# Patient Record
Sex: MALE | Race: WHITE | NOT HISPANIC OR LATINO | URBAN - METROPOLITAN AREA
[De-identification: names, ages, dates, MRNs, and addresses within clinical notes are randomized per-mention and may not be internally consistent; named-entity substitution may affect disease eponyms.]

---

## 2016-12-23 RX ORDER — CARVEDILOL PHOSPHATE 80 MG/1
1 CAPSULE, EXTENDED RELEASE ORAL
Qty: 0 | Refills: 0 | DISCHARGE
Start: 2016-12-23

## 2016-12-23 RX ORDER — FUROSEMIDE 40 MG
1 TABLET ORAL
Qty: 0 | Refills: 0 | COMMUNITY
Start: 2016-12-23

## 2017-01-31 PROBLEM — Z00.00 ENCOUNTER FOR PREVENTIVE HEALTH EXAMINATION: Status: ACTIVE | Noted: 2017-01-31

## 2017-02-16 ENCOUNTER — OUTPATIENT (OUTPATIENT)
Dept: OUTPATIENT SERVICES | Facility: HOSPITAL | Age: 67
LOS: 1 days | End: 2017-02-16
Payer: MEDICARE

## 2017-02-16 ENCOUNTER — APPOINTMENT (OUTPATIENT)
Dept: MRI IMAGING | Facility: HOSPITAL | Age: 67
End: 2017-02-16

## 2017-02-16 DIAGNOSIS — M46.26 OSTEOMYELITIS OF VERTEBRA, LUMBAR REGION: ICD-10-CM

## 2017-02-16 DIAGNOSIS — Z95.5 PRESENCE OF CORONARY ANGIOPLASTY IMPLANT AND GRAFT: Chronic | ICD-10-CM

## 2017-02-16 PROCEDURE — 72158 MRI LUMBAR SPINE W/O & W/DYE: CPT | Mod: 26

## 2017-02-16 PROCEDURE — A9585: CPT

## 2017-02-16 PROCEDURE — 72158 MRI LUMBAR SPINE W/O & W/DYE: CPT

## 2017-03-23 ENCOUNTER — OUTPATIENT (OUTPATIENT)
Dept: OUTPATIENT SERVICES | Facility: HOSPITAL | Age: 67
LOS: 1 days | End: 2017-03-23
Payer: MEDICARE

## 2017-03-23 ENCOUNTER — APPOINTMENT (OUTPATIENT)
Dept: MRI IMAGING | Facility: HOSPITAL | Age: 67
End: 2017-03-23

## 2017-03-23 DIAGNOSIS — M46.26 OSTEOMYELITIS OF VERTEBRA, LUMBAR REGION: ICD-10-CM

## 2017-03-23 DIAGNOSIS — Z95.5 PRESENCE OF CORONARY ANGIOPLASTY IMPLANT AND GRAFT: Chronic | ICD-10-CM

## 2017-03-23 DIAGNOSIS — M48.26 KISSING SPINE, LUMBAR REGION: ICD-10-CM

## 2017-03-23 PROCEDURE — 72158 MRI LUMBAR SPINE W/O & W/DYE: CPT | Mod: 26

## 2017-03-24 ENCOUNTER — INPATIENT (INPATIENT)
Facility: HOSPITAL | Age: 67
LOS: 3 days | Discharge: HOME | DRG: 371 | End: 2017-03-28
Attending: INTERNAL MEDICINE | Admitting: INTERNAL MEDICINE
Payer: MEDICARE

## 2017-03-24 VITALS
RESPIRATION RATE: 20 BRPM | SYSTOLIC BLOOD PRESSURE: 169 MMHG | DIASTOLIC BLOOD PRESSURE: 71 MMHG | HEART RATE: 74 BPM | TEMPERATURE: 99 F | OXYGEN SATURATION: 94 %

## 2017-03-24 DIAGNOSIS — Z95.5 PRESENCE OF CORONARY ANGIOPLASTY IMPLANT AND GRAFT: Chronic | ICD-10-CM

## 2017-03-24 DIAGNOSIS — M86.60 OTHER CHRONIC OSTEOMYELITIS, UNSPECIFIED SITE: ICD-10-CM

## 2017-03-24 DIAGNOSIS — I50.9 HEART FAILURE, UNSPECIFIED: ICD-10-CM

## 2017-03-24 DIAGNOSIS — N17.9 ACUTE KIDNEY FAILURE, UNSPECIFIED: ICD-10-CM

## 2017-03-24 DIAGNOSIS — R19.7 DIARRHEA, UNSPECIFIED: ICD-10-CM

## 2017-03-24 DIAGNOSIS — J44.1 CHRONIC OBSTRUCTIVE PULMONARY DISEASE WITH (ACUTE) EXACERBATION: ICD-10-CM

## 2017-03-24 DIAGNOSIS — K51.00 ULCERATIVE (CHRONIC) PANCOLITIS WITHOUT COMPLICATIONS: ICD-10-CM

## 2017-03-24 DIAGNOSIS — I48.91 UNSPECIFIED ATRIAL FIBRILLATION: ICD-10-CM

## 2017-03-24 DIAGNOSIS — I25.810 ATHEROSCLEROSIS OF CORONARY ARTERY BYPASS GRAFT(S) WITHOUT ANGINA PECTORIS: ICD-10-CM

## 2017-03-24 LAB
ALBUMIN SERPL ELPH-MCNC: 3.6 G/DL — SIGNIFICANT CHANGE UP (ref 3.3–5)
ALP SERPL-CCNC: 130 U/L — HIGH (ref 40–120)
ALT FLD-CCNC: 14 U/L RC — SIGNIFICANT CHANGE UP (ref 10–45)
ANION GAP SERPL CALC-SCNC: 12 MMOL/L — SIGNIFICANT CHANGE UP (ref 5–17)
APPEARANCE UR: CLEAR — SIGNIFICANT CHANGE UP
APTT BLD: 30.2 SEC — SIGNIFICANT CHANGE UP (ref 27.5–37.4)
AST SERPL-CCNC: 24 U/L — SIGNIFICANT CHANGE UP (ref 10–40)
BASOPHILS # BLD AUTO: 0 K/UL — SIGNIFICANT CHANGE UP (ref 0–0.2)
BASOPHILS NFR BLD AUTO: 2 % — SIGNIFICANT CHANGE UP (ref 0–2)
BILIRUB SERPL-MCNC: 0.5 MG/DL — SIGNIFICANT CHANGE UP (ref 0.2–1.2)
BILIRUB UR-MCNC: NEGATIVE — SIGNIFICANT CHANGE UP
BUN SERPL-MCNC: 41 MG/DL — HIGH (ref 7–23)
CALCIUM SERPL-MCNC: 8.5 MG/DL — SIGNIFICANT CHANGE UP (ref 8.4–10.5)
CHLORIDE SERPL-SCNC: 101 MMOL/L — SIGNIFICANT CHANGE UP (ref 96–108)
CK MB BLD-MCNC: 7.6 % — HIGH (ref 0–3.5)
CK MB CFR SERPL CALC: 2.6 NG/ML — SIGNIFICANT CHANGE UP (ref 0–6.7)
CK MB CFR SERPL CALC: 3.1 NG/ML — SIGNIFICANT CHANGE UP (ref 0–6.7)
CK SERPL-CCNC: 35 U/L — SIGNIFICANT CHANGE UP (ref 30–200)
CK SERPL-CCNC: 41 U/L — SIGNIFICANT CHANGE UP (ref 30–200)
CO2 SERPL-SCNC: 22 MMOL/L — SIGNIFICANT CHANGE UP (ref 22–31)
COLOR SPEC: YELLOW — SIGNIFICANT CHANGE UP
CREAT SERPL-MCNC: 1.76 MG/DL — HIGH (ref 0.5–1.3)
DIFF PNL FLD: NEGATIVE — SIGNIFICANT CHANGE UP
EOSINOPHIL # BLD AUTO: 0.1 K/UL — SIGNIFICANT CHANGE UP (ref 0–0.5)
GAS PNL BLDV: SIGNIFICANT CHANGE UP
GLUCOSE SERPL-MCNC: 155 MG/DL — HIGH (ref 70–99)
GLUCOSE UR QL: NEGATIVE — SIGNIFICANT CHANGE UP
HCT VFR BLD CALC: 32.5 % — LOW (ref 39–50)
HGB BLD-MCNC: 10.6 G/DL — LOW (ref 13–17)
INR BLD: 1.15 RATIO — SIGNIFICANT CHANGE UP (ref 0.88–1.16)
KETONES UR-MCNC: NEGATIVE — SIGNIFICANT CHANGE UP
LEUKOCYTE ESTERASE UR-ACNC: NEGATIVE — SIGNIFICANT CHANGE UP
LYMPHOCYTES # BLD AUTO: 1.2 K/UL — SIGNIFICANT CHANGE UP (ref 1–3.3)
LYMPHOCYTES # BLD AUTO: 30 % — SIGNIFICANT CHANGE UP (ref 13–44)
MCHC RBC-ENTMCNC: 31.2 PG — SIGNIFICANT CHANGE UP (ref 27–34)
MCHC RBC-ENTMCNC: 32.7 GM/DL — SIGNIFICANT CHANGE UP (ref 32–36)
MCV RBC AUTO: 95.2 FL — SIGNIFICANT CHANGE UP (ref 80–100)
MONOCYTES # BLD AUTO: 0.9 K/UL — SIGNIFICANT CHANGE UP (ref 0–0.9)
MONOCYTES NFR BLD AUTO: 23 % — HIGH (ref 2–14)
NEUTROPHILS # BLD AUTO: 1.4 K/UL — LOW (ref 1.8–7.4)
NEUTROPHILS NFR BLD AUTO: 38 % — LOW (ref 43–77)
NITRITE UR-MCNC: NEGATIVE — SIGNIFICANT CHANGE UP
NT-PROBNP SERPL-SCNC: 3244 PG/ML — HIGH (ref 0–300)
PH UR: 6 — SIGNIFICANT CHANGE UP (ref 4.8–8)
PLATELET # BLD AUTO: 179 K/UL — SIGNIFICANT CHANGE UP (ref 150–400)
POTASSIUM SERPL-MCNC: 4.5 MMOL/L — SIGNIFICANT CHANGE UP (ref 3.5–5.3)
POTASSIUM SERPL-SCNC: 4.5 MMOL/L — SIGNIFICANT CHANGE UP (ref 3.5–5.3)
PROT SERPL-MCNC: 6.2 G/DL — SIGNIFICANT CHANGE UP (ref 6–8.3)
PROT UR-MCNC: SIGNIFICANT CHANGE UP
PROTHROM AB SERPL-ACNC: 12.6 SEC — SIGNIFICANT CHANGE UP (ref 9.8–12.7)
RBC # BLD: 3.41 M/UL — LOW (ref 4.2–5.8)
RBC # FLD: 13.4 % — SIGNIFICANT CHANGE UP (ref 10.3–14.5)
SODIUM SERPL-SCNC: 135 MMOL/L — SIGNIFICANT CHANGE UP (ref 135–145)
SP GR SPEC: 1.01 — SIGNIFICANT CHANGE UP (ref 1.01–1.02)
TROPONIN T SERPL-MCNC: 0.06 NG/ML — SIGNIFICANT CHANGE UP (ref 0–0.06)
TROPONIN T SERPL-MCNC: 0.08 NG/ML — HIGH (ref 0–0.06)
UROBILINOGEN FLD QL: NEGATIVE — SIGNIFICANT CHANGE UP
WBC # BLD: 3.7 K/UL — LOW (ref 3.8–10.5)
WBC # FLD AUTO: 3.7 K/UL — LOW (ref 3.8–10.5)

## 2017-03-24 PROCEDURE — 71010: CPT | Mod: 26

## 2017-03-24 PROCEDURE — 99223 1ST HOSP IP/OBS HIGH 75: CPT

## 2017-03-24 PROCEDURE — 74176 CT ABD & PELVIS W/O CONTRAST: CPT | Mod: 26

## 2017-03-24 PROCEDURE — 99285 EMERGENCY DEPT VISIT HI MDM: CPT | Mod: GC

## 2017-03-24 RX ORDER — METRONIDAZOLE 500 MG
500 TABLET ORAL EVERY 8 HOURS
Qty: 0 | Refills: 0 | Status: DISCONTINUED | OUTPATIENT
Start: 2017-03-24 | End: 2017-03-26

## 2017-03-24 RX ORDER — HYDRALAZINE HCL 50 MG
25 TABLET ORAL THREE TIMES A DAY
Qty: 0 | Refills: 0 | Status: DISCONTINUED | OUTPATIENT
Start: 2017-03-24 | End: 2017-03-27

## 2017-03-24 RX ORDER — VANCOMYCIN HCL 1 G
125 VIAL (EA) INTRAVENOUS ONCE
Qty: 0 | Refills: 0 | Status: DISCONTINUED | OUTPATIENT
Start: 2017-03-24 | End: 2017-03-24

## 2017-03-24 RX ORDER — METRONIDAZOLE 500 MG
500 TABLET ORAL ONCE
Qty: 0 | Refills: 0 | Status: DISCONTINUED | OUTPATIENT
Start: 2017-03-24 | End: 2017-03-24

## 2017-03-24 RX ORDER — CIPROFLOXACIN LACTATE 400MG/40ML
500 VIAL (ML) INTRAVENOUS ONCE
Qty: 0 | Refills: 0 | Status: COMPLETED | OUTPATIENT
Start: 2017-03-24 | End: 2017-03-24

## 2017-03-24 RX ORDER — FUROSEMIDE 40 MG
20 TABLET ORAL ONCE
Qty: 0 | Refills: 0 | Status: COMPLETED | OUTPATIENT
Start: 2017-03-24 | End: 2017-03-24

## 2017-03-24 RX ORDER — NITROGLYCERIN 6.5 MG
0.5 CAPSULE, EXTENDED RELEASE ORAL ONCE
Qty: 0 | Refills: 0 | Status: COMPLETED | OUTPATIENT
Start: 2017-03-24 | End: 2017-03-24

## 2017-03-24 RX ORDER — FUROSEMIDE 40 MG
60 TABLET ORAL ONCE
Qty: 0 | Refills: 0 | Status: COMPLETED | OUTPATIENT
Start: 2017-03-24 | End: 2017-03-24

## 2017-03-24 RX ORDER — FUROSEMIDE 40 MG
20 TABLET ORAL ONCE
Qty: 0 | Refills: 0 | Status: DISCONTINUED | OUTPATIENT
Start: 2017-03-24 | End: 2017-03-24

## 2017-03-24 RX ORDER — CIPROFLOXACIN LACTATE 400MG/40ML
400 VIAL (ML) INTRAVENOUS EVERY 12 HOURS
Qty: 0 | Refills: 0 | Status: DISCONTINUED | OUTPATIENT
Start: 2017-03-24 | End: 2017-03-26

## 2017-03-24 RX ORDER — NITROGLYCERIN 6.5 MG
1 CAPSULE, EXTENDED RELEASE ORAL ONCE
Qty: 0 | Refills: 0 | Status: COMPLETED | OUTPATIENT
Start: 2017-03-24 | End: 2017-03-24

## 2017-03-24 RX ORDER — HEPARIN SODIUM 5000 [USP'U]/ML
5000 INJECTION INTRAVENOUS; SUBCUTANEOUS EVERY 8 HOURS
Qty: 0 | Refills: 0 | Status: DISCONTINUED | OUTPATIENT
Start: 2017-03-24 | End: 2017-03-28

## 2017-03-24 RX ORDER — NITROGLYCERIN 6.5 MG
0.5 CAPSULE, EXTENDED RELEASE ORAL ONCE
Qty: 0 | Refills: 0 | Status: DISCONTINUED | OUTPATIENT
Start: 2017-03-24 | End: 2017-03-24

## 2017-03-24 RX ORDER — CARVEDILOL PHOSPHATE 80 MG/1
25 CAPSULE, EXTENDED RELEASE ORAL EVERY 12 HOURS
Qty: 0 | Refills: 0 | Status: DISCONTINUED | OUTPATIENT
Start: 2017-03-24 | End: 2017-03-28

## 2017-03-24 RX ORDER — SODIUM CHLORIDE 9 MG/ML
1000 INJECTION INTRAMUSCULAR; INTRAVENOUS; SUBCUTANEOUS ONCE
Qty: 0 | Refills: 0 | Status: DISCONTINUED | OUTPATIENT
Start: 2017-03-24 | End: 2017-03-24

## 2017-03-24 RX ORDER — IPRATROPIUM/ALBUTEROL SULFATE 18-103MCG
3 AEROSOL WITH ADAPTER (GRAM) INHALATION EVERY 6 HOURS
Qty: 0 | Refills: 0 | Status: DISCONTINUED | OUTPATIENT
Start: 2017-03-24 | End: 2017-03-28

## 2017-03-24 RX ORDER — SODIUM CHLORIDE 9 MG/ML
500 INJECTION INTRAMUSCULAR; INTRAVENOUS; SUBCUTANEOUS ONCE
Qty: 0 | Refills: 0 | Status: COMPLETED | OUTPATIENT
Start: 2017-03-24 | End: 2017-03-24

## 2017-03-24 RX ORDER — FUROSEMIDE 40 MG
60 TABLET ORAL ONCE
Qty: 0 | Refills: 0 | Status: DISCONTINUED | OUTPATIENT
Start: 2017-03-24 | End: 2017-03-24

## 2017-03-24 RX ORDER — PANTOPRAZOLE SODIUM 20 MG/1
40 TABLET, DELAYED RELEASE ORAL
Qty: 0 | Refills: 0 | Status: DISCONTINUED | OUTPATIENT
Start: 2017-03-24 | End: 2017-03-28

## 2017-03-24 RX ADMIN — Medication 0.5 INCH(S): at 18:32

## 2017-03-24 RX ADMIN — Medication 0.5 INCH(S): at 23:58

## 2017-03-24 RX ADMIN — Medication 1 INCH(S): at 23:58

## 2017-03-24 RX ADMIN — SODIUM CHLORIDE 1000 MILLILITER(S): 9 INJECTION INTRAMUSCULAR; INTRAVENOUS; SUBCUTANEOUS at 23:55

## 2017-03-24 RX ADMIN — Medication 60 MILLIGRAM(S): at 22:20

## 2017-03-24 RX ADMIN — Medication 1 INCH(S): at 22:15

## 2017-03-24 RX ADMIN — Medication 500 MILLIGRAM(S): at 22:56

## 2017-03-24 RX ADMIN — Medication 3 MILLILITER(S): at 22:56

## 2017-03-24 RX ADMIN — Medication 20 MILLIGRAM(S): at 19:24

## 2017-03-24 RX ADMIN — Medication 500 MILLIGRAM(S): at 22:15

## 2017-03-24 NOTE — H&P ADULT. - PROBLEM SELECTOR PLAN 1
- increased productive sputum, cough, sob, and wheezing on exam  - PCO2 51, pulse ox 94%  - duonebs q6h  - prednisone 20 BID  - chest PT  - check RVP  - given SOB, hold IVF for now

## 2017-03-24 NOTE — H&P ADULT. - HISTORY OF PRESENT ILLNESS
66M c hx AFib (no longer on a/c 2/2 GIB), CAD s/p CABG/PCI, Bladder Ca s/p surg/RT, COPD, anemia, recent ICU hospitalization for lumbar osteomyelitis/GIB/GERALD (d/c'd Dec '16) s/p 12 weeks of vanc/ctx via PICC, pw diarrhea 5 days and sob 3 days.    Pt has been at rehab until 1 week ago. Since coming home       p/w generalized weakness x 2 weeks, sent in by cardiologist for GERALD and anemia on outpatient labs. Pt reports worsening chronic back pain/spasm x 1 month, s/p course of steroids and alleve daily. Endorses generalized weakness, fatigue, and poor PO intake x 2 weeks. Also reports epistaxis in the past few days. Reports colonoscopy 1 year ago that showed polyps. Also had EGD 3 years ago. Reports fall from wheelchair yesterday, but denies any head trauma.  Denies fevers, chills, CP, SOB, abdominal pain.   Also endorses daily alcohol intake - drinks 3 glasses of wine every day, last drink 2 days ago. Denies hx of alcohol withdrawal. Denies visual/auditory/tactile hallucinations, no anxiety, no diaphoresis.     ED Course:   Temp: 36.2, HR: 86, BP: 117/71, RR: 16, O2 sat: 99% on RA  Reportedly had blood clots in rectum.   Level 1 trauma called for hypotension in the setting of recent fall. Massive transfusion activated for anemia Hb 7.6, received 3U pRBCs, 2U of plasma and 1U of platelets.   Given 2L NS Bolus, Ca Gluconate, Albuterol, D5, Praxbind, Humalog 4U, Protonix 40 IV x 1 and Protonix ggt. Received IV contrast for CT. 66M c hx AFib (no longer on a/c 2/2 GIB), CAD s/p CABG/PCI, Bladder Ca s/p surg/RT, COPD, anemia, recent ICU hospitalization for lumbar osteomyelitis/GIB/GERALD (d/c'd Dec '16) s/p 12 weeks of vanc/ctx via PICC, pw diarrhea 5 days and sob 3 days.    Pt has been at rehab for 12 weeks until 1 week ago. He has also been on IV abx with picc like at rehab. Since coming home, he was well until 5 days ago when he started to have increasing frequency watery diarrhea. Yesterday, having 10 episodes of brown waterry diarrhea. Starting 3 days ago, he's been having increasing SOB and productive cough. He reports feeling dehydrated, and he self-tapered taking his lasix. His LE swelling is improving.    Denies bleeding from anywhere, melena, fevers, chills, chest pain, abd pain, N/V, headache. Last colonoscopy was 3-4 yrs ago. Last EGD was in Dec '16 which was grossly normal.    VS: Tm 98.6, P 80, /68, R 20, >94% RA  In the ED, received cipro po, flagyl po, NS 1L, lasix 20IV

## 2017-03-24 NOTE — H&P ADULT. - ASSESSMENT
66M c hx AFib (no longer on a/c 2/2 GIB), CAD s/p CABG/PCI, Bladder Ca s/p surg/RT, COPD, anemia, recent ICU hospitalization for lumbar osteomyelitis/GIB/GERALD (d/c'd Dec '16) s/p 12 weeks of vanc/ctx via PICC, pw pancolitis and COPD exacerbation.

## 2017-03-24 NOTE — H&P ADULT. - NEGATIVE GASTROINTESTINAL SYMPTOMS
no nausea/no abdominal pain/no vomiting no vomiting/no hematochezia/no abdominal pain/no melena/no nausea

## 2017-03-24 NOTE — H&P ADULT. - PMH
Atrial fibrillation    Bladder cancer    COPD (chronic obstructive pulmonary disease)    HLD (hyperlipidemia) Atrial fibrillation    Bladder cancer    Chronic osteomyelitis, osteomyelitis of unspecified site    COPD (chronic obstructive pulmonary disease)    Coronary artery disease involving coronary bypass graft of native heart without angina pectoris    HLD (hyperlipidemia)

## 2017-03-24 NOTE — H&P ADULT. - RADIOLOGY RESULTS AND INTERPRETATION
Personally reviewed imaging. Grossly clear CXR. CT a/p showing diffuse colitis of transverse and descending/sigmoid colon

## 2017-03-24 NOTE — H&P ADULT. - PROBLEM SELECTOR PLAN 5
- hx of CHF, but last TTE grossly normal (Dec '16)  - cont asa  - repeat TTE  - treat CE  - less likely CHF exacerbation despite LE swelling

## 2017-03-24 NOTE — ED PROVIDER NOTE - ATTENDING CONTRIBUTION TO CARE
67 y/o m with pmhx  Atrial fibrillation (on pradaxa), CAD s/p CABG/PCI, Bladder Ca s/p RT, COPD, GERALD presents for concern of low hgb, diarrhea, short of breath. Was seen earlier at Dr. Ledezma and was sent to ER for further treatment. Was having diarrhea for the last 10 days as treatment for osteo.   family with him at bedside. last hgb 8.8, stopped taking lasix last week.   Gen. no acute distress, Non toxic   HEENT: EOMI, mmm,   Lungs:b/l crackles and wheezing.   CVS: S1S2   Abd; Soft non tender, non distended   Ext: 3+ b/l lower ext edema   Neuro AAOx3 non focal clear speech

## 2017-03-24 NOTE — H&P ADULT. - MUSCULOSKELETAL
details… detailed exam no joint swelling/no calf tenderness ROM intact/no calf tenderness/no joint swelling

## 2017-03-24 NOTE — ED PROVIDER NOTE - PROGRESS NOTE DETAILS
ATTENDING MD Flavia: Probable C diff. Colitis on CT. Taking PO, covered with antibitoics. Breathing and symptoms imrpoved with nitro paste and lasix. pt is making good urine. Will admit to medicine.

## 2017-03-24 NOTE — ED ADULT NURSE NOTE - OBJECTIVE STATEMENT
66 year old male a/o3 ambulatory presenting to ed with worsening shortness of breath and 1 week of diarrhea. patient was recently at [facility] rehab for IV abt for tx of osteomyelitis. upon discharge at home patient began having shortness of breath. patient also with pmh of chf, was on Lasix 80 mg BID which he stopped taking about 5 days ago. 3 days ago patient began having Increased shortness of breath and increased swelling ot bilateral lower extremities with +3pitting edema respirations even unlabored no active sob/dyspnea currently no fever/chills. patient placed on contact precautions.

## 2017-03-24 NOTE — ED ADULT NURSE NOTE - PMH
Atrial fibrillation    Bladder cancer    COPD (chronic obstructive pulmonary disease)    HLD (hyperlipidemia)

## 2017-03-24 NOTE — ED PROVIDER NOTE - OBJECTIVE STATEMENT
66M w/ PMHx Afib on pradaxa, cad s/p cabg, chf, spinal osteomyelitis s/p IV abx p/w diarrhea, cough, sob for 1 week. Pt had a recent 12 week stay at [facility] rehab for osteomyelitis tx. Went home started having watery diarrhea with a cough. Felt weak, stopped taking lasix. Cough is non productive. Was discharged from rehab with hgb 8.8, went to see pmd/cards today who said to come into ED. Denies fever, chills, chest pain, palpitations, loc, dizziness.  PMD/cards: [phone] 66M w/ PMHx Afib on pradaxa, cad s/p cabg, chf, spinal osteomyelitis s/p IV abx p/w diarrhea, cough, sob for 1 week. Pt had a recent 12 week stay at [facility] rehab for osteomyelitis tx. Went home started having watery diarrhea with a cough. Felt weak, stopped taking lasix. Cough is non productive. Was discharged from rehab with hgb 8.8, went to see pmd/cards today who said to come into ED. Denies fever, chills, chest pain, palpitations, loc, dizziness.  PMD/cards: Dr. Magaña [phone]

## 2017-03-24 NOTE — ED PROVIDER NOTE - MEDICAL DECISION MAKING DETAILS
ATTD: diarrhea / shortness of breath / anemia  concern for chf / pna / c diff,- check labs, check urine, ct a/p, gentle ivf hydration, c diff. re eval for dispo

## 2017-03-24 NOTE — H&P ADULT. - PROBLEM SELECTOR PLAN 4
- urine lytes ordered  - likely prerenal  - hold lasix, but hold IVF for now until SOB improves  - hold home losartan  - will need nephro f/u as outpt

## 2017-03-24 NOTE — H&P ADULT. - PROBLEM SELECTOR PLAN 2
- unclear etiol  - informed pt to f/u with GI as outpt in 2-3 mo for cscope given hx of GIB  - cipro+flagyl for now  - cdiff pending  - hold IVF for now given SOB, but consider gentle IVF

## 2017-03-25 LAB
ALBUMIN SERPL ELPH-MCNC: 3.4 G/DL — SIGNIFICANT CHANGE UP (ref 3.3–5)
ALP SERPL-CCNC: 126 U/L — HIGH (ref 40–120)
ALT FLD-CCNC: 14 U/L RC — SIGNIFICANT CHANGE UP (ref 10–45)
ANION GAP SERPL CALC-SCNC: 14 MMOL/L — SIGNIFICANT CHANGE UP (ref 5–17)
AST SERPL-CCNC: 25 U/L — SIGNIFICANT CHANGE UP (ref 10–40)
BASOPHILS # BLD AUTO: 0 K/UL — SIGNIFICANT CHANGE UP (ref 0–0.2)
BASOPHILS NFR BLD AUTO: 1 % — SIGNIFICANT CHANGE UP (ref 0–2)
BILIRUB SERPL-MCNC: 0.4 MG/DL — SIGNIFICANT CHANGE UP (ref 0.2–1.2)
BUN SERPL-MCNC: 41 MG/DL — HIGH (ref 7–23)
C DIFF BY PCR RESULT: DETECTED
C DIFF TOX GENS STL QL NAA+PROBE: SIGNIFICANT CHANGE UP
CALCIUM SERPL-MCNC: 7.9 MG/DL — LOW (ref 8.4–10.5)
CHLORIDE SERPL-SCNC: 98 MMOL/L — SIGNIFICANT CHANGE UP (ref 96–108)
CHOLEST SERPL-MCNC: 126 MG/DL — SIGNIFICANT CHANGE UP (ref 10–199)
CO2 SERPL-SCNC: 22 MMOL/L — SIGNIFICANT CHANGE UP (ref 22–31)
CREAT ?TM UR-MCNC: 30 MG/DL — SIGNIFICANT CHANGE UP
CREAT SERPL-MCNC: 1.63 MG/DL — HIGH (ref 0.5–1.3)
CULTURE RESULTS: NO GROWTH — SIGNIFICANT CHANGE UP
EOSINOPHIL # BLD AUTO: 0.1 K/UL — SIGNIFICANT CHANGE UP (ref 0–0.5)
EOSINOPHIL NFR BLD AUTO: 2 % — SIGNIFICANT CHANGE UP (ref 0–6)
FERRITIN SERPL-MCNC: 457.7 NG/ML — HIGH (ref 30–400)
GLUCOSE SERPL-MCNC: 126 MG/DL — HIGH (ref 70–99)
HBA1C BLD-MCNC: 5.4 % — SIGNIFICANT CHANGE UP (ref 4–5.6)
HCT VFR BLD CALC: 26.8 % — LOW (ref 39–50)
HDLC SERPL-MCNC: 23 MG/DL — LOW (ref 40–125)
HGB BLD-MCNC: 9.2 G/DL — LOW (ref 13–17)
HMPV RNA SPEC QL NAA+PROBE: DETECTED
IRON SATN MFR SERPL: 12 % — LOW (ref 16–55)
IRON SATN MFR SERPL: 24 UG/DL — LOW (ref 45–165)
LIPID PNL WITH DIRECT LDL SERPL: 86 MG/DL — SIGNIFICANT CHANGE UP
LYMPHOCYTES # BLD AUTO: 1.3 K/UL — SIGNIFICANT CHANGE UP (ref 1–3.3)
LYMPHOCYTES # BLD AUTO: 41 % — SIGNIFICANT CHANGE UP (ref 13–44)
MAGNESIUM SERPL-MCNC: 1.8 MG/DL — SIGNIFICANT CHANGE UP (ref 1.6–2.6)
MCHC RBC-ENTMCNC: 32.7 PG — SIGNIFICANT CHANGE UP (ref 27–34)
MCHC RBC-ENTMCNC: 34.4 GM/DL — SIGNIFICANT CHANGE UP (ref 32–36)
MCV RBC AUTO: 94.9 FL — SIGNIFICANT CHANGE UP (ref 80–100)
MONOCYTES # BLD AUTO: 0.7 K/UL — SIGNIFICANT CHANGE UP (ref 0–0.9)
MONOCYTES NFR BLD AUTO: 24 % — HIGH (ref 2–14)
NEUTROPHILS # BLD AUTO: 1.2 K/UL — LOW (ref 1.8–7.4)
NEUTROPHILS NFR BLD AUTO: 20 % — LOW (ref 43–77)
PHOSPHATE SERPL-MCNC: 4.9 MG/DL — HIGH (ref 2.5–4.5)
PLATELET # BLD AUTO: 162 K/UL — SIGNIFICANT CHANGE UP (ref 150–400)
POTASSIUM SERPL-MCNC: 4.2 MMOL/L — SIGNIFICANT CHANGE UP (ref 3.5–5.3)
POTASSIUM SERPL-SCNC: 4.2 MMOL/L — SIGNIFICANT CHANGE UP (ref 3.5–5.3)
PROT ?TM UR-MCNC: 6 MG/DL — SIGNIFICANT CHANGE UP (ref 0–12)
PROT SERPL-MCNC: 5.6 G/DL — LOW (ref 6–8.3)
RAPID RVP RESULT: DETECTED
RBC # BLD: 2.82 M/UL — LOW (ref 4.2–5.8)
RBC # BLD: 2.82 M/UL — LOW (ref 4.2–5.8)
RBC # FLD: 12.8 % — SIGNIFICANT CHANGE UP (ref 10.3–14.5)
RETICS #: 53.2 K/UL — SIGNIFICANT CHANGE UP (ref 25–125)
RETICS/RBC NFR: 1.9 % — SIGNIFICANT CHANGE UP (ref 0.5–2.5)
SODIUM SERPL-SCNC: 134 MMOL/L — LOW (ref 135–145)
SODIUM UR-SCNC: 97 MMOL/L — SIGNIFICANT CHANGE UP
SPECIMEN SOURCE: SIGNIFICANT CHANGE UP
TIBC SERPL-MCNC: 195 UG/DL — LOW (ref 220–430)
TOTAL CHOLESTEROL/HDL RATIO MEASUREMENT: 5.5 RATIO — SIGNIFICANT CHANGE UP (ref 3.4–9.6)
TRIGL SERPL-MCNC: 86 MG/DL — SIGNIFICANT CHANGE UP (ref 10–149)
UIBC SERPL-MCNC: 171 UG/DL — SIGNIFICANT CHANGE UP (ref 110–370)
UUN UR-MCNC: 216 MG/DL — SIGNIFICANT CHANGE UP
WBC # BLD: 3.2 K/UL — LOW (ref 3.8–10.5)
WBC # FLD AUTO: 3.2 K/UL — LOW (ref 3.8–10.5)

## 2017-03-25 RX ORDER — FAMOTIDINE 10 MG/ML
2 INJECTION INTRAVENOUS
Qty: 0 | Refills: 0 | COMMUNITY

## 2017-03-25 RX ORDER — MAGNESIUM OXIDE 400 MG ORAL TABLET 241.3 MG
1 TABLET ORAL
Qty: 0 | Refills: 0 | COMMUNITY

## 2017-03-25 RX ORDER — HYDRALAZINE HCL 50 MG
1 TABLET ORAL
Qty: 0 | Refills: 0 | COMMUNITY

## 2017-03-25 RX ADMIN — Medication 25 MILLIGRAM(S): at 21:28

## 2017-03-25 RX ADMIN — PANTOPRAZOLE SODIUM 40 MILLIGRAM(S): 20 TABLET, DELAYED RELEASE ORAL at 08:25

## 2017-03-25 RX ADMIN — Medication 500 MILLIGRAM(S): at 05:01

## 2017-03-25 RX ADMIN — Medication 25 MILLIGRAM(S): at 05:01

## 2017-03-25 RX ADMIN — Medication 3 MILLILITER(S): at 10:43

## 2017-03-25 RX ADMIN — HEPARIN SODIUM 5000 UNIT(S): 5000 INJECTION INTRAVENOUS; SUBCUTANEOUS at 13:31

## 2017-03-25 RX ADMIN — Medication 500 MILLIGRAM(S): at 13:31

## 2017-03-25 RX ADMIN — Medication 200 MILLIGRAM(S): at 21:26

## 2017-03-25 RX ADMIN — Medication 20 MILLIGRAM(S): at 05:01

## 2017-03-25 RX ADMIN — Medication 3 MILLILITER(S): at 16:42

## 2017-03-25 RX ADMIN — HEPARIN SODIUM 5000 UNIT(S): 5000 INJECTION INTRAVENOUS; SUBCUTANEOUS at 05:01

## 2017-03-25 RX ADMIN — CARVEDILOL PHOSPHATE 25 MILLIGRAM(S): 80 CAPSULE, EXTENDED RELEASE ORAL at 05:01

## 2017-03-25 RX ADMIN — CARVEDILOL PHOSPHATE 25 MILLIGRAM(S): 80 CAPSULE, EXTENDED RELEASE ORAL at 18:21

## 2017-03-25 RX ADMIN — Medication 20 MILLIGRAM(S): at 18:20

## 2017-03-25 RX ADMIN — HEPARIN SODIUM 5000 UNIT(S): 5000 INJECTION INTRAVENOUS; SUBCUTANEOUS at 21:27

## 2017-03-25 RX ADMIN — Medication 200 MILLIGRAM(S): at 10:43

## 2017-03-25 RX ADMIN — Medication 3 MILLILITER(S): at 21:26

## 2017-03-25 RX ADMIN — Medication 25 MILLIGRAM(S): at 13:31

## 2017-03-25 RX ADMIN — Medication 3 MILLILITER(S): at 03:00

## 2017-03-25 RX ADMIN — Medication 500 MILLIGRAM(S): at 23:03

## 2017-03-25 NOTE — PROVIDER CONTACT NOTE (CRITICAL VALUE NOTIFICATION) - ACTION/TREATMENT ORDERED:
Will continue to monitor. ABT continues, and remain contact precaution.  Safety and comfort measure maintained.  Will continue to monitor.

## 2017-03-26 LAB
ANION GAP SERPL CALC-SCNC: 12 MMOL/L — SIGNIFICANT CHANGE UP (ref 5–17)
BUN SERPL-MCNC: 50 MG/DL — HIGH (ref 7–23)
CALCIUM SERPL-MCNC: 8.2 MG/DL — LOW (ref 8.4–10.5)
CHLORIDE SERPL-SCNC: 97 MMOL/L — SIGNIFICANT CHANGE UP (ref 96–108)
CO2 SERPL-SCNC: 20 MMOL/L — LOW (ref 22–31)
CREAT SERPL-MCNC: 1.7 MG/DL — HIGH (ref 0.5–1.3)
GLUCOSE SERPL-MCNC: 197 MG/DL — HIGH (ref 70–99)
HCT VFR BLD CALC: 26.7 % — LOW (ref 39–50)
HGB BLD-MCNC: 9 G/DL — LOW (ref 13–17)
MAGNESIUM SERPL-MCNC: 1.7 MG/DL — SIGNIFICANT CHANGE UP (ref 1.6–2.6)
MCHC RBC-ENTMCNC: 32.1 PG — SIGNIFICANT CHANGE UP (ref 27–34)
MCHC RBC-ENTMCNC: 33.7 GM/DL — SIGNIFICANT CHANGE UP (ref 32–36)
MCV RBC AUTO: 95.4 FL — SIGNIFICANT CHANGE UP (ref 80–100)
PHOSPHATE SERPL-MCNC: 4.5 MG/DL — SIGNIFICANT CHANGE UP (ref 2.5–4.5)
PLATELET # BLD AUTO: 153 K/UL — SIGNIFICANT CHANGE UP (ref 150–400)
POTASSIUM SERPL-MCNC: 4.4 MMOL/L — SIGNIFICANT CHANGE UP (ref 3.5–5.3)
POTASSIUM SERPL-SCNC: 4.4 MMOL/L — SIGNIFICANT CHANGE UP (ref 3.5–5.3)
RBC # BLD: 2.8 M/UL — LOW (ref 4.2–5.8)
RBC # FLD: 14.1 % — SIGNIFICANT CHANGE UP (ref 10.3–14.5)
SODIUM SERPL-SCNC: 129 MMOL/L — LOW (ref 135–145)
WBC # BLD: 2.76 K/UL — LOW (ref 3.8–10.5)
WBC # FLD AUTO: 2.76 K/UL — LOW (ref 3.8–10.5)

## 2017-03-26 RX ORDER — BUDESONIDE, MICRONIZED 100 %
0.5 POWDER (GRAM) MISCELLANEOUS
Qty: 0 | Refills: 0 | Status: DISCONTINUED | OUTPATIENT
Start: 2017-03-26 | End: 2017-03-28

## 2017-03-26 RX ORDER — VANCOMYCIN HCL 1 G
250 VIAL (EA) INTRAVENOUS EVERY 6 HOURS
Qty: 0 | Refills: 0 | Status: DISCONTINUED | OUTPATIENT
Start: 2017-03-26 | End: 2017-03-28

## 2017-03-26 RX ORDER — LACTOBACILLUS ACIDOPHILUS 100MM CELL
2 CAPSULE ORAL
Qty: 0 | Refills: 0 | Status: DISCONTINUED | OUTPATIENT
Start: 2017-03-26 | End: 2017-03-28

## 2017-03-26 RX ADMIN — CARVEDILOL PHOSPHATE 25 MILLIGRAM(S): 80 CAPSULE, EXTENDED RELEASE ORAL at 17:06

## 2017-03-26 RX ADMIN — CARVEDILOL PHOSPHATE 25 MILLIGRAM(S): 80 CAPSULE, EXTENDED RELEASE ORAL at 05:30

## 2017-03-26 RX ADMIN — PANTOPRAZOLE SODIUM 40 MILLIGRAM(S): 20 TABLET, DELAYED RELEASE ORAL at 05:31

## 2017-03-26 RX ADMIN — Medication 2 TABLET(S): at 17:06

## 2017-03-26 RX ADMIN — Medication 3 MILLILITER(S): at 15:32

## 2017-03-26 RX ADMIN — Medication 250 MILLIGRAM(S): at 23:47

## 2017-03-26 RX ADMIN — Medication 3 MILLILITER(S): at 21:09

## 2017-03-26 RX ADMIN — HEPARIN SODIUM 5000 UNIT(S): 5000 INJECTION INTRAVENOUS; SUBCUTANEOUS at 13:11

## 2017-03-26 RX ADMIN — Medication 25 MILLIGRAM(S): at 21:09

## 2017-03-26 RX ADMIN — Medication 0.5 MILLIGRAM(S): at 17:06

## 2017-03-26 RX ADMIN — Medication 3 MILLILITER(S): at 04:42

## 2017-03-26 RX ADMIN — Medication 500 MILLIGRAM(S): at 05:25

## 2017-03-26 RX ADMIN — Medication 25 MILLIGRAM(S): at 13:11

## 2017-03-26 RX ADMIN — Medication 20 MILLIGRAM(S): at 17:06

## 2017-03-26 RX ADMIN — Medication 250 MILLIGRAM(S): at 17:06

## 2017-03-26 RX ADMIN — Medication 20 MILLIGRAM(S): at 05:30

## 2017-03-26 RX ADMIN — Medication 25 MILLIGRAM(S): at 05:30

## 2017-03-26 RX ADMIN — Medication 200 MILLIGRAM(S): at 05:24

## 2017-03-26 RX ADMIN — HEPARIN SODIUM 5000 UNIT(S): 5000 INJECTION INTRAVENOUS; SUBCUTANEOUS at 05:29

## 2017-03-26 RX ADMIN — HEPARIN SODIUM 5000 UNIT(S): 5000 INJECTION INTRAVENOUS; SUBCUTANEOUS at 21:08

## 2017-03-26 RX ADMIN — Medication 3 MILLILITER(S): at 10:19

## 2017-03-26 NOTE — PHYSICAL THERAPY INITIAL EVALUATION ADULT - PERTINENT HX OF CURRENT PROBLEM, REHAB EVAL
66M c hx AFib (no longer on a/c 2/2 GIB), CAD s/p CABG/PCI, Bladder Ca s/p surg/RT, COPD, anemia, recent ICU hospitalization for lumbar osteomyelitis/GIB/GERALD (d/c'd Dec '16) s/p 12 weeks of vanc/ctx via PICC, p/w pancolitis and COPD exacerbation. Pt was in [facility] untill last week, Results shows decreased h/h, MRI lumbar: Discitis and osteomyelitis at the L4-5 level is again seen and unchanged. +C-diff

## 2017-03-26 NOTE — PHYSICAL THERAPY INITIAL EVALUATION ADULT - ADDITIONAL COMMENTS
Social hx; Pt lives in a pvt home with his spouse on 2nd floor, 13steps inside HR present, PTA Independent in his ADLs and IADLs, Owns cane and RW, use cane for outdoor walking,

## 2017-03-26 NOTE — PHYSICAL THERAPY INITIAL EVALUATION ADULT - DISCHARGE DISPOSITION, PT EVAL
home w/ assist/home with assist of family as needed recommended outpatient PT to improve his strength, endurance, posture and balance/outpatient PT

## 2017-03-27 LAB
ANION GAP SERPL CALC-SCNC: 13 MMOL/L — SIGNIFICANT CHANGE UP (ref 5–17)
BUN SERPL-MCNC: 57 MG/DL — HIGH (ref 7–23)
CALCIUM SERPL-MCNC: 8.4 MG/DL — SIGNIFICANT CHANGE UP (ref 8.4–10.5)
CHLORIDE SERPL-SCNC: 98 MMOL/L — SIGNIFICANT CHANGE UP (ref 96–108)
CO2 SERPL-SCNC: 17 MMOL/L — LOW (ref 22–31)
CREAT SERPL-MCNC: 1.79 MG/DL — HIGH (ref 0.5–1.3)
GLUCOSE SERPL-MCNC: 207 MG/DL — HIGH (ref 70–99)
HCT VFR BLD CALC: 26.8 % — LOW (ref 39–50)
HGB BLD-MCNC: 9.1 G/DL — LOW (ref 13–17)
MAGNESIUM SERPL-MCNC: 1.7 MG/DL — SIGNIFICANT CHANGE UP (ref 1.6–2.6)
MCHC RBC-ENTMCNC: 31.7 PG — SIGNIFICANT CHANGE UP (ref 27–34)
MCHC RBC-ENTMCNC: 34 GM/DL — SIGNIFICANT CHANGE UP (ref 32–36)
MCV RBC AUTO: 93.4 FL — SIGNIFICANT CHANGE UP (ref 80–100)
PHOSPHATE SERPL-MCNC: 3.9 MG/DL — SIGNIFICANT CHANGE UP (ref 2.5–4.5)
PLATELET # BLD AUTO: 173 K/UL — SIGNIFICANT CHANGE UP (ref 150–400)
POTASSIUM SERPL-MCNC: 4.6 MMOL/L — SIGNIFICANT CHANGE UP (ref 3.5–5.3)
POTASSIUM SERPL-SCNC: 4.6 MMOL/L — SIGNIFICANT CHANGE UP (ref 3.5–5.3)
RBC # BLD: 2.87 M/UL — LOW (ref 4.2–5.8)
RBC # FLD: 14.1 % — SIGNIFICANT CHANGE UP (ref 10.3–14.5)
SODIUM SERPL-SCNC: 128 MMOL/L — LOW (ref 135–145)
WBC # BLD: 3.93 K/UL — SIGNIFICANT CHANGE UP (ref 3.8–10.5)
WBC # FLD AUTO: 3.93 K/UL — SIGNIFICANT CHANGE UP (ref 3.8–10.5)

## 2017-03-27 PROCEDURE — 99223 1ST HOSP IP/OBS HIGH 75: CPT

## 2017-03-27 RX ORDER — HYDRALAZINE HCL 50 MG
50 TABLET ORAL THREE TIMES A DAY
Qty: 0 | Refills: 0 | Status: DISCONTINUED | OUTPATIENT
Start: 2017-03-27 | End: 2017-03-28

## 2017-03-27 RX ADMIN — Medication 50 MILLIGRAM(S): at 23:38

## 2017-03-27 RX ADMIN — Medication 2 TABLET(S): at 17:43

## 2017-03-27 RX ADMIN — Medication 250 MILLIGRAM(S): at 05:01

## 2017-03-27 RX ADMIN — Medication 0.5 MILLIGRAM(S): at 05:00

## 2017-03-27 RX ADMIN — HEPARIN SODIUM 5000 UNIT(S): 5000 INJECTION INTRAVENOUS; SUBCUTANEOUS at 05:01

## 2017-03-27 RX ADMIN — Medication 2 TABLET(S): at 09:06

## 2017-03-27 RX ADMIN — HEPARIN SODIUM 5000 UNIT(S): 5000 INJECTION INTRAVENOUS; SUBCUTANEOUS at 21:07

## 2017-03-27 RX ADMIN — Medication 0.5 MILLIGRAM(S): at 17:43

## 2017-03-27 RX ADMIN — Medication 2 TABLET(S): at 12:05

## 2017-03-27 RX ADMIN — HEPARIN SODIUM 5000 UNIT(S): 5000 INJECTION INTRAVENOUS; SUBCUTANEOUS at 15:35

## 2017-03-27 RX ADMIN — Medication 250 MILLIGRAM(S): at 12:05

## 2017-03-27 RX ADMIN — CARVEDILOL PHOSPHATE 25 MILLIGRAM(S): 80 CAPSULE, EXTENDED RELEASE ORAL at 05:00

## 2017-03-27 RX ADMIN — Medication 250 MILLIGRAM(S): at 23:30

## 2017-03-27 RX ADMIN — Medication 3 MILLILITER(S): at 21:07

## 2017-03-27 RX ADMIN — Medication 50 MILLIGRAM(S): at 17:47

## 2017-03-27 RX ADMIN — Medication 20 MILLIGRAM(S): at 05:00

## 2017-03-27 RX ADMIN — CARVEDILOL PHOSPHATE 25 MILLIGRAM(S): 80 CAPSULE, EXTENDED RELEASE ORAL at 17:44

## 2017-03-27 RX ADMIN — PANTOPRAZOLE SODIUM 40 MILLIGRAM(S): 20 TABLET, DELAYED RELEASE ORAL at 05:00

## 2017-03-27 RX ADMIN — Medication 250 MILLIGRAM(S): at 17:44

## 2017-03-27 RX ADMIN — Medication 3 MILLILITER(S): at 16:17

## 2017-03-27 RX ADMIN — Medication 3 MILLILITER(S): at 09:06

## 2017-03-27 RX ADMIN — Medication 3 MILLILITER(S): at 04:45

## 2017-03-27 RX ADMIN — Medication 25 MILLIGRAM(S): at 05:00

## 2017-03-27 RX ADMIN — Medication 20 MILLIGRAM(S): at 17:44

## 2017-03-28 ENCOUNTER — TRANSCRIPTION ENCOUNTER (OUTPATIENT)
Age: 67
End: 2017-03-28

## 2017-03-28 VITALS
RESPIRATION RATE: 18 BRPM | HEART RATE: 62 BPM | SYSTOLIC BLOOD PRESSURE: 156 MMHG | OXYGEN SATURATION: 95 % | DIASTOLIC BLOOD PRESSURE: 77 MMHG | TEMPERATURE: 97 F

## 2017-03-28 LAB
ANION GAP SERPL CALC-SCNC: 14 MMOL/L — SIGNIFICANT CHANGE UP (ref 5–17)
BUN SERPL-MCNC: 55 MG/DL — HIGH (ref 7–23)
CALCIUM SERPL-MCNC: 8.1 MG/DL — LOW (ref 8.4–10.5)
CHLORIDE SERPL-SCNC: 97 MMOL/L — SIGNIFICANT CHANGE UP (ref 96–108)
CO2 SERPL-SCNC: 19 MMOL/L — LOW (ref 22–31)
CREAT SERPL-MCNC: 1.57 MG/DL — HIGH (ref 0.5–1.3)
GLUCOSE SERPL-MCNC: 217 MG/DL — HIGH (ref 70–99)
HCT VFR BLD CALC: 28 % — LOW (ref 39–50)
HGB BLD-MCNC: 9.2 G/DL — LOW (ref 13–17)
MAGNESIUM SERPL-MCNC: 1.7 MG/DL — SIGNIFICANT CHANGE UP (ref 1.6–2.6)
MCHC RBC-ENTMCNC: 31.1 PG — SIGNIFICANT CHANGE UP (ref 27–34)
MCHC RBC-ENTMCNC: 32.9 GM/DL — SIGNIFICANT CHANGE UP (ref 32–36)
MCV RBC AUTO: 94.6 FL — SIGNIFICANT CHANGE UP (ref 80–100)
PHOSPHATE SERPL-MCNC: 3.1 MG/DL — SIGNIFICANT CHANGE UP (ref 2.5–4.5)
PLATELET # BLD AUTO: 197 K/UL — SIGNIFICANT CHANGE UP (ref 150–400)
POTASSIUM SERPL-MCNC: 4.7 MMOL/L — SIGNIFICANT CHANGE UP (ref 3.5–5.3)
POTASSIUM SERPL-SCNC: 4.7 MMOL/L — SIGNIFICANT CHANGE UP (ref 3.5–5.3)
RBC # BLD: 2.96 M/UL — LOW (ref 4.2–5.8)
RBC # FLD: 14.5 % — SIGNIFICANT CHANGE UP (ref 10.3–14.5)
SODIUM SERPL-SCNC: 130 MMOL/L — LOW (ref 135–145)
WBC # BLD: 4.08 K/UL — SIGNIFICANT CHANGE UP (ref 3.8–10.5)
WBC # FLD AUTO: 4.08 K/UL — SIGNIFICANT CHANGE UP (ref 3.8–10.5)

## 2017-03-28 PROCEDURE — 85045 AUTOMATED RETICULOCYTE COUNT: CPT

## 2017-03-28 PROCEDURE — 84156 ASSAY OF PROTEIN URINE: CPT

## 2017-03-28 PROCEDURE — 82330 ASSAY OF CALCIUM: CPT

## 2017-03-28 PROCEDURE — 85730 THROMBOPLASTIN TIME PARTIAL: CPT

## 2017-03-28 PROCEDURE — 81003 URINALYSIS AUTO W/O SCOPE: CPT

## 2017-03-28 PROCEDURE — 83036 HEMOGLOBIN GLYCOSYLATED A1C: CPT

## 2017-03-28 PROCEDURE — 84540 ASSAY OF URINE/UREA-N: CPT

## 2017-03-28 PROCEDURE — 99285 EMERGENCY DEPT VISIT HI MDM: CPT | Mod: 25

## 2017-03-28 PROCEDURE — 85610 PROTHROMBIN TIME: CPT

## 2017-03-28 PROCEDURE — 82550 ASSAY OF CK (CPK): CPT

## 2017-03-28 PROCEDURE — 84484 ASSAY OF TROPONIN QUANT: CPT

## 2017-03-28 PROCEDURE — 87086 URINE CULTURE/COLONY COUNT: CPT

## 2017-03-28 PROCEDURE — 80053 COMPREHEN METABOLIC PANEL: CPT

## 2017-03-28 PROCEDURE — 87040 BLOOD CULTURE FOR BACTERIA: CPT

## 2017-03-28 PROCEDURE — 84100 ASSAY OF PHOSPHORUS: CPT

## 2017-03-28 PROCEDURE — 84295 ASSAY OF SERUM SODIUM: CPT

## 2017-03-28 PROCEDURE — 93306 TTE W/DOPPLER COMPLETE: CPT | Mod: 26

## 2017-03-28 PROCEDURE — 80048 BASIC METABOLIC PNL TOTAL CA: CPT

## 2017-03-28 PROCEDURE — 84300 ASSAY OF URINE SODIUM: CPT

## 2017-03-28 PROCEDURE — 83605 ASSAY OF LACTIC ACID: CPT

## 2017-03-28 PROCEDURE — 87486 CHLMYD PNEUM DNA AMP PROBE: CPT

## 2017-03-28 PROCEDURE — 94640 AIRWAY INHALATION TREATMENT: CPT

## 2017-03-28 PROCEDURE — 83735 ASSAY OF MAGNESIUM: CPT

## 2017-03-28 PROCEDURE — 82728 ASSAY OF FERRITIN: CPT

## 2017-03-28 PROCEDURE — 82803 BLOOD GASES ANY COMBINATION: CPT

## 2017-03-28 PROCEDURE — 85027 COMPLETE CBC AUTOMATED: CPT

## 2017-03-28 PROCEDURE — 87798 DETECT AGENT NOS DNA AMP: CPT

## 2017-03-28 PROCEDURE — 85014 HEMATOCRIT: CPT

## 2017-03-28 PROCEDURE — 84132 ASSAY OF SERUM POTASSIUM: CPT

## 2017-03-28 PROCEDURE — 87633 RESP VIRUS 12-25 TARGETS: CPT

## 2017-03-28 PROCEDURE — 82947 ASSAY GLUCOSE BLOOD QUANT: CPT

## 2017-03-28 PROCEDURE — 83880 ASSAY OF NATRIURETIC PEPTIDE: CPT

## 2017-03-28 PROCEDURE — 87493 C DIFF AMPLIFIED PROBE: CPT

## 2017-03-28 PROCEDURE — 82435 ASSAY OF BLOOD CHLORIDE: CPT

## 2017-03-28 PROCEDURE — 82570 ASSAY OF URINE CREATININE: CPT

## 2017-03-28 PROCEDURE — 82553 CREATINE MB FRACTION: CPT

## 2017-03-28 PROCEDURE — 72158 MRI LUMBAR SPINE W/O & W/DYE: CPT

## 2017-03-28 PROCEDURE — 93306 TTE W/DOPPLER COMPLETE: CPT

## 2017-03-28 PROCEDURE — 74176 CT ABD & PELVIS W/O CONTRAST: CPT

## 2017-03-28 PROCEDURE — 83550 IRON BINDING TEST: CPT

## 2017-03-28 PROCEDURE — 87581 M.PNEUMON DNA AMP PROBE: CPT

## 2017-03-28 PROCEDURE — 96374 THER/PROPH/DIAG INJ IV PUSH: CPT

## 2017-03-28 PROCEDURE — 80061 LIPID PANEL: CPT

## 2017-03-28 PROCEDURE — 97162 PT EVAL MOD COMPLEX 30 MIN: CPT

## 2017-03-28 PROCEDURE — A9585: CPT

## 2017-03-28 PROCEDURE — 71045 X-RAY EXAM CHEST 1 VIEW: CPT

## 2017-03-28 RX ORDER — VANCOMYCIN HCL 1 G
2.5 VIAL (EA) INTRAVENOUS
Qty: 0 | Refills: 0 | COMMUNITY
Start: 2017-03-28

## 2017-03-28 RX ORDER — CALCIUM CARBONATE 500(1250)
1 TABLET ORAL
Qty: 0 | Refills: 0 | COMMUNITY

## 2017-03-28 RX ORDER — HYDRALAZINE HCL 50 MG
1 TABLET ORAL
Qty: 0 | Refills: 0 | DISCHARGE
Start: 2017-03-28

## 2017-03-28 RX ORDER — LOSARTAN POTASSIUM 100 MG/1
1 TABLET, FILM COATED ORAL
Qty: 0 | Refills: 0 | COMMUNITY

## 2017-03-28 RX ORDER — POLYETHYLENE GLYCOL 3350 17 G/17G
17 POWDER, FOR SOLUTION ORAL
Qty: 0 | Refills: 0 | COMMUNITY

## 2017-03-28 RX ORDER — LACTOBACILLUS ACIDOPHILUS 100MM CELL
0 CAPSULE ORAL
Qty: 0 | Refills: 0 | COMMUNITY
Start: 2017-03-28

## 2017-03-28 RX ORDER — VANCOMYCIN HCL 1 G
2.5 VIAL (EA) INTRAVENOUS
Qty: 100 | Refills: 0
Start: 2017-03-28 | End: 2017-04-07

## 2017-03-28 RX ORDER — HYDRALAZINE HCL 50 MG
3 TABLET ORAL
Qty: 0 | Refills: 0 | COMMUNITY

## 2017-03-28 RX ORDER — FERROUS SULFATE 325(65) MG
1 TABLET ORAL
Qty: 0 | Refills: 0 | COMMUNITY

## 2017-03-28 RX ORDER — LACTOBACILLUS ACIDOPHILUS 100MM CELL
1 CAPSULE ORAL
Qty: 0 | Refills: 0 | COMMUNITY
Start: 2017-03-28

## 2017-03-28 RX ORDER — LACTOBACILLUS ACIDOPHILUS 100MM CELL
1 CAPSULE ORAL
Qty: 60 | Refills: 0
Start: 2017-03-28 | End: 2017-04-27

## 2017-03-28 RX ADMIN — Medication 250 MILLIGRAM(S): at 11:21

## 2017-03-28 RX ADMIN — Medication 50 MILLIGRAM(S): at 13:43

## 2017-03-28 RX ADMIN — HEPARIN SODIUM 5000 UNIT(S): 5000 INJECTION INTRAVENOUS; SUBCUTANEOUS at 13:43

## 2017-03-28 RX ADMIN — PANTOPRAZOLE SODIUM 40 MILLIGRAM(S): 20 TABLET, DELAYED RELEASE ORAL at 05:46

## 2017-03-28 RX ADMIN — CARVEDILOL PHOSPHATE 25 MILLIGRAM(S): 80 CAPSULE, EXTENDED RELEASE ORAL at 05:46

## 2017-03-28 RX ADMIN — Medication 20 MILLIGRAM(S): at 05:46

## 2017-03-28 RX ADMIN — Medication 3 MILLILITER(S): at 11:21

## 2017-03-28 RX ADMIN — Medication 2 TABLET(S): at 11:22

## 2017-03-28 RX ADMIN — Medication 50 MILLIGRAM(S): at 04:55

## 2017-03-28 RX ADMIN — HEPARIN SODIUM 5000 UNIT(S): 5000 INJECTION INTRAVENOUS; SUBCUTANEOUS at 05:46

## 2017-03-28 RX ADMIN — Medication 2 TABLET(S): at 08:02

## 2017-03-28 RX ADMIN — Medication 250 MILLIGRAM(S): at 05:46

## 2017-03-28 RX ADMIN — Medication 3 MILLILITER(S): at 05:46

## 2017-03-28 RX ADMIN — Medication 0.5 MILLIGRAM(S): at 05:46

## 2017-03-28 NOTE — DISCHARGE NOTE ADULT - SECONDARY DIAGNOSIS.
COPD exacerbation Acute on chronic congestive heart failure, unspecified congestive heart failure type GERALD (acute kidney injury)

## 2017-03-28 NOTE — DISCHARGE NOTE ADULT - MEDICATION SUMMARY - MEDICATIONS TO STOP TAKING
I will STOP taking the medications listed below when I get home from the hospital:    oxyCODONE 5 mg oral tablet  -- 1 tab(s) by mouth every 4 hours, As needed, Moderate to severe Pain    albuterol-ipratropium 2.5 mg-0.5 mg/3 mL inhalation solution  -- 3 milliliter(s) inhaled every 6 hours    cefTRIAXone  -- 2 gram(s) intravenous once a day.  Last dose on 2/17/2017.    silver sulfADIAZINE 1% topical cream  -- 1 application on skin once a day    vancomycin  -- 1 gram(s) intravenous every 12 hours.  Last dose on 2/17/2017    polyethylene glycol 3350 oral powder for reconstitution  -- 17 gram(s) by mouth once a day    pantoprazole 40 mg oral delayed release tablet  -- 1 tab(s) by mouth 2 times a day (before meals)    folic acid 1 mg oral tablet  -- 1 tab(s) by mouth once a day    thiamine 100 mg oral tablet  -- 1 tab(s) by mouth once a day    Pepcid AC Maximum Strength 20 mg oral tablet  -- 2 tab(s) by mouth 2 times a day    ferrous sulfate 325 mg (65 mg elemental iron) oral delayed release tablet  -- 1 tab(s) by mouth once a day    calcium carbonate 500 mg (200 mg elemental calcium) oral tablet, chewable  -- 1 tab(s) by mouth once a day    polyethylene glycol 3350 oral powder for reconstitution  -- 17 gram(s) by mouth once a day I will STOP taking the medications listed below when I get home from the hospital:    oxyCODONE 5 mg oral tablet  -- 1 tab(s) by mouth every 4 hours, As needed, Moderate to severe Pain    albuterol-ipratropium 2.5 mg-0.5 mg/3 mL inhalation solution  -- 3 milliliter(s) inhaled every 6 hours    cefTRIAXone  -- 2 gram(s) intravenous once a day.  Last dose on 2/17/2017.    silver sulfADIAZINE 1% topical cream  -- 1 application on skin once a day    vancomycin  -- 1 gram(s) intravenous every 12 hours.  Last dose on 2/17/2017    polyethylene glycol 3350 oral powder for reconstitution  -- 17 gram(s) by mouth once a day    pantoprazole 40 mg oral delayed release tablet  -- 1 tab(s) by mouth 2 times a day (before meals)    folic acid 1 mg oral tablet  -- 1 tab(s) by mouth once a day    thiamine 100 mg oral tablet  -- 1 tab(s) by mouth once a day    Pepcid AC Maximum Strength 20 mg oral tablet  -- 2 tab(s) by mouth 2 times a day    losartan 50 mg oral tablet  -- 1 tab(s) by mouth once a day    calcium carbonate 500 mg (200 mg elemental calcium) oral tablet, chewable  -- 1 tab(s) by mouth once a day    polyethylene glycol 3350 oral powder for reconstitution  -- 17 gram(s) by mouth once a day

## 2017-03-28 NOTE — DISCHARGE NOTE ADULT - CARE PLAN
Principal Discharge DX:	Pancolitis  Goal:	improve with medication  Instructions for follow-up, activity and diet:	continue oral vancomycin as prescribed  Secondary Diagnosis:	COPD exacerbation  Instructions for follow-up, activity and diet:	continue medication as prescribed  Secondary Diagnosis:	Acute on chronic congestive heart failure, unspecified congestive heart failure type  Instructions for follow-up, activity and diet:	low salt diet  check your weight daily-monitor for weight gain  follow up with your primary doctor in 1 week  Secondary Diagnosis:	GERALD (acute kidney injury) Principal Discharge DX:	Pancolitis  Goal:	improve with medication  Instructions for follow-up, activity and diet:	C diff colitis  continue oral vancomycin as prescribed  follow up with infectious disease doctor as outpatient  Secondary Diagnosis:	COPD exacerbation  Instructions for follow-up, activity and diet:	respiratory virus- human metapneuovirus viral infection  continue medication as prescribed  Secondary Diagnosis:	Acute on chronic congestive heart failure, unspecified congestive heart failure type  Instructions for follow-up, activity and diet:	low salt diet  check your weight daily-monitor for weight gain  follow up with your primary doctor in 1 week  Secondary Diagnosis:	GERALD (acute kidney injury)  Instructions for follow-up, activity and diet:	improved  follow up with your primary doctor in 1 week  follow up blood work BMP Principal Discharge DX:	Pancolitis  Goal:	improve with medication  Instructions for follow-up, activity and diet:	C diff colitis  continue oral vancomycin as prescribed  follow up with infectious disease doctor as outpatient  Secondary Diagnosis:	COPD exacerbation  Instructions for follow-up, activity and diet:	respiratory virus- human metapneuovirus viral infection  continue medication as prescribed  Secondary Diagnosis:	Acute on chronic congestive heart failure, unspecified congestive heart failure type  Instructions for follow-up, activity and diet:	low salt diet  follow upp with your cardiologist in 1 week  losartan was on hold secondary to impaired kidney function  check your weight daily-monitor for weight gain  Secondary Diagnosis:	GERALD (acute kidney injury)  Instructions for follow-up, activity and diet:	improved  follow up with your primary doctor in 1 week  follow up blood work BMP

## 2017-03-28 NOTE — DISCHARGE NOTE ADULT - PATIENT PORTAL LINK FT
“You can access the FollowHealth Patient Portal, offered by [facility], by registering with the following website: [email or web address]”

## 2017-03-28 NOTE — PROVIDER CONTACT NOTE (OTHER) - ASSESSMENT
In ED, ED RN Gerald placed 0.5inch nitro oint @1800 and then added 1inch nitro oint totalling to 1.5inch nitro oint under same dressing. Each other has different removal time. Unable to remove oint separately. Called NP to clarify what time to take off nitro oint.
pt a& o x4, pt voids, denies chest pain/ denies pain/ discomfort

## 2017-03-28 NOTE — DISCHARGE NOTE ADULT - MEDICATION SUMMARY - MEDICATIONS TO TAKE
I will START or STAY ON the medications listed below when I get home from the hospital:    Aspirin Enteric Coated 81 mg oral delayed release tablet  -- 1 tab(s) by mouth once a day  -- Indication: For Antiplatelet    losartan 50 mg oral tablet  -- 1 tab(s) by mouth once a day  -- Indication: For Acute on chronic congestive heart failure, unspecified congestive heart failure type    carvedilol 25 mg oral tablet  -- 1 tab(s) by mouth 2 times a day  -- Indication: For Acute on chronic congestive heart failure, unspecified congestive heart failure type    Lasix 40 mg oral tablet  -- 1 tab(s) by mouth 2 times a day  -- Indication: For Diuretic    vancomycin 250 mg/5 mL oral solution  -- 2.5 milliliter(s) by mouth every 6 hours  -- 10 more days  -- Indication: For COlitis    famotidine 40 mg oral tablet  -- 1 tab(s) by mouth 2 times a day  -- Indication: For Antacid    magnesium oxide 400 mg (241.3 mg elemental magnesium) oral tablet  -- 2 tab(s) by mouth 3 times a day  -- Indication: For supplement    lactobacillus acidophilus oral capsule  -- 1 cap(s) by mouth 2 times a day  -- Indication: For Probiotic    hydrALAZINE 50 mg oral tablet  -- 1 tab(s) by mouth 3 times a day  -- Indication: For Cardiac    multivitamin with minerals  -- 1 tab(s) by mouth once a day  -- Indication: For vitamin I will START or STAY ON the medications listed below when I get home from the hospital:    Aspirin Enteric Coated 81 mg oral delayed release tablet  -- 1 tab(s) by mouth once a day  -- Indication: For Antiplatelet    carvedilol 25 mg oral tablet  -- 1 tab(s) by mouth 2 times a day  -- Indication: For Acute on chronic congestive heart failure, unspecified congestive heart failure type    Advair Diskus 250 mcg-50 mcg inhalation powder  -- 1 puff(s) inhaled 2 times a day  -- Indication: For Chronic osteomyelitis, osteomyelitis of unspecified site    Lasix 40 mg oral tablet  -- 1 tab(s) by mouth 2 times a day  -- Indication: For Diuretic    vancomycin 250 mg/5 mL oral solution  -- 2.5 milliliter(s) by mouth every 6 hours  -- 10 more days  -- Indication: For COlitis    famotidine 40 mg oral tablet  -- 1 tab(s) by mouth 2 times a day  -- Indication: For Antacid    ferrous sulfate 325 mg (65 mg elemental iron) oral tablet  -- 1 tab(s) by mouth once a day  -- Indication: For Anemia    magnesium oxide 400 mg (241.3 mg elemental magnesium) oral tablet  -- 2 tab(s) by mouth 3 times a day  -- Indication: For supplement    lactobacillus acidophilus oral capsule  -- 1 cap(s) by mouth 2 times a day  -- Indication: For Probiotic    hydrALAZINE 50 mg oral tablet  -- 1 tab(s) by mouth 3 times a day  -- Indication: For Cardiac    multivitamin with minerals  -- 1 tab(s) by mouth once a day  -- Indication: For vitamin    Vitamin D3 1000 intl units oral capsule  -- 1 cap(s) by mouth once a day  -- Indication: For vitamin

## 2017-03-28 NOTE — DISCHARGE NOTE ADULT - PROVIDER TOKENS
CULLEN:'[id number]',CULLEN:'[id number]' TOKEN:'[id number]',TOKEN:'[id number]',TOKEN:'[id number]'

## 2017-03-28 NOTE — DISCHARGE NOTE ADULT - PLAN OF CARE
improve with medication continue oral vancomycin as prescribed continue medication as prescribed low salt diet  check your weight daily-monitor for weight gain  follow up with your primary doctor in 1 week improved  follow up with your primary doctor in 1 week  follow up blood work BMP C diff colitis  continue oral vancomycin as prescribed  follow up with infectious disease doctor as outpatient respiratory virus- human metapneuovirus viral infection  continue medication as prescribed low salt diet  follow upp with your cardiologist in 1 week  losartan was on hold secondary to impaired kidney function  check your weight daily-monitor for weight gain

## 2017-03-28 NOTE — DISCHARGE NOTE ADULT - MEDICATION SUMMARY - MEDICATIONS TO CHANGE
I will SWITCH the dose or number of times a day I take the medications listed below when I get home from the hospital:    furosemide 80 mg oral tablet  -- 1 tab(s) by mouth 2 times a day    hydrALAZINE 25 mg oral tablet  -- 1 tab(s) by mouth 3 times a day    hydrALAZINE 25 mg oral tablet  -- 3 tab(s) by mouth 3 times a day

## 2017-03-28 NOTE — DISCHARGE NOTE ADULT - CARE PROVIDER_API CALL
Antionette Jerome), Infectious Disease; Internal Medicine  [address] [place] [address]  Phone: [phone]  Fax: [phone]    Aurelia Brower), [facility] Medicine  [address] [place] [address]  Phone: [phone] Antionette Jerome), Infectious Disease; Internal Medicine  [address] [place] [address]  Phone: [phone]  Fax: [phone]    Zia Storm (MD), Cardiovascular Disease  [address]  [place] [address]  Phone: [phone]  Fax: [phone]    Sal Charles), Gastroenterology; Internal Medicine  [address] [place] [address]  Phone: [phone]  Fax: [phone]

## 2017-03-28 NOTE — DISCHARGE NOTE ADULT - HOSPITAL COURSE
to be completed by attending md 66M w/ PMHx Afib on pradaxa, cad s/p cabg, chf, spinal osteomyelitis s/p IV abx p/w diarrhea, cough, sob for 1 week. Pt had a recent 12 week stay at [facility] rehab for osteomyelitis tx. Went home started having watery diarrhea with a cough. Felt weak, stopped taking lasix. Cough is non productive. Was discharged from rehab with hgb 8.8, went to see pmd/cards today who said to come into ED. Denies fever, chills, chest pain, palpitations, loc, dizziness.  Admit  Dx  Pancolitis r/o C-diff,  Copd  Exac,     3/24  Ct  Abdomen - pancolitis. Pt  was  placed and  cipro  and  flagyl.   3/25 positive for C Diff  3/27: cont po vanco for cdiff           cont w/ furosemide for acute on chronic diastolic chf           copd ex: cont w/ solumedrol  3/28	per d/w md-no steroids; no leo; pt takes Advair at home-resume  	losartan was on hold-f/u with cardiologist before resuming  	resume lasix with 40mg twice daily  	d/w md dc meds; adjusted BP meds  	cleared for dc home; d/w pt dc instruction

## 2017-03-29 ENCOUNTER — APPOINTMENT (OUTPATIENT)
Dept: INFECTIOUS DISEASE | Facility: CLINIC | Age: 67
End: 2017-03-29

## 2017-03-29 VITALS
TEMPERATURE: 96.7 F | WEIGHT: 204 LBS | HEIGHT: 66 IN | OXYGEN SATURATION: 95 % | BODY MASS INDEX: 32.78 KG/M2 | HEART RATE: 70 BPM | SYSTOLIC BLOOD PRESSURE: 161 MMHG | DIASTOLIC BLOOD PRESSURE: 68 MMHG

## 2017-03-29 DIAGNOSIS — Z95.1 PRESENCE OF AORTOCORONARY BYPASS GRAFT: ICD-10-CM

## 2017-03-29 DIAGNOSIS — Z87.891 PERSONAL HISTORY OF NICOTINE DEPENDENCE: ICD-10-CM

## 2017-03-29 DIAGNOSIS — M46.20 OSTEOMYELITIS OF VERTEBRA, SITE UNSPECIFIED: ICD-10-CM

## 2017-03-29 LAB
BASOPHILS # BLD AUTO: 0.01 K/UL
BASOPHILS NFR BLD AUTO: 0.2 %
CULTURE RESULTS: SIGNIFICANT CHANGE UP
CULTURE RESULTS: SIGNIFICANT CHANGE UP
EOSINOPHIL # BLD AUTO: 0.01 K/UL
EOSINOPHIL NFR BLD AUTO: 0.2 %
HCT VFR BLD CALC: 32.5 %
HGB BLD-MCNC: 10.5 G/DL
IMM GRANULOCYTES NFR BLD AUTO: 0.8 %
LYMPHOCYTES # BLD AUTO: 2.87 K/UL
LYMPHOCYTES NFR BLD AUTO: 44.2 %
MAN DIFF?: NORMAL
MCHC RBC-ENTMCNC: 30.8 PG
MCHC RBC-ENTMCNC: 32.3 GM/DL
MCV RBC AUTO: 95.3 FL
MONOCYTES # BLD AUTO: 0.81 K/UL
MONOCYTES NFR BLD AUTO: 12.5 %
NEUTROPHILS # BLD AUTO: 2.75 K/UL
NEUTROPHILS NFR BLD AUTO: 42.1 %
PLATELET # BLD AUTO: 268 K/UL
RBC # BLD: 3.41 M/UL
RBC # FLD: 14.5 %
SPECIMEN SOURCE: SIGNIFICANT CHANGE UP
SPECIMEN SOURCE: SIGNIFICANT CHANGE UP
WBC # FLD AUTO: 6.5 K/UL

## 2017-03-30 PROBLEM — Z87.891 FORMER SMOKER: Status: ACTIVE | Noted: 2017-03-30

## 2017-03-30 LAB
ALBUMIN SERPL ELPH-MCNC: 3.8 G/DL
ALP BLD-CCNC: 103 U/L
ALT SERPL-CCNC: 57 U/L
ANION GAP SERPL CALC-SCNC: 14 MMOL/L
AST SERPL-CCNC: 67 U/L
BILIRUB SERPL-MCNC: 0.3 MG/DL
BUN SERPL-MCNC: 52 MG/DL
CALCIUM SERPL-MCNC: 8.8 MG/DL
CHLORIDE SERPL-SCNC: 103 MMOL/L
CO2 SERPL-SCNC: 21 MMOL/L
CREAT SERPL-MCNC: 1.39 MG/DL
CRP SERPL-MCNC: 0.38 MG/DL
ERYTHROCYTE [SEDIMENTATION RATE] IN BLOOD BY WESTERGREN METHOD: 4 MM/HR
GLUCOSE SERPL-MCNC: 124 MG/DL
POTASSIUM SERPL-SCNC: 4.3 MMOL/L
PROT SERPL-MCNC: 6.3 G/DL
SODIUM SERPL-SCNC: 138 MMOL/L

## 2017-03-31 LAB
ADJUSTED MITOGEN: 6.77 IU/ML
ADJUSTED TB AG: 0 IU/ML
M TB IFN-G BLD-IMP: NEGATIVE
QUANTIFERON GOLD NIL: 0.04 IU/ML

## 2017-04-04 LAB — BACTERIA BLD CULT: NORMAL

## 2017-05-24 NOTE — DISCHARGE NOTE ADULT - REASON FOR ADMISSION
Clinic Care Coordination Contact  OUTREACH    Referral Information:  Referral Source: CTS  Reason for Contact: follow up on foot and vision loss   Care Conference: No     Universal Utilization:   ED Visits in last year: 0  Hospital visits in last year: 1  Last PCP appointment: 02/13/17  Missed Appointments: 6  Concerns: NO  Multiple Providers or Specialists:  (Yes)    Clinical Concerns:  Current Medical Concerns: CC spoke to wife and she reports the Vision loss resources is coming to the home for an evaluation 5/31/2017.  Patients vision comes and goes per wife.  Patient uses sunglasses most of the day to protect his eyes .  Patients foot is healing well and he might not need a graft.  Patient has an appointment with Dr Aragon tomorrow     Clinical Pathway Name: None  Clinical Pathway: None    Medication Management:  Not discussed today     Functional Status:  Mobility Status: Independent w/Device  Equipment Currently Used at Home: none, walker, standard         Psychosocial:  Current living arrangement:: I live in a private home with spouse     Resources and Interventions:  Current Resources:  (NA); Home Care  PAS Number:  (NA)  Senior Linkage Line Referral Placed:  (NA)  Advanced Care Plans/Directives on file:: No  Referrals Placed:  (NA)     Goals:   Goal 1 Statement: I will get vision loss resources   Goal 1 Progression Percent: 50%  Goal 1 Progression Date: 05/24/17  Goal 2 Statement:  (I will increase my strength )  Goal 2 Progression Percent: 100%  Goal 3 Statement:  (I will improve my strength)  Goal 3 Progression Percent: 100%  Goal 3 Progression Date: 05/24/17        Barriers: Vision loss   Strengths: Vision Loss resource evaluation set up 5/31/2017     Plan: CC will follow up after the Vision Loss Resource appointment     Rosalva Palafox RN / Clinical Care Coordinator     [address] [address]  [email or web address]  Office :  [phone] /  diarrhea, sob Fax :  [phone]

## 2018-06-01 NOTE — DISCHARGE NOTE ADULT - CARE PROVIDERS DIRECT ADDRESSES
No deformity or limitation of movement ,DirectAddress_Unknown,DirectAddress_Unknown,DirectAddress_Unknown ,DirectAddress_Unknown,DirectAddress_Unknown,[email or web address],DirectAddress_Unknown

## 2018-12-02 NOTE — PHYSICAL THERAPY INITIAL EVALUATION ADULT - REHAB POTENTIAL, PT EVAL
Telephone Encounter by Poppy Scruggs CMA at 10/18/18 10:47 AM     Author:  Poppy Scruggs CMA Service:  (none) Author Type:  Certified Medical Assistant     Filed:  10/18/18 10:48 AM Encounter Date:  10/18/2018 Status:  Addendum     :  Poppy Scruggs CMA (Certified Medical Assistant)            Routed to reception, please call and schedule office visit with Dr. Jain.  Next available okay.  Last seen 5-25-17 and this is a follow up for COPD & refills.[SB1.1M]        Revision History        User Key Date/Time User Provider Type Action    > [N/A] 10/18/18 10:48 AM Poppy Scruggs CMA Certified Medical Assistant Addend     SB1.1 10/18/18 10:47 AM Poppy Scruggs CMA Certified Medical Assistant Sign    M - Manual             good, to achieve stated therapy goals

## 2020-10-15 ENCOUNTER — INPATIENT (INPATIENT)
Facility: HOSPITAL | Age: 70
LOS: 28 days | Discharge: NURSING, REHAB OR LONG-TERM CARE | DRG: 673 | End: 2020-11-13
Attending: SURGERY | Admitting: STUDENT IN AN ORGANIZED HEALTH CARE EDUCATION/TRAINING PROGRAM
Payer: MEDICARE

## 2020-10-15 VITALS
TEMPERATURE: 98 F | WEIGHT: 197.09 LBS | HEART RATE: 89 BPM | RESPIRATION RATE: 20 BRPM | DIASTOLIC BLOOD PRESSURE: 74 MMHG | SYSTOLIC BLOOD PRESSURE: 142 MMHG | OXYGEN SATURATION: 100 % | HEIGHT: 66 IN

## 2020-10-15 DIAGNOSIS — Z98.890 OTHER SPECIFIED POSTPROCEDURAL STATES: Chronic | ICD-10-CM

## 2020-10-15 DIAGNOSIS — Z95.5 PRESENCE OF CORONARY ANGIOPLASTY IMPLANT AND GRAFT: Chronic | ICD-10-CM

## 2020-10-15 DIAGNOSIS — N17.9 ACUTE KIDNEY FAILURE, UNSPECIFIED: ICD-10-CM

## 2020-10-15 DIAGNOSIS — Z95.1 PRESENCE OF AORTOCORONARY BYPASS GRAFT: Chronic | ICD-10-CM

## 2020-10-15 LAB
ALBUMIN SERPL ELPH-MCNC: 3.6 G/DL — SIGNIFICANT CHANGE UP (ref 3.3–5.2)
ALP SERPL-CCNC: 194 U/L — HIGH (ref 40–120)
ALT FLD-CCNC: 16 U/L — SIGNIFICANT CHANGE UP
ANION GAP SERPL CALC-SCNC: 11 MMOL/L — SIGNIFICANT CHANGE UP (ref 5–17)
ANION GAP SERPL CALC-SCNC: 14 MMOL/L — SIGNIFICANT CHANGE UP (ref 5–17)
ANISOCYTOSIS BLD QL: SLIGHT — SIGNIFICANT CHANGE UP
APPEARANCE UR: CLEAR — SIGNIFICANT CHANGE UP
APTT BLD: 28.7 SEC — SIGNIFICANT CHANGE UP (ref 27.5–35.5)
AST SERPL-CCNC: 22 U/L — SIGNIFICANT CHANGE UP
BASOPHILS # BLD AUTO: 0 K/UL — SIGNIFICANT CHANGE UP (ref 0–0.2)
BASOPHILS NFR BLD AUTO: 0 % — SIGNIFICANT CHANGE UP (ref 0–2)
BILIRUB SERPL-MCNC: 0.9 MG/DL — SIGNIFICANT CHANGE UP (ref 0.4–2)
BILIRUB UR-MCNC: ABNORMAL
BLD GP AB SCN SERPL QL: SIGNIFICANT CHANGE UP
BUN SERPL-MCNC: 73 MG/DL — HIGH (ref 8–20)
BUN SERPL-MCNC: 86 MG/DL — HIGH (ref 8–20)
CALCIUM SERPL-MCNC: 8.2 MG/DL — LOW (ref 8.6–10.2)
CALCIUM SERPL-MCNC: 8.7 MG/DL — SIGNIFICANT CHANGE UP (ref 8.6–10.2)
CHLORIDE SERPL-SCNC: 101 MMOL/L — SIGNIFICANT CHANGE UP (ref 98–107)
CHLORIDE SERPL-SCNC: 99 MMOL/L — SIGNIFICANT CHANGE UP (ref 98–107)
CK SERPL-CCNC: 23 U/L — LOW (ref 30–200)
CO2 SERPL-SCNC: 21 MMOL/L — LOW (ref 22–29)
CO2 SERPL-SCNC: 24 MMOL/L — SIGNIFICANT CHANGE UP (ref 22–29)
COLOR SPEC: YELLOW — SIGNIFICANT CHANGE UP
CREAT SERPL-MCNC: 4.26 MG/DL — HIGH (ref 0.5–1.3)
CREAT SERPL-MCNC: 4.84 MG/DL — HIGH (ref 0.5–1.3)
DIFF PNL FLD: ABNORMAL
EOSINOPHIL # BLD AUTO: 0.13 K/UL — SIGNIFICANT CHANGE UP (ref 0–0.5)
EOSINOPHIL NFR BLD AUTO: 1.7 % — SIGNIFICANT CHANGE UP (ref 0–6)
FLU A RESULT: SIGNIFICANT CHANGE UP
FLU A RESULT: SIGNIFICANT CHANGE UP
FLUAV AG NPH QL: SIGNIFICANT CHANGE UP
FLUBV AG NPH QL: SIGNIFICANT CHANGE UP
GAS PNL BLDA: SIGNIFICANT CHANGE UP
GAS PNL BLDV: SIGNIFICANT CHANGE UP
GLUCOSE SERPL-MCNC: 102 MG/DL — HIGH (ref 70–99)
GLUCOSE SERPL-MCNC: 132 MG/DL — HIGH (ref 70–99)
GLUCOSE UR QL: NEGATIVE MG/DL — SIGNIFICANT CHANGE UP
HCO3 BLDV-SCNC: 21 MMOL/L — SIGNIFICANT CHANGE UP (ref 21–29)
HCT VFR BLD CALC: 24.2 % — LOW (ref 39–50)
HGB BLD-MCNC: 7.7 G/DL — LOW (ref 13–17)
HYPOCHROMIA BLD QL: SLIGHT — SIGNIFICANT CHANGE UP
INR BLD: 1.21 RATIO — HIGH (ref 0.88–1.16)
KETONES UR-MCNC: ABNORMAL
LEUKOCYTE ESTERASE UR-ACNC: ABNORMAL
LYMPHOCYTES # BLD AUTO: 1.2 K/UL — SIGNIFICANT CHANGE UP (ref 1–3.3)
LYMPHOCYTES # BLD AUTO: 15.6 % — SIGNIFICANT CHANGE UP (ref 13–44)
MACROCYTES BLD QL: SLIGHT — SIGNIFICANT CHANGE UP
MAGNESIUM SERPL-MCNC: 2 MG/DL — SIGNIFICANT CHANGE UP (ref 1.6–2.6)
MANUAL SMEAR VERIFICATION: SIGNIFICANT CHANGE UP
MCHC RBC-ENTMCNC: 31.8 GM/DL — LOW (ref 32–36)
MCHC RBC-ENTMCNC: 35.8 PG — HIGH (ref 27–34)
MCV RBC AUTO: 112.6 FL — HIGH (ref 80–100)
METAMYELOCYTES # FLD: 0.9 % — HIGH (ref 0–0)
MONOCYTES # BLD AUTO: 0.74 K/UL — SIGNIFICANT CHANGE UP (ref 0–0.9)
MONOCYTES NFR BLD AUTO: 9.6 % — SIGNIFICANT CHANGE UP (ref 2–14)
MYELOCYTES NFR BLD: 0.9 % — HIGH (ref 0–0)
NEUTROPHILS # BLD AUTO: 5.48 K/UL — SIGNIFICANT CHANGE UP (ref 1.8–7.4)
NEUTROPHILS NFR BLD AUTO: 69.6 % — SIGNIFICANT CHANGE UP (ref 43–77)
NEUTS BAND # BLD: 1.7 % — SIGNIFICANT CHANGE UP (ref 0–8)
NITRITE UR-MCNC: NEGATIVE — SIGNIFICANT CHANGE UP
NRBC # BLD: 1 /100 — HIGH (ref 0–0)
NT-PROBNP SERPL-SCNC: HIGH PG/ML (ref 0–300)
PCO2 BLDV: 56 MMHG — HIGH (ref 35–50)
PH BLDV: 7.24 — LOW (ref 7.32–7.43)
PH UR: 5 — SIGNIFICANT CHANGE UP (ref 5–8)
PHOSPHATE SERPL-MCNC: 6.1 MG/DL — HIGH (ref 2.4–4.7)
PLAT MORPH BLD: NORMAL — SIGNIFICANT CHANGE UP
PLATELET # BLD AUTO: 166 K/UL — SIGNIFICANT CHANGE UP (ref 150–400)
PO2 BLDV: 45 MMHG — SIGNIFICANT CHANGE UP (ref 25–45)
POLYCHROMASIA BLD QL SMEAR: SIGNIFICANT CHANGE UP
POTASSIUM SERPL-MCNC: 5 MMOL/L — SIGNIFICANT CHANGE UP (ref 3.5–5.3)
POTASSIUM SERPL-MCNC: 5.3 MMOL/L — SIGNIFICANT CHANGE UP (ref 3.5–5.3)
POTASSIUM SERPL-SCNC: 5 MMOL/L — SIGNIFICANT CHANGE UP (ref 3.5–5.3)
POTASSIUM SERPL-SCNC: 5.3 MMOL/L — SIGNIFICANT CHANGE UP (ref 3.5–5.3)
PROCALCITONIN SERPL-MCNC: 0.18 NG/ML — HIGH (ref 0.02–0.1)
PROT SERPL-MCNC: 5.5 G/DL — LOW (ref 6.6–8.7)
PROT UR-MCNC: 100 MG/DL
PROTHROM AB SERPL-ACNC: 13.9 SEC — HIGH (ref 10.6–13.6)
RBC # BLD: 2.15 M/UL — LOW (ref 4.2–5.8)
RBC # FLD: 16 % — HIGH (ref 10.3–14.5)
RBC BLD AUTO: ABNORMAL
RSV RESULT: SIGNIFICANT CHANGE UP
RSV RNA RESP QL NAA+PROBE: SIGNIFICANT CHANGE UP
SAO2 % BLDV: 81 % — SIGNIFICANT CHANGE UP
SARS-COV-2 RNA SPEC QL NAA+PROBE: SIGNIFICANT CHANGE UP
SODIUM SERPL-SCNC: 134 MMOL/L — LOW (ref 135–145)
SODIUM SERPL-SCNC: 136 MMOL/L — SIGNIFICANT CHANGE UP (ref 135–145)
SP GR SPEC: 1.01 — SIGNIFICANT CHANGE UP (ref 1.01–1.02)
TROPONIN T SERPL-MCNC: 0.05 NG/ML — SIGNIFICANT CHANGE UP (ref 0–0.06)
UROBILINOGEN FLD QL: 1 MG/DL
WBC # BLD: 7.69 K/UL — SIGNIFICANT CHANGE UP (ref 3.8–10.5)
WBC # FLD AUTO: 7.69 K/UL — SIGNIFICANT CHANGE UP (ref 3.8–10.5)

## 2020-10-15 PROCEDURE — 99283 EMERGENCY DEPT VISIT LOW MDM: CPT

## 2020-10-15 PROCEDURE — 71045 X-RAY EXAM CHEST 1 VIEW: CPT | Mod: 26

## 2020-10-15 PROCEDURE — 73060 X-RAY EXAM OF HUMERUS: CPT | Mod: 26,LT

## 2020-10-15 PROCEDURE — 93306 TTE W/DOPPLER COMPLETE: CPT | Mod: 26

## 2020-10-15 PROCEDURE — 73030 X-RAY EXAM OF SHOULDER: CPT | Mod: 26,LT

## 2020-10-15 PROCEDURE — 71045 X-RAY EXAM CHEST 1 VIEW: CPT | Mod: 26,77

## 2020-10-15 PROCEDURE — 93971 EXTREMITY STUDY: CPT | Mod: 26,LT

## 2020-10-15 PROCEDURE — 99284 EMERGENCY DEPT VISIT MOD MDM: CPT

## 2020-10-15 PROCEDURE — 99223 1ST HOSP IP/OBS HIGH 75: CPT

## 2020-10-15 PROCEDURE — 93010 ELECTROCARDIOGRAM REPORT: CPT | Mod: 76

## 2020-10-15 PROCEDURE — 73590 X-RAY EXAM OF LOWER LEG: CPT | Mod: 26,LT

## 2020-10-15 RX ORDER — INSULIN LISPRO 100/ML
VIAL (ML) SUBCUTANEOUS EVERY 6 HOURS
Refills: 0 | Status: DISCONTINUED | OUTPATIENT
Start: 2020-10-15 | End: 2020-10-16

## 2020-10-15 RX ORDER — FUROSEMIDE 40 MG
80 TABLET ORAL ONCE
Refills: 0 | Status: COMPLETED | OUTPATIENT
Start: 2020-10-15 | End: 2020-10-15

## 2020-10-15 RX ORDER — AZITHROMYCIN 500 MG/1
TABLET, FILM COATED ORAL
Refills: 0 | Status: DISCONTINUED | OUTPATIENT
Start: 2020-10-15 | End: 2020-10-16

## 2020-10-15 RX ORDER — AZITHROMYCIN 500 MG/1
500 TABLET, FILM COATED ORAL ONCE
Refills: 0 | Status: COMPLETED | OUTPATIENT
Start: 2020-10-15 | End: 2020-10-15

## 2020-10-15 RX ORDER — BUDESONIDE AND FORMOTEROL FUMARATE DIHYDRATE 160; 4.5 UG/1; UG/1
2 AEROSOL RESPIRATORY (INHALATION)
Refills: 0 | Status: DISCONTINUED | OUTPATIENT
Start: 2020-10-15 | End: 2020-10-16

## 2020-10-15 RX ORDER — SODIUM CHLORIDE 9 MG/ML
500 INJECTION, SOLUTION INTRAVENOUS
Refills: 0 | Status: DISCONTINUED | OUTPATIENT
Start: 2020-10-15 | End: 2020-10-16

## 2020-10-15 RX ORDER — HEPARIN SODIUM 5000 [USP'U]/ML
5000 INJECTION INTRAVENOUS; SUBCUTANEOUS EVERY 8 HOURS
Refills: 0 | Status: DISCONTINUED | OUTPATIENT
Start: 2020-10-15 | End: 2020-10-16

## 2020-10-15 RX ORDER — GLUCAGON INJECTION, SOLUTION 0.5 MG/.1ML
1 INJECTION, SOLUTION SUBCUTANEOUS ONCE
Refills: 0 | Status: DISCONTINUED | OUTPATIENT
Start: 2020-10-15 | End: 2020-10-16

## 2020-10-15 RX ORDER — HYDROCORTISONE 20 MG
50 TABLET ORAL EVERY 8 HOURS
Refills: 0 | Status: DISCONTINUED | OUTPATIENT
Start: 2020-10-15 | End: 2020-10-17

## 2020-10-15 RX ORDER — AZITHROMYCIN 500 MG/1
500 TABLET, FILM COATED ORAL EVERY 24 HOURS
Refills: 0 | Status: DISCONTINUED | OUTPATIENT
Start: 2020-10-16 | End: 2020-10-16

## 2020-10-15 RX ORDER — DEXTROSE 50 % IN WATER 50 %
25 SYRINGE (ML) INTRAVENOUS ONCE
Refills: 0 | Status: DISCONTINUED | OUTPATIENT
Start: 2020-10-15 | End: 2020-10-16

## 2020-10-15 RX ORDER — CEFTRIAXONE 500 MG/1
1000 INJECTION, POWDER, FOR SOLUTION INTRAMUSCULAR; INTRAVENOUS EVERY 24 HOURS
Refills: 0 | Status: DISCONTINUED | OUTPATIENT
Start: 2020-10-15 | End: 2020-10-16

## 2020-10-15 RX ORDER — IPRATROPIUM/ALBUTEROL SULFATE 18-103MCG
3 AEROSOL WITH ADAPTER (GRAM) INHALATION EVERY 6 HOURS
Refills: 0 | Status: DISCONTINUED | OUTPATIENT
Start: 2020-10-15 | End: 2020-10-16

## 2020-10-15 RX ADMIN — AZITHROMYCIN 255 MILLIGRAM(S): 500 TABLET, FILM COATED ORAL at 23:58

## 2020-10-15 RX ADMIN — Medication 80 MILLIGRAM(S): at 19:04

## 2020-10-15 RX ADMIN — BUDESONIDE AND FORMOTEROL FUMARATE DIHYDRATE 2 PUFF(S): 160; 4.5 AEROSOL RESPIRATORY (INHALATION) at 20:12

## 2020-10-15 RX ADMIN — Medication 3 MILLILITER(S): at 20:11

## 2020-10-15 RX ADMIN — HEPARIN SODIUM 5000 UNIT(S): 5000 INJECTION INTRAVENOUS; SUBCUTANEOUS at 23:58

## 2020-10-15 RX ADMIN — Medication 50 MILLIGRAM(S): at 19:04

## 2020-10-15 RX ADMIN — CEFTRIAXONE 100 MILLIGRAM(S): 500 INJECTION, POWDER, FOR SOLUTION INTRAMUSCULAR; INTRAVENOUS at 22:35

## 2020-10-15 NOTE — H&P ADULT - NSICDXPASTMEDICALHX_GEN_ALL_CORE_FT
PAST MEDICAL HISTORY:  Atrial fibrillation     CHF (congestive heart failure)     CKD (chronic kidney disease)     COPD, severity to be determined

## 2020-10-15 NOTE — PROCEDURE NOTE - ADDITIONAL PROCEDURE DETAILS
70M with acute kidney failure, COPD exacerbation and possible fluid overload, requires central venous access for medication administration, CVP monitoring    Pt consented of all risks/benefits/indications of left subclavian central venous catheter placement. Pt's left chest, neck and shoulder were prepped and draped in usual sterile fashion. Pt's left subclavian vein was accessed with 18g seeker needle via landmarks at the border of the medial/middle 1/3d of the clavicle with aspiration of dark venous blood, triple lumen catheter inserted without resistance via seldinger technique aspirating and flushing easily, catheter sutured to skin with sterile dressing. Pt tolerated procedure without complication, EBL minimal, post procedural CXR performed shows CVC tip at distal SVC without evidence of hemo/pneumothorax.

## 2020-10-15 NOTE — ED PROVIDER NOTE - OBJECTIVE STATEMENT
71 y/o M with hx of atrial fibrillation (on eliquis 2.5 BID) c/o pain and swelling in the left leg.  Patient states that he had a mechanical fall 6 days ago.  He had a negative workup at [facility] in [place].  Patient complaining of inability to walk secondary to pain in left leg.  While he was at [facility], he received a blood transfusion for Hb of 7.3.  Labs obtained from PMD from 1/2020 which show Cr of 1.7

## 2020-10-15 NOTE — CONSULT NOTE ADULT - SUBJECTIVE AND OBJECTIVE BOX
SICU Consultation Note  =====================================================  HPI: 70y male w/ PMH of COPD newly on home O2, A fib on eliquis, CHF, CKD who is s/p mechanical fall down 7 stairs on 10/9. Patient did not have LOC but did report hitting head. Patient presented to [facility] on 10/11. CT head/c-spine/CAP were negative for acute injuries. Patient was notted to have diffuse ecchymosis over L side. XR LLE were negative for acute fx. Patient was discharged home on 10/14. Patient presented to [facility] with worsening sob and lower leg pain with difficulty ambulating. Patient reports significantly lower urine output over past few days and noted that urine color was very dark. Patient denies other associated symptoms including f/c/n/v. Patient reports normal bowel movements. Patient takes eliquis for A fib. last dose prior to previous hospitalization.     SICU consulted for concerns of acute renal failure on CKD possibly d/t rhabdomyolysis. Upon evaluation in ED, patient is non-toxic appearing. As above, left arm and leg with diffuse ecchymosis. LLE includes hematoma just distal to patella, laterally. It is tender to light palpation. Compartments are soft and neurologically intact.  With SOB, also concerns for possible COPD exacerbation vs heart failure. Rhabdo unlikely as CK level is 23. To be worked up for cause of renal failure.       PAST MEDICAL & SURGICAL HISTORY:  CKD (chronic kidney disease)    CHF (congestive heart failure)    Atrial fibrillation    COPD, severity to be determined    H/O knee surgery    S/P CABG (coronary artery bypass graft)      Home Meds:   Allergies:   Soc:   Advanced Directives: Presumed Full Code     ROS:    General: Non-Contributory  Skin/Breast: Non-Contributory  Ophthalmologic: Non-Contributory  ENMT: Non-Contributory  Respiratory and Thorax: Non-Contributory  Cardiovascular: Non-Contributory  Gastrointestinal: Non-Contributory  Genitourinary: Non-Contributory  Musculoskeletal: Non-Contributory  Neurological: Non-Contributory  Psychiatric: Non-Contributory  Hematology/Lymphatics: Non-Contributory  Endocrine: Non-Contributory  Allergic/Immunologic: Non-Contributory    CURRENT MEDICATIONS:   --------------------------------------------------------------------------------------  Neurologic Medications    Respiratory Medications  albuterol/ipratropium for Nebulization 3 milliLiter(s) Nebulizer every 6 hours    Cardiovascular Medications    Gastrointestinal Medications    Genitourinary Medications    Hematologic/Oncologic Medications  heparin   Injectable 5000 Unit(s) SubCutaneous every 8 hours    Antimicrobial/Immunologic Medications    Endocrine/Metabolic Medications    Topical/Other Medications    --------------------------------------------------------------------------------------    VITAL SIGNS, INS/OUTS (last 24 hours):  --------------------------------------------------------------------------------------  ((Insert SICU Vitals / Is+Os here)) ***  --------------------------------------------------------------------------------------    EXAM:  General/Neuro  RASS:   GCS:   Exam: Normal, NAD, alert, oriented x 3, no focal deficits. PERRLA  ***    Respiratory  Exam: Lungs clear to auscultation, Normal expansion/effort.  ***  [] Tracheostomy   [] Intubated  Mechanical Ventilation:     Cardiovascular  Exam: S1, S2.  Regular rate and rhythm.  Peripheral edema  ***  Cardiac Rhythm: Normal Sinus Rhythm  ECHO:     GI  Exam: Abdomen soft, Non-tender, Non-distended.  Gastrostomy / Jejunostomy tube in place.  Nasogastric tube in place.  Colostomy / Ileostomy.  ***  Wound:   ***  Current Diet:  NPO***      Tubes/Lines/Drains  ***  [x] Peripheral IV  [] Central Venous Line     	[] R	[] L	[] IJ	[] Fem	[] SC        Type:	    Date Placed:   [] Arterial Line		[] R	[] L	[] Fem	[] Rad	[] Ax	Date Placed:   [] PICC:         	[] Midline		[] Mediport           [] Urinary Catheter		Date Placed:     Extremities  Exam: Extremities warm, pink, well-perfused.        Derm:  Exam: Good skin turgor, no skin breakdown.      :   Exam: Webb catheter in place.     LABS  --------------------------------------------------------------------------------------  ((Insert SICU Labs here))***  --------------------------------------------------------------------------------------    OTHER LABS    IMAGING RESULTS      ASSESSMENT:  70y Male ***    PLAN:   Neurologic:   Respiratory:   Cardiovascular:   Gastrointestinal/Nutrition:   Renal/Genitourinary:   Hematologic:   Infectious Disease:   Lines/Tubes:  Endocrine:   Disposition:     --------------------------------------------------------------------------------------    Critical Care Diagnoses:   SICU Consultation Note  =====================================================  HPI: 70y male w/ PMH of COPD newly on home O2, A fib on eliquis, CHF, CKD who is s/p mechanical fall down 7 stairs on 10/9. Patient did not have LOC but did report hitting head. Patient presented to [facility] on 10/11. CT head/c-spine/CAP were negative for acute injuries. Patient was notted to have diffuse ecchymosis over L side. XR LLE were negative for acute fx. Patient was discharged home on 10/14. Patient presented to [facility] with worsening sob and lower leg pain with difficulty ambulating. Patient reports significantly lower urine output over past few days and noted that urine color was very dark. Patient denies other associated symptoms including f/c/n/v. Patient reports normal bowel movements. Patient takes eliquis for A fib. last dose prior to previous hospitalization.     SICU consulted for concerns of acute renal failure on CKD possibly d/t rhabdomyolysis. Upon evaluation in ED, patient is non-toxic appearing. As above, left arm and leg with diffuse ecchymosis. LLE includes hematoma just distal to patella, laterally. It is tender to light palpation. Compartments are soft and neurologically intact.  With SOB, also concerns for possible COPD exacerbation vs heart failure. Rhabdo unlikely as CK level is 23. To be worked up for cause of renal failure and SOB.       PAST MEDICAL & SURGICAL HISTORY:  CKD (chronic kidney disease)    CHF (congestive heart failure)    Atrial fibrillation    COPD, severity to be determined    H/O knee surgery    S/P CABG (coronary artery bypass graft)      Home Meds: See Med Rec    Allergies: NKDA  Soc: Denies smoking. 2 Glasses Wine daily.   Advanced Directives: Presumed Full Code     ROS:    Negative Except as above.       CURRENT MEDICATIONS:   --------------------------------------------------------------------------------------  Neurologic Medications    Respiratory Medications  albuterol/ipratropium for Nebulization 3 milliLiter(s) Nebulizer every 6 hours    Cardiovascular Medications    Gastrointestinal Medications    Genitourinary Medications    Hematologic/Oncologic Medications  heparin   Injectable 5000 Unit(s) SubCutaneous every 8 hours    Antimicrobial/Immunologic Medications    Endocrine/Metabolic Medications    Topical/Other Medications    --------------------------------------------------------------------------------------    VITAL SIGNS, INS/OUTS (last 24 hours):  --------------------------------------------------------------------------------------    Vital Signs Last 24 Hrs  T(C): 37.1 (15 Oct 2020 14:49), Max: 37.1 (15 Oct 2020 14:49)  T(F): 98.7 (15 Oct 2020 14:49), Max: 98.7 (15 Oct 2020 14:49)  HR: 80 (15 Oct 2020 17:14) (80 - 91)  BP: 144/74 (15 Oct 2020 17:14) (142/74 - 155/74)  BP(mean): --  RR: 16 (15 Oct 2020 17:14) (16 - 20)  SpO2: 98% (15 Oct 2020 17:14) (98% - 100%)      --------------------------------------------------------------------------------------    EXAM:   General/Neuro  RASS: n/a  GCS: 15  Exam: Normal, NAD, alert, oriented x 3, no focal deficits. EOMI    Respiratory  Exam: Unlabored breathing, Normal expansion.  [] Tracheostomy   [] Intubated  Mechanical Ventilation:     Cardiovascular  Exam: S1, S2.  Regular rate and rhythm.  Peripheral edema of LLE  Cardiac Rhythm: Normal Sinus Rhythm  ECHO: Pending     GI  Exam: Abdomen soft, Non-tender, Non-distended. Colostomy / Ileostomy.   Current Diet:  NPO      Tubes/Lines/Drains   [x] Peripheral IV  [x] Central Venous Line     	[] R	[x] L	[] IJ	[] Fem	[x] SC        Type: TLC	    Date Placed: 10/15/20  [] Arterial Line		[] R	[] L	[] Fem	[] Rad	[] Ax	Date Placed:   [] PICC:         	[] Midline		[] Mediport           [x] Urinary Catheter		Date Placed: 10/15/20    Extremities  Exam:  Warm and well-perfused. LUE and LLE with diffuse ecchymoses., compartments soft. LLE tender to light palpation w/ hematoma just distal to patella.         :   Exam: Webb catheter in place.       LABS  --------------------------------------------------------------------------------------    LABS:                        7.7    7.69  )-----------( 166      ( 15 Oct 2020 12:15 )             24.2     10-15    136  |  101  |  73.0<H>  ----------------------------<  132<H>  5.0   |  24.0  |  4.26<H>    Ca    8.2<L>      15 Oct 2020 12:15    TPro  5.5<L>  /  Alb  3.6  /  TBili  0.9  /  DBili  x   /  AST  22  /  ALT  16  /  AlkPhos  194<H>  10-15    PT/INR - ( 15 Oct 2020 12:15 )   PT: 13.9 sec;   INR: 1.21 ratio         PTT - ( 15 Oct 2020 12:15 )  PTT:28.7 sec      --------------------------------------------------------------------------------------      IMAGING RESULTS  XRay Chest, L Shoulder, L Humerus without obvious injuries. Pending final read.       --------------------------------------------------------------------------------------

## 2020-10-15 NOTE — CONSULT NOTE ADULT - ASSESSMENT
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

## 2020-10-15 NOTE — CONSULT NOTE ADULT - SUBJECTIVE AND OBJECTIVE BOX
[facility] DIVISION OF KIDNEY DISEASES AND HYPERTENSION -- INITIAL CONSULT NOTE  --------------------------------------------------------------------------------  HPI:  69 y/o man with hx of atrial fibrillation (on Eliquis 2.5 BID), CKD presenting with c/o pain and swelling in the left leg.  Patient states that he had a mechanical fall 6 days ago.  He had a negative workup at [facility] in [place]. He had required 1 U PRBC for Hb 7.1 prior to discharge. He was discharged home and had worsening pain and swelling in left leg. Pt also reports decreased urination since yesterday. Reports he urinated very little this AM- urine was dark colored. He hasnt urinated since then. Nephrology consulted for Jair SCr 4.2. Labs at [facility] showed Scr 1.8. Pt reports history of CKD and was seeing nephrologist in [place]. Reports his SCr was 1.7 in January on last nephrology visit.    PAST HISTORY  --------------------------------------------------------------------------------  PAST MEDICAL & SURGICAL HISTORY: as above    FAMILY HISTORY non contributory:    PAST SOCIAL HISTORY: ; lives at home    ALLERGIES & MEDICATIONS  --------------------------------------------------------------------------------  Allergies  No Known Allergies    Standing Inpatient Medications    PRN Inpatient Medications      REVIEW OF SYSTEMS  --------------------------------------------------------------------------------  Gen: No weight changes, fatigue, fevers/chills,  Skin: No rashes  Head/Eyes/Ears/Mouth: No headache; Normal hearing; Normal vision w/o blurriness; No sinus pain/discomfort, sore throat  Respiratory: No dyspnea, cough, wheezing, hemoptysis  CV: No chest pain, PND, orthopnea  GI: No abdominal pain, diarrhea, constipation, nausea, vomiting,  : decreased urination+  MSK: leg pain+ no back pain; no edema  Neuro: No dizziness/lightheadedness, weakness, seizures,  Heme:  easy bruising +  Endo: No heat/cold intolerance  Psych: No significant nervousness, anxiety, stress, depression    All other systems were reviewed and are negative, except as noted.    VITALS/PHYSICAL EXAM  --------------------------------------------------------------------------------  T(C): 37.1 (10-15-20 @ 14:49), Max: 37.1 (10-15-20 @ 14:49)  HR: 91 (10-15-20 @ 14:49) (89 - 91)  BP: 155/74 (10-15-20 @ 14:49) (142/74 - 155/74)  RR: 16 (10-15-20 @ 14:49) (16 - 20)  SpO2: 99% (10-15-20 @ 14:49) (99% - 100%)  Wt(kg): --  Height (cm): 167.6 (10-15-20 @ 10:31)  Weight (kg): 89.4 (10-15-20 @ 10:31)  BMI (kg/m2): 31.8 (10-15-20 @ 10:31)  BSA (m2): 1.99 (10-15-20 @ 10:31)      Physical Exam:  	Gen: NAD  	HEENT: , supple neck  	Pulm: CTA B/L  	CV: RRR, S1S2; no rub  	Back: No spinal or CVA tenderness; no sacral edema  	Abd: +BS, soft, nontender/nondistended  	: No suprapubic tenderness  	UE: Warm, ; no edema  	LE: Warm, left leg extensive bruising, hematoma on knee  	Neuro: No focal deficits  	Psych: Normal affect and mood  	Skin: Warm, extensive bruising  	    LABS/STUDIES  --------------------------------------------------------------------------------              7.7    7.69  >-----------<  166      [10-15-20 @ 12:15]              24.2     136  |  101  |  73.0  ----------------------------<  132      [10-15-20 @ 12:15]  5.0   |  24.0  |  4.26        Ca     8.2     [10-15-20 @ 12:15]    TPro  5.5  /  Alb  3.6  /  TBili  0.9  /  DBili  x   /  AST  22  /  ALT  16  /  AlkPhos  194  [10-15-20 @ 12:15]    PT/INR: PT 13.9 , INR 1.21       [10-15-20 @ 12:15]  PTT: 28.7       [10-15-20 @ 12:15]      Creatinine Trend:  SCr 4.26 [10-15 @ 12:15]             [facility] DIVISION OF KIDNEY DISEASES AND HYPERTENSION -- INITIAL CONSULT NOTE  --------------------------------------------------------------------------------  HPI:  71 y/o man with hx of atrial fibrillation (on Eliquis 2.5 BID), CKD presenting with c/o pain and swelling in the left leg.  Patient states that he had a mechanical fall 6 days ago.  He had a negative workup at [facility] in [place]. He had required 1 U PRBC for Hb 7.1 prior to discharge. He was discharged home and had worsening pain and swelling in left leg. Pt also reports decreased urination since yesterday. Reports he urinated very little this AM- urine was dark colored. He hasnt urinated since then. Nephrology consulted for Jair SCr 4.2. Labs at [facility] showed Scr 1.8. Pt reports history of CKD and was seeing nephrologist in [place]. Reports his SCr was 1.7 in January on last nephrology visit. Pt also reports history of chronic anemia; had seen hematologist in the past and required PRBC transfusions twice in the past few years.     PAST HISTORY  --------------------------------------------------------------------------------  PAST MEDICAL & SURGICAL HISTORY: as above    FAMILY HISTORY non contributory:    PAST SOCIAL HISTORY: ; lives at home    ALLERGIES & MEDICATIONS  --------------------------------------------------------------------------------  Allergies  No Known Allergies    Standing Inpatient Medications    PRN Inpatient Medications      REVIEW OF SYSTEMS  --------------------------------------------------------------------------------  Gen: No weight changes, fatigue, fevers/chills,  Skin: No rashes  Head/Eyes/Ears/Mouth: No headache; Normal hearing; Normal vision w/o blurriness; No sinus pain/discomfort, sore throat  Respiratory: No dyspnea, cough, wheezing, hemoptysis  CV: No chest pain, PND, orthopnea  GI: No abdominal pain, diarrhea, constipation, nausea, vomiting,  : decreased urination+  MSK: leg pain+ no back pain; no edema  Neuro: No dizziness/lightheadedness, weakness, seizures,  Heme:  easy bruising +  Endo: No heat/cold intolerance  Psych: No significant nervousness, anxiety, stress, depression    All other systems were reviewed and are negative, except as noted.    VITALS/PHYSICAL EXAM  --------------------------------------------------------------------------------  T(C): 37.1 (10-15-20 @ 14:49), Max: 37.1 (10-15-20 @ 14:49)  HR: 91 (10-15-20 @ 14:49) (89 - 91)  BP: 155/74 (10-15-20 @ 14:49) (142/74 - 155/74)  RR: 16 (10-15-20 @ 14:49) (16 - 20)  SpO2: 99% (10-15-20 @ 14:49) (99% - 100%)  Wt(kg): --  Height (cm): 167.6 (10-15-20 @ 10:31)  Weight (kg): 89.4 (10-15-20 @ 10:31)  BMI (kg/m2): 31.8 (10-15-20 @ 10:31)  BSA (m2): 1.99 (10-15-20 @ 10:31)      Physical Exam:  	Gen: NAD  	HEENT: , supple neck  	Pulm: CTA B/L  	CV: RRR, S1S2; no rub  	Back: No spinal or CVA tenderness; no sacral edema  	Abd: +BS, soft, nontender/nondistended  	: No suprapubic tenderness  	UE: Warm, ; no edema  	LE: Warm, left leg extensive bruising, hematoma on knee  	Neuro: No focal deficits  	Psych: Normal affect and mood  	Skin: Warm, extensive bruising  	    LABS/STUDIES  --------------------------------------------------------------------------------              7.7    7.69  >-----------<  166      [10-15-20 @ 12:15]              24.2     136  |  101  |  73.0  ----------------------------<  132      [10-15-20 @ 12:15]  5.0   |  24.0  |  4.26        Ca     8.2     [10-15-20 @ 12:15]    TPro  5.5  /  Alb  3.6  /  TBili  0.9  /  DBili  x   /  AST  22  /  ALT  16  /  AlkPhos  194  [10-15-20 @ 12:15]    PT/INR: PT 13.9 , INR 1.21       [10-15-20 @ 12:15]  PTT: 28.7       [10-15-20 @ 12:15]      Creatinine Trend:  SCr 4.26 [10-15 @ 12:15]

## 2020-10-15 NOTE — H&P ADULT - NSHPPHYSICALEXAM_GEN_ALL_CORE
PHYSICAL EXAM:  GENERAL: NAD, well-developed  HEAD:  Atraumatic, Normocephalic. abrasion over superior scalp   EYES: EOMI, conjunctiva and sclera clear  NECK: Supple, No JVD  CHEST/LUNG: Clear to auscultation bilaterally; No wheeze saturating 93 on 2L NC. L chest with diffuse ecchymosis.   HEART: Regular rate and rhythm; No murmurs, rubs, or gallops  ABDOMEN: Soft, Nontender, Nondistended; Bowel sounds present  EXTREMITIES:  L shoulder tender. diffuse ecchymosis of l shoulder. LLE with hematoma over distal thigh. Hematoma and edema over lower leg from knee to ankle with associated edema. proximal lower leg with anterior hematoma 6x6cm w/ overlying skin necrosis   PSYCH: AAOx3  NEUROLOGY: non-focal  SKIN: as described above

## 2020-10-15 NOTE — ED ADULT TRIAGE NOTE - CHIEF COMPLAINT QUOTE
pt BIBA for left lower leg swelling and pain. pt states he fell on friday, had xray and full work up with no findings. + edema noted with hematoma to LLE. sensation intact, no numbness/tingling. KAY X 4

## 2020-10-15 NOTE — H&P ADULT - ASSESSMENT
71 yo M s/p fall 10/9 presents in acute renal failure with Cr 4.26 up from 1.8 on 10/12. Patient with no traumatic injuries on scans, however has extensive eccchymosis to entire L side. Patient reporting acute worsening of SOB since discharge from OSH.     - Admit to SICU  - CXR  - XR shoulder  - CK  -CKMB  -UA  -TTE  - apreciate nephrology recommendations  - fluid resuscitation  - possible HD  - Franco roper

## 2020-10-15 NOTE — H&P ADULT - HISTORY OF PRESENT ILLNESS
71 yo M w/ PMH of COPD newly on home O2, A fib on eliquis, CHF, CKD who is s/p mechanical fall down 7 stairs on 10/9. Patient did not have LOC but did report hitting head. Patient presented to [facility] on 10/11. CT head/c-spine/CAP were negative for acute injuries. Patient was notted to have diffuse ecchymosis over L side. XR LLE were negative for acute fx. Patient was discharged home on 10/14. Patient presented to [facility] with worsening sob and lower leg pain with difficulty ambulating. Patient reports significantly lower urine output over past few days and noted that urine color was very dark. Patient denies other associated symptoms including f/c/n/v. Patient reports normal bowel movements. Patient takes eliquis for A fib. last dose prior to previous hospitalization.

## 2020-10-15 NOTE — ED ADULT NURSE NOTE - NSIMPLEMENTINTERV_GEN_ALL_ED
Implemented All Fall with Harm Risk Interventions:  [place] to call system. Call bell, personal items and telephone within reach. Instruct patient to call for assistance. Room bathroom lighting operational. Non-slip footwear when patient is off stretcher. Physically safe environment: no spills, clutter or unnecessary equipment. Stretcher in lowest position, wheels locked, appropriate side rails in place. Provide visual cue, wrist band, yellow gown, etc. Monitor gait and stability. Monitor for mental status changes and reorient to person, place, and time. Review medications for side effects contributing to fall risk. Reinforce activity limits and safety measures with patient and family. Provide visual clues: red socks.

## 2020-10-15 NOTE — ED PROVIDER NOTE - ATTENDING CONTRIBUTION TO CARE
I, Sandor Swenson, performed a face to face bedside interview with this patient regarding history of present illness, review of symptoms and relevant past medical, social and family history.  I completed an independent physical examination. I have communicated the patient’s plan of care and disposition with the ACP.      71 yo male pmh afib on eliquis comes to ed s/p fall 6 days ago  with injury to left side of body; pt noted with pain to left lower extremity; pt with negative work up done at [facility] ;  while at [facility] pt needed transfusion for low hgb;  pt also with labs notable  cre 1.7 ;   pe elderly male in nad heent ncat neck supple nontender chest    irreg reg; lungs clear nontender abd soft  no guarding or rebound  ext left lower leg ecchmosis ; moderate swelling tender to palpation; skin diffuse bruising left  upper extremity, left chest , left leg;    dx leg pain ; eval compartment syndrome, dvt;  anemia ; consult trauma

## 2020-10-15 NOTE — CONSULT NOTE ADULT - ASSESSMENT
71 yo M s/p fall 10/9 presents in acute renal failure with Cr 4.26 up from 1.8 on 10/12. Patient with no traumatic injuries on scans, however has extensive ecchymosis to LUE and LLE.. Patient reporting acute worsening of SOB since discharge from OSH. Concern for renal failure and COPD exacerbation vs Fluid overload vs Heart Failure.     PLAN:   Neurologic: Pain control PRN. Delirium precautions. Sleep / Wake hygiene     Respiratory: Duonebs, hydrocortisone for possible COPD exacerbation. Home symbicort. CO2 retention with compromised metabolic response. Would benefit from HFNC for CO2 washout. Fluid overload vs heart-failure work-up as potential source of SOB.     Cardiovascular: NSR. Obtain ECHO. Troponin, BNP, and central venous gas to be obtained.     Gastrointestinal/Nutrition: NPO. Holding IVF for now as concern for fluid overload and renal failure.     Renal/Genitourinary: Webb in place. Lasix 80mg to encourage diuresis. Strict I&O. Possible Renal consult and hemodialysis pending work-up.    Hematologic: SCD, SQH. Monitor H/H    Infectious Disease: No active concerns. Monitor for leukocytosis and fevers.     Lines/Tubes: PIV, TLC, Webb    Endocrine: FS q6 and ISS in setting of steroids.     Disposition: SICU for critical care needs.

## 2020-10-15 NOTE — CHART NOTE - NSCHARTNOTEFT_GEN_A_CORE
Called by ED for admission. However, patient seen by Vascular and to be admitted to SICU. Plan per SICU. Consult Medicine if needed. Medicine called by ED for admission. However, patient seen by Vascular and to be admitted to SICU. Plan per SICU. Consult Medicine if needed.

## 2020-10-15 NOTE — H&P ADULT - NSHPLABSRESULTS_GEN_ALL_CORE
LABS:  10-15 @ 12:15 Creatine 23 U/L<L> [30 - 200]  cret                        7.7    7.69  )-----------( 166      ( 15 Oct 2020 12:15 )             24.2     10-15    136  |  101  |  73.0<H>  ----------------------------<  132<H>  5.0   |  24.0  |  4.26<H>    Ca    8.2<L>      15 Oct 2020 12:15    TPro  5.5<L>  /  Alb  3.6  /  TBili  0.9  /  DBili  x   /  AST  22  /  ALT  16  /  AlkPhos  194<H>  10-15    PT/INR - ( 15 Oct 2020 12:15 )   PT: 13.9 sec;   INR: 1.21 ratio         PTT - ( 15 Oct 2020 12:15 )  PTT:28.7 sec

## 2020-10-15 NOTE — ED ADULT NURSE NOTE - OBJECTIVE STATEMENT
69 y/o a&ox3 comes to ED c/o increased pain and difficulty ambulating. pt. states he fell a couple of days ago, went to [facility] and was discharged. multiple bruises noted to pt. left shoulder, left arm warm to touch, ace bandage wrapped to left lower leg. +4 pitting edema noted to rt. lower leg. pt. recently diagnosed with COPD and recently placed on O2 yesterday. labored breathing noted. wife at bedside.

## 2020-10-15 NOTE — ED ADULT NURSE REASSESSMENT NOTE - NS ED NURSE REASSESS COMMENT FT1
Pt received form the night shift alert and oriented , breathing easy and unlabored , no distress noted, VSS,  wife at the bedside up to date with pt care , lomeli cath in placed draining some urine,  pt awaiting transport to MICU , verbal report given to VIRI from MICU , will continue to monitor

## 2020-10-15 NOTE — H&P ADULT - ATTENDING COMMENTS
The patient was seen and examined  Details per the resident's H&P  We are asked to see this 70-year old man for a wound on his left leg  History as detailed    Exam:  Dyspneic  Left leg with full thickness necrosis of pre-tibial area, pulses present    ABG noted  Labs noted    Impression:  S/P fall  GERALD with evidence of fluid overload and hyperkalemia  Left leg wound    Plan:  Admit SICU  Re-engage nephrology - will likely require RRT  Webb catheter  Fluid hydration  Correct electrolyte abnormalities  Will require operation on leg

## 2020-10-16 PROBLEM — M86.60 OTHER CHRONIC OSTEOMYELITIS, UNSPECIFIED SITE: Chronic | Status: ACTIVE | Noted: 2017-03-24

## 2020-10-16 PROBLEM — I25.810 ATHEROSCLEROSIS OF CORONARY ARTERY BYPASS GRAFT(S) WITHOUT ANGINA PECTORIS: Chronic | Status: ACTIVE | Noted: 2017-03-24

## 2020-10-16 LAB
A1C WITH ESTIMATED AVERAGE GLUCOSE RESULT: 5 % — SIGNIFICANT CHANGE UP (ref 4–5.6)
ABO RH CONFIRMATION: SIGNIFICANT CHANGE UP
ALBUMIN SERPL ELPH-MCNC: 3.2 G/DL — LOW (ref 3.3–5.2)
ALP SERPL-CCNC: 182 U/L — HIGH (ref 40–120)
ALT FLD-CCNC: 13 U/L — SIGNIFICANT CHANGE UP
ANION GAP SERPL CALC-SCNC: 12 MMOL/L — SIGNIFICANT CHANGE UP (ref 5–17)
ANION GAP SERPL CALC-SCNC: 13 MMOL/L — SIGNIFICANT CHANGE UP (ref 5–17)
ANION GAP SERPL CALC-SCNC: 14 MMOL/L — SIGNIFICANT CHANGE UP (ref 5–17)
ANISOCYTOSIS BLD QL: SIGNIFICANT CHANGE UP
APPEARANCE UR: ABNORMAL
AST SERPL-CCNC: 17 U/L — SIGNIFICANT CHANGE UP
BACTERIA # UR AUTO: ABNORMAL
BACTERIA # UR AUTO: ABNORMAL
BASOPHILS # BLD AUTO: 0.01 K/UL — SIGNIFICANT CHANGE UP (ref 0–0.2)
BASOPHILS NFR BLD AUTO: 0.2 % — SIGNIFICANT CHANGE UP (ref 0–2)
BILIRUB SERPL-MCNC: 0.7 MG/DL — SIGNIFICANT CHANGE UP (ref 0.4–2)
BILIRUB UR-MCNC: ABNORMAL
BUN SERPL-MCNC: 86 MG/DL — HIGH (ref 8–20)
BUN SERPL-MCNC: 87 MG/DL — HIGH (ref 8–20)
BUN SERPL-MCNC: 95 MG/DL — HIGH (ref 8–20)
CALCIUM SERPL-MCNC: 8.1 MG/DL — LOW (ref 8.6–10.2)
CALCIUM SERPL-MCNC: 8.5 MG/DL — LOW (ref 8.6–10.2)
CALCIUM SERPL-MCNC: 8.7 MG/DL — SIGNIFICANT CHANGE UP (ref 8.6–10.2)
CHLORIDE SERPL-SCNC: 100 MMOL/L — SIGNIFICANT CHANGE UP (ref 98–107)
CHLORIDE SERPL-SCNC: 97 MMOL/L — LOW (ref 98–107)
CHLORIDE SERPL-SCNC: 99 MMOL/L — SIGNIFICANT CHANGE UP (ref 98–107)
CK SERPL-CCNC: 18 U/L — LOW (ref 30–200)
CO2 SERPL-SCNC: 21 MMOL/L — LOW (ref 22–29)
CO2 SERPL-SCNC: 23 MMOL/L — SIGNIFICANT CHANGE UP (ref 22–29)
CO2 SERPL-SCNC: 24 MMOL/L — SIGNIFICANT CHANGE UP (ref 22–29)
COLOR SPEC: YELLOW — SIGNIFICANT CHANGE UP
CREAT ?TM UR-MCNC: 209 MG/DL — SIGNIFICANT CHANGE UP
CREAT SERPL-MCNC: 4.29 MG/DL — HIGH (ref 0.5–1.3)
CREAT SERPL-MCNC: 4.53 MG/DL — HIGH (ref 0.5–1.3)
CREAT SERPL-MCNC: 4.79 MG/DL — HIGH (ref 0.5–1.3)
DAT IGG-SP REAG RBC-IMP: SIGNIFICANT CHANGE UP
DIFF PNL FLD: ABNORMAL
DIR ANTIGLOB POLYSPECIFIC INTERPRETATION: ABNORMAL
EOSINOPHIL # BLD AUTO: 0.11 K/UL — SIGNIFICANT CHANGE UP (ref 0–0.5)
EOSINOPHIL NFR BLD AUTO: 2.1 % — SIGNIFICANT CHANGE UP (ref 0–6)
EPI CELLS # UR: NEGATIVE — SIGNIFICANT CHANGE UP
EPI CELLS # UR: SIGNIFICANT CHANGE UP
ESTIMATED AVERAGE GLUCOSE: 97 MG/DL — SIGNIFICANT CHANGE UP (ref 68–114)
GAS PNL BLDA: SIGNIFICANT CHANGE UP
GLUCOSE BLDC GLUCOMTR-MCNC: 108 MG/DL — HIGH (ref 70–99)
GLUCOSE BLDC GLUCOMTR-MCNC: 178 MG/DL — HIGH (ref 70–99)
GLUCOSE BLDC GLUCOMTR-MCNC: 186 MG/DL — HIGH (ref 70–99)
GLUCOSE BLDC GLUCOMTR-MCNC: 204 MG/DL — HIGH (ref 70–99)
GLUCOSE SERPL-MCNC: 121 MG/DL — HIGH (ref 70–99)
GLUCOSE SERPL-MCNC: 159 MG/DL — HIGH (ref 70–99)
GLUCOSE SERPL-MCNC: 184 MG/DL — HIGH (ref 70–99)
GLUCOSE UR QL: NEGATIVE MG/DL — SIGNIFICANT CHANGE UP
HCT VFR BLD CALC: 20.1 % — CRITICAL LOW (ref 39–50)
HCT VFR BLD CALC: 22 % — LOW (ref 39–50)
HCV AB S/CO SERPL IA: 0.04 S/CO — SIGNIFICANT CHANGE UP (ref 0–0.99)
HCV AB SERPL-IMP: SIGNIFICANT CHANGE UP
HGB BLD-MCNC: 7 G/DL — CRITICAL LOW (ref 13–17)
HGB BLD-MCNC: 8.1 G/DL — LOW (ref 13–17)
HYALINE CASTS # UR AUTO: ABNORMAL /LPF
IAT COMP-SP REAG SERPL QL: ABNORMAL
IMM GRANULOCYTES NFR BLD AUTO: 0.4 % — SIGNIFICANT CHANGE UP (ref 0–1.5)
KETONES UR-MCNC: NEGATIVE — SIGNIFICANT CHANGE UP
LEUKOCYTE ESTERASE UR-ACNC: ABNORMAL
LYMPHOCYTES # BLD AUTO: 0.48 K/UL — LOW (ref 1–3.3)
LYMPHOCYTES # BLD AUTO: 9.2 % — LOW (ref 13–44)
MACROCYTES BLD QL: SIGNIFICANT CHANGE UP
MAGNESIUM SERPL-MCNC: 1.9 MG/DL — SIGNIFICANT CHANGE UP (ref 1.6–2.6)
MAGNESIUM SERPL-MCNC: 2.1 MG/DL — SIGNIFICANT CHANGE UP (ref 1.6–2.6)
MANUAL SMEAR VERIFICATION: SIGNIFICANT CHANGE UP
MCHC RBC-ENTMCNC: 34.8 GM/DL — SIGNIFICANT CHANGE UP (ref 32–36)
MCHC RBC-ENTMCNC: 36.8 GM/DL — HIGH (ref 32–36)
MCHC RBC-ENTMCNC: 40.5 PG — HIGH (ref 27–34)
MCHC RBC-ENTMCNC: 41.3 PG — HIGH (ref 27–34)
MCV RBC AUTO: 112.2 FL — HIGH (ref 80–100)
MCV RBC AUTO: 116.2 FL — HIGH (ref 80–100)
MONOCYTES # BLD AUTO: 0.5 K/UL — SIGNIFICANT CHANGE UP (ref 0–0.9)
MONOCYTES NFR BLD AUTO: 9.6 % — SIGNIFICANT CHANGE UP (ref 2–14)
NEUTROPHILS # BLD AUTO: 4.1 K/UL — SIGNIFICANT CHANGE UP (ref 1.8–7.4)
NEUTROPHILS NFR BLD AUTO: 78.5 % — HIGH (ref 43–77)
NITRITE UR-MCNC: NEGATIVE — SIGNIFICANT CHANGE UP
NT-PROBNP SERPL-SCNC: HIGH PG/ML (ref 0–300)
OSMOLALITY UR: 321 MOSM/KG — SIGNIFICANT CHANGE UP (ref 300–1000)
OVALOCYTES BLD QL SMEAR: SLIGHT — SIGNIFICANT CHANGE UP
PH UR: 5 — SIGNIFICANT CHANGE UP (ref 5–8)
PHOSPHATE SERPL-MCNC: 5.9 MG/DL — HIGH (ref 2.4–4.7)
PHOSPHATE SERPL-MCNC: 6.2 MG/DL — HIGH (ref 2.4–4.7)
PLAT MORPH BLD: NORMAL — SIGNIFICANT CHANGE UP
PLATELET # BLD AUTO: 159 K/UL — SIGNIFICANT CHANGE UP (ref 150–400)
PLATELET # BLD AUTO: 183 K/UL — SIGNIFICANT CHANGE UP (ref 150–400)
POIKILOCYTOSIS BLD QL AUTO: SLIGHT — SIGNIFICANT CHANGE UP
POLYCHROMASIA BLD QL SMEAR: SIGNIFICANT CHANGE UP
POTASSIUM SERPL-MCNC: 4.8 MMOL/L — SIGNIFICANT CHANGE UP (ref 3.5–5.3)
POTASSIUM SERPL-MCNC: 5.1 MMOL/L — SIGNIFICANT CHANGE UP (ref 3.5–5.3)
POTASSIUM SERPL-MCNC: 5.3 MMOL/L — SIGNIFICANT CHANGE UP (ref 3.5–5.3)
POTASSIUM SERPL-SCNC: 4.8 MMOL/L — SIGNIFICANT CHANGE UP (ref 3.5–5.3)
POTASSIUM SERPL-SCNC: 5.1 MMOL/L — SIGNIFICANT CHANGE UP (ref 3.5–5.3)
POTASSIUM SERPL-SCNC: 5.3 MMOL/L — SIGNIFICANT CHANGE UP (ref 3.5–5.3)
PROT SERPL-MCNC: 5.3 G/DL — LOW (ref 6.6–8.7)
PROT UR-MCNC: 30 MG/DL
RAPID RVP RESULT: SIGNIFICANT CHANGE UP
RBC # BLD: 1.73 M/UL — LOW (ref 4.2–5.8)
RBC # BLD: 1.96 M/UL — LOW (ref 4.2–5.8)
RBC # FLD: 17.2 % — HIGH (ref 10.3–14.5)
RBC # FLD: 18 % — HIGH (ref 10.3–14.5)
RBC BLD AUTO: ABNORMAL
RBC CASTS # UR COMP ASSIST: ABNORMAL /HPF (ref 0–4)
RBC CASTS # UR COMP ASSIST: ABNORMAL /HPF (ref 0–4)
SARS-COV-2 IGG SERPL QL IA: NEGATIVE — SIGNIFICANT CHANGE UP
SARS-COV-2 IGM SERPL IA-ACNC: 0.1 INDEX — SIGNIFICANT CHANGE UP
SODIUM SERPL-SCNC: 134 MMOL/L — LOW (ref 135–145)
SODIUM SERPL-SCNC: 134 MMOL/L — LOW (ref 135–145)
SODIUM SERPL-SCNC: 135 MMOL/L — SIGNIFICANT CHANGE UP (ref 135–145)
SODIUM UR-SCNC: <30 MMOL/L — SIGNIFICANT CHANGE UP
SP GR SPEC: 1.01 — SIGNIFICANT CHANGE UP (ref 1.01–1.02)
TROPONIN T SERPL-MCNC: 0.05 NG/ML — SIGNIFICANT CHANGE UP (ref 0–0.06)
UROBILINOGEN FLD QL: NEGATIVE MG/DL — SIGNIFICANT CHANGE UP
UUN UR-MCNC: 238 MG/DL — SIGNIFICANT CHANGE UP
WBC # BLD: 5.22 K/UL — SIGNIFICANT CHANGE UP (ref 3.8–10.5)
WBC # BLD: 5.78 K/UL — SIGNIFICANT CHANGE UP (ref 3.8–10.5)
WBC # FLD AUTO: 5.22 K/UL — SIGNIFICANT CHANGE UP (ref 3.8–10.5)
WBC # FLD AUTO: 5.78 K/UL — SIGNIFICANT CHANGE UP (ref 3.8–10.5)
WBC UR QL: ABNORMAL
WBC UR QL: SIGNIFICANT CHANGE UP

## 2020-10-16 PROCEDURE — 99233 SBSQ HOSP IP/OBS HIGH 50: CPT

## 2020-10-16 PROCEDURE — 71045 X-RAY EXAM CHEST 1 VIEW: CPT | Mod: 26

## 2020-10-16 RX ORDER — DEXTROSE 50 % IN WATER 50 %
50 SYRINGE (ML) INTRAVENOUS ONCE
Refills: 0 | Status: COMPLETED | OUTPATIENT
Start: 2020-10-16 | End: 2020-10-16

## 2020-10-16 RX ORDER — SODIUM POLYSTYRENE SULFONATE 4.1 MEQ/G
30 POWDER, FOR SUSPENSION ORAL ONCE
Refills: 0 | Status: COMPLETED | OUTPATIENT
Start: 2020-10-16 | End: 2020-10-16

## 2020-10-16 RX ORDER — BUDESONIDE AND FORMOTEROL FUMARATE DIHYDRATE 160; 4.5 UG/1; UG/1
2 AEROSOL RESPIRATORY (INHALATION)
Refills: 0 | Status: DISCONTINUED | OUTPATIENT
Start: 2020-10-16 | End: 2020-10-16

## 2020-10-16 RX ORDER — TIOTROPIUM BROMIDE 18 UG/1
1 CAPSULE ORAL; RESPIRATORY (INHALATION) DAILY
Refills: 0 | Status: DISCONTINUED | OUTPATIENT
Start: 2020-10-16 | End: 2020-10-21

## 2020-10-16 RX ORDER — HEPARIN SODIUM 5000 [USP'U]/ML
5000 INJECTION INTRAVENOUS; SUBCUTANEOUS EVERY 8 HOURS
Refills: 0 | Status: DISCONTINUED | OUTPATIENT
Start: 2020-10-16 | End: 2020-10-18

## 2020-10-16 RX ORDER — ALBUTEROL 90 UG/1
1 AEROSOL, METERED ORAL EVERY 4 HOURS
Refills: 0 | Status: DISCONTINUED | OUTPATIENT
Start: 2020-10-16 | End: 2020-10-21

## 2020-10-16 RX ORDER — IPRATROPIUM/ALBUTEROL SULFATE 18-103MCG
3 AEROSOL WITH ADAPTER (GRAM) INHALATION EVERY 6 HOURS
Refills: 0 | Status: DISCONTINUED | OUTPATIENT
Start: 2020-10-16 | End: 2020-10-21

## 2020-10-16 RX ORDER — FONDAPARINUX SODIUM 2.5 MG/.5ML
1 INJECTION, SOLUTION SUBCUTANEOUS
Qty: 0 | Refills: 0 | DISCHARGE

## 2020-10-16 RX ORDER — CALCIUM GLUCONATE 100 MG/ML
2 VIAL (ML) INTRAVENOUS ONCE
Refills: 0 | Status: COMPLETED | OUTPATIENT
Start: 2020-10-16 | End: 2020-10-16

## 2020-10-16 RX ORDER — IPRATROPIUM/ALBUTEROL SULFATE 18-103MCG
3 AEROSOL WITH ADAPTER (GRAM) INHALATION EVERY 4 HOURS
Refills: 0 | Status: DISCONTINUED | OUTPATIENT
Start: 2020-10-16 | End: 2020-10-16

## 2020-10-16 RX ORDER — INSULIN LISPRO 100/ML
VIAL (ML) SUBCUTANEOUS EVERY 6 HOURS
Refills: 0 | Status: DISCONTINUED | OUTPATIENT
Start: 2020-10-16 | End: 2020-10-17

## 2020-10-16 RX ORDER — LANOLIN ALCOHOL/MO/W.PET/CERES
3 CREAM (GRAM) TOPICAL AT BEDTIME
Refills: 0 | Status: DISCONTINUED | OUTPATIENT
Start: 2020-10-16 | End: 2020-10-21

## 2020-10-16 RX ORDER — BUDESONIDE AND FORMOTEROL FUMARATE DIHYDRATE 160; 4.5 UG/1; UG/1
2 AEROSOL RESPIRATORY (INHALATION)
Refills: 0 | Status: DISCONTINUED | OUTPATIENT
Start: 2020-10-16 | End: 2020-10-21

## 2020-10-16 RX ORDER — FLUTICASONE PROPIONATE AND SALMETEROL 50; 250 UG/1; UG/1
1 POWDER ORAL; RESPIRATORY (INHALATION)
Qty: 0 | Refills: 0 | DISCHARGE

## 2020-10-16 RX ORDER — SODIUM BICARBONATE 1 MEQ/ML
50 SYRINGE (ML) INTRAVENOUS ONCE
Refills: 0 | Status: COMPLETED | OUTPATIENT
Start: 2020-10-16 | End: 2020-10-16

## 2020-10-16 RX ORDER — INSULIN HUMAN 100 [IU]/ML
10 INJECTION, SOLUTION SUBCUTANEOUS ONCE
Refills: 0 | Status: COMPLETED | OUTPATIENT
Start: 2020-10-16 | End: 2020-10-16

## 2020-10-16 RX ORDER — BUMETANIDE 0.25 MG/ML
2 INJECTION INTRAMUSCULAR; INTRAVENOUS ONCE
Refills: 0 | Status: COMPLETED | OUTPATIENT
Start: 2020-10-16 | End: 2020-10-16

## 2020-10-16 RX ADMIN — Medication 50 MILLILITER(S): at 07:42

## 2020-10-16 RX ADMIN — BUDESONIDE AND FORMOTEROL FUMARATE DIHYDRATE 2 PUFF(S): 160; 4.5 AEROSOL RESPIRATORY (INHALATION) at 08:36

## 2020-10-16 RX ADMIN — Medication 50 MILLIGRAM(S): at 21:59

## 2020-10-16 RX ADMIN — Medication 3 MILLILITER(S): at 08:36

## 2020-10-16 RX ADMIN — Medication 3 MILLILITER(S): at 03:38

## 2020-10-16 RX ADMIN — Medication 2: at 22:04

## 2020-10-16 RX ADMIN — HEPARIN SODIUM 5000 UNIT(S): 5000 INJECTION INTRAVENOUS; SUBCUTANEOUS at 21:59

## 2020-10-16 RX ADMIN — Medication 3 MILLILITER(S): at 20:20

## 2020-10-16 RX ADMIN — Medication 50 MILLIEQUIVALENT(S): at 06:20

## 2020-10-16 RX ADMIN — Medication 1: at 11:52

## 2020-10-16 RX ADMIN — HEPARIN SODIUM 5000 UNIT(S): 5000 INJECTION INTRAVENOUS; SUBCUTANEOUS at 14:34

## 2020-10-16 RX ADMIN — Medication 200 GRAM(S): at 06:20

## 2020-10-16 RX ADMIN — BUMETANIDE 2 MILLIGRAM(S): 0.25 INJECTION INTRAMUSCULAR; INTRAVENOUS at 06:20

## 2020-10-16 RX ADMIN — HEPARIN SODIUM 5000 UNIT(S): 5000 INJECTION INTRAVENOUS; SUBCUTANEOUS at 05:22

## 2020-10-16 RX ADMIN — Medication 50 MILLIGRAM(S): at 14:34

## 2020-10-16 RX ADMIN — Medication 3 MILLIGRAM(S): at 21:59

## 2020-10-16 RX ADMIN — Medication 3 MILLILITER(S): at 14:54

## 2020-10-16 RX ADMIN — BUDESONIDE AND FORMOTEROL FUMARATE DIHYDRATE 2 PUFF(S): 160; 4.5 AEROSOL RESPIRATORY (INHALATION) at 20:23

## 2020-10-16 RX ADMIN — Medication 2: at 17:04

## 2020-10-16 RX ADMIN — INSULIN HUMAN 10 UNIT(S): 100 INJECTION, SOLUTION SUBCUTANEOUS at 07:42

## 2020-10-16 RX ADMIN — SODIUM POLYSTYRENE SULFONATE 30 GRAM(S): 4.1 POWDER, FOR SUSPENSION ORAL at 06:31

## 2020-10-16 RX ADMIN — Medication 50 MILLIGRAM(S): at 05:21

## 2020-10-16 NOTE — PATIENT PROFILE ADULT - NSPROPTRIGHTNOTIFY_GEN_A_NUR
EMERGENCY DEPARTMENT HISTORY AND PHYSICAL EXAM      Date: 9/11/2018  Patient Name: Jorja Crigler    History of Presenting Illness     Chief Complaint   Patient presents with    Palpitations     Ambulatory c/o palpitations x last night with SOB Hx of AFib       History Provided By: Patient    HPI: Jorja Crigler, 72 y.o. male with PMHx significant for A-fib, GERD, HTN and PUD, presents ambulatory to the ED with cc of sudden onset, intermittent palpitations since last night with associated SOB and a cough. He states he has been doing well without problems since his last ablation. However, last night he felt racing, irregular HR so he took a dose of Amiodarone and an extra diltiazem and his symptoms resolved. This AM the racing HR returned, but he denies feeling an irregular beating. He admits to cough with clear sputum over the week, but he denies fevers, chills, CP, nausea/vomiting, AP. There are no other complaints, changes, or physical findings at this time. PCP: Ros Pelaez MD    Current Facility-Administered Medications   Medication Dose Route Frequency Provider Last Rate Last Dose    dilTIAZem (CARDIZEM) IR tablet 120 mg  120 mg Oral NOW Jazlyn Tipton MD         Current Outpatient Prescriptions   Medication Sig Dispense Refill    dilTIAZem CD (CARDIZEM CD) 120 mg ER capsule Take 2 Caps by mouth daily. 60 Cap 7    valACYclovir (VALTREX) 500 mg tablet Take 500 mg by mouth as needed.  pyridoxine, vitamin B6, (VITAMIN B-6) 100 mg tablet Take 100 mg by mouth daily.  diclofenac EC (VOLTAREN) 75 mg EC tablet Take 75 mg by mouth two (2) times a day.  furosemide (LASIX) 20 mg tablet Take 1 Tab by mouth daily as needed. 90 Tab 3    cyanocobalamin (VITAMIN B-12) 1,000 mcg tablet Take 1,000 mcg by mouth daily.          Past History     Past Medical History:  Past Medical History:   Diagnosis Date    A-fib ([address]     Arthritis     back    Chronic pain     back    Encounter for cardioversion procedure 2/7/2017    GERD (gastroesophageal reflux disease)     High cholesterol     Hypertension 02/07/2017    patient denies hx of HTN    Multiple thyroid nodules     biopsied and benign    PUD (peptic ulcer disease)     \"years ago\"       Past Surgical History:  Past Surgical History:   Procedure Laterality Date    CARDIAC CATHETERIZATION      CARDIAC SURG PROCEDURE UNLIST  2010, 2011    ,ABLATION     CARDIOVERSION EXTERNAL      HX CERVICAL FUSION  2013    HX HEENT  6/6/13    THYROID BIOPSY    HX ORTHOPAEDIC      shoulder surgery on left    HX ORTHOPAEDIC      wrist surgery, LEFT    HX ORTHOPAEDIC      elbow surgery, RIGHT       Family History:  Family History   Problem Relation Age of Onset    Cancer Mother      BREAST    Heart Disease Father     Lung Disease Father        Social History:  Social History   Substance Use Topics    Smoking status: Former Smoker     Packs/day: 0.25     Years: 40.00     Quit date: 10/1/2012    Smokeless tobacco: Never Used    Alcohol use Yes      Comment: occassionally 3-4 cans of beer       Allergies: Allergies   Allergen Reactions    Percocet [Oxycodone-Acetaminophen] Anxiety     Patient takes Tylenol without problems. Review of Systems   Review of Systems   Constitutional: Negative for activity change, appetite change, chills, fever and unexpected weight change. HENT: Negative for congestion. Eyes: Negative for pain and visual disturbance. Respiratory: Positive for cough and shortness of breath. Cardiovascular: Positive for palpitations. Negative for chest pain. Gastrointestinal: Negative for abdominal pain, diarrhea, nausea and vomiting. Genitourinary: Negative for dysuria. Musculoskeletal: Negative for back pain. Skin: Negative for rash. Neurological: Negative for headaches. Physical Exam   Physical Exam   Constitutional: He is oriented to person, place, and time.  He appears well-developed and well-nourished. He appears distressed (mild). Obese, elderly male   HENT:   Head: Normocephalic and atraumatic. Mouth/Throat: Oropharynx is clear and moist.   Eyes: Conjunctivae and EOM are normal. Pupils are equal, round, and reactive to light. Right eye exhibits no discharge. Left eye exhibits no discharge. Neck: Normal range of motion. Neck supple. No JVD present. Cardiovascular: Regular rhythm, normal heart sounds and intact distal pulses. No murmur heard. HR approximately 110   Pulmonary/Chest: Effort normal and breath sounds normal. No respiratory distress. He has no wheezes. He has no rales. No Rhonchi   Abdominal: Soft. Bowel sounds are normal. He exhibits no distension. There is no tenderness. Musculoskeletal: Normal range of motion. He exhibits no edema or deformity. Neurological: He is alert and oriented to person, place, and time. No cranial nerve deficit. He exhibits normal muscle tone. Skin: Skin is warm and dry. No rash noted. He is not diaphoretic. Nursing note and vitals reviewed.       Diagnostic Study Results     Labs -     Recent Results (from the past 12 hour(s))   EKG, 12 LEAD, INITIAL    Collection Time: 09/11/18  6:54 AM   Result Value Ref Range    Ventricular Rate 111 BPM    Atrial Rate 110 BPM    QRS Duration 86 ms    Q-T Interval 376 ms    QTC Calculation (Bezet) 511 ms    Calculated R Axis 41 degrees    Calculated T Axis 49 degrees    Diagnosis       Accelerated Junctional rhythm  Nonspecific ST abnormality  When compared with ECG of 07-SEP-2017 12:49,  Junctional rhythm has replaced Sinus rhythm  QRS duration has decreased  Non-specific change in ST segment in Inferior leads     48 32 - 75 %    LYMPHOCYTES 40 12 - 49 %    MONOCYTES 8 5 - 13 %    EOSINOPHILS 3 1.1 1.1 - 2.2     MAGNESIUM    Collection Time: 09/11/18  7:57 AM   Result Value Ref Range    Magnesium 2.1 1.6 - 2.4 mg/dL   NT-PRO BNP    Collection Time: 09/11/18  7:57 AM   Result Value Ref Range    NT pro- (H) 0 - 125 PG/ML   TROPONIN I    Collection Time: 09/11/18  7:57 AM   Result Value Ref Range    Troponin-I, Qt. <0.05 <0.05 ng/mL   URINALYSIS W/ RFLX MICROSCOPIC    Collection Time: 09/11/18  9:08 AM   Result Value Ref Range    Color YELLOW/STRAW      Appearance CLEAR CLEAR      Specific gravity 1.025 1.003 - 1.030      pH (UA) 5.0 5.0 - 8.0      Protein NEGATIVE  NEG mg/dL    Glucose NEGATIVE  NEG mg/dL    Ketone NEGATIVE  NEG mg/dL Bilirubin NEGATIVE  NEG      Blood NEGATIVE  NEG      Urobilinogen 0.2 0.2 - 1.0 EU/dL    Nitrites NEGATIVE  NEG      Leukocyte Esterase NEGATIVE  NEG         Radiologic Studies -   CXR Results  (Last 48 hours)               09/11/18 0806  XR CHEST PORT Final result    Impression:  IMPRESSION: No acute cardiopulmonary disease. Narrative:  INDICATION: Chest pain, palpitations. A. fib. Short of breath. Portable AP upright view of the chest.       Direct comparison made to prior chest x-ray dated August 27, 2017. Cardiomediastinal silhouette is stable. Lungs are clear bilaterally. Pleural   spaces are normal. Osseous structures are intact. Medical Decision Making   I am the first provider for this patient. I reviewed the vital signs, available nursing notes, past medical history, past surgical history, family history and social history. Vital Signs-Reviewed the patient's vital signs. Patient Vitals for the past 12 hrs:   Temp Pulse Resp BP SpO2   09/11/18 1000 - (!) 110 13 114/85 95 %   09/11/18 0900 - (!) 111 10 (!) 140/103 95 %   09/11/18 0800 - (!) 112 13 (!) 138/107 96 %   09/11/18 0730 - (!) 112 12 (!) 127/100 94 %   09/11/18 0704 97.3 °F (36.3 °C) (!) 108 18 130/88 95 %       Pulse Oximetry Analysis - 95% on RA    Cardiac Monitor:   Rate: 108 bpm  Rhythm: Sinus Tachycardia     EKG interpretation: (Preliminary) 0654  Rhythm: Sinus tachycardia with Ps buried in the T waves and prolonged AR interval; and regular . Rate (approx.): 111; Axis: normal; QRS interval: normal ; ST/T wave: non-specific ST abnormality  Written by Rai Mckeon. Temple Brandon, LUIS Scribe, as dictated by Reji Chin MD.    Records Reviewed: Nursing Notes and Old Medical Records    Provider Notes (Medical Decision Making):   Known prior Afib presenting with a sinus tachycardia without evidence of dehydration or infection.  Possible metabolic derangement, low suspicion ACS, PE.    ED Course:   Initial assessment performed. The patients presenting problems have been discussed, and they are in agreement with the care plan formulated and outlined with them. I have encouraged them to ask questions as they arise throughout their visit. Consult Note:  9:28 AM  Bernard Teran MD consult with Dr. Maurice Plunkett,  Specialty: Cardiology  Discussed case with NP, await recommendations. Consult Note:  10:20 AM  Bernard Teran MD spoke with Suzi CANO  Discussed pt's hx, disposition, and available diagnostic and imaging results. Reviewed care plans. Consultant agrees with plans as outlined. They want to increase his Diltiazem to 240 and come to the office today for a halter monitor. 10:25 AM  Pt has been re-evaluated and is ready for discharge. Discussed medication changes and recommendations by cardiology. Pt to head straight to [facility] office for monitor. Critical Care Time:   0    Disposition:  DISCHARGE NOTE:  10:29 AM  The patient is ready for discharge. The patients signs, symptoms, diagnosis, and instructions for discharge have been discussed and the pt has conveyed their understanding. The patient is to follow up as recommended or return to the ER should their symptoms worsen. Plan has been discussed and patient has conveyed their agreement. PLAN:  1. Discharge  Current Discharge Medication List      CONTINUE these medications which have CHANGED    Details   dilTIAZem CD (CARDIZEM CD) 120 mg ER capsule Take 2 Caps by mouth daily. Qty: 60 Cap, Refills: 7           2. Follow-up Information     Follow up With Details Comments [address], [place] Go on 9/20/2018 2pm with Krista Mccall NP for palpitation follow up.   [address] [facility]  [address]       [facility] EMERGENCY DEPT  If symptoms worsen [address] [place] [address]  [phone]        Return to ED if worse     Diagnosis     Clinical Impression:   1.  Palpitations Attestations: This note is prepared by Odette Tolbert, acting as Scribe for Ashely Barton MD.    Ashely Barton MD: The scribe's documentation has been prepared under my direction and personally reviewed by me in its entirety. I confirm that the note above accurately reflects all work, treatment, procedures, and medical decision making performed by me. yes

## 2020-10-16 NOTE — CHART NOTE - NSCHARTNOTEFT_GEN_A_CORE
Patient with rising Creatinine, BUN, and potassium. Patient is fluid overloaded which is likely contributing to his respiratory difficulties. Was unresponsive to Lasix for diuresis.  Patient may benefit from urgent hemodialysis Attempts were made to reach consulting nephrologist without success.  was unable to provide name for nephrologist on call, nor is this information available through [facility]. Attempts were also made to contact ED to inquire if they knew which nephrologist is on call, to which there were also no responses. Will continue to closely monitor patient's respiratory status and cardiac function. Will attempt to reach nephrology again later in morning.

## 2020-10-16 NOTE — PROGRESS NOTE ADULT - ASSESSMENT
71 yo M s/p fall 10/9 presents in acute renal failure with Cr 4.26 up from 1.8 on 10/12. Patient with no traumatic injuries on scans, however has extensive ecchymosis to LUE and LLE.. Patient reporting acute worsening of SOB since discharge from OSH. Concern for renal failure and COPD exacerbation/Fluid Overload/PNA.      PLAN:   Neurologic: Pain control PRN. Delirium precautions. Sleep / Wake hygiene     Respiratory: Duonebs q4 ATC., Hydrocortisone 50 q8 for COPD exacerbation. Home symbicort. CO2 retention responsive to HFNC 40LPM/40%, maintian and wean as tolerated.     Cardiovascular: A.fib, Normal Rate. ECHO with EF 65-70% and severe pulm HTN. BNP Significantly elevated. Trop -    Gastrointestinal/Nutrition: NPO. Holding IVF for now as concern for fluid overload and renal failure. Possible diet if concern for intubation/NPPV low.     Renal/Genitourinary: Webb in place. Lasix 80mg without adequate response. Strict I&O. F/u Renal consult and need for hemodialysis     Hematologic: SCD, SQH. Monitor H/H    Infectious Disease: Azithromycin and Rocephin added for PNA coverage. Monitor for leukocytosis and fevers.     Lines/Tubes: PIV, TLC, Webb    Endocrine: FS q6 and ISS in setting of steroids.     Disposition: SICU for critical care needs.          30 minutes of critical care time spent providing medical care for patient's acute illness/conditions that impairs at least one vital organ system and/or poses a high risk of imminent or life threatening deterioration in the patient's condition. It includes time spent evaluating and treating the patient's acute illness as well as time spent reviewing labs, radiology, discussing goals of care with patient and/or patient's family, and discussing the case with a multidisciplinary team in an effort to prevent further life threatening deterioration or end organ damage. This time is independent of any procedures performed.

## 2020-10-16 NOTE — PATIENT PROFILE ADULT - IS THERE A SUSPICION OF ABUSE/NEGLIGENCE?
Significant other reports that the pt is illiterate, and does not read or write        Gera Babatunde, REMBERTO  10/28/19 2684 no

## 2020-10-16 NOTE — PROGRESS NOTE ADULT - SUBJECTIVE AND OBJECTIVE BOX
24 HOUR EVENTS:  Patient seen and examined at bedside. Now with some symptomatic relief, satting >96% on HFNC @40LPM/40%. Less acidotic with normal pCO2 on repeat ABG. Has some frustration about being NPO, explained importance of empty stomach in case of need for intubation. Echo performed and shows EF of 65-70% with severe pulmonary HTN. Trop Negative, CvO2 80%. Leg remains tender to palpation, though compartments are soft and neurologically intact. Flu and RVP negative. Azithromycin and Rocephin started for possible pneumonia.     SUBJECTIVE/ROS:  [x] A ten-point review of systems was otherwise negative except as noted.  [ ] Due to altered mental status/intubation, subjective information were not able to be obtained from the patient. History was obtained, to the extent possible, from review of the chart and collateral sources of information.      NEURO  RASS:  n/a   GCS: 15    CAM ICU: Negative  Exam: A&Ox3, follows commands, No focal deficits.   Meds:   [x] Adequacy of sedation and pain control has been assessed and adjusted      RESPIRATORY  RR: 18 (10-16-20 @ 02:00) (16 - 20)  SpO2: 93% (10-16-20 @ 02:00) (93% - 100%)  Wt(kg): --  Exam: unlabored, no use of accessory muscles. Some conversational dyspnea.   HFNC 40LPM, 40% FiO2  ABG - ( 16 Oct 2020 01:56 )  pH: 7.30  /  pCO2: 44    /  pO2: 79    / HCO3: 21    / Base Excess: -4.1  /  SaO2: 97      Lactate: x          [ ] Extubation Readiness Assessed  Meds: albuterol/ipratropium for Nebulization 3 milliLiter(s) Nebulizer every 6 hours  budesonide  80 MICROgram(s)/formoterol 4.5 MICROgram(s) Inhaler 2 Puff(s) Inhalation two times a day        CARDIOVASCULAR  HR: 89 (10-16-20 @ 02:00) (80 - 92)  BP: 134/63 (10-16-20 @ 02:00) (131/65 - 161/78)  BP(mean): 90 (10-16-20 @ 02:00) (90 - 111)  ABP: --  ABP(mean): --  Wt(kg): --  CVP(cm H2O): --  VBG - ( 15 Oct 2020 22:30 )  pH: 7.24  /  pCO2: 56    /  pO2: 45    / HCO3: 21    / Base Excess: x     /  SaO2: 81     Lactate: x            Exam:   Cardiac Rhythm: Irregularly Irregular. Normal Rate  Perfusion     [x]Adequate   [ ]Inadequate  Mentation   [x]Normal       [ ]Reduced  Extremities  [x]Warm         [ ]Cool  Volume Status [x]Hypervolemic [ ]Euvolemic [ ]Hypovolemic  Meds:       GI/NUTRITION  Exam: Abd soft, NT, ND  Diet: NPO  Meds:     GENITOURINARY  I&O's Detail    10-15 @ 07:01  -  10-16 @ 02:26  --------------------------------------------------------  IN:    IV PiggyBack: 350 mL    multiple electrolytes Injection Type 1.: 500 mL  Total IN: 850 mL    OUT:    Indwelling Catheter - Urethral (mL): 115 mL  Total OUT: 115 mL    Total NET: 735 mL        Weight (kg): 89.4 (10-15 @ 10:31)  10-15    134<L>  |  99  |  86.0<H>  ----------------------------<  102<H>  5.3   |  21.0<L>  |  4.84<H>    Ca    8.7      15 Oct 2020 22:28  Phos  6.1     10-15  Mg     2.0     10-15    TPro  5.5<L>  /  Alb  3.6  /  TBili  0.9  /  DBili  x   /  AST  22  /  ALT  16  /  AlkPhos  194<H>  10-15    [x] Webb catheter, indication: Strict I&O's  Meds:         HEMATOLOGIC  Meds: heparin   Injectable 5000 Unit(s) SubCutaneous every 8 hours    [x] VTE Prophylaxis                        7.7    7.69  )-----------( 166      ( 15 Oct 2020 12:15 )             24.2     PT/INR - ( 15 Oct 2020 12:15 )   PT: 13.9 sec;   INR: 1.21 ratio         PTT - ( 15 Oct 2020 12:15 )  PTT:28.7 sec  Transfusion     [ ] PRBC   [ ] Platelets   [ ] FFP   [ ] Cryoprecipitate      INFECTIOUS DISEASES  T(C): 37.1 (10-15-20 @ 23:18), Max: 37.1 (10-15-20 @ 14:49)  Wt(kg): --  WBC Count: 7.69 K/uL (10-15 @ 12:15)    Recent Cultures:    Meds: azithromycin  IVPB      azithromycin  IVPB 500 milliGRAM(s) IV Intermittent every 24 hours  cefTRIAXone   IVPB 1000 milliGRAM(s) IV Intermittent every 24 hours        ENDOCRINE  Capillary Blood Glucose    Meds: dextrose 50% Injectable 25 Gram(s) IV Push once  glucagon  Injectable 1 milliGRAM(s) IntraMuscular once PRN  hydrocortisone sodium succinate Injectable 50 milliGRAM(s) IV Push every 8 hours  insulin lispro (HumaLOG) corrective regimen sliding scale   SubCutaneous every 6 hours        ACCESS DEVICES:  [x] Peripheral IV  [x] Central Venous Line	[ ] R	[ ] L	[ ] IJ	[ ] Fem	[ ] SC	Placed:   [ ] Arterial Line		[ ] R	[ ] L	[ ] Fem	[ ] Rad	[ ] Ax	Placed:   [ ] PICC:					[ ] Mediport  [x] Urinary Catheter, Date Placed:   [ ] Necessity of urinary, arterial, and venous catheters discussed    OTHER MEDICATIONS:      CODE STATUS: Full    IMAGING:   AM CXR Pending       24 HOUR EVENTS:  Patient seen and examined at bedside. Now with some symptomatic relief, satting >96% on HFNC @40LPM/40%. Less acidotic with normal pCO2 on repeat ABG. May benefit more from CPAP, though patient does not want as he has not tolerated in past. Has some frustration about being NPO, explained importance of empty stomach in case of need for intubation. Echo performed and shows EF of 65-70% with severe pulmonary HTN. Trop Negative, CvO2 80%. Leg remains tender to palpation, though compartments are soft and neurologically intact. Flu and RVP negative. Azithromycin and Rocephin started for possible pneumonia.     SUBJECTIVE/ROS:  [x] A ten-point review of systems was otherwise negative except as noted.  [ ] Due to altered mental status/intubation, subjective information were not able to be obtained from the patient. History was obtained, to the extent possible, from review of the chart and collateral sources of information.      NEURO  RASS:  n/a   GCS: 15    CAM ICU: Negative  Exam: A&Ox3, follows commands, No focal deficits.   Meds:   [x] Adequacy of sedation and pain control has been assessed and adjusted      RESPIRATORY  RR: 18 (10-16-20 @ 02:00) (16 - 20)  SpO2: 93% (10-16-20 @ 02:00) (93% - 100%)  Wt(kg): --  Exam: unlabored, no use of accessory muscles. Some conversational dyspnea.   HFNC 40LPM, 40% FiO2  ABG - ( 16 Oct 2020 01:56 )  pH: 7.30  /  pCO2: 44    /  pO2: 79    / HCO3: 21    / Base Excess: -4.1  /  SaO2: 97      Lactate: x          [ ] Extubation Readiness Assessed  Meds: albuterol/ipratropium for Nebulization 3 milliLiter(s) Nebulizer every 6 hours  budesonide  80 MICROgram(s)/formoterol 4.5 MICROgram(s) Inhaler 2 Puff(s) Inhalation two times a day        CARDIOVASCULAR  HR: 89 (10-16-20 @ 02:00) (80 - 92)  BP: 134/63 (10-16-20 @ 02:00) (131/65 - 161/78)  BP(mean): 90 (10-16-20 @ 02:00) (90 - 111)  ABP: --  ABP(mean): --  Wt(kg): --  CVP(cm H2O): --  VBG - ( 15 Oct 2020 22:30 )  pH: 7.24  /  pCO2: 56    /  pO2: 45    / HCO3: 21    / Base Excess: x     /  SaO2: 81     Lactate: x            Exam:   Cardiac Rhythm: Irregularly Irregular. Normal Rate  Perfusion     [x]Adequate   [ ]Inadequate  Mentation   [x]Normal       [ ]Reduced  Extremities  [x]Warm         [ ]Cool  Volume Status [x]Hypervolemic [ ]Euvolemic [ ]Hypovolemic  Meds:       GI/NUTRITION  Exam: Abd soft, NT, ND  Diet: NPO  Meds:     GENITOURINARY  I&O's Detail    10-15 @ 07:01  -  10-16 @ 02:26  --------------------------------------------------------  IN:    IV PiggyBack: 350 mL    multiple electrolytes Injection Type 1.: 500 mL  Total IN: 850 mL    OUT:    Indwelling Catheter - Urethral (mL): 115 mL  Total OUT: 115 mL    Total NET: 735 mL        Weight (kg): 89.4 (10-15 @ 10:31)  10-15    134<L>  |  99  |  86.0<H>  ----------------------------<  102<H>  5.3   |  21.0<L>  |  4.84<H>    Ca    8.7      15 Oct 2020 22:28  Phos  6.1     10-15  Mg     2.0     10-15    TPro  5.5<L>  /  Alb  3.6  /  TBili  0.9  /  DBili  x   /  AST  22  /  ALT  16  /  AlkPhos  194<H>  10-15    [x] Webb catheter, indication: Strict I&O's  Meds:         HEMATOLOGIC  Meds: heparin   Injectable 5000 Unit(s) SubCutaneous every 8 hours    [x] VTE Prophylaxis                        7.7    7.69  )-----------( 166      ( 15 Oct 2020 12:15 )             24.2     PT/INR - ( 15 Oct 2020 12:15 )   PT: 13.9 sec;   INR: 1.21 ratio         PTT - ( 15 Oct 2020 12:15 )  PTT:28.7 sec  Transfusion     [ ] PRBC   [ ] Platelets   [ ] FFP   [ ] Cryoprecipitate      INFECTIOUS DISEASES  T(C): 37.1 (10-15-20 @ 23:18), Max: 37.1 (10-15-20 @ 14:49)  Wt(kg): --  WBC Count: 7.69 K/uL (10-15 @ 12:15)    Recent Cultures:    Meds: azithromycin  IVPB      azithromycin  IVPB 500 milliGRAM(s) IV Intermittent every 24 hours  cefTRIAXone   IVPB 1000 milliGRAM(s) IV Intermittent every 24 hours        ENDOCRINE  Capillary Blood Glucose    Meds: dextrose 50% Injectable 25 Gram(s) IV Push once  glucagon  Injectable 1 milliGRAM(s) IntraMuscular once PRN  hydrocortisone sodium succinate Injectable 50 milliGRAM(s) IV Push every 8 hours  insulin lispro (HumaLOG) corrective regimen sliding scale   SubCutaneous every 6 hours        ACCESS DEVICES:  [x] Peripheral IV  [x] Central Venous Line	[ ] R	[ ] L	[ ] IJ	[ ] Fem	[ ] SC	Placed:   [ ] Arterial Line		[ ] R	[ ] L	[ ] Fem	[ ] Rad	[ ] Ax	Placed:   [ ] PICC:					[ ] Mediport  [x] Urinary Catheter, Date Placed:   [ ] Necessity of urinary, arterial, and venous catheters discussed    OTHER MEDICATIONS:      CODE STATUS: Full    IMAGING:   AM CXR Pending

## 2020-10-16 NOTE — PROGRESS NOTE ADULT - SUBJECTIVE AND OBJECTIVE BOX
[facility] DIVISION OF KIDNEY DISEASES AND HYPERTENSION -- FOLLOW UP NOTE  --------------------------------------------------------------------------------  Chief Complaint:  GERALD    24 hour events/subjective:  Pt seen/examined this AM  Making urine with bumex push this AM;      PAST HISTORY  --------------------------------------------------------------------------------  No significant changes to PMH, PSH, FHx, SHx, unless otherwise noted    ALLERGIES & MEDICATIONS  --------------------------------------------------------------------------------  Allergies    No Known Allergies    Intolerances      Standing Inpatient Medications  ALBUTerol    90 MICROgram(s) HFA Inhaler 1 Puff(s) Inhalation every 4 hours  albuterol/ipratropium for Nebulization 3 milliLiter(s) Nebulizer every 6 hours  budesonide 160 MICROgram(s)/formoterol 4.5 MICROgram(s) Inhaler 2 Puff(s) Inhalation two times a day  dextrose 50% Injectable 25 Gram(s) IV Push once  heparin   Injectable 5000 Unit(s) SubCutaneous every 8 hours  hydrocortisone sodium succinate Injectable 50 milliGRAM(s) IV Push every 8 hours  insulin lispro (HumaLOG) corrective regimen sliding scale   SubCutaneous every 6 hours  tiotropium 18 MICROgram(s) Capsule 1 Capsule(s) Inhalation daily    PRN Inpatient Medications  glucagon  Injectable 1 milliGRAM(s) IntraMuscular once PRN      REVIEW OF SYSTEMS  --------------------------------------------------------------------------------  Gen: No weight changes, fatigue, fevers/chills,  Skin: No rashes  Head/Eyes/Ears/Mouth: No headache; Normal hearing; Normal vision w/o blurriness; No sinus pain/discomfort, sore throat  Respiratory: No dyspnea, cough, wheezing, hemoptysis  CV: No chest pain, PND, orthopnea  GI: No abdominal pain, diarrhea, constipation, nausea, vomiting,  : decreased urination+  MSK: leg pain+ no back pain; no edema  Neuro: No dizziness/lightheadedness, weakness, seizures,  Heme:  easy bruising +  Endo: No heat/cold intolerance  Psych: No significant nervousness, anxiety, stress, depression    All other systems were reviewed and are negative, except as noted.    VITALS/PHYSICAL EXAM  --------------------------------------------------------------------------------  T(C): 37 (10-16-20 @ 12:00), Max: 37.1 (10-15-20 @ 20:22)  HR: 86 (10-16-20 @ 14:56) (80 - 97)  BP: 145/74 (10-16-20 @ 14:00) (131/65 - 161/78)  RR: 24 (10-16-20 @ 14:00) (16 - 25)  SpO2: 96% (10-16-20 @ 14:56) (93% - 100%)  Wt(kg): --  Height (cm): 167.6 (10-15-20 @ 10:31)  Weight (kg): 89.4 (10-15-20 @ 10:31)  BMI (kg/m2): 31.8 (10-15-20 @ 10:31)  BSA (m2): 1.99 (10-15-20 @ 10:31)      10-15-20 @ 07:01  -  10-16-20 @ 07:00  --------------------------------------------------------  IN: 950 mL / OUT: 215 mL / NET: 735 mL    10-16-20 @ 07:01  -  10-16-20 @ 15:06  --------------------------------------------------------  IN: 640 mL / OUT: 520 mL / NET: 120 mL      Physical Exam:  	Gen: NAD  	HEENT: , supple neck  	Pulm: CTA B/L  	CV: RRR, S1S2; no rub  	Back: No spinal or CVA tenderness; no sacral edema  	Abd: +BS, soft, nontender/nondistended  	: No suprapubic tenderness  	UE: Warm, ; no edema  	LE: Warm, left leg extensive bruising, hematoma on knee  	Neuro: No focal deficits  	Psych: Normal affect and mood  	Skin: Warm, extensive bruising    LABS/STUDIES  --------------------------------------------------------------------------------              7.0    5.22  >-----------<  159      [10-16-20 @ 04:28]              20.1     134  |  99  |  87.0  ----------------------------<  184      [10-16-20 @ 12:06]  5.1   |  23.0  |  4.53        Ca     8.7     [10-16-20 @ 12:06]      Mg     1.9     [10-16-20 @ 04:28]      Phos  6.2     [10-16-20 @ 04:28]    TPro  5.3  /  Alb  3.2  /  TBili  0.7  /  DBili  x   /  AST  17  /  ALT  13  /  AlkPhos  182  [10-16-20 @ 04:28]    PT/INR: PT 13.9 , INR 1.21       [10-15-20 @ 12:15]  PTT: 28.7       [10-15-20 @ 12:15]    Troponin 0.05      [10-16-20 @ 04:28]  CK 18      [10-16-20 @ 04:28]    Creatinine Trend:  SCr 4.53 [10-16 @ 12:06]  SCr 4.79 [10-16 @ 04:28]  SCr 4.84 [10-15 @ 22:28]  SCr 4.26 [10-15 @ 12:15]    Urinalysis - [10-16-20 @ 10:57]      Color Yellow / Appearance Slightly Turbid / SG 1.015 / pH 5.0      Gluc Negative / Ketone Negative  / Bili Small / Urobili Negative       Blood Moderate / Protein 30 / Leuk Est Small / Nitrite Negative      RBC 11-25 / WBC 3-5 / Hyaline  / Gran  / Sq Epi  / Non Sq Epi Negative / Bacteria Occasional    Urine Creatinine 209      [10-15-20 @ 23:57]  Urine Sodium <30      [10-15-20 @ 23:57]  Urine Urea Nitrogen 238      [10-16-20 @ 07:35]  Urine Osmolality 321      [10-15-20 @ 23:57]

## 2020-10-16 NOTE — PROGRESS NOTE ADULT - ASSESSMENT
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

## 2020-10-17 LAB
ANION GAP SERPL CALC-SCNC: 13 MMOL/L — SIGNIFICANT CHANGE UP (ref 5–17)
ANISOCYTOSIS BLD QL: SIGNIFICANT CHANGE UP
BASO STIPL BLD QL SMEAR: PRESENT — SIGNIFICANT CHANGE UP
BASOPHILS # BLD AUTO: 0 K/UL — SIGNIFICANT CHANGE UP (ref 0–0.2)
BASOPHILS NFR BLD AUTO: 0 % — SIGNIFICANT CHANGE UP (ref 0–2)
BUN SERPL-MCNC: 96 MG/DL — HIGH (ref 8–20)
CALCIUM SERPL-MCNC: 8.2 MG/DL — LOW (ref 8.6–10.2)
CHLORIDE SERPL-SCNC: 99 MMOL/L — SIGNIFICANT CHANGE UP (ref 98–107)
CO2 SERPL-SCNC: 23 MMOL/L — SIGNIFICANT CHANGE UP (ref 22–29)
CREAT SERPL-MCNC: 3.93 MG/DL — HIGH (ref 0.5–1.3)
EOSINOPHIL # BLD AUTO: 0 K/UL — SIGNIFICANT CHANGE UP (ref 0–0.5)
EOSINOPHIL NFR BLD AUTO: 0 % — SIGNIFICANT CHANGE UP (ref 0–6)
GIANT PLATELETS BLD QL SMEAR: PRESENT — SIGNIFICANT CHANGE UP
GLUCOSE BLDC GLUCOMTR-MCNC: 166 MG/DL — HIGH (ref 70–99)
GLUCOSE BLDC GLUCOMTR-MCNC: 182 MG/DL — HIGH (ref 70–99)
GLUCOSE BLDC GLUCOMTR-MCNC: 233 MG/DL — HIGH (ref 70–99)
GLUCOSE BLDC GLUCOMTR-MCNC: 248 MG/DL — HIGH (ref 70–99)
GLUCOSE SERPL-MCNC: 150 MG/DL — HIGH (ref 70–99)
HCT VFR BLD CALC: 27.1 % — LOW (ref 39–50)
HGB BLD-MCNC: 9.1 G/DL — LOW (ref 13–17)
HYPOCHROMIA BLD QL: SLIGHT — SIGNIFICANT CHANGE UP
LYMPHOCYTES # BLD AUTO: 0.43 K/UL — LOW (ref 1–3.3)
LYMPHOCYTES # BLD AUTO: 7.9 % — LOW (ref 13–44)
MACROCYTES BLD QL: SLIGHT — SIGNIFICANT CHANGE UP
MAGNESIUM SERPL-MCNC: 2.1 MG/DL — SIGNIFICANT CHANGE UP (ref 1.8–2.6)
MANUAL SMEAR VERIFICATION: SIGNIFICANT CHANGE UP
MCHC RBC-ENTMCNC: 33.6 GM/DL — SIGNIFICANT CHANGE UP (ref 32–36)
MCHC RBC-ENTMCNC: 35.3 PG — HIGH (ref 27–34)
MCV RBC AUTO: 105 FL — HIGH (ref 80–100)
MICROCYTES BLD QL: SLIGHT — SIGNIFICANT CHANGE UP
MONOCYTES # BLD AUTO: 0.48 K/UL — SIGNIFICANT CHANGE UP (ref 0–0.9)
MONOCYTES NFR BLD AUTO: 8.8 % — SIGNIFICANT CHANGE UP (ref 2–14)
NEUTROPHILS # BLD AUTO: 4.55 K/UL — SIGNIFICANT CHANGE UP (ref 1.8–7.4)
NEUTROPHILS NFR BLD AUTO: 83.3 % — HIGH (ref 43–77)
NRBC # BLD: 2 /100 — HIGH (ref 0–0)
NT-PROBNP SERPL-SCNC: HIGH PG/ML (ref 0–300)
OVALOCYTES BLD QL SMEAR: SLIGHT — SIGNIFICANT CHANGE UP
PHOSPHATE SERPL-MCNC: 5.9 MG/DL — HIGH (ref 2.4–4.7)
PLAT MORPH BLD: NORMAL — SIGNIFICANT CHANGE UP
PLATELET # BLD AUTO: 167 K/UL — SIGNIFICANT CHANGE UP (ref 150–400)
PLATELET COUNT - ESTIMATE: NORMAL — SIGNIFICANT CHANGE UP
POLYCHROMASIA BLD QL SMEAR: SIGNIFICANT CHANGE UP
POTASSIUM SERPL-MCNC: 4.8 MMOL/L — SIGNIFICANT CHANGE UP (ref 3.5–5.3)
POTASSIUM SERPL-SCNC: 4.8 MMOL/L — SIGNIFICANT CHANGE UP (ref 3.5–5.3)
RBC # BLD: 2.58 M/UL — LOW (ref 4.2–5.8)
RBC # FLD: 15.4 % — HIGH (ref 10.3–14.5)
RBC BLD AUTO: ABNORMAL
SODIUM SERPL-SCNC: 135 MMOL/L — SIGNIFICANT CHANGE UP (ref 135–145)
WBC # BLD: 5.46 K/UL — SIGNIFICANT CHANGE UP (ref 3.8–10.5)
WBC # FLD AUTO: 5.46 K/UL — SIGNIFICANT CHANGE UP (ref 3.8–10.5)

## 2020-10-17 PROCEDURE — 99233 SBSQ HOSP IP/OBS HIGH 50: CPT

## 2020-10-17 RX ORDER — INSULIN LISPRO 100/ML
VIAL (ML) SUBCUTANEOUS
Refills: 0 | Status: DISCONTINUED | OUTPATIENT
Start: 2020-10-17 | End: 2020-10-19

## 2020-10-17 RX ORDER — HYDROCORTISONE 20 MG
25 TABLET ORAL EVERY 8 HOURS
Refills: 0 | Status: COMPLETED | OUTPATIENT
Start: 2020-10-18 | End: 2020-10-18

## 2020-10-17 RX ORDER — CHLORHEXIDINE GLUCONATE 213 G/1000ML
1 SOLUTION TOPICAL DAILY
Refills: 0 | Status: DISCONTINUED | OUTPATIENT
Start: 2020-10-17 | End: 2020-10-21

## 2020-10-17 RX ADMIN — Medication 3 MILLILITER(S): at 09:03

## 2020-10-17 RX ADMIN — Medication 2: at 05:27

## 2020-10-17 RX ADMIN — BUDESONIDE AND FORMOTEROL FUMARATE DIHYDRATE 2 PUFF(S): 160; 4.5 AEROSOL RESPIRATORY (INHALATION) at 09:03

## 2020-10-17 RX ADMIN — Medication 3 MILLIGRAM(S): at 21:07

## 2020-10-17 RX ADMIN — Medication 50 MILLIGRAM(S): at 13:23

## 2020-10-17 RX ADMIN — Medication 50 MILLIGRAM(S): at 05:28

## 2020-10-17 RX ADMIN — HEPARIN SODIUM 5000 UNIT(S): 5000 INJECTION INTRAVENOUS; SUBCUTANEOUS at 13:22

## 2020-10-17 RX ADMIN — HEPARIN SODIUM 5000 UNIT(S): 5000 INJECTION INTRAVENOUS; SUBCUTANEOUS at 05:28

## 2020-10-17 RX ADMIN — HEPARIN SODIUM 5000 UNIT(S): 5000 INJECTION INTRAVENOUS; SUBCUTANEOUS at 21:07

## 2020-10-17 RX ADMIN — Medication 4: at 11:13

## 2020-10-17 RX ADMIN — BUDESONIDE AND FORMOTEROL FUMARATE DIHYDRATE 2 PUFF(S): 160; 4.5 AEROSOL RESPIRATORY (INHALATION) at 21:25

## 2020-10-17 RX ADMIN — Medication 4: at 22:28

## 2020-10-17 RX ADMIN — Medication 2: at 17:07

## 2020-10-17 RX ADMIN — Medication 3 MILLILITER(S): at 14:53

## 2020-10-17 RX ADMIN — CHLORHEXIDINE GLUCONATE 1 APPLICATION(S): 213 SOLUTION TOPICAL at 11:13

## 2020-10-17 RX ADMIN — Medication 3 MILLILITER(S): at 21:25

## 2020-10-17 NOTE — PROGRESS NOTE ADULT - ASSESSMENT
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

## 2020-10-17 NOTE — DIETITIAN INITIAL EVALUATION ADULT. - OTHER INFO
Pt is a 69 yo M w/ PMH of COPD newly on home O2, A fib on eliquis, CHF, CKD who is s/p mechanical fall down 7 stairs on 10/9. Patient did not have LOC but did report hitting head. Patient presented to [facility] on 10/11. CT head/c-spine/CAP were negative for acute injuries. Patient was notted to have diffuse ecchymosis over L side. XR LLE were negative for acute fx. Patient was discharged home on 10/14. Patient presented to [facility] with worsening sob and lower leg pain with difficulty ambulating. Patient reports significantly lower urine output over past few days and noted that urine color was very dark. Patient denies other associated symptoms including f/c/n/v.   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 Pt reports eating well PTA, follows a low Na+ diet. No recent wt changes noted. New food preferences obtained. Pt declined further diet education at this time.

## 2020-10-17 NOTE — PROGRESS NOTE ADULT - SUBJECTIVE AND OBJECTIVE BOX
[facility] DIVISION OF KIDNEY DISEASES AND HYPERTENSION -- FOLLOW UP NOTE  --------------------------------------------------------------------------------  Chief Complaint: GERALD    24 hour events/subjective:  Seen/examined  SCr improving  Making urine;      PAST HISTORY  --------------------------------------------------------------------------------  No significant changes to PMH, PSH, FHx, SHx, unless otherwise noted    ALLERGIES & MEDICATIONS  --------------------------------------------------------------------------------  Allergies    No Known Allergies    Intolerances      Standing Inpatient Medications  ALBUTerol    90 MICROgram(s) HFA Inhaler 1 Puff(s) Inhalation every 4 hours  albuterol/ipratropium for Nebulization 3 milliLiter(s) Nebulizer every 6 hours  budesonide 160 MICROgram(s)/formoterol 4.5 MICROgram(s) Inhaler 2 Puff(s) Inhalation two times a day  chlorhexidine 2% Cloths 1 Application(s) Topical daily  heparin   Injectable 5000 Unit(s) SubCutaneous every 8 hours  hydrocortisone sodium succinate Injectable 50 milliGRAM(s) IV Push every 8 hours  insulin lispro (HumaLOG) corrective regimen sliding scale   SubCutaneous every 6 hours  melatonin 3 milliGRAM(s) Oral at bedtime  tiotropium 18 MICROgram(s) Capsule 1 Capsule(s) Inhalation daily    PRN Inpatient Medications      REVIEW OF SYSTEMS  --------------------------------------------------------------------------------  Gen: No weight changes, fatigue, fevers/chills, weakness  Skin: No rashes  Head/Eyes/Ears/Mouth: No headache; Normal hearing; Normal vision w/o blurriness; No sinus pain/discomfort, sore throat  Respiratory: No dyspnea, cough, wheezing, hemoptysis  CV: No chest pain, PND, orthopnea  GI: No abdominal pain, diarrhea, constipation, nausea, vomiting, melena, hematochezia  : No increased frequency, dysuria, hematuria, nocturia  MSK: No joint pain/swelling; no back pain; no edema  Neuro: No dizziness/lightheadedness, weakness, seizures, numbness, tingling  Heme: No easy bruising or bleeding  Endo: No heat/cold intolerance  Psych: No significant nervousness, anxiety, stress, depression    All other systems were reviewed and are negative, except as noted.    VITALS/PHYSICAL EXAM  --------------------------------------------------------------------------------  T(C): 36.4 (10-17-20 @ 12:01), Max: 37.4 (10-16-20 @ 23:30)  HR: 76 (10-17-20 @ 14:00) (69 - 97)  BP: 144/67 (10-17-20 @ 14:00) (120/59 - 166/77)  RR: 37 (10-17-20 @ 14:00) (18 - 37)  SpO2: 97% (10-17-20 @ 14:00) (92% - 99%)  Wt(kg): --        10-16-20 @ 07:01  -  10-17-20 @ 07:00  --------------------------------------------------------  IN: 880 mL / OUT: 1310 mL / NET: -430 mL    10-17-20 @ 07:01  -  10-17-20 @ 14:16  --------------------------------------------------------  IN: 600 mL / OUT: 395 mL / NET: 205 mL      Physical Exam:  	Gen: NAD   	HEENT: PERRL, supple neck, clear oropharynx  	Pulm: CTA B/L  	CV: RRR, S1S2; no rub  	Back: No spinal or CVA tenderness; no sacral edema  	Abd: +BS, soft, nontender/nondistended  	: No suprapubic tenderness  	UE: Warm, FROM, no clubbing, intact strength; no edema; no asterixis  	LE: Warm, FROM, no clubbing, intact strength; no edema  	Neuro: No focal deficits, intact gait  	Psych: Normal affect and mood  	Skin: Warm, without rashes  	Vascular access:    LABS/STUDIES  --------------------------------------------------------------------------------              9.1    5.46  >-----------<  167      [10-17-20 @ 06:13]              27.1     135  |  99  |  96.0  ----------------------------<  150      [10-17-20 @ 06:13]  4.8   |  23.0  |  3.93        Ca     8.2     [10-17-20 @ 06:13]      Mg     2.1     [10-17-20 @ 06:13]      Phos  5.9     [10-17-20 @ 06:13]    TPro  5.3  /  Alb  3.2  /  TBili  0.7  /  DBili  x   /  AST  17  /  ALT  13  /  AlkPhos  182  [10-16-20 @ 04:28]        Troponin 0.05      [10-16-20 @ 04:28]  CK 18      [10-16-20 @ 04:28]    Creatinine Trend:  SCr 3.93 [10-17 @ 06:13]  SCr 4.29 [10-16 @ 21:10]  SCr 4.53 [10-16 @ 12:06]  SCr 4.79 [10-16 @ 04:28]  SCr 4.84 [10-15 @ 22:28]    Urinalysis - [10-16-20 @ 10:57]      Color Yellow / Appearance Slightly Turbid / SG 1.015 / pH 5.0      Gluc Negative / Ketone Negative  / Bili Small / Urobili Negative       Blood Moderate / Protein 30 / Leuk Est Small / Nitrite Negative      RBC 11-25 / WBC 3-5 / Hyaline  / Gran  / Sq Epi  / Non Sq Epi Negative / Bacteria Occasional    Urine Creatinine 209      [10-15-20 @ 23:57]  Urine Sodium <30      [10-15-20 @ 23:57]  Urine Urea Nitrogen 238      [10-16-20 @ 07:35]  Urine Osmolality 321      [10-15-20 @ 23:57]    HbA1c 5.4      [03-25-17 @ 07:51]    HCV 0.04, Nonreact      [10-16-20 @ 09:36]

## 2020-10-17 NOTE — PROGRESS NOTE ADULT - SUBJECTIVE AND OBJECTIVE BOX
24h Events:  Given one dose of Bumex yesterday morning with good effect, UOP has been between 40-60 ml/hr. Hgb yesterday AM 7.0, recieved 1U PRBC. BMP repeated yesterday afternoon, lytes within normal limits. Maintained on HFNC, weaning support.     ICU Vital Signs Last 24 Hrs  T(C): 37.4 (16 Oct 2020 23:30), Max: 37.4 (16 Oct 2020 23:30)  T(F): 99.4 (16 Oct 2020 23:30), Max: 99.4 (16 Oct 2020 23:30)  HR: 81 (17 Oct 2020 02:00) (79 - 97)  BP: 158/75 (17 Oct 2020 02:00) (120/59 - 163/71)  BP(mean): 108 (17 Oct 2020 02:00) (85 - 120)  ABP: --  ABP(mean): --  RR: 29 (17 Oct 2020 02:00) (18 - 29)  SpO2: 98% (17 Oct 2020 02:00) (93% - 100%)    I&O's Detail    15 Oct 2020 07:01  -  16 Oct 2020 07:00  --------------------------------------------------------  IN:    IV PiggyBack: 450 mL    multiple electrolytes Injection Type 1.: 500 mL  Total IN: 950 mL    OUT:    Indwelling Catheter - Urethral (mL): 215 mL  Total OUT: 215 mL    Total NET: 735 mL    16 Oct 2020 07:01  -  17 Oct 2020 02:29  --------------------------------------------------------  IN:    Oral Fluid: 480 mL    PRBCs (Packed Red Blood Cells): 400 mL  Total IN: 880 mL    OUT:    Indwelling Catheter - Urethral (mL): 1095 mL  Total OUT: 1095 mL    Total NET: -215 mL    ABG - ( 16 Oct 2020 01:56 )  pH, Arterial: 7.30  pH, Blood: x     /  pCO2: 44    /  pO2: 79    / HCO3: 21    / Base Excess: -4.1  /  SaO2: 97        MEDICATIONS  (STANDING):  ALBUTerol    90 MICROgram(s) HFA Inhaler 1 Puff(s) Inhalation every 4 hours  albuterol/ipratropium for Nebulization 3 milliLiter(s) Nebulizer every 6 hours  budesonide 160 MICROgram(s)/formoterol 4.5 MICROgram(s) Inhaler 2 Puff(s) Inhalation two times a day  heparin   Injectable 5000 Unit(s) SubCutaneous every 8 hours  hydrocortisone sodium succinate Injectable 50 milliGRAM(s) IV Push every 8 hours  insulin lispro (HumaLOG) corrective regimen sliding scale   SubCutaneous every 6 hours  melatonin 3 milliGRAM(s) Oral at bedtime  tiotropium 18 MICROgram(s) Capsule 1 Capsule(s) Inhalation daily    MEDICATIONS  (PRN):    Physical Exam:    Gen: Resting comfortably in bed    HEENT: PERRL, EOMI    Neurological: Alert and oriented x3 without focal deficit    Neck: Trachea midline, no evidence of JVD, FROM without pain, neck symmetric    Pulmonary: CTAB with decreased breath sounds at the bases    Cardiovascular: S1S2    Gastrointestinal: Soft, non-tender, non-distended    : Lomeli in place draining yellow urine    Skin: Intact, multiple areas of ecchymosis: LUE ecchymotic with bruising extending up the L neck and down the L lateral back and flank, LLE with large hematoma    Musculoskeletal: FROM without pain, no deformity or areas of tenderness, all compartments soft    LABS:  CBC Full  -  ( 16 Oct 2020 21:10 )  WBC Count : 5.78 K/uL  RBC Count : 1.96 M/uL  Hemoglobin : 8.1 g/dL  Hematocrit : 22.0 %  Platelet Count - Automated : 183 K/uL  Mean Cell Volume : 112.2 fl  Mean Cell Hemoglobin : 41.3 pg  Mean Cell Hemoglobin Concentration : 36.8 gm/dL  Auto Neutrophil # : x  Auto Lymphocyte # : x  Auto Monocyte # : x  Auto Eosinophil # : x  Auto Basophil # : x  Auto Neutrophil % : x  Auto Lymphocyte % : x  Auto Monocyte % : x  Auto Eosinophil % : x  Auto Basophil % : x    10-16    135  |  97<L>  |  95.0<H>  ----------------------------<  159<H>  4.8   |  24.0  |  4.29<H>    Ca    8.5<L>      16 Oct 2020 21:10  Phos  5.9     10-16  Mg     2.1     10-16    TPro  5.3<L>  /  Alb  3.2<L>  /  TBili  0.7  /  DBili  x   /  AST  17  /  ALT  13  /  AlkPhos  182<H>  10-16    PT/INR - ( 15 Oct 2020 12:15 )   PT: 13.9 sec;   INR: 1.21 ratio         PTT - ( 15 Oct 2020 12:15 )  PTT:28.7 sec  Urinalysis Basic - ( 16 Oct 2020 10:57 )    Color: Yellow / Appearance: Slightly Turbid / S.015 / pH: x  Gluc: x / Ketone: Negative  / Bili: Small / Urobili: Negative mg/dL   Blood: x / Protein: 30 mg/dL / Nitrite: Negative   Leuk Esterase: Small / RBC: 11-25 /HPF / WBC 3-5   Sq Epi: x / Non Sq Epi: Negative / Bacteria: Occasional    RECENT CULTURES:    LIVER FUNCTIONS - ( 16 Oct 2020 04:28 )  Alb: 3.2 g/dL / Pro: 5.3 g/dL / ALK PHOS: 182 U/L / ALT: 13 U/L / AST: 17 U/L / GGT: x           CARDIAC MARKERS ( 16 Oct 2020 04:28 )  x     / 0.05 ng/mL / 18 U/L / x     / x      CARDIAC MARKERS ( 15 Oct 2020 20:42 )  x     / 0.05 ng/mL / x     / x     / x      CARDIAC MARKERS ( 15 Oct 2020 12:15 )  x     / x     / 23 U/L / x     / x          ASSESSMENT/PLAN:  70y Male w/ PMH of A fib, CKD, and CHF recently admitted to OSH after a mechanical fall presents w/ acute renal failure.    Neuro: delirium precautions, frequent re-orientation, avoid sedation, sleep hygiene.     CV: continue telemetry monitoring, remove TLC in AM if remains HD stable    Pulm: continue to wean HFNC support as tolerated, duonebs, hydrocort for possible COPD exacerbation, consider beginning taper in AM.     GI/Nutrition: DASH diet    /Renal: Closely monitor urine output, consider re-dosing Bumex if UOP < 40 ml/hr, trend sCr. Renal recs appreciated. strict intake and output to closely monitor fluid balance    ID: Rocephin and azithromycin discontinued yesterday, no current need for antimicrobials.     Endo: FS q 6, ISS as needed, hydrocort as above for possible COPD exacerbation    Skin: Repositioning for DTI prevention while in bed    Heme/DVT Prophylaxis: SCDs to RLE, SQH for dvt ppx, Trend H/H    Lines/Tubes: PIVs, TLC, olmeli catheter    Dispo: Continue care per SICU

## 2020-10-17 NOTE — DIETITIAN INITIAL EVALUATION ADULT. - PERTINENT LABORATORY DATA
10-17 Na135 mmol/L Glu 150 mg/dL<H> K+ 4.8 mmol/L Cr  3.93 mg/dL<H> BUN 96.0 mg/dL<H> Phos 5.9 mg/dL<H> Alb n/a   PAB n/a

## 2020-10-17 NOTE — CHART NOTE - NSCHARTNOTEFT_GEN_A_CORE
Lt SC TLC removed.  Tip visualized.  Hemostasis achieved with direct pressure.  Pt tolerated well.  No complications.

## 2020-10-18 LAB
ANION GAP SERPL CALC-SCNC: 12 MMOL/L — SIGNIFICANT CHANGE UP (ref 5–17)
APTT BLD: 125 SEC — CRITICAL HIGH (ref 27.5–35.5)
APTT BLD: 86.8 SEC — HIGH (ref 27.5–35.5)
BASOPHILS # BLD AUTO: 0.01 K/UL — SIGNIFICANT CHANGE UP (ref 0–0.2)
BASOPHILS NFR BLD AUTO: 0.1 % — SIGNIFICANT CHANGE UP (ref 0–2)
BUN SERPL-MCNC: 99 MG/DL — HIGH (ref 8–20)
CALCIUM SERPL-MCNC: 8.4 MG/DL — LOW (ref 8.6–10.2)
CHLORIDE SERPL-SCNC: 102 MMOL/L — SIGNIFICANT CHANGE UP (ref 98–107)
CK SERPL-CCNC: 15 U/L — LOW (ref 30–200)
CO2 SERPL-SCNC: 26 MMOL/L — SIGNIFICANT CHANGE UP (ref 22–29)
CREAT SERPL-MCNC: 3.15 MG/DL — HIGH (ref 0.5–1.3)
EOSINOPHIL # BLD AUTO: 0 K/UL — SIGNIFICANT CHANGE UP (ref 0–0.5)
EOSINOPHIL NFR BLD AUTO: 0 % — SIGNIFICANT CHANGE UP (ref 0–6)
GLUCOSE BLDC GLUCOMTR-MCNC: 142 MG/DL — HIGH (ref 70–99)
GLUCOSE BLDC GLUCOMTR-MCNC: 157 MG/DL — HIGH (ref 70–99)
GLUCOSE BLDC GLUCOMTR-MCNC: 164 MG/DL — HIGH (ref 70–99)
GLUCOSE BLDC GLUCOMTR-MCNC: 232 MG/DL — HIGH (ref 70–99)
GLUCOSE SERPL-MCNC: 126 MG/DL — HIGH (ref 70–99)
HCT VFR BLD CALC: 25.1 % — LOW (ref 39–50)
HCT VFR BLD CALC: 25.7 % — LOW (ref 39–50)
HGB BLD-MCNC: 8.5 G/DL — LOW (ref 13–17)
HGB BLD-MCNC: 8.7 G/DL — LOW (ref 13–17)
IMM GRANULOCYTES NFR BLD AUTO: 0.7 % — SIGNIFICANT CHANGE UP (ref 0–1.5)
LYMPHOCYTES # BLD AUTO: 0.79 K/UL — LOW (ref 1–3.3)
LYMPHOCYTES # BLD AUTO: 11.2 % — LOW (ref 13–44)
MAGNESIUM SERPL-MCNC: 2.2 MG/DL — SIGNIFICANT CHANGE UP (ref 1.6–2.6)
MCHC RBC-ENTMCNC: 33.9 GM/DL — SIGNIFICANT CHANGE UP (ref 32–36)
MCHC RBC-ENTMCNC: 33.9 GM/DL — SIGNIFICANT CHANGE UP (ref 32–36)
MCHC RBC-ENTMCNC: 36.6 PG — HIGH (ref 27–34)
MCHC RBC-ENTMCNC: 37.2 PG — HIGH (ref 27–34)
MCV RBC AUTO: 108.2 FL — HIGH (ref 80–100)
MCV RBC AUTO: 109.8 FL — HIGH (ref 80–100)
MONOCYTES # BLD AUTO: 1.06 K/UL — HIGH (ref 0–0.9)
MONOCYTES NFR BLD AUTO: 15 % — HIGH (ref 2–14)
NEUTROPHILS # BLD AUTO: 5.14 K/UL — SIGNIFICANT CHANGE UP (ref 1.8–7.4)
NEUTROPHILS NFR BLD AUTO: 73 % — SIGNIFICANT CHANGE UP (ref 43–77)
PHOSPHATE SERPL-MCNC: 4.8 MG/DL — HIGH (ref 2.4–4.7)
PLATELET # BLD AUTO: 222 K/UL — SIGNIFICANT CHANGE UP (ref 150–400)
PLATELET # BLD AUTO: 238 K/UL — SIGNIFICANT CHANGE UP (ref 150–400)
POTASSIUM SERPL-MCNC: 4.2 MMOL/L — SIGNIFICANT CHANGE UP (ref 3.5–5.3)
POTASSIUM SERPL-SCNC: 4.2 MMOL/L — SIGNIFICANT CHANGE UP (ref 3.5–5.3)
RBC # BLD: 2.32 M/UL — LOW (ref 4.2–5.8)
RBC # BLD: 2.34 M/UL — LOW (ref 4.2–5.8)
RBC # FLD: 15.9 % — HIGH (ref 10.3–14.5)
RBC # FLD: 15.9 % — HIGH (ref 10.3–14.5)
SODIUM SERPL-SCNC: 140 MMOL/L — SIGNIFICANT CHANGE UP (ref 135–145)
WBC # BLD: 6.48 K/UL — SIGNIFICANT CHANGE UP (ref 3.8–10.5)
WBC # BLD: 7.05 K/UL — SIGNIFICANT CHANGE UP (ref 3.8–10.5)
WBC # FLD AUTO: 6.48 K/UL — SIGNIFICANT CHANGE UP (ref 3.8–10.5)
WBC # FLD AUTO: 7.05 K/UL — SIGNIFICANT CHANGE UP (ref 3.8–10.5)

## 2020-10-18 PROCEDURE — 99233 SBSQ HOSP IP/OBS HIGH 50: CPT

## 2020-10-18 RX ORDER — HYDROCORTISONE 20 MG
25 TABLET ORAL EVERY 12 HOURS
Refills: 0 | Status: COMPLETED | OUTPATIENT
Start: 2020-10-19 | End: 2020-10-19

## 2020-10-18 RX ORDER — HEPARIN SODIUM 5000 [USP'U]/ML
7000 INJECTION INTRAVENOUS; SUBCUTANEOUS EVERY 6 HOURS
Refills: 0 | Status: DISCONTINUED | OUTPATIENT
Start: 2020-10-18 | End: 2020-10-21

## 2020-10-18 RX ORDER — HEPARIN SODIUM 5000 [USP'U]/ML
3500 INJECTION INTRAVENOUS; SUBCUTANEOUS EVERY 6 HOURS
Refills: 0 | Status: DISCONTINUED | OUTPATIENT
Start: 2020-10-18 | End: 2020-10-21

## 2020-10-18 RX ORDER — HEPARIN SODIUM 5000 [USP'U]/ML
INJECTION INTRAVENOUS; SUBCUTANEOUS
Qty: 25000 | Refills: 0 | Status: DISCONTINUED | OUTPATIENT
Start: 2020-10-18 | End: 2020-10-21

## 2020-10-18 RX ORDER — HYDROCORTISONE 20 MG
25 TABLET ORAL ONCE
Refills: 0 | Status: DISCONTINUED | OUTPATIENT
Start: 2020-10-20 | End: 2020-10-20

## 2020-10-18 RX ORDER — HEPARIN SODIUM 5000 [USP'U]/ML
7000 INJECTION INTRAVENOUS; SUBCUTANEOUS ONCE
Refills: 0 | Status: COMPLETED | OUTPATIENT
Start: 2020-10-18 | End: 2020-10-18

## 2020-10-18 RX ADMIN — Medication 3 MILLIGRAM(S): at 22:01

## 2020-10-18 RX ADMIN — Medication 25 MILLIGRAM(S): at 22:00

## 2020-10-18 RX ADMIN — Medication 3 MILLILITER(S): at 20:47

## 2020-10-18 RX ADMIN — HEPARIN SODIUM 1400 UNIT(S)/HR: 5000 INJECTION INTRAVENOUS; SUBCUTANEOUS at 16:47

## 2020-10-18 RX ADMIN — Medication 4: at 16:26

## 2020-10-18 RX ADMIN — HEPARIN SODIUM 5000 UNIT(S): 5000 INJECTION INTRAVENOUS; SUBCUTANEOUS at 04:27

## 2020-10-18 RX ADMIN — BUDESONIDE AND FORMOTEROL FUMARATE DIHYDRATE 2 PUFF(S): 160; 4.5 AEROSOL RESPIRATORY (INHALATION) at 09:23

## 2020-10-18 RX ADMIN — BUDESONIDE AND FORMOTEROL FUMARATE DIHYDRATE 2 PUFF(S): 160; 4.5 AEROSOL RESPIRATORY (INHALATION) at 20:47

## 2020-10-18 RX ADMIN — Medication 3 MILLILITER(S): at 04:14

## 2020-10-18 RX ADMIN — CHLORHEXIDINE GLUCONATE 1 APPLICATION(S): 213 SOLUTION TOPICAL at 11:33

## 2020-10-18 RX ADMIN — HEPARIN SODIUM 1400 UNIT(S)/HR: 5000 INJECTION INTRAVENOUS; SUBCUTANEOUS at 23:45

## 2020-10-18 RX ADMIN — HEPARIN SODIUM 1600 UNIT(S)/HR: 5000 INJECTION INTRAVENOUS; SUBCUTANEOUS at 10:11

## 2020-10-18 RX ADMIN — Medication 3 MILLILITER(S): at 14:18

## 2020-10-18 RX ADMIN — Medication 25 MILLIGRAM(S): at 15:01

## 2020-10-18 RX ADMIN — Medication 25 MILLIGRAM(S): at 04:26

## 2020-10-18 RX ADMIN — Medication 4: at 11:34

## 2020-10-18 RX ADMIN — HEPARIN SODIUM 7000 UNIT(S): 5000 INJECTION INTRAVENOUS; SUBCUTANEOUS at 10:10

## 2020-10-18 RX ADMIN — Medication 6: at 22:00

## 2020-10-18 RX ADMIN — Medication 3 MILLILITER(S): at 09:23

## 2020-10-18 NOTE — PROGRESS NOTE ADULT - SUBJECTIVE AND OBJECTIVE BOX
[facility] DIVISION OF KIDNEY DISEASES AND HYPERTENSION -- FOLLOW UP NOTE  --------------------------------------------------------------------------------  Chief Complaint:  GERALD on CKD    24 hour events/subjective:  Pt seen/examined  No complaints; sitting up in NAD;  Cr improving markedly;      PAST HISTORY  --------------------------------------------------------------------------------  No significant changes to PMH, PSH, FHx, SHx, unless otherwise noted    ALLERGIES & MEDICATIONS  --------------------------------------------------------------------------------  Allergies    No Known Allergies    Intolerances      Standing Inpatient Medications  ALBUTerol    90 MICROgram(s) HFA Inhaler 1 Puff(s) Inhalation every 4 hours  albuterol/ipratropium for Nebulization 3 milliLiter(s) Nebulizer every 6 hours  budesonide 160 MICROgram(s)/formoterol 4.5 MICROgram(s) Inhaler 2 Puff(s) Inhalation two times a day  chlorhexidine 2% Cloths 1 Application(s) Topical daily  heparin  Infusion.  Unit(s)/Hr IV Continuous <Continuous>  hydrocortisone sodium succinate Injectable 25 milliGRAM(s) IV Push every 8 hours  insulin lispro (HumaLOG) corrective regimen sliding scale   SubCutaneous Before meals and at bedtime  melatonin 3 milliGRAM(s) Oral at bedtime  tiotropium 18 MICROgram(s) Capsule 1 Capsule(s) Inhalation daily    PRN Inpatient Medications  heparin   Injectable 7000 Unit(s) IV Push every 6 hours PRN  heparin   Injectable 3500 Unit(s) IV Push every 6 hours PRN      REVIEW OF SYSTEMS  --------------------------------------------------------------------------------  Gen: No weight changes, fatigue, fevers/chills, weakness  Skin: No rashes  Head/Eyes/Ears/Mouth: No headache; Normal hearing; Normal vision w/o blurriness; No sinus pain/discomfort, sore throat  Respiratory: No dyspnea, cough, wheezing, hemoptysis  CV: No chest pain, PND, orthopnea  GI: No abdominal pain, diarrhea, constipation, nausea, vomiting, melena, hematochezia  : No increased frequency, dysuria, hematuria, nocturia  MSK: No joint pain/swelling; no back pain; no edema  Neuro: No dizziness/lightheadedness, weakness, seizures, numbness, tingling  Heme: No easy bruising or bleeding  Endo: No heat/cold intolerance  Psych: No significant nervousness, anxiety, stress, depression    All other systems were reviewed and are negative, except as noted.    VITALS/PHYSICAL EXAM  --------------------------------------------------------------------------------  T(C): 36.4 (10-18-20 @ 11:53), Max: 36.9 (10-18-20 @ 07:37)  HR: 81 (10-18-20 @ 12:00) (75 - 95)  BP: 152/68 (10-18-20 @ 12:00) (135/65 - 171/70)  RR: 18 (10-18-20 @ 12:00) (18 - 40)  SpO2: 97% (10-18-20 @ 12:00) (91% - 98%)  Wt(kg): --        10-17-20 @ 07:01  -  10-18-20 @ 07:00  --------------------------------------------------------  IN: 1055 mL / OUT: 1655 mL / NET: -600 mL    10-18-20 @ 07:01  -  10-18-20 @ 13:09  --------------------------------------------------------  IN: 0 mL / OUT: 355 mL / NET: -355 mL      Physical Exam:  	Gen: NAD, well-appearing  	HEENT: PERRL, supple neck, clear oropharynx  	Pulm: CTA B/L  	CV: RRR, S1S2; no rub  	Back: No spinal or CVA tenderness; no sacral edema  	Abd: +BS, soft, nontender/nondistended  	: No suprapubic tenderness  	UE: Warm, FROM, no clubbing, intact strength; no edema; no asterixis  	LE: Warm, FROM, no clubbing, intact strength; no edema  	Neuro: No focal deficits, intact gait  	Psych: Normal affect and mood  	Skin: Warm, without rashes  	Vascular access:    LABS/STUDIES  --------------------------------------------------------------------------------              8.7    7.05  >-----------<  222      [10-18-20 @ 04:32]              25.7     140  |  102  |  99.0  ----------------------------<  126      [10-18-20 @ 04:32]  4.2   |  26.0  |  3.15        Ca     8.4     [10-18-20 @ 04:32]      Mg     2.2     [10-18-20 @ 04:32]      Phos  4.8     [10-18-20 @ 04:32]          CK 15      [10-18-20 @ 04:32]    Creatinine Trend:  SCr 3.15 [10-18 @ 04:32]  SCr 3.93 [10-17 @ 06:13]  SCr 4.29 [10-16 @ 21:10]  SCr 4.53 [10-16 @ 12:06]  SCr 4.79 [10-16 @ 04:28]    Urinalysis - [10-16-20 @ 10:57]      Color Yellow / Appearance Slightly Turbid / SG 1.015 / pH 5.0      Gluc Negative / Ketone Negative  / Bili Small / Urobili Negative       Blood Moderate / Protein 30 / Leuk Est Small / Nitrite Negative      RBC 11-25 / WBC 3-5 / Hyaline  / Gran  / Sq Epi  / Non Sq Epi Negative / Bacteria Occasional    Urine Creatinine 209      [10-15-20 @ 23:57]  Urine Sodium <30      [10-15-20 @ 23:57]  Urine Urea Nitrogen 238      [10-16-20 @ 07:35]  Urine Osmolality 321      [10-15-20 @ 23:57]    HbA1c 5.4      [03-25-17 @ 07:51]    HCV 0.04, Nonreact      [10-16-20 @ 09:36]

## 2020-10-18 NOTE — PROGRESS NOTE ADULT - SUBJECTIVE AND OBJECTIVE BOX
24h Events:      Subjective:      ICU Vital Signs Last 24 Hrs  T(C): 36.6 (17 Oct 2020 23:34), Max: 36.9 (17 Oct 2020 08:04)  T(F): 97.9 (17 Oct 2020 23:34), Max: 98.4 (17 Oct 2020 08:04)  HR: 84 (18 Oct 2020 02:00) (69 - 95)  BP: 135/65 (18 Oct 2020 02:00) (123/60 - 170/74)  BP(mean): 93 (18 Oct 2020 02:00) (70 - 110)  ABP: --  ABP(mean): --  RR: 30 (18 Oct 2020 02:00) (19 - 40)  SpO2: 93% (18 Oct 2020 02:00) (91% - 99%)      I&O's Detail    16 Oct 2020 07:01  -  17 Oct 2020 07:00  --------------------------------------------------------  IN:    Oral Fluid: 480 mL    PRBCs (Packed Red Blood Cells): 400 mL  Total IN: 880 mL    OUT:    Indwelling Catheter - Urethral (mL): 1310 mL  Total OUT: 1310 mL    Total NET: -430 mL      17 Oct 2020 07:01  -  18 Oct 2020 02:31  --------------------------------------------------------  IN:    Oral Fluid: 1055 mL  Total IN: 1055 mL    OUT:    Indwelling Catheter - Urethral (mL): 1185 mL  Total OUT: 1185 mL    Total NET: -130 mL              MEDICATIONS  (STANDING):  ALBUTerol    90 MICROgram(s) HFA Inhaler 1 Puff(s) Inhalation every 4 hours  albuterol/ipratropium for Nebulization 3 milliLiter(s) Nebulizer every 6 hours  budesonide 160 MICROgram(s)/formoterol 4.5 MICROgram(s) Inhaler 2 Puff(s) Inhalation two times a day  chlorhexidine 2% Cloths 1 Application(s) Topical daily  heparin   Injectable 5000 Unit(s) SubCutaneous every 8 hours  hydrocortisone sodium succinate Injectable 25 milliGRAM(s) IV Push every 8 hours  insulin lispro (HumaLOG) corrective regimen sliding scale   SubCutaneous Before meals and at bedtime  melatonin 3 milliGRAM(s) Oral at bedtime  tiotropium 18 MICROgram(s) Capsule 1 Capsule(s) Inhalation daily    MEDICATIONS  (PRN):          Physical Exam:    Gen: Resting comfortably in bed    HEENT: PERRL, EOMI    Neurological: Alert and oriented x3 without focal deficit    Neck: Trachea midline, no evidence of JVD, FROM without pain, neck symmetric    Pulmonary: CTAB with decreased breath sounds at the bases    Cardiovascular: S1S2    Gastrointestinal: Soft, non-tender, non-distended    : Webb in place draining yellow urine    Extremities: Without c/c/e    Skin: Intact    Musculoskeletal: FROM without pain, no deformity or areas of tenderness    LABS:  CBC Full  -  ( 17 Oct 2020 06:13 )  WBC Count : 5.46 K/uL  RBC Count : 2.58 M/uL  Hemoglobin : 9.1 g/dL  Hematocrit : 27.1 %  Platelet Count - Automated : 167 K/uL  Mean Cell Volume : 105.0 fl  Mean Cell Hemoglobin : 35.3 pg  Mean Cell Hemoglobin Concentration : 33.6 gm/dL  Auto Neutrophil # : 4.55 K/uL  Auto Lymphocyte # : 0.43 K/uL  Auto Monocyte # : 0.48 K/uL  Auto Eosinophil # : 0.00 K/uL  Auto Basophil # : 0.00 K/uL  Auto Neutrophil % : 83.3 %  Auto Lymphocyte % : 7.9 %  Auto Monocyte % : 8.8 %  Auto Eosinophil % : 0.0 %  Auto Basophil % : 0.0 %    10-17    135  |  99  |  96.0<H>  ----------------------------<  150<H>  4.8   |  23.0  |  3.93<H>    Ca    8.2<L>      17 Oct 2020 06:13  Phos  5.9     10  Mg     2.1     10-17    TPro  5.3<L>  /  Alb  3.2<L>  /  TBili  0.7  /  DBili  x   /  AST  17  /  ALT  13  /  AlkPhos  182<H>  10-16      Urinalysis Basic - ( 16 Oct 2020 10:57 )    Color: Yellow / Appearance: Slightly Turbid / S.015 / pH: x  Gluc: x / Ketone: Negative  / Bili: Small / Urobili: Negative mg/dL   Blood: x / Protein: 30 mg/dL / Nitrite: Negative   Leuk Esterase: Small / RBC: 11-25 /HPF / WBC 3-5   Sq Epi: x / Non Sq Epi: Negative / Bacteria: Occasional          LIVER FUNCTIONS - ( 16 Oct 2020 04:28 )  Alb: 3.2 g/dL / Pro: 5.3 g/dL / ALK PHOS: 182 U/L / ALT: 13 U/L / AST: 17 U/L / GGT: x           CARDIAC MARKERS ( 16 Oct 2020 04:28 )  x     / 0.05 ng/mL / 18 U/L / x     / x          ASSESSMENT/PLAN:  70y Male    Neuro: Pain control, delirium precautions, sleep hygiene     CV:     Pulm:     GI/Nutrition:     /Renal: Webb for strict I&O    ID:     Endo:     Skin: Repositioning for DTI prevention while in bed    Heme/DVT Prophylaxis: SCDs    Lines/Tubes:     Dispo:      24h Events:  No acute events. Remained on minimal O2 support via NC, OOB to chair during the day. TLC removed. Reports his breathing feels even better that what his baseline has been recently. Urine output remains adequate, >50 ml/hr.    ICU Vital Signs Last 24 Hrs  T(C): 36.6 (17 Oct 2020 23:34), Max: 36.9 (17 Oct 2020 08:04)  T(F): 97.9 (17 Oct 2020 23:34), Max: 98.4 (17 Oct 2020 08:04)  HR: 84 (18 Oct 2020 02:00) (69 - 95)  BP: 135/65 (18 Oct 2020 02:00) (123/60 - 170/74)  BP(mean): 93 (18 Oct 2020 02:00) (70 - 110)  ABP: --  ABP(mean): --  RR: 30 (18 Oct 2020 02:00) (19 - 40)  SpO2: 93% (18 Oct 2020 02:00) (91% - 99%)    I&O's Detail    16 Oct 2020 07:01  -  17 Oct 2020 07:00  --------------------------------------------------------  IN:    Oral Fluid: 480 mL    PRBCs (Packed Red Blood Cells): 400 mL  Total IN: 880 mL    OUT:    Indwelling Catheter - Urethral (mL): 1310 mL  Total OUT: 1310 mL    Total NET: -430 mL    17 Oct 2020 07:01  -  18 Oct 2020 02:31  --------------------------------------------------------  IN:    Oral Fluid: 1055 mL  Total IN: 1055 mL    OUT:    Indwelling Catheter - Urethral (mL): 1185 mL  Total OUT: 1185 mL    Total NET: -130 mL    MEDICATIONS  (STANDING):  ALBUTerol    90 MICROgram(s) HFA Inhaler 1 Puff(s) Inhalation every 4 hours  albuterol/ipratropium for Nebulization 3 milliLiter(s) Nebulizer every 6 hours  budesonide 160 MICROgram(s)/formoterol 4.5 MICROgram(s) Inhaler 2 Puff(s) Inhalation two times a day  chlorhexidine 2% Cloths 1 Application(s) Topical daily  heparin   Injectable 5000 Unit(s) SubCutaneous every 8 hours  hydrocortisone sodium succinate Injectable 25 milliGRAM(s) IV Push every 8 hours  insulin lispro (HumaLOG) corrective regimen sliding scale   SubCutaneous Before meals and at bedtime  melatonin 3 milliGRAM(s) Oral at bedtime  tiotropium 18 MICROgram(s) Capsule 1 Capsule(s) Inhalation daily    MEDICATIONS  (PRN):    Physical Exam:    Gen: Resting comfortably in bed    HEENT: PERRL, EOMI    Neurological: Alert and oriented x3 without focal deficit    Neck: Trachea midline, no evidence of JVD, FROM without pain, neck symmetric    Pulmonary: CTAB with decreased breath sounds at the bases    Cardiovascular: S1S2    Gastrointestinal: Soft, non-tender, non-distended    : Lomeli in place draining yellow urine    Skin: Intact, multiple areas of ecchymosis: LUE ecchymotic with bruising extending up the L neck and down the L lateral back and flank, LLE with large hematoma    Musculoskeletal: FROM without pain, no deformity or areas of tenderness, all compartments soft    LABS:  CBC Full  -  ( 17 Oct 2020 06:13 )  WBC Count : 5.46 K/uL  RBC Count : 2.58 M/uL  Hemoglobin : 9.1 g/dL  Hematocrit : 27.1 %  Platelet Count - Automated : 167 K/uL  Mean Cell Volume : 105.0 fl  Mean Cell Hemoglobin : 35.3 pg  Mean Cell Hemoglobin Concentration : 33.6 gm/dL  Auto Neutrophil # : 4.55 K/uL  Auto Lymphocyte # : 0.43 K/uL  Auto Monocyte # : 0.48 K/uL  Auto Eosinophil # : 0.00 K/uL  Auto Basophil # : 0.00 K/uL  Auto Neutrophil % : 83.3 %  Auto Lymphocyte % : 7.9 %  Auto Monocyte % : 8.8 %  Auto Eosinophil % : 0.0 %  Auto Basophil % : 0.0 %    10-17    135  |  99  |  96.0<H>  ----------------------------<  150<H>  4.8   |  23.0  |  3.93<H>    Ca    8.2<L>      17 Oct 2020 06:13  Phos  5.9     10-17  Mg     2.1     10-17    TPro  5.3<L>  /  Alb  3.2<L>  /  TBili  0.7  /  DBili  x   /  AST  17  /  ALT  13  /  AlkPhos  182<H>  10-16      Urinalysis Basic - ( 16 Oct 2020 10:57 )    Color: Yellow / Appearance: Slightly Turbid / S.015 / pH: x  Gluc: x / Ketone: Negative  / Bili: Small / Urobili: Negative mg/dL   Blood: x / Protein: 30 mg/dL / Nitrite: Negative   Leuk Esterase: Small / RBC: 11-25 /HPF / WBC 3-5   Sq Epi: x / Non Sq Epi: Negative / Bacteria: Occasional    LIVER FUNCTIONS - ( 16 Oct 2020 04:28 )  Alb: 3.2 g/dL / Pro: 5.3 g/dL / ALK PHOS: 182 U/L / ALT: 13 U/L / AST: 17 U/L / GGT: x           CARDIAC MARKERS ( 16 Oct 2020 04:28 )  x     / 0.05 ng/mL / 18 U/L / x     / x        ASSESSMENT/PLAN:  70y Male w/ PMH of A fib, CKD, and CHF recently admitted to OSH after a mechanical fall presents w/ acute renal failure, now improving.    Neuro: delirium precautions, frequent re-orientation, avoid sedation, sleep hygiene.     CV: no issues, hemodynamically well    Pulm: 2L O2 (home dose), maintain O2 sat > 88%, duonebs, complete hydrocort taper for possible COPD exacerbation    GI/Nutrition: DASH diet    /Renal: maintain lomeli for strict I/O, trend sCr. Renal recs appreciated    ID: no issues, no current need for antimicrobials, continue to trend WBC    Endo: FS q 6, ISS as needed, hydrocort as above for possible COPD exacerbation    Skin: Repositioning for DTI prevention while in bed    Heme/DVT Prophylaxis: SCDs to RLE, SQH for dvt ppx, Trend H/H    Lines/Tubes: PIVs, TLC, lomeli catheter    Dispo: HD stable, now with improving renal function. Consider downgrade to floor in AM 24h Events:  No acute events. Remained on minimal O2 support via NC, OOB to chair during the day. TLC removed. Reports his breathing feels even better that what his baseline has been recently. Urine output remains adequate, >50 ml/hr.    ICU Vital Signs Last 24 Hrs  T(C): 36.6 (17 Oct 2020 23:34), Max: 36.9 (17 Oct 2020 08:04)  T(F): 97.9 (17 Oct 2020 23:34), Max: 98.4 (17 Oct 2020 08:04)  HR: 84 (18 Oct 2020 02:00) (69 - 95)  BP: 135/65 (18 Oct 2020 02:00) (123/60 - 170/74)  BP(mean): 93 (18 Oct 2020 02:00) (70 - 110)  ABP: --  ABP(mean): --  RR: 30 (18 Oct 2020 02:00) (19 - 40)  SpO2: 93% (18 Oct 2020 02:00) (91% - 99%)    I&O's Detail    16 Oct 2020 07:01  -  17 Oct 2020 07:00  --------------------------------------------------------  IN:    Oral Fluid: 480 mL    PRBCs (Packed Red Blood Cells): 400 mL  Total IN: 880 mL    OUT:    Indwelling Catheter - Urethral (mL): 1310 mL  Total OUT: 1310 mL    Total NET: -430 mL    17 Oct 2020 07:01  -  18 Oct 2020 02:31  --------------------------------------------------------  IN:    Oral Fluid: 1055 mL  Total IN: 1055 mL    OUT:    Indwelling Catheter - Urethral (mL): 1185 mL  Total OUT: 1185 mL    Total NET: -130 mL    MEDICATIONS  (STANDING):  ALBUTerol    90 MICROgram(s) HFA Inhaler 1 Puff(s) Inhalation every 4 hours  albuterol/ipratropium for Nebulization 3 milliLiter(s) Nebulizer every 6 hours  budesonide 160 MICROgram(s)/formoterol 4.5 MICROgram(s) Inhaler 2 Puff(s) Inhalation two times a day  chlorhexidine 2% Cloths 1 Application(s) Topical daily  heparin   Injectable 5000 Unit(s) SubCutaneous every 8 hours  hydrocortisone sodium succinate Injectable 25 milliGRAM(s) IV Push every 8 hours  insulin lispro (HumaLOG) corrective regimen sliding scale   SubCutaneous Before meals and at bedtime  melatonin 3 milliGRAM(s) Oral at bedtime  tiotropium 18 MICROgram(s) Capsule 1 Capsule(s) Inhalation daily    MEDICATIONS  (PRN):    Physical Exam:    Gen: Resting comfortably in bed    HEENT: PERRL, EOMI    Neurological: Alert and oriented x3 without focal deficit    Neck: Trachea midline, no evidence of JVD, FROM without pain, neck symmetric    Pulmonary: CTAB with decreased breath sounds at the bases    Cardiovascular: NSR / A fib    Gastrointestinal: Soft, non-tender, non-distended    : Lomeli in place draining yellow urine    Skin: Intact, multiple areas of ecchymosis: LUE ecchymotic with bruising extending up the L neck and down the L lateral back and flank, LLE with large hematoma    Musculoskeletal: FROM without pain, no deformity or areas of tenderness, all compartments soft    LABS:  CBC Full  -  ( 17 Oct 2020 06:13 )  WBC Count : 5.46 K/uL  RBC Count : 2.58 M/uL  Hemoglobin : 9.1 g/dL  Hematocrit : 27.1 %  Platelet Count - Automated : 167 K/uL  Mean Cell Volume : 105.0 fl  Mean Cell Hemoglobin : 35.3 pg  Mean Cell Hemoglobin Concentration : 33.6 gm/dL  Auto Neutrophil # : 4.55 K/uL  Auto Lymphocyte # : 0.43 K/uL  Auto Monocyte # : 0.48 K/uL  Auto Eosinophil # : 0.00 K/uL  Auto Basophil # : 0.00 K/uL  Auto Neutrophil % : 83.3 %  Auto Lymphocyte % : 7.9 %  Auto Monocyte % : 8.8 %  Auto Eosinophil % : 0.0 %  Auto Basophil % : 0.0 %    10-17    135  |  99  |  96.0<H>  ----------------------------<  150<H>  4.8   |  23.0  |  3.93<H>    Ca    8.2<L>      17 Oct 2020 06:13  Phos  5.9     10-17  Mg     2.1     10-17    TPro  5.3<L>  /  Alb  3.2<L>  /  TBili  0.7  /  DBili  x   /  AST  17  /  ALT  13  /  AlkPhos  182<H>  10-16      Urinalysis Basic - ( 16 Oct 2020 10:57 )    Color: Yellow / Appearance: Slightly Turbid / S.015 / pH: x  Gluc: x / Ketone: Negative  / Bili: Small / Urobili: Negative mg/dL   Blood: x / Protein: 30 mg/dL / Nitrite: Negative   Leuk Esterase: Small / RBC: 11-25 /HPF / WBC 3-5   Sq Epi: x / Non Sq Epi: Negative / Bacteria: Occasional    LIVER FUNCTIONS - ( 16 Oct 2020 04:28 )  Alb: 3.2 g/dL / Pro: 5.3 g/dL / ALK PHOS: 182 U/L / ALT: 13 U/L / AST: 17 U/L / GGT: x           CARDIAC MARKERS ( 16 Oct 2020 04:28 )  x     / 0.05 ng/mL / 18 U/L / x     / x        ASSESSMENT/PLAN:  70y Male w/ PMH of A fib, CKD, and CHF recently admitted to OSH after a mechanical fall presents w/ acute renal failure, now improving.    Neuro: delirium precautions, frequent re-orientation, avoid sedation, sleep hygiene.     CV: no issues, hemodynamically well    Pulm: 2L O2 (home dose), maintain O2 sat > 88%, duonebs, complete hydrocort taper for possible COPD exacerbation    GI/Nutrition: DASH diet    /Renal: maintain lomeli for strict I/O, trend sCr. Renal recs appreciated    ID: no issues, no current need for antimicrobials, continue to trend WBC    Endo: FS q 6, ISS as needed, hydrocort as above for possible COPD exacerbation    Skin: Repositioning for DTI prevention while in bed    Heme/DVT Prophylaxis: SCDs to RLE, [facility] for dvt ppx, Trend H/H, continue to hold home eliquis    Lines/Tubes: PIVs, TLC, lomeli catheter    Dispo: HD stable, now with improving renal function. Consider downgrade to floor in AM

## 2020-10-18 NOTE — PROGRESS NOTE ADULT - ASSESSMENT
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

## 2020-10-19 LAB
ANION GAP SERPL CALC-SCNC: 11 MMOL/L — SIGNIFICANT CHANGE UP (ref 5–17)
ANISOCYTOSIS BLD QL: SIGNIFICANT CHANGE UP
APTT BLD: 127.3 SEC — CRITICAL HIGH (ref 27.5–35.5)
APTT BLD: 70.6 SEC — HIGH (ref 27.5–35.5)
APTT BLD: 72 SEC — HIGH (ref 27.5–35.5)
BASO STIPL BLD QL SMEAR: PRESENT — SIGNIFICANT CHANGE UP
BASOPHILS # BLD AUTO: 0 K/UL — SIGNIFICANT CHANGE UP (ref 0–0.2)
BASOPHILS NFR BLD AUTO: 0 % — SIGNIFICANT CHANGE UP (ref 0–2)
BUN SERPL-MCNC: 90 MG/DL — HIGH (ref 8–20)
CALCIUM SERPL-MCNC: 8.4 MG/DL — LOW (ref 8.6–10.2)
CHLORIDE SERPL-SCNC: 103 MMOL/L — SIGNIFICANT CHANGE UP (ref 98–107)
CO2 SERPL-SCNC: 24 MMOL/L — SIGNIFICANT CHANGE UP (ref 22–29)
CREAT SERPL-MCNC: 2.18 MG/DL — HIGH (ref 0.5–1.3)
ELLIPTOCYTES BLD QL SMEAR: SLIGHT — SIGNIFICANT CHANGE UP
EOSINOPHIL # BLD AUTO: 0 K/UL — SIGNIFICANT CHANGE UP (ref 0–0.5)
EOSINOPHIL NFR BLD AUTO: 0 % — SIGNIFICANT CHANGE UP (ref 0–6)
GIANT PLATELETS BLD QL SMEAR: PRESENT — SIGNIFICANT CHANGE UP
GLUCOSE BLDC GLUCOMTR-MCNC: 158 MG/DL — HIGH (ref 70–99)
GLUCOSE BLDC GLUCOMTR-MCNC: 167 MG/DL — HIGH (ref 70–99)
GLUCOSE BLDC GLUCOMTR-MCNC: 226 MG/DL — HIGH (ref 70–99)
GLUCOSE BLDC GLUCOMTR-MCNC: 319 MG/DL — HIGH (ref 70–99)
GLUCOSE SERPL-MCNC: 206 MG/DL — HIGH (ref 70–99)
HCT VFR BLD CALC: 26.5 % — LOW (ref 39–50)
HGB BLD-MCNC: 8.8 G/DL — LOW (ref 13–17)
LYMPHOCYTES # BLD AUTO: 0.17 K/UL — LOW (ref 1–3.3)
LYMPHOCYTES # BLD AUTO: 2.6 % — LOW (ref 13–44)
MACROCYTES BLD QL: SIGNIFICANT CHANGE UP
MAGNESIUM SERPL-MCNC: 2.1 MG/DL — SIGNIFICANT CHANGE UP (ref 1.6–2.6)
MANUAL SMEAR VERIFICATION: SIGNIFICANT CHANGE UP
MCHC RBC-ENTMCNC: 33.2 GM/DL — SIGNIFICANT CHANGE UP (ref 32–36)
MCHC RBC-ENTMCNC: 36.7 PG — HIGH (ref 27–34)
MCV RBC AUTO: 110.4 FL — HIGH (ref 80–100)
MICROCYTES BLD QL: SLIGHT — SIGNIFICANT CHANGE UP
MONOCYTES # BLD AUTO: 0.51 K/UL — SIGNIFICANT CHANGE UP (ref 0–0.9)
MONOCYTES NFR BLD AUTO: 8 % — SIGNIFICANT CHANGE UP (ref 2–14)
MYELOCYTES NFR BLD: 0.9 % — HIGH (ref 0–0)
NEUTROPHILS # BLD AUTO: 5.64 K/UL — SIGNIFICANT CHANGE UP (ref 1.8–7.4)
NEUTROPHILS NFR BLD AUTO: 88.5 % — HIGH (ref 43–77)
NRBC # BLD: 2 /100 — HIGH (ref 0–0)
OVALOCYTES BLD QL SMEAR: SLIGHT — SIGNIFICANT CHANGE UP
PHOSPHATE SERPL-MCNC: 5 MG/DL — HIGH (ref 2.4–4.7)
PLAT MORPH BLD: NORMAL — SIGNIFICANT CHANGE UP
PLATELET # BLD AUTO: 239 K/UL — SIGNIFICANT CHANGE UP (ref 150–400)
PLATELET COUNT - ESTIMATE: NORMAL — SIGNIFICANT CHANGE UP
POLYCHROMASIA BLD QL SMEAR: SLIGHT — SIGNIFICANT CHANGE UP
POTASSIUM SERPL-MCNC: 4.6 MMOL/L — SIGNIFICANT CHANGE UP (ref 3.5–5.3)
POTASSIUM SERPL-SCNC: 4.6 MMOL/L — SIGNIFICANT CHANGE UP (ref 3.5–5.3)
RBC # BLD: 2.4 M/UL — LOW (ref 4.2–5.8)
RBC # FLD: 15.5 % — HIGH (ref 10.3–14.5)
RBC BLD AUTO: ABNORMAL
SODIUM SERPL-SCNC: 138 MMOL/L — SIGNIFICANT CHANGE UP (ref 135–145)
WBC # BLD: 6.37 K/UL — SIGNIFICANT CHANGE UP (ref 3.8–10.5)
WBC # FLD AUTO: 6.37 K/UL — SIGNIFICANT CHANGE UP (ref 3.8–10.5)

## 2020-10-19 PROCEDURE — 99233 SBSQ HOSP IP/OBS HIGH 50: CPT

## 2020-10-19 PROCEDURE — 99232 SBSQ HOSP IP/OBS MODERATE 35: CPT

## 2020-10-19 RX ORDER — TAMSULOSIN HYDROCHLORIDE 0.4 MG/1
0.4 CAPSULE ORAL AT BEDTIME
Refills: 0 | Status: DISCONTINUED | OUTPATIENT
Start: 2020-10-19 | End: 2020-10-21

## 2020-10-19 RX ORDER — PANTOPRAZOLE SODIUM 20 MG/1
40 TABLET, DELAYED RELEASE ORAL
Refills: 0 | Status: DISCONTINUED | OUTPATIENT
Start: 2020-10-19 | End: 2020-10-21

## 2020-10-19 RX ORDER — FERROUS SULFATE 325(65) MG
1 TABLET ORAL
Qty: 0 | Refills: 0 | DISCHARGE

## 2020-10-19 RX ORDER — ALLOPURINOL 300 MG
100 TABLET ORAL DAILY
Refills: 0 | Status: DISCONTINUED | OUTPATIENT
Start: 2020-10-19 | End: 2020-10-21

## 2020-10-19 RX ORDER — TEMAZEPAM 15 MG/1
7.5 CAPSULE ORAL AT BEDTIME
Refills: 0 | Status: DISCONTINUED | OUTPATIENT
Start: 2020-10-19 | End: 2020-10-20

## 2020-10-19 RX ORDER — FUROSEMIDE 40 MG
1 TABLET ORAL
Qty: 0 | Refills: 0 | DISCHARGE

## 2020-10-19 RX ORDER — CHOLECALCIFEROL (VITAMIN D3) 125 MCG
1 CAPSULE ORAL
Qty: 0 | Refills: 0 | DISCHARGE

## 2020-10-19 RX ORDER — FLUTICASONE PROPIONATE AND SALMETEROL 50; 250 UG/1; UG/1
1 POWDER ORAL; RESPIRATORY (INHALATION)
Qty: 0 | Refills: 0 | DISCHARGE

## 2020-10-19 RX ORDER — INSULIN LISPRO 100/ML
VIAL (ML) SUBCUTANEOUS
Refills: 0 | Status: DISCONTINUED | OUTPATIENT
Start: 2020-10-19 | End: 2020-10-21

## 2020-10-19 RX ORDER — ASPIRIN/CALCIUM CARB/MAGNESIUM 324 MG
1 TABLET ORAL
Qty: 0 | Refills: 0 | DISCHARGE

## 2020-10-19 RX ORDER — CARVEDILOL PHOSPHATE 80 MG/1
12.5 CAPSULE, EXTENDED RELEASE ORAL EVERY 12 HOURS
Refills: 0 | Status: DISCONTINUED | OUTPATIENT
Start: 2020-10-19 | End: 2020-10-21

## 2020-10-19 RX ORDER — MAGNESIUM OXIDE 400 MG ORAL TABLET 241.3 MG
2 TABLET ORAL
Qty: 0 | Refills: 0 | DISCHARGE

## 2020-10-19 RX ORDER — FAMOTIDINE 10 MG/ML
1 INJECTION INTRAVENOUS
Qty: 0 | Refills: 0 | DISCHARGE

## 2020-10-19 RX ORDER — ATORVASTATIN CALCIUM 80 MG/1
20 TABLET, FILM COATED ORAL AT BEDTIME
Refills: 0 | Status: DISCONTINUED | OUTPATIENT
Start: 2020-10-19 | End: 2020-10-21

## 2020-10-19 RX ORDER — LIDOCAINE 4 G/100G
1 CREAM TOPICAL DAILY
Refills: 0 | Status: DISCONTINUED | OUTPATIENT
Start: 2020-10-19 | End: 2020-10-21

## 2020-10-19 RX ORDER — SENNA PLUS 8.6 MG/1
2 TABLET ORAL AT BEDTIME
Refills: 0 | Status: DISCONTINUED | OUTPATIENT
Start: 2020-10-19 | End: 2020-10-21

## 2020-10-19 RX ORDER — MULTIVIT-MIN/FERROUS GLUCONATE 9 MG/15 ML
1 LIQUID (ML) ORAL
Qty: 0 | Refills: 0 | DISCHARGE

## 2020-10-19 RX ADMIN — BUDESONIDE AND FORMOTEROL FUMARATE DIHYDRATE 2 PUFF(S): 160; 4.5 AEROSOL RESPIRATORY (INHALATION) at 08:14

## 2020-10-19 RX ADMIN — LIDOCAINE 1 PATCH: 4 CREAM TOPICAL at 23:33

## 2020-10-19 RX ADMIN — LIDOCAINE 1 PATCH: 4 CREAM TOPICAL at 11:29

## 2020-10-19 RX ADMIN — Medication 3 MILLILITER(S): at 14:54

## 2020-10-19 RX ADMIN — CHLORHEXIDINE GLUCONATE 1 APPLICATION(S): 213 SOLUTION TOPICAL at 11:30

## 2020-10-19 RX ADMIN — Medication 10: at 11:37

## 2020-10-19 RX ADMIN — ATORVASTATIN CALCIUM 20 MILLIGRAM(S): 80 TABLET, FILM COATED ORAL at 22:16

## 2020-10-19 RX ADMIN — Medication 4: at 22:15

## 2020-10-19 RX ADMIN — HEPARIN SODIUM 1200 UNIT(S)/HR: 5000 INJECTION INTRAVENOUS; SUBCUTANEOUS at 14:20

## 2020-10-19 RX ADMIN — TAMSULOSIN HYDROCHLORIDE 0.4 MILLIGRAM(S): 0.4 CAPSULE ORAL at 22:15

## 2020-10-19 RX ADMIN — Medication 4: at 17:14

## 2020-10-19 RX ADMIN — HEPARIN SODIUM 1200 UNIT(S)/HR: 5000 INJECTION INTRAVENOUS; SUBCUTANEOUS at 07:34

## 2020-10-19 RX ADMIN — LIDOCAINE 1 PATCH: 4 CREAM TOPICAL at 19:00

## 2020-10-19 RX ADMIN — Medication 100 MILLIGRAM(S): at 11:29

## 2020-10-19 RX ADMIN — Medication 3 MILLILITER(S): at 08:12

## 2020-10-19 RX ADMIN — Medication 25 MILLIGRAM(S): at 17:12

## 2020-10-19 RX ADMIN — Medication 3 MILLILITER(S): at 20:42

## 2020-10-19 RX ADMIN — Medication 3 MILLILITER(S): at 03:20

## 2020-10-19 RX ADMIN — SENNA PLUS 2 TABLET(S): 8.6 TABLET ORAL at 09:53

## 2020-10-19 RX ADMIN — PANTOPRAZOLE SODIUM 40 MILLIGRAM(S): 20 TABLET, DELAYED RELEASE ORAL at 09:53

## 2020-10-19 RX ADMIN — TEMAZEPAM 7.5 MILLIGRAM(S): 15 CAPSULE ORAL at 22:15

## 2020-10-19 RX ADMIN — Medication 6: at 07:14

## 2020-10-19 RX ADMIN — Medication 25 MILLIGRAM(S): at 05:50

## 2020-10-19 RX ADMIN — CARVEDILOL PHOSPHATE 12.5 MILLIGRAM(S): 80 CAPSULE, EXTENDED RELEASE ORAL at 17:12

## 2020-10-19 RX ADMIN — HEPARIN SODIUM 1200 UNIT(S)/HR: 5000 INJECTION INTRAVENOUS; SUBCUTANEOUS at 20:34

## 2020-10-19 NOTE — OCCUPATIONAL THERAPY INITIAL EVALUATION ADULT - GENERAL OBSERVATIONS, REHAB EVAL
NAD, +multiple IV, +Tele//BP cuff, +2L O2NC, +Webb, +Left LE hematoma. Pt agreeable to OT evaluation

## 2020-10-19 NOTE — PROGRESS NOTE ADULT - SUBJECTIVE AND OBJECTIVE BOX
24h Events:  Heparin restarted for history of afib. Renal function improving.     Subjective:  Pt continues to experience tenderness of the LLE. Pt offers no new complaints.     ICU Vital Signs Last 24 Hrs  T(C): 36.8 (18 Oct 2020 23:39), Max: 36.9 (18 Oct 2020 07:37)  T(F): 98.3 (18 Oct 2020 23:39), Max: 98.5 (18 Oct 2020 07:37)  HR: 79 (19 Oct 2020 04:00) (72 - 96)  BP: 146/67 (19 Oct 2020 04:00) (125/95 - 173/73)  BP(mean): 97 (19 Oct 2020 04:00) (95 - 107)  ABP: --  ABP(mean): --  RR: 30 (19 Oct 2020 04:00) (18 - 40)  SpO2: 98% (19 Oct 2020 04:00) (81% - 99%)          MEDICATIONS  (STANDING):  ALBUTerol    90 MICROgram(s) HFA Inhaler 1 Puff(s) Inhalation every 4 hours  albuterol/ipratropium for Nebulization 3 milliLiter(s) Nebulizer every 6 hours  budesonide 160 MICROgram(s)/formoterol 4.5 MICROgram(s) Inhaler 2 Puff(s) Inhalation two times a day  chlorhexidine 2% Cloths 1 Application(s) Topical daily  heparin  Infusion.  Unit(s)/Hr (16 mL/Hr) IV Continuous <Continuous>  hydrocortisone sodium succinate Injectable 25 milliGRAM(s) IV Push every 12 hours  insulin lispro (HumaLOG) corrective regimen sliding scale   SubCutaneous Before meals and at bedtime  melatonin 3 milliGRAM(s) Oral at bedtime  tiotropium 18 MICROgram(s) Capsule 1 Capsule(s) Inhalation daily    MEDICATIONS  (PRN):  heparin   Injectable 7000 Unit(s) IV Push every 6 hours PRN For aPTT less than 40  heparin   Injectable 3500 Unit(s) IV Push every 6 hours PRN For aPTT between 40 - 57          Physical Exam:    Gen: Resting comfortably in bed    HEENT: PERRL, EOMI    Neurological: Alert and oriented x3 without focal deficit    Neck: Trachea midline, no evidence of JVD, FROM without pain, neck symmetric    Pulmonary: CTAB with decreased breath sounds at the bases    Cardiovascular: irregular    Gastrointestinal: Soft, non-tender, non-distended    : Webb in place draining yellow urine    Extremities: +edema of the b/l LE, tender to light tough over the distal LLE    Skin: Hematoma ecchymosis of the LLE, ecchymosis of the posterolateral left shoulder, venous stasis changes of the b/l LE    Musculoskeletal: FROM without pain, no deformity or areas of tenderness    LABS:  Pending      ASSESSMENT/PLAN:  70y Male recovering from acute renal failure, COPD exacerbation, LLE hematoma    Neuro: Pain control, delirium precautions, sleep hygiene     CV: Hemodynamically well, will transition to oral AC regimen after operative interventions complete    Pulm: Steroid taper. Encourage incentive spirometry, OOB as tolerated. Continue home dose of 2L supplemental oxygen via NC, goal SpO2 >88%     GI/Nutrition: Continue diet    /Renal: Possible ToV today based on am lab results, pending    ID: No active issues     Endo: Steroid taper, ISS    Skin: Repositioning for DTI prevention while in bed    Heme/DVT Prophylaxis: SCDs, heparin

## 2020-10-19 NOTE — PHYSICAL THERAPY INITIAL EVALUATION ADULT - ADDITIONAL COMMENTS
pt states he lives with his wife in a 2-story apartment with a basement. pt states there are no steps to enter and an elevator between all floors. pt states he was independent with all mobility prior to admit. no DME. wife works.

## 2020-10-19 NOTE — CHART NOTE - NSCHARTNOTEFT_GEN_A_CORE
SICU TRANSFER NOTE  -----------------------------  ICU Admission Date: 10/15/2020  Transfer Date: 10-19-20 @ 09:39    Admission Diagnosis: LLE hematoma, COPD exacerbation, Acute renal failure    Procedures: none    Consultants:  [ ] Cardiology  [ ] Endocrine  [ ] Infectious Disease  [ ] Medicine  [x] Nephrology  [ ]Neurosurgery  [ ] Ortho       [ ] Weight Bearing Status:  [ ] Palliative       [ ] Advanced Directives:    [ ] Physical Medicine and Rehab       [ ] Disposition :   [ ] Plastics  [ ] Pulmonary    Medications  ALBUTerol    90 MICROgram(s) HFA Inhaler 1 Puff(s) Inhalation every 4 hours  albuterol/ipratropium for Nebulization 3 milliLiter(s) Nebulizer every 6 hours  allopurinol 100 milliGRAM(s) Oral daily  atorvastatin 20 milliGRAM(s) Oral at bedtime  budesonide 160 MICROgram(s)/formoterol 4.5 MICROgram(s) Inhaler 2 Puff(s) Inhalation two times a day  carvedilol 12.5 milliGRAM(s) Oral every 12 hours  chlorhexidine 2% Cloths 1 Application(s) Topical daily  heparin   Injectable 7000 Unit(s) IV Push every 6 hours PRN  heparin   Injectable 3500 Unit(s) IV Push every 6 hours PRN  heparin  Infusion.  Unit(s)/Hr IV Continuous <Continuous>  hydrocortisone sodium succinate Injectable 25 milliGRAM(s) IV Push every 12 hours  insulin lispro (HumaLOG) corrective regimen sliding scale   SubCutaneous Before meals and at bedtime  lidocaine   Patch 1 Patch Transdermal daily  melatonin 3 milliGRAM(s) Oral at bedtime  pantoprazole    Tablet 40 milliGRAM(s) Oral before breakfast  senna 2 Tablet(s) Oral at bedtime  tamsulosin 0.4 milliGRAM(s) Oral at bedtime  tiotropium 18 MICROgram(s) Capsule 1 Capsule(s) Inhalation daily      [x] I attest I have reviewed and reconciled all medications prior to transfer      I have discussed this case with Dr. Shields & Dr. Gray upon transfer and all questions regarding ICU course were answered.  The following items are to be followed up:  1. Plan for operative intervention for LLE debridement - Wed 10/21  2. Heparin gtt for afib - last PTT supratherapeutic, adjusted gtt based on normogram, f/u repeat PTT  3. Monitor respiratory status - currently on 2L nasal cannula, hydrocortisone taper to end tomorrow 10/20, continue duonebs & symbicort  4. Renal indices improving, nephro input appreciated  5. D/c lomeli tomorrow (flomax started today)  6. Monitor HR / BP now that back on home dose coreg  7. Monitor blood glucose on ISS - adjust as needed  8. Be mindful that home meds hydralazine, furosemide, & aspirin have not yet been restarted - may need to be restarted based on how BP responds to coreg, daily electrolytes, respiratory status, etc.  9. Pain control PRN - lidoderm added, avoid narcotics if possible  10. Regular diet, as tolerated  11. SCD to RLE  12. Needs aggressive pulm toilette - HOB elevation, incentive spirometer, suppl O2, nebs, cough / deep breathing exercises

## 2020-10-19 NOTE — OCCUPATIONAL THERAPY INITIAL EVALUATION ADULT - DIAGNOSIS, OT EVAL
70y M recovering from acute renal failure, COPD exacerbation, Left Leg hematoma. Pt recently admitted to OSH after mechanical fall

## 2020-10-19 NOTE — PROGRESS NOTE ADULT - SUBJECTIVE AND OBJECTIVE BOX
Chief Complaint:  JAIR on CKD    24 hour events/subjective:  Pt seen/examined    SCr improving markedly;    PAST HISTORY  --------------------------------------------------------------------------------  No significant changes to PMH, PSH, FHx, SHx, unless otherwise noted    ALLERGIES & MEDICATIONS  --------------------------------------------------------------------------------  Allergies    No Known Allergies    Standing Inpatient Medications  ALBUTerol    90 MICROgram(s) HFA Inhaler 1 Puff(s) Inhalation every 4 hours  albuterol/ipratropium for Nebulization 3 milliLiter(s) Nebulizer every 6 hours  budesonide 160 MICROgram(s)/formoterol 4.5 MICROgram(s) Inhaler 2 Puff(s) Inhalation two times a day  chlorhexidine 2% Cloths 1 Application(s) Topical daily  heparin  Infusion.  Unit(s)/Hr IV Continuous <Continuous>  hydrocortisone sodium succinate Injectable 25 milliGRAM(s) IV Push every 8 hours  insulin lispro (HumaLOG) corrective regimen sliding scale   SubCutaneous Before meals and at bedtime  melatonin 3 milliGRAM(s) Oral at bedtime  tiotropium 18 MICROgram(s) Capsule 1 Capsule(s) Inhalation daily    PRN Inpatient Medications  heparin   Injectable 7000 Unit(s) IV Push every 6 hours PRN  heparin   Injectable 3500 Unit(s) IV Push every 6 hours PRN    REVIEW OF SYSTEMS  --------------------------------------------------------------------------------  Gen: No weight changes, fatigue, fevers/chills, weakness  Skin: No rashes  Head/Eyes/Ears/Mouth: No headache; Normal hearing; Normal vision w/o blurriness; No sinus pain/discomfort, sore throat  Respiratory: No dyspnea, cough, wheezing, hemoptysis  CV: No chest pain, PND, orthopnea  GI: No abdominal pain, diarrhea, constipation, nausea, vomiting, melena, hematochezia  : No increased frequency, dysuria, hematuria, nocturia  MSK: No joint pain/swelling; no back pain; no edema  Neuro: No dizziness/lightheadedness, weakness, seizures, numbness, tingling  Heme: No easy bruising or bleeding  Endo: No heat/cold intolerance  Psych: No significant nervousness, anxiety, stress, depression    All other systems were reviewed and are negative, except as noted.    VITALS/PHYSICAL EXAM:    ICU Vital Signs Last 24 Hrs,    T(C): 36.4 (19 Oct 2020 11:58), Max: 36.8 (18 Oct 2020 23:39)  T(F): 97.6 (19 Oct 2020 11:58), Max: 98.3 (18 Oct 2020 23:39)  HR: 79 (19 Oct 2020 12:00) (72 - 96)  BP: 168/74 (19 Oct 2020 12:00) (125/95 - 173/73)  BP(mean): 107 (19 Oct 2020 12:00) (95 - 129)-  RR: 23 (19 Oct 2020 12:00) (12 - 42)  SpO2: 99% (19 Oct 2020 12:00) (81% - 99%)  -------------------------------------------------------------------------------  T(C): 36.4 (10-18-20 @ 11:53), Max: 36.9 (10-18-20 @ 07:37)  HR: 81 (10-18-20 @ 12:00) (75 - 95)  BP: 152/68 (10-18-20 @ 12:00) (135/65 - 171/70)  RR: 18 (10-18-20 @ 12:00) (18 - 40)  SpO2: 97% (10-18-20 @ 12:00) (91% - 98%)    10-17-20 @ 07:01  -  10-18-20 @ 07:00  --------------------------------------------------------  IN: 1055 mL / OUT: 1655 mL / NET: -600 mL    10-18-20 @ 07:01  -  10-18-20 @ 13:09  --------------------------------------------------------  IN: 0 mL / OUT: 355 mL / NET: -355 mL    Physical Exam:  	Gen: NAD, well-appearing  	HEENT: PERRL, supple neck, clear oropharynx  	Pulm: CTA B/L  	CV: RRR, S1S2; no rub  	Back: No spinal or CVA tenderness; no sacral edema  	Abd: +BS, soft, nontender/nondistended  	: No suprapubic tenderness  	UE: Warm, FROM, no clubbing, intact strength; no edema; no asterixis  	LE: Warm, FROM, no clubbing, intact strength; no edema  	Neuro: No focal deficits, intact gait  	Psych: Normal affect and mood  	Skin: Warm, without rashes  	Vascular access:    LABS/STUDIES:    138    |  103    |  90.0<H>  ----------------------------<  206<H>  Ca:8.4<L> (19 Oct 2020 06:16)  4.6     |  24.0   |  2.18<H>      eGFR if Non : 30 <L>  eGFR if : 34 <L>                       8.8<L>  6.37  )-----------( 239      ( 19 Oct 2020 06:16 )             26.5<L>    Phos:5.0 mg/dL<H> M.1 mg/dL  (10-19 @ 06:16)    Urinalysis Basic - ( 16 Oct 2020 10:57 )  Color: Yellow / Appearance: Slightly Turbid<!> / S.015 / pH: x  Gluc: x / Ketone: Negative  / Bili: Small<!> / Urobili: Negative mg/dL   Blood: x / Protein: 30 mg/dL<!> / Nitrite: Negative   Leuk Esterase: Small<!> / RBC: 11-25 /HPF<!> / WBC 3-5   Sq Epi: x / Non Sq Epi: Negative / Bacteria: Occasional<!>    UCr:209 mg/dL   Pee:<30 mmol/L UOsm:321 mosm/kg (10-15 @ 23:57)  -------------------------------------------------------------------------------              8.7    7.05  >-----------<  222      [10-18-20 @ 04:32]              25.7     140  |  102  |  99.0  ----------------------------<  126      [10-18-20 @ 04:32]  4.2   |  26.0  |  3.15        Ca     8.4     [10-18-20 @ 04:32]      Mg     2.2     [10-18-20 @ 04:32]      Phos  4.8     [10-18-20 @ 04:32]    CK 15      [10-18-20 @ 04:32]    Creatinine Trend:  SCr 3.15 [10-18 @ 04:32]  SCr 3.93 [10-17 @ 06:13]  SCr 4.29 [10-16 @ 21:10]  SCr 4.53 [10-16 @ 12:06]  SCr 4.79 [10-16 @ 04:28]    Urinalysis - [10-16-20 @ 10:57]      Color Yellow / Appearance Slightly Turbid / SG 1.015 / pH 5.0      Gluc Negative / Ketone Negative  / Bili Small / Urobili Negative       Blood Moderate / Protein 30 / Leuk Est Small / Nitrite Negative      RBC 11-25 / WBC 3-5 / Hyaline  / Gran  / Sq Epi  / Non Sq Epi Negative / Bacteria Occasional    Urine Creatinine 209      [10-15-20 @ 23:57]  Urine Sodium <30      [10-15-20 @ 23:57]  Urine Urea Nitrogen 238      [10-16-20 @ 07:35]  Urine Osmolality 321      [10-15-20 @ 23:57]    HbA1c 5.4      [17 @ 07:51]    HCV 0.04, Nonreact      [10-16-20 @ 09:36]    Pt with known CKD_ baseline Scr 1.7; Scr was 1.8 six days ago at [facility]    Pt with Jair -  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

## 2020-10-19 NOTE — OCCUPATIONAL THERAPY INITIAL EVALUATION ADULT - SPECIAL TRAINING, OT EVAL
Patient ambulated with RW and min A x 1, +1 person for line/equipment management, +external cues short distances around bed area due to pain, decreased balance and strength. Patient limited by lines. Patient educated in energy conservation techniques including proper breathing and activity pacing.

## 2020-10-19 NOTE — PROGRESS NOTE ADULT - ASSESSMENT
69 yo male s/p fall down steps, recovering GERALD, COPD exacerbation, with LLE hematoma.   Plan for OR Wednesay 10/21 for LLE debridement  Eliquis for Afib held, on Heparin gtt  On O2 NC for COPD  DASH diet  Hydrocort Taper to end tomorrow  Webb removal tomorrow  PT/OT: home with PT vs ROCKY  On all home meds except Hydralazine, Lasix, Aspirin

## 2020-10-19 NOTE — PROGRESS NOTE ADULT - ASSESSMENT
KDIGO Care Bundle:    Avoidance of nephrotoxins/nephrotoxin medication adjustment  Medication dosage adjustment for kidney function  Continuous IVF administration (guided by CVP and testing of fluid responsiveness for 6 hours )  Discontinuation of ACE/ARBs,  Close monitoring of serum Creatinine and UO  Acid-base, electrolyte and albumin status management  Avoidance of hyperglycemia,  Consider alternatives to radiocontrast administration  Optimizing hemodynamics:

## 2020-10-19 NOTE — PROGRESS NOTE ADULT - SUBJECTIVE AND OBJECTIVE BOX
SICU Downgrade Note  INTERVAL HPI/OVERNIGHT EVENTS: Pt downgraded from SICU today. Admitted 10/15 s/p falling down steps, presented with diffuse hematoma over L side, GERALD on CKD, with CHF/COPD exacerbation.  Pt currently on Hydrocort taper to end tomorrow, Lomeli in place until tomorrow.   Pt evaluated bedside, states pain is under control. Tolerating diet, denies nausea, vomiting, chest pain, abdominal pain. States he has SOB baseline but it is improved since being in hospital. Last BM 10/17.       MEDICATIONS  (STANDING):  ALBUTerol    90 MICROgram(s) HFA Inhaler 1 Puff(s) Inhalation every 4 hours  albuterol/ipratropium for Nebulization 3 milliLiter(s) Nebulizer every 6 hours  allopurinol 100 milliGRAM(s) Oral daily  atorvastatin 20 milliGRAM(s) Oral at bedtime  budesonide 160 MICROgram(s)/formoterol 4.5 MICROgram(s) Inhaler 2 Puff(s) Inhalation two times a day  carvedilol 12.5 milliGRAM(s) Oral every 12 hours  chlorhexidine 2% Cloths 1 Application(s) Topical daily  heparin  Infusion.  Unit(s)/Hr (16 mL/Hr) IV Continuous <Continuous>  insulin lispro (HumaLOG) corrective regimen sliding scale   SubCutaneous Before meals and at bedtime  lidocaine   Patch 1 Patch Transdermal daily  melatonin 3 milliGRAM(s) Oral at bedtime  pantoprazole    Tablet 40 milliGRAM(s) Oral before breakfast  senna 2 Tablet(s) Oral at bedtime  tamsulosin 0.4 milliGRAM(s) Oral at bedtime  tiotropium 18 MICROgram(s) Capsule 1 Capsule(s) Inhalation daily    MEDICATIONS  (PRN):  heparin   Injectable 7000 Unit(s) IV Push every 6 hours PRN For aPTT less than 40  heparin   Injectable 3500 Unit(s) IV Push every 6 hours PRN For aPTT between 40 - 57      Vital Signs Last 24 Hrs  T(C): 36.4 (19 Oct 2020 18:56), Max: 36.8 (18 Oct 2020 23:39)  T(F): 97.6 (19 Oct 2020 18:56), Max: 98.3 (18 Oct 2020 23:39)  HR: 79 (19 Oct 2020 18:56) (74 - 88)  BP: 147/77 (19 Oct 2020 18:56) (141/65 - 168/77)  BP(mean): 95 (19 Oct 2020 18:00) (94 - 129)  RR: 18 (19 Oct 2020 18:56) (12 - 42)  SpO2: 96% (19 Oct 2020 18:56) (91% - 99%)    Constitutional: NAD  HEENT: PERRLA, EOMI, no drainage or redness  Respiratory: no increased WOB, Nasal Canula on 2LPM  Cardiovascular: RRR  Gastrointestinal: Soft, non-distended, non-tender  : lomeli in place draining yellow urine  Musculoskeletal: large hematoma over anterior LLE with 3x3cm eschar. Hematoma and evolving ecchymosis over left upper extremity, left flank, wrapping onto left side of back, diffusely tender to palpation    I&O's Detail    18 Oct 2020 07:01  -  19 Oct 2020 07:00  --------------------------------------------------------  IN:    Heparin Infusion: 168 mL  Total IN: 168 mL    OUT:    Indwelling Catheter - Urethral (mL): 1740 mL  Total OUT: 1740 mL    Total NET: -1572 mL    19 Oct 2020 07:01  -  19 Oct 2020 20:23  --------------------------------------------------------  IN:    Heparin Infusion: 96 mL  Total IN: 96 mL    OUT:    Indwelling Catheter - Urethral (mL): 330 mL  Total OUT: 330 mL    Total NET: -234 mL      LABS:                        8.8    6.37  )-----------( 239      ( 19 Oct 2020 06:16 )             26.5     10-19    138  |  103  |  90.0<H>  ----------------------------<  206<H>  4.6   |  24.0  |  2.18<H>    Ca    8.4<L>      19 Oct 2020 06:16  Phos  5.0     10-19  Mg     2.1     10-19      PTT - ( 19 Oct 2020 13:50 )  PTT:72.0 sec

## 2020-10-20 ENCOUNTER — TRANSCRIPTION ENCOUNTER (OUTPATIENT)
Age: 70
End: 2020-10-20

## 2020-10-20 LAB
ANION GAP SERPL CALC-SCNC: 14 MMOL/L — SIGNIFICANT CHANGE UP (ref 5–17)
ANISOCYTOSIS BLD QL: SIGNIFICANT CHANGE UP
APTT BLD: 63.1 SEC — HIGH (ref 27.5–35.5)
BASO STIPL BLD QL SMEAR: PRESENT — SIGNIFICANT CHANGE UP
BASOPHILS # BLD AUTO: 0 K/UL — SIGNIFICANT CHANGE UP (ref 0–0.2)
BASOPHILS NFR BLD AUTO: 0 % — SIGNIFICANT CHANGE UP (ref 0–2)
BUN SERPL-MCNC: 94 MG/DL — HIGH (ref 8–20)
CALCIUM SERPL-MCNC: 8.5 MG/DL — LOW (ref 8.6–10.2)
CHLORIDE SERPL-SCNC: 102 MMOL/L — SIGNIFICANT CHANGE UP (ref 98–107)
CO2 SERPL-SCNC: 23 MMOL/L — SIGNIFICANT CHANGE UP (ref 22–29)
CREAT SERPL-MCNC: 1.97 MG/DL — HIGH (ref 0.5–1.3)
EOSINOPHIL # BLD AUTO: 0.02 K/UL — SIGNIFICANT CHANGE UP (ref 0–0.5)
EOSINOPHIL NFR BLD AUTO: 0.3 % — SIGNIFICANT CHANGE UP (ref 0–6)
GLUCOSE BLDC GLUCOMTR-MCNC: 130 MG/DL — HIGH (ref 70–99)
GLUCOSE BLDC GLUCOMTR-MCNC: 139 MG/DL — HIGH (ref 70–99)
GLUCOSE BLDC GLUCOMTR-MCNC: 177 MG/DL — HIGH (ref 70–99)
GLUCOSE BLDC GLUCOMTR-MCNC: 184 MG/DL — HIGH (ref 70–99)
GLUCOSE SERPL-MCNC: 122 MG/DL — HIGH (ref 70–99)
HCT VFR BLD CALC: 24.5 % — LOW (ref 39–50)
HGB BLD-MCNC: 8.8 G/DL — LOW (ref 13–17)
IMM GRANULOCYTES NFR BLD AUTO: 1.2 % — SIGNIFICANT CHANGE UP (ref 0–1.5)
LYMPHOCYTES # BLD AUTO: 0.98 K/UL — LOW (ref 1–3.3)
LYMPHOCYTES # BLD AUTO: 13.1 % — SIGNIFICANT CHANGE UP (ref 13–44)
MACROCYTES BLD QL: SIGNIFICANT CHANGE UP
MAGNESIUM SERPL-MCNC: 2.1 MG/DL — SIGNIFICANT CHANGE UP (ref 1.6–2.6)
MANUAL SMEAR VERIFICATION: SIGNIFICANT CHANGE UP
MCHC RBC-ENTMCNC: 35.9 GM/DL — SIGNIFICANT CHANGE UP (ref 32–36)
MCHC RBC-ENTMCNC: 42.5 PG — HIGH (ref 27–34)
MCV RBC AUTO: 118.4 FL — HIGH (ref 80–100)
MICROCYTES BLD QL: SLIGHT — SIGNIFICANT CHANGE UP
MONOCYTES # BLD AUTO: 1.05 K/UL — HIGH (ref 0–0.9)
MONOCYTES NFR BLD AUTO: 14 % — SIGNIFICANT CHANGE UP (ref 2–14)
NEUTROPHILS # BLD AUTO: 5.36 K/UL — SIGNIFICANT CHANGE UP (ref 1.8–7.4)
NEUTROPHILS NFR BLD AUTO: 71.4 % — SIGNIFICANT CHANGE UP (ref 43–77)
OVALOCYTES BLD QL SMEAR: SLIGHT — SIGNIFICANT CHANGE UP
PHOSPHATE SERPL-MCNC: 3.7 MG/DL — SIGNIFICANT CHANGE UP (ref 2.4–4.7)
PLAT MORPH BLD: NORMAL — SIGNIFICANT CHANGE UP
PLATELET # BLD AUTO: 253 K/UL — SIGNIFICANT CHANGE UP (ref 150–400)
POLYCHROMASIA BLD QL SMEAR: SLIGHT — SIGNIFICANT CHANGE UP
POTASSIUM SERPL-MCNC: 4.1 MMOL/L — SIGNIFICANT CHANGE UP (ref 3.5–5.3)
POTASSIUM SERPL-SCNC: 4.1 MMOL/L — SIGNIFICANT CHANGE UP (ref 3.5–5.3)
RBC # BLD: 2.07 M/UL — LOW (ref 4.2–5.8)
RBC # FLD: 20.5 % — HIGH (ref 10.3–14.5)
RBC BLD AUTO: ABNORMAL
SARS-COV-2 RNA SPEC QL NAA+PROBE: SIGNIFICANT CHANGE UP
SODIUM SERPL-SCNC: 139 MMOL/L — SIGNIFICANT CHANGE UP (ref 135–145)
WBC # BLD: 7.5 K/UL — SIGNIFICANT CHANGE UP (ref 3.8–10.5)
WBC # FLD AUTO: 7.5 K/UL — SIGNIFICANT CHANGE UP (ref 3.8–10.5)

## 2020-10-20 PROCEDURE — 99231 SBSQ HOSP IP/OBS SF/LOW 25: CPT

## 2020-10-20 PROCEDURE — 99233 SBSQ HOSP IP/OBS HIGH 50: CPT

## 2020-10-20 RX ORDER — ACETAMINOPHEN 500 MG
1000 TABLET ORAL ONCE
Refills: 0 | Status: COMPLETED | OUTPATIENT
Start: 2020-10-20 | End: 2020-10-20

## 2020-10-20 RX ORDER — SODIUM CHLORIDE 9 MG/ML
1000 INJECTION, SOLUTION INTRAVENOUS
Refills: 0 | Status: DISCONTINUED | OUTPATIENT
Start: 2020-10-20 | End: 2020-10-21

## 2020-10-20 RX ORDER — TEMAZEPAM 15 MG/1
30 CAPSULE ORAL AT BEDTIME
Refills: 0 | Status: DISCONTINUED | OUTPATIENT
Start: 2020-10-20 | End: 2020-10-21

## 2020-10-20 RX ADMIN — Medication 4: at 18:12

## 2020-10-20 RX ADMIN — BUDESONIDE AND FORMOTEROL FUMARATE DIHYDRATE 2 PUFF(S): 160; 4.5 AEROSOL RESPIRATORY (INHALATION) at 09:28

## 2020-10-20 RX ADMIN — Medication 3 MILLILITER(S): at 15:00

## 2020-10-20 RX ADMIN — LIDOCAINE 1 PATCH: 4 CREAM TOPICAL at 13:44

## 2020-10-20 RX ADMIN — BUDESONIDE AND FORMOTEROL FUMARATE DIHYDRATE 2 PUFF(S): 160; 4.5 AEROSOL RESPIRATORY (INHALATION) at 19:51

## 2020-10-20 RX ADMIN — ATORVASTATIN CALCIUM 20 MILLIGRAM(S): 80 TABLET, FILM COATED ORAL at 21:35

## 2020-10-20 RX ADMIN — CARVEDILOL PHOSPHATE 12.5 MILLIGRAM(S): 80 CAPSULE, EXTENDED RELEASE ORAL at 05:29

## 2020-10-20 RX ADMIN — CHLORHEXIDINE GLUCONATE 1 APPLICATION(S): 213 SOLUTION TOPICAL at 13:42

## 2020-10-20 RX ADMIN — Medication 4: at 21:36

## 2020-10-20 RX ADMIN — SENNA PLUS 2 TABLET(S): 8.6 TABLET ORAL at 21:35

## 2020-10-20 RX ADMIN — Medication 400 MILLIGRAM(S): at 21:44

## 2020-10-20 RX ADMIN — TAMSULOSIN HYDROCHLORIDE 0.4 MILLIGRAM(S): 0.4 CAPSULE ORAL at 21:35

## 2020-10-20 RX ADMIN — Medication 3 MILLILITER(S): at 09:27

## 2020-10-20 RX ADMIN — CARVEDILOL PHOSPHATE 12.5 MILLIGRAM(S): 80 CAPSULE, EXTENDED RELEASE ORAL at 18:12

## 2020-10-20 RX ADMIN — PANTOPRAZOLE SODIUM 40 MILLIGRAM(S): 20 TABLET, DELAYED RELEASE ORAL at 05:29

## 2020-10-20 RX ADMIN — Medication 3 MILLILITER(S): at 19:50

## 2020-10-20 RX ADMIN — LIDOCAINE 1 PATCH: 4 CREAM TOPICAL at 19:55

## 2020-10-20 RX ADMIN — Medication 100 MILLIGRAM(S): at 13:42

## 2020-10-20 NOTE — PROGRESS NOTE ADULT - SUBJECTIVE AND OBJECTIVE BOX
[facility] DIVISION OF KIDNEY DISEASES AND HYPERTENSION -- FOLLOW UP NOTE  --------------------------------------------------------------------------------  Chief Complaint: shahriar on ckd    24 hour events/subjective:  no acute event  pt seen and examined; planned for OR today        PAST HISTORY  --------------------------------------------------------------------------------  No significant changes to PMH, PSH, FHx, SHx, unless otherwise noted    ALLERGIES & MEDICATIONS  --------------------------------------------------------------------------------  Allergies    No Known Allergies    Intolerances      Standing Inpatient Medications  ALBUTerol    90 MICROgram(s) HFA Inhaler 1 Puff(s) Inhalation every 4 hours  albuterol/ipratropium for Nebulization 3 milliLiter(s) Nebulizer every 6 hours  allopurinol 100 milliGRAM(s) Oral daily  atorvastatin 20 milliGRAM(s) Oral at bedtime  budesonide 160 MICROgram(s)/formoterol 4.5 MICROgram(s) Inhaler 2 Puff(s) Inhalation two times a day  carvedilol 12.5 milliGRAM(s) Oral every 12 hours  chlorhexidine 2% Cloths 1 Application(s) Topical daily  heparin  Infusion.  Unit(s)/Hr IV Continuous <Continuous>  hydrocortisone sodium succinate Injectable 25 milliGRAM(s) IV Push once  insulin lispro (HumaLOG) corrective regimen sliding scale   SubCutaneous Before meals and at bedtime  lidocaine   Patch 1 Patch Transdermal daily  melatonin 3 milliGRAM(s) Oral at bedtime  pantoprazole    Tablet 40 milliGRAM(s) Oral before breakfast  senna 2 Tablet(s) Oral at bedtime  tamsulosin 0.4 milliGRAM(s) Oral at bedtime  tiotropium 18 MICROgram(s) Capsule 1 Capsule(s) Inhalation daily    PRN Inpatient Medications  heparin   Injectable 7000 Unit(s) IV Push every 6 hours PRN  heparin   Injectable 3500 Unit(s) IV Push every 6 hours PRN      REVIEW OF SYSTEMS  --------------------------------------------------------------------------------  Gen: No weight changes, fatigue, fevers/chills, weakness  Skin: No rashes  Head/Eyes/Ears/Mouth: No headache; Normal hearing; Normal vision w/o blurriness; No sinus pain/discomfort, sore throat  Respiratory: No dyspnea, cough, wheezing, hemoptysis  CV: No chest pain, PND, orthopnea  GI: No abdominal pain, diarrhea, constipation, nausea, vomiting, melena, hematochezia  : No increased frequency, dysuria, hematuria, nocturia  MSK: No joint pain/swelling; no back pain; no edema  Neuro: No dizziness/lightheadedness, weakness, seizures, numbness, tingling  Heme: No easy bruising or bleeding  Endo: No heat/cold intolerance  Psych: No significant nervousness, anxiety, stress, depression    All other systems were reviewed and are negative, except as noted.    VITALS/PHYSICAL EXAM  --------------------------------------------------------------------------------  T(C): 36.4 (10-20-20 @ 05:29), Max: 36.5 (10-20-20 @ 02:17)  HR: 74 (10-20-20 @ 09:27) (74 - 89)  BP: 149/69 (10-20-20 @ 05:29) (141/65 - 168/74)  RR: 18 (10-20-20 @ 05:29) (15 - 27)  SpO2: 90% (10-20-20 @ 05:29) (90% - 99%)  Wt(kg): --        10-19-20 @ 07:01  -  10-20-20 @ 07:00  --------------------------------------------------------  IN: 216 mL / OUT: 330 mL / NET: -114 mL      Physical Exam:  	Gen: NAD  	HEENT: PERRL, supple neck  	Pulm: CTA B/L  	CV: RRR, S1S2; no rub  	Back: No spinal or CVA tenderness; no sacral edema  	Abd: +BS, soft, nontender/nondistended  	: lomeli+  	UE: Warm, no edema; no asterixis  	LE: Warm, no edema  	Neuro: No focal deficits  	Psych: Normal affect and mood  	Skin: Warm  	    LABS/STUDIES  --------------------------------------------------------------------------------              8.8    7.50  >-----------<  253      [10-20-20 @ 07:30]              24.5     139  |  102  |  94.0  ----------------------------<  122      [10-20-20 @ 07:30]  4.1   |  23.0  |  1.97        Ca     8.5     [10-20-20 @ 07:30]      Mg     2.1     [10-20-20 @ 07:30]      Phos  3.7     [10-20-20 @ 07:30]        PTT: 63.1       [10-20-20 @ 07:30]      Creatinine Trend:  SCr 1.97 [10-20 @ 07:30]  SCr 2.18 [10-19 @ 06:16]  SCr 3.15 [10-18 @ 04:32]  SCr 3.93 [10-17 @ 06:13]  SCr 4.29 [10-16 @ 21:10]    Urinalysis - [10-16-20 @ 10:57]      Color Yellow / Appearance Slightly Turbid / SG 1.015 / pH 5.0      Gluc Negative / Ketone Negative  / Bili Small / Urobili Negative       Blood Moderate / Protein 30 / Leuk Est Small / Nitrite Negative      RBC 11-25 / WBC 3-5 / Hyaline  / Gran  / Sq Epi  / Non Sq Epi Negative / Bacteria Occasional    Urine Creatinine 209      [10-15-20 @ 23:57]  Urine Sodium <30      [10-15-20 @ 23:57]  Urine Urea Nitrogen 238      [10-16-20 @ 07:35]  Urine Osmolality 321      [10-15-20 @ 23:57]    HbA1c 5.4      [03-25-17 @ 07:51]    HCV 0.04, Nonreact      [10-16-20 @ 09:36]

## 2020-10-20 NOTE — PROGRESS NOTE ADULT - ASSESSMENT
69 yo M  with PMH of COPD on home O2, afib on eliquis, CHF and CKD is s/p fall with development of acute renal failure, COPD exacerbation, and LLE hematoma    - Plan for operative intervention for LLE debridement - Wed 10/21  - Heparin gtt for afib - last PTT supratherapeutic, adjusted gtt based on normogram, f/u repeat PTT  - Monitor respiratory status - currently on 2L nasal cannula, hydrocortisone taper to end tomorrow 10/20, continue duonebs & symbicort  - Renal indices improving, nephro input appreciated  - D/c lomeli tomorrow (flomax started today)  - Monitor HR / BP now that back on home dose coreg  - Monitor blood glucose on ISS - adjust as needed  - Be mindful that home meds hydralazine, furosemide, & aspirin have not yet been restarted - may need to be restarted based on how BP   responds to coreg, daily electrolytes, respiratory status, etc.  - Pain control PRN - lidoderm added, avoid narcotics if possible  - Regular diet, as tolerated  - SCD to RLE  - Needs aggressive pulm toilette - HOB elevation, incentive spirometer, suppl O2, nebs, cough / deep breathing exercises.

## 2020-10-20 NOTE — PROGRESS NOTE ADULT - ASSESSMENT
Pt with known CKD_ baseline Scr 1.7; Scr was 1.8 during recent hospitalization  at [facility]  Pt with Jair -pre renal  improving scr with hydration  pt non oliguric  Maintain euvolemia  Monitor Scr, lytes, UOP

## 2020-10-20 NOTE — PROGRESS NOTE ADULT - SUBJECTIVE AND OBJECTIVE BOX
INTERVAL HPI/OVERNIGHT EVENTS:    GERALD continues with downtrending creatinine.  Pt continues with pain to LLE but controlled with current regimen.  Pt for OR tomorrow for debridement.  Remains on steroid taper for COPD exacerbation.  PT evaluated and recommended home with assist and home PT vs ROCKY.     MEDICATIONS  (STANDING):  ALBUTerol    90 MICROgram(s) HFA Inhaler 1 Puff(s) Inhalation every 4 hours  albuterol/ipratropium for Nebulization 3 milliLiter(s) Nebulizer every 6 hours  allopurinol 100 milliGRAM(s) Oral daily  atorvastatin 20 milliGRAM(s) Oral at bedtime  budesonide 160 MICROgram(s)/formoterol 4.5 MICROgram(s) Inhaler 2 Puff(s) Inhalation two times a day  carvedilol 12.5 milliGRAM(s) Oral every 12 hours  chlorhexidine 2% Cloths 1 Application(s) Topical daily  heparin  Infusion.  Unit(s)/Hr (16 mL/Hr) IV Continuous <Continuous>  hydrocortisone sodium succinate Injectable 25 milliGRAM(s) IV Push once  insulin lispro (HumaLOG) corrective regimen sliding scale   SubCutaneous Before meals and at bedtime  lidocaine   Patch 1 Patch Transdermal daily  melatonin 3 milliGRAM(s) Oral at bedtime  pantoprazole    Tablet 40 milliGRAM(s) Oral before breakfast  senna 2 Tablet(s) Oral at bedtime  tamsulosin 0.4 milliGRAM(s) Oral at bedtime  tiotropium 18 MICROgram(s) Capsule 1 Capsule(s) Inhalation daily    MEDICATIONS  (PRN):  heparin   Injectable 7000 Unit(s) IV Push every 6 hours PRN For aPTT less than 40  heparin   Injectable 3500 Unit(s) IV Push every 6 hours PRN For aPTT between 40 - 57      Vital Signs Last 24 Hrs  T(C): 36.4 (20 Oct 2020 05:29), Max: 36.5 (20 Oct 2020 02:17)  T(F): 97.6 (20 Oct 2020 05:29), Max: 97.7 (20 Oct 2020 02:17)  HR: 85 (20 Oct 2020 05:29) (74 - 89)  BP: 149/69 (20 Oct 2020 05:29) (141/65 - 168/74)  BP(mean): 95 (19 Oct 2020 18:00) (94 - 129)  RR: 18 (20 Oct 2020 05:29) (12 - 42)  SpO2: 90% (20 Oct 2020 05:29) (90% - 99%)      Physical Exam:    Gen: Resting comfortably in bed    HEENT: PERRL, EOMI    Neurological: Alert and oriented x3 without focal deficit    Neck: Trachea midline, no evidence of JVD, FROM without pain, neck symmetric    Pulmonary: CTAB with decreased breath sounds at the bases    Cardiovascular: irregular    Gastrointestinal: Soft, non-tender, non-distended    : Webb in place draining yellow urine    Extremities: +edema of the b/l LE, tender to light tough over the distal LLE    Skin: Hematoma ecchymosis of the LLE, ecchymosis of the posterolateral left shoulder, venous stasis changes of the b/l LE      I&O's Detail    18 Oct 2020 07:01  -  19 Oct 2020 07:00  --------------------------------------------------------  IN:    Heparin Infusion: 168 mL  Total IN: 168 mL    OUT:    Indwelling Catheter - Urethral (mL): 1740 mL  Total OUT: 1740 mL    Total NET: -1572 mL      19 Oct 2020 07:01  -  20 Oct 2020 06:46  --------------------------------------------------------  IN:    Heparin Infusion: 216 mL  Total IN: 216 mL    OUT:    Indwelling Catheter - Urethral (mL): 330 mL  Total OUT: 330 mL    Total NET: -114 mL          LABS:                        8.8    6.37  )-----------( 239      ( 19 Oct 2020 06:16 )             26.5     10-19    138  |  103  |  90.0<H>  ----------------------------<  206<H>  4.6   |  24.0  |  2.18<H>    Ca    8.4<L>      19 Oct 2020 06:16  Phos  5.0     10-19  Mg     2.1     10-19      PTT - ( 19 Oct 2020 20:08 )  PTT:70.6 sec      RADIOLOGY & ADDITIONAL STUDIES:

## 2020-10-20 NOTE — PROGRESS NOTE ADULT - SUBJECTIVE AND OBJECTIVE BOX
69 yo M w/ PMH of COPD on home O2, A fib on eliquis, CHF, CKD who is s/p mechanical fall down 7 stairs on 10/9.  Plan for debridement of LLE 10/21.

## 2020-10-20 NOTE — DIETITIAN INITIAL EVALUATION ADULT. - PATIENT MEETS CRITERIA FOR MALNUTRITION
"[facility]  Prenatal Visit    SUBJECTIVE  No contractions, no vaginal bleeding, no leakage of fluid, normal fetal movement. No headache, no vision changes, no RUQ pain.     Has had a few small nosebleeds (bloody mucus) with the change in weather; wonders if this is normal.    OBJECTIVE  /80   Pulse 91   Ht 1.6 m (5' 3\")   Wt 86.5 kg (190 lb 9.6 oz)   LMP 2020   BMI 33.76 kg/m      See OB Flowsheet    ASSESSMENT/PLAN  Gabriele Rodriguez is a 33 year old  who is 32w2d, here for HARRY.    1. Chronic hypertension:  -BP has been well-controlled without medication.  -continue daily aspirin for pre-eclampsia prevention  -baseline HELLP labs normal  -urine protein:creatinine ratio 0.21; repeat today   -was referred to nephrology for proteinuria at 9 weeks; felt to be spurious; will continue to monitor UPC monthly per their recommendations  -discussed rationale for weekly BPPs starting today until delivery; BPP today    -growth US today 42%ile; plan to repeat at 36 weeks  -reviewed signs/symptoms of pre-eclampsia and when to call  -discussed delivery planning at 38-39 weeks; sooner if poor BP control    2. Two vessel cord:   -serial growth assessments given risk of growth restriction    3. Nosebleeds:   -discussed that this is often normal in pregnancy. Offered to check platelets. Will defer for now, but Qi will bring up at any time if they worsen.    4.Routine prenatal care:  -plans nexplanon for PP contraception  - Pedro will provide her with support during labor  -plans epidural for labor pain management  -Rh POS/RI/Tdap and fluvax UTD    RTC for MD visit in 2 weeks.    Janice Hayes MD, MSCI    [facility]/OBGYN  " no

## 2020-10-20 NOTE — CHART NOTE - NSCHARTNOTEFT_GEN_A_CORE
RN called to request sleep aid per pateints home medication.  Patient normally takes 30mg qhs,  given 7.5 mg of temazepam po with good effects.

## 2020-10-21 ENCOUNTER — RESULT REVIEW (OUTPATIENT)
Age: 70
End: 2020-10-21

## 2020-10-21 LAB
ANION GAP SERPL CALC-SCNC: 7 MMOL/L — SIGNIFICANT CHANGE UP (ref 5–17)
APTT BLD: 26.1 SEC — LOW (ref 27.5–35.5)
BASOPHILS # BLD AUTO: 0.01 K/UL — SIGNIFICANT CHANGE UP (ref 0–0.2)
BASOPHILS NFR BLD AUTO: 0.1 % — SIGNIFICANT CHANGE UP (ref 0–2)
BLD GP AB SCN SERPL QL: SIGNIFICANT CHANGE UP
BUN SERPL-MCNC: 83 MG/DL — HIGH (ref 8–20)
CALCIUM SERPL-MCNC: 8.8 MG/DL — SIGNIFICANT CHANGE UP (ref 8.6–10.2)
CHLORIDE SERPL-SCNC: 103 MMOL/L — SIGNIFICANT CHANGE UP (ref 98–107)
CK SERPL-CCNC: 23 U/L — LOW (ref 30–200)
CO2 SERPL-SCNC: 28 MMOL/L — SIGNIFICANT CHANGE UP (ref 22–29)
CREAT SERPL-MCNC: 1.85 MG/DL — HIGH (ref 0.5–1.3)
EOSINOPHIL # BLD AUTO: 0.05 K/UL — SIGNIFICANT CHANGE UP (ref 0–0.5)
EOSINOPHIL NFR BLD AUTO: 0.5 % — SIGNIFICANT CHANGE UP (ref 0–6)
GAS PNL BLDA: SIGNIFICANT CHANGE UP
GLUCOSE BLDC GLUCOMTR-MCNC: 135 MG/DL — HIGH (ref 70–99)
GLUCOSE BLDC GLUCOMTR-MCNC: 176 MG/DL — HIGH (ref 70–99)
GLUCOSE SERPL-MCNC: 166 MG/DL — HIGH (ref 70–99)
HCT VFR BLD CALC: 23.9 % — LOW (ref 39–50)
HCT VFR BLD CALC: 26.9 % — LOW (ref 39–50)
HGB BLD-MCNC: 8.3 G/DL — LOW (ref 13–17)
HGB BLD-MCNC: 8.3 G/DL — LOW (ref 13–17)
IMM GRANULOCYTES NFR BLD AUTO: 1.4 % — SIGNIFICANT CHANGE UP (ref 0–1.5)
INR BLD: 1.05 RATIO — SIGNIFICANT CHANGE UP (ref 0.88–1.16)
LYMPHOCYTES # BLD AUTO: 0.84 K/UL — LOW (ref 1–3.3)
LYMPHOCYTES # BLD AUTO: 8.1 % — LOW (ref 13–44)
MAGNESIUM SERPL-MCNC: 1.9 MG/DL — SIGNIFICANT CHANGE UP (ref 1.6–2.6)
MCHC RBC-ENTMCNC: 30.9 GM/DL — LOW (ref 32–36)
MCHC RBC-ENTMCNC: 34.7 GM/DL — SIGNIFICANT CHANGE UP (ref 32–36)
MCHC RBC-ENTMCNC: 34.7 PG — HIGH (ref 27–34)
MCHC RBC-ENTMCNC: 41.1 PG — HIGH (ref 27–34)
MCV RBC AUTO: 112.6 FL — HIGH (ref 80–100)
MCV RBC AUTO: 118.3 FL — HIGH (ref 80–100)
MONOCYTES # BLD AUTO: 0.62 K/UL — SIGNIFICANT CHANGE UP (ref 0–0.9)
MONOCYTES NFR BLD AUTO: 6 % — SIGNIFICANT CHANGE UP (ref 2–14)
NEUTROPHILS # BLD AUTO: 8.67 K/UL — HIGH (ref 1.8–7.4)
NEUTROPHILS NFR BLD AUTO: 83.9 % — HIGH (ref 43–77)
PHOSPHATE SERPL-MCNC: 4 MG/DL — SIGNIFICANT CHANGE UP (ref 2.4–4.7)
PLATELET # BLD AUTO: 255 K/UL — SIGNIFICANT CHANGE UP (ref 150–400)
PLATELET # BLD AUTO: 272 K/UL — SIGNIFICANT CHANGE UP (ref 150–400)
POTASSIUM SERPL-MCNC: 4.7 MMOL/L — SIGNIFICANT CHANGE UP (ref 3.5–5.3)
POTASSIUM SERPL-SCNC: 4.7 MMOL/L — SIGNIFICANT CHANGE UP (ref 3.5–5.3)
PROTHROM AB SERPL-ACNC: 12.1 SEC — SIGNIFICANT CHANGE UP (ref 10.6–13.6)
RBC # BLD: 2.02 M/UL — LOW (ref 4.2–5.8)
RBC # BLD: 2.39 M/UL — LOW (ref 4.2–5.8)
RBC # FLD: 16.4 % — HIGH (ref 10.3–14.5)
RBC # FLD: 19.7 % — HIGH (ref 10.3–14.5)
SODIUM SERPL-SCNC: 138 MMOL/L — SIGNIFICANT CHANGE UP (ref 135–145)
TROPONIN T SERPL-MCNC: 0.02 NG/ML — SIGNIFICANT CHANGE UP (ref 0–0.06)
TROPONIN T SERPL-MCNC: <0.01 NG/ML — SIGNIFICANT CHANGE UP (ref 0–0.06)
WBC # BLD: 10.33 K/UL — SIGNIFICANT CHANGE UP (ref 3.8–10.5)
WBC # BLD: 7.48 K/UL — SIGNIFICANT CHANGE UP (ref 3.8–10.5)
WBC # FLD AUTO: 10.33 K/UL — SIGNIFICANT CHANGE UP (ref 3.8–10.5)
WBC # FLD AUTO: 7.48 K/UL — SIGNIFICANT CHANGE UP (ref 3.8–10.5)

## 2020-10-21 PROCEDURE — 99233 SBSQ HOSP IP/OBS HIGH 50: CPT

## 2020-10-21 PROCEDURE — 71045 X-RAY EXAM CHEST 1 VIEW: CPT | Mod: 26

## 2020-10-21 PROCEDURE — 32554 ASPIRATE PLEURA W/O IMAGING: CPT

## 2020-10-21 PROCEDURE — 71045 X-RAY EXAM CHEST 1 VIEW: CPT | Mod: 26,77

## 2020-10-21 PROCEDURE — 11042 DBRDMT SUBQ TIS 1ST 20SQCM/<: CPT

## 2020-10-21 PROCEDURE — 93010 ELECTROCARDIOGRAM REPORT: CPT

## 2020-10-21 PROCEDURE — 11045 DBRDMT SUBQ TISS EACH ADDL: CPT

## 2020-10-21 PROCEDURE — 99233 SBSQ HOSP IP/OBS HIGH 50: CPT | Mod: 25

## 2020-10-21 PROCEDURE — 88304 TISSUE EXAM BY PATHOLOGIST: CPT | Mod: 26

## 2020-10-21 PROCEDURE — 32556 INSERT CATH PLEURA W/O IMAGE: CPT

## 2020-10-21 PROCEDURE — 93306 TTE W/DOPPLER COMPLETE: CPT | Mod: 26

## 2020-10-21 RX ORDER — CHLORHEXIDINE GLUCONATE 213 G/1000ML
15 SOLUTION TOPICAL EVERY 12 HOURS
Refills: 0 | Status: DISCONTINUED | OUTPATIENT
Start: 2020-10-21 | End: 2020-10-22

## 2020-10-21 RX ORDER — CHLORHEXIDINE GLUCONATE 213 G/1000ML
1 SOLUTION TOPICAL DAILY
Refills: 0 | Status: DISCONTINUED | OUTPATIENT
Start: 2020-10-21 | End: 2020-10-30

## 2020-10-21 RX ORDER — HYDRALAZINE HCL 50 MG
10 TABLET ORAL ONCE
Refills: 0 | Status: COMPLETED | OUTPATIENT
Start: 2020-10-21 | End: 2020-10-21

## 2020-10-21 RX ORDER — SODIUM CHLORIDE 9 MG/ML
1000 INJECTION, SOLUTION INTRAVENOUS
Refills: 0 | Status: DISCONTINUED | OUTPATIENT
Start: 2020-10-21 | End: 2020-10-22

## 2020-10-21 RX ORDER — HYDRALAZINE HCL 50 MG
10 TABLET ORAL EVERY 4 HOURS
Refills: 0 | Status: DISCONTINUED | OUTPATIENT
Start: 2020-10-21 | End: 2020-10-22

## 2020-10-21 RX ORDER — FENTANYL CITRATE 50 UG/ML
50 INJECTION INTRAVENOUS ONCE
Refills: 0 | Status: DISCONTINUED | OUTPATIENT
Start: 2020-10-21 | End: 2020-10-21

## 2020-10-21 RX ORDER — FENTANYL CITRATE 50 UG/ML
100 INJECTION INTRAVENOUS ONCE
Refills: 0 | Status: DISCONTINUED | OUTPATIENT
Start: 2020-10-21 | End: 2020-10-21

## 2020-10-21 RX ORDER — PANTOPRAZOLE SODIUM 20 MG/1
40 TABLET, DELAYED RELEASE ORAL DAILY
Refills: 0 | Status: DISCONTINUED | OUTPATIENT
Start: 2020-10-21 | End: 2020-10-22

## 2020-10-21 RX ORDER — PROPOFOL 10 MG/ML
20 INJECTION, EMULSION INTRAVENOUS
Qty: 1000 | Refills: 0 | Status: DISCONTINUED | OUTPATIENT
Start: 2020-10-21 | End: 2020-10-22

## 2020-10-21 RX ORDER — HYDRALAZINE HCL 50 MG
10 TABLET ORAL EVERY 4 HOURS
Refills: 0 | Status: DISCONTINUED | OUTPATIENT
Start: 2020-10-21 | End: 2020-10-21

## 2020-10-21 RX ORDER — INSULIN LISPRO 100/ML
VIAL (ML) SUBCUTANEOUS EVERY 6 HOURS
Refills: 0 | Status: DISCONTINUED | OUTPATIENT
Start: 2020-10-21 | End: 2020-10-22

## 2020-10-21 RX ORDER — ERYTHROPOIETIN 10000 [IU]/ML
10000 INJECTION, SOLUTION INTRAVENOUS; SUBCUTANEOUS
Refills: 0 | Status: DISCONTINUED | OUTPATIENT
Start: 2020-10-21 | End: 2020-10-21

## 2020-10-21 RX ORDER — HEPARIN SODIUM 5000 [USP'U]/ML
5000 INJECTION INTRAVENOUS; SUBCUTANEOUS EVERY 8 HOURS
Refills: 0 | Status: DISCONTINUED | OUTPATIENT
Start: 2020-10-21 | End: 2020-10-22

## 2020-10-21 RX ADMIN — CHLORHEXIDINE GLUCONATE 15 MILLILITER(S): 213 SOLUTION TOPICAL at 17:12

## 2020-10-21 RX ADMIN — PROPOFOL 10.7 MICROGRAM(S)/KG/MIN: 10 INJECTION, EMULSION INTRAVENOUS at 23:25

## 2020-10-21 RX ADMIN — PROPOFOL 10.7 MICROGRAM(S)/KG/MIN: 10 INJECTION, EMULSION INTRAVENOUS at 18:15

## 2020-10-21 RX ADMIN — Medication 10 MILLIGRAM(S): at 17:11

## 2020-10-21 RX ADMIN — SODIUM CHLORIDE 42 MILLILITER(S): 9 INJECTION, SOLUTION INTRAVENOUS at 18:11

## 2020-10-21 RX ADMIN — CHLORHEXIDINE GLUCONATE 1 APPLICATION(S): 213 SOLUTION TOPICAL at 15:17

## 2020-10-21 RX ADMIN — LIDOCAINE 1 PATCH: 4 CREAM TOPICAL at 01:45

## 2020-10-21 RX ADMIN — Medication 10 MILLIGRAM(S): at 20:31

## 2020-10-21 RX ADMIN — FENTANYL CITRATE 50 MICROGRAM(S): 50 INJECTION INTRAVENOUS at 15:00

## 2020-10-21 RX ADMIN — Medication 3 MILLILITER(S): at 08:47

## 2020-10-21 RX ADMIN — PROPOFOL 10.7 MICROGRAM(S)/KG/MIN: 10 INJECTION, EMULSION INTRAVENOUS at 15:17

## 2020-10-21 RX ADMIN — FENTANYL CITRATE 50 MICROGRAM(S): 50 INJECTION INTRAVENOUS at 14:45

## 2020-10-21 RX ADMIN — SODIUM CHLORIDE 100 MILLILITER(S): 9 INJECTION, SOLUTION INTRAVENOUS at 00:00

## 2020-10-21 RX ADMIN — PANTOPRAZOLE SODIUM 40 MILLIGRAM(S): 20 TABLET, DELAYED RELEASE ORAL at 06:08

## 2020-10-21 RX ADMIN — CARVEDILOL PHOSPHATE 12.5 MILLIGRAM(S): 80 CAPSULE, EXTENDED RELEASE ORAL at 06:08

## 2020-10-21 RX ADMIN — Medication 2: at 18:11

## 2020-10-21 RX ADMIN — Medication 3 MILLILITER(S): at 02:58

## 2020-10-21 RX ADMIN — HEPARIN SODIUM 5000 UNIT(S): 5000 INJECTION INTRAVENOUS; SUBCUTANEOUS at 23:25

## 2020-10-21 NOTE — BRIEF OPERATIVE NOTE - OPERATION/FINDINGS
Evacuation of left lower extremity traumatic hematoma (on Eliquis), excisional of skin and subcutaneous tissue down to fat 25 x 11 cm.     Wound Class I    Following administration of anesthetic reversal agents, patient's BP and HR decreased to SBP of 50-60 and HR 40-50. He was given Atropine 1mg, epinephrine 0.1mg, and Ca+2 1g, with normalization of vital signs. Kept intubated and transferred to SICU for continuous monitoring. Evacuation of left lower extremity traumatic hematoma (on Eliquis), excisional of skin and subcutaneous tissue 25 x 11 cm.     Wound Class I    Following administration of anesthetic reversal agents, patient's BP and HR decreased to SBP of 50-60 and HR 40-50. He was given Atropine 1mg, epinephrine 0.1mg, and Ca+2 1g, with normalization of vital signs. Kept intubated and transferred to SICU for continuous monitoring. Evacuation of left lower extremity traumatic hematoma (on Eliquis), excisional debridement of skin and subcutaneous tissue down to fascia. Full thickness wound measures 25 x 11 cm.     Wound Class I    Following administration of anesthetic reversal agents, patient's BP and HR decreased to SBP of 50-60 and HR 40-50. He was given Atropine 1mg, epinephrine 0.1mg, and Ca+2 1g, with normalization of vital signs. Kept intubated and transferred to SICU for continuous monitoring.

## 2020-10-21 NOTE — PROCEDURE NOTE - NSPROCDETAILS_GEN_ALL_CORE
lumen(s) aspirated and flushed/sterile dressing applied/sterile technique, catheter placed/guidewire recovered
thoracostomy tube placed percutaneously/Seldinger technique/secured in place/sterile dressing applied

## 2020-10-21 NOTE — PROGRESS NOTE ADULT - ASSESSMENT
71 yo M  with PMH of COPD on home O2, afib on eliquis, CHF and CKD is s/p fall with development of acute renal failure, COPD exacerbation, and LLE hematoma    - Plan for operative intervention for LLE debridement - Wed 10/21  - Heparin gtt for afib - last PTT supratherapeutic, adjusted gtt based on normogram, f/u repeat PTT, plan to hold heparin 6am this am in anticipation for OR  - Monitor respiratory status   - Renal indices improving, nephro input appreciated  - Monitor HR / BP now that back on home dose coreg  - Monitor blood glucose on ISS - adjust as needed  - Be mindful that home meds hydralazine, furosemide, & aspirin have not yet been restarted - may need to be restarted based on how BP   responds to coreg, daily electrolytes, respiratory status, etc.  - Pain control PRN - lidoderm added, avoid narcotics if possible  - NPO for OR today  - SCD to RLE  - Needs aggressive pulm toilette - HOB elevation, incentive spirometer, suppl O2, nebs, cough / deep breathing exercises.

## 2020-10-21 NOTE — PROGRESS NOTE ADULT - SUBJECTIVE AND OBJECTIVE BOX
INTERVAL HPI/OVERNIGHT EVENTS:    SUBJECTIVE: Patient evaluated at bedside, found resting comfortably in bed, nad. No acute complaints or concerns. Notes sharp intermittent pains at LLE but otherwise feeling well. Plan for OR Hematoma evacuation today.       MEDICATIONS  (STANDING):  ALBUTerol    90 MICROgram(s) HFA Inhaler 1 Puff(s) Inhalation every 4 hours  albuterol/ipratropium for Nebulization 3 milliLiter(s) Nebulizer every 6 hours  allopurinol 100 milliGRAM(s) Oral daily  atorvastatin 20 milliGRAM(s) Oral at bedtime  budesonide 160 MICROgram(s)/formoterol 4.5 MICROgram(s) Inhaler 2 Puff(s) Inhalation two times a day  carvedilol 12.5 milliGRAM(s) Oral every 12 hours  chlorhexidine 2% Cloths 1 Application(s) Topical daily  heparin  Infusion.  Unit(s)/Hr (16 mL/Hr) IV Continuous <Continuous>  insulin lispro (HumaLOG) corrective regimen sliding scale   SubCutaneous Before meals and at bedtime  lactated ringers. 1000 milliLiter(s) (100 mL/Hr) IV Continuous <Continuous>  lidocaine   Patch 1 Patch Transdermal daily  melatonin 3 milliGRAM(s) Oral at bedtime  pantoprazole    Tablet 40 milliGRAM(s) Oral before breakfast  senna 2 Tablet(s) Oral at bedtime  tamsulosin 0.4 milliGRAM(s) Oral at bedtime  tiotropium 18 MICROgram(s) Capsule 1 Capsule(s) Inhalation daily    MEDICATIONS  (PRN):  heparin   Injectable 7000 Unit(s) IV Push every 6 hours PRN For aPTT less than 40  heparin   Injectable 3500 Unit(s) IV Push every 6 hours PRN For aPTT between 40 - 57  temazepam 30 milliGRAM(s) Oral at bedtime PRN Insomnia      Vital Signs Last 24 Hrs  T(C): 36.8 (20 Oct 2020 20:57), Max: 36.9 (20 Oct 2020 16:10)  T(F): 98.3 (20 Oct 2020 20:57), Max: 98.4 (20 Oct 2020 16:10)  HR: 77 (20 Oct 2020 20:57) (72 - 85)  BP: 154/76 (20 Oct 2020 20:57) (119/74 - 154/76)  BP(mean): --  RR: 18 (20 Oct 2020 20:57) (18 - 18)  SpO2: 95% (20 Oct 2020 20:57) (90% - 95%)    PE  Gen: resting comfortably in bed, nad  Pulm: no increased wob, no conversational dyspnea  Abd: non-distended, soft, non-tender  Ext: distal extremities warm and well-perfused, LLE with visible hematoma at outer anterior shin tender to touch, L calf soft and non-tender, 2+ DP pulses at b/l LE, motor and sensory intact at b/l LE        I&O's Detail    19 Oct 2020 07:01  -  20 Oct 2020 07:00  --------------------------------------------------------  IN:    Heparin Infusion: 216 mL  Total IN: 216 mL    OUT:    Indwelling Catheter - Urethral (mL): 330 mL  Total OUT: 330 mL    Total NET: -114 mL      20 Oct 2020 07:01  -  21 Oct 2020 03:08  --------------------------------------------------------  IN:    Heparin Infusion: 96 mL  Total IN: 96 mL    OUT:    Indwelling Catheter - Urethral (mL): 1250 mL    Stool (mL): 1 mL  Total OUT: 1251 mL    Total NET: -1155 mL          LABS:                        8.8    7.50  )-----------( 253      ( 20 Oct 2020 07:30 )             24.5     10-20    139  |  102  |  94.0<H>  ----------------------------<  122<H>  4.1   |  23.0  |  1.97<H>    Ca    8.5<L>      20 Oct 2020 07:30  Phos  3.7     10-20  Mg     2.1     10-20      PTT - ( 20 Oct 2020 07:30 )  PTT:63.1 sec      RADIOLOGY & ADDITIONAL STUDIES:

## 2020-10-21 NOTE — CONSULT NOTE ADULT - SUBJECTIVE AND OBJECTIVE BOX
69 y/o male with PMH COPD on home O2, afib, CHF, and CKD s/p fall prior to admission sustaining large LLE hematoma. Patient initially admitted to SICU on admission in acute renal failure and with COPD exacerbation. He completed course of steroids for COPD exacerbation and renal indices improved. Hgb remained stable, he was started on hep gtt for atrial fib, and he was downgraded to surgical floors on 10/19. Today patient was taken to the OR for debridement of LLE wound. Procedure was completed without complication however while still in OR upon reversal of anesthetics (with neostigmine and glycopyrrolate, per anesthesia records) patient became acutely bradycardic w/ HR in 40s and hypotensive w/ SBPs 50-60. Code blue was called however pt did not arrest and no CPR was performed.  Patient rcvd atropine, epinephrine, and calcium at that time w/ normalization of vital signs. He was transferred to SICU post-operatively for further management.     On examination in SICU pt hemodynamically normal. Bedside US showed large right sided pleural effusion that was then confirmed on CXR. R sided pigtail catheter was placed w/ return of approx 2L serous fluid.     PAST MEDICAL & SURGICAL HISTORY:  CKD (chronic kidney disease)  CHF (congestive heart failure)  Atrial fibrillation  COPD, severity to be determined  Coronary artery disease involving coronary bypass graft of native heart without angina pectoris  Chronic osteomyelitis, osteomyelitis of unspecified site  COPD (chronic obstructive pulmonary disease)  Atrial fibrillation  Bladder cancer  HLD (hyperlipidemia)  H/O knee surgery  S/P CABG (coronary artery bypass graft  Presence of stent of CABG    Home Medications:  Advair Diskus 250 mcg-50 mcg inhalation powder: 1 puff(s) inhaled 2 times a day (19 Oct 2020 09:15)  allopurinol 100 mg oral tablet: 1 tab(s) orally once a day (19 Oct 2020 09:15)  apixaban 2.5 mg oral tablet: 1 tab(s) orally 2 times a day (19 Oct 2020 09:15)  aspirin 81 mg oral tablet: 1 tab(s) orally once a day (19 Oct 2020 09:15)  carvedilol 12.5 mg oral tablet: 1 tab(s) orally 2 times a day (19 Oct 2020 09:15)  Flomax 0.4 mg oral capsule: 1 cap(s) orally once a day (19 Oct 2020 09:15)  furosemide 40 mg oral tablet: 1 tab(s) orally prn, As Needed (19 Oct 2020 09:15)  hydrALAZINE 25 mg oral tablet: 2 tab(s) orally 3 times a day (19 Oct 2020 09:15)  Lipitor 20 mg oral tablet: 1 tab(s) orally once a day (19 Oct 2020 09:15)  omeprazole 40 mg oral delayed release capsule: 1 cap(s) orally once a day (19 Oct 2020 09:15)  temazepam 30 mg oral capsule: 1 cap(s) orally once a day (at bedtime), As Needed (19 Oct 2020 09:15)    Review of Systems: All negative except where noted in HPI    MEDICATIONS  (STANDING):  chlorhexidine 0.12% Liquid 15 milliLiter(s) Oral Mucosa every 12 hours  chlorhexidine 2% Cloths 1 Application(s) Topical daily  propofol Infusion 20 MICROgram(s)/kG/Min (10.7 mL/Hr) IV Continuous <Continuous>      Vital Signs Last 24 Hrs  T(C): 36.7 (21 Oct 2020 10:16), Max: 36.8 (20 Oct 2020 20:57)  T(F): 98 (21 Oct 2020 10:16), Max: 98.3 (20 Oct 2020 20:57)  HR: 61 (21 Oct 2020 16:00) (61 - 87)  BP: 160/75 (21 Oct 2020 16:00) (127/62 - 169/73)  BP(mean): 108 (21 Oct 2020 16:00) (79 - 108)  RR: 12 (21 Oct 2020 16:00) (12 - 23)  SpO2: 100% (21 Oct 2020 16:00) (93% - 100%)    PHYSICAL EXAM:    General: pt intubated & sedated, arousable to voice, appears calm  Pulm: lung sounds diminished b/l, on volume AC 18/550/+8/60%, R pigtail catheter to suction, total 2250 out since insertion, serous fluid  CV: S1, S2, regular rate & rhythm,   Abdomen: softly distended, non-tender, no guarding, umbilical hernia  Neuro: RASS-1 to -2, no gross focal deficits  Skin: scattered ecchymosis, most significant on RUE  MSK: LLE w/ overlying ace bandage in place, C/D/I, LLE compartments are soft, brisk cap refill    LABS:                        8.3    10.33 )-----------( 255      ( 21 Oct 2020 14:35 )             26.9     10-21    138  |  103  |  83.0<H>  ----------------------------<  166<H>  4.7   |  28.0  |  1.85<H>    Ca    8.8      21 Oct 2020 14:35  Phos  4.0     10-21  Mg     1.9     10-21      PT/INR - ( 21 Oct 2020 14:36 )   PT: 12.1 sec;   INR: 1.05 ratio         PTT - ( 21 Oct 2020 14:36 )  PTT:26.1 sec     69 y/o male with PMH COPD on home O2, afib, CHF, and CKD s/p fall prior to admission sustaining large LLE hematoma. Patient initially admitted to SICU on admission in acute renal failure and with COPD exacerbation. He completed course of steroids for COPD exacerbation and renal indices improved. Hgb remained stable, he was started on hep gtt for atrial fib, and he was downgraded to surgical floors on 10/19. Today patient was taken to the OR for debridement of LLE wound. Procedure was completed without complication however while still in OR upon reversal of anesthetics (with neostigmine and glycopyrrolate, per anesthesia records) patient became acutely bradycardic w/ HR in 40s and hypotensive w/ SBPs 50-60. Code blue was called however pt did not arrest and no CPR was performed.  Patient rcvd atropine, epinephrine, and calcium at that time w/ normalization of vital signs. He was transferred to SICU post-operatively for further management.     On examination in SICU pt hemodynamically normal. Bedside US showed large right sided pleural effusion that was then confirmed on CXR. R sided pigtail catheter was placed w/ return of approx 2L serous fluid.     PAST MEDICAL & SURGICAL HISTORY:  CKD (chronic kidney disease)  CHF (congestive heart failure)  Atrial fibrillation  COPD, severity to be determined  Coronary artery disease involving coronary bypass graft of native heart without angina pectoris  Chronic osteomyelitis, osteomyelitis of unspecified site  COPD (chronic obstructive pulmonary disease)  Atrial fibrillation  Bladder cancer  HLD (hyperlipidemia)  H/O knee surgery  S/P CABG (coronary artery bypass graft  Presence of stent of CABG    Home Medications:  Advair Diskus 250 mcg-50 mcg inhalation powder: 1 puff(s) inhaled 2 times a day (19 Oct 2020 09:15)  allopurinol 100 mg oral tablet: 1 tab(s) orally once a day (19 Oct 2020 09:15)  apixaban 2.5 mg oral tablet: 1 tab(s) orally 2 times a day (19 Oct 2020 09:15)  aspirin 81 mg oral tablet: 1 tab(s) orally once a day (19 Oct 2020 09:15)  carvedilol 12.5 mg oral tablet: 1 tab(s) orally 2 times a day (19 Oct 2020 09:15)  Flomax 0.4 mg oral capsule: 1 cap(s) orally once a day (19 Oct 2020 09:15)  furosemide 40 mg oral tablet: 1 tab(s) orally prn, As Needed (19 Oct 2020 09:15)  hydrALAZINE 25 mg oral tablet: 2 tab(s) orally 3 times a day (19 Oct 2020 09:15)  Lipitor 20 mg oral tablet: 1 tab(s) orally once a day (19 Oct 2020 09:15)  omeprazole 40 mg oral delayed release capsule: 1 cap(s) orally once a day (19 Oct 2020 09:15)  temazepam 30 mg oral capsule: 1 cap(s) orally once a day (at bedtime), As Needed (19 Oct 2020 09:15)    Review of Systems: All negative except where noted in HPI    MEDICATIONS  (STANDING):  chlorhexidine 0.12% Liquid 15 milliLiter(s) Oral Mucosa every 12 hours  chlorhexidine 2% Cloths 1 Application(s) Topical daily  propofol Infusion 20 MICROgram(s)/kG/Min (10.7 mL/Hr) IV Continuous <Continuous>    Vital Signs Last 24 Hrs  T(C): 36.7 (21 Oct 2020 10:16), Max: 36.8 (20 Oct 2020 20:57)  T(F): 98 (21 Oct 2020 10:16), Max: 98.3 (20 Oct 2020 20:57)  HR: 61 (21 Oct 2020 16:00) (61 - 87)  BP: 160/75 (21 Oct 2020 16:00) (127/62 - 169/73)  BP(mean): 108 (21 Oct 2020 16:00) (79 - 108)  RR: 12 (21 Oct 2020 16:00) (12 - 23)  SpO2: 100% (21 Oct 2020 16:00) (93% - 100%)    PHYSICAL EXAM:  General: pt intubated & sedated, arousable to voice, appears calm  Pulm: lung sounds diminished b/l, on volume AC 18/550/+8/60%, R pigtail catheter to suction, total 2250 out since insertion, serous fluid  CV: S1, S2, regular rate & rhythm  Abdomen: distended but soft, non-tender, no guarding, umbilical hernia soft w/ no skin changes  Neuro: RASS-1 to -2, no gross focal deficits  Skin: scattered ecchymosis, most significant on RUE, no diaphoresis, pallor, cyanosis or jaundice  MSK: LLE w/ overlying ace bandage in place, C/D/I, LLE compartments are soft, brisk cap refill      LABS:                        8.3    10.33 )-----------( 255      ( 21 Oct 2020 14:35 )             26.9     10-21    138  |  103  |  83.0<H>  ----------------------------<  166<H>  4.7   |  28.0  |  1.85<H>    Ca    8.8      21 Oct 2020 14:35  Phos  4.0     10-21  Mg     1.9     10-21      PT/INR - ( 21 Oct 2020 14:36 )   PT: 12.1 sec;   INR: 1.05 ratio         PTT - ( 21 Oct 2020 14:36 )  PTT:26.1 sec

## 2020-10-21 NOTE — CONSULT NOTE ADULT - ASSESSMENT
NOTE INCOMPLETE 69 y/o male w/ hx COPD on home O2, afib, CHF, and CKD. Admitted s/p fall w/ LLE hematoma, ARF, and COPD exacerbation. Was downgraded to surgical floors on 10/19 and taken to the OR today for LLE wound debridement. At end of operative case pt was given neostigmine and glycopyrrolate for reversal of paralytic & extubation at which point he became acute bradycardic & hypotensive. Rcvd epinephrine, calcium & atropine w/ normalization of vital signs. Returned to SICU intubated and hemodynamically normal. CXR showed large R sided pleural effusion s/p drainage w/ pigtail catheter.     Neuro: continue propofol gtt for sedation, daily sedation holiday & delirium precautions, pain control w/ IV push medication if needed    CV: currently hemodynamic well, EKG showed afib, rate controlled, age indeterminate septal infarct which is unchanged compared to prior EKGs. TTE performed & results pending. First trop 0.02. Will continue to trend troponins, f/u echo read, avoid AV quique blocking agents for now and monitor hemodynamics & fluid status closely    Pulm: continue mechanical ventilation for now, can trial PSV, monitor chest tube output, keep to suction, f/u AM CXR, wean to extubation    GI/Nutrition: Keep NPO w/ NGT, gentle hydration pending echo results    /Renal: lomeli for strict I&O, monitor kidney fxn, renal indices continue to improve    ID: no afebrile w/ normal WBC, rcvd abx for SCIP, no need for additional antibiotics at this time     Endo: monitor blood glucose    Skin: repositioning for DTI prevention while in bed    Heme/DVT Prophylaxis: SQH & SCDs for DVT ppx

## 2020-10-21 NOTE — PROGRESS NOTE ADULT - SUBJECTIVE AND OBJECTIVE BOX
Chief Complaint: Baseline CK D-3, AK I ( Pre Renal Resolved )     24 hour events/subjective:  S/P OR today    PAST HISTORY  --------------------------------------------------------------------------------  No significant changes to PMH, PSH, FHx, SHx, unless otherwise noted    ALLERGIES & MEDICATIONS  --------------------------------------------------------------------------------  Allergies    No Known Allergies    Standing Inpatient Medications  ALBUTerol    90 MICROgram(s) HFA Inhaler 1 Puff(s) Inhalation every 4 hours  albuterol/ipratropium for Nebulization 3 milliLiter(s) Nebulizer every 6 hours  allopurinol 100 milliGRAM(s) Oral daily  atorvastatin 20 milliGRAM(s) Oral at bedtime  budesonide 160 MICROgram(s)/formoterol 4.5 MICROgram(s) Inhaler 2 Puff(s) Inhalation two times a day  carvedilol 12.5 milliGRAM(s) Oral every 12 hours  chlorhexidine 2% Cloths 1 Application(s) Topical daily  heparin  Infusion.  Unit(s)/Hr IV Continuous <Continuous>  hydrocortisone sodium succinate Injectable 25 milliGRAM(s) IV Push once  insulin lispro (HumaLOG) corrective regimen sliding scale   SubCutaneous Before meals and at bedtime  lidocaine   Patch 1 Patch Transdermal daily  melatonin 3 milliGRAM(s) Oral at bedtime  pantoprazole    Tablet 40 milliGRAM(s) Oral before breakfast  senna 2 Tablet(s) Oral at bedtime  tamsulosin 0.4 milliGRAM(s) Oral at bedtime  tiotropium 18 MICROgram(s) Capsule 1 Capsule(s) Inhalation daily    PRN Inpatient Medications  heparin   Injectable 7000 Unit(s) IV Push every 6 hours PRN  heparin   Injectable 3500 Unit(s) IV Push every 6 hours PRN    REVIEW OF SYSTEMS  --------------------------------------------------------------------------------  Gen: No weight changes, fatigue, fevers/chills, weakness  Skin: No rashes  Head/Eyes/Ears/Mouth: No headache; Normal hearing; Normal vision w/o blurriness; No sinus pain/discomfort, sore throat  Respiratory: No dyspnea, cough, wheezing, hemoptysis  CV: No chest pain, PND, orthopnea  GI: No abdominal pain, diarrhea, constipation, nausea, vomiting, melena, hematochezia  : No increased frequency, dysuria, hematuria, nocturia  MSK: No joint pain/swelling; no back pain; no edema  Neuro: No dizziness/lightheadedness, weakness, seizures, numbness, tingling  Heme: No easy bruising or bleeding  Endo: No heat/cold intolerance  Psych: No significant nervousness, anxiety, stress, depression    All other systems were reviewed and are negative, except as noted.    VITALS/PHYSICAL EXAM  --------------------------------------------------------------------------------  T(C): 36.4 (10-20-20 @ 05:29), Max: 36.5 (10-20-20 @ 02:17)  HR: 74 (10-20-20 @ 09:27) (74 - 89)  BP: 149/69 (10-20-20 @ 05:29) (141/65 - 168/74)  RR: 18 (10-20-20 @ 05:29) (15 - 27)  SpO2: 90% (10-20-20 @ 05:29) (90% - 99%)    10-19-20 @ 07:01  -  10-20-20 @ 07:00  --------------------------------------------------------  IN: 216 mL / OUT: 330 mL / NET: -114 mL    Physical Exam:  	Gen: NAD  	HEENT: PERRL, supple neck  	Pulm: CTA B/L  	CV: RRR, S1S2; no rub  	Back: No spinal or CVA tenderness; no sacral edema  	Abd: +BS, soft, nontender/nondistended  	: lomeli+  	UE: Warm, no edema; no asterixis  	LE: Warm, no edema  	Neuro: No focal deficits  	Psych: Normal affect and mood  	Skin: Warm, S/P Surgery,   	  LABS/STUDIES  --------------------------------------------------------------------------------              8.8    7.50  >-----------<  253      [10-20-20 @ 07:30]              24.5     139  |  102  |  94.0  ----------------------------<  122      [10-20-20 @ 07:30]  4.1   |  23.0  |  1.97        Ca     8.5     [10-20-20 @ 07:30]      Mg     2.1     [10-20-20 @ 07:30]      Phos  3.7     [10-20-20 @ 07:30]    PTT: 63.1       [10-20-20 @ 07:30]    Creatinine Trend:  SCr 1.97 [10-20 @ 07:30]  SCr 2.18 [10-19 @ 06:16]  SCr 3.15 [10-18 @ 04:32]  SCr 3.93 [10-17 @ 06:13]  SCr 4.29 [10-16 @ 21:10]    Urinalysis - [10-16-20 @ 10:57]      Color Yellow / Appearance Slightly Turbid / SG 1.015 / pH 5.0      Gluc Negative / Ketone Negative  / Bili Small / Urobili Negative       Blood Moderate / Protein 30 / Leuk Est Small / Nitrite Negative      RBC 11-25 / WBC 3-5 / Hyaline  / Gran  / Sq Epi  / Non Sq Epi Negative / Bacteria Occasional    Urine Creatinine 209      [10-15-20 @ 23:57]  Urine Sodium <30      [10-15-20 @ 23:57]  Urine Urea Nitrogen 238      [10-16-20 @ 07:35]  Urine Osmolality 321      [10-15-20 @ 23:57]    HbA1c 5.4      [03-25-17 @ 07:51]    HCV 0.04, Nonreact      [10-16-20 @ 09:36]    Pt with known CKD_ baseline Scr 1.7 mg., ; Scr was 1.8 mg., during the recent hospitalization  at [facility]    Maintain euvolemia,    Trend U.O & Scr., James

## 2020-10-21 NOTE — PRE PROCEDURE NOTE - PRE PROCEDURE EVALUATION
Preoperative Note    Preop Dx: LLE Hematoma  Surgeon: Bruno Bedoya DO  Procedure: Wound debridement, possible skin graft    Vital Signs Last 24 Hrs  T(C): 36.4 (21 Oct 2020 04:45), Max: 36.9 (20 Oct 2020 16:10)  T(F): 97.5 (21 Oct 2020 04:45), Max: 98.4 (20 Oct 2020 16:10)  HR: 82 (21 Oct 2020 04:45) (72 - 85)  BP: 127/62 (21 Oct 2020 04:45) (119/74 - 154/76)  RR: 18 (21 Oct 2020 04:45) (18 - 18)  SpO2: 95% (20 Oct 2020 20:57) (90% - 95%)                        8.8    7.50  )-----------( 253      ( 20 Oct 2020 07:30 )             24.5     10-20    139  |  102  |  94.0<H>  ----------------------------<  122<H>  4.1   |  23.0  |  1.97<H>    Ca    8.5<L>      20 Oct 2020 07:30  Phos  3.7     10-20  Mg     2.1     10-20      PTT - ( 20 Oct 2020 07:30 )  PTT:63.1 sec  Daily  (on heparin drip)    EKG: 15 Oct 2020: reviewed; notable for a-fib but otherwise unremarkable  CXR: 16 Oct 2020: reviewed  Type and Screen: 15 Oct 2020: A positive; repeat T&S pending        A/P: 70y Male w/LLE hematoma, now scheduled for OR today for wound debridement, possible skin graft    - OR 10/20/2020 w/Dr. Bedoya  - NPO past midnight, except medications  - IVF while NPO  - Morning Labs: CBC, BMP, coags, type & screen  - Heparin gtt to be stopped approximately 6 hours before surgery  - Consent signed and in chart

## 2020-10-21 NOTE — PROCEDURE NOTE - NSPOSTPRCRAD_GEN_A_CORE
awaiting XR
depth of insertion/post-procedure radiography performed/no pneumothorax/central line located in the superior vena cava

## 2020-10-21 NOTE — PROCEDURE NOTE - PROCEDURE DATE TIME, MLM
Quality 111:Pneumonia Vaccination Status For Older Adults: Pneumococcal Vaccination Previously Received
Quality 110: Preventive Care And Screening: Influenza Immunization: Influenza Immunization previously received during influenza season
15-Oct-2020 16:16
21-Oct-2020 15:21
Detail Level: Detailed

## 2020-10-21 NOTE — PROGRESS NOTE ADULT - ASSESSMENT
S/P Hematoma Evacuation,    ICU Vital Signs Last 24 Hrs,    T(C): 36.7 (21 Oct 2020 10:16), Max: 36.9 (20 Oct 2020 16:10)  T(F): 98 (21 Oct 2020 10:16), Max: 98.4 (20 Oct 2020 16:10)  HR: 80 (21 Oct 2020 14:00) (72 - 87)  BP: 130/68 (21 Oct 2020 14:00) (127/62 - 154/76)  BP(mean): 93 (21 Oct 2020 14:00) (92 - 94)  RR: 14 (21 Oct 2020 14:00) (14 - 20)  SpO2: 98% (21 Oct 2020 14:00) (92% - 100%)    139    |  102    |  94.0<H>  ----------------------------<  122<H>  Ca:8.5<L> (20 Oct 2020 07:30)  4.1     |  23.0   |  1.97<H>                        8.3<L>  7.48  )-----------( 272      ( 21 Oct 2020 06:50 )             23.9<L>    Phos:3.7 mg/dL M.1 mg/dL  (10-20 @ 07:30)    UCr:209 mg/dL   Pee:<30 mmol/L UOsm:321 mosm/kg  (10-15 @ 23:57)      Continue current Management,    Please call as Needed, TY,

## 2020-10-22 LAB
ANION GAP SERPL CALC-SCNC: 15 MMOL/L — SIGNIFICANT CHANGE UP (ref 5–17)
APTT BLD: 41.2 SEC — HIGH (ref 27.5–35.5)
BUN SERPL-MCNC: 78 MG/DL — HIGH (ref 8–20)
CALCIUM SERPL-MCNC: 8.7 MG/DL — SIGNIFICANT CHANGE UP (ref 8.6–10.2)
CHLORIDE SERPL-SCNC: 104 MMOL/L — SIGNIFICANT CHANGE UP (ref 98–107)
CO2 SERPL-SCNC: 22 MMOL/L — SIGNIFICANT CHANGE UP (ref 22–29)
CREAT SERPL-MCNC: 1.44 MG/DL — HIGH (ref 0.5–1.3)
GLUCOSE BLDC GLUCOMTR-MCNC: 166 MG/DL — HIGH (ref 70–99)
GLUCOSE BLDC GLUCOMTR-MCNC: 170 MG/DL — HIGH (ref 70–99)
GLUCOSE BLDC GLUCOMTR-MCNC: 172 MG/DL — HIGH (ref 70–99)
GLUCOSE BLDC GLUCOMTR-MCNC: 202 MG/DL — HIGH (ref 70–99)
GLUCOSE SERPL-MCNC: 153 MG/DL — HIGH (ref 70–99)
HCT VFR BLD CALC: 26 % — LOW (ref 39–50)
HCT VFR BLD CALC: 26.9 % — LOW (ref 39–50)
HGB BLD-MCNC: 8.4 G/DL — LOW (ref 13–17)
HGB BLD-MCNC: 8.7 G/DL — LOW (ref 13–17)
MAGNESIUM SERPL-MCNC: 1.8 MG/DL — SIGNIFICANT CHANGE UP (ref 1.6–2.6)
MCHC RBC-ENTMCNC: 31.2 GM/DL — LOW (ref 32–36)
MCHC RBC-ENTMCNC: 33.5 GM/DL — SIGNIFICANT CHANGE UP (ref 32–36)
MCHC RBC-ENTMCNC: 34.7 PG — HIGH (ref 27–34)
MCHC RBC-ENTMCNC: 35.8 PG — HIGH (ref 27–34)
MCV RBC AUTO: 107 FL — HIGH (ref 80–100)
MCV RBC AUTO: 111.2 FL — HIGH (ref 80–100)
PHOSPHATE SERPL-MCNC: 2.8 MG/DL — SIGNIFICANT CHANGE UP (ref 2.4–4.7)
PLATELET # BLD AUTO: 273 K/UL — SIGNIFICANT CHANGE UP (ref 150–400)
PLATELET # BLD AUTO: 292 K/UL — SIGNIFICANT CHANGE UP (ref 150–400)
POTASSIUM SERPL-MCNC: 4.6 MMOL/L — SIGNIFICANT CHANGE UP (ref 3.5–5.3)
POTASSIUM SERPL-SCNC: 4.6 MMOL/L — SIGNIFICANT CHANGE UP (ref 3.5–5.3)
RBC # BLD: 2.42 M/UL — LOW (ref 4.2–5.8)
RBC # BLD: 2.43 M/UL — LOW (ref 4.2–5.8)
RBC # FLD: 15.5 % — HIGH (ref 10.3–14.5)
RBC # FLD: 16.7 % — HIGH (ref 10.3–14.5)
SODIUM SERPL-SCNC: 141 MMOL/L — SIGNIFICANT CHANGE UP (ref 135–145)
TROPONIN T SERPL-MCNC: 0.01 NG/ML — SIGNIFICANT CHANGE UP (ref 0–0.06)
WBC # BLD: 12.53 K/UL — HIGH (ref 3.8–10.5)
WBC # BLD: 7.29 K/UL — SIGNIFICANT CHANGE UP (ref 3.8–10.5)
WBC # FLD AUTO: 12.53 K/UL — HIGH (ref 3.8–10.5)
WBC # FLD AUTO: 7.29 K/UL — SIGNIFICANT CHANGE UP (ref 3.8–10.5)

## 2020-10-22 PROCEDURE — 99231 SBSQ HOSP IP/OBS SF/LOW 25: CPT

## 2020-10-22 PROCEDURE — 99233 SBSQ HOSP IP/OBS HIGH 50: CPT

## 2020-10-22 PROCEDURE — 71045 X-RAY EXAM CHEST 1 VIEW: CPT | Mod: 26

## 2020-10-22 PROCEDURE — 99232 SBSQ HOSP IP/OBS MODERATE 35: CPT | Mod: GC

## 2020-10-22 RX ORDER — MAGNESIUM SULFATE 500 MG/ML
2 VIAL (ML) INJECTION ONCE
Refills: 0 | Status: COMPLETED | OUTPATIENT
Start: 2020-10-22 | End: 2020-10-22

## 2020-10-22 RX ORDER — ATORVASTATIN CALCIUM 80 MG/1
20 TABLET, FILM COATED ORAL AT BEDTIME
Refills: 0 | Status: DISCONTINUED | OUTPATIENT
Start: 2020-10-22 | End: 2020-10-30

## 2020-10-22 RX ORDER — ACETAMINOPHEN 500 MG
1000 TABLET ORAL ONCE
Refills: 0 | Status: COMPLETED | OUTPATIENT
Start: 2020-10-22 | End: 2020-10-22

## 2020-10-22 RX ORDER — IPRATROPIUM/ALBUTEROL SULFATE 18-103MCG
3 AEROSOL WITH ADAPTER (GRAM) INHALATION EVERY 6 HOURS
Refills: 0 | Status: DISCONTINUED | OUTPATIENT
Start: 2020-10-22 | End: 2020-10-30

## 2020-10-22 RX ORDER — LIDOCAINE 4 G/100G
1 CREAM TOPICAL DAILY
Refills: 0 | Status: DISCONTINUED | OUTPATIENT
Start: 2020-10-22 | End: 2020-10-30

## 2020-10-22 RX ORDER — ERYTHROPOIETIN 10000 [IU]/ML
10000 INJECTION, SOLUTION INTRAVENOUS; SUBCUTANEOUS
Refills: 0 | Status: DISCONTINUED | OUTPATIENT
Start: 2020-10-22 | End: 2020-10-30

## 2020-10-22 RX ORDER — PANTOPRAZOLE SODIUM 20 MG/1
40 TABLET, DELAYED RELEASE ORAL
Refills: 0 | Status: DISCONTINUED | OUTPATIENT
Start: 2020-10-22 | End: 2020-10-30

## 2020-10-22 RX ORDER — TAMSULOSIN HYDROCHLORIDE 0.4 MG/1
0.4 CAPSULE ORAL AT BEDTIME
Refills: 0 | Status: DISCONTINUED | OUTPATIENT
Start: 2020-10-22 | End: 2020-10-30

## 2020-10-22 RX ORDER — SENNA PLUS 8.6 MG/1
2 TABLET ORAL AT BEDTIME
Refills: 0 | Status: DISCONTINUED | OUTPATIENT
Start: 2020-10-22 | End: 2020-10-30

## 2020-10-22 RX ORDER — TEMAZEPAM 15 MG/1
30 CAPSULE ORAL AT BEDTIME
Refills: 0 | Status: DISCONTINUED | OUTPATIENT
Start: 2020-10-22 | End: 2020-10-29

## 2020-10-22 RX ORDER — TIOTROPIUM BROMIDE 18 UG/1
1 CAPSULE ORAL; RESPIRATORY (INHALATION) DAILY
Refills: 0 | Status: DISCONTINUED | OUTPATIENT
Start: 2020-10-22 | End: 2020-10-30

## 2020-10-22 RX ORDER — ALBUTEROL 90 UG/1
1 AEROSOL, METERED ORAL EVERY 4 HOURS
Refills: 0 | Status: DISCONTINUED | OUTPATIENT
Start: 2020-10-22 | End: 2020-10-30

## 2020-10-22 RX ORDER — HEPARIN SODIUM 5000 [USP'U]/ML
1200 INJECTION INTRAVENOUS; SUBCUTANEOUS
Qty: 25000 | Refills: 0 | Status: DISCONTINUED | OUTPATIENT
Start: 2020-10-22 | End: 2020-10-26

## 2020-10-22 RX ORDER — ALLOPURINOL 300 MG
100 TABLET ORAL DAILY
Refills: 0 | Status: DISCONTINUED | OUTPATIENT
Start: 2020-10-22 | End: 2020-10-30

## 2020-10-22 RX ORDER — INSULIN LISPRO 100/ML
VIAL (ML) SUBCUTANEOUS
Refills: 0 | Status: DISCONTINUED | OUTPATIENT
Start: 2020-10-22 | End: 2020-10-30

## 2020-10-22 RX ORDER — CARVEDILOL PHOSPHATE 80 MG/1
12.5 CAPSULE, EXTENDED RELEASE ORAL EVERY 12 HOURS
Refills: 0 | Status: DISCONTINUED | OUTPATIENT
Start: 2020-10-22 | End: 2020-10-30

## 2020-10-22 RX ORDER — BUDESONIDE AND FORMOTEROL FUMARATE DIHYDRATE 160; 4.5 UG/1; UG/1
2 AEROSOL RESPIRATORY (INHALATION)
Refills: 0 | Status: DISCONTINUED | OUTPATIENT
Start: 2020-10-22 | End: 2020-10-30

## 2020-10-22 RX ADMIN — Medication 50 GRAM(S): at 08:30

## 2020-10-22 RX ADMIN — Medication 1000 MILLIGRAM(S): at 07:49

## 2020-10-22 RX ADMIN — Medication 3 MILLILITER(S): at 14:55

## 2020-10-22 RX ADMIN — TEMAZEPAM 30 MILLIGRAM(S): 15 CAPSULE ORAL at 22:50

## 2020-10-22 RX ADMIN — Medication 100 MILLIGRAM(S): at 11:28

## 2020-10-22 RX ADMIN — Medication 4: at 11:29

## 2020-10-22 RX ADMIN — TAMSULOSIN HYDROCHLORIDE 0.4 MILLIGRAM(S): 0.4 CAPSULE ORAL at 21:15

## 2020-10-22 RX ADMIN — Medication 2: at 15:53

## 2020-10-22 RX ADMIN — Medication 2: at 06:57

## 2020-10-22 RX ADMIN — Medication 2: at 21:15

## 2020-10-22 RX ADMIN — HEPARIN SODIUM 1400 UNIT(S)/HR: 5000 INJECTION INTRAVENOUS; SUBCUTANEOUS at 19:56

## 2020-10-22 RX ADMIN — LIDOCAINE 1 PATCH: 4 CREAM TOPICAL at 22:52

## 2020-10-22 RX ADMIN — ATORVASTATIN CALCIUM 20 MILLIGRAM(S): 80 TABLET, FILM COATED ORAL at 21:15

## 2020-10-22 RX ADMIN — LIDOCAINE 1 PATCH: 4 CREAM TOPICAL at 20:14

## 2020-10-22 RX ADMIN — Medication 400 MILLIGRAM(S): at 07:32

## 2020-10-22 RX ADMIN — HEPARIN SODIUM 1200 UNIT(S)/HR: 5000 INJECTION INTRAVENOUS; SUBCUTANEOUS at 13:08

## 2020-10-22 RX ADMIN — Medication 2: at 01:09

## 2020-10-22 RX ADMIN — LIDOCAINE 1 PATCH: 4 CREAM TOPICAL at 11:28

## 2020-10-22 RX ADMIN — CHLORHEXIDINE GLUCONATE 15 MILLILITER(S): 213 SOLUTION TOPICAL at 06:23

## 2020-10-22 RX ADMIN — SENNA PLUS 2 TABLET(S): 8.6 TABLET ORAL at 21:15

## 2020-10-22 RX ADMIN — HEPARIN SODIUM 5000 UNIT(S): 5000 INJECTION INTRAVENOUS; SUBCUTANEOUS at 06:23

## 2020-10-22 RX ADMIN — CARVEDILOL PHOSPHATE 12.5 MILLIGRAM(S): 80 CAPSULE, EXTENDED RELEASE ORAL at 17:36

## 2020-10-22 RX ADMIN — Medication 3 MILLILITER(S): at 20:47

## 2020-10-22 RX ADMIN — CHLORHEXIDINE GLUCONATE 1 APPLICATION(S): 213 SOLUTION TOPICAL at 11:32

## 2020-10-22 NOTE — PROGRESS NOTE ADULT - SUBJECTIVE AND OBJECTIVE BOX
DOWNGRADE NOTE  INTERVAL HPI/OVERNIGHT EVENTS: Pt evaluated on 2 bracket after downgrade from SICU, wife bedside.  POD 1 s/ LLE hematoma evacuation and debridement, complicated post op by bradycardia and hypotension.  Pt now HDS.  Pt has no complaints, states he is feeling well, pain is well controlled, tolerating diet with no nausea, vomiting. Denies fever, chills, difficulty breathing, chest pain.        MEDICATIONS  (STANDING):  ALBUTerol    90 MICROgram(s) HFA Inhaler 1 Puff(s) Inhalation every 4 hours  albuterol/ipratropium for Nebulization 3 milliLiter(s) Nebulizer every 6 hours  allopurinol 100 milliGRAM(s) Oral daily  atorvastatin 20 milliGRAM(s) Oral at bedtime  budesonide 160 MICROgram(s)/formoterol 4.5 MICROgram(s) Inhaler 2 Puff(s) Inhalation two times a day  carvedilol 12.5 milliGRAM(s) Oral every 12 hours  chlorhexidine 2% Cloths 1 Application(s) Topical daily  epoetin felisha-epbx (RETACRIT) Injectable 97910 Unit(s) SubCutaneous <User Schedule>  heparin  Infusion. 1200 Unit(s)/Hr (12 mL/Hr) IV Continuous <Continuous>  insulin lispro (ADMELOG) corrective regimen sliding scale   SubCutaneous Before meals and at bedtime  lidocaine   Patch 1 Patch Transdermal daily  pantoprazole    Tablet 40 milliGRAM(s) Oral before breakfast  senna 2 Tablet(s) Oral at bedtime  tamsulosin 0.4 milliGRAM(s) Oral at bedtime  tiotropium 18 MICROgram(s) Capsule 1 Capsule(s) Inhalation daily    MEDICATIONS  (PRN):  temazepam 30 milliGRAM(s) Oral at bedtime PRN Insomnia      Vital Signs Last 24 Hrs  T(C): 36.5 (22 Oct 2020 16:59), Max: 36.8 (22 Oct 2020 00:19)  T(F): 97.7 (22 Oct 2020 16:59), Max: 98.2 (22 Oct 2020 00:19)  HR: 82 (22 Oct 2020 16:59) (59 - 90)  BP: 127/68 (22 Oct 2020 16:59) (117/56 - 204/87)  BP(mean): 96 (22 Oct 2020 16:00) (80 - 130)  RR: 18 (22 Oct 2020 16:59) (18 - 22)  SpO2: 97% (22 Oct 2020 16:59) (94% - 100%)    Constitutional: NAD  HEENT: NCAT  Respiratory: no increased WOB, speaking full sentences, 2L NC, pigtail drain R chest, no tenderness or skin changes around insertion site, good air seel, serosanguinous output  Cardiovascular: Afib, regular rate  Gastrointestinal: Soft, non-distended, non-tender, reducible umbilical hernia without overlaying skin changes  Musculoskeletal: LLE wrapped in ACE bandage c/d/i, LUE ROM impaired by pain, hematoma along right shoulder, flank, and back    I&O's Detail    21 Oct 2020 07:01  -  22 Oct 2020 07:00  --------------------------------------------------------  IN:    multiple electrolytes Injection Type 1.: 588 mL    Propofol: 240 mL  Total IN: 828 mL    OUT:    Chest Tube (mL): 2690 mL    Indwelling Catheter - Urethral (mL): 2155 mL  Total OUT: 4845 mL    Total NET: -4017 mL      22 Oct 2020 07:01  -  22 Oct 2020 17:55  --------------------------------------------------------  IN:    Heparin Infusion: 48 mL    IV PiggyBack: 100 mL    IV PiggyBack: 50 mL    multiple electrolytes Injection Type 1.: 126 mL  Total IN: 324 mL    OUT:    Indwelling Catheter - Urethral (mL): 260 mL    Voided (mL): 300 mL  Total OUT: 560 mL    Total NET: -236 mL      LABS:                        8.7    7.29  )-----------( 273      ( 22 Oct 2020 05:41 )             26.0     10-22    141  |  104  |  78.0<H>  ----------------------------<  153<H>  4.6   |  22.0  |  1.44<H>    Ca    8.7      22 Oct 2020 05:41  Phos  2.8     10-22  Mg     1.8     10-22      PT/INR - ( 21 Oct 2020 14:36 )   PT: 12.1 sec;   INR: 1.05 ratio         PTT - ( 21 Oct 2020 14:36 )  PTT:26.1 sec     DOWNGRADE NOTE  INTERVAL HPI/OVERNIGHT EVENTS: Pt evaluated on 2 bracket after downgrade from SICU, wife bedside.  POD 1 s/ LLE hematoma evacuation and debridement, complicated post op by bradycardia and hypotension.  Pt now HDS.  Pt has no complaints, states he is feeling well, pain is well controlled, tolerating diet with no nausea, vomiting. Denies fever, chills, difficulty breathing, chest pain.  Webb removed in SICU and voiding spontaneously.     MEDICATIONS  (STANDING):  ALBUTerol    90 MICROgram(s) HFA Inhaler 1 Puff(s) Inhalation every 4 hours  albuterol/ipratropium for Nebulization 3 milliLiter(s) Nebulizer every 6 hours  allopurinol 100 milliGRAM(s) Oral daily  atorvastatin 20 milliGRAM(s) Oral at bedtime  budesonide 160 MICROgram(s)/formoterol 4.5 MICROgram(s) Inhaler 2 Puff(s) Inhalation two times a day  carvedilol 12.5 milliGRAM(s) Oral every 12 hours  chlorhexidine 2% Cloths 1 Application(s) Topical daily  epoetin felisha-epbx (RETACRIT) Injectable 48734 Unit(s) SubCutaneous <User Schedule>  heparin  Infusion. 1200 Unit(s)/Hr (12 mL/Hr) IV Continuous <Continuous>  insulin lispro (ADMELOG) corrective regimen sliding scale   SubCutaneous Before meals and at bedtime  lidocaine   Patch 1 Patch Transdermal daily  pantoprazole    Tablet 40 milliGRAM(s) Oral before breakfast  senna 2 Tablet(s) Oral at bedtime  tamsulosin 0.4 milliGRAM(s) Oral at bedtime  tiotropium 18 MICROgram(s) Capsule 1 Capsule(s) Inhalation daily    MEDICATIONS  (PRN):  temazepam 30 milliGRAM(s) Oral at bedtime PRN Insomnia      Vital Signs Last 24 Hrs  T(C): 36.5 (22 Oct 2020 16:59), Max: 36.8 (22 Oct 2020 00:19)  T(F): 97.7 (22 Oct 2020 16:59), Max: 98.2 (22 Oct 2020 00:19)  HR: 82 (22 Oct 2020 16:59) (59 - 90)  BP: 127/68 (22 Oct 2020 16:59) (117/56 - 204/87)  BP(mean): 96 (22 Oct 2020 16:00) (80 - 130)  RR: 18 (22 Oct 2020 16:59) (18 - 22)  SpO2: 97% (22 Oct 2020 16:59) (94% - 100%)    Constitutional: NAD  HEENT: NCAT  Respiratory: no increased WOB, speaking full sentences, 2L NC, pigtail drain R chest, no tenderness or skin changes around insertion site, good air seel, serosanguinous output  Cardiovascular: Afib, regular rate  Gastrointestinal: Soft, non-distended, non-tender, reducible umbilical hernia without overlaying skin changes  Musculoskeletal: LLE wrapped in ACE bandage c/d/i, LUE ROM impaired by pain, hematoma along right shoulder, flank, and back    I&O's Detail    21 Oct 2020 07:01  -  22 Oct 2020 07:00  --------------------------------------------------------  IN:    multiple electrolytes Injection Type 1.: 588 mL    Propofol: 240 mL  Total IN: 828 mL    OUT:    Chest Tube (mL): 2690 mL    Indwelling Catheter - Urethral (mL): 2155 mL  Total OUT: 4845 mL    Total NET: -4017 mL      22 Oct 2020 07:01  -  22 Oct 2020 17:55  --------------------------------------------------------  IN:    Heparin Infusion: 48 mL    IV PiggyBack: 100 mL    IV PiggyBack: 50 mL    multiple electrolytes Injection Type 1.: 126 mL  Total IN: 324 mL    OUT:    Indwelling Catheter - Urethral (mL): 260 mL    Voided (mL): 300 mL  Total OUT: 560 mL    Total NET: -236 mL      LABS:                        8.7    7.29  )-----------( 273      ( 22 Oct 2020 05:41 )             26.0     10-22    141  |  104  |  78.0<H>  ----------------------------<  153<H>  4.6   |  22.0  |  1.44<H>    Ca    8.7      22 Oct 2020 05:41  Phos  2.8     10-22  Mg     1.8     10-22      PT/INR - ( 21 Oct 2020 14:36 )   PT: 12.1 sec;   INR: 1.05 ratio         PTT - ( 21 Oct 2020 14:36 )  PTT:26.1 sec

## 2020-10-22 NOTE — PROGRESS NOTE ADULT - SUBJECTIVE AND OBJECTIVE BOX
[facility] DIVISION OF KIDNEY DISEASES AND HYPERTENSION -- FOLLOW UP NOTE  --------------------------------------------------------------------------------  Chief Complaint: shahriar on ckd    24 hour events/subjective:    patient was taken to the OR for debridement of LLE wound. post procedure. ptt became acutely bradycardic w/ HR in 40s and hypotensive w/ SBPs 50-60 and was given atropine, epinephrine, and calcium at that time w/ normalization of vital signs. He was transferred to SICU. Bedside US showed large right sided pleural effusion that was then confirmed on CXR. R sided pigtail catheter was placed w/ return of approx 2L serous fluid.           PAST HISTORY  --------------------------------------------------------------------------------  No significant changes to PMH, PSH, FHx, SHx, unless otherwise noted    ALLERGIES & MEDICATIONS  --------------------------------------------------------------------------------  Allergies    No Known Allergies    Intolerances      Standing Inpatient Medications  ALBUTerol    90 MICROgram(s) HFA Inhaler 1 Puff(s) Inhalation every 4 hours  albuterol/ipratropium for Nebulization 3 milliLiter(s) Nebulizer every 6 hours  allopurinol 100 milliGRAM(s) Oral daily  atorvastatin 20 milliGRAM(s) Oral at bedtime  budesonide 160 MICROgram(s)/formoterol 4.5 MICROgram(s) Inhaler 2 Puff(s) Inhalation two times a day  carvedilol 12.5 milliGRAM(s) Oral every 12 hours  chlorhexidine 2% Cloths 1 Application(s) Topical daily  epoetin felisha-epbx (RETACRIT) Injectable 07634 Unit(s) SubCutaneous <User Schedule>  heparin   Injectable 5000 Unit(s) SubCutaneous every 8 hours  insulin lispro (ADMELOG) corrective regimen sliding scale   SubCutaneous every 6 hours  lidocaine   Patch 1 Patch Transdermal daily  pantoprazole    Tablet 40 milliGRAM(s) Oral before breakfast  senna 2 Tablet(s) Oral at bedtime  tamsulosin 0.4 milliGRAM(s) Oral at bedtime  tiotropium 18 MICROgram(s) Capsule 1 Capsule(s) Inhalation daily    PRN Inpatient Medications  temazepam 30 milliGRAM(s) Oral at bedtime PRN      REVIEW OF SYSTEMS  --------------------------------------------------------------------------------  Gen: No weight changes, fatigue, fevers/chills, weakness  Skin: No rashes  Head/Eyes/Ears/Mouth: No headache; Normal hearing; Normal vision w/o blurriness; No sinus pain/discomfort, sore throat  Respiratory: No dyspnea, cough, wheezing, hemoptysis  CV: No chest pain, PND, orthopnea  GI: No abdominal pain, diarrhea, constipation, nausea, vomiting, melena, hematochezia  : No increased frequency, dysuria, hematuria, nocturia  MSK: No joint pain/swelling; no back pain; no edema  Neuro: No dizziness/lightheadedness, weakness, seizures, numbness, tingling  Heme: No easy bruising or bleeding  Endo: No heat/cold intolerance  Psych: No significant nervousness, anxiety, stress, depression    All other systems were reviewed and are negative, except as noted.    VITALS/PHYSICAL EXAM  --------------------------------------------------------------------------------  T(C): 36.8 (10-22-20 @ 07:28), Max: 36.8 (10-22-20 @ 00:19)  HR: 74 (10-22-20 @ 10:00) (59 - 90)  BP: 131/60 (10-22-20 @ 10:00) (117/56 - 204/87)  RR: 19 (10-22-20 @ 10:00) (12 - 23)  SpO2: 96% (10-22-20 @ 10:00) (94% - 100%)  Wt(kg): --    Weight (kg): 89 (10-21-20 @ 10:16)      10-21-20 @ 07:01  -  10-22-20 @ 07:00  --------------------------------------------------------  IN: 828 mL / OUT: 4845 mL / NET: -4017 mL    10-22-20 @ 07:01  -  10-22-20 @ 11:33  --------------------------------------------------------  IN: 234 mL / OUT: 125 mL / NET: 109 mL      Physical Exam:  	Gen: NAD  	HEENT:  supple neck  	Pulm: rt chest tube  	CV: RRR, S1S2; no rub  	Back: No spinal or CVA tenderness; no sacral edema  	Abd: +BS, soft, nontender/nondistended  	: yani+  	UE: Warm,  no edema  	LE: Warm,  no edema  	Neuro: No focal deficits  	Skin: Warm    LABS/STUDIES  --------------------------------------------------------------------------------              8.7    7.29  >-----------<  273      [10-22-20 @ 05:41]              26.0     141  |  104  |  78.0  ----------------------------<  153      [10-22-20 @ 05:41]  4.6   |  22.0  |  1.44        Ca     8.7     [10-22-20 @ 05:41]      Mg     1.8     [10-22-20 @ 05:41]      Phos  2.8     [10-22-20 @ 05:41]      PT/INR: PT 12.1 , INR 1.05       [10-21-20 @ 14:36]  PTT: 26.1       [10-21-20 @ 14:36]    Troponin 0.01      [10-22-20 @ 05:41]  CK 23      [10-21-20 @ 14:35]    Creatinine Trend:  SCr 1.44 [10-22 @ 05:41]  SCr 1.85 [10-21 @ 14:35]  SCr 1.97 [10-20 @ 07:30]  SCr 2.18 [10-19 @ 06:16]  SCr 3.15 [10-18 @ 04:32]    Urinalysis - [10-16-20 @ 10:57]      Color Yellow / Appearance Slightly Turbid / SG 1.015 / pH 5.0      Gluc Negative / Ketone Negative  / Bili Small / Urobili Negative       Blood Moderate / Protein 30 / Leuk Est Small / Nitrite Negative      RBC 11-25 / WBC 3-5 / Hyaline  / Gran  / Sq Epi  / Non Sq Epi Negative / Bacteria Occasional    Urine Creatinine 209      [10-15-20 @ 23:57]  Urine Sodium <30      [10-15-20 @ 23:57]  Urine Urea Nitrogen 238      [10-16-20 @ 07:35]  Urine Osmolality 321      [10-15-20 @ 23:57]    HbA1c 5.4      [03-25-17 @ 07:51]    HCV 0.04, Nonreact      [10-16-20 @ 09:36]

## 2020-10-22 NOTE — PROGRESS NOTE ADULT - ASSESSMENT
Pt with known CKD_ baseline Scr 1.7; Scr was 1.8 during recent hospitalization  at [facility]  Pt with Jair -pre renal  improving scr with hydration  pt non oliguric  Maintain euvolemia  Monitor Scr, lytes, UOP

## 2020-10-22 NOTE — PROGRESS NOTE ADULT - ASSESSMENT
69 yo M PMH COPD on home O2, Afib on eliquis, CHF, and CKD s/p mechanical fall with extensive hematoma overlaying entire left side of body; POD 1 s/p LLE hematoma evacuation and debridement, downgraded from SICU today  Plan:   - continue 2L NC, titrate for SPO2 >92%, standing q6 duoneb, advair; will f/u with Pulm outpt  - daily CXR while chest tube in place, monitor chest tube output  - continue coreg and heparin drip for A fib -- follow ptt  - DASH diet  - Webb removed prior to downgrade, monitor urine output   - BID dressing changes to LLE WTD w/ NS  - all home  meds restarted except for Eliquis  DVT pps: Heparin drip  Plan for OR monday 10/26 for wound debridement and possible STSG

## 2020-10-22 NOTE — PROGRESS NOTE ADULT - ASSESSMENT
Assessment and Plan:    71 yo M w/ PMH of COPD on home O2, afib, CHF, and CKD is s/p fall w/ LLE hematoma, ARF, and COPD exacerbation. OR today for LLE wound debridement where at end of the case when given neostigmine and glycopyrrolate for reversal of paralytic & extubations had acute bradycardic & hypotensive. Rcvd epinephrine, calcium & atropine w/ normalization of vital signs. Returned to SICU intubated and CXR with large R sided pleural effusion.   Intubated, sedated and vented, Pigtail catheter placed and 2250 ml serous fluid obtained    Neuro:   - Pain control  - delirium precautions  - daily sedation holidays    CV: Symptomatic bradycardia post op, resolved, likely medication induced  - Continue hemodynamic monitoring  - Maintain MAP > 65    Pulm:   - Incentive spirometry  - Pulmonary toilet  - OOB to chair    GI/Nutrition:   - Renal diet  - continue bowel reg    /Renal:  GERALD, resolving  - Continue to trend creat an appreciate Renal recs  - Restart Lasix as per Renal  - yani to be DC'd with trial of void  - monitor kidney fxn    ID:  - Monitor fever curve  - Leukocytosis    Endo:  - monitor blood glucose  - continue ISS    Skin: Hematoma   - Wound dressing changes bid   - Plan for further debridement in OR on 10/26  - repositioning for DTI prevention while in bed    Heme/DVT Prophylaxis: Afib  - SCDs  - Will restart heparin gtt  - F/up PTT    Dispo:  - Floor transfer

## 2020-10-22 NOTE — PROGRESS NOTE ADULT - SUBJECTIVE AND OBJECTIVE BOX
71 yo male S/P Left Leg Wound Debridement under gen. anesthesia POD # 1. Patient had hypotension and bradycardia while waiting to extubate patient after surgery. Blood pressure and heart rate returned to normal after giving atropin, calcium and epinephrine. No CPR was done. Brought to ICU intubated, vss, for further management. Chest X-ray showed large pleural effusion on the right side and pigtail catheter was inserted. Troponin was negative and ECG and TTE was unchanged from previous study.   Patient was extubated today and was transferred out of ICU to surgical floor. I saw patient this afternoon, sitting on bed, awake, alert, spontaneous breathing, O2 via nasal cannula, vss, complaining of minimal pain on surgical site on leg.

## 2020-10-22 NOTE — CHART NOTE - NSCHARTNOTEFT_GEN_A_CORE
SICU TRANSFER NOTE  -----------------------------  ICU Admission Date: 10/21 (Re-admission for separate issue)  Transfer Date: 10-22-20 @ 14:02    Admission Diagnosis: Symptomatic Bradycardia    Active Problems/injuries:   LLE Hematoma - s/p Evac and Debridement  Symptomatic Bradycardia - resolved  Right hydrothorax - s/p drain placement   COPD  A.fib    Procedures:   10/21/20: Hematoma Evacuation, excisional debridement and washout of LLE  10/21/20: Bedside placement of Pigtail pleural Catheter, right     Consultants:  [ ] Cardiology  [ ] Endocrine  [ ] Infectious Disease  [ ] Medicine  [x] Nephro  [ ] Neurosurgery  [ ] Ortho       [ ] Weight Bearing Status:  [ ] Palliative       [ ] Advanced Directives:    [ ] Physical Medicine and Rehab       [ ] Disposition :   [ ] Plastics  [ ] Pulmonary  [x] PT/OT - Home w Home assist and Home PT vs [facility]    Medications  ALBUTerol    90 MICROgram(s) HFA Inhaler 1 Puff(s) Inhalation every 4 hours  albuterol/ipratropium for Nebulization 3 milliLiter(s) Nebulizer every 6 hours  allopurinol 100 milliGRAM(s) Oral daily  atorvastatin 20 milliGRAM(s) Oral at bedtime  budesonide 160 MICROgram(s)/formoterol 4.5 MICROgram(s) Inhaler 2 Puff(s) Inhalation two times a day  carvedilol 12.5 milliGRAM(s) Oral every 12 hours  chlorhexidine 2% Cloths 1 Application(s) Topical daily  epoetin felisha-epbx (RETACRIT) Injectable 57463 Unit(s) SubCutaneous <User Schedule>  heparin  Infusion. 1200 Unit(s)/Hr IV Continuous <Continuous>  insulin lispro (ADMELOG) corrective regimen sliding scale   SubCutaneous Before meals and at bedtime  lidocaine   Patch 1 Patch Transdermal daily  pantoprazole    Tablet 40 milliGRAM(s) Oral before breakfast  senna 2 Tablet(s) Oral at bedtime  tamsulosin 0.4 milliGRAM(s) Oral at bedtime  temazepam 30 milliGRAM(s) Oral at bedtime PRN  tiotropium 18 MICROgram(s) Capsule 1 Capsule(s) Inhalation daily      [ ] I attest I have reviewed and reconciled all medications prior to transfer    IV Fluids: None Active  lactated ringers.: Solution, 1000 milliLiter(s) infuse at 100 mL/Hr  Provider's Contact #: [phone]    Indication: n/a    Antibiotics: None.    Indication: n/a  End Date: -       I have discussed this case with Dr. Sonny Gray and Dr. Mj Tamayo upon transfer and all questions regarding ICU course were answered.  The following items are to be followed up:    - F/u PTT's for heparin drip which was restarted.   - BID LLE dressing changes: WTD vs Santyl  - Tentative Plan to RTOR on Monday 10/26 for skin grafting  - Monitor CT output, plans for work-up and d/c per primary.   - PT/OT

## 2020-10-22 NOTE — PROVIDER CONTACT NOTE (OTHER) - REASON
Chart Reviewed. Events noted for surgical intervention. PT requests updated MD orders for a PT Eval.

## 2020-10-22 NOTE — PROGRESS NOTE ADULT - SUBJECTIVE AND OBJECTIVE BOX
INTERVAL HPI/OVERNIGHT EVENTS:    Pt admitted to SICU s/p, per anesthesia records, patient became acutely bradycardic w/ HR in 40s and hypotensive w/ SBPs 50-60. Code blue was called however pt did not arrest and no CPR was performed.  Patient rcvd atropine, epinephrine, and calcium at that time w/ normalization of vital signs. He was transferred to SICU post-operatively for further management.   On examination in SICU pt hemodynamically normal. Bedside US showed large right sided pleural effusion that was then confirmed on CXR. R sided pigtail catheter was placed w/ return of approx 2L serous fluid.  Chest XR clear this AM with catheter in place.  Pt was successfully extubated this AM.  Weaned to nasal cannula with PMH of COPD.  Pt is currently OOB to chair with c/o of mild pain to lower extremity.  Pt remains with stable vitals.  GERALD continues to improve.        MEDICATIONS  (STANDING):  ALBUTerol    90 MICROgram(s) HFA Inhaler 1 Puff(s) Inhalation every 4 hours  albuterol/ipratropium for Nebulization 3 milliLiter(s) Nebulizer every 6 hours  allopurinol 100 milliGRAM(s) Oral daily  atorvastatin 20 milliGRAM(s) Oral at bedtime  budesonide 160 MICROgram(s)/formoterol 4.5 MICROgram(s) Inhaler 2 Puff(s) Inhalation two times a day  carvedilol 12.5 milliGRAM(s) Oral every 12 hours  chlorhexidine 2% Cloths 1 Application(s) Topical daily  epoetin felisha-epbx (RETACRIT) Injectable 24702 Unit(s) SubCutaneous <User Schedule>  heparin   Injectable 5000 Unit(s) SubCutaneous every 8 hours  insulin lispro (ADMELOG) corrective regimen sliding scale   SubCutaneous Before meals and at bedtime  lidocaine   Patch 1 Patch Transdermal daily  pantoprazole    Tablet 40 milliGRAM(s) Oral before breakfast  senna 2 Tablet(s) Oral at bedtime  tamsulosin 0.4 milliGRAM(s) Oral at bedtime  tiotropium 18 MICROgram(s) Capsule 1 Capsule(s) Inhalation daily    MEDICATIONS  (PRN):  temazepam 30 milliGRAM(s) Oral at bedtime PRN Insomnia      Drug Dosing Weight  Height (cm): 167.6 (15 Oct 2020 10:31)  Weight (kg): 89 (21 Oct 2020 10:16)  BMI (kg/m2): 31.7 (21 Oct 2020 10:16)  BSA (m2): 1.98 (21 Oct 2020 10:16)      PAST MEDICAL & SURGICAL HISTORY:  CKD (chronic kidney disease)    CHF (congestive heart failure)    Atrial fibrillation    COPD, severity to be determined    Coronary artery disease involving coronary bypass graft of native heart without angina pectoris    Chronic osteomyelitis, osteomyelitis of unspecified site    COPD (chronic obstructive pulmonary disease)    Atrial fibrillation    Bladder cancer    HLD (hyperlipidemia)    H/O knee surgery    S/P CABG (coronary artery bypass graft)    Presence of stent of CABG        ICU Vital Signs Last 24 Hrs  T(C): 36.8 (22 Oct 2020 07:28), Max: 36.8 (22 Oct 2020 00:19)  T(F): 98.2 (22 Oct 2020 07:28), Max: 98.2 (22 Oct 2020 00:19)  HR: 74 (22 Oct 2020 11:00) (59 - 90)  BP: 140/63 (22 Oct 2020 11:00) (117/56 - 204/87)  BP(mean): 90 (22 Oct 2020 11:00) (79 - 130)  ABP: --  ABP(mean): --  RR: 21 (22 Oct 2020 11:00) (12 - 23)  SpO2: 97% (22 Oct 2020 11:00) (94% - 100%)      ABG - ( 21 Oct 2020 14:08 )  pH, Arterial: 7.25  pH, Blood: x     /  pCO2: 59    /  pO2: 68    / HCO3: 23    / Base Excess: -1.4  /  SaO2: 94                  I&O's Detail    21 Oct 2020 07:01  -  22 Oct 2020 07:00  --------------------------------------------------------  IN:    multiple electrolytes Injection Type 1.: 588 mL    Propofol: 240 mL  Total IN: 828 mL    OUT:    Chest Tube (mL): 2690 mL    Indwelling Catheter - Urethral (mL): 2155 mL  Total OUT: 4845 mL    Total NET: -4017 mL      22 Oct 2020 07:01  -  22 Oct 2020 11:50  --------------------------------------------------------  IN:    IV PiggyBack: 100 mL    IV PiggyBack: 50 mL    multiple electrolytes Injection Type 1.: 84 mL  Total IN: 234 mL    OUT:    Indwelling Catheter - Urethral (mL): 125 mL  Total OUT: 125 mL    Total NET: 109 mL      Mode: CPAP with PS  FiO2: 40  PEEP: 5  PS: 10  MAP: 9  PIP: 16      Physical Exam:    Neurological:  Intubated and sedated.  Non-focal.  Moving all extremities.  No appreciable motor deficits    Pulm: lung sounds diminished b/l, on volume AC 18/550/+8/60%, R pigtail catheter to suction, total 2250 out since insertion, serous fluid    CV: S1, S2, regular rate & rhythm    Abdomen: distended but soft, non-tender, no guarding, umbilical hernia soft w/ no skin changes    MSK: LLE w/ overlying ace bandage in place, C/D/I, LLE compartments are soft, brisk cap refill    Skin: scattered ecchymosis, most significant on RUE, no diaphoresis, pallor, cyanosis or jaundice      LABS:  CBC Full  -  ( 22 Oct 2020 05:41 )  WBC Count : 7.29 K/uL  RBC Count : 2.43 M/uL  Hemoglobin : 8.7 g/dL  Hematocrit : 26.0 %  Platelet Count - Automated : 273 K/uL  Mean Cell Volume : 107.0 fl  Mean Cell Hemoglobin : 35.8 pg  Mean Cell Hemoglobin Concentration : 33.5 gm/dL  Auto Neutrophil # : x  Auto Lymphocyte # : x  Auto Monocyte # : x  Auto Eosinophil # : x  Auto Basophil # : x  Auto Neutrophil % : x  Auto Lymphocyte % : x  Auto Monocyte % : x  Auto Eosinophil % : x  Auto Basophil % : x    10-22    141  |  104  |  78.0<H>  ----------------------------<  153<H>  4.6   |  22.0  |  1.44<H>    Ca    8.7      22 Oct 2020 05:41  Phos  2.8     10-22  Mg     1.8     10-22      PT/INR - ( 21 Oct 2020 14:36 )   PT: 12.1 sec;   INR: 1.05 ratio         PTT - ( 21 Oct 2020 14:36 )  PTT:26.1 sec

## 2020-10-23 LAB
ANION GAP SERPL CALC-SCNC: 12 MMOL/L — SIGNIFICANT CHANGE UP (ref 5–17)
ANISOCYTOSIS BLD QL: SLIGHT — SIGNIFICANT CHANGE UP
APTT BLD: 74.1 SEC — HIGH (ref 27.5–35.5)
APTT BLD: 96.1 SEC — HIGH (ref 27.5–35.5)
BASO STIPL BLD QL SMEAR: PRESENT — SIGNIFICANT CHANGE UP
BASOPHILS # BLD AUTO: 0 K/UL — SIGNIFICANT CHANGE UP (ref 0–0.2)
BASOPHILS NFR BLD AUTO: 0 % — SIGNIFICANT CHANGE UP (ref 0–2)
BUN SERPL-MCNC: 63 MG/DL — HIGH (ref 8–20)
CALCIUM SERPL-MCNC: 8.2 MG/DL — LOW (ref 8.6–10.2)
CHLORIDE SERPL-SCNC: 104 MMOL/L — SIGNIFICANT CHANGE UP (ref 98–107)
CO2 SERPL-SCNC: 26 MMOL/L — SIGNIFICANT CHANGE UP (ref 22–29)
CREAT SERPL-MCNC: 1.44 MG/DL — HIGH (ref 0.5–1.3)
ELLIPTOCYTES BLD QL SMEAR: SLIGHT — SIGNIFICANT CHANGE UP
EOSINOPHIL # BLD AUTO: 0 K/UL — SIGNIFICANT CHANGE UP (ref 0–0.5)
EOSINOPHIL NFR BLD AUTO: 0 % — SIGNIFICANT CHANGE UP (ref 0–6)
GIANT PLATELETS BLD QL SMEAR: PRESENT — SIGNIFICANT CHANGE UP
GLUCOSE BLDC GLUCOMTR-MCNC: 103 MG/DL — HIGH (ref 70–99)
GLUCOSE BLDC GLUCOMTR-MCNC: 111 MG/DL — HIGH (ref 70–99)
GLUCOSE BLDC GLUCOMTR-MCNC: 179 MG/DL — HIGH (ref 70–99)
GLUCOSE BLDC GLUCOMTR-MCNC: 251 MG/DL — HIGH (ref 70–99)
GLUCOSE SERPL-MCNC: 111 MG/DL — HIGH (ref 70–99)
HCT VFR BLD CALC: 25.2 % — LOW (ref 39–50)
HGB BLD-MCNC: 7.9 G/DL — LOW (ref 13–17)
LYMPHOCYTES # BLD AUTO: 1.26 K/UL — SIGNIFICANT CHANGE UP (ref 1–3.3)
LYMPHOCYTES # BLD AUTO: 11.3 % — LOW (ref 13–44)
MACROCYTES BLD QL: SIGNIFICANT CHANGE UP
MAGNESIUM SERPL-MCNC: 2.1 MG/DL — SIGNIFICANT CHANGE UP (ref 1.6–2.6)
MANUAL SMEAR VERIFICATION: SIGNIFICANT CHANGE UP
MCHC RBC-ENTMCNC: 31.3 GM/DL — LOW (ref 32–36)
MCHC RBC-ENTMCNC: 34.6 PG — HIGH (ref 27–34)
MCV RBC AUTO: 110.5 FL — HIGH (ref 80–100)
MICROCYTES BLD QL: SLIGHT — SIGNIFICANT CHANGE UP
MONOCYTES # BLD AUTO: 0.78 K/UL — SIGNIFICANT CHANGE UP (ref 0–0.9)
MONOCYTES NFR BLD AUTO: 7 % — SIGNIFICANT CHANGE UP (ref 2–14)
NEUTROPHILS # BLD AUTO: 9.08 K/UL — HIGH (ref 1.8–7.4)
NEUTROPHILS NFR BLD AUTO: 81.7 % — HIGH (ref 43–77)
NRBC # BLD: 1 /100 — HIGH (ref 0–0)
OVALOCYTES BLD QL SMEAR: SLIGHT — SIGNIFICANT CHANGE UP
PHOSPHATE SERPL-MCNC: 3.7 MG/DL — SIGNIFICANT CHANGE UP (ref 2.4–4.7)
PLAT MORPH BLD: NORMAL — SIGNIFICANT CHANGE UP
PLATELET # BLD AUTO: 256 K/UL — SIGNIFICANT CHANGE UP (ref 150–400)
POLYCHROMASIA BLD QL SMEAR: SIGNIFICANT CHANGE UP
POTASSIUM SERPL-MCNC: 4 MMOL/L — SIGNIFICANT CHANGE UP (ref 3.5–5.3)
POTASSIUM SERPL-SCNC: 4 MMOL/L — SIGNIFICANT CHANGE UP (ref 3.5–5.3)
RBC # BLD: 2.28 M/UL — LOW (ref 4.2–5.8)
RBC # FLD: 17.2 % — HIGH (ref 10.3–14.5)
RBC BLD AUTO: ABNORMAL
SODIUM SERPL-SCNC: 141 MMOL/L — SIGNIFICANT CHANGE UP (ref 135–145)
WBC # BLD: 11.11 K/UL — HIGH (ref 3.8–10.5)
WBC # FLD AUTO: 11.11 K/UL — HIGH (ref 3.8–10.5)

## 2020-10-23 PROCEDURE — 71045 X-RAY EXAM CHEST 1 VIEW: CPT | Mod: 26

## 2020-10-23 PROCEDURE — 99233 SBSQ HOSP IP/OBS HIGH 50: CPT

## 2020-10-23 PROCEDURE — 99231 SBSQ HOSP IP/OBS SF/LOW 25: CPT

## 2020-10-23 RX ORDER — ACETAMINOPHEN 500 MG
650 TABLET ORAL EVERY 6 HOURS
Refills: 0 | Status: DISCONTINUED | OUTPATIENT
Start: 2020-10-23 | End: 2020-10-30

## 2020-10-23 RX ORDER — TRAMADOL HYDROCHLORIDE 50 MG/1
50 TABLET ORAL EVERY 4 HOURS
Refills: 0 | Status: DISCONTINUED | OUTPATIENT
Start: 2020-10-23 | End: 2020-10-30

## 2020-10-23 RX ORDER — FUROSEMIDE 40 MG
40 TABLET ORAL ONCE
Refills: 0 | Status: COMPLETED | OUTPATIENT
Start: 2020-10-23 | End: 2020-10-23

## 2020-10-23 RX ORDER — TRAMADOL HYDROCHLORIDE 50 MG/1
25 TABLET ORAL EVERY 4 HOURS
Refills: 0 | Status: DISCONTINUED | OUTPATIENT
Start: 2020-10-23 | End: 2020-10-30

## 2020-10-23 RX ADMIN — Medication 3 MILLILITER(S): at 20:55

## 2020-10-23 RX ADMIN — TRAMADOL HYDROCHLORIDE 25 MILLIGRAM(S): 50 TABLET ORAL at 17:03

## 2020-10-23 RX ADMIN — Medication 6: at 12:00

## 2020-10-23 RX ADMIN — BUDESONIDE AND FORMOTEROL FUMARATE DIHYDRATE 2 PUFF(S): 160; 4.5 AEROSOL RESPIRATORY (INHALATION) at 10:26

## 2020-10-23 RX ADMIN — ERYTHROPOIETIN 10000 UNIT(S): 10000 INJECTION, SOLUTION INTRAVENOUS; SUBCUTANEOUS at 12:00

## 2020-10-23 RX ADMIN — BUDESONIDE AND FORMOTEROL FUMARATE DIHYDRATE 2 PUFF(S): 160; 4.5 AEROSOL RESPIRATORY (INHALATION) at 20:59

## 2020-10-23 RX ADMIN — Medication 40 MILLIGRAM(S): at 17:03

## 2020-10-23 RX ADMIN — HEPARIN SODIUM 1400 UNIT(S)/HR: 5000 INJECTION INTRAVENOUS; SUBCUTANEOUS at 03:07

## 2020-10-23 RX ADMIN — LIDOCAINE 1 PATCH: 4 CREAM TOPICAL at 12:00

## 2020-10-23 RX ADMIN — TEMAZEPAM 30 MILLIGRAM(S): 15 CAPSULE ORAL at 22:54

## 2020-10-23 RX ADMIN — CHLORHEXIDINE GLUCONATE 1 APPLICATION(S): 213 SOLUTION TOPICAL at 12:01

## 2020-10-23 RX ADMIN — LIDOCAINE 1 PATCH: 4 CREAM TOPICAL at 18:42

## 2020-10-23 RX ADMIN — Medication 100 MILLIGRAM(S): at 11:59

## 2020-10-23 RX ADMIN — Medication 2: at 21:18

## 2020-10-23 RX ADMIN — Medication 3 MILLILITER(S): at 15:52

## 2020-10-23 RX ADMIN — Medication 3 MILLILITER(S): at 03:03

## 2020-10-23 RX ADMIN — Medication 3 MILLILITER(S): at 10:27

## 2020-10-23 RX ADMIN — ATORVASTATIN CALCIUM 20 MILLIGRAM(S): 80 TABLET, FILM COATED ORAL at 21:18

## 2020-10-23 RX ADMIN — TAMSULOSIN HYDROCHLORIDE 0.4 MILLIGRAM(S): 0.4 CAPSULE ORAL at 21:18

## 2020-10-23 RX ADMIN — CARVEDILOL PHOSPHATE 12.5 MILLIGRAM(S): 80 CAPSULE, EXTENDED RELEASE ORAL at 17:03

## 2020-10-23 RX ADMIN — PANTOPRAZOLE SODIUM 40 MILLIGRAM(S): 20 TABLET, DELAYED RELEASE ORAL at 05:12

## 2020-10-23 RX ADMIN — CARVEDILOL PHOSPHATE 12.5 MILLIGRAM(S): 80 CAPSULE, EXTENDED RELEASE ORAL at 05:12

## 2020-10-23 RX ADMIN — TRAMADOL HYDROCHLORIDE 25 MILLIGRAM(S): 50 TABLET ORAL at 17:30

## 2020-10-23 NOTE — PROGRESS NOTE ADULT - SUBJECTIVE AND OBJECTIVE BOX
[facility] DIVISION OF KIDNEY DISEASES AND HYPERTENSION -- FOLLOW UP NOTE  --------------------------------------------------------------------------------  Chief Complaint: shahriar on ckd    24 hour events/subjective:  downgraded from sicu  pt has no new complaint    PAST HISTORY  --------------------------------------------------------------------------------  No significant changes to PMH, PSH, FHx, SHx, unless otherwise noted    ALLERGIES & MEDICATIONS  --------------------------------------------------------------------------------  Allergies    No Known Allergies    Intolerances      Standing Inpatient Medications  ALBUTerol    90 MICROgram(s) HFA Inhaler 1 Puff(s) Inhalation every 4 hours  albuterol/ipratropium for Nebulization 3 milliLiter(s) Nebulizer every 6 hours  allopurinol 100 milliGRAM(s) Oral daily  atorvastatin 20 milliGRAM(s) Oral at bedtime  budesonide 160 MICROgram(s)/formoterol 4.5 MICROgram(s) Inhaler 2 Puff(s) Inhalation two times a day  carvedilol 12.5 milliGRAM(s) Oral every 12 hours  chlorhexidine 2% Cloths 1 Application(s) Topical daily  epoetin felisha-epbx (RETACRIT) Injectable 41763 Unit(s) SubCutaneous <User Schedule>  heparin  Infusion. 1200 Unit(s)/Hr IV Continuous <Continuous>  insulin lispro (ADMELOG) corrective regimen sliding scale   SubCutaneous Before meals and at bedtime  lidocaine   Patch 1 Patch Transdermal daily  pantoprazole    Tablet 40 milliGRAM(s) Oral before breakfast  senna 2 Tablet(s) Oral at bedtime  tamsulosin 0.4 milliGRAM(s) Oral at bedtime  tiotropium 18 MICROgram(s) Capsule 1 Capsule(s) Inhalation daily    PRN Inpatient Medications  acetaminophen   Tablet .. 650 milliGRAM(s) Oral every 6 hours PRN  temazepam 30 milliGRAM(s) Oral at bedtime PRN  traMADol 25 milliGRAM(s) Oral every 4 hours PRN  traMADol 50 milliGRAM(s) Oral every 4 hours PRN      REVIEW OF SYSTEMS  --------------------------------------------------------------------------------  Gen: No weight changes, fatigue, fevers/chills, weakness  Skin: No rashes  Head/Eyes/Ears/Mouth: No headache; Normal hearing; Normal vision w/o blurriness; No sinus pain/discomfort, sore throat  Respiratory: No dyspnea, cough, wheezing, hemoptysis  CV: No chest pain, PND, orthopnea  GI: No abdominal pain, diarrhea, constipation, nausea, vomiting, melena, hematochezia  : No increased frequency, dysuria, hematuria, nocturia  MSK: No joint pain/swelling; no back pain; no edema  Neuro: No dizziness/lightheadedness, weakness, seizures, numbness, tingling  Heme: No easy bruising or bleeding  Endo: No heat/cold intolerance  Psych: No significant nervousness, anxiety, stress, depression    All other systems were reviewed and are negative, except as noted.    VITALS/PHYSICAL EXAM  --------------------------------------------------------------------------------  T(C): 36.7 (10-23-20 @ 07:23), Max: 36.8 (10-22-20 @ 23:45)  HR: 65 (10-23-20 @ 10:30) (65 - 82)  BP: 128/67 (10-23-20 @ 07:23) (121/66 - 167/74)  RR: 18 (10-23-20 @ 07:23) (18 - 20)  SpO2: 96% (10-23-20 @ 10:30) (93% - 99%)  Wt(kg): --        10-22-20 @ 07:01  -  10-23-20 @ 07:00  --------------------------------------------------------  IN: 972 mL / OUT: 1124 mL / NET: -152 mL    10-23-20 @ 07:01  -  10-23-20 @ 14:04  --------------------------------------------------------  IN: 0 mL / OUT: 375 mL / NET: -375 mL      Physical Exam:  	Gen: NAD  	HEENT:  supple neck  	Pulm: rt chest tube  	CV: RRR, S1S2; no rub  	Back: No spinal or CVA tenderness; no sacral edema  	Abd: +BS, soft, nontender/nondistended  	: no suprapubic tenderness  	UE: Warm,  no edema  	LE: Warm,  no edema  	Neuro: No focal deficits  	Skin: Warm    LABS/STUDIES  --------------------------------------------------------------------------------              7.9    11.11 >-----------<  256      [10-23-20 @ 05:49]              25.2     141  |  104  |  63.0  ----------------------------<  111      [10-23-20 @ 05:49]  4.0   |  26.0  |  1.44        Ca     8.2     [10-23-20 @ 05:49]      Mg     2.1     [10-23-20 @ 05:49]      Phos  3.7     [10-23-20 @ 05:49]      PT/INR: PT 12.1 , INR 1.05       [10-21-20 @ 14:36]  PTT: 96.1       [10-23-20 @ 09:52]    Troponin 0.01      [10-22-20 @ 05:41]  CK 23      [10-21-20 @ 14:35]    Creatinine Trend:  SCr 1.44 [10-23 @ 05:49]  SCr 1.44 [10-22 @ 05:41]  SCr 1.85 [10-21 @ 14:35]  SCr 1.97 [10-20 @ 07:30]  SCr 2.18 [10-19 @ 06:16]    Urinalysis - [10-16-20 @ 10:57]      Color Yellow / Appearance Slightly Turbid / SG 1.015 / pH 5.0      Gluc Negative / Ketone Negative  / Bili Small / Urobili Negative       Blood Moderate / Protein 30 / Leuk Est Small / Nitrite Negative      RBC 11-25 / WBC 3-5 / Hyaline  / Gran  / Sq Epi  / Non Sq Epi Negative / Bacteria Occasional      HbA1c 5.4      [03-25-17 @ 07:51]    HCV 0.04, Nonreact      [10-16-20 @ 09:36]

## 2020-10-23 NOTE — PROGRESS NOTE ADULT - SUBJECTIVE AND OBJECTIVE BOX
INTERVAL HPI/OVERNIGHT EVENTS: Pt downgraded from SICU yesterday. POD 2 LLE hematoma evacuation with anesthesia complication of adriano and hypotension post-op. Pt now doing well, HDS, with no complaints. Some pain around chest tube site. Tolerating diet with no nausea, vomiting. Denies CP, SOB. Voiding spontaneously.     MEDICATIONS  (STANDING):  ALBUTerol    90 MICROgram(s) HFA Inhaler 1 Puff(s) Inhalation every 4 hours  albuterol/ipratropium for Nebulization 3 milliLiter(s) Nebulizer every 6 hours  allopurinol 100 milliGRAM(s) Oral daily  atorvastatin 20 milliGRAM(s) Oral at bedtime  budesonide 160 MICROgram(s)/formoterol 4.5 MICROgram(s) Inhaler 2 Puff(s) Inhalation two times a day  carvedilol 12.5 milliGRAM(s) Oral every 12 hours  chlorhexidine 2% Cloths 1 Application(s) Topical daily  epoetin felisha-epbx (RETACRIT) Injectable 38406 Unit(s) SubCutaneous <User Schedule>  heparin  Infusion. 1200 Unit(s)/Hr (12 mL/Hr) IV Continuous <Continuous>  insulin lispro (ADMELOG) corrective regimen sliding scale   SubCutaneous Before meals and at bedtime  lidocaine   Patch 1 Patch Transdermal daily  pantoprazole    Tablet 40 milliGRAM(s) Oral before breakfast  senna 2 Tablet(s) Oral at bedtime  tamsulosin 0.4 milliGRAM(s) Oral at bedtime  tiotropium 18 MICROgram(s) Capsule 1 Capsule(s) Inhalation daily    MEDICATIONS  (PRN):  temazepam 30 milliGRAM(s) Oral at bedtime PRN Insomnia      Vital Signs Last 24 Hrs  T(C): 36.8 (22 Oct 2020 23:45), Max: 36.8 (22 Oct 2020 07:28)  T(F): 98.3 (22 Oct 2020 23:45), Max: 98.3 (22 Oct 2020 23:45)  HR: 72 (22 Oct 2020 23:45) (60 - 90)  BP: 121/66 (22 Oct 2020 23:45) (117/56 - 204/87)  BP(mean): 96 (22 Oct 2020 16:00) (80 - 125)  RR: 19 (22 Oct 2020 23:45) (18 - 22)  SpO2: 96% (22 Oct 2020 23:45) (94% - 100%)    Constitutional: NAD  HEENT: PERRLA, EOMI, no drainage or redness  Respiratory: no increased WOB, speaking full sentences, on 2L NC, right sided chest tube with serosang output  Cardiovascular: RRR  Gastrointestinal: Soft, non-distended, non-tender  Musculoskeletal: LLE wrapped in kerlix and ace c/d/i; hematoma along entire left side of body; LUE ROM limited by pain    I&O's Detail    21 Oct 2020 07:01  -  22 Oct 2020 07:00  --------------------------------------------------------  IN:    multiple electrolytes Injection Type 1.: 588 mL    Propofol: 240 mL  Total IN: 828 mL    OUT:    Chest Tube (mL): 2690 mL    Indwelling Catheter - Urethral (mL): 2155 mL  Total OUT: 4845 mL    Total NET: -4017 mL      22 Oct 2020 07:01  -  23 Oct 2020 00:39  --------------------------------------------------------  IN:    Heparin Infusion: 48 mL    IV PiggyBack: 100 mL    IV PiggyBack: 50 mL    multiple electrolytes Injection Type 1.: 126 mL  Total IN: 324 mL    OUT:    Indwelling Catheter - Urethral (mL): 260 mL    Voided (mL): 850 mL  Total OUT: 1110 mL    Total NET: -786 mL          LABS:                        8.4    12.53 )-----------( 292      ( 22 Oct 2020 19:24 )             26.9     10-22    141  |  104  |  78.0<H>  ----------------------------<  153<H>  4.6   |  22.0  |  1.44<H>    Ca    8.7      22 Oct 2020 05:41  Phos  2.8     10-22  Mg     1.8     10-22      PT/INR - ( 21 Oct 2020 14:36 )   PT: 12.1 sec;   INR: 1.05 ratio         PTT - ( 22 Oct 2020 19:24 )  PTT:41.2 sec

## 2020-10-23 NOTE — OCCUPATIONAL THERAPY INITIAL EVALUATION ADULT - PERTINENT HX OF CURRENT PROBLEM, REHAB EVAL
s/p fall 10/9 presents in acute renal failure with Cr 4.26 up from 1.8 on 10/12. Patient with no traumatic injuries on scans, however has extensive eccchymosis to entire L side. Left LE hematoma now s/p evacuation of hematoma and debridment of LLE. Pt also developed right pleural effusion, now s/p chest tube placement. Now s/p LLE hematoma evacuation with anesthesia complication of adriano and hypotension post-op

## 2020-10-23 NOTE — PROGRESS NOTE ADULT - ASSESSMENT
Pt with known CKD_ baseline Scr 1.7  Pt with Jair -pre renal  improved scr with hydration;  pt non oliguric  Maintain euvolemia      Anemia: continue TIMOTHY    traumatic fall; s/p hematoma evacuation    Monitor Scr, lytes, UOP

## 2020-10-23 NOTE — OCCUPATIONAL THERAPY INITIAL EVALUATION ADULT - PLANNED THERAPY INTERVENTIONS, OT EVAL
fine motor coordination training/transfer training/ROM/strengthening/IADL retraining/balance training/cognitive, visual perceptual/neuromuscular re-education/bed mobility training/ADL retraining/motor coordination training
ADL retraining/balance training/transfer training/ROM/strengthening/functional endurance training

## 2020-10-23 NOTE — OCCUPATIONAL THERAPY INITIAL EVALUATION ADULT - RANGE OF MOTION EXAMINATION, UPPER EXTREMITY
Pain with left UE ROM since initial fall but WFL actively./bilateral UE Active ROM was WNL (within normal limits)

## 2020-10-23 NOTE — PROGRESS NOTE ADULT - ASSESSMENT
71 yo M PMH COPD on home O2, Afib on eliquis, CHF, and CKD s/p mechanical fall with extensive hematoma overlaying entire left side of body; POD 1 s/p LLE hematoma evacuation and debridement, downgraded from SICU today  Plan:   - continue 2L NC, titrate for SPO2 >92%, standing q6 duoneb, advair; will f/u with Pulm outpt  - daily CXR while chest tube in place, monitor chest tube output  - continue coreg and heparin drip for A fib -- follow ptt, adjust per Normogram  - DASH diet  - BID dressing changes to LLE WTD w/ NS  - all home  meds restarted except for Eliquis  DVT ppx: Heparin drip  Plan for OR monday 10/26 for wound debridement and possible STSG

## 2020-10-23 NOTE — OCCUPATIONAL THERAPY INITIAL EVALUATION ADULT - GENERAL OBSERVATIONS, REHAB EVAL
Pt received supine in bed, +right LE VCDs, +IV, +chest tube, +left LE drain and ace wrap, +2L O2 via nasal canula, agreeable to OT eval.

## 2020-10-24 LAB
ANION GAP SERPL CALC-SCNC: 12 MMOL/L — SIGNIFICANT CHANGE UP (ref 5–17)
APTT BLD: 79.7 SEC — HIGH (ref 27.5–35.5)
BUN SERPL-MCNC: 64 MG/DL — HIGH (ref 8–20)
CALCIUM SERPL-MCNC: 8.3 MG/DL — LOW (ref 8.6–10.2)
CHLORIDE SERPL-SCNC: 102 MMOL/L — SIGNIFICANT CHANGE UP (ref 98–107)
CO2 SERPL-SCNC: 26 MMOL/L — SIGNIFICANT CHANGE UP (ref 22–29)
CREAT SERPL-MCNC: 1.51 MG/DL — HIGH (ref 0.5–1.3)
GLUCOSE BLDC GLUCOMTR-MCNC: 120 MG/DL — HIGH (ref 70–99)
GLUCOSE BLDC GLUCOMTR-MCNC: 177 MG/DL — HIGH (ref 70–99)
GLUCOSE BLDC GLUCOMTR-MCNC: 258 MG/DL — HIGH (ref 70–99)
GLUCOSE BLDC GLUCOMTR-MCNC: 91 MG/DL — SIGNIFICANT CHANGE UP (ref 70–99)
GLUCOSE SERPL-MCNC: 187 MG/DL — HIGH (ref 70–99)
HCT VFR BLD CALC: 26.3 % — LOW (ref 39–50)
HGB BLD-MCNC: 8.7 G/DL — LOW (ref 13–17)
MAGNESIUM SERPL-MCNC: 1.8 MG/DL — SIGNIFICANT CHANGE UP (ref 1.6–2.6)
MCHC RBC-ENTMCNC: 33.1 GM/DL — SIGNIFICANT CHANGE UP (ref 32–36)
MCHC RBC-ENTMCNC: 37.8 PG — HIGH (ref 27–34)
MCV RBC AUTO: 114.3 FL — HIGH (ref 80–100)
PHOSPHATE SERPL-MCNC: 4.9 MG/DL — HIGH (ref 2.4–4.7)
PLATELET # BLD AUTO: 280 K/UL — SIGNIFICANT CHANGE UP (ref 150–400)
POTASSIUM SERPL-MCNC: 4.6 MMOL/L — SIGNIFICANT CHANGE UP (ref 3.5–5.3)
POTASSIUM SERPL-SCNC: 4.6 MMOL/L — SIGNIFICANT CHANGE UP (ref 3.5–5.3)
RBC # BLD: 2.3 M/UL — LOW (ref 4.2–5.8)
RBC # FLD: 17.1 % — HIGH (ref 10.3–14.5)
SODIUM SERPL-SCNC: 140 MMOL/L — SIGNIFICANT CHANGE UP (ref 135–145)
SURGICAL PATHOLOGY STUDY: SIGNIFICANT CHANGE UP
WBC # BLD: 9.86 K/UL — SIGNIFICANT CHANGE UP (ref 3.8–10.5)
WBC # FLD AUTO: 9.86 K/UL — SIGNIFICANT CHANGE UP (ref 3.8–10.5)

## 2020-10-24 PROCEDURE — 71045 X-RAY EXAM CHEST 1 VIEW: CPT | Mod: 26

## 2020-10-24 PROCEDURE — 99232 SBSQ HOSP IP/OBS MODERATE 35: CPT | Mod: GC

## 2020-10-24 PROCEDURE — 99233 SBSQ HOSP IP/OBS HIGH 50: CPT

## 2020-10-24 RX ORDER — FUROSEMIDE 40 MG
40 TABLET ORAL DAILY
Refills: 0 | Status: DISCONTINUED | OUTPATIENT
Start: 2020-10-24 | End: 2020-10-29

## 2020-10-24 RX ADMIN — CARVEDILOL PHOSPHATE 12.5 MILLIGRAM(S): 80 CAPSULE, EXTENDED RELEASE ORAL at 17:16

## 2020-10-24 RX ADMIN — SENNA PLUS 2 TABLET(S): 8.6 TABLET ORAL at 21:42

## 2020-10-24 RX ADMIN — Medication 3 MILLILITER(S): at 15:11

## 2020-10-24 RX ADMIN — CARVEDILOL PHOSPHATE 12.5 MILLIGRAM(S): 80 CAPSULE, EXTENDED RELEASE ORAL at 05:49

## 2020-10-24 RX ADMIN — PANTOPRAZOLE SODIUM 40 MILLIGRAM(S): 20 TABLET, DELAYED RELEASE ORAL at 05:49

## 2020-10-24 RX ADMIN — Medication 3 MILLILITER(S): at 21:13

## 2020-10-24 RX ADMIN — ATORVASTATIN CALCIUM 20 MILLIGRAM(S): 80 TABLET, FILM COATED ORAL at 21:42

## 2020-10-24 RX ADMIN — Medication 2: at 08:39

## 2020-10-24 RX ADMIN — Medication 6: at 12:32

## 2020-10-24 RX ADMIN — Medication 40 MILLIGRAM(S): at 05:49

## 2020-10-24 RX ADMIN — LIDOCAINE 1 PATCH: 4 CREAM TOPICAL at 00:24

## 2020-10-24 RX ADMIN — TEMAZEPAM 30 MILLIGRAM(S): 15 CAPSULE ORAL at 23:14

## 2020-10-24 RX ADMIN — Medication 3 MILLILITER(S): at 02:34

## 2020-10-24 RX ADMIN — TAMSULOSIN HYDROCHLORIDE 0.4 MILLIGRAM(S): 0.4 CAPSULE ORAL at 21:43

## 2020-10-24 RX ADMIN — BUDESONIDE AND FORMOTEROL FUMARATE DIHYDRATE 2 PUFF(S): 160; 4.5 AEROSOL RESPIRATORY (INHALATION) at 09:26

## 2020-10-24 RX ADMIN — CHLORHEXIDINE GLUCONATE 1 APPLICATION(S): 213 SOLUTION TOPICAL at 12:34

## 2020-10-24 RX ADMIN — Medication 100 MILLIGRAM(S): at 12:32

## 2020-10-24 RX ADMIN — BUDESONIDE AND FORMOTEROL FUMARATE DIHYDRATE 2 PUFF(S): 160; 4.5 AEROSOL RESPIRATORY (INHALATION) at 21:12

## 2020-10-24 RX ADMIN — Medication 3 MILLILITER(S): at 09:26

## 2020-10-24 NOTE — PROGRESS NOTE ADULT - ASSESSMENT
71 yo M PMH COPD on home O2, Afib on eliquis, CHF, and CKD s/p mechanical fall with extensive hematoma overlaying entire left side of body; postop s/p LLE hematoma evacuation and debridement.   Plan:   - continue 2L NC, titrate for SPO2 >92%, standing q6 duoneb, advair; will f/u with Pulm outpt  - AM CXR showing CT dislodged, will plan to remove today  - continue coreg and heparin drip for A fib -- follow ptt, adjust per Normogram  - DASH diet  - BID dressing changes to LLE WTD w/ NS  - all home  meds restarted except for Eliquis  DVT ppx: Heparin drip  Plan for OR monday 10/26 for wound debridement and possible STSG

## 2020-10-24 NOTE — PROGRESS NOTE ADULT - ASSESSMENT
Pt with known CKD_ baseline Scr 1.7  Jair -pre renal; improved scr with hydration;  pt non oliguric  Maintain euvolemia    Anemia: continue TIMOTHY  Monitor H/H    CHF  Continue lasix and Coreg  Plan to remove chest tube      traumatic fall; s/p hematoma evacuation and debridement    Monitor Scr, lytes, UOP

## 2020-10-24 NOTE — CHART NOTE - NSCHARTNOTEFT_GEN_A_CORE
Source: Patient [ x]  Family [ ]   other [x ]    Current Diet: Diet, Regular:   DASH/TLC {Sodium & Cholesterol Restricted} (DASH)  For patients receiving Renal Replacement - No Protein Restr, No Conc K, No Conc Phos, Low  Sodium (RENAL) (10-22-20 @ 07:25)      Patient reports [ ] nausea  [ ] vomiting [ ] diarrhea [ ] constipation  [ ]chewing problems [ ] swallowing issues  [ ] other:     PO intake:  < 50% [ ]   50-75%  [ ]   %  [ x]  other :    Source for PO intake [x ] Patient [ ] family [ ] chart [ ] staff [ ] other    Current Weight:   (10/21) 196.2 lbs  (10/19) 200.4 lbs  (10/18) 207.4 lbs  (10/17) 201.7 lbs  (10/16) 203.4 lbs  ? accuracy of weights, noted with 1+ left arm and left leg edema, continue to trend and maintain strict Is&Os     Pertinent Medications: MEDICATIONS  (STANDING):  ALBUTerol    90 MICROgram(s) HFA Inhaler 1 Puff(s) Inhalation every 4 hours  albuterol/ipratropium for Nebulization 3 milliLiter(s) Nebulizer every 6 hours  allopurinol 100 milliGRAM(s) Oral daily  atorvastatin 20 milliGRAM(s) Oral at bedtime  budesonide 160 MICROgram(s)/formoterol 4.5 MICROgram(s) Inhaler 2 Puff(s) Inhalation two times a day  carvedilol 12.5 milliGRAM(s) Oral every 12 hours  chlorhexidine 2% Cloths 1 Application(s) Topical daily  epoetin felisha-epbx (RETACRIT) Injectable 67665 Unit(s) SubCutaneous <User Schedule>  furosemide    Tablet 40 milliGRAM(s) Oral daily  heparin  Infusion. 1200 Unit(s)/Hr (12 mL/Hr) IV Continuous <Continuous>  insulin lispro (ADMELOG) corrective regimen sliding scale   SubCutaneous Before meals and at bedtime  lidocaine   Patch 1 Patch Transdermal daily  pantoprazole    Tablet 40 milliGRAM(s) Oral before breakfast  senna 2 Tablet(s) Oral at bedtime  tamsulosin 0.4 milliGRAM(s) Oral at bedtime  tiotropium 18 MICROgram(s) Capsule 1 Capsule(s) Inhalation daily    MEDICATIONS  (PRN):  acetaminophen   Tablet .. 650 milliGRAM(s) Oral every 6 hours PRN Mild Pain (1 - 3)  temazepam 30 milliGRAM(s) Oral at bedtime PRN Insomnia  traMADol 25 milliGRAM(s) Oral every 4 hours PRN Moderate Pain (4 - 6)  traMADol 50 milliGRAM(s) Oral every 4 hours PRN Severe Pain (7 - 10)    Pertinent Labs: CBC Full  -  ( 24 Oct 2020 06:23 )  WBC Count : 9.86 K/uL  RBC Count : 2.30 M/uL  Hemoglobin : 8.7 g/dL  Hematocrit : 26.3 %  Platelet Count - Automated : 280 K/uL  Mean Cell Volume : 114.3 fl  Mean Cell Hemoglobin : 37.8 pg  Mean Cell Hemoglobin Concentration : 33.1 gm/dL  Auto Neutrophil # : x  Auto Lymphocyte # : x  Auto Monocyte # : x  Auto Eosinophil # : x  Auto Basophil # : x  Auto Neutrophil % : x  Auto Lymphocyte % : x  Auto Monocyte % : x  Auto Eosinophil % : x  Auto Basophil % : x    10-24 Na140 mmol/L Glu 187 mg/dL<H> K+ 4.6 mmol/L Cr  1.51 mg/dL<H> BUN 64.0 mg/dL<H> Phos 4.9 mg/dL<H> Alb n/a   PAB n/a       Skin: Surgical incision to left leg per documentation    Nutrition focused physical exam conducted - found signs of malnutrition [ x]absent [ ]present    Subcutaneous fat loss: [ ] Orbital fat pads region, [ ]Buccal fat region, [ ]Triceps region,  [ ]Ribs region    Muscle wasting: [ ]Temples region, [ ]Clavicle region, [ ]Shoulder region, [ ]Scapula region, [ ]Interosseous region,  [ ]thigh region, [ ]Calf region    Estimated Needs:   [ x] no change since previous assessment  [ ] recalculated:     Current Nutrition Diagnosis: Pt remains at nutrition risk secondary to increased nutrient needs related to increased physiological demand for nutrient associated with healing as evidenced by s/p fall with extensive hematoma, s/p LLE hematoma evacuation and debridement with plan for another debridement and possible STSG on 10/26. Pt reports good appetite/po intake, consuming >75% of meals. Denies any chewing/swallowing difficulty. Pt states he prefers sugar-free items at meals. All nutrition-related questions/concerns addressed.    Recommendations:   1) Continue diet as tolerated.   2) Rx: MVI and vit C 500mg daily.   3) Encourage po intake, monitor diet tolerance, and provide assistance at meals as needed.   4) Encourage HBV protein sources.  5) Obtain daily weights to monitor trends.     Monitoring and Evaluation:   [x ] PO intake [x ] Tolerance to diet prescription [X] Weights  [X] Follow up per protocol [X] Labs

## 2020-10-24 NOTE — PROGRESS NOTE ADULT - SUBJECTIVE AND OBJECTIVE BOX
[facility] DIVISION OF KIDNEY DISEASES AND HYPERTENSION -- FOLLOW UP NOTE  --------------------------------------------------------------------------------  Chief Complaint: shahriar on ckd    24 hour events/subjective:  pt seen and examined; feels well  no complaint          PAST HISTORY  --------------------------------------------------------------------------------  No significant changes to PMH, PSH, FHx, SHx, unless otherwise noted    ALLERGIES & MEDICATIONS  --------------------------------------------------------------------------------  Allergies    No Known Allergies    Intolerances      Standing Inpatient Medications  ALBUTerol    90 MICROgram(s) HFA Inhaler 1 Puff(s) Inhalation every 4 hours  albuterol/ipratropium for Nebulization 3 milliLiter(s) Nebulizer every 6 hours  allopurinol 100 milliGRAM(s) Oral daily  atorvastatin 20 milliGRAM(s) Oral at bedtime  budesonide 160 MICROgram(s)/formoterol 4.5 MICROgram(s) Inhaler 2 Puff(s) Inhalation two times a day  carvedilol 12.5 milliGRAM(s) Oral every 12 hours  chlorhexidine 2% Cloths 1 Application(s) Topical daily  epoetin felisha-epbx (RETACRIT) Injectable 95022 Unit(s) SubCutaneous <User Schedule>  furosemide    Tablet 40 milliGRAM(s) Oral daily  heparin  Infusion. 1200 Unit(s)/Hr IV Continuous <Continuous>  insulin lispro (ADMELOG) corrective regimen sliding scale   SubCutaneous Before meals and at bedtime  lidocaine   Patch 1 Patch Transdermal daily  pantoprazole    Tablet 40 milliGRAM(s) Oral before breakfast  senna 2 Tablet(s) Oral at bedtime  tamsulosin 0.4 milliGRAM(s) Oral at bedtime  tiotropium 18 MICROgram(s) Capsule 1 Capsule(s) Inhalation daily    PRN Inpatient Medications  acetaminophen   Tablet .. 650 milliGRAM(s) Oral every 6 hours PRN  temazepam 30 milliGRAM(s) Oral at bedtime PRN  traMADol 25 milliGRAM(s) Oral every 4 hours PRN  traMADol 50 milliGRAM(s) Oral every 4 hours PRN      REVIEW OF SYSTEMS  --------------------------------------------------------------------------------  Gen: No weight changes, fatigue, fevers/chills, weakness  Skin: No rashes  Head/Eyes/Ears/Mouth: No headache; Normal hearing; Normal vision w/o blurriness; No sinus pain/discomfort, sore throat  Respiratory: No dyspnea, cough, wheezing, hemoptysis  CV: No chest pain, PND, orthopnea  GI: No abdominal pain, diarrhea, constipation, nausea, vomiting, melena, hematochezia  : No increased frequency, dysuria, hematuria, nocturia  MSK: No joint pain/swelling; no back pain; no edema  Neuro: No dizziness/lightheadedness, weakness, seizures, numbness, tingling  Heme: No easy bruising or bleeding  Endo: No heat/cold intolerance  Psych: No significant nervousness, anxiety, stress, depression    All other systems were reviewed and are negative, except as noted.    VITALS/PHYSICAL EXAM  --------------------------------------------------------------------------------  T(C): 36.6 (10-24-20 @ 04:56), Max: 37 (10-23-20 @ 19:42)  HR: 76 (10-24-20 @ 09:28) (68 - 86)  BP: 155/74 (10-24-20 @ 07:10) (134/67 - 155/74)  RR: 18 (10-24-20 @ 07:10) (17 - 20)  SpO2: 97% (10-24-20 @ 07:10) (92% - 97%)  Wt(kg): --        10-23-20 @ 07:01  -  10-24-20 @ 07:00  --------------------------------------------------------  IN: 168 mL / OUT: 2119 mL / NET: -1951 mL      Physical Exam:  	Gen: NAD,  	HEENT: , supple neck  	Pulm: CTA B/L  	CV: RRR, S1S2; no rub  	Back: No spinal or CVA tenderness; no sacral edema  	Abd: +BS, soft, nontender/nondistended  	: No suprapubic tenderness  	UE: Warm, ; no edema;  	LE: Warm, legs wrapped in ace bandages  	Neuro: No focal deficits, intact gait  	Psych: Normal affect and mood  	Skin: Warm      LABS/STUDIES  --------------------------------------------------------------------------------              8.7    9.86  >-----------<  280      [10-24-20 @ 06:23]              26.3     140  |  102  |  64.0  ----------------------------<  187      [10-24-20 @ 06:23]  4.6   |  26.0  |  1.51        Ca     8.3     [10-24-20 @ 06:23]      Mg     1.8     [10-24-20 @ 06:23]      Phos  4.9     [10-24-20 @ 06:23]        PTT: 79.7       [10-24-20 @ 06:23]      Creatinine Trend:  SCr 1.51 [10-24 @ 06:23]  SCr 1.44 [10-23 @ 05:49]  SCr 1.44 [10-22 @ 05:41]  SCr 1.85 [10-21 @ 14:35]  SCr 1.97 [10-20 @ 07:30]    Urinalysis - [10-16-20 @ 10:57]      Color Yellow / Appearance Slightly Turbid / SG 1.015 / pH 5.0      Gluc Negative / Ketone Negative  / Bili Small / Urobili Negative       Blood Moderate / Protein 30 / Leuk Est Small / Nitrite Negative      RBC 11-25 / WBC 3-5 / Hyaline  / Gran  / Sq Epi  / Non Sq Epi Negative / Bacteria Occasional      HbA1c 5.4      [03-25-17 @ 07:51]    HCV 0.04, Nonreact      [10-16-20 @ 09:36]

## 2020-10-24 NOTE — PROGRESS NOTE ADULT - SUBJECTIVE AND OBJECTIVE BOX
INTERVAL HPI/OVERNIGHT EVENTS:    SUBJECTIVE: Patient evaluated at bedside, found resting comfortably in bed, nad. No acute complaints or concerns. Pain well controlled at present. Denies sob, chest pain, n/v. Chest tube dislodged on am CXR, plan to remove today.       MEDICATIONS  (STANDING):  ALBUTerol    90 MICROgram(s) HFA Inhaler 1 Puff(s) Inhalation every 4 hours  albuterol/ipratropium for Nebulization 3 milliLiter(s) Nebulizer every 6 hours  allopurinol 100 milliGRAM(s) Oral daily  atorvastatin 20 milliGRAM(s) Oral at bedtime  budesonide 160 MICROgram(s)/formoterol 4.5 MICROgram(s) Inhaler 2 Puff(s) Inhalation two times a day  carvedilol 12.5 milliGRAM(s) Oral every 12 hours  chlorhexidine 2% Cloths 1 Application(s) Topical daily  epoetin felisha-epbx (RETACRIT) Injectable 11820 Unit(s) SubCutaneous <User Schedule>  furosemide    Tablet 40 milliGRAM(s) Oral daily  heparin  Infusion. 1200 Unit(s)/Hr (12 mL/Hr) IV Continuous <Continuous>  insulin lispro (ADMELOG) corrective regimen sliding scale   SubCutaneous Before meals and at bedtime  lidocaine   Patch 1 Patch Transdermal daily  pantoprazole    Tablet 40 milliGRAM(s) Oral before breakfast  senna 2 Tablet(s) Oral at bedtime  tamsulosin 0.4 milliGRAM(s) Oral at bedtime  tiotropium 18 MICROgram(s) Capsule 1 Capsule(s) Inhalation daily    MEDICATIONS  (PRN):  acetaminophen   Tablet .. 650 milliGRAM(s) Oral every 6 hours PRN Mild Pain (1 - 3)  temazepam 30 milliGRAM(s) Oral at bedtime PRN Insomnia  traMADol 25 milliGRAM(s) Oral every 4 hours PRN Moderate Pain (4 - 6)  traMADol 50 milliGRAM(s) Oral every 4 hours PRN Severe Pain (7 - 10)      Vital Signs Last 24 Hrs  T(C): 36.6 (24 Oct 2020 04:56), Max: 37 (23 Oct 2020 19:42)  T(F): 97.8 (24 Oct 2020 04:56), Max: 98.6 (23 Oct 2020 19:42)  HR: 70 (24 Oct 2020 04:56) (65 - 86)  BP: 149/75 (24 Oct 2020 04:56) (128/67 - 149/75)  BP(mean): --  RR: 20 (24 Oct 2020 04:56) (17 - 20)  SpO2: 95% (24 Oct 2020 04:56) (92% - 96%)    PE  Gen: resting comfortably in bed, nad  Pulm: no increased wob, no conversational dyspnea, no increased WOB, speaking full sentences, on NC, right sided chest tube with serosang output  Abd: non-distended, soft, non-tender  Ext: distal extremities warm and well-perfused, no peripheral edema  Msk: LLE wrapped in kerlix and ace c/d/i; hematoma along entire left side of body; LUE ROM limited by pain          I&O's Detail    22 Oct 2020 07:01  -  23 Oct 2020 07:00  --------------------------------------------------------  IN:    Heparin Infusion: 216 mL    IV PiggyBack: 100 mL    IV PiggyBack: 50 mL    multiple electrolytes Injection Type 1.: 126 mL    Oral Fluid: 480 mL  Total IN: 972 mL    OUT:    Chest Tube (mL): 14 mL    Indwelling Catheter - Urethral (mL): 260 mL    Voided (mL): 850 mL  Total OUT: 1124 mL    Total NET: -152 mL      23 Oct 2020 07:01  -  24 Oct 2020 06:24  --------------------------------------------------------  IN:    Heparin Infusion: 126 mL  Total IN: 126 mL    OUT:    Chest Tube (mL): 344 mL    Voided (mL): 1775 mL  Total OUT: 2119 mL    Total NET: -1993 mL          LABS:                        7.9    11.11 )-----------( 256      ( 23 Oct 2020 05:49 )             25.2     10-23    141  |  104  |  63.0<H>  ----------------------------<  111<H>  4.0   |  26.0  |  1.44<H>    Ca    8.2<L>      23 Oct 2020 05:49  Phos  3.7     10-23  Mg     2.1     10-23      PTT - ( 23 Oct 2020 09:52 )  PTT:96.1 sec      RADIOLOGY & ADDITIONAL STUDIES:

## 2020-10-25 LAB
ANION GAP SERPL CALC-SCNC: 10 MMOL/L — SIGNIFICANT CHANGE UP (ref 5–17)
APTT BLD: 104.1 SEC — HIGH (ref 27.5–35.5)
APTT BLD: 63.6 SEC — HIGH (ref 27.5–35.5)
APTT BLD: 77.8 SEC — HIGH (ref 27.5–35.5)
BLD GP AB SCN SERPL QL: SIGNIFICANT CHANGE UP
BUN SERPL-MCNC: 60 MG/DL — HIGH (ref 8–20)
CALCIUM SERPL-MCNC: 7.8 MG/DL — LOW (ref 8.6–10.2)
CHLORIDE SERPL-SCNC: 101 MMOL/L — SIGNIFICANT CHANGE UP (ref 98–107)
CO2 SERPL-SCNC: 26 MMOL/L — SIGNIFICANT CHANGE UP (ref 22–29)
CREAT SERPL-MCNC: 1.45 MG/DL — HIGH (ref 0.5–1.3)
GLUCOSE BLDC GLUCOMTR-MCNC: 134 MG/DL — HIGH (ref 70–99)
GLUCOSE BLDC GLUCOMTR-MCNC: 136 MG/DL — HIGH (ref 70–99)
GLUCOSE BLDC GLUCOMTR-MCNC: 157 MG/DL — HIGH (ref 70–99)
GLUCOSE SERPL-MCNC: 119 MG/DL — HIGH (ref 70–99)
HCT VFR BLD CALC: 22.8 % — LOW (ref 39–50)
HGB BLD-MCNC: 7.4 G/DL — LOW (ref 13–17)
MAGNESIUM SERPL-MCNC: 1.5 MG/DL — LOW (ref 1.6–2.6)
MCHC RBC-ENTMCNC: 32.5 GM/DL — SIGNIFICANT CHANGE UP (ref 32–36)
MCHC RBC-ENTMCNC: 37.2 PG — HIGH (ref 27–34)
MCV RBC AUTO: 114.6 FL — HIGH (ref 80–100)
PHOSPHATE SERPL-MCNC: 4 MG/DL — SIGNIFICANT CHANGE UP (ref 2.4–4.7)
PLATELET # BLD AUTO: 240 K/UL — SIGNIFICANT CHANGE UP (ref 150–400)
POTASSIUM SERPL-MCNC: 3.8 MMOL/L — SIGNIFICANT CHANGE UP (ref 3.5–5.3)
POTASSIUM SERPL-SCNC: 3.8 MMOL/L — SIGNIFICANT CHANGE UP (ref 3.5–5.3)
RBC # BLD: 1.99 M/UL — LOW (ref 4.2–5.8)
RBC # FLD: 16.4 % — HIGH (ref 10.3–14.5)
SODIUM SERPL-SCNC: 137 MMOL/L — SIGNIFICANT CHANGE UP (ref 135–145)
WBC # BLD: 9.96 K/UL — SIGNIFICANT CHANGE UP (ref 3.8–10.5)
WBC # FLD AUTO: 9.96 K/UL — SIGNIFICANT CHANGE UP (ref 3.8–10.5)

## 2020-10-25 PROCEDURE — 71045 X-RAY EXAM CHEST 1 VIEW: CPT | Mod: 26

## 2020-10-25 PROCEDURE — 99232 SBSQ HOSP IP/OBS MODERATE 35: CPT

## 2020-10-25 PROCEDURE — 99233 SBSQ HOSP IP/OBS HIGH 50: CPT

## 2020-10-25 RX ORDER — SODIUM CHLORIDE 9 MG/ML
1000 INJECTION, SOLUTION INTRAVENOUS
Refills: 0 | Status: DISCONTINUED | OUTPATIENT
Start: 2020-10-26 | End: 2020-10-26

## 2020-10-25 RX ORDER — POTASSIUM CHLORIDE 20 MEQ
20 PACKET (EA) ORAL ONCE
Refills: 0 | Status: COMPLETED | OUTPATIENT
Start: 2020-10-25 | End: 2020-10-25

## 2020-10-25 RX ORDER — MAGNESIUM OXIDE 400 MG ORAL TABLET 241.3 MG
400 TABLET ORAL
Refills: 0 | Status: COMPLETED | OUTPATIENT
Start: 2020-10-25 | End: 2020-10-26

## 2020-10-25 RX ADMIN — TRAMADOL HYDROCHLORIDE 25 MILLIGRAM(S): 50 TABLET ORAL at 23:00

## 2020-10-25 RX ADMIN — Medication 2: at 12:27

## 2020-10-25 RX ADMIN — Medication 20 MILLIEQUIVALENT(S): at 11:21

## 2020-10-25 RX ADMIN — CHLORHEXIDINE GLUCONATE 1 APPLICATION(S): 213 SOLUTION TOPICAL at 12:24

## 2020-10-25 RX ADMIN — HEPARIN SODIUM 1200 UNIT(S)/HR: 5000 INJECTION INTRAVENOUS; SUBCUTANEOUS at 07:12

## 2020-10-25 RX ADMIN — MAGNESIUM OXIDE 400 MG ORAL TABLET 400 MILLIGRAM(S): 241.3 TABLET ORAL at 17:12

## 2020-10-25 RX ADMIN — PANTOPRAZOLE SODIUM 40 MILLIGRAM(S): 20 TABLET, DELAYED RELEASE ORAL at 05:26

## 2020-10-25 RX ADMIN — ATORVASTATIN CALCIUM 20 MILLIGRAM(S): 80 TABLET, FILM COATED ORAL at 22:28

## 2020-10-25 RX ADMIN — TRAMADOL HYDROCHLORIDE 50 MILLIGRAM(S): 50 TABLET ORAL at 03:39

## 2020-10-25 RX ADMIN — Medication 3 MILLILITER(S): at 21:32

## 2020-10-25 RX ADMIN — Medication 100 MILLIGRAM(S): at 11:21

## 2020-10-25 RX ADMIN — TRAMADOL HYDROCHLORIDE 25 MILLIGRAM(S): 50 TABLET ORAL at 22:24

## 2020-10-25 RX ADMIN — CARVEDILOL PHOSPHATE 12.5 MILLIGRAM(S): 80 CAPSULE, EXTENDED RELEASE ORAL at 17:11

## 2020-10-25 RX ADMIN — Medication 3 MILLILITER(S): at 03:32

## 2020-10-25 RX ADMIN — BUDESONIDE AND FORMOTEROL FUMARATE DIHYDRATE 2 PUFF(S): 160; 4.5 AEROSOL RESPIRATORY (INHALATION) at 21:28

## 2020-10-25 RX ADMIN — TAMSULOSIN HYDROCHLORIDE 0.4 MILLIGRAM(S): 0.4 CAPSULE ORAL at 22:36

## 2020-10-25 RX ADMIN — Medication 40 MILLIGRAM(S): at 05:27

## 2020-10-25 RX ADMIN — TEMAZEPAM 30 MILLIGRAM(S): 15 CAPSULE ORAL at 22:23

## 2020-10-25 RX ADMIN — LIDOCAINE 1 PATCH: 4 CREAM TOPICAL at 21:48

## 2020-10-25 RX ADMIN — CARVEDILOL PHOSPHATE 12.5 MILLIGRAM(S): 80 CAPSULE, EXTENDED RELEASE ORAL at 05:26

## 2020-10-25 RX ADMIN — BUDESONIDE AND FORMOTEROL FUMARATE DIHYDRATE 2 PUFF(S): 160; 4.5 AEROSOL RESPIRATORY (INHALATION) at 08:02

## 2020-10-25 RX ADMIN — Medication 3 MILLILITER(S): at 14:54

## 2020-10-25 RX ADMIN — TRAMADOL HYDROCHLORIDE 50 MILLIGRAM(S): 50 TABLET ORAL at 02:39

## 2020-10-25 RX ADMIN — TRAMADOL HYDROCHLORIDE 25 MILLIGRAM(S): 50 TABLET ORAL at 16:55

## 2020-10-25 RX ADMIN — TRAMADOL HYDROCHLORIDE 25 MILLIGRAM(S): 50 TABLET ORAL at 14:35

## 2020-10-25 RX ADMIN — Medication 3 MILLILITER(S): at 08:02

## 2020-10-25 NOTE — CHART NOTE - NSCHARTNOTEFT_GEN_A_CORE
Wound vac applied to LLE shallow, open wound.  Wound measures 25cm long by 6cm wide proximally and 3cm wide distally. Black sponge, 125mmHg, good seal no leak. Pt tolerated procedure.

## 2020-10-25 NOTE — PROGRESS NOTE ADULT - SUBJECTIVE AND OBJECTIVE BOX
HPI/OVERNIGHT EVENTS: NAEON. Right chest tube removed 10/24 after morning CXR showed infiltration of pigtail out of pleural space and into subcutaneous space. Post-pull CXR appeared normal. Patient feels well and has no new complaints. Notes improvement in breathing after pigtail drainage. Pain adequately controlled. Tolerating PO, voiding, passing BMs w/o issue. Denies f/c/n/v/d, chest pain, SOB.    MEDICATIONS  (STANDING):  ALBUTerol    90 MICROgram(s) HFA Inhaler 1 Puff(s) Inhalation every 4 hours  albuterol/ipratropium for Nebulization 3 milliLiter(s) Nebulizer every 6 hours  allopurinol 100 milliGRAM(s) Oral daily  atorvastatin 20 milliGRAM(s) Oral at bedtime  budesonide 160 MICROgram(s)/formoterol 4.5 MICROgram(s) Inhaler 2 Puff(s) Inhalation two times a day  carvedilol 12.5 milliGRAM(s) Oral every 12 hours  chlorhexidine 2% Cloths 1 Application(s) Topical daily  epoetin felisha-epbx (RETACRIT) Injectable 16700 Unit(s) SubCutaneous <User Schedule>  furosemide    Tablet 40 milliGRAM(s) Oral daily  heparin  Infusion. 1200 Unit(s)/Hr (12 mL/Hr) IV Continuous <Continuous>  insulin lispro (ADMELOG) corrective regimen sliding scale   SubCutaneous Before meals and at bedtime  lidocaine   Patch 1 Patch Transdermal daily  pantoprazole    Tablet 40 milliGRAM(s) Oral before breakfast  senna 2 Tablet(s) Oral at bedtime  tamsulosin 0.4 milliGRAM(s) Oral at bedtime  tiotropium 18 MICROgram(s) Capsule 1 Capsule(s) Inhalation daily    MEDICATIONS  (PRN):  acetaminophen   Tablet .. 650 milliGRAM(s) Oral every 6 hours PRN Mild Pain (1 - 3)  temazepam 30 milliGRAM(s) Oral at bedtime PRN Insomnia  traMADol 25 milliGRAM(s) Oral every 4 hours PRN Moderate Pain (4 - 6)  traMADol 50 milliGRAM(s) Oral every 4 hours PRN Severe Pain (7 - 10)      Vital Signs Last 24 Hrs  T(C): 36.4 (24 Oct 2020 23:17), Max: 36.6 (24 Oct 2020 04:56)  T(F): 97.6 (24 Oct 2020 23:17), Max: 97.8 (24 Oct 2020 04:56)  HR: 70 (24 Oct 2020 23:17) (68 - 79)  BP: 133/56 (24 Oct 2020 23:17) (124/67 - 155/74)  RR: 18 (24 Oct 2020 23:17) (18 - 20)  SpO2: 94% (24 Oct 2020 23:17) (93% - 97%)    Gen: A&Ox3, NAD. Laying comfortably in bed  HEENT: NCAT, PERRLA, EOMI  Pulm: Regular respiratory rate, no distress. On NC  CV: RRR  Abd: Soft, NTND  Ext: LLE wound debrided down to fascia, with healthy, well-vascularized tissue along edges. Moderate oozing of blood  Skin: No evident rashes or lesions      I&O's Detail    23 Oct 2020 07:01  -  24 Oct 2020 07:00  --------------------------------------------------------  IN:    Heparin Infusion: 168 mL  Total IN: 168 mL    OUT:    Chest Tube (mL): 344 mL    Voided (mL): 1775 mL  Total OUT: 2119 mL    Total NET: -1951 mL      24 Oct 2020 07:01  -  25 Oct 2020 03:24  --------------------------------------------------------  IN:    Heparin Infusion: 112 mL  Total IN: 112 mL    OUT:    Voided (mL): 1000 mL  Total OUT: 1000 mL    Total NET: -888 mL          LABS:                        8.7    9.86  )-----------( 280      ( 24 Oct 2020 06:23 )             26.3     10-24    140  |  102  |  64.0<H>  ----------------------------<  187<H>  4.6   |  26.0  |  1.51<H>    Ca    8.3<L>      24 Oct 2020 06:23  Phos  4.9     10-24  Mg     1.8     10-24      PTT - ( 24 Oct 2020 06:23 )  PTT:79.7 sec

## 2020-10-25 NOTE — PROGRESS NOTE ADULT - ASSESSMENT
Pt with known CKD_ baseline Scr 1.7  Jair -pre renal; improved with hydration;  pt non oliguric  Maintain euvolemia    Anemia: continue TIMOTHY  Monitor H/H    CHF- compensated  Continue lasix and Coreg      traumatic fall; s/p hematoma evacuation and debridement    Monitor Scr, lytes, UOP

## 2020-10-25 NOTE — PROGRESS NOTE ADULT - ASSESSMENT
A/P: 69 y/o M w/PMH COPD (on home O2), Afib (on eliquis), CHF, and CKD s/p mechanical fall with extensive hematoma overlaying entire left side of body. S/p LLE hematoma evacuation and debridement, complicated by hypotension and bradycardia on anesthesia reversal, prompting brief ICU stay. Currently stable on floor    -F/U AM CXR  -BID LLE Dressing Changes: WTD w/Kerlex + ACE  -Continue Heparin gtt leading up to OR  -Scheduled for OR on 10/26 for wound debridement, possible STSG  -F/U urine nitrogen, creatinine

## 2020-10-25 NOTE — PROGRESS NOTE ADULT - SUBJECTIVE AND OBJECTIVE BOX
[facility] DIVISION OF KIDNEY DISEASES AND HYPERTENSION -- FOLLOW UP NOTE  --------------------------------------------------------------------------------  Chief Complaint:  shahriar on ckd    24 hour events/subjective:  chest tube removed        PAST HISTORY  --------------------------------------------------------------------------------  No significant changes to PMH, PSH, FHx, SHx, unless otherwise noted    ALLERGIES & MEDICATIONS  --------------------------------------------------------------------------------  Allergies    No Known Allergies    Intolerances      Standing Inpatient Medications  ALBUTerol    90 MICROgram(s) HFA Inhaler 1 Puff(s) Inhalation every 4 hours  albuterol/ipratropium for Nebulization 3 milliLiter(s) Nebulizer every 6 hours  allopurinol 100 milliGRAM(s) Oral daily  atorvastatin 20 milliGRAM(s) Oral at bedtime  budesonide 160 MICROgram(s)/formoterol 4.5 MICROgram(s) Inhaler 2 Puff(s) Inhalation two times a day  carvedilol 12.5 milliGRAM(s) Oral every 12 hours  chlorhexidine 2% Cloths 1 Application(s) Topical daily  epoetin felisha-epbx (RETACRIT) Injectable 12357 Unit(s) SubCutaneous <User Schedule>  furosemide    Tablet 40 milliGRAM(s) Oral daily  heparin  Infusion. 1200 Unit(s)/Hr IV Continuous <Continuous>  insulin lispro (ADMELOG) corrective regimen sliding scale   SubCutaneous Before meals and at bedtime  lidocaine   Patch 1 Patch Transdermal daily  magnesium oxide 400 milliGRAM(s) Oral two times a day with meals  pantoprazole    Tablet 40 milliGRAM(s) Oral before breakfast  potassium chloride   Powder 20 milliEquivalent(s) Oral once  senna 2 Tablet(s) Oral at bedtime  tamsulosin 0.4 milliGRAM(s) Oral at bedtime  tiotropium 18 MICROgram(s) Capsule 1 Capsule(s) Inhalation daily    PRN Inpatient Medications  acetaminophen   Tablet .. 650 milliGRAM(s) Oral every 6 hours PRN  temazepam 30 milliGRAM(s) Oral at bedtime PRN  traMADol 25 milliGRAM(s) Oral every 4 hours PRN  traMADol 50 milliGRAM(s) Oral every 4 hours PRN      REVIEW OF SYSTEMS  --------------------------------------------------------------------------------  Gen: No weight changes, fatigue, fevers/chills, weakness  Skin: No rashes  Head/Eyes/Ears/Mouth: No headache; Normal hearing; Normal vision w/o blurriness; No sinus pain/discomfort, sore throat  Respiratory: No dyspnea, cough, wheezing, hemoptysis  CV: No chest pain, PND, orthopnea  GI: No abdominal pain, diarrhea, constipation, nausea, vomiting, melena, hematochezia  : No increased frequency, dysuria, hematuria, nocturia  MSK: No joint pain/swelling; no back pain; no edema  Neuro: No dizziness/lightheadedness, weakness, seizures, numbness, tingling  Heme: No easy bruising or bleeding  Endo: No heat/cold intolerance  Psych: No significant nervousness, anxiety, stress, depression    All other systems were reviewed and are negative, except as noted.    VITALS/PHYSICAL EXAM  --------------------------------------------------------------------------------  T(C): 36.7 (10-25-20 @ 07:30), Max: 36.7 (10-25-20 @ 04:52)  HR: 84 (10-25-20 @ 08:03) (68 - 94)  BP: 118/64 (10-25-20 @ 07:30) (118/64 - 133/56)  RR: 18 (10-25-20 @ 07:30) (18 - 20)  SpO2: 97% (10-25-20 @ 07:30) (93% - 98%)  Wt(kg): --        10-24-20 @ 07:01  -  10-25-20 @ 07:00  --------------------------------------------------------  IN: 168 mL / OUT: 1000 mL / NET: -832 mL    10-25-20 @ 07:01  -  10-25-20 @ 10:34  --------------------------------------------------------  IN: 38 mL / OUT: 0 mL / NET: 38 mL      Physical Exam:  	Gen: NAD  	HEENT; supple neck  	Pulm: CTA B/L  	CV: RRR, S1S2; no rub  	Back: No spinal or CVA tenderness; no sacral edema  	Abd: +BS, soft, nontender/nondistended  	: No suprapubic tenderness  	UE: Warm,  no edema; no asterixis  	LE: Warm, no edema  	Neuro: No focal deficits  	Psych: Normal affect and mood  	Skin: Warm, without rashes  	    LABS/STUDIES  --------------------------------------------------------------------------------              7.4    9.96  >-----------<  240      [10-25-20 @ 06:28]              22.8     137  |  101  |  60.0  ----------------------------<  119      [10-25-20 @ 06:28]  3.8   |  26.0  |  1.45        Ca     7.8     [10-25-20 @ 06:28]      Mg     1.5     [10-25-20 @ 06:28]      Phos  4.0     [10-25-20 @ 06:28]        PTT: 104.1      [10-25-20 @ 06:27]      Creatinine Trend:  SCr 1.45 [10-25 @ 06:28]  SCr 1.51 [10-24 @ 06:23]  SCr 1.44 [10-23 @ 05:49]  SCr 1.44 [10-22 @ 05:41]  SCr 1.85 [10-21 @ 14:35]    Urinalysis - [10-16-20 @ 10:57]      Color Yellow / Appearance Slightly Turbid / SG 1.015 / pH 5.0      Gluc Negative / Ketone Negative  / Bili Small / Urobili Negative       Blood Moderate / Protein 30 / Leuk Est Small / Nitrite Negative      RBC 11-25 / WBC 3-5 / Hyaline  / Gran  / Sq Epi  / Non Sq Epi Negative / Bacteria Occasional      HbA1c 5.4      [03-25-17 @ 07:51]    HCV 0.04, Nonreact      [10-16-20 @ 09:36]

## 2020-10-26 LAB
24R-OH-CALCIDIOL SERPL-MCNC: 60.6 NG/ML — SIGNIFICANT CHANGE UP (ref 30–80)
ANION GAP SERPL CALC-SCNC: 12 MMOL/L — SIGNIFICANT CHANGE UP (ref 5–17)
APTT BLD: 64.2 SEC — HIGH (ref 27.5–35.5)
BASOPHILS # BLD AUTO: 0.01 K/UL — SIGNIFICANT CHANGE UP (ref 0–0.2)
BASOPHILS NFR BLD AUTO: 0.1 % — SIGNIFICANT CHANGE UP (ref 0–2)
BUN SERPL-MCNC: 61 MG/DL — HIGH (ref 8–20)
CALCIUM SERPL-MCNC: 7.9 MG/DL — LOW (ref 8.6–10.2)
CALCIUM SERPL-MCNC: 8.1 MG/DL — LOW (ref 8.4–10.5)
CHLORIDE SERPL-SCNC: 100 MMOL/L — SIGNIFICANT CHANGE UP (ref 98–107)
CO2 SERPL-SCNC: 25 MMOL/L — SIGNIFICANT CHANGE UP (ref 22–29)
CREAT SERPL-MCNC: 1.4 MG/DL — HIGH (ref 0.5–1.3)
EOSINOPHIL # BLD AUTO: 0.13 K/UL — SIGNIFICANT CHANGE UP (ref 0–0.5)
EOSINOPHIL NFR BLD AUTO: 1.6 % — SIGNIFICANT CHANGE UP (ref 0–6)
GLUCOSE BLDC GLUCOMTR-MCNC: 115 MG/DL — HIGH (ref 70–99)
GLUCOSE BLDC GLUCOMTR-MCNC: 119 MG/DL — HIGH (ref 70–99)
GLUCOSE BLDC GLUCOMTR-MCNC: 127 MG/DL — HIGH (ref 70–99)
GLUCOSE BLDC GLUCOMTR-MCNC: 154 MG/DL — HIGH (ref 70–99)
GLUCOSE BLDC GLUCOMTR-MCNC: 282 MG/DL — HIGH (ref 70–99)
GLUCOSE SERPL-MCNC: 114 MG/DL — HIGH (ref 70–99)
HCT VFR BLD CALC: 23.2 % — LOW (ref 39–50)
HCT VFR BLD CALC: 24.2 % — LOW (ref 39–50)
HGB BLD-MCNC: 7.3 G/DL — LOW (ref 13–17)
HGB BLD-MCNC: 7.7 G/DL — LOW (ref 13–17)
IMM GRANULOCYTES NFR BLD AUTO: 1.9 % — HIGH (ref 0–1.5)
LYMPHOCYTES # BLD AUTO: 1.1 K/UL — SIGNIFICANT CHANGE UP (ref 1–3.3)
LYMPHOCYTES # BLD AUTO: 13.3 % — SIGNIFICANT CHANGE UP (ref 13–44)
MAGNESIUM SERPL-MCNC: 1.5 MG/DL — LOW (ref 1.6–2.6)
MCHC RBC-ENTMCNC: 31.5 GM/DL — LOW (ref 32–36)
MCHC RBC-ENTMCNC: 31.8 GM/DL — LOW (ref 32–36)
MCHC RBC-ENTMCNC: 34.9 PG — HIGH (ref 27–34)
MCHC RBC-ENTMCNC: 36 PG — HIGH (ref 27–34)
MCV RBC AUTO: 111 FL — HIGH (ref 80–100)
MCV RBC AUTO: 113.1 FL — HIGH (ref 80–100)
MONOCYTES # BLD AUTO: 0.94 K/UL — HIGH (ref 0–0.9)
MONOCYTES NFR BLD AUTO: 11.3 % — SIGNIFICANT CHANGE UP (ref 2–14)
NEUTROPHILS # BLD AUTO: 5.95 K/UL — SIGNIFICANT CHANGE UP (ref 1.8–7.4)
NEUTROPHILS NFR BLD AUTO: 71.8 % — SIGNIFICANT CHANGE UP (ref 43–77)
PHOSPHATE SERPL-MCNC: 4.1 MG/DL — SIGNIFICANT CHANGE UP (ref 2.4–4.7)
PLATELET # BLD AUTO: 216 K/UL — SIGNIFICANT CHANGE UP (ref 150–400)
PLATELET # BLD AUTO: 224 K/UL — SIGNIFICANT CHANGE UP (ref 150–400)
POTASSIUM SERPL-MCNC: 4.3 MMOL/L — SIGNIFICANT CHANGE UP (ref 3.5–5.3)
POTASSIUM SERPL-SCNC: 4.3 MMOL/L — SIGNIFICANT CHANGE UP (ref 3.5–5.3)
PTH-INTACT FLD-MCNC: 56 PG/ML — SIGNIFICANT CHANGE UP (ref 15–65)
RBC # BLD: 2.09 M/UL — LOW (ref 4.2–5.8)
RBC # BLD: 2.14 M/UL — LOW (ref 4.2–5.8)
RBC # FLD: 16 % — HIGH (ref 10.3–14.5)
RBC # FLD: 16.4 % — HIGH (ref 10.3–14.5)
SODIUM SERPL-SCNC: 137 MMOL/L — SIGNIFICANT CHANGE UP (ref 135–145)
WBC # BLD: 8.29 K/UL — SIGNIFICANT CHANGE UP (ref 3.8–10.5)
WBC # BLD: 8.46 K/UL — SIGNIFICANT CHANGE UP (ref 3.8–10.5)
WBC # FLD AUTO: 8.29 K/UL — SIGNIFICANT CHANGE UP (ref 3.8–10.5)
WBC # FLD AUTO: 8.46 K/UL — SIGNIFICANT CHANGE UP (ref 3.8–10.5)

## 2020-10-26 PROCEDURE — 99233 SBSQ HOSP IP/OBS HIGH 50: CPT

## 2020-10-26 PROCEDURE — 99231 SBSQ HOSP IP/OBS SF/LOW 25: CPT

## 2020-10-26 RX ORDER — HEPARIN SODIUM 5000 [USP'U]/ML
7000 INJECTION INTRAVENOUS; SUBCUTANEOUS ONCE
Refills: 0 | Status: COMPLETED | OUTPATIENT
Start: 2020-10-26 | End: 2020-10-26

## 2020-10-26 RX ORDER — MAGNESIUM SULFATE 500 MG/ML
2 VIAL (ML) INJECTION ONCE
Refills: 0 | Status: COMPLETED | OUTPATIENT
Start: 2020-10-26 | End: 2020-10-26

## 2020-10-26 RX ORDER — ASCORBIC ACID 60 MG
500 TABLET,CHEWABLE ORAL
Refills: 0 | Status: DISCONTINUED | OUTPATIENT
Start: 2020-10-26 | End: 2020-10-30

## 2020-10-26 RX ORDER — ZINC SULFATE TAB 220 MG (50 MG ZINC EQUIVALENT) 220 (50 ZN) MG
220 TAB ORAL DAILY
Refills: 0 | Status: DISCONTINUED | OUTPATIENT
Start: 2020-10-26 | End: 2020-10-30

## 2020-10-26 RX ORDER — HEPARIN SODIUM 5000 [USP'U]/ML
1200 INJECTION INTRAVENOUS; SUBCUTANEOUS
Qty: 25000 | Refills: 0 | Status: DISCONTINUED | OUTPATIENT
Start: 2020-10-26 | End: 2020-10-30

## 2020-10-26 RX ORDER — HEPARIN SODIUM 5000 [USP'U]/ML
7000 INJECTION INTRAVENOUS; SUBCUTANEOUS EVERY 6 HOURS
Refills: 0 | Status: DISCONTINUED | OUTPATIENT
Start: 2020-10-26 | End: 2020-10-30

## 2020-10-26 RX ORDER — HEPARIN SODIUM 5000 [USP'U]/ML
3500 INJECTION INTRAVENOUS; SUBCUTANEOUS EVERY 6 HOURS
Refills: 0 | Status: DISCONTINUED | OUTPATIENT
Start: 2020-10-26 | End: 2020-10-30

## 2020-10-26 RX ORDER — HYDRALAZINE HCL 50 MG
50 TABLET ORAL THREE TIMES A DAY
Refills: 0 | Status: DISCONTINUED | OUTPATIENT
Start: 2020-10-26 | End: 2020-10-30

## 2020-10-26 RX ADMIN — SODIUM CHLORIDE 42 MILLILITER(S): 9 INJECTION, SOLUTION INTRAVENOUS at 03:14

## 2020-10-26 RX ADMIN — Medication 40 MILLIGRAM(S): at 07:11

## 2020-10-26 RX ADMIN — Medication 3 MILLILITER(S): at 08:12

## 2020-10-26 RX ADMIN — Medication 2: at 23:11

## 2020-10-26 RX ADMIN — Medication 3 MILLILITER(S): at 04:05

## 2020-10-26 RX ADMIN — PANTOPRAZOLE SODIUM 40 MILLIGRAM(S): 20 TABLET, DELAYED RELEASE ORAL at 07:11

## 2020-10-26 RX ADMIN — Medication 50 GRAM(S): at 10:05

## 2020-10-26 RX ADMIN — LIDOCAINE 1 PATCH: 4 CREAM TOPICAL at 11:40

## 2020-10-26 RX ADMIN — Medication 500 MILLIGRAM(S): at 17:58

## 2020-10-26 RX ADMIN — CARVEDILOL PHOSPHATE 12.5 MILLIGRAM(S): 80 CAPSULE, EXTENDED RELEASE ORAL at 18:55

## 2020-10-26 RX ADMIN — ERYTHROPOIETIN 10000 UNIT(S): 10000 INJECTION, SOLUTION INTRAVENOUS; SUBCUTANEOUS at 12:20

## 2020-10-26 RX ADMIN — TRAMADOL HYDROCHLORIDE 50 MILLIGRAM(S): 50 TABLET ORAL at 17:58

## 2020-10-26 RX ADMIN — TEMAZEPAM 30 MILLIGRAM(S): 15 CAPSULE ORAL at 23:01

## 2020-10-26 RX ADMIN — MAGNESIUM OXIDE 400 MG ORAL TABLET 400 MILLIGRAM(S): 241.3 TABLET ORAL at 12:20

## 2020-10-26 RX ADMIN — LIDOCAINE 1 PATCH: 4 CREAM TOPICAL at 19:00

## 2020-10-26 RX ADMIN — Medication 100 MILLIGRAM(S): at 11:40

## 2020-10-26 RX ADMIN — ZINC SULFATE TAB 220 MG (50 MG ZINC EQUIVALENT) 220 MILLIGRAM(S): 220 (50 ZN) TAB at 12:21

## 2020-10-26 RX ADMIN — Medication 3 MILLILITER(S): at 14:49

## 2020-10-26 RX ADMIN — BUDESONIDE AND FORMOTEROL FUMARATE DIHYDRATE 2 PUFF(S): 160; 4.5 AEROSOL RESPIRATORY (INHALATION) at 08:13

## 2020-10-26 RX ADMIN — HEPARIN SODIUM 1200 UNIT(S)/HR: 5000 INJECTION INTRAVENOUS; SUBCUTANEOUS at 20:25

## 2020-10-26 RX ADMIN — TAMSULOSIN HYDROCHLORIDE 0.4 MILLIGRAM(S): 0.4 CAPSULE ORAL at 23:00

## 2020-10-26 RX ADMIN — Medication 50 MILLIGRAM(S): at 13:46

## 2020-10-26 RX ADMIN — HEPARIN SODIUM 7000 UNIT(S): 5000 INJECTION INTRAVENOUS; SUBCUTANEOUS at 13:37

## 2020-10-26 RX ADMIN — LIDOCAINE 1 PATCH: 4 CREAM TOPICAL at 23:14

## 2020-10-26 RX ADMIN — CHLORHEXIDINE GLUCONATE 1 APPLICATION(S): 213 SOLUTION TOPICAL at 11:48

## 2020-10-26 RX ADMIN — BUDESONIDE AND FORMOTEROL FUMARATE DIHYDRATE 2 PUFF(S): 160; 4.5 AEROSOL RESPIRATORY (INHALATION) at 20:35

## 2020-10-26 RX ADMIN — Medication 3 MILLILITER(S): at 20:35

## 2020-10-26 RX ADMIN — HEPARIN SODIUM 1200 UNIT(S)/HR: 5000 INJECTION INTRAVENOUS; SUBCUTANEOUS at 13:34

## 2020-10-26 RX ADMIN — CARVEDILOL PHOSPHATE 12.5 MILLIGRAM(S): 80 CAPSULE, EXTENDED RELEASE ORAL at 07:10

## 2020-10-26 RX ADMIN — Medication 6: at 12:21

## 2020-10-26 RX ADMIN — Medication 1 TABLET(S): at 11:40

## 2020-10-26 RX ADMIN — ATORVASTATIN CALCIUM 20 MILLIGRAM(S): 80 TABLET, FILM COATED ORAL at 23:00

## 2020-10-26 NOTE — PROGRESS NOTE ADULT - SUBJECTIVE AND OBJECTIVE BOX
INTERVAL HPI/ OVERNIGHT EVENTS: wound vac applied to LLE yesterday, wound not ready for skin grafting,    Patient states pain is well controlled, tolerating diet, voiding spontaneously, On PT.     MEDICATIONS  (STANDING):  ALBUTerol    90 MICROgram(s) HFA Inhaler 1 Puff(s) Inhalation every 4 hours  albuterol/ipratropium for Nebulization 3 milliLiter(s) Nebulizer every 6 hours  allopurinol 100 milliGRAM(s) Oral daily  atorvastatin 20 milliGRAM(s) Oral at bedtime  budesonide 160 MICROgram(s)/formoterol 4.5 MICROgram(s) Inhaler 2 Puff(s) Inhalation two times a day  carvedilol 12.5 milliGRAM(s) Oral every 12 hours  chlorhexidine 2% Cloths 1 Application(s) Topical daily  epoetin felisha-epbx (RETACRIT) Injectable 99905 Unit(s) SubCutaneous <User Schedule>  furosemide    Tablet 40 milliGRAM(s) Oral daily  heparin  Infusion. 1200 Unit(s)/Hr (12 mL/Hr) IV Continuous <Continuous>  insulin lispro (ADMELOG) corrective regimen sliding scale   SubCutaneous Before meals and at bedtime  lidocaine   Patch 1 Patch Transdermal daily  magnesium oxide 400 milliGRAM(s) Oral two times a day with meals  multiple electrolytes Injection Type 1 1000 milliLiter(s) (42 mL/Hr) IV Continuous <Continuous>  pantoprazole    Tablet 40 milliGRAM(s) Oral before breakfast  senna 2 Tablet(s) Oral at bedtime  tamsulosin 0.4 milliGRAM(s) Oral at bedtime  tiotropium 18 MICROgram(s) Capsule 1 Capsule(s) Inhalation daily    MEDICATIONS  (PRN):  acetaminophen   Tablet .. 650 milliGRAM(s) Oral every 6 hours PRN Mild Pain (1 - 3)  temazepam 30 milliGRAM(s) Oral at bedtime PRN Insomnia  traMADol 25 milliGRAM(s) Oral every 4 hours PRN Moderate Pain (4 - 6)  traMADol 50 milliGRAM(s) Oral every 4 hours PRN Severe Pain (7 - 10)    Vital Signs Last 24 Hrs,    T(C): 36.7 (25 Oct 2020 23:28), Max: 36.7 (25 Oct 2020 04:52)  T(F): 98 (25 Oct 2020 23:28), Max: 98.1 (25 Oct 2020 04:52)  HR: 87 (26 Oct 2020 04:06) (68 - 94)  BP: 144/71 (25 Oct 2020 23:28) (118/64 - 144/71)  RR: 18 (25 Oct 2020 23:28) (18 - 18)  SpO2: 95% (25 Oct 2020 23:28) (94% - 98%)    Gen: A&Ox3, NAD. Laying comfortably in bed  HEENT: NCAT, PERRLA, EOMI  Pulm: Regular respiratory rate, no distress. On NC  CV: RRR  Abd: Soft, NTND  Ext: wound vac to LLE, good seal no leaks, 125mmHg    I&O's Detail    24 Oct 2020 07:01  -  25 Oct 2020 07:00  --------------------------------------------------------  IN:    Heparin Infusion: 168 mL  Total IN: 168 mL    OUT:    Voided (mL): 1000 mL  Total OUT: 1000 mL    Total NET: -832 mL    25 Oct 2020 07:01  -  26 Oct 2020 04:41  --------------------------------------------------------  IN:    Heparin Infusion: 134 mL  Total IN: 134 mL    OUT:  Total OUT: 0 mL    Total NET: 134 mL    LABS:                        7.4    9.96  )-----------( 240      ( 25 Oct 2020 06:28 )             22.8     10-25    137  |  101  |  60.0<H>  ----------------------------<  119<H>  3.8   |  26.0  |  1.45<H>    Ca    7.8<L>      25 Oct 2020 06:28  Phos  4.0     10-25  Mg     1.5     10-25    PTT - ( 25 Oct 2020 20:36 )  PTT:63.6 sec    KDIGO Care Bundle:    Avoidance of nephrotoxins/nephrotoxin medication adjustment  Medication dosage adjustment for kidney function  Continuous IVF administration (guided by CVP and testing of fluid responsiveness as Indicated,   Discontinuation of ACE/ARBs ,  Close monitoring of serum Creatinine and UO  Acid-base, electrolyte and albumin status management  Avoidance of hyperglycemia ,  Consider alternatives to radiocontrast administration  Optimizing hemodynamics:

## 2020-10-26 NOTE — PROGRESS NOTE ADULT - SUBJECTIVE AND OBJECTIVE BOX
INTERVAL HPI/OVERNIGHT EVENTS: wound vac applied to LLE yesterday, wound not ready for skin grafting  Patient states pain is well controlled, tolerating a diet, voiding spontaneously, working with PT.     MEDICATIONS  (STANDING):  ALBUTerol    90 MICROgram(s) HFA Inhaler 1 Puff(s) Inhalation every 4 hours  albuterol/ipratropium for Nebulization 3 milliLiter(s) Nebulizer every 6 hours  allopurinol 100 milliGRAM(s) Oral daily  atorvastatin 20 milliGRAM(s) Oral at bedtime  budesonide 160 MICROgram(s)/formoterol 4.5 MICROgram(s) Inhaler 2 Puff(s) Inhalation two times a day  carvedilol 12.5 milliGRAM(s) Oral every 12 hours  chlorhexidine 2% Cloths 1 Application(s) Topical daily  epoetin felisha-epbx (RETACRIT) Injectable 74848 Unit(s) SubCutaneous <User Schedule>  furosemide    Tablet 40 milliGRAM(s) Oral daily  heparin  Infusion. 1200 Unit(s)/Hr (12 mL/Hr) IV Continuous <Continuous>  insulin lispro (ADMELOG) corrective regimen sliding scale   SubCutaneous Before meals and at bedtime  lidocaine   Patch 1 Patch Transdermal daily  magnesium oxide 400 milliGRAM(s) Oral two times a day with meals  multiple electrolytes Injection Type 1 1000 milliLiter(s) (42 mL/Hr) IV Continuous <Continuous>  pantoprazole    Tablet 40 milliGRAM(s) Oral before breakfast  senna 2 Tablet(s) Oral at bedtime  tamsulosin 0.4 milliGRAM(s) Oral at bedtime  tiotropium 18 MICROgram(s) Capsule 1 Capsule(s) Inhalation daily    MEDICATIONS  (PRN):  acetaminophen   Tablet .. 650 milliGRAM(s) Oral every 6 hours PRN Mild Pain (1 - 3)  temazepam 30 milliGRAM(s) Oral at bedtime PRN Insomnia  traMADol 25 milliGRAM(s) Oral every 4 hours PRN Moderate Pain (4 - 6)  traMADol 50 milliGRAM(s) Oral every 4 hours PRN Severe Pain (7 - 10)      Vital Signs Last 24 Hrs  T(C): 36.7 (25 Oct 2020 23:28), Max: 36.7 (25 Oct 2020 04:52)  T(F): 98 (25 Oct 2020 23:28), Max: 98.1 (25 Oct 2020 04:52)  HR: 87 (26 Oct 2020 04:06) (68 - 94)  BP: 144/71 (25 Oct 2020 23:28) (118/64 - 144/71)  BP(mean): --  RR: 18 (25 Oct 2020 23:28) (18 - 18)  SpO2: 95% (25 Oct 2020 23:28) (94% - 98%)    Gen: A&Ox3, NAD. Laying comfortably in bed  HEENT: NCAT, PERRLA, EOMI  Pulm: Regular respiratory rate, no distress. On NC  CV: RRR  Abd: Soft, NTND  Ext: wound vac to LLE, good seal no leaks, 125mmHg    I&O's Detail    24 Oct 2020 07:01  -  25 Oct 2020 07:00  --------------------------------------------------------  IN:    Heparin Infusion: 168 mL  Total IN: 168 mL    OUT:    Voided (mL): 1000 mL  Total OUT: 1000 mL    Total NET: -832 mL      25 Oct 2020 07:01  -  26 Oct 2020 04:41  --------------------------------------------------------  IN:    Heparin Infusion: 134 mL  Total IN: 134 mL    OUT:  Total OUT: 0 mL    Total NET: 134 mL    LABS:                        7.4    9.96  )-----------( 240      ( 25 Oct 2020 06:28 )             22.8     10-25    137  |  101  |  60.0<H>  ----------------------------<  119<H>  3.8   |  26.0  |  1.45<H>    Ca    7.8<L>      25 Oct 2020 06:28  Phos  4.0     10-25  Mg     1.5     10-25      PTT - ( 25 Oct 2020 20:36 )  PTT:63.6 sec

## 2020-10-26 NOTE — PROGRESS NOTE ADULT - PROVIDER SPECIALTY LIST ADULT
Nephrology Patient called for pain medication. Attending nurse informed patient that next PRN dose cannot be administered until 0545.

## 2020-10-26 NOTE — PROGRESS NOTE ADULT - ASSESSMENT
A/P: 69 y/o M w/PMH COPD (on home O2), Afib (on eliquis), CHF, and CKD s/p mechanical fall with extensive hematoma overlaying entire left side of body. S/p LLE hematoma evacuation and debridement, complicated by hypotension and bradycardia on anesthesia reversal, prompting brief ICU stay. Currently stable on floor, s/p wound vac application to LLE wound  - next wound vac change 10/28  - continue heparin gtt, trend ptt  -F/U urine nitrogen, creatinine

## 2020-10-26 NOTE — PROGRESS NOTE ADULT - ASSESSMENT
A/P: 69 y/o M w/PMH COPD (on home O2), Afib (on eliquis), CHF, and CKD s/p mechanical fall with extensive hematoma overlaying entire left side of body.     S/p LLE hematoma evacuation and debridement, complicated by hypotension and bradycardia on anesthesia reversal, prompting brief ICU stay.     Currently stable on floor,     s/p wound vac application to LLE wound,    - next wound vac change 10/28/20,     On Heparin,     eGFR Stabilized, PRBC Transfusion PRN,     Please call as Needed, Ty.

## 2020-10-27 DIAGNOSIS — N18.9 CHRONIC KIDNEY DISEASE, UNSPECIFIED: ICD-10-CM

## 2020-10-27 DIAGNOSIS — S80.10XA CONTUSION OF UNSPECIFIED LOWER LEG, INITIAL ENCOUNTER: ICD-10-CM

## 2020-10-27 DIAGNOSIS — I48.91 UNSPECIFIED ATRIAL FIBRILLATION: ICD-10-CM

## 2020-10-27 LAB
ANION GAP SERPL CALC-SCNC: 10 MMOL/L — SIGNIFICANT CHANGE UP (ref 5–17)
ANISOCYTOSIS BLD QL: SIGNIFICANT CHANGE UP
APTT BLD: 43.3 SEC — HIGH (ref 27.5–35.5)
APTT BLD: 66.8 SEC — HIGH (ref 27.5–35.5)
APTT BLD: 92.3 SEC — HIGH (ref 27.5–35.5)
BASO STIPL BLD QL SMEAR: PRESENT — SIGNIFICANT CHANGE UP
BASOPHILS # BLD AUTO: 0 K/UL — SIGNIFICANT CHANGE UP (ref 0–0.2)
BASOPHILS NFR BLD AUTO: 0 % — SIGNIFICANT CHANGE UP (ref 0–2)
BUN SERPL-MCNC: 63 MG/DL — HIGH (ref 8–20)
CALCIUM SERPL-MCNC: 7.9 MG/DL — LOW (ref 8.6–10.2)
CHLORIDE SERPL-SCNC: 97 MMOL/L — LOW (ref 98–107)
CO2 SERPL-SCNC: 27 MMOL/L — SIGNIFICANT CHANGE UP (ref 22–29)
CREAT SERPL-MCNC: 1.56 MG/DL — HIGH (ref 0.5–1.3)
EOSINOPHIL # BLD AUTO: 0 K/UL — SIGNIFICANT CHANGE UP (ref 0–0.5)
EOSINOPHIL NFR BLD AUTO: 0 % — SIGNIFICANT CHANGE UP (ref 0–6)
GIANT PLATELETS BLD QL SMEAR: PRESENT — SIGNIFICANT CHANGE UP
GLUCOSE BLDC GLUCOMTR-MCNC: 128 MG/DL — HIGH (ref 70–99)
GLUCOSE BLDC GLUCOMTR-MCNC: 152 MG/DL — HIGH (ref 70–99)
GLUCOSE BLDC GLUCOMTR-MCNC: 170 MG/DL — HIGH (ref 70–99)
GLUCOSE BLDC GLUCOMTR-MCNC: 193 MG/DL — HIGH (ref 70–99)
GLUCOSE SERPL-MCNC: 109 MG/DL — HIGH (ref 70–99)
HCT VFR BLD CALC: 23.5 % — LOW (ref 39–50)
HGB BLD-MCNC: 7.4 G/DL — LOW (ref 13–17)
LYMPHOCYTES # BLD AUTO: 0.66 K/UL — LOW (ref 1–3.3)
LYMPHOCYTES # BLD AUTO: 7.9 % — LOW (ref 13–44)
MACROCYTES BLD QL: SIGNIFICANT CHANGE UP
MAGNESIUM SERPL-MCNC: 1.8 MG/DL — SIGNIFICANT CHANGE UP (ref 1.6–2.6)
MANUAL SMEAR VERIFICATION: SIGNIFICANT CHANGE UP
MCHC RBC-ENTMCNC: 31.5 GM/DL — LOW (ref 32–36)
MCHC RBC-ENTMCNC: 35.1 PG — HIGH (ref 27–34)
MCV RBC AUTO: 111.4 FL — HIGH (ref 80–100)
MICROCYTES BLD QL: SLIGHT — SIGNIFICANT CHANGE UP
MONOCYTES # BLD AUTO: 0.74 K/UL — SIGNIFICANT CHANGE UP (ref 0–0.9)
MONOCYTES NFR BLD AUTO: 8.8 % — SIGNIFICANT CHANGE UP (ref 2–14)
NEUTROPHILS # BLD AUTO: 6.92 K/UL — SIGNIFICANT CHANGE UP (ref 1.8–7.4)
NEUTROPHILS NFR BLD AUTO: 82.4 % — HIGH (ref 43–77)
OVALOCYTES BLD QL SMEAR: SLIGHT — SIGNIFICANT CHANGE UP
PHOSPHATE SERPL-MCNC: 3.8 MG/DL — SIGNIFICANT CHANGE UP (ref 2.4–4.7)
PLAT MORPH BLD: NORMAL — SIGNIFICANT CHANGE UP
PLATELET # BLD AUTO: 212 K/UL — SIGNIFICANT CHANGE UP (ref 150–400)
POLYCHROMASIA BLD QL SMEAR: SLIGHT — SIGNIFICANT CHANGE UP
POTASSIUM SERPL-MCNC: 4.5 MMOL/L — SIGNIFICANT CHANGE UP (ref 3.5–5.3)
POTASSIUM SERPL-SCNC: 4.5 MMOL/L — SIGNIFICANT CHANGE UP (ref 3.5–5.3)
RBC # BLD: 2.11 M/UL — LOW (ref 4.2–5.8)
RBC # FLD: 16.2 % — HIGH (ref 10.3–14.5)
RBC BLD AUTO: ABNORMAL
SODIUM SERPL-SCNC: 134 MMOL/L — LOW (ref 135–145)
VARIANT LYMPHS # BLD: 0.9 % — SIGNIFICANT CHANGE UP (ref 0–6)
WBC # BLD: 8.4 K/UL — SIGNIFICANT CHANGE UP (ref 3.8–10.5)
WBC # FLD AUTO: 8.4 K/UL — SIGNIFICANT CHANGE UP (ref 3.8–10.5)

## 2020-10-27 PROCEDURE — 99233 SBSQ HOSP IP/OBS HIGH 50: CPT

## 2020-10-27 PROCEDURE — 99231 SBSQ HOSP IP/OBS SF/LOW 25: CPT

## 2020-10-27 RX ORDER — THIAMINE MONONITRATE (VIT B1) 100 MG
100 TABLET ORAL DAILY
Refills: 0 | Status: DISCONTINUED | OUTPATIENT
Start: 2020-10-27 | End: 2020-10-30

## 2020-10-27 RX ORDER — MAGNESIUM SULFATE 500 MG/ML
1 VIAL (ML) INJECTION ONCE
Refills: 0 | Status: COMPLETED | OUTPATIENT
Start: 2020-10-27 | End: 2020-10-27

## 2020-10-27 RX ORDER — FOLIC ACID 0.8 MG
1 TABLET ORAL DAILY
Refills: 0 | Status: DISCONTINUED | OUTPATIENT
Start: 2020-10-27 | End: 2020-10-30

## 2020-10-27 RX ADMIN — Medication 100 GRAM(S): at 10:06

## 2020-10-27 RX ADMIN — CARVEDILOL PHOSPHATE 12.5 MILLIGRAM(S): 80 CAPSULE, EXTENDED RELEASE ORAL at 17:26

## 2020-10-27 RX ADMIN — TAMSULOSIN HYDROCHLORIDE 0.4 MILLIGRAM(S): 0.4 CAPSULE ORAL at 21:18

## 2020-10-27 RX ADMIN — HEPARIN SODIUM 3500 UNIT(S): 5000 INJECTION INTRAVENOUS; SUBCUTANEOUS at 04:37

## 2020-10-27 RX ADMIN — Medication 500 MILLIGRAM(S): at 05:17

## 2020-10-27 RX ADMIN — Medication 2: at 21:18

## 2020-10-27 RX ADMIN — LIDOCAINE 1 PATCH: 4 CREAM TOPICAL at 23:27

## 2020-10-27 RX ADMIN — Medication 100 MILLIGRAM(S): at 08:53

## 2020-10-27 RX ADMIN — LIDOCAINE 1 PATCH: 4 CREAM TOPICAL at 08:52

## 2020-10-27 RX ADMIN — Medication 1 TABLET(S): at 08:53

## 2020-10-27 RX ADMIN — BUDESONIDE AND FORMOTEROL FUMARATE DIHYDRATE 2 PUFF(S): 160; 4.5 AEROSOL RESPIRATORY (INHALATION) at 20:55

## 2020-10-27 RX ADMIN — HEPARIN SODIUM 1400 UNIT(S)/HR: 5000 INJECTION INTRAVENOUS; SUBCUTANEOUS at 04:37

## 2020-10-27 RX ADMIN — ATORVASTATIN CALCIUM 20 MILLIGRAM(S): 80 TABLET, FILM COATED ORAL at 21:17

## 2020-10-27 RX ADMIN — Medication 2: at 12:15

## 2020-10-27 RX ADMIN — PANTOPRAZOLE SODIUM 40 MILLIGRAM(S): 20 TABLET, DELAYED RELEASE ORAL at 05:18

## 2020-10-27 RX ADMIN — Medication 50 MILLIGRAM(S): at 05:16

## 2020-10-27 RX ADMIN — Medication 1 MILLIGRAM(S): at 08:53

## 2020-10-27 RX ADMIN — CARVEDILOL PHOSPHATE 12.5 MILLIGRAM(S): 80 CAPSULE, EXTENDED RELEASE ORAL at 05:17

## 2020-10-27 RX ADMIN — Medication 3 MILLILITER(S): at 20:54

## 2020-10-27 RX ADMIN — Medication 40 MILLIGRAM(S): at 05:17

## 2020-10-27 RX ADMIN — Medication 500 MILLIGRAM(S): at 17:26

## 2020-10-27 RX ADMIN — HEPARIN SODIUM 1400 UNIT(S)/HR: 5000 INJECTION INTRAVENOUS; SUBCUTANEOUS at 12:23

## 2020-10-27 RX ADMIN — Medication 2: at 17:26

## 2020-10-27 RX ADMIN — Medication 3 MILLILITER(S): at 09:14

## 2020-10-27 RX ADMIN — TEMAZEPAM 30 MILLIGRAM(S): 15 CAPSULE ORAL at 21:35

## 2020-10-27 RX ADMIN — CHLORHEXIDINE GLUCONATE 1 APPLICATION(S): 213 SOLUTION TOPICAL at 08:53

## 2020-10-27 RX ADMIN — Medication 3 MILLILITER(S): at 03:30

## 2020-10-27 RX ADMIN — BUDESONIDE AND FORMOTEROL FUMARATE DIHYDRATE 2 PUFF(S): 160; 4.5 AEROSOL RESPIRATORY (INHALATION) at 09:13

## 2020-10-27 RX ADMIN — Medication 3 MILLILITER(S): at 15:01

## 2020-10-27 RX ADMIN — ZINC SULFATE TAB 220 MG (50 MG ZINC EQUIVALENT) 220 MILLIGRAM(S): 220 (50 ZN) TAB at 08:53

## 2020-10-27 RX ADMIN — SENNA PLUS 2 TABLET(S): 8.6 TABLET ORAL at 21:17

## 2020-10-27 RX ADMIN — HEPARIN SODIUM 1400 UNIT(S)/HR: 5000 INJECTION INTRAVENOUS; SUBCUTANEOUS at 19:19

## 2020-10-27 NOTE — PROGRESS NOTE ADULT - SUBJECTIVE AND OBJECTIVE BOX
SUBJECTIVE/24 hour events:  Patient is a 70yMale with large left anterior calf hematoma with evacuation and debridement on 10/21, wound vac was placed on 10/25, not ready yet for skin graft, vac to be taken down today. Patient had no issues overnight, bun and creatinine back to baseline, renal signed off. Patient has no complaints, getting out of bed, tolerating a diet, no pain issues, hemoglobin stable. Evaluated by pt/ot and when medically stable can be discharged home with home pt and assistance from spouse.       Vital Signs Last 24 Hrs  T(C): 36.9 (26 Oct 2020 19:43), Max: 36.9 (26 Oct 2020 19:43)  T(F): 98.5 (26 Oct 2020 19:43), Max: 98.5 (26 Oct 2020 19:43)  HR: 75 (26 Oct 2020 19:43) (69 - 87)  BP: 128/72 (26 Oct 2020 19:43) (120/70 - 132/65)  BP(mean): --  RR: 17 (26 Oct 2020 19:43) (17 - 18)  SpO2: 94% (26 Oct 2020 19:43) (92% - 95%)  Drug Dosing Weight  Height (cm): 167.6 (15 Oct 2020 10:31)  Weight (kg): 89 (21 Oct 2020 10:16)  BMI (kg/m2): 31.7 (21 Oct 2020 10:16)  BSA (m2): 1.98 (21 Oct 2020 10:16)  I&O's Detail    25 Oct 2020 07:01  -  26 Oct 2020 07:00  --------------------------------------------------------  IN:    Heparin Infusion: 134 mL  Total IN: 134 mL    OUT:  Total OUT: 0 mL    Total NET: 134 mL      26 Oct 2020 07:01  -  27 Oct 2020 01:19  --------------------------------------------------------  IN:    Oral Fluid: 240 mL  Total IN: 240 mL    OUT:    Voided (mL): 400 mL  Total OUT: 400 mL    Total NET: -160 mL        Allergies    No Known Allergies    Intolerances                              7.7    8.46  )-----------( 216      ( 26 Oct 2020 19:45 )             24.2   10-26    137  |  100  |  61.0<H>  ----------------------------<  114<H>  4.3   |  25.0  |  1.40<H>    Ca    7.9<L>      26 Oct 2020 05:36  Phos  4.1     10-26  Mg     1.5     10-26    PTT - ( 26 Oct 2020 19:45 )  PTT:64.2 sec    ROS:    PHYSICAL EXAM:  Constitutional: in good spirits, " all things considered Im doing pretty good"     Respiratory: no respiratory distress, no conversational dyspnea, no supplemental o2 needed     Gastrointestinal: abdomen soft, non-tender, atraumatic     Genitourinary: voiding spontaneously     Extremities: lle NVI, extremity warm to touch, vac well seated, wound drainage with moderate amount of sanguineous output     Neurological: A&OX3     Skin: warm, dry and no rashes           MEDICATIONS  (STANDING):  ALBUTerol    90 MICROgram(s) HFA Inhaler 1 Puff(s) Inhalation every 4 hours  albuterol/ipratropium for Nebulization 3 milliLiter(s) Nebulizer every 6 hours  allopurinol 100 milliGRAM(s) Oral daily  ascorbic acid 500 milliGRAM(s) Oral two times a day  atorvastatin 20 milliGRAM(s) Oral at bedtime  budesonide 160 MICROgram(s)/formoterol 4.5 MICROgram(s) Inhaler 2 Puff(s) Inhalation two times a day  carvedilol 12.5 milliGRAM(s) Oral every 12 hours  chlorhexidine 2% Cloths 1 Application(s) Topical daily  epoetin felisha-epbx (RETACRIT) Injectable 53055 Unit(s) SubCutaneous <User Schedule>  furosemide    Tablet 40 milliGRAM(s) Oral daily  heparin  Infusion. 1200 Unit(s)/Hr (12 mL/Hr) IV Continuous <Continuous>  hydrALAZINE 50 milliGRAM(s) Oral three times a day  insulin lispro (ADMELOG) corrective regimen sliding scale   SubCutaneous Before meals and at bedtime  lidocaine   Patch 1 Patch Transdermal daily  multivitamin 1 Tablet(s) Oral daily  pantoprazole    Tablet 40 milliGRAM(s) Oral before breakfast  senna 2 Tablet(s) Oral at bedtime  tamsulosin 0.4 milliGRAM(s) Oral at bedtime  tiotropium 18 MICROgram(s) Capsule 1 Capsule(s) Inhalation daily  zinc sulfate 220 milliGRAM(s) Oral daily    MEDICATIONS  (PRN):  acetaminophen   Tablet .. 650 milliGRAM(s) Oral every 6 hours PRN Mild Pain (1 - 3)  heparin   Injectable 7000 Unit(s) IV Push every 6 hours PRN For aPTT less than 40  heparin   Injectable 3500 Unit(s) IV Push every 6 hours PRN For aPTT between 40 - 57  temazepam 30 milliGRAM(s) Oral at bedtime PRN Insomnia  traMADol 25 milliGRAM(s) Oral every 4 hours PRN Moderate Pain (4 - 6)  traMADol 50 milliGRAM(s) Oral every 4 hours PRN Severe Pain (7 - 10)      RADIOLOGY STUDIES:    CULTURES:

## 2020-10-27 NOTE — PROGRESS NOTE ADULT - PROBLEM SELECTOR PLAN 1
continue wound vac chagne q 3days next take down today  skin graft when wound is ready   monitor vac output  pain control prn  monitor hemoglobin   continue home medications  encourage participation with pt/ot  when medically stable can be discharged home with home pt

## 2020-10-28 LAB
ACANTHOCYTES BLD QL SMEAR: SLIGHT — SIGNIFICANT CHANGE UP
ANION GAP SERPL CALC-SCNC: 12 MMOL/L — SIGNIFICANT CHANGE UP (ref 5–17)
ANISOCYTOSIS BLD QL: SIGNIFICANT CHANGE UP
APTT BLD: 193.3 SEC — CRITICAL HIGH (ref 27.5–35.5)
APTT BLD: 41.7 SEC — HIGH (ref 27.5–35.5)
APTT BLD: 51.9 SEC — HIGH (ref 27.5–35.5)
BASOPHILS # BLD AUTO: 0.08 K/UL — SIGNIFICANT CHANGE UP (ref 0–0.2)
BASOPHILS NFR BLD AUTO: 0.9 % — SIGNIFICANT CHANGE UP (ref 0–2)
BUN SERPL-MCNC: 72 MG/DL — HIGH (ref 8–20)
CALCIUM SERPL-MCNC: 8.2 MG/DL — LOW (ref 8.6–10.2)
CHLORIDE SERPL-SCNC: 97 MMOL/L — LOW (ref 98–107)
CO2 SERPL-SCNC: 25 MMOL/L — SIGNIFICANT CHANGE UP (ref 22–29)
CREAT SERPL-MCNC: 1.58 MG/DL — HIGH (ref 0.5–1.3)
EOSINOPHIL # BLD AUTO: 0.08 K/UL — SIGNIFICANT CHANGE UP (ref 0–0.5)
EOSINOPHIL NFR BLD AUTO: 0.9 % — SIGNIFICANT CHANGE UP (ref 0–6)
GLUCOSE BLDC GLUCOMTR-MCNC: 158 MG/DL — HIGH (ref 70–99)
GLUCOSE BLDC GLUCOMTR-MCNC: 163 MG/DL — HIGH (ref 70–99)
GLUCOSE BLDC GLUCOMTR-MCNC: 164 MG/DL — HIGH (ref 70–99)
GLUCOSE BLDC GLUCOMTR-MCNC: 170 MG/DL — HIGH (ref 70–99)
GLUCOSE SERPL-MCNC: 137 MG/DL — HIGH (ref 70–99)
HCT VFR BLD CALC: 23 % — LOW (ref 39–50)
HGB BLD-MCNC: 7.9 G/DL — LOW (ref 13–17)
LYMPHOCYTES # BLD AUTO: 0.92 K/UL — LOW (ref 1–3.3)
LYMPHOCYTES # BLD AUTO: 10.5 % — LOW (ref 13–44)
MACROCYTES BLD QL: SLIGHT — SIGNIFICANT CHANGE UP
MAGNESIUM SERPL-MCNC: 1.8 MG/DL — SIGNIFICANT CHANGE UP (ref 1.6–2.6)
MANUAL SMEAR VERIFICATION: SIGNIFICANT CHANGE UP
MCHC RBC-ENTMCNC: 34.3 GM/DL — SIGNIFICANT CHANGE UP (ref 32–36)
MCHC RBC-ENTMCNC: 40.1 PG — HIGH (ref 27–34)
MCV RBC AUTO: 116.8 FL — HIGH (ref 80–100)
METAMYELOCYTES # FLD: 1.8 % — HIGH (ref 0–0)
MICROCYTES BLD QL: SLIGHT — SIGNIFICANT CHANGE UP
MONOCYTES # BLD AUTO: 0.92 K/UL — HIGH (ref 0–0.9)
MONOCYTES NFR BLD AUTO: 10.5 % — SIGNIFICANT CHANGE UP (ref 2–14)
MYELOCYTES NFR BLD: 0.9 % — HIGH (ref 0–0)
NEUTROPHILS # BLD AUTO: 6.44 K/UL — SIGNIFICANT CHANGE UP (ref 1.8–7.4)
NEUTROPHILS NFR BLD AUTO: 71.9 % — SIGNIFICANT CHANGE UP (ref 43–77)
NEUTS BAND # BLD: 1.7 % — SIGNIFICANT CHANGE UP (ref 0–8)
PHOSPHATE SERPL-MCNC: 3.7 MG/DL — SIGNIFICANT CHANGE UP (ref 2.4–4.7)
PLAT MORPH BLD: NORMAL — SIGNIFICANT CHANGE UP
PLATELET # BLD AUTO: 208 K/UL — SIGNIFICANT CHANGE UP (ref 150–400)
POIKILOCYTOSIS BLD QL AUTO: SLIGHT — SIGNIFICANT CHANGE UP
POLYCHROMASIA BLD QL SMEAR: SLIGHT — SIGNIFICANT CHANGE UP
POTASSIUM SERPL-MCNC: 4.3 MMOL/L — SIGNIFICANT CHANGE UP (ref 3.5–5.3)
POTASSIUM SERPL-SCNC: 4.3 MMOL/L — SIGNIFICANT CHANGE UP (ref 3.5–5.3)
RBC # BLD: 1.97 M/UL — LOW (ref 4.2–5.8)
RBC # FLD: 18.6 % — HIGH (ref 10.3–14.5)
RBC BLD AUTO: ABNORMAL
SCHISTOCYTES BLD QL AUTO: SLIGHT — SIGNIFICANT CHANGE UP
SODIUM SERPL-SCNC: 134 MMOL/L — LOW (ref 135–145)
STOMATOCYTES BLD QL SMEAR: SLIGHT — SIGNIFICANT CHANGE UP
VARIANT LYMPHS # BLD: 0.9 % — SIGNIFICANT CHANGE UP (ref 0–6)
WBC # BLD: 8.75 K/UL — SIGNIFICANT CHANGE UP (ref 3.8–10.5)
WBC # FLD AUTO: 8.75 K/UL — SIGNIFICANT CHANGE UP (ref 3.8–10.5)

## 2020-10-28 PROCEDURE — 99231 SBSQ HOSP IP/OBS SF/LOW 25: CPT

## 2020-10-28 RX ADMIN — ZINC SULFATE TAB 220 MG (50 MG ZINC EQUIVALENT) 220 MILLIGRAM(S): 220 (50 ZN) TAB at 11:23

## 2020-10-28 RX ADMIN — Medication 2: at 16:50

## 2020-10-28 RX ADMIN — TRAMADOL HYDROCHLORIDE 50 MILLIGRAM(S): 50 TABLET ORAL at 00:37

## 2020-10-28 RX ADMIN — PANTOPRAZOLE SODIUM 40 MILLIGRAM(S): 20 TABLET, DELAYED RELEASE ORAL at 05:29

## 2020-10-28 RX ADMIN — LIDOCAINE 1 PATCH: 4 CREAM TOPICAL at 11:23

## 2020-10-28 RX ADMIN — BUDESONIDE AND FORMOTEROL FUMARATE DIHYDRATE 2 PUFF(S): 160; 4.5 AEROSOL RESPIRATORY (INHALATION) at 09:11

## 2020-10-28 RX ADMIN — HEPARIN SODIUM 1600 UNIT(S)/HR: 5000 INJECTION INTRAVENOUS; SUBCUTANEOUS at 06:09

## 2020-10-28 RX ADMIN — Medication 100 MILLIGRAM(S): at 11:23

## 2020-10-28 RX ADMIN — CARVEDILOL PHOSPHATE 12.5 MILLIGRAM(S): 80 CAPSULE, EXTENDED RELEASE ORAL at 17:06

## 2020-10-28 RX ADMIN — ERYTHROPOIETIN 10000 UNIT(S): 10000 INJECTION, SOLUTION INTRAVENOUS; SUBCUTANEOUS at 11:24

## 2020-10-28 RX ADMIN — Medication 3 MILLILITER(S): at 09:12

## 2020-10-28 RX ADMIN — TRAMADOL HYDROCHLORIDE 50 MILLIGRAM(S): 50 TABLET ORAL at 10:18

## 2020-10-28 RX ADMIN — TAMSULOSIN HYDROCHLORIDE 0.4 MILLIGRAM(S): 0.4 CAPSULE ORAL at 22:58

## 2020-10-28 RX ADMIN — HEPARIN SODIUM 3500 UNIT(S): 5000 INJECTION INTRAVENOUS; SUBCUTANEOUS at 06:09

## 2020-10-28 RX ADMIN — HEPARIN SODIUM 1800 UNIT(S)/HR: 5000 INJECTION INTRAVENOUS; SUBCUTANEOUS at 14:28

## 2020-10-28 RX ADMIN — HEPARIN SODIUM 0 UNIT(S)/HR: 5000 INJECTION INTRAVENOUS; SUBCUTANEOUS at 23:39

## 2020-10-28 RX ADMIN — CARVEDILOL PHOSPHATE 12.5 MILLIGRAM(S): 80 CAPSULE, EXTENDED RELEASE ORAL at 05:29

## 2020-10-28 RX ADMIN — Medication 3 MILLILITER(S): at 21:16

## 2020-10-28 RX ADMIN — SENNA PLUS 2 TABLET(S): 8.6 TABLET ORAL at 22:58

## 2020-10-28 RX ADMIN — TRAMADOL HYDROCHLORIDE 50 MILLIGRAM(S): 50 TABLET ORAL at 09:19

## 2020-10-28 RX ADMIN — Medication 2: at 08:32

## 2020-10-28 RX ADMIN — Medication 2: at 12:13

## 2020-10-28 RX ADMIN — Medication 1 TABLET(S): at 11:23

## 2020-10-28 RX ADMIN — TRAMADOL HYDROCHLORIDE 50 MILLIGRAM(S): 50 TABLET ORAL at 01:30

## 2020-10-28 RX ADMIN — Medication 3 MILLILITER(S): at 14:41

## 2020-10-28 RX ADMIN — Medication 500 MILLIGRAM(S): at 17:06

## 2020-10-28 RX ADMIN — HEPARIN SODIUM 3500 UNIT(S): 5000 INJECTION INTRAVENOUS; SUBCUTANEOUS at 14:25

## 2020-10-28 RX ADMIN — Medication 2: at 22:58

## 2020-10-28 RX ADMIN — Medication 3 MILLILITER(S): at 03:46

## 2020-10-28 RX ADMIN — BUDESONIDE AND FORMOTEROL FUMARATE DIHYDRATE 2 PUFF(S): 160; 4.5 AEROSOL RESPIRATORY (INHALATION) at 21:16

## 2020-10-28 RX ADMIN — Medication 1 MILLIGRAM(S): at 11:23

## 2020-10-28 RX ADMIN — Medication 40 MILLIGRAM(S): at 05:29

## 2020-10-28 RX ADMIN — Medication 500 MILLIGRAM(S): at 05:29

## 2020-10-28 RX ADMIN — ATORVASTATIN CALCIUM 20 MILLIGRAM(S): 80 TABLET, FILM COATED ORAL at 22:58

## 2020-10-28 RX ADMIN — LIDOCAINE 1 PATCH: 4 CREAM TOPICAL at 19:40

## 2020-10-28 NOTE — PROGRESS NOTE ADULT - SUBJECTIVE AND OBJECTIVE BOX
SUBJECTIVE/24 hour events:  Patient is a 70yMale s/p incision and drainage of large left anterior calf hematoma. Wound vac to be taken down today and evaluate if the wound is ready for skin graft. No acute events overnight, no complaints, pain controlled on current regimen, getting out of bed to chair and working with PT/OT with no difficulties.       Vital Signs Last 24 Hrs  T(C): 36.6 (27 Oct 2020 23:27), Max: 36.7 (27 Oct 2020 19:26)  T(F): 97.8 (27 Oct 2020 23:27), Max: 98 (27 Oct 2020 19:26)  HR: 70 (27 Oct 2020 23:27) (65 - 78)  BP: 140/70 (27 Oct 2020 23:27) (118/63 - 144/69)  BP(mean): --  RR: 18 (27 Oct 2020 23:27) (17 - 18)  SpO2: 95% (27 Oct 2020 23:27) (92% - 97%)  Drug Dosing Weight  Height (cm): 167.6 (15 Oct 2020 10:31)  Weight (kg): 89 (21 Oct 2020 10:16)  BMI (kg/m2): 31.7 (21 Oct 2020 10:16)  BSA (m2): 1.98 (21 Oct 2020 10:16)  I&O's Detail    26 Oct 2020 07:01  -  27 Oct 2020 07:00  --------------------------------------------------------  IN:    Oral Fluid: 240 mL  Total IN: 240 mL    OUT:    Voided (mL): 800 mL  Total OUT: 800 mL    Total NET: -560 mL        Allergies    No Known Allergies    Intolerances                              7.4    8.40  )-----------( 212      ( 27 Oct 2020 02:36 )             23.5   10-27    134<L>  |  97<L>  |  63.0<H>  ----------------------------<  109<H>  4.5   |  27.0  |  1.56<H>    Ca    7.9<L>      27 Oct 2020 02:36  Phos  3.8     10-27  Mg     1.8     10-27    PTT - ( 27 Oct 2020 18:31 )  PTT:66.8 sec    ROS:    PHYSICAL EXAM:  Constitutional: in good spirits, " thanks for visiting, have a good night"     Respiratory: no respiratory distress, no conversational dyspnea, no supplemental o2 needed     Gastrointestinal: abdomen soft, non-tender, atraumatic     Genitourinary: voiding spontaneously    Extremities: lle NVI, extremity warm to touch, vac well seated, wound drainage with moderate amount of sanguineous output     Neurological: A&OX3     Skin: warm, dry and no rashes           MEDICATIONS  (STANDING):  ALBUTerol    90 MICROgram(s) HFA Inhaler 1 Puff(s) Inhalation every 4 hours  albuterol/ipratropium for Nebulization 3 milliLiter(s) Nebulizer every 6 hours  allopurinol 100 milliGRAM(s) Oral daily  ascorbic acid 500 milliGRAM(s) Oral two times a day  atorvastatin 20 milliGRAM(s) Oral at bedtime  budesonide 160 MICROgram(s)/formoterol 4.5 MICROgram(s) Inhaler 2 Puff(s) Inhalation two times a day  carvedilol 12.5 milliGRAM(s) Oral every 12 hours  chlorhexidine 2% Cloths 1 Application(s) Topical daily  epoetin felisha-epbx (RETACRIT) Injectable 93370 Unit(s) SubCutaneous <User Schedule>  folic acid 1 milliGRAM(s) Oral daily  furosemide    Tablet 40 milliGRAM(s) Oral daily  heparin  Infusion. 1200 Unit(s)/Hr (12 mL/Hr) IV Continuous <Continuous>  hydrALAZINE 50 milliGRAM(s) Oral three times a day  insulin lispro (ADMELOG) corrective regimen sliding scale   SubCutaneous Before meals and at bedtime  lidocaine   Patch 1 Patch Transdermal daily  multivitamin 1 Tablet(s) Oral daily  pantoprazole    Tablet 40 milliGRAM(s) Oral before breakfast  senna 2 Tablet(s) Oral at bedtime  tamsulosin 0.4 milliGRAM(s) Oral at bedtime  thiamine 100 milliGRAM(s) Oral daily  tiotropium 18 MICROgram(s) Capsule 1 Capsule(s) Inhalation daily  zinc sulfate 220 milliGRAM(s) Oral daily    MEDICATIONS  (PRN):  acetaminophen   Tablet .. 650 milliGRAM(s) Oral every 6 hours PRN Mild Pain (1 - 3)  heparin   Injectable 7000 Unit(s) IV Push every 6 hours PRN For aPTT less than 40  heparin   Injectable 3500 Unit(s) IV Push every 6 hours PRN For aPTT between 40 - 57  temazepam 30 milliGRAM(s) Oral at bedtime PRN Insomnia  traMADol 25 milliGRAM(s) Oral every 4 hours PRN Moderate Pain (4 - 6)  traMADol 50 milliGRAM(s) Oral every 4 hours PRN Severe Pain (7 - 10)      RADIOLOGY STUDIES:    CULTURES:

## 2020-10-28 NOTE — PROGRESS NOTE ADULT - PROBLEM SELECTOR PLAN 1
continue wound vac change q 3days next take down today  skin graft when wound is ready   monitor vac output  pain control prn  monitor hemoglobin   continue home medications  encourage participation with pt/ot  supplemental o2 needed   good pulmonary toliet  when medically stable can be discharged home with home pt

## 2020-10-29 ENCOUNTER — TRANSCRIPTION ENCOUNTER (OUTPATIENT)
Age: 70
End: 2020-10-29

## 2020-10-29 LAB
APTT BLD: 103.7 SEC — HIGH (ref 27.5–35.5)
APTT BLD: 68.7 SEC — HIGH (ref 27.5–35.5)
APTT BLD: 84.3 SEC — HIGH (ref 27.5–35.5)
GLUCOSE BLDC GLUCOMTR-MCNC: 131 MG/DL — HIGH (ref 70–99)
GLUCOSE BLDC GLUCOMTR-MCNC: 137 MG/DL — HIGH (ref 70–99)
GLUCOSE BLDC GLUCOMTR-MCNC: 210 MG/DL — HIGH (ref 70–99)
HCT VFR BLD CALC: 22 % — LOW (ref 39–50)
HGB BLD-MCNC: 7.3 G/DL — LOW (ref 13–17)
MCHC RBC-ENTMCNC: 33.2 GM/DL — SIGNIFICANT CHANGE UP (ref 32–36)
MCHC RBC-ENTMCNC: 38.8 PG — HIGH (ref 27–34)
MCV RBC AUTO: 117 FL — HIGH (ref 80–100)
PLATELET # BLD AUTO: 233 K/UL — SIGNIFICANT CHANGE UP (ref 150–400)
RBC # BLD: 1.88 M/UL — LOW (ref 4.2–5.8)
RBC # FLD: 17.2 % — HIGH (ref 10.3–14.5)
WBC # BLD: 10.9 K/UL — HIGH (ref 3.8–10.5)
WBC # FLD AUTO: 10.9 K/UL — HIGH (ref 3.8–10.5)

## 2020-10-29 PROCEDURE — 99232 SBSQ HOSP IP/OBS MODERATE 35: CPT

## 2020-10-29 PROCEDURE — 99233 SBSQ HOSP IP/OBS HIGH 50: CPT

## 2020-10-29 RX ORDER — TEMAZEPAM 15 MG/1
30 CAPSULE ORAL AT BEDTIME
Refills: 0 | Status: DISCONTINUED | OUTPATIENT
Start: 2020-10-29 | End: 2020-10-30

## 2020-10-29 RX ORDER — SODIUM CHLORIDE 9 MG/ML
1000 INJECTION, SOLUTION INTRAVENOUS
Refills: 0 | Status: DISCONTINUED | OUTPATIENT
Start: 2020-10-29 | End: 2020-10-30

## 2020-10-29 RX ADMIN — HEPARIN SODIUM 0 UNIT(S)/HR: 5000 INJECTION INTRAVENOUS; SUBCUTANEOUS at 07:19

## 2020-10-29 RX ADMIN — Medication 100 MILLIGRAM(S): at 12:41

## 2020-10-29 RX ADMIN — LIDOCAINE 1 PATCH: 4 CREAM TOPICAL at 17:43

## 2020-10-29 RX ADMIN — CARVEDILOL PHOSPHATE 12.5 MILLIGRAM(S): 80 CAPSULE, EXTENDED RELEASE ORAL at 17:40

## 2020-10-29 RX ADMIN — Medication 500 MILLIGRAM(S): at 17:37

## 2020-10-29 RX ADMIN — PANTOPRAZOLE SODIUM 40 MILLIGRAM(S): 20 TABLET, DELAYED RELEASE ORAL at 05:34

## 2020-10-29 RX ADMIN — CARVEDILOL PHOSPHATE 12.5 MILLIGRAM(S): 80 CAPSULE, EXTENDED RELEASE ORAL at 05:34

## 2020-10-29 RX ADMIN — LIDOCAINE 1 PATCH: 4 CREAM TOPICAL at 12:41

## 2020-10-29 RX ADMIN — Medication 3 MILLILITER(S): at 21:14

## 2020-10-29 RX ADMIN — BUDESONIDE AND FORMOTEROL FUMARATE DIHYDRATE 2 PUFF(S): 160; 4.5 AEROSOL RESPIRATORY (INHALATION) at 08:42

## 2020-10-29 RX ADMIN — Medication 3 MILLILITER(S): at 03:36

## 2020-10-29 RX ADMIN — Medication 1 MILLIGRAM(S): at 12:50

## 2020-10-29 RX ADMIN — BUDESONIDE AND FORMOTEROL FUMARATE DIHYDRATE 2 PUFF(S): 160; 4.5 AEROSOL RESPIRATORY (INHALATION) at 21:14

## 2020-10-29 RX ADMIN — TRAMADOL HYDROCHLORIDE 50 MILLIGRAM(S): 50 TABLET ORAL at 22:12

## 2020-10-29 RX ADMIN — ZINC SULFATE TAB 220 MG (50 MG ZINC EQUIVALENT) 220 MILLIGRAM(S): 220 (50 ZN) TAB at 12:42

## 2020-10-29 RX ADMIN — TAMSULOSIN HYDROCHLORIDE 0.4 MILLIGRAM(S): 0.4 CAPSULE ORAL at 22:12

## 2020-10-29 RX ADMIN — TEMAZEPAM 30 MILLIGRAM(S): 15 CAPSULE ORAL at 23:28

## 2020-10-29 RX ADMIN — Medication 1 TABLET(S): at 12:42

## 2020-10-29 RX ADMIN — LIDOCAINE 1 PATCH: 4 CREAM TOPICAL at 00:02

## 2020-10-29 RX ADMIN — ATORVASTATIN CALCIUM 20 MILLIGRAM(S): 80 TABLET, FILM COATED ORAL at 22:12

## 2020-10-29 RX ADMIN — Medication 3 MILLILITER(S): at 15:24

## 2020-10-29 RX ADMIN — TRAMADOL HYDROCHLORIDE 50 MILLIGRAM(S): 50 TABLET ORAL at 12:40

## 2020-10-29 RX ADMIN — Medication 4: at 12:40

## 2020-10-29 RX ADMIN — CHLORHEXIDINE GLUCONATE 1 APPLICATION(S): 213 SOLUTION TOPICAL at 12:50

## 2020-10-29 RX ADMIN — HEPARIN SODIUM 0 UNIT(S)/HR: 5000 INJECTION INTRAVENOUS; SUBCUTANEOUS at 13:45

## 2020-10-29 RX ADMIN — TEMAZEPAM 30 MILLIGRAM(S): 15 CAPSULE ORAL at 00:02

## 2020-10-29 RX ADMIN — TRAMADOL HYDROCHLORIDE 50 MILLIGRAM(S): 50 TABLET ORAL at 12:50

## 2020-10-29 RX ADMIN — Medication 3 MILLILITER(S): at 08:41

## 2020-10-29 RX ADMIN — Medication 4: at 22:21

## 2020-10-29 RX ADMIN — Medication 500 MILLIGRAM(S): at 05:34

## 2020-10-29 RX ADMIN — TRAMADOL HYDROCHLORIDE 50 MILLIGRAM(S): 50 TABLET ORAL at 22:42

## 2020-10-29 RX ADMIN — Medication 40 MILLIGRAM(S): at 05:33

## 2020-10-29 NOTE — CHART NOTE - NSCHARTNOTEFT_GEN_A_CORE
Source: Patient [ x]  Family [ ]   other [ x]    Current Diet: Diet, NPO after Midnight:      NPO Start Date: 29-Oct-2020,   NPO Start Time: 23:59  Except Medications (10-29-20 @ 09:38)  Diet, NPO after Midnight:      NPO Start Date: 29-Oct-2020,   NPO Start Time: 23:59 (10-29-20 @ 08:47)  Diet, Regular:   DASH/TLC {Sodium & Cholesterol Restricted} (DASH)  For patients receiving Renal Replacement - No Protein Restr, No Conc K, No Conc Phos, Low  Sodium (RENAL)  Supplement Feeding Modality:  Oral  Ensure Enlive Cans or Servings Per Day:  3       Frequency:  Three Times a day (10-26-20 @ 12:10)      Patient reports [ ] nausea  [ ] vomiting [ ] diarrhea [ ] constipation  [ ]chewing problems [ ] swallowing issues  [ ] other:     PO intake:  < 50% [ ]   50-75%  [ ]   %  [ x]  other :    Source for PO intake [x ] Patient [ ] family [ ] chart [ ] staff [x ] other: visual observation    Current Weight:   (10/28) 196.2 lbs  (10/19) 200.4 lbs  (10/18) 207.4 lbs  (10/17) 201.7 lbs  (10/16) 203.4 lbs  Probable 7.2 lb (3.5%) wt loss since admission; noted with 2+ left leg/foot edema and 3+ right leg/foot edema, continue to trend    Pertinent Medications: MEDICATIONS  (STANDING):  ALBUTerol    90 MICROgram(s) HFA Inhaler 1 Puff(s) Inhalation every 4 hours  albuterol/ipratropium for Nebulization 3 milliLiter(s) Nebulizer every 6 hours  allopurinol 100 milliGRAM(s) Oral daily  ascorbic acid 500 milliGRAM(s) Oral two times a day  atorvastatin 20 milliGRAM(s) Oral at bedtime  budesonide 160 MICROgram(s)/formoterol 4.5 MICROgram(s) Inhaler 2 Puff(s) Inhalation two times a day  carvedilol 12.5 milliGRAM(s) Oral every 12 hours  chlorhexidine 2% Cloths 1 Application(s) Topical daily  epoetin felisha-epbx (RETACRIT) Injectable 69742 Unit(s) SubCutaneous <User Schedule>  folic acid 1 milliGRAM(s) Oral daily  heparin  Infusion. 1200 Unit(s)/Hr (12 mL/Hr) IV Continuous <Continuous>  hydrALAZINE 50 milliGRAM(s) Oral three times a day  insulin lispro (ADMELOG) corrective regimen sliding scale   SubCutaneous Before meals and at bedtime  lidocaine   Patch 1 Patch Transdermal daily  multiple electrolytes Injection Type 1 1000 milliLiter(s) (42 mL/Hr) IV Continuous <Continuous>  multivitamin 1 Tablet(s) Oral daily  pantoprazole    Tablet 40 milliGRAM(s) Oral before breakfast  senna 2 Tablet(s) Oral at bedtime  tamsulosin 0.4 milliGRAM(s) Oral at bedtime  thiamine 100 milliGRAM(s) Oral daily  tiotropium 18 MICROgram(s) Capsule 1 Capsule(s) Inhalation daily  zinc sulfate 220 milliGRAM(s) Oral daily    MEDICATIONS  (PRN):  acetaminophen   Tablet .. 650 milliGRAM(s) Oral every 6 hours PRN Mild Pain (1 - 3)  heparin   Injectable 7000 Unit(s) IV Push every 6 hours PRN For aPTT less than 40  heparin   Injectable 3500 Unit(s) IV Push every 6 hours PRN For aPTT between 40 - 57  temazepam 30 milliGRAM(s) Oral at bedtime PRN Insomnia  traMADol 50 milliGRAM(s) Oral every 4 hours PRN Severe Pain (7 - 10)  traMADol 25 milliGRAM(s) Oral every 4 hours PRN Moderate Pain (4 - 6)    Pertinent Labs: CBC Full  -  ( 29 Oct 2020 07:00 )  WBC Count : 10.90 K/uL  RBC Count : 1.88 M/uL  Hemoglobin : 7.3 g/dL  Hematocrit : 22.0 %  Platelet Count - Automated : 233 K/uL  Mean Cell Volume : 117.0 fl  Mean Cell Hemoglobin : 38.8 pg  Mean Cell Hemoglobin Concentration : 33.2 gm/dL  Auto Neutrophil # : x  Auto Lymphocyte # : x  Auto Monocyte # : x  Auto Eosinophil # : x  Auto Basophil # : x  Auto Neutrophil % : x  Auto Lymphocyte % : x  Auto Monocyte % : x  Auto Eosinophil % : x  Auto Basophil % : x      Skin: Surgical incision to Left LE per documentation    Nutrition focused physical exam conducted - found signs of malnutrition [ ]absent [ x]present    Subcutaneous fat loss: MILD [x ] Orbital fat pads region, [ ]Buccal fat region, [ ]Triceps region,  [ ]Ribs region    Muscle wasting: MILD [x ]Temples region, [x ]Clavicle region, [ ]Shoulder region, [ ]Scapula region, [ ]Interosseous region,  [ ]thigh region, [ ]Calf region    Estimated Needs:   [x ] no change since previous assessment  [ ] recalculated:     Current Nutrition Diagnosis: Pt remains at high nutrition risk secondary to malnutrition (moderate, acute) related to inability to meet increased nutrient needs in setting of COPD, CHF, LLE hematoma evacuation and debridement, s/p wound vac application to LLE wound, and plan for OR for likely skin graft as evidenced by meeting <75% nutrient needs >7 days, mild muscle loss of temples and clavicles, mild fat loss of orbitals, 3.5% wt loss x ~2 weeks, and +edema. Pt reports overall fair appetite. States he has been eating lighter- for breakfast consumed cottage cheese and some fruit. Encouraged to consume Ensure Enlive supplements at or in between meals to optimize protein-calorie intake- pt verbalized understanding.       Recommendations:   1) Continue diet as tolerated.   2) Continue Ensure Enlive TID to optimize po intake and provide an additional 350 kcal, 20g protein per serving.   3) Continue MVI, folic acid, vitamin C, and zinc sulfate supplementation.  4 Encourage po intake, monitor diet tolerance, and provide assistance at meals as needed.   5) Encourage HBV protein sources.  6) Obtain daily weights to monitor trends.     Monitoring and Evaluation:   [x ] PO intake [x ] Tolerance to diet prescription [X] Weights  [X] Follow up per protocol [X] Labs.

## 2020-10-29 NOTE — PROGRESS NOTE ADULT - PROBLEM SELECTOR PLAN 1
continue wound vac for now, rein-enforce as needed, may have increased oozing 2/2 to anticoagulation, going to OR Friday for likely skin graft    monitor vac output  pain control prn  monitor hemoglobin   continue home medications  encourage participation with pt/ot  supplemental o2 needed   good pulmonary toliet  when medically stable can be discharged home with home pt.

## 2020-10-29 NOTE — CHART NOTE - TREATMENT: THE FOLLOWING DIET HAS BEEN RECOMMENDED
Continue diet as tolerated.   Continue Ensure Enlive TID to optimize po intake and provide an additional 350 kcal, 20g protein per serving.   Continue MVI, folic acid, vitamin C, and zinc sulfate supplementation.  Encourage po intake, monitor diet tolerance, and provide assistance at meals as needed.   Encourage HBV protein sources.  Obtain daily weights to monitor trends.

## 2020-10-29 NOTE — PROGRESS NOTE ADULT - SUBJECTIVE AND OBJECTIVE BOX
SUBJECTIVE/24 hour events:  Patient is a 70yMale s/p incision and drainage of large left anterior calf hematoma. Going to OR friday for likely skin graft. Patient is on heparin gtt and was supra- therapeutic ptt to 193  overnight and had some oozing from wound site, able to rein-enforce tegaderm and had no further issues. Patient no complaints, pain controlled on current regimen, getting out of bed to chair and working with PT/OT with no difficulties, all in all " I feel pretty good"      Vital Signs Last 24 Hrs  T(C): 36.6 (28 Oct 2020 23:00), Max: 36.6 (28 Oct 2020 04:52)  T(F): 97.9 (28 Oct 2020 23:00), Max: 97.9 (28 Oct 2020 07:00)  HR: 74 (28 Oct 2020 23:00) (61 - 78)  BP: 138/56 (28 Oct 2020 23:00) (117/61 - 138/56)  BP(mean): --  RR: 18 (28 Oct 2020 23:00) (17 - 18)  SpO2: 94% (28 Oct 2020 23:00) (92% - 97%)  Drug Dosing Weight  Height (cm): 167.6 (15 Oct 2020 10:31)  Weight (kg): 89 (21 Oct 2020 10:16)  BMI (kg/m2): 31.7 (21 Oct 2020 10:16)  BSA (m2): 1.98 (21 Oct 2020 10:16)  I&O's Detail    28 Oct 2020 07:01  -  29 Oct 2020 02:58  --------------------------------------------------------  IN:    Heparin Infusion: 170 mL    Oral Fluid: 400 mL  Total IN: 570 mL    OUT:    VAC (Vacuum Assisted Closure) System (mL): 100 mL    Voided (mL): 725 mL  Total OUT: 825 mL    Total NET: -255 mL        Allergies    No Known Allergies    Intolerances                              7.9    8.75  )-----------( 208      ( 28 Oct 2020 05:46 )             23.0   10-28    134<L>  |  97<L>  |  72.0<H>  ----------------------------<  137<H>  4.3   |  25.0  |  1.58<H>    Ca    8.2<L>      28 Oct 2020 05:46  Phos  3.7     10-28  Mg     1.8     10-28    PTT - ( 28 Oct 2020 22:02 )  PTT:193.3 sec    ROS:    PHYSICAL EXAM:  Constitutional: in good spirits     Respiratory: no conversational dyspnea, wet productive cough, no respiratory distress, supplemental o2 via nasal canula on 2 liters    Gastrointestinal: abdomen soft, non-tender, atraumatic     Genitourinary: voiding spontaneously     Extremities: lle NVI, extremity warm to touch, vac well seated, wound drainage with moderate amount of sanguineous output     Neurological: A&OX3     Skin: warm, dry and no rashes     MEDICATIONS  (STANDING):  ALBUTerol    90 MICROgram(s) HFA Inhaler 1 Puff(s) Inhalation every 4 hours  albuterol/ipratropium for Nebulization 3 milliLiter(s) Nebulizer every 6 hours  allopurinol 100 milliGRAM(s) Oral daily  ascorbic acid 500 milliGRAM(s) Oral two times a day  atorvastatin 20 milliGRAM(s) Oral at bedtime  budesonide 160 MICROgram(s)/formoterol 4.5 MICROgram(s) Inhaler 2 Puff(s) Inhalation two times a day  carvedilol 12.5 milliGRAM(s) Oral every 12 hours  chlorhexidine 2% Cloths 1 Application(s) Topical daily  epoetin felisha-epbx (RETACRIT) Injectable 54716 Unit(s) SubCutaneous <User Schedule>  folic acid 1 milliGRAM(s) Oral daily  furosemide    Tablet 40 milliGRAM(s) Oral daily  heparin  Infusion. 1200 Unit(s)/Hr (12 mL/Hr) IV Continuous <Continuous>  hydrALAZINE 50 milliGRAM(s) Oral three times a day  insulin lispro (ADMELOG) corrective regimen sliding scale   SubCutaneous Before meals and at bedtime  lidocaine   Patch 1 Patch Transdermal daily  multivitamin 1 Tablet(s) Oral daily  pantoprazole    Tablet 40 milliGRAM(s) Oral before breakfast  senna 2 Tablet(s) Oral at bedtime  tamsulosin 0.4 milliGRAM(s) Oral at bedtime  thiamine 100 milliGRAM(s) Oral daily  tiotropium 18 MICROgram(s) Capsule 1 Capsule(s) Inhalation daily  zinc sulfate 220 milliGRAM(s) Oral daily    MEDICATIONS  (PRN):  acetaminophen   Tablet .. 650 milliGRAM(s) Oral every 6 hours PRN Mild Pain (1 - 3)  heparin   Injectable 7000 Unit(s) IV Push every 6 hours PRN For aPTT less than 40  heparin   Injectable 3500 Unit(s) IV Push every 6 hours PRN For aPTT between 40 - 57  traMADol 25 milliGRAM(s) Oral every 4 hours PRN Moderate Pain (4 - 6)  traMADol 50 milliGRAM(s) Oral every 4 hours PRN Severe Pain (7 - 10)      RADIOLOGY STUDIES:    CULTURES:

## 2020-10-30 LAB
ANION GAP SERPL CALC-SCNC: 10 MMOL/L — SIGNIFICANT CHANGE UP (ref 5–17)
ANION GAP SERPL CALC-SCNC: 10 MMOL/L — SIGNIFICANT CHANGE UP (ref 5–17)
ANISOCYTOSIS BLD QL: SIGNIFICANT CHANGE UP
APTT BLD: 28 SEC — SIGNIFICANT CHANGE UP (ref 27.5–35.5)
APTT BLD: 55.1 SEC — HIGH (ref 27.5–35.5)
BASO STIPL BLD QL SMEAR: PRESENT — SIGNIFICANT CHANGE UP
BASOPHILS # BLD AUTO: 0 K/UL — SIGNIFICANT CHANGE UP (ref 0–0.2)
BASOPHILS # BLD AUTO: 0.02 K/UL — SIGNIFICANT CHANGE UP (ref 0–0.2)
BASOPHILS NFR BLD AUTO: 0 % — SIGNIFICANT CHANGE UP (ref 0–2)
BASOPHILS NFR BLD AUTO: 0.2 % — SIGNIFICANT CHANGE UP (ref 0–2)
BLD GP AB SCN SERPL QL: SIGNIFICANT CHANGE UP
BUN SERPL-MCNC: 66 MG/DL — HIGH (ref 8–20)
BUN SERPL-MCNC: 74 MG/DL — HIGH (ref 8–20)
BURR CELLS BLD QL SMEAR: PRESENT — SIGNIFICANT CHANGE UP
CALCIUM SERPL-MCNC: 8 MG/DL — LOW (ref 8.6–10.2)
CALCIUM SERPL-MCNC: 8.4 MG/DL — LOW (ref 8.6–10.2)
CHLORIDE SERPL-SCNC: 103 MMOL/L — SIGNIFICANT CHANGE UP (ref 98–107)
CHLORIDE SERPL-SCNC: 96 MMOL/L — LOW (ref 98–107)
CO2 SERPL-SCNC: 24 MMOL/L — SIGNIFICANT CHANGE UP (ref 22–29)
CO2 SERPL-SCNC: 28 MMOL/L — SIGNIFICANT CHANGE UP (ref 22–29)
CREAT SERPL-MCNC: 1.24 MG/DL — SIGNIFICANT CHANGE UP (ref 0.5–1.3)
CREAT SERPL-MCNC: 1.34 MG/DL — HIGH (ref 0.5–1.3)
EOSINOPHIL # BLD AUTO: 0.07 K/UL — SIGNIFICANT CHANGE UP (ref 0–0.5)
EOSINOPHIL # BLD AUTO: 0.09 K/UL — SIGNIFICANT CHANGE UP (ref 0–0.5)
EOSINOPHIL NFR BLD AUTO: 0.6 % — SIGNIFICANT CHANGE UP (ref 0–6)
EOSINOPHIL NFR BLD AUTO: 0.9 % — SIGNIFICANT CHANGE UP (ref 0–6)
GAS PNL BLDA: SIGNIFICANT CHANGE UP
GAS PNL BLDA: SIGNIFICANT CHANGE UP
GIANT PLATELETS BLD QL SMEAR: PRESENT — SIGNIFICANT CHANGE UP
GLUCOSE BLDC GLUCOMTR-MCNC: 135 MG/DL — HIGH (ref 70–99)
GLUCOSE BLDC GLUCOMTR-MCNC: 145 MG/DL — HIGH (ref 70–99)
GLUCOSE BLDC GLUCOMTR-MCNC: 151 MG/DL — HIGH (ref 70–99)
GLUCOSE BLDC GLUCOMTR-MCNC: 153 MG/DL — HIGH (ref 70–99)
GLUCOSE BLDC GLUCOMTR-MCNC: 205 MG/DL — HIGH (ref 70–99)
GLUCOSE SERPL-MCNC: 124 MG/DL — HIGH (ref 70–99)
GLUCOSE SERPL-MCNC: 142 MG/DL — HIGH (ref 70–99)
HCT VFR BLD CALC: 21.8 % — LOW (ref 39–50)
HCT VFR BLD CALC: 23.1 % — LOW (ref 39–50)
HGB BLD-MCNC: 7.4 G/DL — LOW (ref 13–17)
HGB BLD-MCNC: 7.5 G/DL — LOW (ref 13–17)
IMM GRANULOCYTES NFR BLD AUTO: 1.5 % — SIGNIFICANT CHANGE UP (ref 0–1.5)
INR BLD: 1.15 RATIO — SIGNIFICANT CHANGE UP (ref 0.88–1.16)
LYMPHOCYTES # BLD AUTO: 0.61 K/UL — LOW (ref 1–3.3)
LYMPHOCYTES # BLD AUTO: 1.15 K/UL — SIGNIFICANT CHANGE UP (ref 1–3.3)
LYMPHOCYTES # BLD AUTO: 11.3 % — LOW (ref 13–44)
LYMPHOCYTES # BLD AUTO: 5.6 % — LOW (ref 13–44)
MACROCYTES BLD QL: SIGNIFICANT CHANGE UP
MAGNESIUM SERPL-MCNC: 1.7 MG/DL — SIGNIFICANT CHANGE UP (ref 1.6–2.6)
MAGNESIUM SERPL-MCNC: 1.7 MG/DL — SIGNIFICANT CHANGE UP (ref 1.6–2.6)
MANUAL SMEAR VERIFICATION: SIGNIFICANT CHANGE UP
MCHC RBC-ENTMCNC: 32.5 GM/DL — SIGNIFICANT CHANGE UP (ref 32–36)
MCHC RBC-ENTMCNC: 33.9 GM/DL — SIGNIFICANT CHANGE UP (ref 32–36)
MCHC RBC-ENTMCNC: 36.8 PG — HIGH (ref 27–34)
MCHC RBC-ENTMCNC: 40.2 PG — HIGH (ref 27–34)
MCV RBC AUTO: 113.2 FL — HIGH (ref 80–100)
MCV RBC AUTO: 118.5 FL — HIGH (ref 80–100)
MICROCYTES BLD QL: SLIGHT — SIGNIFICANT CHANGE UP
MONOCYTES # BLD AUTO: 0.98 K/UL — HIGH (ref 0–0.9)
MONOCYTES # BLD AUTO: 1.07 K/UL — HIGH (ref 0–0.9)
MONOCYTES NFR BLD AUTO: 9.6 % — SIGNIFICANT CHANGE UP (ref 2–14)
MONOCYTES NFR BLD AUTO: 9.9 % — SIGNIFICANT CHANGE UP (ref 2–14)
NEUTROPHILS # BLD AUTO: 7.97 K/UL — HIGH (ref 1.8–7.4)
NEUTROPHILS # BLD AUTO: 8.92 K/UL — HIGH (ref 1.8–7.4)
NEUTROPHILS NFR BLD AUTO: 78.2 % — HIGH (ref 43–77)
NEUTROPHILS NFR BLD AUTO: 82.2 % — HIGH (ref 43–77)
NRBC # BLD: 1 /100 — HIGH (ref 0–0)
OVALOCYTES BLD QL SMEAR: SLIGHT — SIGNIFICANT CHANGE UP
PHOSPHATE SERPL-MCNC: 4 MG/DL — SIGNIFICANT CHANGE UP (ref 2.4–4.7)
PHOSPHATE SERPL-MCNC: 4 MG/DL — SIGNIFICANT CHANGE UP (ref 2.4–4.7)
PLAT MORPH BLD: NORMAL — SIGNIFICANT CHANGE UP
PLATELET # BLD AUTO: 217 K/UL — SIGNIFICANT CHANGE UP (ref 150–400)
PLATELET # BLD AUTO: 258 K/UL — SIGNIFICANT CHANGE UP (ref 150–400)
POLYCHROMASIA BLD QL SMEAR: SLIGHT — SIGNIFICANT CHANGE UP
POTASSIUM SERPL-MCNC: 4.2 MMOL/L — SIGNIFICANT CHANGE UP (ref 3.5–5.3)
POTASSIUM SERPL-MCNC: 4.5 MMOL/L — SIGNIFICANT CHANGE UP (ref 3.5–5.3)
POTASSIUM SERPL-SCNC: 4.2 MMOL/L — SIGNIFICANT CHANGE UP (ref 3.5–5.3)
POTASSIUM SERPL-SCNC: 4.5 MMOL/L — SIGNIFICANT CHANGE UP (ref 3.5–5.3)
PROTHROM AB SERPL-ACNC: 13.3 SEC — SIGNIFICANT CHANGE UP (ref 10.6–13.6)
RBC # BLD: 1.84 M/UL — LOW (ref 4.2–5.8)
RBC # BLD: 2.04 M/UL — LOW (ref 4.2–5.8)
RBC # FLD: 17.2 % — HIGH (ref 10.3–14.5)
RBC # FLD: 19 % — HIGH (ref 10.3–14.5)
RBC BLD AUTO: ABNORMAL
SODIUM SERPL-SCNC: 134 MMOL/L — LOW (ref 135–145)
SODIUM SERPL-SCNC: 137 MMOL/L — SIGNIFICANT CHANGE UP (ref 135–145)
STOMATOCYTES BLD QL SMEAR: SLIGHT — SIGNIFICANT CHANGE UP
TROPONIN T SERPL-MCNC: <0.01 NG/ML — SIGNIFICANT CHANGE UP (ref 0–0.06)
WBC # BLD: 10.19 K/UL — SIGNIFICANT CHANGE UP (ref 3.8–10.5)
WBC # BLD: 10.85 K/UL — HIGH (ref 3.8–10.5)
WBC # FLD AUTO: 10.19 K/UL — SIGNIFICANT CHANGE UP (ref 3.8–10.5)
WBC # FLD AUTO: 10.85 K/UL — HIGH (ref 3.8–10.5)

## 2020-10-30 PROCEDURE — 15002 WOUND PREP TRK/ARM/LEG: CPT

## 2020-10-30 PROCEDURE — 93010 ELECTROCARDIOGRAM REPORT: CPT

## 2020-10-30 PROCEDURE — 71045 X-RAY EXAM CHEST 1 VIEW: CPT | Mod: 26

## 2020-10-30 PROCEDURE — 15100 SPLT AGRFT T/A/L 1ST 100SQCM: CPT

## 2020-10-30 RX ORDER — TAMSULOSIN HYDROCHLORIDE 0.4 MG/1
0.4 CAPSULE ORAL AT BEDTIME
Refills: 0 | Status: DISCONTINUED | OUTPATIENT
Start: 2020-10-30 | End: 2020-11-13

## 2020-10-30 RX ORDER — FOLIC ACID 0.8 MG
1 TABLET ORAL DAILY
Refills: 0 | Status: DISCONTINUED | OUTPATIENT
Start: 2020-10-30 | End: 2020-11-13

## 2020-10-30 RX ORDER — THIAMINE MONONITRATE (VIT B1) 100 MG
100 TABLET ORAL DAILY
Refills: 0 | Status: DISCONTINUED | OUTPATIENT
Start: 2020-10-30 | End: 2020-11-13

## 2020-10-30 RX ORDER — SENNA PLUS 8.6 MG/1
2 TABLET ORAL AT BEDTIME
Refills: 0 | Status: DISCONTINUED | OUTPATIENT
Start: 2020-10-30 | End: 2020-11-13

## 2020-10-30 RX ORDER — ALLOPURINOL 300 MG
100 TABLET ORAL DAILY
Refills: 0 | Status: DISCONTINUED | OUTPATIENT
Start: 2020-10-30 | End: 2020-11-03

## 2020-10-30 RX ORDER — HEPARIN SODIUM 5000 [USP'U]/ML
8000 INJECTION INTRAVENOUS; SUBCUTANEOUS EVERY 6 HOURS
Refills: 0 | Status: DISCONTINUED | OUTPATIENT
Start: 2020-10-30 | End: 2020-10-31

## 2020-10-30 RX ORDER — INSULIN LISPRO 100/ML
VIAL (ML) SUBCUTANEOUS
Refills: 0 | Status: DISCONTINUED | OUTPATIENT
Start: 2020-10-30 | End: 2020-10-31

## 2020-10-30 RX ORDER — LANOLIN ALCOHOL/MO/W.PET/CERES
3 CREAM (GRAM) TOPICAL AT BEDTIME
Refills: 0 | Status: DISCONTINUED | OUTPATIENT
Start: 2020-10-30 | End: 2020-11-09

## 2020-10-30 RX ORDER — HYDRALAZINE HCL 50 MG
50 TABLET ORAL THREE TIMES A DAY
Refills: 0 | Status: DISCONTINUED | OUTPATIENT
Start: 2020-10-30 | End: 2020-11-02

## 2020-10-30 RX ORDER — HEPARIN SODIUM 5000 [USP'U]/ML
1200 INJECTION INTRAVENOUS; SUBCUTANEOUS
Qty: 25000 | Refills: 0 | Status: DISCONTINUED | OUTPATIENT
Start: 2020-10-30 | End: 2020-10-31

## 2020-10-30 RX ORDER — ONDANSETRON 8 MG/1
4 TABLET, FILM COATED ORAL ONCE
Refills: 0 | Status: DISCONTINUED | OUTPATIENT
Start: 2020-11-01 | End: 2020-11-13

## 2020-10-30 RX ORDER — TEMAZEPAM 15 MG/1
30 CAPSULE ORAL AT BEDTIME
Refills: 0 | Status: DISCONTINUED | OUTPATIENT
Start: 2020-10-30 | End: 2020-11-02

## 2020-10-30 RX ORDER — HEPARIN SODIUM 5000 [USP'U]/ML
4000 INJECTION INTRAVENOUS; SUBCUTANEOUS EVERY 6 HOURS
Refills: 0 | Status: DISCONTINUED | OUTPATIENT
Start: 2020-10-30 | End: 2020-10-31

## 2020-10-30 RX ORDER — MAGNESIUM SULFATE 500 MG/ML
2 VIAL (ML) INJECTION ONCE
Refills: 0 | Status: COMPLETED | OUTPATIENT
Start: 2020-10-30 | End: 2020-10-30

## 2020-10-30 RX ORDER — HYDROMORPHONE HYDROCHLORIDE 2 MG/ML
0.5 INJECTION INTRAMUSCULAR; INTRAVENOUS; SUBCUTANEOUS ONCE
Refills: 0 | Status: DISCONTINUED | OUTPATIENT
Start: 2020-10-30 | End: 2020-10-30

## 2020-10-30 RX ORDER — ATORVASTATIN CALCIUM 80 MG/1
20 TABLET, FILM COATED ORAL AT BEDTIME
Refills: 0 | Status: DISCONTINUED | OUTPATIENT
Start: 2020-10-30 | End: 2020-11-13

## 2020-10-30 RX ORDER — CHLORHEXIDINE GLUCONATE 213 G/1000ML
1 SOLUTION TOPICAL
Refills: 0 | Status: DISCONTINUED | OUTPATIENT
Start: 2020-10-30 | End: 2020-11-01

## 2020-10-30 RX ORDER — ACETAMINOPHEN 500 MG
975 TABLET ORAL EVERY 6 HOURS
Refills: 0 | Status: DISCONTINUED | OUTPATIENT
Start: 2020-10-30 | End: 2020-11-01

## 2020-10-30 RX ORDER — PANTOPRAZOLE SODIUM 20 MG/1
40 TABLET, DELAYED RELEASE ORAL
Refills: 0 | Status: DISCONTINUED | OUTPATIENT
Start: 2020-10-30 | End: 2020-11-13

## 2020-10-30 RX ORDER — FENTANYL CITRATE 50 UG/ML
25 INJECTION INTRAVENOUS
Refills: 0 | Status: DISCONTINUED | OUTPATIENT
Start: 2020-11-01 | End: 2020-11-02

## 2020-10-30 RX ORDER — CARVEDILOL PHOSPHATE 80 MG/1
12.5 CAPSULE, EXTENDED RELEASE ORAL EVERY 12 HOURS
Refills: 0 | Status: DISCONTINUED | OUTPATIENT
Start: 2020-10-30 | End: 2020-11-02

## 2020-10-30 RX ORDER — OXYCODONE HYDROCHLORIDE 5 MG/1
5 TABLET ORAL EVERY 6 HOURS
Refills: 0 | Status: DISCONTINUED | OUTPATIENT
Start: 2020-10-30 | End: 2020-11-02

## 2020-10-30 RX ORDER — ASCORBIC ACID 60 MG
500 TABLET,CHEWABLE ORAL
Refills: 0 | Status: DISCONTINUED | OUTPATIENT
Start: 2020-10-30 | End: 2020-11-13

## 2020-10-30 RX ORDER — ALBUTEROL 90 UG/1
1 AEROSOL, METERED ORAL EVERY 4 HOURS
Refills: 0 | Status: DISCONTINUED | OUTPATIENT
Start: 2020-10-30 | End: 2020-11-13

## 2020-10-30 RX ORDER — ZINC SULFATE TAB 220 MG (50 MG ZINC EQUIVALENT) 220 (50 ZN) MG
220 TAB ORAL DAILY
Refills: 0 | Status: DISCONTINUED | OUTPATIENT
Start: 2020-10-30 | End: 2020-11-13

## 2020-10-30 RX ORDER — BENZOCAINE AND MENTHOL 5; 1 G/100ML; G/100ML
1 LIQUID ORAL
Refills: 0 | Status: DISCONTINUED | OUTPATIENT
Start: 2020-10-30 | End: 2020-11-13

## 2020-10-30 RX ORDER — IPRATROPIUM/ALBUTEROL SULFATE 18-103MCG
3 AEROSOL WITH ADAPTER (GRAM) INHALATION EVERY 6 HOURS
Refills: 0 | Status: DISCONTINUED | OUTPATIENT
Start: 2020-10-30 | End: 2020-11-13

## 2020-10-30 RX ORDER — ERYTHROPOIETIN 10000 [IU]/ML
10000 INJECTION, SOLUTION INTRAVENOUS; SUBCUTANEOUS
Refills: 0 | Status: DISCONTINUED | OUTPATIENT
Start: 2020-10-30 | End: 2020-11-13

## 2020-10-30 RX ORDER — ACETAMINOPHEN 500 MG
1000 TABLET ORAL ONCE
Refills: 0 | Status: COMPLETED | OUTPATIENT
Start: 2020-10-30 | End: 2020-10-30

## 2020-10-30 RX ORDER — HYDROMORPHONE HYDROCHLORIDE 2 MG/ML
0.5 INJECTION INTRAMUSCULAR; INTRAVENOUS; SUBCUTANEOUS EVERY 4 HOURS
Refills: 0 | Status: DISCONTINUED | OUTPATIENT
Start: 2020-10-30 | End: 2020-11-01

## 2020-10-30 RX ORDER — BUDESONIDE AND FORMOTEROL FUMARATE DIHYDRATE 160; 4.5 UG/1; UG/1
2 AEROSOL RESPIRATORY (INHALATION)
Refills: 0 | Status: DISCONTINUED | OUTPATIENT
Start: 2020-10-30 | End: 2020-11-13

## 2020-10-30 RX ADMIN — HYDROMORPHONE HYDROCHLORIDE 0.5 MILLIGRAM(S): 2 INJECTION INTRAMUSCULAR; INTRAVENOUS; SUBCUTANEOUS at 01:04

## 2020-10-30 RX ADMIN — Medication 3 MILLILITER(S): at 02:33

## 2020-10-30 RX ADMIN — Medication 100 MILLIGRAM(S): at 21:45

## 2020-10-30 RX ADMIN — HYDROMORPHONE HYDROCHLORIDE 0.5 MILLIGRAM(S): 2 INJECTION INTRAMUSCULAR; INTRAVENOUS; SUBCUTANEOUS at 20:50

## 2020-10-30 RX ADMIN — CHLORHEXIDINE GLUCONATE 1 APPLICATION(S): 213 SOLUTION TOPICAL at 12:05

## 2020-10-30 RX ADMIN — LIDOCAINE 1 PATCH: 4 CREAM TOPICAL at 00:00

## 2020-10-30 RX ADMIN — TEMAZEPAM 30 MILLIGRAM(S): 15 CAPSULE ORAL at 23:21

## 2020-10-30 RX ADMIN — BENZOCAINE AND MENTHOL 1 LOZENGE: 5; 1 LIQUID ORAL at 22:09

## 2020-10-30 RX ADMIN — SODIUM CHLORIDE 42 MILLILITER(S): 9 INJECTION, SOLUTION INTRAVENOUS at 00:00

## 2020-10-30 RX ADMIN — SENNA PLUS 2 TABLET(S): 8.6 TABLET ORAL at 21:47

## 2020-10-30 RX ADMIN — HYDROMORPHONE HYDROCHLORIDE 0.5 MILLIGRAM(S): 2 INJECTION INTRAMUSCULAR; INTRAVENOUS; SUBCUTANEOUS at 20:18

## 2020-10-30 RX ADMIN — Medication 400 MILLIGRAM(S): at 21:41

## 2020-10-30 RX ADMIN — HYDROMORPHONE HYDROCHLORIDE 0.5 MILLIGRAM(S): 2 INJECTION INTRAMUSCULAR; INTRAVENOUS; SUBCUTANEOUS at 00:34

## 2020-10-30 RX ADMIN — Medication 500 MILLIGRAM(S): at 05:31

## 2020-10-30 RX ADMIN — BUDESONIDE AND FORMOTEROL FUMARATE DIHYDRATE 2 PUFF(S): 160; 4.5 AEROSOL RESPIRATORY (INHALATION) at 09:00

## 2020-10-30 RX ADMIN — Medication 50 GRAM(S): at 09:54

## 2020-10-30 RX ADMIN — Medication 3 MILLILITER(S): at 20:27

## 2020-10-30 RX ADMIN — ATORVASTATIN CALCIUM 20 MILLIGRAM(S): 80 TABLET, FILM COATED ORAL at 21:46

## 2020-10-30 RX ADMIN — Medication 50 GRAM(S): at 21:48

## 2020-10-30 RX ADMIN — HEPARIN SODIUM 8000 UNIT(S): 5000 INJECTION INTRAVENOUS; SUBCUTANEOUS at 22:07

## 2020-10-30 RX ADMIN — TAMSULOSIN HYDROCHLORIDE 0.4 MILLIGRAM(S): 0.4 CAPSULE ORAL at 21:48

## 2020-10-30 RX ADMIN — HEPARIN SODIUM 1800 UNIT(S)/HR: 5000 INJECTION INTRAVENOUS; SUBCUTANEOUS at 22:08

## 2020-10-30 RX ADMIN — Medication 1000 MILLIGRAM(S): at 22:15

## 2020-10-30 RX ADMIN — CHLORHEXIDINE GLUCONATE 1 APPLICATION(S): 213 SOLUTION TOPICAL at 19:12

## 2020-10-30 RX ADMIN — CARVEDILOL PHOSPHATE 12.5 MILLIGRAM(S): 80 CAPSULE, EXTENDED RELEASE ORAL at 05:32

## 2020-10-30 RX ADMIN — PANTOPRAZOLE SODIUM 40 MILLIGRAM(S): 20 TABLET, DELAYED RELEASE ORAL at 05:32

## 2020-10-30 RX ADMIN — Medication 3 MILLILITER(S): at 08:59

## 2020-10-30 RX ADMIN — Medication 50 MILLIGRAM(S): at 21:47

## 2020-10-30 RX ADMIN — OXYCODONE HYDROCHLORIDE 5 MILLIGRAM(S): 5 TABLET ORAL at 23:47

## 2020-10-30 RX ADMIN — SODIUM CHLORIDE 42 MILLILITER(S): 9 INJECTION, SOLUTION INTRAVENOUS at 09:54

## 2020-10-30 NOTE — BRIEF OPERATIVE NOTE - NSICDXBRIEFPROCEDURE_GEN_ALL_CORE_FT
PROCEDURES:  Graft, skin, split-thickness, extremity 30-Oct-2020 17:43:37  Rosangela Gray  Irrigation and debridement, tissue, including muscle or fascia, excisional 21-Oct-2020 13:44:45  Reyes, Jose  
PROCEDURES:  Irrigation and debridement, tissue, including muscle or fascia, excisional 21-Oct-2020 13:44:45  Reyes, Jose

## 2020-10-30 NOTE — AIRWAY REMOVAL NOTE  ADULT & PEDS - ARTIFICAL AIRWAY REMOVAL COMMENTS
Per JAYME Meza , Pt was awake and alert. Was extubate to NC-2L. Pt has no stridor or in any signs of distress. Will continue to monitor pt.

## 2020-10-30 NOTE — PRE PROCEDURE NOTE - PRE PROCEDURE EVALUATION
Preoperative Note    Preop Dx: LLE hematoma  Surgeon: Bruno Bedoya DO  Procedure: Split thickness skin graft    Vital Signs Last 24 Hrs  T(C): 36.5 (30 Oct 2020 04:52), Max: 36.8 (29 Oct 2020 07:00)  T(F): 97.7 (30 Oct 2020 04:52), Max: 98.2 (29 Oct 2020 07:00)  HR: 69 (30 Oct 2020 04:52) (69 - 82)  BP: 138/70 (30 Oct 2020 04:52) (104/50 - 144/70)  RR: 18 (30 Oct 2020 04:52) (17 - 18)  SpO2: 93% (30 Oct 2020 04:52) (90% - 97%)                        7.3    10.90 )-----------( 233      ( 29 Oct 2020 07:00 )             22.0     10-28    134<L>  |  97<L>  |  72.0<H>  ----------------------------<  137<H>  4.3   |  25.0  |  1.58<H>    Ca    8.2<L>      28 Oct 2020 05:46  Phos  3.7     10-28  Mg     1.8     10-28      PTT - ( 29 Oct 2020 20:56 )  PTT:68.7 sec  Daily     Daily     EKG: Reviewed 10/21/2020; notable for A-fib  CXR: Reviewed 10/25/2020; s/p chest tube placement and removal, notable for small residual effusion but no other abnormalities  Type and Screen: 10/25/2020, A POS        A/P: 70y Male w/LLE hematoma, s/p debridement and wound vac application, to OR today for split thickness skin graft and any additional debridement as necessary    - OR 10/30/20 for STSG with Dr. Bedoya  - NPO past midnight, except medications; IVF while NPO  - Morning Labs: CBC, BMP, type & screen  - Heparin drip to be stopped 6 hours prior to OR  - Consent to be signed and placed in chart

## 2020-10-30 NOTE — BRIEF OPERATIVE NOTE - OPERATION/FINDINGS
Webb placed intra op. 25 x 12cm wound to left lower extremity; at end of operation pt became bradycardic and hypotensive, similar to last time pt went to OR. Pt remained intubated and transferred to SICU.   LLE in splint and knee immobilizer; strict bed rest 5 days.

## 2020-10-30 NOTE — BRIEF OPERATIVE NOTE - NSICDXBRIEFPOSTOP_GEN_ALL_CORE_FT
POST-OP DIAGNOSIS:  Traumatic hematoma 21-Oct-2020 13:45:25  Reyes, Jose  
POST-OP DIAGNOSIS:  Traumatic hematoma 21-Oct-2020 13:45:25  Reyes, Jose

## 2020-10-30 NOTE — BRIEF OPERATIVE NOTE - NSICDXBRIEFPREOP_GEN_ALL_CORE_FT
PRE-OP DIAGNOSIS:  Traumatic hematoma 21-Oct-2020 13:45:04  Reyes, Jose  
PRE-OP DIAGNOSIS:  Traumatic hematoma 21-Oct-2020 13:45:04  Reyes, Jose

## 2020-10-30 NOTE — CHART NOTE - NSCHARTNOTEFT_GEN_A_CORE
Called to bedside by RN for wound vac leaking, at bedside noted blood leaking from vac with no seal, replaced vac with wet-to-dry dressing.

## 2020-10-30 NOTE — PROGRESS NOTE ADULT - SUBJECTIVE AND OBJECTIVE BOX
INTERVAL HPI/OVERNIGHT EVENTS:    SUBJECTIVE: Patient evaluated at bedside, found resting comfortably in bed, nad. No acute complaints or concerns. Vac removed overnight as not functioning, replaced with WTD dressing. Plan for OR today.      MEDICATIONS  (STANDING):  ALBUTerol    90 MICROgram(s) HFA Inhaler 1 Puff(s) Inhalation every 4 hours  albuterol/ipratropium for Nebulization 3 milliLiter(s) Nebulizer every 6 hours  allopurinol 100 milliGRAM(s) Oral daily  ascorbic acid 500 milliGRAM(s) Oral two times a day  atorvastatin 20 milliGRAM(s) Oral at bedtime  budesonide 160 MICROgram(s)/formoterol 4.5 MICROgram(s) Inhaler 2 Puff(s) Inhalation two times a day  carvedilol 12.5 milliGRAM(s) Oral every 12 hours  chlorhexidine 2% Cloths 1 Application(s) Topical daily  epoetin felisha-epbx (RETACRIT) Injectable 90954 Unit(s) SubCutaneous <User Schedule>  folic acid 1 milliGRAM(s) Oral daily  heparin  Infusion. 1200 Unit(s)/Hr (12 mL/Hr) IV Continuous <Continuous>  hydrALAZINE 50 milliGRAM(s) Oral three times a day  insulin lispro (ADMELOG) corrective regimen sliding scale   SubCutaneous Before meals and at bedtime  lidocaine   Patch 1 Patch Transdermal daily  multiple electrolytes Injection Type 1 1000 milliLiter(s) (42 mL/Hr) IV Continuous <Continuous>  multivitamin 1 Tablet(s) Oral daily  pantoprazole    Tablet 40 milliGRAM(s) Oral before breakfast  senna 2 Tablet(s) Oral at bedtime  tamsulosin 0.4 milliGRAM(s) Oral at bedtime  thiamine 100 milliGRAM(s) Oral daily  tiotropium 18 MICROgram(s) Capsule 1 Capsule(s) Inhalation daily  zinc sulfate 220 milliGRAM(s) Oral daily    MEDICATIONS  (PRN):  acetaminophen   Tablet .. 650 milliGRAM(s) Oral every 6 hours PRN Mild Pain (1 - 3)  heparin   Injectable 7000 Unit(s) IV Push every 6 hours PRN For aPTT less than 40  heparin   Injectable 3500 Unit(s) IV Push every 6 hours PRN For aPTT between 40 - 57  temazepam 30 milliGRAM(s) Oral at bedtime PRN Insomnia  traMADol 25 milliGRAM(s) Oral every 4 hours PRN Moderate Pain (4 - 6)  traMADol 50 milliGRAM(s) Oral every 4 hours PRN Severe Pain (7 - 10)      Vital Signs Last 24 Hrs  T(C): 36.5 (30 Oct 2020 04:52), Max: 36.8 (29 Oct 2020 07:00)  T(F): 97.7 (30 Oct 2020 04:52), Max: 98.2 (29 Oct 2020 07:00)  HR: 69 (30 Oct 2020 04:52) (69 - 82)  BP: 138/70 (30 Oct 2020 04:52) (104/50 - 144/70)  BP(mean): --  RR: 18 (30 Oct 2020 04:52) (17 - 18)  SpO2: 93% (30 Oct 2020 04:52) (90% - 97%)    PE  Gen: resting comfortably in bed, nad  Pulm: no increased wob, no conversational dyspnea  Abd: non-distended, soft, non-tender  Ext: distal extremities warm and well-perfused, LLE wound to lateral anterior lower extremity below knee. granulation tissue at base, no purulent exudate. L foot mildly swollen, compressing dressing applied.         I&O's Detail    28 Oct 2020 07:01  -  29 Oct 2020 07:00  --------------------------------------------------------  IN:    Heparin Infusion: 170 mL    Oral Fluid: 400 mL  Total IN: 570 mL    OUT:    VAC (Vacuum Assisted Closure) System (mL): 300 mL    Voided (mL): 725 mL  Total OUT: 1025 mL    Total NET: -455 mL      29 Oct 2020 07:01  -  30 Oct 2020 04:53  --------------------------------------------------------  IN:    Heparin Infusion: 169 mL  Total IN: 169 mL    OUT:    Stool (mL): 1 mL    Voided (mL): 200 mL  Total OUT: 201 mL    Total NET: -32 mL          LABS:                        7.3    10.90 )-----------( 233      ( 29 Oct 2020 07:00 )             22.0     10-28    134<L>  |  97<L>  |  72.0<H>  ----------------------------<  137<H>  4.3   |  25.0  |  1.58<H>    Ca    8.2<L>      28 Oct 2020 05:46  Phos  3.7     10-28  Mg     1.8     10-28      PTT - ( 29 Oct 2020 20:56 )  PTT:68.7 sec      RADIOLOGY & ADDITIONAL STUDIES:

## 2020-10-30 NOTE — PROGRESS NOTE ADULT - ASSESSMENT
70-year-old male status post fall down steps suffered a left anterior tibial hematoma which underwent surgical debridement.  Wound vac removed and replaced with WTD overnight. The patient is on a heparin drip for A. fib.   Plan  -OR today for split thickness skin graft  -Continue heparin drip for A. fib with a target PTT of 50-70, will plan for hold at 6am for planned OR  - f/u am labs  -NPO/IVF  -pain control  -OOB

## 2020-10-31 LAB
ANION GAP SERPL CALC-SCNC: 12 MMOL/L — SIGNIFICANT CHANGE UP (ref 5–17)
APTT BLD: 116.6 SEC — HIGH (ref 27.5–35.5)
APTT BLD: 28.5 SEC — SIGNIFICANT CHANGE UP (ref 27.5–35.5)
APTT BLD: 78.9 SEC — HIGH (ref 27.5–35.5)
APTT BLD: >200 SEC — CRITICAL HIGH (ref 27.5–35.5)
BASOPHILS # BLD AUTO: 0.03 K/UL — SIGNIFICANT CHANGE UP (ref 0–0.2)
BASOPHILS NFR BLD AUTO: 0.3 % — SIGNIFICANT CHANGE UP (ref 0–2)
BUN SERPL-MCNC: 68 MG/DL — HIGH (ref 8–20)
CALCIUM SERPL-MCNC: 8.7 MG/DL — SIGNIFICANT CHANGE UP (ref 8.6–10.2)
CHLORIDE SERPL-SCNC: 99 MMOL/L — SIGNIFICANT CHANGE UP (ref 98–107)
CO2 SERPL-SCNC: 26 MMOL/L — SIGNIFICANT CHANGE UP (ref 22–29)
CREAT SERPL-MCNC: 1.34 MG/DL — HIGH (ref 0.5–1.3)
EOSINOPHIL # BLD AUTO: 0.11 K/UL — SIGNIFICANT CHANGE UP (ref 0–0.5)
EOSINOPHIL NFR BLD AUTO: 1 % — SIGNIFICANT CHANGE UP (ref 0–6)
GLUCOSE SERPL-MCNC: 113 MG/DL — HIGH (ref 70–99)
HCT VFR BLD CALC: 24.4 % — LOW (ref 39–50)
HGB BLD-MCNC: 8 G/DL — LOW (ref 13–17)
IMM GRANULOCYTES NFR BLD AUTO: 1.2 % — SIGNIFICANT CHANGE UP (ref 0–1.5)
INR BLD: 1.2 RATIO — HIGH (ref 0.88–1.16)
LYMPHOCYTES # BLD AUTO: 0.92 K/UL — LOW (ref 1–3.3)
LYMPHOCYTES # BLD AUTO: 8.1 % — LOW (ref 13–44)
MAGNESIUM SERPL-MCNC: 2.3 MG/DL — SIGNIFICANT CHANGE UP (ref 1.6–2.6)
MCHC RBC-ENTMCNC: 32.8 GM/DL — SIGNIFICANT CHANGE UP (ref 32–36)
MCHC RBC-ENTMCNC: 37 PG — HIGH (ref 27–34)
MCV RBC AUTO: 113 FL — HIGH (ref 80–100)
MONOCYTES # BLD AUTO: 1.35 K/UL — HIGH (ref 0–0.9)
MONOCYTES NFR BLD AUTO: 11.9 % — SIGNIFICANT CHANGE UP (ref 2–14)
NEUTROPHILS # BLD AUTO: 8.8 K/UL — HIGH (ref 1.8–7.4)
NEUTROPHILS NFR BLD AUTO: 77.5 % — HIGH (ref 43–77)
PHOSPHATE SERPL-MCNC: 5.1 MG/DL — HIGH (ref 2.4–4.7)
PLATELET # BLD AUTO: 230 K/UL — SIGNIFICANT CHANGE UP (ref 150–400)
POTASSIUM SERPL-MCNC: 4.9 MMOL/L — SIGNIFICANT CHANGE UP (ref 3.5–5.3)
POTASSIUM SERPL-SCNC: 4.9 MMOL/L — SIGNIFICANT CHANGE UP (ref 3.5–5.3)
PROTHROM AB SERPL-ACNC: 13.8 SEC — HIGH (ref 10.6–13.6)
RBC # BLD: 2.16 M/UL — LOW (ref 4.2–5.8)
RBC # FLD: 17.3 % — HIGH (ref 10.3–14.5)
SODIUM SERPL-SCNC: 137 MMOL/L — SIGNIFICANT CHANGE UP (ref 135–145)
WBC # BLD: 11.35 K/UL — HIGH (ref 3.8–10.5)
WBC # FLD AUTO: 11.35 K/UL — HIGH (ref 3.8–10.5)

## 2020-10-31 PROCEDURE — 99024 POSTOP FOLLOW-UP VISIT: CPT

## 2020-10-31 RX ORDER — HEPARIN SODIUM 5000 [USP'U]/ML
1600 INJECTION INTRAVENOUS; SUBCUTANEOUS
Qty: 25000 | Refills: 0 | Status: DISCONTINUED | OUTPATIENT
Start: 2020-10-31 | End: 2020-11-02

## 2020-10-31 RX ORDER — FUROSEMIDE 40 MG
20 TABLET ORAL ONCE
Refills: 0 | Status: COMPLETED | OUTPATIENT
Start: 2020-10-31 | End: 2020-10-31

## 2020-10-31 RX ADMIN — HEPARIN SODIUM 16 UNIT(S)/HR: 5000 INJECTION INTRAVENOUS; SUBCUTANEOUS at 18:15

## 2020-10-31 RX ADMIN — Medication 100 MILLIGRAM(S): at 11:35

## 2020-10-31 RX ADMIN — Medication 3 MILLILITER(S): at 17:04

## 2020-10-31 RX ADMIN — Medication 1 TABLET(S): at 11:36

## 2020-10-31 RX ADMIN — Medication 3 MILLILITER(S): at 08:12

## 2020-10-31 RX ADMIN — PANTOPRAZOLE SODIUM 40 MILLIGRAM(S): 20 TABLET, DELAYED RELEASE ORAL at 05:18

## 2020-10-31 RX ADMIN — HYDROMORPHONE HYDROCHLORIDE 0.5 MILLIGRAM(S): 2 INJECTION INTRAMUSCULAR; INTRAVENOUS; SUBCUTANEOUS at 02:38

## 2020-10-31 RX ADMIN — Medication 3 MILLILITER(S): at 20:42

## 2020-10-31 RX ADMIN — Medication 50 MILLIGRAM(S): at 21:00

## 2020-10-31 RX ADMIN — HEPARIN SODIUM 16 UNIT(S)/HR: 5000 INJECTION INTRAVENOUS; SUBCUTANEOUS at 20:52

## 2020-10-31 RX ADMIN — Medication 600 MILLIGRAM(S): at 21:00

## 2020-10-31 RX ADMIN — CARVEDILOL PHOSPHATE 12.5 MILLIGRAM(S): 80 CAPSULE, EXTENDED RELEASE ORAL at 21:00

## 2020-10-31 RX ADMIN — HYDROMORPHONE HYDROCHLORIDE 0.5 MILLIGRAM(S): 2 INJECTION INTRAMUSCULAR; INTRAVENOUS; SUBCUTANEOUS at 23:17

## 2020-10-31 RX ADMIN — OXYCODONE HYDROCHLORIDE 5 MILLIGRAM(S): 5 TABLET ORAL at 16:47

## 2020-10-31 RX ADMIN — HYDROMORPHONE HYDROCHLORIDE 0.5 MILLIGRAM(S): 2 INJECTION INTRAMUSCULAR; INTRAVENOUS; SUBCUTANEOUS at 08:47

## 2020-10-31 RX ADMIN — Medication 100 MILLIGRAM(S): at 11:44

## 2020-10-31 RX ADMIN — HYDROMORPHONE HYDROCHLORIDE 0.5 MILLIGRAM(S): 2 INJECTION INTRAMUSCULAR; INTRAVENOUS; SUBCUTANEOUS at 23:42

## 2020-10-31 RX ADMIN — TAMSULOSIN HYDROCHLORIDE 0.4 MILLIGRAM(S): 0.4 CAPSULE ORAL at 21:01

## 2020-10-31 RX ADMIN — HYDROMORPHONE HYDROCHLORIDE 0.5 MILLIGRAM(S): 2 INJECTION INTRAMUSCULAR; INTRAVENOUS; SUBCUTANEOUS at 03:08

## 2020-10-31 RX ADMIN — Medication 50 MILLIGRAM(S): at 14:26

## 2020-10-31 RX ADMIN — BUDESONIDE AND FORMOTEROL FUMARATE DIHYDRATE 2 PUFF(S): 160; 4.5 AEROSOL RESPIRATORY (INHALATION) at 09:02

## 2020-10-31 RX ADMIN — CHLORHEXIDINE GLUCONATE 1 APPLICATION(S): 213 SOLUTION TOPICAL at 05:19

## 2020-10-31 RX ADMIN — Medication 1 MILLIGRAM(S): at 11:35

## 2020-10-31 RX ADMIN — Medication 500 MILLIGRAM(S): at 05:18

## 2020-10-31 RX ADMIN — HYDROMORPHONE HYDROCHLORIDE 0.5 MILLIGRAM(S): 2 INJECTION INTRAMUSCULAR; INTRAVENOUS; SUBCUTANEOUS at 09:00

## 2020-10-31 RX ADMIN — ZINC SULFATE TAB 220 MG (50 MG ZINC EQUIVALENT) 220 MILLIGRAM(S): 220 (50 ZN) TAB at 11:35

## 2020-10-31 RX ADMIN — OXYCODONE HYDROCHLORIDE 5 MILLIGRAM(S): 5 TABLET ORAL at 00:45

## 2020-10-31 RX ADMIN — ATORVASTATIN CALCIUM 20 MILLIGRAM(S): 80 TABLET, FILM COATED ORAL at 21:00

## 2020-10-31 RX ADMIN — Medication 20 MILLIGRAM(S): at 10:41

## 2020-10-31 RX ADMIN — OXYCODONE HYDROCHLORIDE 5 MILLIGRAM(S): 5 TABLET ORAL at 08:47

## 2020-10-31 RX ADMIN — Medication 50 MILLIGRAM(S): at 05:18

## 2020-10-31 RX ADMIN — Medication 500 MILLIGRAM(S): at 18:15

## 2020-10-31 RX ADMIN — CARVEDILOL PHOSPHATE 12.5 MILLIGRAM(S): 80 CAPSULE, EXTENDED RELEASE ORAL at 05:18

## 2020-10-31 RX ADMIN — Medication 3 MILLILITER(S): at 03:09

## 2020-10-31 RX ADMIN — OXYCODONE HYDROCHLORIDE 5 MILLIGRAM(S): 5 TABLET ORAL at 09:00

## 2020-10-31 RX ADMIN — SENNA PLUS 2 TABLET(S): 8.6 TABLET ORAL at 21:00

## 2020-10-31 RX ADMIN — BUDESONIDE AND FORMOTEROL FUMARATE DIHYDRATE 2 PUFF(S): 160; 4.5 AEROSOL RESPIRATORY (INHALATION) at 20:42

## 2020-10-31 NOTE — PROGRESS NOTE ADULT - SUBJECTIVE AND OBJECTIVE BOX
HPI/OVERNIGHT EVENTS: NAEON. Patient underwent STSG to LLE, but during anesthesia reversal became hypotensive and bradycardic, as with prior surgery on 10/21. On evaulation in SICU, patient continues to note significant pain in LLE, but otherwise has no complaints. Per nursing, dressing is saturated.    MEDICATIONS  (STANDING):  ALBUTerol    90 MICROgram(s) HFA Inhaler 1 Puff(s) Inhalation every 4 hours  albuterol/ipratropium for Nebulization 3 milliLiter(s) Nebulizer every 6 hours  allopurinol 100 milliGRAM(s) Oral daily  ascorbic acid 500 milliGRAM(s) Oral two times a day  atorvastatin 20 milliGRAM(s) Oral at bedtime  budesonide 160 MICROgram(s)/formoterol 4.5 MICROgram(s) Inhaler 2 Puff(s) Inhalation two times a day  carvedilol 12.5 milliGRAM(s) Oral every 12 hours  chlorhexidine 2% Cloths 1 Application(s) Topical <User Schedule>  epoetin felisha-epbx (RETACRIT) Injectable 97900 Unit(s) SubCutaneous <User Schedule>  folic acid 1 milliGRAM(s) Oral daily  heparin  Infusion. 1200 Unit(s)/Hr (18 mL/Hr) IV Continuous <Continuous>  hydrALAZINE 50 milliGRAM(s) Oral three times a day  insulin lispro (ADMELOG) corrective regimen sliding scale   SubCutaneous Before meals and at bedtime  melatonin 3 milliGRAM(s) Oral at bedtime  multivitamin 1 Tablet(s) Oral daily  pantoprazole    Tablet 40 milliGRAM(s) Oral before breakfast  senna 2 Tablet(s) Oral at bedtime  tamsulosin 0.4 milliGRAM(s) Oral at bedtime  thiamine 100 milliGRAM(s) Oral daily  zinc sulfate 220 milliGRAM(s) Oral daily    MEDICATIONS  (PRN):  acetaminophen   Tablet .. 975 milliGRAM(s) Oral every 6 hours PRN Mild Pain (1 - 3)  benzocaine 15 mG/menthol 3.6 mG (Sugar-Free) Lozenge 1 Lozenge Oral every 3 hours PRN Sore Throat  heparin   Injectable 8000 Unit(s) IV Push every 6 hours PRN For aPTT less than 40  heparin   Injectable 4000 Unit(s) IV Push every 6 hours PRN For aPTT between 40 - 57  HYDROmorphone  Injectable 0.5 milliGRAM(s) IV Push every 4 hours PRN Severe Pain (7 - 10)  oxyCODONE    IR 5 milliGRAM(s) Oral every 6 hours PRN Moderate Pain (4 - 6)  temazepam 30 milliGRAM(s) Oral at bedtime PRN Insomnia      Vital Signs Last 24 Hrs  T(C): 36.3 (31 Oct 2020 00:10), Max: 36.7 (30 Oct 2020 14:03)  T(F): 97.4 (31 Oct 2020 00:10), Max: 98.1 (30 Oct 2020 14:03)  HR: 76 (31 Oct 2020 03:10) (66 - 95)  BP: 162/57 (30 Oct 2020 19:00) (129/68 - 162/57)  BP(mean): --  RR: 24 (31 Oct 2020 02:00) (14 - 26)  SpO2: 91% (31 Oct 2020 03:10) (91% - 100%)    Gen: A&Ox3, NAD. Laying in bed  HEENT: NCAT. Nasal cannula in place  Pulm: Regular, nonlabored respirations  CV: RRR  Abd: Soft, NTND  Ext: LLE leg splint in place, with ACE wrap in place. Significant serous discharge from dressing along left thigh; minimal blood strikethrough on dressing. Noted TTP along wound. Compartments soft     I&O's Detail    29 Oct 2020 07:01  -  30 Oct 2020 07:00  --------------------------------------------------------  IN:    Heparin Infusion: 299 mL    multiple electrolytes Injection Type 1.: 336 mL  Total IN: 635 mL    OUT:    Stool (mL): 1 mL    Voided (mL): 450 mL  Total OUT: 451 mL    Total NET: 184 mL      30 Oct 2020 07:01  -  31 Oct 2020 03:18  --------------------------------------------------------  IN:    Heparin Infusion: 90 mL  Total IN: 90 mL    OUT:    Indwelling Catheter - Urethral (mL): 675 mL    Voided (mL): 275 mL  Total OUT: 950 mL    Total NET: -860 mL          LABS:                        7.5    10.85 )-----------( 217      ( 30 Oct 2020 19:29 )             23.1     10-30    137  |  103  |  66.0<H>  ----------------------------<  124<H>  4.2   |  24.0  |  1.24    Ca    8.0<L>      30 Oct 2020 19:28  Phos  4.0     10-30  Mg     1.7     10-30      PT/INR - ( 30 Oct 2020 19:28 )   PT: 13.3 sec;   INR: 1.15 ratio         PTT - ( 30 Oct 2020 19:28 )  PTT:28.0 sec

## 2020-10-31 NOTE — CHART NOTE - NSCHARTNOTEFT_GEN_A_CORE
SICU TRANSFER NOTE  -----------------------------  ICU Admission Date: XXXXX  Transfer Date: 10-31-20 @ 14:57    Admission Diagnosis: XXXXXXX    Active Problems/injuries: XXXXXX    Procedures: XXXXX INCLUDE DATES    Consultants:  [ ] Cardiology  [ ] Endocrine  [ ] Infectious Disease  [ ] Medicine  [ ]Neurosurgery  [ ] Ortho       [ ] Weight Bearing Status:  [ ] Palliative       [ ] Advanced Directives:    [ ] Physical Medicine and Rehab       [ ] Disposition :   [ ] Plastics  [ ] Pulmonary    Medications  acetaminophen   Tablet .. 975 milliGRAM(s) Oral every 6 hours PRN  ALBUTerol    90 MICROgram(s) HFA Inhaler 1 Puff(s) Inhalation every 4 hours  albuterol/ipratropium for Nebulization 3 milliLiter(s) Nebulizer every 6 hours  allopurinol 100 milliGRAM(s) Oral daily  ascorbic acid 500 milliGRAM(s) Oral two times a day  atorvastatin 20 milliGRAM(s) Oral at bedtime  benzocaine 15 mG/menthol 3.6 mG (Sugar-Free) Lozenge 1 Lozenge Oral every 3 hours PRN  budesonide 160 MICROgram(s)/formoterol 4.5 MICROgram(s) Inhaler 2 Puff(s) Inhalation two times a day  carvedilol 12.5 milliGRAM(s) Oral every 12 hours  chlorhexidine 2% Cloths 1 Application(s) Topical <User Schedule>  epoetin felisha-epbx (RETACRIT) Injectable 43356 Unit(s) SubCutaneous <User Schedule>  folic acid 1 milliGRAM(s) Oral daily  heparin   Injectable 8000 Unit(s) IV Push every 6 hours PRN  heparin   Injectable 4000 Unit(s) IV Push every 6 hours PRN  heparin  Infusion. 1200 Unit(s)/Hr IV Continuous <Continuous>  hydrALAZINE 50 milliGRAM(s) Oral three times a day  HYDROmorphone  Injectable 0.5 milliGRAM(s) IV Push every 4 hours PRN  melatonin 3 milliGRAM(s) Oral at bedtime  multivitamin 1 Tablet(s) Oral daily  oxyCODONE    IR 5 milliGRAM(s) Oral every 6 hours PRN  pantoprazole    Tablet 40 milliGRAM(s) Oral before breakfast  senna 2 Tablet(s) Oral at bedtime  tamsulosin 0.4 milliGRAM(s) Oral at bedtime  temazepam 30 milliGRAM(s) Oral at bedtime PRN  thiamine 100 milliGRAM(s) Oral daily  zinc sulfate 220 milliGRAM(s) Oral daily      [ ] I attest I have reviewed and reconciled all medications prior to transfer    IV Fluids  lactated ringers.: Solution, 1000 milliLiter(s) infuse at 75 mL/Hr  Provider's Contact #: [phone]  lactated ringers.: Solution, 1000 milliLiter(s) infuse at 100 mL/Hr  Provider's Contact #: [phone]    Indication: XXXXXX    Antibiotics:    Indication: XXXXXXX End Date:XXXXXXX      I have discussed this case with [email or web address] upon transfer and all questions regarding ICU course were answered.  The following items are to be followed up: SICU TRANSFER NOTE  -----------------------------  ICU Admission Date: 10/30/20  Transfer Date: 10-31-20 @ 14:57    Admission Diagnosis: Symptomatic Bradycardia, hypotension post-op    Active Problems/injuries:   LLE Hematoma - s/p Evac and Debridement, now s/p skin graft  Symptomatic Bradycardia - resolved  Right hydrothorax - s/p drain placement and removal  COPD  A.fib    Procedures:   10/30/20: Irrigation and debridement of LLE wound, Split-thickness Skin Graft, Left thigh donor   10/21/20: Hematoma Evacuation, excisional debridement and washout of LLE  10/21/20: Bedside placement of Pigtail pleural Catheter, right     Consultants:  [ ] Cardiology  [ ] Endocrine  [ ] Infectious Disease  [ ] Medicine  [x] Nephro  [ ] Neurosurgery  [ ] Ortho       [ ] Weight Bearing Status:  [ ] Palliative       [ ] Advanced Directives:    [ ] Physical Medicine and Rehab       [ ] Disposition :   [ ] Plastics  [ ] Pulmonary  [x] PT/OT - Home w Home assist and Home PT vs [facility]    Medications  acetaminophen   Tablet .. 975 milliGRAM(s) Oral every 6 hours PRN  ALBUTerol    90 MICROgram(s) HFA Inhaler 1 Puff(s) Inhalation every 4 hours  albuterol/ipratropium for Nebulization 3 milliLiter(s) Nebulizer every 6 hours  allopurinol 100 milliGRAM(s) Oral daily  ascorbic acid 500 milliGRAM(s) Oral two times a day  atorvastatin 20 milliGRAM(s) Oral at bedtime  benzocaine 15 mG/menthol 3.6 mG (Sugar-Free) Lozenge 1 Lozenge Oral every 3 hours PRN  budesonide 160 MICROgram(s)/formoterol 4.5 MICROgram(s) Inhaler 2 Puff(s) Inhalation two times a day  carvedilol 12.5 milliGRAM(s) Oral every 12 hours  chlorhexidine 2% Cloths 1 Application(s) Topical <User Schedule>  epoetin felisha-epbx (RETACRIT) Injectable 59480 Unit(s) SubCutaneous <User Schedule>  folic acid 1 milliGRAM(s) Oral daily  heparin   Injectable 8000 Unit(s) IV Push every 6 hours PRN  heparin   Injectable 4000 Unit(s) IV Push every 6 hours PRN  heparin  Infusion. 1200 Unit(s)/Hr IV Continuous <Continuous>  hydrALAZINE 50 milliGRAM(s) Oral three times a day  HYDROmorphone  Injectable 0.5 milliGRAM(s) IV Push every 4 hours PRN  melatonin 3 milliGRAM(s) Oral at bedtime  multivitamin 1 Tablet(s) Oral daily  oxyCODONE    IR 5 milliGRAM(s) Oral every 6 hours PRN  pantoprazole    Tablet 40 milliGRAM(s) Oral before breakfast  senna 2 Tablet(s) Oral at bedtime  tamsulosin 0.4 milliGRAM(s) Oral at bedtime  temazepam 30 milliGRAM(s) Oral at bedtime PRN  thiamine 100 milliGRAM(s) Oral daily  zinc sulfate 220 milliGRAM(s) Oral daily      [x] I attest I have reviewed and reconciled all medications prior to transfer    IV Fluids: None active.  lactated ringers.: Solution, 1000 milliLiter(s) infuse at 75 mL/Hr  Provider's Contact #: [phone]  lactated ringers.: Solution, 1000 milliLiter(s) infuse at 100 mL/Hr  Provider's Contact #: [phone]    Indication: n/a    Antibiotics: None    Indication: n/a  End Date: -       I have discussed this case with Dr. Sonny Gray and Dr. Hernán Dozier upon transfer and all questions regarding ICU course were answered.  The following items are to be followed up:    - F/u PTT @ 1900, adjust heparin infusion as indicated for therapeutic coverage. No heparin boluses.  - To have 5 days of bedrest, until 11/04  - STSG Dressing to remain for 5 days, until 11/04  - Donor site dressing per primary team  - Webb to remain while patient is strict bedrest

## 2020-10-31 NOTE — PROGRESS NOTE ADULT - SUBJECTIVE AND OBJECTIVE BOX
pt is a 70 year old male who underwent a LE skin graft procedure with General anesthesia. pt was spontaneously breathing with an endotracheal tube in place, not reversed given succinylcholine was administered, when he suddenly  became bradycardia and hypotensive. This occurred during his prior surgery on this admission. 0.4 mg of glycopyrolate given, followed by 0.5 mg of atropine and then 0.2 mg of epinephrine. pt responded appropriately. I placed an arterial line in the right upper extremity under sterile technique. An abg revealed acidosis. Pt brought to the SICU intubated, and was extubated one hour later. pt seen an examined. doing well. VSS. sleeping    Dr. Calixto Martinez

## 2020-10-31 NOTE — PROGRESS NOTE ADULT - ASSESSMENT
A/P: 70y y/o Male patient w/notable PMH of COPD, CHF, A-fib, admitted for LLE hematoma, s/p hematoma evacuation and sharp debridement to fascia of LLE on 10/21, now s/p STSG to LLE on 10/30, with post-op course complicated by bradycardia and hypotension during reversal prompting SICU transfer. Similar in nature to prior surgery. Now stable in SICU    -Strict bed rest for 5 days  -Can consider changes of outer dressing layers if wound continues to saturate  -Remaining management per SICU A/P: 70y y/o Male patient w/notable PMH of COPD, CHF, A-fib, admitted for LLE hematoma, s/p hematoma evacuation and sharp debridement to fascia of LLE on 10/21, now s/p STSG to LLE on 10/30, with post-op course complicated by bradycardia and hypotension during reversal prompting SICU transfer. Similar in nature to prior surgery. Now stable in SICU    -Strict bed rest for 5 days  -Remaining management per SICU

## 2020-10-31 NOTE — PROGRESS NOTE ADULT - ASSESSMENT
70-year-old male status post fall down steps suffered a left anterior tibial hematoma which underwent surgical debridement 10/21, and subsequent STSG on 10/30; both operations complicated by suspected vasovagal leading to bradycardia and hypotension and subsequent SICU admission. Stable now on downgrade.   Plan  - bedrest until 11/4, ace bandage to graft site until 11/4  - keep lomeli while on bedrest  - wean NC; continue q4 duoneb treatments  -Continue heparin drip for A. fib with a target PTT of 50-70, follow 6pm ptt  -DASH diet  -pain control

## 2020-10-31 NOTE — PROGRESS NOTE ADULT - SUBJECTIVE AND OBJECTIVE BOX
DOWNGRADE NOTE  POD 1 s/p STSG to LLE wound; s/p fall down steps.  Pt had episode of hypotension and bradycardia coming out of anesthesia yesterday, taken to SICU post-op as a result.  Pt extubated yesterday in SICU.   Pt endorses pain of LLE, however well controlled with pain medications.  On 5L NC and endorsing SOB, pt to receive duoneb treatment.   Tolerating diet, denies nausea, vomiting, fever, chills. Webb in place while on bedrest.     MEDICATIONS  (STANDING):  ALBUTerol    90 MICROgram(s) HFA Inhaler 1 Puff(s) Inhalation every 4 hours  albuterol/ipratropium for Nebulization 3 milliLiter(s) Nebulizer every 6 hours  allopurinol 100 milliGRAM(s) Oral daily  ascorbic acid 500 milliGRAM(s) Oral two times a day  atorvastatin 20 milliGRAM(s) Oral at bedtime  budesonide 160 MICROgram(s)/formoterol 4.5 MICROgram(s) Inhaler 2 Puff(s) Inhalation two times a day  carvedilol 12.5 milliGRAM(s) Oral every 12 hours  chlorhexidine 2% Cloths 1 Application(s) Topical <User Schedule>  epoetin felisha-epbx (RETACRIT) Injectable 69022 Unit(s) SubCutaneous <User Schedule>  folic acid 1 milliGRAM(s) Oral daily  heparin  Infusion 1600 Unit(s)/Hr (16 mL/Hr) IV Continuous <Continuous>  hydrALAZINE 50 milliGRAM(s) Oral three times a day  melatonin 3 milliGRAM(s) Oral at bedtime  multivitamin 1 Tablet(s) Oral daily  pantoprazole    Tablet 40 milliGRAM(s) Oral before breakfast  senna 2 Tablet(s) Oral at bedtime  tamsulosin 0.4 milliGRAM(s) Oral at bedtime  thiamine 100 milliGRAM(s) Oral daily  zinc sulfate 220 milliGRAM(s) Oral daily    MEDICATIONS  (PRN):  acetaminophen   Tablet .. 975 milliGRAM(s) Oral every 6 hours PRN Mild Pain (1 - 3)  benzocaine 15 mG/menthol 3.6 mG (Sugar-Free) Lozenge 1 Lozenge Oral every 3 hours PRN Sore Throat  HYDROmorphone  Injectable 0.5 milliGRAM(s) IV Push every 4 hours PRN Severe Pain (7 - 10)  oxyCODONE    IR 5 milliGRAM(s) Oral every 6 hours PRN Moderate Pain (4 - 6)  temazepam 30 milliGRAM(s) Oral at bedtime PRN Insomnia      Vital Signs Last 24 Hrs  T(C): 36.7 (31 Oct 2020 14:58), Max: 36.7 (31 Oct 2020 14:58)  T(F): 98 (31 Oct 2020 14:58), Max: 98 (31 Oct 2020 14:58)  HR: 93 (31 Oct 2020 14:58) (73 - 95)  BP: 122/70 (31 Oct 2020 14:58) (114/46 - 162/57)  BP(mean): 72 (31 Oct 2020 14:00) (67 - 79)  RR: 21 (31 Oct 2020 14:58) (14 - 33)  SpO2: 95% (31 Oct 2020 14:58) (90% - 100%)    Constitutional: NAD  Respiratory: on 5L NC,   Cardiovascular: RRR  Gastrointestinal: Soft, non-distended, non-tender  Musculoskeletal: No joint pain, swelling or deformity; no limitation of movement    I&O's Detail    30 Oct 2020 07:01  -  31 Oct 2020 07:00  --------------------------------------------------------  IN:    Heparin Infusion: 144 mL  Total IN: 144 mL    OUT:    Indwelling Catheter - Urethral (mL): 955 mL    Voided (mL): 275 mL  Total OUT: 1230 mL    Total NET: -1086 mL      31 Oct 2020 08:01  -  31 Oct 2020 17:13  --------------------------------------------------------  IN:    Heparin Infusion: 96 mL  Total IN: 96 mL    OUT:    Indwelling Catheter - Urethral (mL): 245 mL  Total OUT: 245 mL    Total NET: -149 mL          LABS:                        8.0    11.35 )-----------( 230      ( 31 Oct 2020 04:35 )             24.4     10-31    137  |  99  |  68.0<H>  ----------------------------<  113<H>  4.9   |  26.0  |  1.34<H>    Ca    8.7      31 Oct 2020 04:35  Phos  5.1     10-31  Mg     2.3     10-31      PT/INR - ( 31 Oct 2020 04:36 )   PT: 13.8 sec;   INR: 1.20 ratio         PTT - ( 31 Oct 2020 13:23 )  PTT:28.5 sec     DOWNGRADE NOTE  POD 1 s/p STSG to LLE wound; s/p fall down steps.  Pt had episode of hypotension and bradycardia coming out of anesthesia yesterday, taken to SICU post-op as a result.  Pt extubated yesterday in SICU.   Pt endorses pain of LLE, however well controlled with pain medications.  On 5L NC and endorsing SOB, pt to receive duoneb treatment.   Tolerating diet, denies nausea, vomiting, fever, chills. Webb in place while on bedrest.     MEDICATIONS  (STANDING):  ALBUTerol    90 MICROgram(s) HFA Inhaler 1 Puff(s) Inhalation every 4 hours  albuterol/ipratropium for Nebulization 3 milliLiter(s) Nebulizer every 6 hours  allopurinol 100 milliGRAM(s) Oral daily  ascorbic acid 500 milliGRAM(s) Oral two times a day  atorvastatin 20 milliGRAM(s) Oral at bedtime  budesonide 160 MICROgram(s)/formoterol 4.5 MICROgram(s) Inhaler 2 Puff(s) Inhalation two times a day  carvedilol 12.5 milliGRAM(s) Oral every 12 hours  chlorhexidine 2% Cloths 1 Application(s) Topical <User Schedule>  epoetin felisha-epbx (RETACRIT) Injectable 10907 Unit(s) SubCutaneous <User Schedule>  folic acid 1 milliGRAM(s) Oral daily  heparin  Infusion 1600 Unit(s)/Hr (16 mL/Hr) IV Continuous <Continuous>  hydrALAZINE 50 milliGRAM(s) Oral three times a day  melatonin 3 milliGRAM(s) Oral at bedtime  multivitamin 1 Tablet(s) Oral daily  pantoprazole    Tablet 40 milliGRAM(s) Oral before breakfast  senna 2 Tablet(s) Oral at bedtime  tamsulosin 0.4 milliGRAM(s) Oral at bedtime  thiamine 100 milliGRAM(s) Oral daily  zinc sulfate 220 milliGRAM(s) Oral daily    MEDICATIONS  (PRN):  acetaminophen   Tablet .. 975 milliGRAM(s) Oral every 6 hours PRN Mild Pain (1 - 3)  benzocaine 15 mG/menthol 3.6 mG (Sugar-Free) Lozenge 1 Lozenge Oral every 3 hours PRN Sore Throat  HYDROmorphone  Injectable 0.5 milliGRAM(s) IV Push every 4 hours PRN Severe Pain (7 - 10)  oxyCODONE    IR 5 milliGRAM(s) Oral every 6 hours PRN Moderate Pain (4 - 6)  temazepam 30 milliGRAM(s) Oral at bedtime PRN Insomnia      Vital Signs Last 24 Hrs  T(C): 36.7 (31 Oct 2020 14:58), Max: 36.7 (31 Oct 2020 14:58)  T(F): 98 (31 Oct 2020 14:58), Max: 98 (31 Oct 2020 14:58)  HR: 93 (31 Oct 2020 14:58) (73 - 95)  BP: 122/70 (31 Oct 2020 14:58) (114/46 - 162/57)  BP(mean): 72 (31 Oct 2020 14:00) (67 - 79)  RR: 21 (31 Oct 2020 14:58) (14 - 33)  SpO2: 95% (31 Oct 2020 14:58) (90% - 100%)    Constitutional: NAD  Respiratory: on 5L NC, dyspneic, able to speak full sentences  Cardiovascular: RRR  Gastrointestinal: Soft, non-distended, non-tender  Musculoskeletal: LLE in knee imobilizer, ACE bandage up to knee c/d/i; thigh donor sight with xeroform and telfa with dark red blood  Old ecchymosis to left side of body    I&O's Detail    30 Oct 2020 07:01  -  31 Oct 2020 07:00  --------------------------------------------------------  IN:    Heparin Infusion: 144 mL  Total IN: 144 mL    OUT:    Indwelling Catheter - Urethral (mL): 955 mL    Voided (mL): 275 mL  Total OUT: 1230 mL    Total NET: -1086 mL      31 Oct 2020 08:01  -  31 Oct 2020 17:13  --------------------------------------------------------  IN:    Heparin Infusion: 96 mL  Total IN: 96 mL    OUT:    Indwelling Catheter - Urethral (mL): 245 mL  Total OUT: 245 mL    Total NET: -149 mL          LABS:                        8.0    11.35 )-----------( 230      ( 31 Oct 2020 04:35 )             24.4     10-31    137  |  99  |  68.0<H>  ----------------------------<  113<H>  4.9   |  26.0  |  1.34<H>    Ca    8.7      31 Oct 2020 04:35  Phos  5.1     10-31  Mg     2.3     10-31      PT/INR - ( 31 Oct 2020 04:36 )   PT: 13.8 sec;   INR: 1.20 ratio         PTT - ( 31 Oct 2020 13:23 )  PTT:28.5 sec

## 2020-10-31 NOTE — PROVIDER CONTACT NOTE (CRITICAL VALUE NOTIFICATION) - ACTION/TREATMENT ORDERED:
hold heparin gtt for 1 hr and repeat ptt, wait for new ptt results to determine wether to resume heparin gtt or continue to hold.

## 2020-11-01 LAB
ANION GAP SERPL CALC-SCNC: 13 MMOL/L — SIGNIFICANT CHANGE UP (ref 5–17)
APTT BLD: 120.5 SEC — CRITICAL HIGH (ref 27.5–35.5)
APTT BLD: 85.4 SEC — HIGH (ref 27.5–35.5)
APTT BLD: 89.3 SEC — HIGH (ref 27.5–35.5)
BASOPHILS # BLD AUTO: 0.03 K/UL — SIGNIFICANT CHANGE UP (ref 0–0.2)
BASOPHILS NFR BLD AUTO: 0.3 % — SIGNIFICANT CHANGE UP (ref 0–2)
BUN SERPL-MCNC: 71 MG/DL — HIGH (ref 8–20)
CALCIUM SERPL-MCNC: 8.3 MG/DL — LOW (ref 8.6–10.2)
CHLORIDE SERPL-SCNC: 92 MMOL/L — LOW (ref 98–107)
CHLORIDE UR-SCNC: <27 MMOL/L — SIGNIFICANT CHANGE UP
CO2 SERPL-SCNC: 25 MMOL/L — SIGNIFICANT CHANGE UP (ref 22–29)
CREAT ?TM UR-MCNC: 118 MG/DL — SIGNIFICANT CHANGE UP
CREAT SERPL-MCNC: 1.77 MG/DL — HIGH (ref 0.5–1.3)
EOSINOPHIL # BLD AUTO: 0.06 K/UL — SIGNIFICANT CHANGE UP (ref 0–0.5)
EOSINOPHIL NFR BLD AUTO: 0.7 % — SIGNIFICANT CHANGE UP (ref 0–6)
GLUCOSE SERPL-MCNC: 124 MG/DL — HIGH (ref 70–99)
HCT VFR BLD CALC: 22.3 % — LOW (ref 39–50)
HGB BLD-MCNC: 7.1 G/DL — LOW (ref 13–17)
IMM GRANULOCYTES NFR BLD AUTO: 0.8 % — SIGNIFICANT CHANGE UP (ref 0–1.5)
LYMPHOCYTES # BLD AUTO: 0.72 K/UL — LOW (ref 1–3.3)
LYMPHOCYTES # BLD AUTO: 8 % — LOW (ref 13–44)
MAGNESIUM SERPL-MCNC: 2.2 MG/DL — SIGNIFICANT CHANGE UP (ref 1.8–2.6)
MCHC RBC-ENTMCNC: 31.8 GM/DL — LOW (ref 32–36)
MCHC RBC-ENTMCNC: 37.2 PG — HIGH (ref 27–34)
MCV RBC AUTO: 116.8 FL — HIGH (ref 80–100)
MONOCYTES # BLD AUTO: 0.96 K/UL — HIGH (ref 0–0.9)
MONOCYTES NFR BLD AUTO: 10.6 % — SIGNIFICANT CHANGE UP (ref 2–14)
NEUTROPHILS # BLD AUTO: 7.21 K/UL — SIGNIFICANT CHANGE UP (ref 1.8–7.4)
NEUTROPHILS NFR BLD AUTO: 79.6 % — HIGH (ref 43–77)
OSMOLALITY UR: 349 MOSM/KG — SIGNIFICANT CHANGE UP (ref 300–1000)
PHOSPHATE SERPL-MCNC: 6.6 MG/DL — HIGH (ref 2.4–4.7)
PLATELET # BLD AUTO: 183 K/UL — SIGNIFICANT CHANGE UP (ref 150–400)
POTASSIUM SERPL-MCNC: 5.3 MMOL/L — SIGNIFICANT CHANGE UP (ref 3.5–5.3)
POTASSIUM SERPL-SCNC: 5.3 MMOL/L — SIGNIFICANT CHANGE UP (ref 3.5–5.3)
POTASSIUM UR-SCNC: 30 MMOL/L — SIGNIFICANT CHANGE UP
RBC # BLD: 1.91 M/UL — LOW (ref 4.2–5.8)
RBC # FLD: 17.2 % — HIGH (ref 10.3–14.5)
SODIUM SERPL-SCNC: 130 MMOL/L — LOW (ref 135–145)
SODIUM UR-SCNC: <30 MMOL/L — SIGNIFICANT CHANGE UP
WBC # BLD: 9.05 K/UL — SIGNIFICANT CHANGE UP (ref 3.8–10.5)
WBC # FLD AUTO: 9.05 K/UL — SIGNIFICANT CHANGE UP (ref 3.8–10.5)

## 2020-11-01 PROCEDURE — 99024 POSTOP FOLLOW-UP VISIT: CPT

## 2020-11-01 PROCEDURE — 71045 X-RAY EXAM CHEST 1 VIEW: CPT | Mod: 26

## 2020-11-01 RX ORDER — SODIUM CHLORIDE 9 MG/ML
1000 INJECTION, SOLUTION INTRAVENOUS
Refills: 0 | Status: DISCONTINUED | OUTPATIENT
Start: 2020-11-01 | End: 2020-11-02

## 2020-11-01 RX ORDER — SODIUM CHLORIDE 9 MG/ML
1000 INJECTION INTRAMUSCULAR; INTRAVENOUS; SUBCUTANEOUS
Refills: 0 | Status: DISCONTINUED | OUTPATIENT
Start: 2020-11-01 | End: 2020-11-02

## 2020-11-01 RX ORDER — OXYCODONE HYDROCHLORIDE 5 MG/1
10 TABLET ORAL EVERY 6 HOURS
Refills: 0 | Status: DISCONTINUED | OUTPATIENT
Start: 2020-11-01 | End: 2020-11-02

## 2020-11-01 RX ORDER — ACETAMINOPHEN 500 MG
1000 TABLET ORAL ONCE
Refills: 0 | Status: COMPLETED | OUTPATIENT
Start: 2020-11-01 | End: 2020-11-01

## 2020-11-01 RX ADMIN — BUDESONIDE AND FORMOTEROL FUMARATE DIHYDRATE 2 PUFF(S): 160; 4.5 AEROSOL RESPIRATORY (INHALATION) at 20:58

## 2020-11-01 RX ADMIN — OXYCODONE HYDROCHLORIDE 10 MILLIGRAM(S): 5 TABLET ORAL at 09:57

## 2020-11-01 RX ADMIN — Medication 100 MILLIGRAM(S): at 11:30

## 2020-11-01 RX ADMIN — OXYCODONE HYDROCHLORIDE 10 MILLIGRAM(S): 5 TABLET ORAL at 18:31

## 2020-11-01 RX ADMIN — Medication 3 MILLIGRAM(S): at 22:11

## 2020-11-01 RX ADMIN — TAMSULOSIN HYDROCHLORIDE 0.4 MILLIGRAM(S): 0.4 CAPSULE ORAL at 22:11

## 2020-11-01 RX ADMIN — Medication 400 MILLIGRAM(S): at 09:00

## 2020-11-01 RX ADMIN — HEPARIN SODIUM 16 UNIT(S)/HR: 5000 INJECTION INTRAVENOUS; SUBCUTANEOUS at 02:52

## 2020-11-01 RX ADMIN — TEMAZEPAM 30 MILLIGRAM(S): 15 CAPSULE ORAL at 23:14

## 2020-11-01 RX ADMIN — ZINC SULFATE TAB 220 MG (50 MG ZINC EQUIVALENT) 220 MILLIGRAM(S): 220 (50 ZN) TAB at 11:30

## 2020-11-01 RX ADMIN — HYDROMORPHONE HYDROCHLORIDE 0.5 MILLIGRAM(S): 2 INJECTION INTRAMUSCULAR; INTRAVENOUS; SUBCUTANEOUS at 03:17

## 2020-11-01 RX ADMIN — Medication 3 MILLILITER(S): at 03:19

## 2020-11-01 RX ADMIN — Medication 3 MILLILITER(S): at 20:58

## 2020-11-01 RX ADMIN — Medication 3 MILLILITER(S): at 09:13

## 2020-11-01 RX ADMIN — OXYCODONE HYDROCHLORIDE 10 MILLIGRAM(S): 5 TABLET ORAL at 17:31

## 2020-11-01 RX ADMIN — OXYCODONE HYDROCHLORIDE 10 MILLIGRAM(S): 5 TABLET ORAL at 23:35

## 2020-11-01 RX ADMIN — ATORVASTATIN CALCIUM 20 MILLIGRAM(S): 80 TABLET, FILM COATED ORAL at 22:11

## 2020-11-01 RX ADMIN — Medication 1000 MILLIGRAM(S): at 09:15

## 2020-11-01 RX ADMIN — CARVEDILOL PHOSPHATE 12.5 MILLIGRAM(S): 80 CAPSULE, EXTENDED RELEASE ORAL at 17:28

## 2020-11-01 RX ADMIN — Medication 500 MILLIGRAM(S): at 17:28

## 2020-11-01 RX ADMIN — PANTOPRAZOLE SODIUM 40 MILLIGRAM(S): 20 TABLET, DELAYED RELEASE ORAL at 05:06

## 2020-11-01 RX ADMIN — CHLORHEXIDINE GLUCONATE 1 APPLICATION(S): 213 SOLUTION TOPICAL at 05:06

## 2020-11-01 RX ADMIN — Medication 3 MILLILITER(S): at 13:42

## 2020-11-01 RX ADMIN — BUDESONIDE AND FORMOTEROL FUMARATE DIHYDRATE 2 PUFF(S): 160; 4.5 AEROSOL RESPIRATORY (INHALATION) at 09:13

## 2020-11-01 RX ADMIN — Medication 1 MILLIGRAM(S): at 11:30

## 2020-11-01 RX ADMIN — Medication 600 MILLIGRAM(S): at 05:06

## 2020-11-01 RX ADMIN — OXYCODONE HYDROCHLORIDE 10 MILLIGRAM(S): 5 TABLET ORAL at 10:57

## 2020-11-01 RX ADMIN — TEMAZEPAM 30 MILLIGRAM(S): 15 CAPSULE ORAL at 00:43

## 2020-11-01 RX ADMIN — HYDROMORPHONE HYDROCHLORIDE 0.5 MILLIGRAM(S): 2 INJECTION INTRAMUSCULAR; INTRAVENOUS; SUBCUTANEOUS at 03:35

## 2020-11-01 RX ADMIN — CARVEDILOL PHOSPHATE 12.5 MILLIGRAM(S): 80 CAPSULE, EXTENDED RELEASE ORAL at 05:06

## 2020-11-01 RX ADMIN — SODIUM CHLORIDE 75 MILLILITER(S): 9 INJECTION, SOLUTION INTRAVENOUS at 22:16

## 2020-11-01 RX ADMIN — Medication 500 MILLIGRAM(S): at 05:06

## 2020-11-01 RX ADMIN — Medication 1 TABLET(S): at 11:30

## 2020-11-01 RX ADMIN — Medication 600 MILLIGRAM(S): at 17:28

## 2020-11-01 NOTE — PROGRESS NOTE ADULT - SUBJECTIVE AND OBJECTIVE BOX
INTERVAL HPI/OVERNIGHT EVENTS/SUBJECTIVE:  POD 2 s/p STSG to LLE wound; s/p fall down steps.  Pt c/o cough and unable to expectorate, without relife from nebulizer treatment,  chest PT performed and guaifenesin added to regimen.   Tolerating diet, denies nausea, vomiting, fever, chills. Lomeli in place while on bedrest.       ICU Vital Signs Last 24 Hrs  T(C): 36.5 (31 Oct 2020 23:53), Max: 36.7 (31 Oct 2020 14:58)  T(F): 97.7 (31 Oct 2020 23:53), Max: 98 (31 Oct 2020 14:58)  HR: 88 (31 Oct 2020 23:53) (75 - 102)  BP: 128/65 (31 Oct 2020 23:53) (114/46 - 146/94)  BP(mean): 72 (31 Oct 2020 14:00) (67 - 79)  ABP: 94/54 (31 Oct 2020 10:00) (87/37 - 150/53)  ABP(mean): 72 (31 Oct 2020 10:00) (55 - 81)  RR: 20 (31 Oct 2020 23:53) (17 - 33)  SpO2: 93% (31 Oct 2020 19:24) (90% - 99%)      I&O's Detail    30 Oct 2020 07:01  -  31 Oct 2020 07:00  --------------------------------------------------------  IN:    Heparin Infusion: 144 mL  Total IN: 144 mL    OUT:    Indwelling Catheter - Urethral (mL): 955 mL    Voided (mL): 275 mL  Total OUT: 1230 mL    Total NET: -1086 mL      31 Oct 2020 08:01  -  01 Nov 2020 01:23  --------------------------------------------------------  IN:    Heparin: 48 mL    Heparin Infusion: 96 mL  Total IN: 144 mL    OUT:    Indwelling Catheter - Urethral (mL): 695 mL  Total OUT: 695 mL    Total NET: -551 mL      ABG - ( 30 Oct 2020 18:23 )  pH, Arterial: 7.35  pH, Blood: x     /  pCO2: 52    /  pO2: 197   / HCO3: 27    / Base Excess: 2.4   /  SaO2: 99          MEDICATIONS  (STANDING):  ALBUTerol    90 MICROgram(s) HFA Inhaler 1 Puff(s) Inhalation every 4 hours  albuterol/ipratropium for Nebulization 3 milliLiter(s) Nebulizer every 6 hours  allopurinol 100 milliGRAM(s) Oral daily  ascorbic acid 500 milliGRAM(s) Oral two times a day  atorvastatin 20 milliGRAM(s) Oral at bedtime  budesonide 160 MICROgram(s)/formoterol 4.5 MICROgram(s) Inhaler 2 Puff(s) Inhalation two times a day  carvedilol 12.5 milliGRAM(s) Oral every 12 hours  chlorhexidine 2% Cloths 1 Application(s) Topical <User Schedule>  epoetin felisha-epbx (RETACRIT) Injectable 12151 Unit(s) SubCutaneous <User Schedule>  folic acid 1 milliGRAM(s) Oral daily  guaiFENesin  milliGRAM(s) Oral every 12 hours  heparin  Infusion 1600 Unit(s)/Hr (16 mL/Hr) IV Continuous <Continuous>  hydrALAZINE 50 milliGRAM(s) Oral three times a day  melatonin 3 milliGRAM(s) Oral at bedtime  multivitamin 1 Tablet(s) Oral daily  pantoprazole    Tablet 40 milliGRAM(s) Oral before breakfast  senna 2 Tablet(s) Oral at bedtime  tamsulosin 0.4 milliGRAM(s) Oral at bedtime  thiamine 100 milliGRAM(s) Oral daily  zinc sulfate 220 milliGRAM(s) Oral daily    MEDICATIONS  (PRN):  acetaminophen   Tablet .. 975 milliGRAM(s) Oral every 6 hours PRN Mild Pain (1 - 3)  benzocaine 15 mG/menthol 3.6 mG (Sugar-Free) Lozenge 1 Lozenge Oral every 3 hours PRN Sore Throat  HYDROmorphone  Injectable 0.5 milliGRAM(s) IV Push every 4 hours PRN Severe Pain (7 - 10)  oxyCODONE    IR 5 milliGRAM(s) Oral every 6 hours PRN Moderate Pain (4 - 6)  temazepam 30 milliGRAM(s) Oral at bedtime PRN Insomnia    MISC:     PHYSICAL EXAM:  Constitutional: NAD  Respiratory: on 5L NC, audible wheeze, unproductive cough  Cardiovascular: RRR  Gastrointestinal: Soft, non-distended, non-tender  Musculoskeletal: LLE in knee imobilizer, ACE bandage up to knee c/d/i; thigh donor sight with xeroform and telfa with dark red blood    LABS:  CBC Full  -  ( 31 Oct 2020 04:35 )  WBC Count : 11.35 K/uL  RBC Count : 2.16 M/uL  Hemoglobin : 8.0 g/dL  Hematocrit : 24.4 %  Platelet Count - Automated : 230 K/uL  Mean Cell Volume : 113.0 fl  Mean Cell Hemoglobin : 37.0 pg  Mean Cell Hemoglobin Concentration : 32.8 gm/dL  Auto Neutrophil # : 8.80 K/uL  Auto Lymphocyte # : 0.92 K/uL  Auto Monocyte # : 1.35 K/uL  Auto Eosinophil # : 0.11 K/uL  Auto Basophil # : 0.03 K/uL  Auto Neutrophil % : 77.5 %  Auto Lymphocyte % : 8.1 %  Auto Monocyte % : 11.9 %  Auto Eosinophil % : 1.0 %  Auto Basophil % : 0.3 %    10-31    137  |  99  |  68.0<H>  ----------------------------<  113<H>  4.9   |  26.0  |  1.34<H>    Ca    8.7      31 Oct 2020 04:35  Phos  5.1     10-31  Mg     2.3     10-31      PT/INR - ( 31 Oct 2020 04:36 )   PT: 13.8 sec;   INR: 1.20 ratio         PTT - ( 31 Oct 2020 19:55 )  PTT:78.9 sec    CARDIAC MARKERS ( 30 Oct 2020 19:28 )  x     / <0.01 ng/mL / x     / x     / x          ASSESSMENT/PLAN:  70yMale presenting with: Left anterior tibial hematoma which underwent surgical debridement 10/21, and subsequent STSG on 10/30; both operations complicated by suspected vasovagal leading to bradycardia and hypotension and subsequent SICU admission. Stable now on downgrade.   Plan  -needs chest PT  - bedrest until 11/4, ace bandage to graft site until 11/4  - keep lomeli while on bedrest  - wean NC; continue q4 duoneb treatments, added guaifenesin   -Continue heparin drip for A. fib with a target PTT of 50-70  -DASH diet  -pain control

## 2020-11-02 LAB
ALBUMIN SERPL ELPH-MCNC: 3.1 G/DL — LOW (ref 3.3–5.2)
ALP SERPL-CCNC: 222 U/L — HIGH (ref 40–120)
ALT FLD-CCNC: 11 U/L — SIGNIFICANT CHANGE UP
ANION GAP SERPL CALC-SCNC: 14 MMOL/L — SIGNIFICANT CHANGE UP (ref 5–17)
ANISOCYTOSIS BLD QL: SLIGHT — SIGNIFICANT CHANGE UP
APPEARANCE UR: CLEAR — SIGNIFICANT CHANGE UP
APTT BLD: 21.5 SEC — LOW (ref 27.5–35.5)
APTT BLD: 31.1 SEC — SIGNIFICANT CHANGE UP (ref 27.5–35.5)
AST SERPL-CCNC: 13 U/L — SIGNIFICANT CHANGE UP
BACTERIA # UR AUTO: ABNORMAL
BASE EXCESS BLDA CALC-SCNC: -1.4 MMOL/L — SIGNIFICANT CHANGE UP (ref -3–3)
BASO STIPL BLD QL SMEAR: PRESENT — SIGNIFICANT CHANGE UP
BASOPHILS # BLD AUTO: 0 K/UL — SIGNIFICANT CHANGE UP (ref 0–0.2)
BASOPHILS NFR BLD AUTO: 0 % — SIGNIFICANT CHANGE UP (ref 0–2)
BILIRUB SERPL-MCNC: 0.7 MG/DL — SIGNIFICANT CHANGE UP (ref 0.4–2)
BILIRUB UR-MCNC: NEGATIVE — SIGNIFICANT CHANGE UP
BLOOD GAS COMMENTS ARTERIAL: SIGNIFICANT CHANGE UP
BUN SERPL-MCNC: 75 MG/DL — HIGH (ref 8–20)
BUN SERPL-MCNC: 80 MG/DL — HIGH (ref 8–20)
BUN SERPL-MCNC: 81 MG/DL — HIGH (ref 8–20)
BURR CELLS BLD QL SMEAR: PRESENT — SIGNIFICANT CHANGE UP
CALCIUM SERPL-MCNC: 8.1 MG/DL — LOW (ref 8.6–10.2)
CALCIUM SERPL-MCNC: 8.3 MG/DL — LOW (ref 8.6–10.2)
CALCIUM SERPL-MCNC: 8.3 MG/DL — LOW (ref 8.6–10.2)
CHLORIDE SERPL-SCNC: 93 MMOL/L — LOW (ref 98–107)
CHLORIDE SERPL-SCNC: 93 MMOL/L — LOW (ref 98–107)
CHLORIDE SERPL-SCNC: 95 MMOL/L — LOW (ref 98–107)
CHLORIDE UR-SCNC: <27 MMOL/L — SIGNIFICANT CHANGE UP
CO2 SERPL-SCNC: 23 MMOL/L — SIGNIFICANT CHANGE UP (ref 22–29)
CO2 SERPL-SCNC: 24 MMOL/L — SIGNIFICANT CHANGE UP (ref 22–29)
CO2 SERPL-SCNC: 25 MMOL/L — SIGNIFICANT CHANGE UP (ref 22–29)
COLOR SPEC: YELLOW — SIGNIFICANT CHANGE UP
CREAT ?TM UR-MCNC: 123 MG/DL — SIGNIFICANT CHANGE UP
CREAT SERPL-MCNC: 2.32 MG/DL — HIGH (ref 0.5–1.3)
CREAT SERPL-MCNC: 2.76 MG/DL — HIGH (ref 0.5–1.3)
CREAT SERPL-MCNC: 2.84 MG/DL — HIGH (ref 0.5–1.3)
DIFF PNL FLD: ABNORMAL
ELLIPTOCYTES BLD QL SMEAR: SLIGHT — SIGNIFICANT CHANGE UP
EOSINOPHIL # BLD AUTO: 0 K/UL — SIGNIFICANT CHANGE UP (ref 0–0.5)
EOSINOPHIL NFR BLD AUTO: 0 % — SIGNIFICANT CHANGE UP (ref 0–6)
EPI CELLS # UR: SIGNIFICANT CHANGE UP
GAS PNL BLDA: SIGNIFICANT CHANGE UP
GLUCOSE BLDC GLUCOMTR-MCNC: 141 MG/DL — HIGH (ref 70–99)
GLUCOSE BLDC GLUCOMTR-MCNC: 223 MG/DL — HIGH (ref 70–99)
GLUCOSE SERPL-MCNC: 114 MG/DL — HIGH (ref 70–99)
GLUCOSE SERPL-MCNC: 114 MG/DL — HIGH (ref 70–99)
GLUCOSE SERPL-MCNC: 137 MG/DL — HIGH (ref 70–99)
GLUCOSE UR QL: NEGATIVE MG/DL — SIGNIFICANT CHANGE UP
HCO3 BLDA-SCNC: 23 MMOL/L — SIGNIFICANT CHANGE UP (ref 20–26)
HCT VFR BLD CALC: 25.1 % — LOW (ref 39–50)
HCT VFR BLD CALC: 26.1 % — LOW (ref 39–50)
HGB BLD-MCNC: 8.2 G/DL — LOW (ref 13–17)
HGB BLD-MCNC: 8.3 G/DL — LOW (ref 13–17)
HOROWITZ INDEX BLDA+IHG-RTO: SIGNIFICANT CHANGE UP
HYALINE CASTS # UR AUTO: ABNORMAL /LPF
INR BLD: 1.07 RATIO — SIGNIFICANT CHANGE UP (ref 0.88–1.16)
KETONES UR-MCNC: NEGATIVE — SIGNIFICANT CHANGE UP
LEUKOCYTE ESTERASE UR-ACNC: ABNORMAL
LYMPHOCYTES # BLD AUTO: 0.96 K/UL — LOW (ref 1–3.3)
LYMPHOCYTES # BLD AUTO: 9.6 % — LOW (ref 13–44)
MACROCYTES BLD QL: SLIGHT — SIGNIFICANT CHANGE UP
MAGNESIUM SERPL-MCNC: 2.1 MG/DL — SIGNIFICANT CHANGE UP (ref 1.6–2.6)
MAGNESIUM SERPL-MCNC: 2.2 MG/DL — SIGNIFICANT CHANGE UP (ref 1.6–2.6)
MANUAL SMEAR VERIFICATION: SIGNIFICANT CHANGE UP
MCHC RBC-ENTMCNC: 31.8 GM/DL — LOW (ref 32–36)
MCHC RBC-ENTMCNC: 32.7 GM/DL — SIGNIFICANT CHANGE UP (ref 32–36)
MCHC RBC-ENTMCNC: 36.2 PG — HIGH (ref 27–34)
MCHC RBC-ENTMCNC: 37.3 PG — HIGH (ref 27–34)
MCV RBC AUTO: 114 FL — HIGH (ref 80–100)
MCV RBC AUTO: 114.1 FL — HIGH (ref 80–100)
MONOCYTES # BLD AUTO: 0.79 K/UL — SIGNIFICANT CHANGE UP (ref 0–0.9)
MONOCYTES NFR BLD AUTO: 7.9 % — SIGNIFICANT CHANGE UP (ref 2–14)
NEUTROPHILS # BLD AUTO: 8.16 K/UL — HIGH (ref 1.8–7.4)
NEUTROPHILS NFR BLD AUTO: 81.6 % — HIGH (ref 43–77)
NITRITE UR-MCNC: NEGATIVE — SIGNIFICANT CHANGE UP
NT-PROBNP SERPL-SCNC: HIGH PG/ML (ref 0–300)
OSMOLALITY UR: 322 MOSM/KG — SIGNIFICANT CHANGE UP (ref 300–1000)
OVALOCYTES BLD QL SMEAR: SLIGHT — SIGNIFICANT CHANGE UP
PCO2 BLDA: 64 MMHG — HIGH (ref 35–45)
PH BLDA: 7.23 — LOW (ref 7.35–7.45)
PH UR: 5 — SIGNIFICANT CHANGE UP (ref 5–8)
PHOSPHATE SERPL-MCNC: 7.5 MG/DL — HIGH (ref 2.4–4.7)
PHOSPHATE SERPL-MCNC: 7.7 MG/DL — HIGH (ref 2.4–4.7)
PLAT MORPH BLD: NORMAL — SIGNIFICANT CHANGE UP
PLATELET # BLD AUTO: 156 K/UL — SIGNIFICANT CHANGE UP (ref 150–400)
PLATELET # BLD AUTO: 173 K/UL — SIGNIFICANT CHANGE UP (ref 150–400)
PO2 BLDA: 62 MMHG — LOW (ref 83–108)
POLYCHROMASIA BLD QL SMEAR: SLIGHT — SIGNIFICANT CHANGE UP
POTASSIUM SERPL-MCNC: 5.8 MMOL/L — HIGH (ref 3.5–5.3)
POTASSIUM SERPL-MCNC: 5.9 MMOL/L — HIGH (ref 3.5–5.3)
POTASSIUM SERPL-MCNC: 6 MMOL/L — HIGH (ref 3.5–5.3)
POTASSIUM SERPL-SCNC: 5.8 MMOL/L — HIGH (ref 3.5–5.3)
POTASSIUM SERPL-SCNC: 5.9 MMOL/L — HIGH (ref 3.5–5.3)
POTASSIUM SERPL-SCNC: 6 MMOL/L — HIGH (ref 3.5–5.3)
PROT SERPL-MCNC: 5.4 G/DL — LOW (ref 6.6–8.7)
PROT UR-MCNC: 30 MG/DL
PROTHROM AB SERPL-ACNC: 12.4 SEC — SIGNIFICANT CHANGE UP (ref 10.6–13.6)
RBC # BLD: 2.2 M/UL — LOW (ref 4.2–5.8)
RBC # BLD: 2.29 M/UL — LOW (ref 4.2–5.8)
RBC # FLD: 18.8 % — HIGH (ref 10.3–14.5)
RBC # FLD: 20.2 % — HIGH (ref 10.3–14.5)
RBC BLD AUTO: ABNORMAL
RBC CASTS # UR COMP ASSIST: ABNORMAL /HPF (ref 0–4)
SAO2 % BLDA: 91 % — LOW (ref 95–99)
SCHISTOCYTES BLD QL AUTO: SLIGHT — SIGNIFICANT CHANGE UP
SODIUM SERPL-SCNC: 131 MMOL/L — LOW (ref 135–145)
SODIUM SERPL-SCNC: 132 MMOL/L — LOW (ref 135–145)
SODIUM SERPL-SCNC: 132 MMOL/L — LOW (ref 135–145)
SODIUM UR-SCNC: <30 MMOL/L — SIGNIFICANT CHANGE UP
SP GR SPEC: 1.02 — SIGNIFICANT CHANGE UP (ref 1.01–1.02)
STOMATOCYTES BLD QL SMEAR: SLIGHT — SIGNIFICANT CHANGE UP
UROBILINOGEN FLD QL: NEGATIVE MG/DL — SIGNIFICANT CHANGE UP
VARIANT LYMPHS # BLD: 0.9 % — SIGNIFICANT CHANGE UP (ref 0–6)
WBC # BLD: 10 K/UL — SIGNIFICANT CHANGE UP (ref 3.8–10.5)
WBC # BLD: 7.6 K/UL — SIGNIFICANT CHANGE UP (ref 3.8–10.5)
WBC # FLD AUTO: 10 K/UL — SIGNIFICANT CHANGE UP (ref 3.8–10.5)
WBC # FLD AUTO: 7.6 K/UL — SIGNIFICANT CHANGE UP (ref 3.8–10.5)
WBC UR QL: ABNORMAL

## 2020-11-02 PROCEDURE — 99024 POSTOP FOLLOW-UP VISIT: CPT

## 2020-11-02 PROCEDURE — 93010 ELECTROCARDIOGRAM REPORT: CPT

## 2020-11-02 PROCEDURE — 71045 X-RAY EXAM CHEST 1 VIEW: CPT | Mod: 26

## 2020-11-02 RX ORDER — HEPARIN SODIUM 5000 [USP'U]/ML
4000 INJECTION INTRAVENOUS; SUBCUTANEOUS EVERY 6 HOURS
Refills: 0 | Status: DISCONTINUED | OUTPATIENT
Start: 2020-11-02 | End: 2020-11-02

## 2020-11-02 RX ORDER — FUROSEMIDE 40 MG
20 TABLET ORAL ONCE
Refills: 0 | Status: COMPLETED | OUTPATIENT
Start: 2020-11-02 | End: 2020-11-02

## 2020-11-02 RX ORDER — HALOPERIDOL DECANOATE 100 MG/ML
2.5 INJECTION INTRAMUSCULAR ONCE
Refills: 0 | Status: COMPLETED | OUTPATIENT
Start: 2020-11-02 | End: 2020-11-02

## 2020-11-02 RX ORDER — APIXABAN 2.5 MG/1
2.5 TABLET, FILM COATED ORAL
Refills: 0 | Status: DISCONTINUED | OUTPATIENT
Start: 2020-11-02 | End: 2020-11-04

## 2020-11-02 RX ORDER — SODIUM CHLORIDE 9 MG/ML
1000 INJECTION INTRAMUSCULAR; INTRAVENOUS; SUBCUTANEOUS
Refills: 0 | Status: DISCONTINUED | OUTPATIENT
Start: 2020-11-02 | End: 2020-11-02

## 2020-11-02 RX ORDER — INSULIN LISPRO 100/ML
10 VIAL (ML) SUBCUTANEOUS ONCE
Refills: 0 | Status: DISCONTINUED | OUTPATIENT
Start: 2020-11-02 | End: 2020-11-02

## 2020-11-02 RX ORDER — CALCIUM GLUCONATE 100 MG/ML
2 VIAL (ML) INTRAVENOUS ONCE
Refills: 0 | Status: COMPLETED | OUTPATIENT
Start: 2020-11-02 | End: 2020-11-02

## 2020-11-02 RX ORDER — INSULIN HUMAN 100 [IU]/ML
10 INJECTION, SOLUTION SUBCUTANEOUS ONCE
Refills: 0 | Status: COMPLETED | OUTPATIENT
Start: 2020-11-02 | End: 2020-11-02

## 2020-11-02 RX ORDER — HEPARIN SODIUM 5000 [USP'U]/ML
8000 INJECTION INTRAVENOUS; SUBCUTANEOUS EVERY 6 HOURS
Refills: 0 | Status: DISCONTINUED | OUTPATIENT
Start: 2020-11-02 | End: 2020-11-02

## 2020-11-02 RX ORDER — INSULIN HUMAN 100 [IU]/ML
10 INJECTION, SOLUTION SUBCUTANEOUS ONCE
Refills: 0 | Status: COMPLETED | OUTPATIENT
Start: 2020-11-02 | End: 2020-11-03

## 2020-11-02 RX ORDER — HEPARIN SODIUM 5000 [USP'U]/ML
8000 INJECTION INTRAVENOUS; SUBCUTANEOUS ONCE
Refills: 0 | Status: COMPLETED | OUTPATIENT
Start: 2020-11-02 | End: 2020-11-02

## 2020-11-02 RX ORDER — DEXTROSE 50 % IN WATER 50 %
25 SYRINGE (ML) INTRAVENOUS ONCE
Refills: 0 | Status: COMPLETED | OUTPATIENT
Start: 2020-11-02 | End: 2020-11-03

## 2020-11-02 RX ORDER — CHLORHEXIDINE GLUCONATE 213 G/1000ML
1 SOLUTION TOPICAL DAILY
Refills: 0 | Status: DISCONTINUED | OUTPATIENT
Start: 2020-11-02 | End: 2020-11-09

## 2020-11-02 RX ORDER — HEPARIN SODIUM 5000 [USP'U]/ML
1400 INJECTION INTRAVENOUS; SUBCUTANEOUS
Qty: 25000 | Refills: 0 | Status: DISCONTINUED | OUTPATIENT
Start: 2020-11-02 | End: 2020-11-02

## 2020-11-02 RX ORDER — IPRATROPIUM/ALBUTEROL SULFATE 18-103MCG
3 AEROSOL WITH ADAPTER (GRAM) INHALATION ONCE
Refills: 0 | Status: DISCONTINUED | OUTPATIENT
Start: 2020-11-02 | End: 2020-11-13

## 2020-11-02 RX ORDER — SODIUM CHLORIDE 9 MG/ML
1000 INJECTION, SOLUTION INTRAVENOUS
Refills: 0 | Status: DISCONTINUED | OUTPATIENT
Start: 2020-11-02 | End: 2020-11-03

## 2020-11-02 RX ORDER — ACETAMINOPHEN 500 MG
650 TABLET ORAL EVERY 6 HOURS
Refills: 0 | Status: DISCONTINUED | OUTPATIENT
Start: 2020-11-02 | End: 2020-11-13

## 2020-11-02 RX ORDER — DEXTROSE 50 % IN WATER 50 %
50 SYRINGE (ML) INTRAVENOUS ONCE
Refills: 0 | Status: COMPLETED | OUTPATIENT
Start: 2020-11-02 | End: 2020-11-02

## 2020-11-02 RX ORDER — ASPIRIN/CALCIUM CARB/MAGNESIUM 324 MG
81 TABLET ORAL DAILY
Refills: 0 | Status: DISCONTINUED | OUTPATIENT
Start: 2020-11-02 | End: 2020-11-13

## 2020-11-02 RX ORDER — SODIUM CHLORIDE 9 MG/ML
1000 INJECTION INTRAMUSCULAR; INTRAVENOUS; SUBCUTANEOUS ONCE
Refills: 0 | Status: COMPLETED | OUTPATIENT
Start: 2020-11-02 | End: 2020-11-02

## 2020-11-02 RX ORDER — SODIUM ZIRCONIUM CYCLOSILICATE 10 G/10G
5 POWDER, FOR SUSPENSION ORAL ONCE
Refills: 0 | Status: COMPLETED | OUTPATIENT
Start: 2020-11-02 | End: 2020-11-03

## 2020-11-02 RX ORDER — ACETYLCYSTEINE 200 MG/ML
4 VIAL (ML) MISCELLANEOUS THREE TIMES A DAY
Refills: 0 | Status: COMPLETED | OUTPATIENT
Start: 2020-11-02 | End: 2020-11-03

## 2020-11-02 RX ADMIN — Medication 100 MILLIGRAM(S): at 11:21

## 2020-11-02 RX ADMIN — Medication 3 MILLILITER(S): at 07:52

## 2020-11-02 RX ADMIN — Medication 500 MILLIGRAM(S): at 06:16

## 2020-11-02 RX ADMIN — Medication 600 MILLIGRAM(S): at 21:29

## 2020-11-02 RX ADMIN — Medication 20 MILLIGRAM(S): at 18:43

## 2020-11-02 RX ADMIN — HALOPERIDOL DECANOATE 2.5 MILLIGRAM(S): 100 INJECTION INTRAMUSCULAR at 23:34

## 2020-11-02 RX ADMIN — SENNA PLUS 2 TABLET(S): 8.6 TABLET ORAL at 21:29

## 2020-11-02 RX ADMIN — Medication 500 MILLIGRAM(S): at 17:42

## 2020-11-02 RX ADMIN — OXYCODONE HYDROCHLORIDE 10 MILLIGRAM(S): 5 TABLET ORAL at 00:35

## 2020-11-02 RX ADMIN — Medication 81 MILLIGRAM(S): at 11:21

## 2020-11-02 RX ADMIN — BUDESONIDE AND FORMOTEROL FUMARATE DIHYDRATE 2 PUFF(S): 160; 4.5 AEROSOL RESPIRATORY (INHALATION) at 07:53

## 2020-11-02 RX ADMIN — SODIUM CHLORIDE 1000 MILLILITER(S): 9 INJECTION INTRAMUSCULAR; INTRAVENOUS; SUBCUTANEOUS at 23:40

## 2020-11-02 RX ADMIN — Medication 3 MILLILITER(S): at 03:13

## 2020-11-02 RX ADMIN — BUDESONIDE AND FORMOTEROL FUMARATE DIHYDRATE 2 PUFF(S): 160; 4.5 AEROSOL RESPIRATORY (INHALATION) at 20:51

## 2020-11-02 RX ADMIN — CARVEDILOL PHOSPHATE 12.5 MILLIGRAM(S): 80 CAPSULE, EXTENDED RELEASE ORAL at 06:16

## 2020-11-02 RX ADMIN — ATORVASTATIN CALCIUM 20 MILLIGRAM(S): 80 TABLET, FILM COATED ORAL at 21:28

## 2020-11-02 RX ADMIN — Medication 50 MILLILITER(S): at 12:30

## 2020-11-02 RX ADMIN — Medication 1 MILLIGRAM(S): at 11:21

## 2020-11-02 RX ADMIN — TEMAZEPAM 30 MILLIGRAM(S): 15 CAPSULE ORAL at 22:14

## 2020-11-02 RX ADMIN — ZINC SULFATE TAB 220 MG (50 MG ZINC EQUIVALENT) 220 MILLIGRAM(S): 220 (50 ZN) TAB at 11:21

## 2020-11-02 RX ADMIN — Medication 20 MILLIGRAM(S): at 17:41

## 2020-11-02 RX ADMIN — Medication 1 TABLET(S): at 11:21

## 2020-11-02 RX ADMIN — Medication 600 MILLIGRAM(S): at 06:16

## 2020-11-02 RX ADMIN — HEPARIN SODIUM 1400 UNIT(S)/HR: 5000 INJECTION INTRAVENOUS; SUBCUTANEOUS at 06:51

## 2020-11-02 RX ADMIN — Medication 200 GRAM(S): at 23:56

## 2020-11-02 RX ADMIN — INSULIN HUMAN 10 UNIT(S): 100 INJECTION, SOLUTION SUBCUTANEOUS at 12:29

## 2020-11-02 RX ADMIN — APIXABAN 2.5 MILLIGRAM(S): 2.5 TABLET, FILM COATED ORAL at 17:42

## 2020-11-02 RX ADMIN — TAMSULOSIN HYDROCHLORIDE 0.4 MILLIGRAM(S): 0.4 CAPSULE ORAL at 21:29

## 2020-11-02 RX ADMIN — Medication 3 MILLILITER(S): at 20:51

## 2020-11-02 RX ADMIN — HEPARIN SODIUM 8000 UNIT(S): 5000 INJECTION INTRAVENOUS; SUBCUTANEOUS at 06:51

## 2020-11-02 RX ADMIN — ERYTHROPOIETIN 10000 UNIT(S): 10000 INJECTION, SOLUTION INTRAVENOUS; SUBCUTANEOUS at 11:14

## 2020-11-02 RX ADMIN — PANTOPRAZOLE SODIUM 40 MILLIGRAM(S): 20 TABLET, DELAYED RELEASE ORAL at 06:17

## 2020-11-02 NOTE — CONSULT NOTE ADULT - ATTENDING COMMENTS
Delayed entry  patient was seen and examined, events of the  day was  noted  agreed with note exam  patient with known copd recently started on home o2 with worsening respiratory failure and decrease urine output   post skin graft  on exam, alert in some respiratory distress  exam some crackles to bilateral bases  abg respiratory acidosis with baseline hypoxemia  will transfer to ICU, Lasix challenge  supplemental oxygen
69 yo M w/ PMH of COPD on home O2, afib, CHF, and CKD is s/p fall w/ LLE hematoma, ARF, and COPD exacerbation. OR today for LLE wound debridement where at end of the case when given neostigmine and glycopyrrolate for reversal of paralytic & extubations had acute bradycardic & hypotensive. Rcvd epinephrine, calcium & atropine w/ normalization of vital signs. Returned to SICU intubated and CXR with large R sided pleural effusion.   Intubated, sedated and vented, Pigtail catheter placed and 2250 ml seous fluid obtained  CV NSR  PUL vented  MS LLE dressed  Plan   1. Allow patient to awaken and wean to PSV and extubation  2. Analgesia for pain control  3. Cardiology consult and ECHO    code [id number]
I HAVE SEEN AND EXAMINED THE PATIENT WITH THE PA/NP/RESIDENT.  I AGREE WITH THE NOTE, ASSESSMENT, PLAN AND PE.  COPD EXACERBATION, DUONEBS, PULMICORT, MAG REPLACEMENT, HYDROCORTISONE  RIGHT MIDDLE AND LOWER LUNG FIELD INFILTRATE, WILL COVER FOR COMMUNITY ACQUIRED PNA.  SEVERE PULMONARY HTN ON ECHO, TROPONIN NO MAJOR ELEVATION, EKG RULES OUT STEMI,  WILL HOLD OFF SILDENAFIL, POOR CREATININE CLEARANCE  CPAP ATTEMPTED, PATIENT REFUSED, FEELS BETTER WITH VAPOTHERM,   ICU ADMIT  GERALD, NEPHRO CONSULTED  WILL TRY BUMEX, NO RESPONSE TO HIGH DOSE LASIX  CRITICAL CARE 35 MINUTES EXCLUDING BEDSIDE PROCEDURES.  CRITICAL CARE DX SEVERE PULMONARY HYPERTENSION, RESPIRATORY FAILURE ACUTE HYPOXIC\     JKOHOUT

## 2020-11-02 NOTE — CONSULT NOTE ADULT - SUBJECTIVE AND OBJECTIVE BOX
Patient is 71 y/o male w/ PMHx COPD on home O2, CHF, CKD, Afib admitted on 10/15 s/p fall with LLE hematoma. Initially admitted to SICU in acute renal failure w/ COPD exacerbation both of which had resolved. He was taken to the OR on 10/21 for debridement of LLE - he had episode of hypotension & bradycardia after receiving neostigime for reversal of anesthetic agents at conclusion of the case. He was brought to SICU post-operatively and found to have L sided hydrothorax which was drained. Work-up of cardiac event at that time was negative. He returned to the surgical floors and then was taken to the OR on 10/30 for repeat debridement and STSG - similar episode of hypotension & bradycardia happened again upon reversal of anesthetic agent. Patient had since been doing well on surgical floors. This afternoon SICU was consulted for increased work-of breathing and worsening renal function. Patient takes lasix at home - last dose of lasix was rcvd 10/31.     Patient was seen and examined at bedside with Dr. Celeste. He reports some difficulty breathing. Otherwise with no acute complaints. Denies chest pain, chest pressure, or palpitation at time of exam.    PAST MEDICAL & SURGICAL HISTORY:  CKD (chronic kidney disease)  CHF (congestive heart failure)  Atrial fibrillation  COPD  Coronary artery disease involving coronary bypass graft of native heart without angina pectoris  Chronic osteomyelitis  COPD (chronic obstructive pulmonary disease)  Atrial fibrillation  Bladder cancer  HLD (hyperlipidemia)  H/O knee surgery  S/P CABG (coronary artery bypass graft)    Home Medications:  Advair Diskus 250 mcg-50 mcg inhalation powder: 1 puff(s) inhaled 2 times a day (19 Oct 2020 09:15)  allopurinol 100 mg oral tablet: 1 tab(s) orally once a day (19 Oct 2020 09:15)  apixaban 2.5 mg oral tablet: 1 tab(s) orally 2 times a day (19 Oct 2020 09:15)  aspirin 81 mg oral tablet: 1 tab(s) orally once a day (19 Oct 2020 09:15)  carvedilol 12.5 mg oral tablet: 1 tab(s) orally 2 times a day (19 Oct 2020 09:15)  Flomax 0.4 mg oral capsule: 1 cap(s) orally once a day (19 Oct 2020 09:15)  furosemide 40 mg oral tablet: 1 tab(s) orally prn, As Needed (19 Oct 2020 09:15)  hydrALAZINE 25 mg oral tablet: 2 tab(s) orally 3 times a day (19 Oct 2020 09:15)  Lipitor 20 mg oral tablet: 1 tab(s) orally once a day (19 Oct 2020 09:15)  omeprazole 40 mg oral delayed release capsule: 1 cap(s) orally once a day (19 Oct 2020 09:15)  temazepam 30 mg oral capsule: 1 cap(s) orally once a day (at bedtime), As Needed (19 Oct 2020 09:15)    Review of Systems: All negative except where noted in HPI    MEDICATIONS  (STANDING):  ALBUTerol    90 MICROgram(s) HFA Inhaler 1 Puff(s) Inhalation every 4 hours  albuterol/ipratropium for Nebulization 3 milliLiter(s) Nebulizer every 6 hours  albuterol/ipratropium for Nebulization. 3 milliLiter(s) Nebulizer once  allopurinol 100 milliGRAM(s) Oral daily  apixaban 2.5 milliGRAM(s) Oral two times a day  ascorbic acid 500 milliGRAM(s) Oral two times a day  aspirin  chewable 81 milliGRAM(s) Oral daily  atorvastatin 20 milliGRAM(s) Oral at bedtime  budesonide 160 MICROgram(s)/formoterol 4.5 MICROgram(s) Inhaler 2 Puff(s) Inhalation two times a day  epoetin felisha-epbx (RETACRIT) Injectable 78101 Unit(s) SubCutaneous <User Schedule>  folic acid 1 milliGRAM(s) Oral daily  guaiFENesin  milliGRAM(s) Oral every 12 hours  melatonin 3 milliGRAM(s) Oral at bedtime  multivitamin 1 Tablet(s) Oral daily  pantoprazole    Tablet 40 milliGRAM(s) Oral before breakfast  senna 2 Tablet(s) Oral at bedtime  tamsulosin 0.4 milliGRAM(s) Oral at bedtime  thiamine 100 milliGRAM(s) Oral daily  zinc sulfate 220 milliGRAM(s) Oral daily    MEDICATIONS  (PRN):  benzocaine 15 mG/menthol 3.6 mG (Sugar-Free) Lozenge 1 Lozenge Oral every 3 hours PRN Sore Throat  ondansetron Injectable 4 milliGRAM(s) IV Push once PRN Nausea and/or Vomiting  temazepam 30 milliGRAM(s) Oral at bedtime PRN Insomnia      Vital Signs Last 24 Hrs  T(C): 36.5 (02 Nov 2020 09:00), Max: 36.8 (02 Nov 2020 04:43)  T(F): 97.7 (02 Nov 2020 09:00), Max: 98.3 (02 Nov 2020 04:43)  HR: 92 (02 Nov 2020 15:47) (80 - 95)  BP: 92/55 (02 Nov 2020 15:47) (86/48 - 131/64)  BP(mean): --  RR: 23 (02 Nov 2020 15:47) (18 - 23)  SpO2: 94% (02 Nov 2020 15:47) (94% - 98%)    PHYSICAL EXAM:    General: pt appears mildly uncomfortable sitting up in bed, calm & cooperative w/ exam  Pulm: lung sounds diminished b/l, inspiratory crackles & expiratory wheezing noted, mild conversational dyspnea, no accessory muscle use  CV: irregular rhythm, afib on monitor, rate controlled  Abdomen: soft, non-tender, non-distended, no rebound / guarding  Neuro: A&Ox3, no gross focal deficits  Skin: L thigh donor site covered w/ xeroform & abd pad, moderate serosanguenous oozing. Distal LLE w/ KI in place, overlying ace bandage C/D/I, able to move toes without difficulty, foot is warm w/ brisk cap refill & palpable DP pulse. Remainder of skin is warm, dry, scattered ecchymosis most notably to LUE & LLE. No diaphoresis, pallor, or cyanosis.       LABS:                        8.3    7.60  )-----------( 156      ( 02 Nov 2020 13:01 )             26.1     11-02    132<L>  |  93<L>  |  80.0<H>  ----------------------------<  114<H>  5.8<H>   |  25.0  |  2.76<H>    Ca    8.1<L>      02 Nov 2020 16:17  Phos  7.5     11-02  Mg     2.1     11-02      PT/INR - ( 02 Nov 2020 05:53 )   PT: 12.4 sec;   INR: 1.07 ratio         PTT - ( 02 Nov 2020 13:01 )  PTT:31.1 sec

## 2020-11-02 NOTE — CHART NOTE - NSCHARTNOTEFT_GEN_A_CORE
Patient seen and examined s/p 40 IV push of lasix without adequate response 20/hr UOP.  Patient denies pain at this time.  Labored breathing however, much improved after Duoneb. Patient able to bring up secretions with aggressive pulmonary toileting, sating well on 2L NC.  Unclear volume status - labs more consistent with prerenal, POC US +B lines on R lung, Hyperdynamic LV, IVC >50% variations on respiration however, patient using abdominal muscles to support breathing.  Repeat labs pending for a better understanding to support possible hypovolemia. Significant volume loss from donor site- dressing reenforced. No other issues, vitals stable.     ICU Vital Signs Last 24 Hrs  T(C): 36.7 (02 Nov 2020 19:53), Max: 36.8 (02 Nov 2020 04:43)  T(F): 98 (02 Nov 2020 19:53), Max: 98.3 (02 Nov 2020 04:43)  HR: 86 (02 Nov 2020 20:52) (79 - 95)  BP: 111/48 (02 Nov 2020 20:00) (86/48 - 131/64)  BP(mean): 67 (02 Nov 2020 20:00) (60 - 114)  ABP: --  ABP(mean): --  RR: 21 (02 Nov 2020 20:00) (18 - 23)  SpO2: 97% (02 Nov 2020 20:52) (91% - 99%)      PHYSICAL EXAM:    General: NAD, in good spirits   Pulm: unlabored,  expiratory wheezing noted, mild conversational dyspnea, no accessory muscle use  CV: afib  Abdomen: soft, non-tender, non-distended, no rebound / guarding  Neuro: A&Ox3, motor and sensation intact, no focal neuro deficits.   Skin: L thigh donor site covered w/ xeroform & abd pad, moderate serosanguenous oozing. Distal LLE w/ KI in place, overlying ace bandage C/D/I, motor intact of foot and toes.  foot is warm w/ brisk cap refill & palpable DP pulse.   Skin: warm, dry, scattered ecchymosis most notably to LUE & LLE      71 y/o male admitted on 10/15 s/p fall with LLE hematoma. Hospital course complicated by ARF, COPD exacerbation, hypotensive/bradycardic episode post-operatively after reversal of anesthetic agents. Today patient noted to be short of breath with increasing oxygen requirements. Also w/ worsening hyponatremia and renal function. Pt normally lasix dependent - has not rcvd lasix since 10/31. Deterioration of respiratory status likely due to volume overload.     Neuro: tylenol for pain control, minimize narcotics, melatonin for sleep hygiene, delirium precautions      CV: Continue hemodynamic monitoring, unclear volume status - vitals remain stable at this time.  Pending repeat labs     Pulm: COPD- (oxygen dependent at home), deconditioned from complicated hospital course and bed rest after STSG.  Continue aggressive pulm toiletting, duonebs & symbicort, HOB elevation, chair mode    GI/Nutrition: NPO, bowel regimen    /Renal: Repeat labs pending - previous urine lytes reveal pre renal ?. Monitor lytes closely     ID: afebrile. No issues     Endo: monitor blood glucose    Skin: LLE dressing to remain in place s/p STSG x 5 days - liberate from bed rest 11/4, and change dressing by surgical team.  Repositioning for DTI prevention while in bed    Heme/DVT Prophylaxis: Hgb stable, Eliquis for atrial fib, SCD to RLE     Dispo: SICU Patient seen and examined s/p 40 IV push of lasix without adequate response 20/hr UOP.  Patient denies pain at this time.  Labored breathing however, much improved after Duoneb. Patient able to bring up secretions with aggressive pulmonary toileting, sating well on 2L NC.  Unclear volume status - labs more consistent with prerenal, POC US +B lines on R lung, Hyperdynamic LV, IVC >50% variations on respiration however, patient using abdominal muscles to support breathing.  Repeat labs pending for a better understanding to support possible hypovolemia. Significant volume loss from donor site- dressing reenforced. No other issues, vitals stable.     ICU Vital Signs Last 24 Hrs  T(C): 36.7 (02 Nov 2020 19:53), Max: 36.8 (02 Nov 2020 04:43)  T(F): 98 (02 Nov 2020 19:53), Max: 98.3 (02 Nov 2020 04:43)  HR: 86 (02 Nov 2020 20:52) (79 - 95)  BP: 111/48 (02 Nov 2020 20:00) (86/48 - 131/64)  BP(mean): 67 (02 Nov 2020 20:00) (60 - 114)  ABP: --  ABP(mean): --  RR: 21 (02 Nov 2020 20:00) (18 - 23)  SpO2: 97% (02 Nov 2020 20:52) (91% - 99%)      PHYSICAL EXAM:    General: NAD, in good spirits   Pulm: unlabored,  expiratory wheezing noted, mild conversational dyspnea, no accessory muscle use  CV: afib, NO JVD  Abdomen: soft, non-tender, non-distended, no rebound / guarding  Neuro: A&Ox3, motor and sensation intact, no focal neuro deficits.   Skin: L thigh donor site covered w/ xeroform & abd pad, moderate serosanguenous oozing. Distal LLE w/ KI in place, overlying ace bandage C/D/I, motor intact of foot and toes.  foot is warm w/ brisk cap refill & palpable DP pulse.   Skin: warm, dry, scattered ecchymosis most notably to LUE & LLE      69 y/o male admitted on 10/15 s/p fall with LLE hematoma. Hospital course complicated by ARF, COPD exacerbation, hypotensive/bradycardic episode post-operatively after reversal of anesthetic agents. Today patient noted to be short of breath with increasing oxygen requirements. Also w/ worsening hyponatremia and renal function. Pt normally lasix dependent - has not rcvd lasix since 10/31. Deterioration of respiratory status likely due to volume overload.     Neuro: tylenol for pain control, minimize narcotics, melatonin for sleep hygiene, delirium precautions      CV: Continue hemodynamic monitoring, unclear volume status - vitals remain stable at this time.  Pending repeat labs     Pulm: COPD- (oxygen dependent at home), deconditioned from complicated hospital course and bed rest after STSG.  Continue aggressive pulm toiletting, duonebs & symbicort, HOB elevation, chair mode    GI/Nutrition: NPO, bowel regimen    /Renal: Repeat labs pending - previous urine lytes reveal pre renal ?. Monitor lytes closely     ID: afebrile. No issues     Endo: monitor blood glucose    Skin: LLE dressing to remain in place s/p STSG x 5 days - liberate from bed rest 11/4, and change dressing by surgical team.  Repositioning for DTI prevention while in bed    Heme/DVT Prophylaxis: Hgb stable, Eliquis for atrial fib, SCD to RLE     Dispo: SICU

## 2020-11-02 NOTE — CONSULT NOTE ADULT - ASSESSMENT
71 y/o male admitted on 10/15 s/p fall with LLE hematoma. Hospital course complicated by ARF, COPD exacerbation, hypotensive/bradycardic episode post-operatively after reversal of anesthetic agents. Today patient noted to be short of breath with increasing oxygen requirements. Also w/ worsening hyponatremia and renal function. Pt normally lasix dependent - has not rcvd lasix since 10/31. Deterioration of respiratory status likely due to volume overload.     Neuro: tylenol for pain control, minimize narcotics, melatonin for sleep hygiene, delirium precautions      CV: anti-HTN medications stopped as pt hypotensive on surgical floors, most recent /55 (MAP 70). Continue non-invasive monitoring - will closely monitor hemodynamics while diuresing    Pulm: lasix 20mg IV push now - will assess response to lasix and likely need additional dosing, needs aggressive pulm toiletting, duonebs & symbicort, HOB elevation, cannot mobilize as pt on strict bed rest until 11/4 s/p STSG    GI/Nutrition: keep NPO except meds for now, IVF d/c'ed     /Renal: hyponatremia likely 2/2 volume overload, worsening renal fxn as well. Rcvd 10 regular insulin & amp D50 for hyperkalemia, repeat K+ 6.0>5.8. Will repeat BMP after diuresis.     ID: afebrile w/ normal WBC, no indication for antibiosis at this time     Endo: monitor blood glucose    Skin: LLE dressing to remain in place s/p STSG x 5 days - will take down dressing / liberate from bed rest 11/4.  Repositioning for DTI prevention while in bed    Heme/DVT Prophylaxis: Hgb stable, Eliquis for atrial fib, SCD to RLE

## 2020-11-02 NOTE — PROGRESS NOTE ADULT - ASSESSMENT
70y Male presenting with: Left anterior tibial hematoma which underwent surgical debridement 10/21, and subsequent STSG on 10/30; both operations complicated by suspected vasovagal leading to bradycardia and hypotension and subsequent SICU admission. Pt remains stable while on the floors.  Plan  - bedrest until 11/4, ace bandage to graft site until 11/4  - keep lomeli while on bedrest  - wean NC; continue q4 duoneb treatments, added guaifenesin   -Continue heparin drip for A. fib with a target PTT of 50-70  -DASH diet  -pain control

## 2020-11-02 NOTE — CHART NOTE - NSCHARTNOTEFT_GEN_A_CORE
Patient received relistor -shortly after became acutely delirious.  Attmpeted to ramona Patient received relistor -shortly after became acutely delirious.  He responded to re-orientation and reassurance by nursing staff.  However, attempted to d/c PIV's so placed on constant observation.  Continues to be agitated , confused, attempting to get out of bed despite reorientation.  He required 2.5mg of haldol to help with agitated delirium.

## 2020-11-02 NOTE — PROGRESS NOTE ADULT - SUBJECTIVE AND OBJECTIVE BOX
STATUS POST:  STSG to LLE wound; s/p fall down steps.   POST OPERATIVE DAY #: 3    Vital Signs Last 24 Hrs  T(C): 36.8 (02 Nov 2020 04:43), Max: 36.8 (02 Nov 2020 04:43)  T(F): 98.3 (02 Nov 2020 04:43), Max: 98.3 (02 Nov 2020 04:43)  HR: 95 (02 Nov 2020 04:43) (76 - 95)  BP: 124/64 (02 Nov 2020 04:43) (123/67 - 131/64)  BP(mean): --  RR: 19 (02 Nov 2020 04:43) (18 - 28)  SpO2: 96% (02 Nov 2020 04:43) (84% - 98%)      SUBJECTIVE: Pt seen and examined at bedside. NAEO. Pt with pain well controlled. Tolerating diet, denies nausea, vomiting, fever, chills. Lomlei in place while on bedrest. Pt remains with lomeli and on bedrest throughout the day yesterday.     PHYSICAL EXAM:  Constitutional: NAD  Respiratory: on 5L NC, audible wheeze, unproductive cough  Cardiovascular: RRR  Gastrointestinal: Soft, non-distended, non-tender  Musculoskeletal: LLE in knee immobilizer, ACE bandage up to knee c/d/i; thigh donor sight with xeroform and telfa with dark red blood              I&O's Summary    01 Nov 2020 07:01  -  02 Nov 2020 07:00  --------------------------------------------------------  IN: 0 mL / OUT: 250 mL / NET: -250 mL      I&O's Detail    01 Nov 2020 07:01  -  02 Nov 2020 07:00  --------------------------------------------------------  IN:  Total IN: 0 mL    OUT:    Indwelling Catheter - Urethral (mL): 250 mL  Total OUT: 250 mL    Total NET: -250 mL          MEDICATIONS  (STANDING):  ALBUTerol    90 MICROgram(s) HFA Inhaler 1 Puff(s) Inhalation every 4 hours  albuterol/ipratropium for Nebulization 3 milliLiter(s) Nebulizer every 6 hours  allopurinol 100 milliGRAM(s) Oral daily  ascorbic acid 500 milliGRAM(s) Oral two times a day  atorvastatin 20 milliGRAM(s) Oral at bedtime  budesonide 160 MICROgram(s)/formoterol 4.5 MICROgram(s) Inhaler 2 Puff(s) Inhalation two times a day  carvedilol 12.5 milliGRAM(s) Oral every 12 hours  epoetin felisha-epbx (RETACRIT) Injectable 66140 Unit(s) SubCutaneous <User Schedule>  folic acid 1 milliGRAM(s) Oral daily  guaiFENesin  milliGRAM(s) Oral every 12 hours  heparin  Infusion. 1400 Unit(s)/Hr (14 mL/Hr) IV Continuous <Continuous>  hydrALAZINE 50 milliGRAM(s) Oral three times a day  lactated ringers. 1000 milliLiter(s) (75 mL/Hr) IV Continuous <Continuous>  melatonin 3 milliGRAM(s) Oral at bedtime  multivitamin 1 Tablet(s) Oral daily  pantoprazole    Tablet 40 milliGRAM(s) Oral before breakfast  senna 2 Tablet(s) Oral at bedtime  sodium chloride 0.9%. 1000 milliLiter(s) (42 mL/Hr) IV Continuous <Continuous>  tamsulosin 0.4 milliGRAM(s) Oral at bedtime  thiamine 100 milliGRAM(s) Oral daily  zinc sulfate 220 milliGRAM(s) Oral daily    MEDICATIONS  (PRN):  benzocaine 15 mG/menthol 3.6 mG (Sugar-Free) Lozenge 1 Lozenge Oral every 3 hours PRN Sore Throat  fentaNYL    Injectable 25 MICROGram(s) IV Push every 5 minutes PRN Moderate Pain (4 - 6)  heparin   Injectable 8000 Unit(s) IV Push every 6 hours PRN For aPTT less than 40  heparin   Injectable 4000 Unit(s) IV Push every 6 hours PRN For aPTT between 40 - 57  ondansetron Injectable 4 milliGRAM(s) IV Push once PRN Nausea and/or Vomiting  oxyCODONE    IR 10 milliGRAM(s) Oral every 6 hours PRN Severe Pain (7 - 10)  oxyCODONE    IR 5 milliGRAM(s) Oral every 6 hours PRN Moderate Pain (4 - 6)  temazepam 30 milliGRAM(s) Oral at bedtime PRN Insomnia      LABS:                        8.2    10.00 )-----------( 173      ( 02 Nov 2020 05:52 )             25.1     11-02    132<L>  |  95<L>  |  75.0<H>  ----------------------------<  114<H>  6.0<H>   |  23.0  |  2.32<H>    Ca    8.3<L>      02 Nov 2020 05:52  Phos  7.5     11-02  Mg     2.1     11-02      PT/INR - ( 02 Nov 2020 05:53 )   PT: 12.4 sec;   INR: 1.07 ratio         PTT - ( 02 Nov 2020 05:53 )  PTT:21.5 sec      RADIOLOGY & ADDITIONAL STUDIES:      Pathology:

## 2020-11-03 LAB
ANION GAP SERPL CALC-SCNC: 13 MMOL/L — SIGNIFICANT CHANGE UP (ref 5–17)
ANION GAP SERPL CALC-SCNC: 14 MMOL/L — SIGNIFICANT CHANGE UP (ref 5–17)
ANION GAP SERPL CALC-SCNC: 17 MMOL/L — SIGNIFICANT CHANGE UP (ref 5–17)
BASOPHILS # BLD AUTO: 0.02 K/UL — SIGNIFICANT CHANGE UP (ref 0–0.2)
BASOPHILS NFR BLD AUTO: 0.2 % — SIGNIFICANT CHANGE UP (ref 0–2)
BUN SERPL-MCNC: 80 MG/DL — HIGH (ref 8–20)
BUN SERPL-MCNC: 81 MG/DL — HIGH (ref 8–20)
BUN SERPL-MCNC: 82 MG/DL — HIGH (ref 8–20)
CALCIUM SERPL-MCNC: 8.1 MG/DL — LOW (ref 8.6–10.2)
CALCIUM SERPL-MCNC: 8.3 MG/DL — LOW (ref 8.6–10.2)
CALCIUM SERPL-MCNC: 8.4 MG/DL — LOW (ref 8.6–10.2)
CHLORIDE SERPL-SCNC: 95 MMOL/L — LOW (ref 98–107)
CHLORIDE SERPL-SCNC: 97 MMOL/L — LOW (ref 98–107)
CHLORIDE SERPL-SCNC: 98 MMOL/L — SIGNIFICANT CHANGE UP (ref 98–107)
CK SERPL-CCNC: 32 U/L — SIGNIFICANT CHANGE UP (ref 30–200)
CO2 SERPL-SCNC: 22 MMOL/L — SIGNIFICANT CHANGE UP (ref 22–29)
CO2 SERPL-SCNC: 23 MMOL/L — SIGNIFICANT CHANGE UP (ref 22–29)
CO2 SERPL-SCNC: 23 MMOL/L — SIGNIFICANT CHANGE UP (ref 22–29)
CREAT SERPL-MCNC: 2.4 MG/DL — HIGH (ref 0.5–1.3)
CREAT SERPL-MCNC: 2.52 MG/DL — HIGH (ref 0.5–1.3)
CREAT SERPL-MCNC: 2.64 MG/DL — HIGH (ref 0.5–1.3)
EOSINOPHIL # BLD AUTO: 0.04 K/UL — SIGNIFICANT CHANGE UP (ref 0–0.5)
EOSINOPHIL NFR BLD AUTO: 0.4 % — SIGNIFICANT CHANGE UP (ref 0–6)
GAS PNL BLDA: SIGNIFICANT CHANGE UP
GLUCOSE BLDC GLUCOMTR-MCNC: 147 MG/DL — HIGH (ref 70–99)
GLUCOSE BLDC GLUCOMTR-MCNC: 170 MG/DL — HIGH (ref 70–99)
GLUCOSE SERPL-MCNC: 78 MG/DL — SIGNIFICANT CHANGE UP (ref 70–99)
GLUCOSE SERPL-MCNC: 81 MG/DL — SIGNIFICANT CHANGE UP (ref 70–99)
GLUCOSE SERPL-MCNC: 92 MG/DL — SIGNIFICANT CHANGE UP (ref 70–99)
HCT VFR BLD CALC: 24.4 % — LOW (ref 39–50)
HGB BLD-MCNC: 8.3 G/DL — LOW (ref 13–17)
IMM GRANULOCYTES NFR BLD AUTO: 0.3 % — SIGNIFICANT CHANGE UP (ref 0–1.5)
LYMPHOCYTES # BLD AUTO: 0.68 K/UL — LOW (ref 1–3.3)
LYMPHOCYTES # BLD AUTO: 7.2 % — LOW (ref 13–44)
MAGNESIUM SERPL-MCNC: 2 MG/DL — SIGNIFICANT CHANGE UP (ref 1.6–2.6)
MAGNESIUM SERPL-MCNC: 2.1 MG/DL — SIGNIFICANT CHANGE UP (ref 1.6–2.6)
MCHC RBC-ENTMCNC: 34 GM/DL — SIGNIFICANT CHANGE UP (ref 32–36)
MCHC RBC-ENTMCNC: 39.3 PG — HIGH (ref 27–34)
MCV RBC AUTO: 115.6 FL — HIGH (ref 80–100)
MONOCYTES # BLD AUTO: 1 K/UL — HIGH (ref 0–0.9)
MONOCYTES NFR BLD AUTO: 10.6 % — SIGNIFICANT CHANGE UP (ref 2–14)
NEUTROPHILS # BLD AUTO: 7.63 K/UL — HIGH (ref 1.8–7.4)
NEUTROPHILS NFR BLD AUTO: 81.3 % — HIGH (ref 43–77)
PHOSPHATE SERPL-MCNC: 6.5 MG/DL — HIGH (ref 2.4–4.7)
PHOSPHATE SERPL-MCNC: 6.8 MG/DL — HIGH (ref 2.4–4.7)
PLATELET # BLD AUTO: 173 K/UL — SIGNIFICANT CHANGE UP (ref 150–400)
POTASSIUM SERPL-MCNC: 5.3 MMOL/L — SIGNIFICANT CHANGE UP (ref 3.5–5.3)
POTASSIUM SERPL-MCNC: 5.5 MMOL/L — HIGH (ref 3.5–5.3)
POTASSIUM SERPL-MCNC: 5.8 MMOL/L — HIGH (ref 3.5–5.3)
POTASSIUM SERPL-SCNC: 5.3 MMOL/L — SIGNIFICANT CHANGE UP (ref 3.5–5.3)
POTASSIUM SERPL-SCNC: 5.5 MMOL/L — HIGH (ref 3.5–5.3)
POTASSIUM SERPL-SCNC: 5.8 MMOL/L — HIGH (ref 3.5–5.3)
RBC # BLD: 2.11 M/UL — LOW (ref 4.2–5.8)
RBC # FLD: 19.9 % — HIGH (ref 10.3–14.5)
SODIUM SERPL-SCNC: 131 MMOL/L — LOW (ref 135–145)
SODIUM SERPL-SCNC: 135 MMOL/L — SIGNIFICANT CHANGE UP (ref 135–145)
SODIUM SERPL-SCNC: 136 MMOL/L — SIGNIFICANT CHANGE UP (ref 135–145)
TROPONIN T SERPL-MCNC: 0.09 NG/ML — HIGH (ref 0–0.06)
URATE UR-MCNC: 8.5 MG/DL — SIGNIFICANT CHANGE UP
WBC # BLD: 9.4 K/UL — SIGNIFICANT CHANGE UP (ref 3.8–10.5)
WBC # FLD AUTO: 9.4 K/UL — SIGNIFICANT CHANGE UP (ref 3.8–10.5)

## 2020-11-03 PROCEDURE — 93010 ELECTROCARDIOGRAM REPORT: CPT

## 2020-11-03 PROCEDURE — 71045 X-RAY EXAM CHEST 1 VIEW: CPT | Mod: 26

## 2020-11-03 PROCEDURE — 99291 CRITICAL CARE FIRST HOUR: CPT

## 2020-11-03 RX ORDER — FUROSEMIDE 40 MG
80 TABLET ORAL ONCE
Refills: 0 | Status: COMPLETED | OUTPATIENT
Start: 2020-11-03 | End: 2020-11-03

## 2020-11-03 RX ORDER — FUROSEMIDE 40 MG
80 TABLET ORAL DAILY
Refills: 0 | Status: DISCONTINUED | OUTPATIENT
Start: 2020-11-03 | End: 2020-11-03

## 2020-11-03 RX ORDER — ALBUMIN HUMAN 25 %
250 VIAL (ML) INTRAVENOUS ONCE
Refills: 0 | Status: COMPLETED | OUTPATIENT
Start: 2020-11-03 | End: 2020-11-03

## 2020-11-03 RX ORDER — ALBUMIN HUMAN 25 %
100 VIAL (ML) INTRAVENOUS EVERY 4 HOURS
Refills: 0 | Status: COMPLETED | OUTPATIENT
Start: 2020-11-03 | End: 2020-11-04

## 2020-11-03 RX ORDER — SODIUM CHLORIDE 9 MG/ML
1000 INJECTION INTRAMUSCULAR; INTRAVENOUS; SUBCUTANEOUS ONCE
Refills: 0 | Status: COMPLETED | OUTPATIENT
Start: 2020-11-03 | End: 2020-11-03

## 2020-11-03 RX ORDER — FUROSEMIDE 40 MG
80 TABLET ORAL ONCE
Refills: 0 | Status: DISCONTINUED | OUTPATIENT
Start: 2020-11-03 | End: 2020-11-03

## 2020-11-03 RX ADMIN — PANTOPRAZOLE SODIUM 40 MILLIGRAM(S): 20 TABLET, DELAYED RELEASE ORAL at 06:26

## 2020-11-03 RX ADMIN — Medication 600 MILLIGRAM(S): at 08:34

## 2020-11-03 RX ADMIN — TAMSULOSIN HYDROCHLORIDE 0.4 MILLIGRAM(S): 0.4 CAPSULE ORAL at 21:04

## 2020-11-03 RX ADMIN — Medication 3 MILLIGRAM(S): at 21:04

## 2020-11-03 RX ADMIN — ATORVASTATIN CALCIUM 20 MILLIGRAM(S): 80 TABLET, FILM COATED ORAL at 21:04

## 2020-11-03 RX ADMIN — SODIUM CHLORIDE 1000 MILLILITER(S): 9 INJECTION INTRAMUSCULAR; INTRAVENOUS; SUBCUTANEOUS at 02:46

## 2020-11-03 RX ADMIN — Medication 80 MILLIGRAM(S): at 10:44

## 2020-11-03 RX ADMIN — Medication 80 MILLIGRAM(S): at 23:00

## 2020-11-03 RX ADMIN — BUDESONIDE AND FORMOTEROL FUMARATE DIHYDRATE 2 PUFF(S): 160; 4.5 AEROSOL RESPIRATORY (INHALATION) at 08:20

## 2020-11-03 RX ADMIN — SENNA PLUS 2 TABLET(S): 8.6 TABLET ORAL at 21:08

## 2020-11-03 RX ADMIN — Medication 500 MILLIGRAM(S): at 06:26

## 2020-11-03 RX ADMIN — Medication 50 MILLILITER(S): at 21:05

## 2020-11-03 RX ADMIN — Medication 4 MILLILITER(S): at 14:36

## 2020-11-03 RX ADMIN — Medication 125 MILLILITER(S): at 10:48

## 2020-11-03 RX ADMIN — BUDESONIDE AND FORMOTEROL FUMARATE DIHYDRATE 2 PUFF(S): 160; 4.5 AEROSOL RESPIRATORY (INHALATION) at 20:04

## 2020-11-03 RX ADMIN — Medication 4 MILLILITER(S): at 20:04

## 2020-11-03 RX ADMIN — CHLORHEXIDINE GLUCONATE 1 APPLICATION(S): 213 SOLUTION TOPICAL at 18:25

## 2020-11-03 RX ADMIN — INSULIN HUMAN 10 UNIT(S): 100 INJECTION, SOLUTION SUBCUTANEOUS at 00:01

## 2020-11-03 RX ADMIN — Medication 3 MILLILITER(S): at 20:04

## 2020-11-03 RX ADMIN — Medication 25 MILLILITER(S): at 00:01

## 2020-11-03 RX ADMIN — SODIUM ZIRCONIUM CYCLOSILICATE 5 GRAM(S): 10 POWDER, FOR SUSPENSION ORAL at 06:25

## 2020-11-03 RX ADMIN — APIXABAN 2.5 MILLIGRAM(S): 2.5 TABLET, FILM COATED ORAL at 06:26

## 2020-11-03 RX ADMIN — Medication 3 MILLILITER(S): at 07:30

## 2020-11-03 RX ADMIN — Medication 4 MILLILITER(S): at 07:30

## 2020-11-03 RX ADMIN — Medication 3 MILLILITER(S): at 14:36

## 2020-11-03 NOTE — CONSULT NOTE ADULT - SUBJECTIVE AND OBJECTIVE BOX
PULMONARY CONSULT NOTE      DEBI BADILLO  MRN-[id number]    Patient is a 70y old  Male who presents with a chief complaint of Respiratory distress (2020 10:16)      HISTORY OF PRESENT ILLNESS:    71 yo M w/ PMH of COPD newly on home O2, A fib on eliquis, CHF, CKD who is s/p mechanical fall down 7 stairs on 10/9. Patient did not have LOC but did report hitting head. Patient presented to [facility] on 10/11. CT head/c-spine/CAP were negative for acute injuries. Patient was notted to have diffuse ecchymosis over L side. XR LLE were negative for acute fx. Patient was discharged home on 10/14. Patient presented to [facility] with worsening sob and lower leg pain with difficulty ambulating. Patient reports significantly lower urine output at that time as well with dark urine but without other associated symptoms including f/c/n/v. He is followed with outside pulm for COPD. He is now in ICU for increased SOB, hypoxia and respiratory failure as well as now hypercarbia. He is s/p LLE graft for hematoma and now with concern for pulm edema. He is being started on BiPAP support for respiratory failure with improvement in oxygenation. He was also started on albumin and lasix for volume expansion and diuresis. He was reportedly lethargic but now somewhat more awake.      MEDICATIONS  (STANDING):  acetylcysteine 10%  Inhalation 4 milliLiter(s) Inhalation three times a day  ALBUTerol    90 MICROgram(s) HFA Inhaler 1 Puff(s) Inhalation every 4 hours  albuterol/ipratropium for Nebulization 3 milliLiter(s) Nebulizer every 6 hours  albuterol/ipratropium for Nebulization. 3 milliLiter(s) Nebulizer once  apixaban 2.5 milliGRAM(s) Oral two times a day  ascorbic acid 500 milliGRAM(s) Oral two times a day  aspirin  chewable 81 milliGRAM(s) Oral daily  atorvastatin 20 milliGRAM(s) Oral at bedtime  budesonide 160 MICROgram(s)/formoterol 4.5 MICROgram(s) Inhaler 2 Puff(s) Inhalation two times a day  chlorhexidine 2% Cloths 1 Application(s) Topical daily  epoetin felisha-epbx (RETACRIT) Injectable 50784 Unit(s) SubCutaneous <User Schedule>  folic acid 1 milliGRAM(s) Oral daily  guaiFENesin  milliGRAM(s) Oral every 12 hours  melatonin 3 milliGRAM(s) Oral at bedtime  multivitamin 1 Tablet(s) Oral daily  pantoprazole    Tablet 40 milliGRAM(s) Oral before breakfast  senna 2 Tablet(s) Oral at bedtime  tamsulosin 0.4 milliGRAM(s) Oral at bedtime  thiamine 100 milliGRAM(s) Oral daily  zinc sulfate 220 milliGRAM(s) Oral daily      MEDICATIONS  (PRN):  acetaminophen   Tablet .. 650 milliGRAM(s) Oral every 6 hours PRN Mild Pain (1 - 3)  benzocaine 15 mG/menthol 3.6 mG (Sugar-Free) Lozenge 1 Lozenge Oral every 3 hours PRN Sore Throat  ondansetron Injectable 4 milliGRAM(s) IV Push once PRN Nausea and/or Vomiting      Allergies    No Known Allergies    Intolerances        PAST MEDICAL & SURGICAL HISTORY:  CKD (chronic kidney disease)    CHF (congestive heart failure)    Atrial fibrillation    COPD, severity to be determined    Coronary artery disease involving coronary bypass graft of native heart without angina pectoris    Chronic osteomyelitis, osteomyelitis of unspecified site    COPD (chronic obstructive pulmonary disease)    Atrial fibrillation    Bladder cancer    HLD (hyperlipidemia)    H/O knee surgery    S/P CABG (coronary artery bypass graft)    Presence of stent of CABG        FAMILY HISTORY:  No pertinent family history in first degree relatives        SOCIAL HISTORY  Smoking History: former smoker    REVIEW OF SYSTEMS: Limited due to BiPAP therapy    CONSTITUTIONAL:  No fevers, chills, sweats    HEENT:  Eyes:  No diplopia or blurred vision. ENT:  No earache, sore throat or runny nose.    CARDIOVASCULAR:  No pressure, squeezing, tightness, or heaviness about the chest; no palpitations.    RESPIRATORY:  Per HPI    GASTROINTESTINAL:  No abdominal pain, nausea, vomiting or diarrhea.    GENITOURINARY:  No dysuria, frequency or urgency.    NEUROLOGIC:  No paresthesias, fasciculations, seizures or weakness.    PSYCHIATRIC:  No disorder of thought or mood.    Vital Signs Last 24 Hrs  T(C): 36.5 (2020 12:15), Max: 36.7 (2020 19:53)  T(F): 97.7 (2020 12:15), Max: 98 (2020 19:53)  HR: 91 (2020 12:00) (79 - 96)  BP: 147/64 (2020 12:00) (92/55 - 147/64)  BP(mean): 83 (2020 12:00) (60 - 114)  RR: 18 (2020 12:00) (15 - 26)  SpO2: 100% (2020 12:00) (89% - 100%)    PHYSICAL EXAMINATION:    GENERAL: The patient is a well-developed, well-nourished obese male on BiPAP in no apparent distress.     HEENT: Head is normocephalic and atraumatic. Extraocular muscles are intact. Mucous membranes are moist.     NECK: Supple.     LUNGS: Clear to auscultation without wheezing, rales, or rhonchi. Respirations unlabored    HEART: Irregular rate and rhythm without murmur.    ABDOMEN: Soft, nontender, and nondistended.  No hepatosplenomegaly is noted.    EXTREMITIES: Without any cyanosis, clubbing, with LLE edema and leg is wrapped.    NEUROLOGIC: Grossly intact though somewhat confused.      LABS:                        8.3    9.40  )-----------( 173      ( 2020 04:24 )             24.4     11-03    135  |  98  |  82.0<H>  ----------------------------<  92  5.8<H>   |  23.0  |  2.52<H>    Ca    8.1<L>      2020 11:04  Phos  6.8     11-03  Mg     2.1     11-03    TPro  5.4<L>  /  Alb  3.1<L>  /  TBili  0.7  /  DBili  x   /  AST  13  /  ALT  11  /  AlkPhos  222<H>  11-02    PT/INR - ( 2020 05:53 )   PT: 12.4 sec;   INR: 1.07 ratio         PTT - ( 2020 13:01 )  PTT:31.1 sec  Urinalysis Basic - ( 2020 22:14 )    Color: Yellow / Appearance: Clear / S.020 / pH: x  Gluc: x / Ketone: Negative  / Bili: Negative / Urobili: Negative mg/dL   Blood: x / Protein: 30 mg/dL / Nitrite: Negative   Leuk Esterase: Small / RBC: 25-50 /HPF / WBC 11-25   Sq Epi: x / Non Sq Epi: Few / Bacteria: Few      ABG - ( 2020 10:22 )  pH, Arterial: 7.26  pH, Blood: x     /  pCO2: 53    /  pO2: 66    / HCO3: 22    / Base Excess: -3.4  /  SaO2: 93          Serum Pro-Brain Natriuretic Peptide: 50968 pg/mL (20 @ 22:18)    MICROBIOLOGY:    COVID-19 PCR . (10.20.20 @ 22:55)    COVID-19 PCR: NotDetec: This test has been validated by [employer] to be accurate;  though it has not been FDA cleared/approved by the usual pathway  As with all laboratory test, results should be correlated with clinical  findings.  https://www.fda.gov/media/572883/download  https://www.fda.gov/media/532382/download      RADIOLOGY & ADDITIONAL STUDIES:       EXAM:  XR CHEST PORTABLE ROUTINE 1V                          PROCEDURE DATE:  2020          INTERPRETATION:  Portable chest radiograph    CLINICAL INFORMATION:   Short of breath.    TECHNIQUE:  Portable  AP view of the chest was obtained.    COMPARISON: 2020 chest available for review.    FINDINGS: There is haziness overlying the RIGHT hemithorax indicating a large RIGHT effusion and/or basilar airspace consolidation. There is a mild LEFT pleural effusion. RIGHT diaphragmatic contour not delineated. An  LEFT upper lobe clear.  The  heart is enlarged in transverse diameter. No hilar mass.  Status post coronary artery bypass graft procedure.  /Visualized osseous structures are intact.        IMPRESSION:   Moderate-large RIGHT effusion and/or basilar RIGHT airspace consolidation. LEFT effusion.  Upright/semiupright AP and lateral radiographs recommended for confirmation.        SAYRA GIBBS MD; Attending Radiologist  This document has been electronically signed. Nov  3 2020  9:51AM      ECHO:    Summary:   1. Left ventricular ejection fraction, by visual estimation, is 55 to 60%.   2. Normal global left ventricular systolic function.   3. Limited study to evaluate LV regional wall motion. Consider repeat with Definity.   4. Abnormal septal motion consistent with post-operative status.   5. The left ventricular diastolic function could not be assessed in this study.   6. Normal left ventricular internal cavity size.   7. Mildly enlarged right ventricle. Moderately reduced RV systolic function.   8. Mildly enlargedleft atrium.   9. Mildly enlarged right atrium.  10. Mild to moderate mitral valve regurgitation.  11. Sclerotic aortic valve with normal opening.  12. Mild tricuspid regurgitation.  13. Estimated pulmonary artery systolic pressure is 46.4 mmHg assuming a right atrial pressure of 8 mmHg, which is consistent with mild pulmonary hypertension.  14. There is no evidence of pericardial effusion.    MD Mati Electronically signed on 10/21/2020 at 5:26:38 PM

## 2020-11-03 NOTE — PROGRESS NOTE ADULT - SUBJECTIVE AND OBJECTIVE BOX
24h Events:  Pt admitted to ICU yesterday evening from floor for shortness of breath and worsening renal function. Given 40 IVP Lasix without response. Overnight, pt had episode of agitation after receiving Relistor. He received 2m5 mg of haldol. Relistor discontinued and agitation and confusion is improved this morning. Additionally, pt recieved 2L fluid over the course of the night as concern that renal failure could be pre-renal in the setting of large amount of insensible losses from graft donor site. POCUS this am shows hyperdynamic LV with dilated RV, concerning for R heart failure.     ICU Vital Signs Last 24 Hrs  T(C): 36.4 (2020 07:32), Max: 36.7 (2020 19:53)  T(F): 97.5 (2020 07:32), Max: 98 (2020 19:53)  HR: 86 (2020 10:00) (79 - 96)  BP: 117/59 (2020 10:00) (92/55 - 141/59)  BP(mean): 76 (2020 10:00) (60 - 114)  ABP: --  ABP(mean): --  RR: 26 (2020 10:00) (15 - 26)  SpO2: 97% (2020 10:00) (89% - 100%)    I&O's Detail    2020 07:01  -  2020 07:00  --------------------------------------------------------  IN:    IV PiggyBack: 50 mL    multiple electrolytes Injection Type 1.: 672 mL    Sodium Chloride 0.9% Bolus: 1000 mL  Total IN: 1722 mL    OUT:    Indwelling Catheter - Urethral (mL): 340 mL  Total OUT: 340 mL    Total NET: 1382 mL    2020 07:01  -  2020 10:17  --------------------------------------------------------  IN:    multiple electrolytes Injection Type 1.: 168 mL  Total IN: 168 mL    OUT:    Indwelling Catheter - Urethral (mL): 25 mL  Total OUT: 25 mL    Total NET: 143 mL    ABG - ( 2020 15:15 )  pH, Arterial: 7.23  pH, Blood: x     /  pCO2: 64    /  pO2: 62    / HCO3: 23    / Base Excess: -1.4  /  SaO2: 91        MEDICATIONS  (STANDING):  acetylcysteine 10%  Inhalation 4 milliLiter(s) Inhalation three times a day  albumin human  5% IVPB 250 milliLiter(s) IV Intermittent once  ALBUTerol    90 MICROgram(s) HFA Inhaler 1 Puff(s) Inhalation every 4 hours  albuterol/ipratropium for Nebulization 3 milliLiter(s) Nebulizer every 6 hours  albuterol/ipratropium for Nebulization. 3 milliLiter(s) Nebulizer once  apixaban 2.5 milliGRAM(s) Oral two times a day  ascorbic acid 500 milliGRAM(s) Oral two times a day  aspirin  chewable 81 milliGRAM(s) Oral daily  atorvastatin 20 milliGRAM(s) Oral at bedtime  budesonide 160 MICROgram(s)/formoterol 4.5 MICROgram(s) Inhaler 2 Puff(s) Inhalation two times a day  chlorhexidine 2% Cloths 1 Application(s) Topical daily  epoetin felisha-epbx (RETACRIT) Injectable 62054 Unit(s) SubCutaneous <User Schedule>  folic acid 1 milliGRAM(s) Oral daily  furosemide   Injectable 80 milliGRAM(s) IV Push once  guaiFENesin  milliGRAM(s) Oral every 12 hours  melatonin 3 milliGRAM(s) Oral at bedtime  multivitamin 1 Tablet(s) Oral daily  pantoprazole    Tablet 40 milliGRAM(s) Oral before breakfast  senna 2 Tablet(s) Oral at bedtime  tamsulosin 0.4 milliGRAM(s) Oral at bedtime  thiamine 100 milliGRAM(s) Oral daily  zinc sulfate 220 milliGRAM(s) Oral daily    MEDICATIONS  (PRN):  acetaminophen   Tablet .. 650 milliGRAM(s) Oral every 6 hours PRN Mild Pain (1 - 3)  benzocaine 15 mG/menthol 3.6 mG (Sugar-Free) Lozenge 1 Lozenge Oral every 3 hours PRN Sore Throat  ondansetron Injectable 4 milliGRAM(s) IV Push once PRN Nausea and/or Vomiting    Physical Exam:    Gen: Resting comfortably in bed, sleeping, easily arousable    HEENT: PERRL, EOMI    Neurological: Alert and oriented x3 without focal deficit    Neck: Trachea midline, no evidence of JVD, FROM without pain, neck symmetric    Pulmonary: Course crackles R > L, shallow respirations    Cardiovascular: S1S2    Gastrointestinal: Soft, non-tender, non-distended    : Webb in place draining yellow urine    Extremities: Without c/c/e    Skin: LLE with donor site to upper thigh, LLE wrapped in ace and brace    Musculoskeletal: FROM without pain, no deformity or areas of tenderness    LABS:  CBC Full  -  ( 2020 04:24 )  WBC Count : 9.40 K/uL  RBC Count : 2.11 M/uL  Hemoglobin : 8.3 g/dL  Hematocrit : 24.4 %  Platelet Count - Automated : 173 K/uL  Mean Cell Volume : 115.6 fl  Mean Cell Hemoglobin : 39.3 pg  Mean Cell Hemoglobin Concentration : 34.0 gm/dL  Auto Neutrophil # : 7.63 K/uL  Auto Lymphocyte # : 0.68 K/uL  Auto Monocyte # : 1.00 K/uL  Auto Eosinophil # : 0.04 K/uL  Auto Basophil # : 0.02 K/uL  Auto Neutrophil % : 81.3 %  Auto Lymphocyte % : 7.2 %  Auto Monocyte % : 10.6 %  Auto Eosinophil % : 0.4 %  Auto Basophil % : 0.2 %        131<L>  |  95<L>  |  80.0<H>  ----------------------------<  78  5.5<H>   |  23.0  |  2.64<H>    Ca    8.3<L>      2020 04:24  Phos  6.8     11-  Mg     2.1     -    TPro  5.4<L>  /  Alb  3.1<L>  /  TBili  0.7  /  DBili  x   /  AST  13  /  ALT  11  /  AlkPhos  222<H>  11-    PT/INR - ( 2020 05:53 )   PT: 12.4 sec;   INR: 1.07 ratio      PTT - ( 2020 13:01 )  PTT:31.1 sec  Urinalysis Basic - ( 2020 22:14 )    Color: Yellow / Appearance: Clear / S.020 / pH: x  Gluc: x / Ketone: Negative  / Bili: Negative / Urobili: Negative mg/dL   Blood: x / Protein: 30 mg/dL / Nitrite: Negative   Leuk Esterase: Small / RBC: 25-50 /HPF / WBC 11-25   Sq Epi: x / Non Sq Epi: Few / Bacteria: Few    RECENT CULTURES:    LIVER FUNCTIONS - ( 2020 22:18 )  Alb: 3.1 g/dL / Pro: 5.4 g/dL / ALK PHOS: 222 U/L / ALT: 11 U/L / AST: 13 U/L / GGT: x           ASSESSMENT/PLAN:  71 y/o male w/ PMHx COPD on home O2, CHF, CKD, Afib admitted on 10/15 s/p fall with LLE hematoma. Admitted to SICU in acute renal failure w/ COPD exacerbation (resolved). S/P OR on 10/21 for debridement of LLE, admitted to SICU post-operatively for hypotension and bradycardia during extubation. Tx to surgical floors following day, back to OR on 10/30 for repeat debridement and STSG, again briefly admitted to ICU for hypotension and bradycardia during attempted extubation. Trasnfered back to surgical floors after safely extubated later that evening in ICU. Admitted again to ICU  for respiratory distress and acute renal failure.     Neuro: Pain control, delirium precautions, sleep hygiene, delirium precautions. No longer need for 1:1.     CV: concerned for R heart failure, formal echo ordered    Pulm: f/u ABG, pt more lethargic this AM, concerned for CO2 retention, consult pulm    GI/Nutrition: NPO for mental status    /Renal: Webb for strict I&O, Lasix 80 mg, albumin 250 5%    ID: none    Endo: FS q6, ISS    Skin: Repositioning for DTI prevention while in bed    Heme/DVT Prophylaxis: SCDs, Eliquis    Lines/Tubes: PIV, Rad A line    Dispo: ICU

## 2020-11-03 NOTE — CONSULT NOTE ADULT - ASSESSMENT
Prior echo with normal EF and only mild pulm HTN  VHD with mild to mod MR  Hypoxic respiratory failure requiring BiPAP  Renal insufficiency  Hyperkalemia  Anemia  Improved though persistent respiratory acidosis  R>L effusion with significantly elevated BNP though somewhat limited due to renal insufficiency  Doubt infection given pt is afeb without leukocytosis  Reported underlying COPD  Former smoker  AF    Rec:    Drug nebs  BiPAP support  Supplemental O2 - decrease as tolerates  Follow lytes and renal function; correct as needed; should improve with diuresis  Await f/u echo  Optimize cardiac function  Diurese as tolerates  Complete albumin  Consider IV steroids if no improvement with diuresis  Rate control of AF  A/c for AF  May need renal evaluation for HD if K does not correct or if renal function worsens  Continue supportive care    D/w SICU   Prior echo with normal EF and only mild pulm HTN  VHD with mild to mod MR  Hypoxic respiratory failure requiring BiPAP  Renal insufficiency  Hyperkalemia  Anemia  Improved though persistent respiratory acidosis  R>L effusion with significantly elevated BNP though somewhat limited due to renal insufficiency  Doubt infection given pt is afeb without leukocytosis  Reported underlying COPD  Former smoker  AF    Rec:    Drug nebs  BiPAP support  Follow ABG for improvement  Supplemental O2 - decrease as tolerates  Follow lytes and renal function; correct as needed; should improve with diuresis  Await f/u echo  Optimize cardiac function  Diurese as tolerates  Complete albumin  Consider IV steroids if no improvement with diuresis  Rate control of AF  A/c for AF  May need renal evaluation for HD if K does not correct or if renal function worsens  Continue supportive care    D/w SICU

## 2020-11-03 NOTE — CHART NOTE - NSCHARTNOTEFT_GEN_A_CORE
Patient status is unchanged, minimal response to lasix 100/hr x2.  Now with worsening pulmonary hypertension and RV failure.  Patient denies CP, SOB, Dyspnea, palpitations, jaw or arm pain.  Repeat ECG and troponins pending.  Consult cards to discuss possible need for R heart catheterization.  Called pulmonology for thought of use of sildenafil - does NOT feel patient would benefit in acute setting.  Dr. Celeste on board with plan.      ICU Vital Signs Last 24 Hrs  T(C): 36.3 (03 Nov 2020 19:10), Max: 36.8 (03 Nov 2020 15:43)  T(F): 97.4 (03 Nov 2020 19:10), Max: 98.3 (03 Nov 2020 15:43)  HR: 90 (03 Nov 2020 21:00) (80 - 96)  BP: 116/54 (03 Nov 2020 21:00) (101/83 - 147/64)  BP(mean): 71 (03 Nov 2020 21:00) (71 - 99)  ABP: --  ABP(mean): --  RR: 26 (03 Nov 2020 21:00) (16 - 30)  SpO2: 92% (03 Nov 2020 21:00) (89% - 100%)    PHYSICAL EXAM:    General: NAD, in good spirits   Pulm: unlabored,  expiratory wheezing noted, mild conversational dyspnea, no accessory muscle use  CV: afib, + JVD  Abdomen: soft, non-tender, non-distended, no rebound / guarding  Neuro: A&Ox3, motor and sensation intact, no focal neuro deficits.   Skin: L thigh donor site covered w/ xeroform & abd pad, moderate serosanguenous oozing. Distal LLE w/ KI in place, overlying ace bandage C/D/I, motor intact of foot and toes.  foot is warm w/ brisk cap refill & palpable DP pulse.   Skin: warm, dry, scattered ecchymosis most notably to LUE & LLE    Echo: results as follows:   1. Left ventricular ejection fraction, by visual estimation, is 60 to 65%.   2. Normal global left ventricular systolic function.   3. Severely enlarged right atrium.   4. Severely enlarged right ventricle.   5. Severely reduced RV systolic function.   6. Positive Baca Sign.   7. Normal left atrial size.   8. There is no evidence of pericardial effusion.   9. Mild-moderate tricuspid regurgitation.  10. Estimated pulmonary artery systolic pressure is 63.4 mmHg assuming a right atrial pressure of 15 mmHg, which is consistent with severe pulmonary hypertension.    71 y/o male admitted on 10/15 s/p fall with LLE hematoma. Hospital course complicated by ARF, COPD exacerbation, hypotensive/bradycardic episode post-operatively after reversal of anesthetic agents. Today patient noted to be short of breath with increasing oxygen requirements. Also w/ worsening hyponatremia and renal function. Pt normally lasix dependent - has not rcvd lasix since 10/31. Deterioration of respiratory status likely due to volume overload.     -Continue delirium precautions  -Pending ecg, troponins, attempt to diuresis futher  -F/u cards recs   -COPD-bipap as needed  -NPO Patient status is unchanged, minimal response to lasix 100/hr x2.  Now with worsening pulmonary hypertension and RV failure.  Patient denies CP, SOB, Dyspnea, palpitations, jaw or arm pain.  Repeat ECG and troponins pending.  Consult cards to discuss possible need for R heart catheterization.  Called pulmonology for thought of use of sildenafil - does NOT feel patient would benefit in acute setting.      ICU Vital Signs Last 24 Hrs  T(C): 36.3 (03 Nov 2020 19:10), Max: 36.8 (03 Nov 2020 15:43)  T(F): 97.4 (03 Nov 2020 19:10), Max: 98.3 (03 Nov 2020 15:43)  HR: 90 (03 Nov 2020 21:00) (80 - 96)  BP: 116/54 (03 Nov 2020 21:00) (101/83 - 147/64)  BP(mean): 71 (03 Nov 2020 21:00) (71 - 99)  ABP: --  ABP(mean): --  RR: 26 (03 Nov 2020 21:00) (16 - 30)  SpO2: 92% (03 Nov 2020 21:00) (89% - 100%)    PHYSICAL EXAM:    General: NAD, in good spirits   Pulm: unlabored,  expiratory wheezing noted, mild conversational dyspnea, no accessory muscle use  CV: afib, + JVD  Abdomen: soft, non-tender, non-distended, no rebound / guarding  Neuro: A&Ox3, motor and sensation intact, no focal neuro deficits.   Skin: L thigh donor site covered w/ xeroform & abd pad, moderate serosanguenous oozing. Distal LLE w/ KI in place, overlying ace bandage C/D/I, motor intact of foot and toes.  foot is warm w/ brisk cap refill & palpable DP pulse.   Skin: warm, dry, scattered ecchymosis most notably to LUE & LLE    Echo: results as follows:   1. Left ventricular ejection fraction, by visual estimation, is 60 to 65%.   2. Normal global left ventricular systolic function.   3. Severely enlarged right atrium.   4. Severely enlarged right ventricle.   5. Severely reduced RV systolic function.   6. Positive Baca Sign.   7. Normal left atrial size.   8. There is no evidence of pericardial effusion.   9. Mild-moderate tricuspid regurgitation.  10. Estimated pulmonary artery systolic pressure is 63.4 mmHg assuming a right atrial pressure of 15 mmHg, which is consistent with severe pulmonary hypertension.    69 y/o male admitted on 10/15 s/p fall with LLE hematoma. Hospital course complicated by ARF, COPD exacerbation, hypotensive/bradycardic episode post-operatively after reversal of anesthetic agents. Today patient noted to be short of breath with increasing oxygen requirements. Also w/ worsening hyponatremia and renal function. Pt normally lasix dependent - has not rcvd lasix since 10/31. Deterioration of respiratory status likely due to volume overload.     -Continue delirium precautions  -Pending ecg, troponins, attempt to diuresis futher  -F/u cards recs   -COPD-bipap as needed  -NPO  -Echo also noted to have +McConnells sign- likely consistent with severe pulmonary hypertension, however, can be seen with PE as well.  Low suspicion for PE at this time.  Patient is fully anticoagulated with Eliquis.  Vitals remain stable.  The risk of renal failure does not outweigh the benefit of getting a CT Angio Chest to r/o PE at this time.  It does not  of patient- which at this time is to continue anticoagulation.     Dr. Celeste on board with plan

## 2020-11-04 LAB
ANION GAP SERPL CALC-SCNC: 13 MMOL/L — SIGNIFICANT CHANGE UP (ref 5–17)
ANION GAP SERPL CALC-SCNC: 17 MMOL/L — SIGNIFICANT CHANGE UP (ref 5–17)
BASOPHILS # BLD AUTO: 0.02 K/UL — SIGNIFICANT CHANGE UP (ref 0–0.2)
BASOPHILS NFR BLD AUTO: 0.3 % — SIGNIFICANT CHANGE UP (ref 0–2)
BUN SERPL-MCNC: 78 MG/DL — HIGH (ref 8–20)
BUN SERPL-MCNC: 82 MG/DL — HIGH (ref 8–20)
CALCIUM SERPL-MCNC: 8.1 MG/DL — LOW (ref 8.6–10.2)
CALCIUM SERPL-MCNC: 8.5 MG/DL — LOW (ref 8.6–10.2)
CHLORIDE SERPL-SCNC: 94 MMOL/L — LOW (ref 98–107)
CHLORIDE SERPL-SCNC: 96 MMOL/L — LOW (ref 98–107)
CO2 SERPL-SCNC: 21 MMOL/L — LOW (ref 22–29)
CO2 SERPL-SCNC: 26 MMOL/L — SIGNIFICANT CHANGE UP (ref 22–29)
CREAT SERPL-MCNC: 2.11 MG/DL — HIGH (ref 0.5–1.3)
CREAT SERPL-MCNC: 2.26 MG/DL — HIGH (ref 0.5–1.3)
EOSINOPHIL # BLD AUTO: 0.04 K/UL — SIGNIFICANT CHANGE UP (ref 0–0.5)
EOSINOPHIL NFR BLD AUTO: 0.6 % — SIGNIFICANT CHANGE UP (ref 0–6)
GAS PNL BLDA: SIGNIFICANT CHANGE UP
GLUCOSE SERPL-MCNC: 123 MG/DL — HIGH (ref 70–99)
GLUCOSE SERPL-MCNC: 78 MG/DL — SIGNIFICANT CHANGE UP (ref 70–99)
HCT VFR BLD CALC: 24.8 % — LOW (ref 39–50)
HGB BLD-MCNC: 7.9 G/DL — LOW (ref 13–17)
IMM GRANULOCYTES NFR BLD AUTO: 0.4 % — SIGNIFICANT CHANGE UP (ref 0–1.5)
LYMPHOCYTES # BLD AUTO: 0.66 K/UL — LOW (ref 1–3.3)
LYMPHOCYTES # BLD AUTO: 9.4 % — LOW (ref 13–44)
MAGNESIUM SERPL-MCNC: 1.9 MG/DL — SIGNIFICANT CHANGE UP (ref 1.6–2.6)
MAGNESIUM SERPL-MCNC: 2.1 MG/DL — SIGNIFICANT CHANGE UP (ref 1.6–2.6)
MCHC RBC-ENTMCNC: 31.9 GM/DL — LOW (ref 32–36)
MCHC RBC-ENTMCNC: 35.9 PG — HIGH (ref 27–34)
MCV RBC AUTO: 112.7 FL — HIGH (ref 80–100)
MONOCYTES # BLD AUTO: 0.67 K/UL — SIGNIFICANT CHANGE UP (ref 0–0.9)
MONOCYTES NFR BLD AUTO: 9.5 % — SIGNIFICANT CHANGE UP (ref 2–14)
NEUTROPHILS # BLD AUTO: 5.61 K/UL — SIGNIFICANT CHANGE UP (ref 1.8–7.4)
NEUTROPHILS NFR BLD AUTO: 79.8 % — HIGH (ref 43–77)
PHOSPHATE SERPL-MCNC: 5.6 MG/DL — HIGH (ref 2.4–4.7)
PHOSPHATE SERPL-MCNC: 6.1 MG/DL — HIGH (ref 2.4–4.7)
PLATELET # BLD AUTO: 170 K/UL — SIGNIFICANT CHANGE UP (ref 150–400)
POTASSIUM SERPL-MCNC: 4.6 MMOL/L — SIGNIFICANT CHANGE UP (ref 3.5–5.3)
POTASSIUM SERPL-MCNC: 5 MMOL/L — SIGNIFICANT CHANGE UP (ref 3.5–5.3)
POTASSIUM SERPL-SCNC: 4.6 MMOL/L — SIGNIFICANT CHANGE UP (ref 3.5–5.3)
POTASSIUM SERPL-SCNC: 5 MMOL/L — SIGNIFICANT CHANGE UP (ref 3.5–5.3)
RBC # BLD: 2.2 M/UL — LOW (ref 4.2–5.8)
RBC # FLD: 18.6 % — HIGH (ref 10.3–14.5)
SODIUM SERPL-SCNC: 133 MMOL/L — LOW (ref 135–145)
SODIUM SERPL-SCNC: 134 MMOL/L — LOW (ref 135–145)
TROPONIN T SERPL-MCNC: 0.09 NG/ML — HIGH (ref 0–0.06)
TROPONIN T SERPL-MCNC: 0.1 NG/ML — HIGH (ref 0–0.06)
WBC # BLD: 7.03 K/UL — SIGNIFICANT CHANGE UP (ref 3.8–10.5)
WBC # FLD AUTO: 7.03 K/UL — SIGNIFICANT CHANGE UP (ref 3.8–10.5)

## 2020-11-04 PROCEDURE — 99232 SBSQ HOSP IP/OBS MODERATE 35: CPT

## 2020-11-04 PROCEDURE — 99291 CRITICAL CARE FIRST HOUR: CPT

## 2020-11-04 RX ORDER — APIXABAN 2.5 MG/1
5 TABLET, FILM COATED ORAL EVERY 12 HOURS
Refills: 0 | Status: DISCONTINUED | OUTPATIENT
Start: 2020-11-04 | End: 2020-11-04

## 2020-11-04 RX ORDER — FUROSEMIDE 40 MG
80 TABLET ORAL ONCE
Refills: 0 | Status: COMPLETED | OUTPATIENT
Start: 2020-11-04 | End: 2020-11-04

## 2020-11-04 RX ORDER — APIXABAN 2.5 MG/1
2.5 TABLET, FILM COATED ORAL
Refills: 0 | Status: DISCONTINUED | OUTPATIENT
Start: 2020-11-04 | End: 2020-11-05

## 2020-11-04 RX ORDER — CARVEDILOL PHOSPHATE 80 MG/1
12.5 CAPSULE, EXTENDED RELEASE ORAL EVERY 12 HOURS
Refills: 0 | Status: DISCONTINUED | OUTPATIENT
Start: 2020-11-04 | End: 2020-11-13

## 2020-11-04 RX ADMIN — ZINC SULFATE TAB 220 MG (50 MG ZINC EQUIVALENT) 220 MILLIGRAM(S): 220 (50 ZN) TAB at 12:39

## 2020-11-04 RX ADMIN — Medication 50 MILLILITER(S): at 18:24

## 2020-11-04 RX ADMIN — APIXABAN 2.5 MILLIGRAM(S): 2.5 TABLET, FILM COATED ORAL at 05:48

## 2020-11-04 RX ADMIN — APIXABAN 5 MILLIGRAM(S): 2.5 TABLET, FILM COATED ORAL at 18:24

## 2020-11-04 RX ADMIN — Medication 50 MILLILITER(S): at 14:14

## 2020-11-04 RX ADMIN — Medication 50 MILLILITER(S): at 05:46

## 2020-11-04 RX ADMIN — CHLORHEXIDINE GLUCONATE 1 APPLICATION(S): 213 SOLUTION TOPICAL at 12:40

## 2020-11-04 RX ADMIN — Medication 500 MILLIGRAM(S): at 18:23

## 2020-11-04 RX ADMIN — Medication 3 MILLILITER(S): at 15:23

## 2020-11-04 RX ADMIN — BUDESONIDE AND FORMOTEROL FUMARATE DIHYDRATE 2 PUFF(S): 160; 4.5 AEROSOL RESPIRATORY (INHALATION) at 08:32

## 2020-11-04 RX ADMIN — CARVEDILOL PHOSPHATE 12.5 MILLIGRAM(S): 80 CAPSULE, EXTENDED RELEASE ORAL at 18:23

## 2020-11-04 RX ADMIN — Medication 80 MILLIGRAM(S): at 09:56

## 2020-11-04 RX ADMIN — Medication 3 MILLILITER(S): at 20:37

## 2020-11-04 RX ADMIN — Medication 500 MILLIGRAM(S): at 05:48

## 2020-11-04 RX ADMIN — Medication 50 MILLILITER(S): at 09:56

## 2020-11-04 RX ADMIN — Medication 81 MILLIGRAM(S): at 12:40

## 2020-11-04 RX ADMIN — Medication 1 MILLIGRAM(S): at 12:39

## 2020-11-04 RX ADMIN — Medication 3 MILLILITER(S): at 08:34

## 2020-11-04 RX ADMIN — Medication 50 MILLILITER(S): at 02:43

## 2020-11-04 RX ADMIN — PANTOPRAZOLE SODIUM 40 MILLIGRAM(S): 20 TABLET, DELAYED RELEASE ORAL at 06:13

## 2020-11-04 RX ADMIN — Medication 100 MILLIGRAM(S): at 12:40

## 2020-11-04 RX ADMIN — Medication 1 TABLET(S): at 12:39

## 2020-11-04 RX ADMIN — Medication 3 MILLIGRAM(S): at 21:21

## 2020-11-04 RX ADMIN — TAMSULOSIN HYDROCHLORIDE 0.4 MILLIGRAM(S): 0.4 CAPSULE ORAL at 21:21

## 2020-11-04 RX ADMIN — ATORVASTATIN CALCIUM 20 MILLIGRAM(S): 80 TABLET, FILM COATED ORAL at 21:21

## 2020-11-04 RX ADMIN — ERYTHROPOIETIN 10000 UNIT(S): 10000 INJECTION, SOLUTION INTRAVENOUS; SUBCUTANEOUS at 10:02

## 2020-11-04 RX ADMIN — BUDESONIDE AND FORMOTEROL FUMARATE DIHYDRATE 2 PUFF(S): 160; 4.5 AEROSOL RESPIRATORY (INHALATION) at 20:37

## 2020-11-04 RX ADMIN — Medication 3 MILLILITER(S): at 02:32

## 2020-11-04 NOTE — PROGRESS NOTE ADULT - SUBJECTIVE AND OBJECTIVE BOX
24 HOUR EVENTS: Patient responded to lasix, 100-150/hr.  Vitals remain stable. Awaiting cards to see patient.  ECG unchanged.  Troponin negative, will trend.  Denies pain.  Pending labs to evaluate electrolytes this AM.     SUBJECTIVE/ROS:  [x ] A ten-point review of systems was otherwise negative except as noted.  [ ] Due to altered mental status/intubation, subjective information were not able to be obtained from the patient. History was obtained, to the extent possible, from review of the chart and collateral sources of information.      NEURO  RASS:     GCS:   15  CAM ICU:  Exam: No focal neuro deficits, KAY, 5/5 strength   Meds: acetaminophen   Tablet .. 650 milliGRAM(s) Oral every 6 hours PRN Mild Pain (1 - 3)  melatonin 3 milliGRAM(s) Oral at bedtime  ondansetron Injectable 4 milliGRAM(s) IV Push once PRN Nausea and/or Vomiting    [x] Adequacy of sedation and pain control has been assessed and adjusted      RESPIRATORY  RR: 23 (11-04-20 @ 02:00) (16 - 30)  SpO2: 99% (11-04-20 @ 02:32) (89% - 100%)  Wt(kg): --  Exam: unlabored, diminished b/l bs, wheezing slightly improved R>L  Mechanical Ventilation:   ABG - ( 03 Nov 2020 10:22 )  pH: 7.26  /  pCO2: 53    /  pO2: 66    / HCO3: 22    / Base Excess: -3.4  /  SaO2: 93      Lactate: x                [ ] Extubation Readiness Assessed  Meds: ALBUTerol    90 MICROgram(s) HFA Inhaler 1 Puff(s) Inhalation every 4 hours  albuterol/ipratropium for Nebulization 3 milliLiter(s) Nebulizer every 6 hours  albuterol/ipratropium for Nebulization. 3 milliLiter(s) Nebulizer once  budesonide 160 MICROgram(s)/formoterol 4.5 MICROgram(s) Inhaler 2 Puff(s) Inhalation two times a day        CARDIOVASCULAR  HR: 72 (11-04-20 @ 02:32) (72 - 96)  BP: 128/53 (11-04-20 @ 02:00) (101/83 - 147/64)  BP(mean): 74 (11-04-20 @ 02:00) (71 - 105)  ABP: --  ABP(mean): --  Wt(kg): --  CVP(cm H2O): --      Exam: nsr   Cardiac Rhythm: rrr  Perfusion     [x ]Adequate   [ ]Inadequate  Mentation   [x ]Normal       [ ]Reduced  Extremities  [x ]Warm         [ ]Cool  Volume Status [ ]Hypervolemic [x ]Euvolemic [ ]Hypovolemic  Meds: tamsulosin 0.4 milliGRAM(s) Oral at bedtime        GI/NUTRITION  Exam:   Diet:  Meds: pantoprazole    Tablet 40 milliGRAM(s) Oral before breakfast  senna 2 Tablet(s) Oral at bedtime      GENITOURINARY  I&O's Detail    11-02 @ 07:01  -  11-03 @ 07:00  --------------------------------------------------------  IN:    IV PiggyBack: 50 mL    multiple electrolytes Injection Type 1.: 672 mL    Sodium Chloride 0.9% Bolus: 1000 mL  Total IN: 1722 mL    OUT:    Indwelling Catheter - Urethral (mL): 340 mL  Total OUT: 340 mL    Total NET: 1382 mL      11-03 @ 07:01 - 11-04 @ 02:57  --------------------------------------------------------  IN:    IV PiggyBack: 450 mL    multiple electrolytes Injection Type 1.: 252 mL  Total IN: 702 mL    OUT:    Indwelling Catheter - Urethral (mL): 1545 mL  Total OUT: 1545 mL    Total NET: -843 mL          11-03    136  |  97<L>  |  81.0<H>  ----------------------------<  81  5.3   |  22.0  |  2.40<H>    Ca    8.4<L>      03 Nov 2020 18:09  Phos  6.5     11-03  Mg     2.0     11-03    TPro  5.4<L>  /  Alb  3.1<L>  /  TBili  0.7  /  DBili  x   /  AST  13  /  ALT  11  /  AlkPhos  222<H>  11-02    [ ] Webb catheter, indication: N/A  Meds: albumin human 25% IVPB 100 milliLiter(s) IV Intermittent every 4 hours  ascorbic acid 500 milliGRAM(s) Oral two times a day  folic acid 1 milliGRAM(s) Oral daily  multivitamin 1 Tablet(s) Oral daily  thiamine 100 milliGRAM(s) Oral daily  zinc sulfate 220 milliGRAM(s) Oral daily        HEMATOLOGIC  Meds: apixaban 2.5 milliGRAM(s) Oral two times a day  aspirin  chewable 81 milliGRAM(s) Oral daily    [x] VTE Prophylaxis                        8.3    9.40  )-----------( 173      ( 03 Nov 2020 04:24 )             24.4     PT/INR - ( 02 Nov 2020 05:53 )   PT: 12.4 sec;   INR: 1.07 ratio         PTT - ( 02 Nov 2020 13:01 )  PTT:31.1 sec  Transfusion     [ ] PRBC   [ ] Platelets   [ ] FFP   [ ] Cryoprecipitate      INFECTIOUS DISEASES  T(C): 37 (11-04-20 @ 00:07), Max: 37 (11-04-20 @ 00:07)  Wt(kg): --  WBC Count: 9.40 K/uL (11-03 @ 04:24)    Recent Cultures:    Meds: epoetin felisha-epbx (RETACRIT) Injectable 69676 Unit(s) SubCutaneous <User Schedule>        ENDOCRINE  Capillary Blood Glucose    Meds: atorvastatin 20 milliGRAM(s) Oral at bedtime        ACCESS DEVICES:  [ ] Peripheral IV  [ ] Central Venous Line	[ ] R	[ ] L	[ ] IJ	[ ] Fem	[ ] SC	Placed:   [ ] Arterial Line		[ ] R	[ ] L	[ ] Fem	[ ] Rad	[ ] Ax	Placed:   [ ] PICC:					[ ] Mediport  [ ] Urinary Catheter, Date Placed:   [ ] Necessity of urinary, arterial, and venous catheters discussed    OTHER MEDICATIONS:  benzocaine 15 mG/menthol 3.6 mG (Sugar-Free) Lozenge 1 Lozenge Oral every 3 hours PRN  chlorhexidine 2% Cloths 1 Application(s) Topical daily      CODE STATUS:     IMAGING:   24 HOUR EVENTS: Patient responded to lasix, 100-150/hr.  Vitals remain stable. Awaiting cards to see patient.  ECG unchanged.  Troponin negative, will trend.  Denies pain.  Pending labs to evaluate electrolytes this AM.     SUBJECTIVE/ROS:  [x ] A ten-point review of systems was otherwise negative except as noted.  [ ] Due to altered mental status/intubation, subjective information were not able to be obtained from the patient. History was obtained, to the extent possible, from review of the chart and collateral sources of information.      NEURO  RASS:     GCS:   15  CAM ICU:  Exam: No focal neuro deficits, KAY, 5/5 strength   Meds: acetaminophen   Tablet .. 650 milliGRAM(s) Oral every 6 hours PRN Mild Pain (1 - 3)  melatonin 3 milliGRAM(s) Oral at bedtime  ondansetron Injectable 4 milliGRAM(s) IV Push once PRN Nausea and/or Vomiting    [x] Adequacy of sedation and pain control has been assessed and adjusted      RESPIRATORY  RR: 23 (11-04-20 @ 02:00) (16 - 30)  SpO2: 99% (11-04-20 @ 02:32) (89% - 100%)  Wt(kg): --  Exam: unlabored, diminished b/l bs, wheezing slightly improved R>L  Mechanical Ventilation:   ABG - ( 03 Nov 2020 10:22 )  pH: 7.26  /  pCO2: 53    /  pO2: 66    / HCO3: 22    / Base Excess: -3.4  /  SaO2: 93      Lactate: x                [ ] Extubation Readiness Assessed  Meds: ALBUTerol    90 MICROgram(s) HFA Inhaler 1 Puff(s) Inhalation every 4 hours  albuterol/ipratropium for Nebulization 3 milliLiter(s) Nebulizer every 6 hours  albuterol/ipratropium for Nebulization. 3 milliLiter(s) Nebulizer once  budesonide 160 MICROgram(s)/formoterol 4.5 MICROgram(s) Inhaler 2 Puff(s) Inhalation two times a day        CARDIOVASCULAR  HR: 72 (11-04-20 @ 02:32) (72 - 96)  BP: 128/53 (11-04-20 @ 02:00) (101/83 - 147/64)  BP(mean): 74 (11-04-20 @ 02:00) (71 - 105)  ABP: --  ABP(mean): --  Wt(kg): --  CVP(cm H2O): --      Exam: nsr   Cardiac Rhythm: rrr  Perfusion     [x ]Adequate   [ ]Inadequate  Mentation   [x ]Normal       [ ]Reduced  Extremities  [x ]Warm         [ ]Cool  Volume Status [ ]Hypervolemic [x ]Euvolemic [ ]Hypovolemic  Meds: tamsulosin 0.4 milliGRAM(s) Oral at bedtime        GI/NUTRITION  Exam: obese, soft, nttp, nd  Diet: NPO   Meds: pantoprazole    Tablet 40 milliGRAM(s) Oral before breakfast  senna 2 Tablet(s) Oral at bedtime      GENITOURINARY  I&O's Detail    11-02 @ 07:01  -  11-03 @ 07:00  --------------------------------------------------------  IN:    IV PiggyBack: 50 mL    multiple electrolytes Injection Type 1.: 672 mL    Sodium Chloride 0.9% Bolus: 1000 mL  Total IN: 1722 mL    OUT:    Indwelling Catheter - Urethral (mL): 340 mL  Total OUT: 340 mL    Total NET: 1382 mL      11-03 @ 07:01  -  11-04 @ 02:57  --------------------------------------------------------  IN:    IV PiggyBack: 450 mL    multiple electrolytes Injection Type 1.: 252 mL  Total IN: 702 mL    OUT:    Indwelling Catheter - Urethral (mL): 1545 mL  Total OUT: 1545 mL    Total NET: -843 mL          11-03    136  |  97<L>  |  81.0<H>  ----------------------------<  81  5.3   |  22.0  |  2.40<H>    Ca    8.4<L>      03 Nov 2020 18:09  Phos  6.5     11-03  Mg     2.0     11-03    TPro  5.4<L>  /  Alb  3.1<L>  /  TBili  0.7  /  DBili  x   /  AST  13  /  ALT  11  /  AlkPhos  222<H>  11-02    [x ] Webb catheter, indication: strict i/o's   Meds: albumin human 25% IVPB 100 milliLiter(s) IV Intermittent every 4 hours  ascorbic acid 500 milliGRAM(s) Oral two times a day  folic acid 1 milliGRAM(s) Oral daily  multivitamin 1 Tablet(s) Oral daily  thiamine 100 milliGRAM(s) Oral daily  zinc sulfate 220 milliGRAM(s) Oral daily    Skin: L thigh donor site covered w/ xeroform & abd pad, moderate serosanguenous oozing. Distal LLE w/ KI in place, overlying knee immoblizer, C/D/I, motor intact of foot and toes.  foot is warm w/ brisk cap refill & palpable DP pulse.   Skin: warm, dry, scattered ecchymosis most notably to LUE & LLE      HEMATOLOGIC  Meds: apixaban 2.5 milliGRAM(s) Oral two times a day  aspirin  chewable 81 milliGRAM(s) Oral daily    [x] VTE Prophylaxis                        8.3    9.40  )-----------( 173      ( 03 Nov 2020 04:24 )             24.4     PT/INR - ( 02 Nov 2020 05:53 )   PT: 12.4 sec;   INR: 1.07 ratio         PTT - ( 02 Nov 2020 13:01 )  PTT:31.1 sec  Transfusion     [ ] PRBC   [ ] Platelets   [ ] FFP   [ ] Cryoprecipitate      INFECTIOUS DISEASES  T(C): 37 (11-04-20 @ 00:07), Max: 37 (11-04-20 @ 00:07)  Wt(kg): --  WBC Count: 9.40 K/uL (11-03 @ 04:24)    Recent Cultures:    Meds: epoetin felisha-epbx (RETACRIT) Injectable 75953 Unit(s) SubCutaneous <User Schedule>        ENDOCRINE  Capillary Blood Glucose    Meds: atorvastatin 20 milliGRAM(s) Oral at bedtime        ACCESS DEVICES:  [ ] Peripheral IV  [ ] Central Venous Line	[ ] R	[ ] L	[ ] IJ	[ ] Fem	[ ] SC	Placed:   [ ] Arterial Line		[ ] R	[ ] L	[ ] Fem	[ ] Rad	[ ] Ax	Placed:   [ ] PICC:					[ ] Mediport  [ ] Urinary Catheter, Date Placed:   [ ] Necessity of urinary, arterial, and venous catheters discussed    OTHER MEDICATIONS:  benzocaine 15 mG/menthol 3.6 mG (Sugar-Free) Lozenge 1 Lozenge Oral every 3 hours PRN  chlorhexidine 2% Cloths 1 Application(s) Topical daily      CODE STATUS:     IMAGING:

## 2020-11-04 NOTE — PROGRESS NOTE ADULT - ASSESSMENT
Assess    Acute worsening of right heart failure despite anticoagulation with asa and apixiban  COPD  CAD  LLE hematoma  CKD    Rec    Perfusion lung scan  Leg duplex  AC  Neb Rx  02  Bipap as needed       Assess    Acute worsening of right heart failure despite anticoagulation with asa and apixiban  COPD  CAD  LLE hematoma  CKD    Rec    Perfusion lung scan  Leg duplex  AC  Neb Rx  02  Bipap as needed  EKG, Right sided V3-6  Cardiac enzymes

## 2020-11-04 NOTE — PROVIDER CONTACT NOTE (OTHER) - ACTION/TREATMENT ORDERED:
resident rodrigez ordered b/l wrist which were placed on patient   resident rodrigez replaced left leg graft site drsg with abd padsx2 wrapped in kerlix  contiue to monitor patient
as per resident roxane rodrigez, reinforce left thigh jillg

## 2020-11-04 NOTE — PROVIDER CONTACT NOTE (OTHER) - SITUATION
Please enter new orders for OT evaluate and treat following OR on 10/21/20. Thank you.
Please enter new orders for OT evaluate and treat when pt appropriate to participate following OR. Thank you.
left thigh drsg saturated with sanguinous drainage
patient removed his left leg graft site drsg and has large amount of sanguinous blood loss in bed  vss, bp wdl  o2 sat 95% on 3l nc  patient alert and oriented with periods of confusion

## 2020-11-04 NOTE — PROGRESS NOTE ADULT - ASSESSMENT
71 y/o male admitted on 10/15 s/p fall with LLE hematoma. Hospital course complicated by ARF, COPD exacerbation, hypotensive/bradycardic episode post-operatively after reversal of anesthetic agents. Today patient noted to be short of breath with increasing oxygen requirements. Also w/ worsening hyponatremia and renal function. Pt normally lasix dependent - has not rcvd lasix since 10/31. Deterioration of respiratory status likely due to worsening R heart failure and severe pulmonary hypertension.     Neuro: Tylenol for pain control, minimize narcotics, melatonin for sleep hygiene, delirium precautions      CV: Continue hemodynamic monitoring. Vitals stable.  Responded to lasix in order to off load R heart. Cards consulted for possible R heart catheretization.  Unclear etiology of worsening pulmonary hypertension on echo, trend troponins. McConnells sign likely due to RV failure.     Pulm: COPD-bipap prn.  Pulm recs appreciated. McConnells sign- possible PE however, risk outweighs benefit of CT angio of chest.  Continue full anticoagulation.   Continue pulmonary toileting     GI/Nutrition: NPO, bowel regimen    /Renal: Webb- strict i/o's, f/u electrolytes     ID: afebrile. No issues     Endo: monitor blood glucose    Skin: LLE dressing change to performed by surgical team today.      Heme/DVT Prophylaxis: Hgb stable, Eliquis for atrial fib, SCD to RLE     Dispo: SICU.

## 2020-11-04 NOTE — PROVIDER CONTACT NOTE (OTHER) - RECOMMENDATIONS
1:1 constant observation was reccommended by myself to andrzej rosales and resident elia as well as to repeat cbc
change drsg repeat cbc

## 2020-11-04 NOTE — CONSULT NOTE ADULT - SUBJECTIVE AND OBJECTIVE BOX
[facility]                                                               Office:  [address] [address]                                                              Telephone: [phone]. Fax:[phone]                                                                        CARDIOLOGY CONSULTATION NOTE                                                                                             Consult requested by:  Dr. Bedoya  Reason for Consultation: CHF  History obtained by: Patient and medical record   obtained: No    Chief complaint:    Patient is a 70y old  Male who presents with a chief complaint of Leg pain (2020 12:32)      HPI: 71 y/o M with a PMHx of COPD (newly placed on home O2), Afib (on Eliquis), CHF (unknown baseline EF), and CKD who presented to the ED after a mechanical fall.   71 yo M w/ PMH of COPD newly on home O2, A fib on eliquis, CHF, CKD who is s/p mechanical fall down 7 stairs on 10/9. Patient did not have LOC but did report hitting head. Patient presented to [facility] on 10/11. CT head/c-spine/CAP were negative for acute injuries. Patient was notted to have diffuse ecchymosis over L side. XR LLE were negative for acute fx. Patient was discharged home on 10/14. Patient presented to [facility] with worsening sob and lower leg pain with difficulty ambulating. Patient reports significantly lower urine output over past few days and noted that urine color was very dark. Patient denies other associated symptoms including f/c/n/v. Patient reports normal bowel movements. Patient takes eliquis for A fib. last dose prior to previous hospitalization.  (15 Oct 2020 16:07)        REVIEW OF SYMPTOMS:     CONSTITUTIONAL: No fever, weight loss, or fatigue  ENMT:  No difficulty hearing, tinnitus, vertigo; No sinus or throat pain  NECK: No pain or stiffness  CARDIOVASCULAR: chest pain, dyspnea, syncope, palpitations, dizziness, Orthopnea, Paroxsymal nocturnal dyspnea  RESPIRATORY: Dyspnea on exertion, Shortness of breath, cough, wheezing  : No dysuria, no hematuria   GI: No dark color stool, no melena, no diarrhea, no constipation, no abdominal pain   NEURO: No headache, no dizziness, no slurred speech   MUSCULOSKELETAL: No joint pain or swelling; No muscle, back, or extremity pain  PSYCH: No agitation, no anxiety.    ALL OTHER REVIEW OF SYSTEMS ARE NEGATIVE.      PREVIOUS DIAGNOSTIC TESTING  ECHO FINDINGS:      STRESS FINDINGS:      CATHETERIZATION FINDINGS:         ALLERGIES: Allergies    No Known Allergies    Intolerances          PAST MEDICAL HISTORY  CKD (chronic kidney disease)    CHF (congestive heart failure)    Atrial fibrillation    COPD, severity to be determined    Coronary artery disease involving coronary bypass graft of native heart without angina pectoris    Chronic osteomyelitis, osteomyelitis of unspecified site    COPD (chronic obstructive pulmonary disease)    Atrial fibrillation    Bladder cancer    HLD (hyperlipidemia)        PAST SURGICAL HISTORY  H/O knee surgery    S/P CABG (coronary artery bypass graft)    Presence of stent of CABG        FAMILY HISTORY:  No pertinent family history in first degree relatives        SOCIAL HISTORY:  Denies smoking/alcohol/drugs  CIGARETTES:     ALCOHOL:  DRUGS:      CURRENT MEDICATIONS:  furosemide   Injectable 80 milliGRAM(s) IV Push once  tamsulosin 0.4 milliGRAM(s) Oral at bedtime    ALBUTerol    90 MICROgram(s) HFA Inhaler  albuterol/ipratropium for Nebulization  albuterol/ipratropium for Nebulization.  budesonide 160 MICROgram(s)/formoterol 4.5 MICROgram(s) Inhaler   melatonin  pantoprazole    Tablet  senna  albumin human 25% IVPB  apixaban  ascorbic acid  aspirin  chewable  atorvastatin  chlorhexidine 2% Cloths  epoetin felisha-epbx (RETACRIT) Injectable  folic acid  multivitamin  thiamine  zinc sulfate        HOME MEDICATIONS:      Vital Signs Last 24 Hrs  T(C): 36.4 (2020 07:59), Max: 37 (2020 00:07)  T(F): 97.6 (2020 07:59), Max: 98.6 (2020 00:07)  HR: 85 (2020 09:00) (72 - 94)  BP: 131/54 (2020 09:00) (103/86 - 147/64)  BP(mean): 76 (2020 09:00) (64 - 105)  RR: 22 (2020 09:00) (18 - 31)  SpO2: 96% (2020 09:00) (92% - 100%)      PHYSICAL EXAM:  Constitutional: Comfortable . No acute distress.   HEENT: Atraumatic and normocephalic , neck is supple . no JVD. No carotid bruit. PEERL   CNS: A&Ox3. No focal deficits. EOMI. Cranial nerves II-IX are intact.   Lymph Nodes: Cervical : Not palpable.  Respiratory: CTAB  Cardiovascular: S1S2 RRR. No murmur/rubs or gallop.  Gastrointestinal: Soft non-tender and non distended . +Bowel sounds. negative Woodson's sign.  Extremities: No edema.   Psychiatric: Calm . no agitation.  Skin: No skin rash/ulcers visualized to face, hands or feet.    Intake and output:    @ 07:  -   @ 07:00  --------------------------------------------------------  IN: 802 mL / OUT: 2055 mL / NET: -1253 mL     @ 07:  -   @ 09:42  --------------------------------------------------------  IN: 250 mL / OUT: 125 mL / NET: 125 mL        LABS:                        7.9    7.03  )-----------( 170      ( 2020 04:30 )             24.8     04    134<L>  |  96<L>  |  78.0<H>  ----------------------------<  78  5.0   |  21.0<L>  |  2.26<H>    Ca    8.1<L>      2020 04:30  Phos  6.1       Mg     1.9         TPro  5.4<L>  /  Alb  3.1<L>  /  TBili  0.7  /  DBili  x   /  AST  13  /  ALT  11  /  AlkPhos  222<H>  11    CARDIAC MARKERS ( 2020 04:30 )  x     / 0.09 ng/mL / x     / x     / x      CARDIAC MARKERS ( 2020 21:52 )  x     / 0.09 ng/mL / 32 U/L / x     / x        ;p-BNP=Serum Pro-Brain Natriuretic Peptide: 89555 pg/mL ( @ 22:18)    PTT - ( 2020 13:01 )  PTT:31.1 sec  Urinalysis Basic - ( 2020 22:14 )    Color: Yellow / Appearance: Clear / S.020 / pH: x  Gluc: x / Ketone: Negative  / Bili: Negative / Urobili: Negative mg/dL   Blood: x / Protein: 30 mg/dL / Nitrite: Negative   Leuk Esterase: Small / RBC: 25-50 /HPF / WBC 11-25   Sq Epi: x / Non Sq Epi: Few / Bacteria: Few        INTERPRETATION OF TELEMETRY: Reviewed by me.   ECG: Reviewed by me.     RADIOLOGY & ADDITIONAL STUDIES:    X-ray:  reviewed by me.   CT scan:   MRI:      [facility] [facility]                                                               Office:  [address] [address]                                                              Telephone: [phone]. Fax:[phone]                                                                        CARDIOLOGY CONSULTATION NOTE                                                                                             Consult requested by:  Dr. Bedoya  Reason for Consultation: CHF  History obtained by: Patient and medical record   obtained: No    Chief complaint:    Patient is a 70y old  Male who presents with a chief complaint of Leg pain (2020 12:32)      HPI: 69 y/o M with a PMHx of COPD (newly placed on home O2), CAD s/p CABG x 3 (10/2013 at [place]), pulmonary HTN (severity not noted on Echo 2020), Afib (on Eliquis), CHF (unknown baseline EF), PVD, HTN, HLD, and CKD who presented to the ED after a mechanical fall. Patient initially fell on 10/9 down 7 stairs with a mechanical fall with hitting his head, but no LOC. Patient was in [facility] on 10/11 with negative CT and LLE Xrays, but noted to have diffuse ecchymosis on his left side. Patient was D/C'd 10/14, and began having worsening leg swelling, pain, decreased urine output, and dyspnea. Patient has had a complicated hospital course with ARF, COPD exacerbation, CHF exacerbation. Patient underwent a LLE debridement on 10/21/20, and post-operatively with hypotension and bradycardia, requiring reversal of anesthetic agents. Patient now receiving IV doses of lasix, but repeat Echo showed severe pulmonary HTN and severely reduced RV function. Patient still with some dyspnea, orthopnea, and LE swelling, but denies any chest pain or chest pressure. Patient denies any fevers, chills, CP, syncope, near syncope, headache, dizziness, abdominal pain, N/V/D.     REVIEW OF SYMPTOMS:     CONSTITUTIONAL: No fever, weight loss, or fatigue  ENMT:  No difficulty hearing, tinnitus, vertigo; No sinus or throat pain  NECK: No pain or stiffness  CARDIOVASCULAR: AS PER HPI  RESPIRATORY: Dyspnea on exertion, Shortness of breath, cough, wheezing  : No dysuria, no hematuria   GI: No dark color stool, no melena, no diarrhea, no constipation, no abdominal pain   NEURO: No headache, no dizziness, no slurred speech   MUSCULOSKELETAL: AS PER HPI  PSYCH: No agitation, no anxiety.    ALL OTHER REVIEW OF SYSTEMS ARE NEGATIVE.      PREVIOUS DIAGNOSTIC TESTING  ECHO FINDINGS:  < from: TTE Echo Limited or F/U (20 @ 15:22) >  PHYSICIAN INTERPRETATION:  Left Ventricle: Endocardial visualization was enhanced with intravenous echo contrast. The left ventricular internal cavity size is normal.  Global LV systolic function was normal. Left ventricular ejection fraction, by visual estimation, is 60 to 65%. Abnormal (paradoxical) septal motion, consistent with left bundle branch block.  Right Ventricle: The right ventricular size is severely enlarged. RV systolicfunction is severely reduced. Positive Baca Sign. TV S' 0.1 m/s.  Left Atrium: Normal left atrial size.  Right Atrium: Severely enlarged right atrium.  Pericardium: There is no evidence of pericardial effusion.  Tricuspid Valve: The tricuspid valve is not well seen. Mild-moderate tricuspid regurgitation is visualized. Estimated pulmonary artery systolic pressure is 63.4 mmHg assuming a right atrial pressure of 15 mmHg, which is consistent with severe pulmonary hypertension.  Venous: The inferior vena cava was dilated, with respiratory size variation less than 50%.      Summary:   1. Left ventricular ejection fraction, by visual estimation, is 60 to 65%.   2. Normal global left ventricular systolic function.   3. Severely enlarged right atrium.   4. Severely enlarged right ventricle.   5. Severely reduced RV systolic function.   6. Positive Baca Sign.   7. Normal left atrial size.   8. There is no evidence of pericardial effusion.   9. Mild-moderate tricuspid regurgitation.  10. Estimated pulmonary artery systolic pressure is 63.4 mmHg assuming a right atrial pressure of 15 mmHg, which is consistent with severe pulmonary hypertension.  11. Endocardial visualization was enhanced with intravenous echo contrast.    MD Niharika Electronically signed on 11/3/2020 at 6:34:55 PM    < end of copied text >      Echo 2020 Outpatient Dr. Storm  EF 55-60%, mild asymmetric LVH, normal regional wall motion, moderate to severe diastolic dysfunction, enlarged LA, RV normal size and function, aortic root is dilated, mild MR, mild TR, pulmonary HTN is present, peak PA pressure 47 (mild to mod)      ALLERGIES: Allergies    No Known Allergies    Intolerances      PAST MEDICAL HISTORY  CKD (chronic kidney disease)    CHF (congestive heart failure)    Atrial fibrillation    COPD, severity to be determined    Coronary artery disease involving coronary bypass graft of native heart without angina pectoris    Chronic osteomyelitis, osteomyelitis of unspecified site    COPD (chronic obstructive pulmonary disease)    Atrial fibrillation    Bladder cancer    HLD (hyperlipidemia)        PAST SURGICAL HISTORY  H/O knee surgery    S/P CABG (coronary artery bypass graft)    Presence of stent of CABG        FAMILY HISTORY:  No pertinent family history in first degree relatives        SOCIAL HISTORY:    CIGARETTES:   Former smoker, quit . Smoked 1- 1.5 PPD x 20+ years  ALCOHOL: On weekends   DRUGS: Denies      CURRENT MEDICATIONS:  furosemide   Injectable 80 milliGRAM(s) IV Push once  tamsulosin 0.4 milliGRAM(s) Oral at bedtime    ALBUTerol    90 MICROgram(s) HFA Inhaler  albuterol/ipratropium for Nebulization  albuterol/ipratropium for Nebulization.  budesonide 160 MICROgram(s)/formoterol 4.5 MICROgram(s) Inhaler   melatonin  pantoprazole    Tablet  senna  albumin human 25% IVPB  apixaban  ascorbic acid  aspirin  chewable  atorvastatin  chlorhexidine 2% Cloths  epoetin felisha-epbx (RETACRIT) Injectable  folic acid  multivitamin  thiamine  zinc sulfate      HOME MEDICATIONS:  Home Medications:  Advair Diskus 250 mcg-50 mcg inhalation powder: 1 puff(s) inhaled 2 times a day (19 Oct 2020 09:15)  allopurinol 100 mg oral tablet: 1 tab(s) orally once a day (19 Oct 2020 09:15)  apixaban 2.5 mg oral tablet: 1 tab(s) orally 2 times a day (19 Oct 2020 09:15)  aspirin 81 mg oral tablet: 1 tab(s) orally once a day (19 Oct 2020 09:15)  carvedilol 12.5 mg oral tablet: 1 tab(s) orally 2 times a day (19 Oct 2020 09:15)  Flomax 0.4 mg oral capsule: 1 cap(s) orally once a day (19 Oct 2020 09:15)  furosemide 40 mg oral tablet: 1 tab(s) orally prn, As Needed (19 Oct 2020 09:15)  hydrALAZINE 25 mg oral tablet: 2 tab(s) orally 3 times a day (19 Oct 2020 09:15)  Lipitor 20 mg oral tablet: 1 tab(s) orally once a day (19 Oct 2020 09:15)  omeprazole 40 mg oral delayed release capsule: 1 cap(s) orally once a day (19 Oct 2020 09:15)  temazepam 30 mg oral capsule: 1 cap(s) orally once a day (at bedtime), As Needed (19 Oct 2020 09:15)      Vital Signs Last 24 Hrs  T(C): 36.4 (2020 07:59), Max: 37 (2020 00:07)  T(F): 97.6 (2020 07:59), Max: 98.6 (2020 00:07)  HR: 85 (2020 09:00) (72 - 94)  BP: 131/54 (2020 09:00) (103/86 - 147/64)  BP(mean): 76 (2020 09:00) (64 - 105)  RR: 22 (2020 09:00) (18 - 31)  SpO2: 96% (2020 09:00) (92% - 100%)      PHYSICAL EXAM:  Constitutional: Comfortable . No acute distress.   HEENT: Atraumatic and normocephalic , neck is supple . + JVD. No carotid bruit. PEERL   CNS: A&Ox3. No focal deficits. EOMI. Cranial nerves II-IX are intact.   Lymph Nodes: Cervical : Not palpable.  Respiratory: Bibasilar rales  Cardiovascular: S1S2 Irregularly irregular. No rubs or gallop. III/VI systolic murmur lsb  Gastrointestinal: Soft non-tender and non distended . +Bowel sounds. negative Woodson's sign.  Extremities: LLE with dressing in place, b/l LE 2+ LE edema, scattered ecchymosis LUE/LLE  Psychiatric: Calm . no agitation.  Skin: No skin rash/ulcers visualized to face, hands or feet.      Intake and output:    @ : @ 07:00  --------------------------------------------------------  IN: 802 mL / OUT: 2055 mL / NET: -1253 mL     @ 07: @ 09:42  --------------------------------------------------------  IN: 250 mL / OUT: 125 mL / NET: 125 mL        LABS:                        7.9    7.03  )-----------( 170      ( 2020 04:30 )             24.8     11-04    134<L>  |  96<L>  |  78.0<H>  ----------------------------<  78  5.0   |  21.0<L>  |  2.26<H>    Ca    8.1<L>      2020 04:30  Phos  6.1     11-  Mg     1.9     -    TPro  5.4<L>  /  Alb  3.1<L>  /  TBili  0.7  /  DBili  x   /  AST  13  /  ALT  11  /  AlkPhos  222<H>  1102    CARDIAC MARKERS ( 2020 04:30 )  x     / 0.09 ng/mL / x     / x     / x      CARDIAC MARKERS ( 2020 21:52 )  x     / 0.09 ng/mL / 32 U/L / x     / x        ;p-BNP=Serum Pro-Brain Natriuretic Peptide: 28246 pg/mL ( @ 22:18)    PTT - ( 2020 13:01 )  PTT:31.1 sec  Urinalysis Basic - ( 2020 22:14 )    Color: Yellow / Appearance: Clear / S.020 / pH: x  Gluc: x / Ketone: Negative  / Bili: Negative / Urobili: Negative mg/dL   Blood: x / Protein: 30 mg/dL / Nitrite: Negative   Leuk Esterase: Small / RBC: 25-50 /HPF / WBC 11-25   Sq Epi: x / Non Sq Epi: Few / Bacteria: Few        INTERPRETATION OF TELEMETRY: Reviewed by me. Afib, PVCs  ECG: Reviewed by me. Atrial fibrillation, RAD, IRBBB, NSST/T wave abnormalities    RADIOLOGY & ADDITIONAL STUDIES:    X-ray:  reviewed by me.   < from: Xray Chest 1 View- PORTABLE-Routine (Xray Chest 1 View- PORTABLE-Routine in AM.) (20 @ 06:04) >  EXAM:  XR CHEST PORTABLE ROUTINE 1V                          PROCEDURE DATE:  2020          INTERPRETATION:  Portable chest radiograph    CLINICAL INFORMATION:   Short of breath.    TECHNIQUE:  Portable  AP view of the chest was obtained.    COMPARISON: 2020 chest available for review.    FINDINGS: There is haziness overlying the RIGHT hemithorax indicating a large RIGHT effusion and/or basilar airspace consolidation. There is a mild LEFT pleural effusion. RIGHT diaphragmatic contour not delineated. An  LEFT upper lobe clear.  The  heart is enlarged in transverse diameter. No hilar mass.  Status post coronary artery bypass graft procedure.  /Visualized osseous structures are intact.        IMPRESSION:   Moderate-large RIGHT effusion and/or basilar RIGHT airspace consolidation. LEFT effusion.  Upright/semiupright AP and lateral radiographs recommended for confirmation.    < end of copied text >         [facility]                                                               Office:  [address] [address]                                                              Telephone: [phone]. Fax:[phone]                                                                        CARDIOLOGY CONSULTATION NOTE                                                                                             Consult requested by:  Dr. Bedoya  Reason for Consultation: CHF  History obtained by: Patient and medical record   obtained: No    Chief complaint:    Patient is a 70y old  Male who presents with a chief complaint of Leg pain (2020 12:32)      HPI: 69 y/o M with a PMHx of COPD (newly placed on home O2), CAD s/p CABG x 3 (10/2013 at [place]), pulmonary HTN (severity not noted on Echo 2020), Afib (on Eliquis), CHF (unknown baseline EF), PVD, HTN, HLD, and CKD who presented to the ED after a mechanical fall. Patient initially fell on 10/9 down 7 stairs with a mechanical fall with hitting his head, but no LOC. Patient was in [facility] on 10/[facility] with negative CT and LLE Xrays, but noted to have diffuse ecchymosis on his left side. Patient was D/C'd 10/14, and began having worsening leg swelling, pain, decreased urine output, and dyspnea. Patient has had a complicated hospital course with ARF, COPD exacerbation, CHF exacerbation. Patient underwent a LLE debridement on 10/21/20, and post-operatively with hypotension and bradycardia, requiring reversal of anesthetic agents. Patient now receiving IV doses of lasix, but repeat Echo showed severe pulmonary HTN and severely reduced RV function. Patient still with some dyspnea, orthopnea, and LE swelling, but denies any chest pain or chest pressure. Patient denies any fevers, chills, CP, syncope, near syncope, headache, dizziness, abdominal pain, N/V/D.     REVIEW OF SYMPTOMS:     CONSTITUTIONAL: No fever, weight loss, or fatigue  ENMT:  No difficulty hearing, tinnitus, vertigo; No sinus or throat pain  NECK: No pain or stiffness  CARDIOVASCULAR: AS PER HPI  RESPIRATORY: Dyspnea on exertion, Shortness of breath, cough, wheezing  : No dysuria, no hematuria   GI: No dark color stool, no melena, no diarrhea, no constipation, no abdominal pain   NEURO: No headache, no dizziness, no slurred speech   MUSCULOSKELETAL: AS PER HPI  PSYCH: No agitation, no anxiety.    ALL OTHER REVIEW OF SYSTEMS ARE NEGATIVE.      PREVIOUS DIAGNOSTIC TESTING  ECHO FINDINGS:  < from: TTE Echo Limited or F/U (20 @ 15:22) >  PHYSICIAN INTERPRETATION:  Left Ventricle: Endocardial visualization was enhanced with intravenous echo contrast. The left ventricular internal cavity size is normal.  Global LV systolic function was normal. Left ventricular ejection fraction, by visual estimation, is 60 to 65%. Abnormal (paradoxical) septal motion, consistent with left bundle branch block.  Right Ventricle: The right ventricular size is severely enlarged. RV systolicfunction is severely reduced. Positive Baca Sign. TV S' 0.1 m/s.  Left Atrium: Normal left atrial size.  Right Atrium: Severely enlarged right atrium.  Pericardium: There is no evidence of pericardial effusion.  Tricuspid Valve: The tricuspid valve is not well seen. Mild-moderate tricuspid regurgitation is visualized. Estimated pulmonary artery systolic pressure is 63.4 mmHg assuming a right atrial pressure of 15 mmHg, which is consistent with severe pulmonary hypertension.  Venous: The inferior vena cava was dilated, with respiratory size variation less than 50%.      Summary:   1. Left ventricular ejection fraction, by visual estimation, is 60 to 65%.   2. Normal global left ventricular systolic function.   3. Severely enlarged right atrium.   4. Severely enlarged right ventricle.   5. Severely reduced RV systolic function.   6. Positive Baca Sign.   7. Normal left atrial size.   8. There is no evidence of pericardial effusion.   9. Mild-moderate tricuspid regurgitation.  10. Estimated pulmonary artery systolic pressure is 63.4 mmHg assuming a right atrial pressure of 15 mmHg, which is consistent with severe pulmonary hypertension.  11. Endocardial visualization was enhanced with intravenous echo contrast.    MD Niharika Electronically signed on 11/3/2020 at 6:34:55 PM    < end of copied text >      Echo 2020 Outpatient Dr. Storm  EF 55-60%, mild asymmetric LVH, normal regional wall motion, moderate to severe diastolic dysfunction, enlarged LA, RV normal size and function, aortic root is dilated, mild MR, mild TR, pulmonary HTN is present, peak PA pressure 47 (mild to mod)      ALLERGIES: Allergies    No Known Allergies    Intolerances      PAST MEDICAL HISTORY  CKD (chronic kidney disease)    CHF (congestive heart failure)    Atrial fibrillation    COPD, severity to be determined    Coronary artery disease involving coronary bypass graft of native heart without angina pectoris    Chronic osteomyelitis, osteomyelitis of unspecified site    COPD (chronic obstructive pulmonary disease)    Atrial fibrillation    Bladder cancer    HLD (hyperlipidemia)        PAST SURGICAL HISTORY  H/O knee surgery    S/P CABG (coronary artery bypass graft)    Presence of stent of CABG        FAMILY HISTORY:  No pertinent family history in first degree relatives        SOCIAL HISTORY:    CIGARETTES:   Former smoker, quit . Smoked 1- 1.5 PPD x 20+ years  ALCOHOL: On weekends   DRUGS: Denies      CURRENT MEDICATIONS:  furosemide   Injectable 80 milliGRAM(s) IV Push once  tamsulosin 0.4 milliGRAM(s) Oral at bedtime    ALBUTerol    90 MICROgram(s) HFA Inhaler  albuterol/ipratropium for Nebulization  albuterol/ipratropium for Nebulization.  budesonide 160 MICROgram(s)/formoterol 4.5 MICROgram(s) Inhaler   melatonin  pantoprazole    Tablet  senna  albumin human 25% IVPB  apixaban  ascorbic acid  aspirin  chewable  atorvastatin  chlorhexidine 2% Cloths  epoetin felisha-epbx (RETACRIT) Injectable  folic acid  multivitamin  thiamine  zinc sulfate      HOME MEDICATIONS:  Home Medications:  Advair Diskus 250 mcg-50 mcg inhalation powder: 1 puff(s) inhaled 2 times a day (19 Oct 2020 09:15)  allopurinol 100 mg oral tablet: 1 tab(s) orally once a day (19 Oct 2020 09:15)  apixaban 2.5 mg oral tablet: 1 tab(s) orally 2 times a day (19 Oct 2020 09:15)  aspirin 81 mg oral tablet: 1 tab(s) orally once a day (19 Oct 2020 09:15)  carvedilol 12.5 mg oral tablet: 1 tab(s) orally 2 times a day (19 Oct 2020 09:15)  Flomax 0.4 mg oral capsule: 1 cap(s) orally once a day (19 Oct 2020 09:15)  furosemide 40 mg oral tablet: 1 tab(s) orally prn, As Needed (19 Oct 2020 09:15)  hydrALAZINE 25 mg oral tablet: 2 tab(s) orally 3 times a day (19 Oct 2020 09:15)  Lipitor 20 mg oral tablet: 1 tab(s) orally once a day (19 Oct 2020 09:15)  omeprazole 40 mg oral delayed release capsule: 1 cap(s) orally once a day (19 Oct 2020 09:15)  temazepam 30 mg oral capsule: 1 cap(s) orally once a day (at bedtime), As Needed (19 Oct 2020 09:15)      Vital Signs Last 24 Hrs  T(C): 36.4 (2020 07:59), Max: 37 (2020 00:07)  T(F): 97.6 (2020 07:59), Max: 98.6 (2020 00:07)  HR: 85 (2020 09:00) (72 - 94)  BP: 131/54 (2020 09:00) (103/86 - 147/64)  BP(mean): 76 (2020 09:00) (64 - 105)  RR: 22 (2020 09:00) (18 - 31)  SpO2: 96% (2020 09:00) (92% - 100%)      PHYSICAL EXAM:  Constitutional: Comfortable . No acute distress.   HEENT: Atraumatic and normocephalic , neck is supple . + JVD. No carotid bruit. PEERL   CNS: A&Ox3. No focal deficits. EOMI. Cranial nerves II-IX are intact.   Lymph Nodes: Cervical : Not palpable.  Respiratory: Bibasilar rales  Cardiovascular: S1S2 Irregularly irregular. No rubs or gallop. III/VI systolic murmur lsb  Gastrointestinal: Soft non-tender and non distended . +Bowel sounds. negative Woodson's sign.  Extremities: LLE with dressing in place, b/l LE 2+ LE edema, scattered ecchymosis LUE/LLE  Psychiatric: Calm . no agitation.  Skin: No skin rash/ulcers visualized to face, hands or feet.      Intake and output:    @ : @ 07:00  --------------------------------------------------------  IN: 802 mL / OUT: 2055 mL / NET: -1253 mL     @ 07: @ 09:42  --------------------------------------------------------  IN: 250 mL / OUT: 125 mL / NET: 125 mL        LABS:                        7.9    7.03  )-----------( 170      ( 2020 04:30 )             24.8     11-04    134<L>  |  96<L>  |  78.0<H>  ----------------------------<  78  5.0   |  21.0<L>  |  2.26<H>    Ca    8.1<L>      2020 04:30  Phos  6.1     11-  Mg     1.9     -    TPro  5.4<L>  /  Alb  3.1<L>  /  TBili  0.7  /  DBili  x   /  AST  13  /  ALT  11  /  AlkPhos  222<H>  1102    CARDIAC MARKERS ( 2020 04:30 )  x     / 0.09 ng/mL / x     / x     / x      CARDIAC MARKERS ( 2020 21:52 )  x     / 0.09 ng/mL / 32 U/L / x     / x        ;p-BNP=Serum Pro-Brain Natriuretic Peptide: 84138 pg/mL ( @ 22:18)    PTT - ( 2020 13:01 )  PTT:31.1 sec  Urinalysis Basic - ( 2020 22:14 )    Color: Yellow / Appearance: Clear / S.020 / pH: x  Gluc: x / Ketone: Negative  / Bili: Negative / Urobili: Negative mg/dL   Blood: x / Protein: 30 mg/dL / Nitrite: Negative   Leuk Esterase: Small / RBC: 25-50 /HPF / WBC 11-25   Sq Epi: x / Non Sq Epi: Few / Bacteria: Few        INTERPRETATION OF TELEMETRY: Reviewed by me. Afib, PVCs  ECG: Reviewed by me. Atrial fibrillation, RAD, IRBBB, NSST/T wave abnormalities    RADIOLOGY & ADDITIONAL STUDIES:    X-ray:  reviewed by me.   < from: Xray Chest 1 View- PORTABLE-Routine (Xray Chest 1 View- PORTABLE-Routine in AM.) (20 @ 06:04) >  EXAM:  XR CHEST PORTABLE ROUTINE 1V                          PROCEDURE DATE:  2020          INTERPRETATION:  Portable chest radiograph    CLINICAL INFORMATION:   Short of breath.    TECHNIQUE:  Portable  AP view of the chest was obtained.    COMPARISON: 2020 chest available for review.    FINDINGS: There is haziness overlying the RIGHT hemithorax indicating a large RIGHT effusion and/or basilar airspace consolidation. There is a mild LEFT pleural effusion. RIGHT diaphragmatic contour not delineated. An  LEFT upper lobe clear.  The  heart is enlarged in transverse diameter. No hilar mass.  Status post coronary artery bypass graft procedure.  /Visualized osseous structures are intact.        IMPRESSION:   Moderate-large RIGHT effusion and/or basilar RIGHT airspace consolidation. LEFT effusion.  Upright/semiupright AP and lateral radiographs recommended for confirmation.    < end of copied text >

## 2020-11-04 NOTE — CONSULT NOTE ADULT - ASSESSMENT
A/P: 71 y/o M with a PMHx of COPD (newly placed on home O2), CAD s/p CABG x 3 (10/2013 at [place]), pulmonary HTN (severity not noted on Echo 06/2020), Afib (on Eliquis), CHF (unknown baseline EF), PVD, HTN, HLD, and CKD who presented to the ED after a mechanical fall. Patient initially fell on 10/9 down 7 stairs with a mechanical fall with hitting his head, but no LOC. Patient was in [facility] on 10/11 with negative CT and LLE Xrays, but noted to have diffuse ecchymosis on his left side. Patient was D/C'd 10/14, and began having worsening leg swelling, pain, decreased urine output, and dyspnea. Patient has had a complicated hospital course with ARF, COPD exacerbation, CHF exacerbation. Patient underwent a LLE debridement on 10/21/20, and post-operatively with hypotension and bradycardia, requiring reversal of anesthetic agents. Patient now receiving IV doses of lasix, but repeat Echo showed severe pulmonary HTN and severely reduced RV function. Patient still with some dyspnea, orthopnea, and LE swelling, but denies any chest pain or chest pressure. Patient denies any fevers, chills, CP, syncope, near syncope, headache, dizziness, abdominal pain, N/V/D.   Troponin <0.01 -> 0.09  x 2 (normal CK)    Severe Pulmonary HTN with RV Failure  - Echo performed on 06/2020 showed mild to moderate pulmonary HTN and normal RV function.   - Patient was off of AC from 10/11 until at least 10/15.   - Patient's initial echo 10/15/20 showed now severe pulmonary HTN.   - Patient was maintained on heparin gtt until 11/02, when he was started on Eliquis 2.5mg PO BID  - Patient only meets 1 of the criteria for lowering the dose (81 y/o or older; wt < 60kg; Cr > 1.5), would increase dose to 5mg PO BID.   - Concern at this time for pulmonary embolism as cause of worsening pulmonary HTN and severe RV failure, would recommend either CTA chest or V/Q scan to further evaluate.   - Continue IV diuresis.   - Defer LHC/RHC at this time.     CAD s/p CABG x 3  - Patient with elevated troponin 0.09 x 2 in setting of GERALD with normal CK.   - No new RWMA on echo.   - No need for ischemic evaluation at this time, likely demand ischemia.   - Continue aspirin and statin.   - Restart home coreg 12.5mg PO BID when able.     Atrial Fibrillation   - Rate controlled at this time.   - Restart home Coreg 12.5mg PO BID.   - Increase Eliquis to 5mg PO BID for Ac.   - Continue telemetry monitoring.     Preliminary evaluation, please await official recommendations by Dr. Geller A/P: 69 y/o M with a PMHx of COPD (newly placed on home O2), CAD s/p CABG x 3 (10/2013 at [place]), pulmonary HTN (severity not noted on Echo 06/2020), Afib (on Eliquis), CHF (unknown baseline EF), PVD, HTN, HLD, and CKD who presented to the ED after a mechanical fall. Patient initially fell on 10/9 down 7 stairs with a mechanical fall with hitting his head, but no LOC. Patient was in [facility] on 10/11 with negative CT and LLE Xrays, but noted to have diffuse ecchymosis on his left side. Patient was D/C'd 10/14, and began having worsening leg swelling, pain, decreased urine output, and dyspnea. Patient has had a complicated hospital course with ARF, COPD exacerbation, CHF exacerbation. Patient underwent a LLE debridement on 10/21/20, and post-operatively with hypotension and bradycardia, requiring reversal of anesthetic agents. Patient now receiving IV doses of lasix, but repeat Echo showed severe pulmonary HTN and severely reduced RV function. Patient still with some dyspnea, orthopnea, and LE swelling, but denies any chest pain or chest pressure. Patient denies any fevers, chills, CP, syncope, near syncope, headache, dizziness, abdominal pain, N/V/D.   Troponin <0.01 -> 0.09  x 2 (normal CK)    Severe Pulmonary HTN with RV Failure  - Echo performed on 06/2020 showed mild to moderate pulmonary HTN and normal RV function.   - Patient was off of AC from 10/11 until at least 10/15.   - Patient's initial echo 10/15/20 showed now severe pulmonary HTN.   - Patient was maintained on heparin gtt until 11/02, when he was started on Eliquis 2.5mg PO BID  - Patient only meets 1 of the criteria for lowering the dose (79 y/o or older; wt < 60kg; Cr > 1.5), would increase dose to 5mg PO BID.   - Concern at this time for pulmonary embolism as cause of acute worsening pulmonary HTN and severe RV failure, less likely cardiac ischemia.  Would recommend either CTA chest or V/Q scan to further evaluate.   - Continue IV diuresis; lasix 80 mg IVP x1 today for goal day balance -1 L, if not meeting target may require bid dosing  - No indication for urgent LHC at this time, particularly in setting of GERALD     CAD s/p CABG x 3  - Patient with elevated troponin 0.09 x 2 in setting of GERALD with normal CK.   - No new RWMA on echo.   - If no recent ischemia evaluations, LHC prior to discharge when acute hospital issues resolved, including GERALD  - Continue aspirin and statin.   - Restart home coreg 12.5mg PO BID when able.   - maintain Hb >8    Atrial Fibrillation   - Rate controlled at this time.   - Restart home Coreg 12.5mg PO BID.   - Increase Eliquis to 5mg PO BID for AC (does not meet criteria for reduced dosing)  - Continue telemetry monitoring.

## 2020-11-04 NOTE — CHART NOTE - NSCHARTNOTEFT_GEN_A_CORE
Lasix re-dosed this morning, given 80 mg IVP. Urine output 75 - 150 ml/hr, afternoon BMP with improvement in K, phos, and downtrending cr. Seen by cardiology, discussed benefit of obtaining CTA to r/o PE. Pt's respiratory status has been improving with diuresis, he is not tachypneic and has not been tachycardic. Came to conclusion that it would not  as patient is already receiving therapeutic anticoagulation and would not be a candidate for TPA even if a PE was discovered on imaging. ACS team took down LLE brace and dressing. Graft site covered in xeroform and wrapped with kerlex and ace. Graft donor site covered with xeroform and is to be left open to air.

## 2020-11-04 NOTE — PROGRESS NOTE ADULT - SUBJECTIVE AND OBJECTIVE BOX
PULMONARY PROGRESS NOTE      DEBI BADILLOAPARNA-[id number]    Patient is a 70y old  Male who presents with a chief complaint of Leg pain (2020 12:32)  COPD on 02  CAD post CABGX3  pHTN  Afib on Eliquis  PVD, HTN, CKD      INTERVAL HPI/OVERNIGHT EVENTS:    Comfortable when seen     MEDICATIONS  (STANDING):  albumin human 25% IVPB 100 milliLiter(s) IV Intermittent every 4 hours  ALBUTerol    90 MICROgram(s) HFA Inhaler 1 Puff(s) Inhalation every 4 hours  albuterol/ipratropium for Nebulization 3 milliLiter(s) Nebulizer every 6 hours  albuterol/ipratropium for Nebulization. 3 milliLiter(s) Nebulizer once  apixaban 2.5 milliGRAM(s) Oral two times a day  ascorbic acid 500 milliGRAM(s) Oral two times a day  aspirin  chewable 81 milliGRAM(s) Oral daily  atorvastatin 20 milliGRAM(s) Oral at bedtime  budesonide 160 MICROgram(s)/formoterol 4.5 MICROgram(s) Inhaler 2 Puff(s) Inhalation two times a day  chlorhexidine 2% Cloths 1 Application(s) Topical daily  epoetin felisha-epbx (RETACRIT) Injectable 87677 Unit(s) SubCutaneous <User Schedule>  folic acid 1 milliGRAM(s) Oral daily  melatonin 3 milliGRAM(s) Oral at bedtime  multivitamin 1 Tablet(s) Oral daily  pantoprazole    Tablet 40 milliGRAM(s) Oral before breakfast  senna 2 Tablet(s) Oral at bedtime  tamsulosin 0.4 milliGRAM(s) Oral at bedtime  thiamine 100 milliGRAM(s) Oral daily  zinc sulfate 220 milliGRAM(s) Oral daily      MEDICATIONS  (PRN):  acetaminophen   Tablet .. 650 milliGRAM(s) Oral every 6 hours PRN Mild Pain (1 - 3)  benzocaine 15 mG/menthol 3.6 mG (Sugar-Free) Lozenge 1 Lozenge Oral every 3 hours PRN Sore Throat  ondansetron Injectable 4 milliGRAM(s) IV Push once PRN Nausea and/or Vomiting      Allergies    No Known Allergies    Intolerances        PAST MEDICAL & SURGICAL HISTORY:  CKD (chronic kidney disease)    CHF (congestive heart failure)    Atrial fibrillation    COPD, severity to be determined    Coronary artery disease involving coronary bypass graft of native heart without angina pectoris    Chronic osteomyelitis, osteomyelitis of unspecified site    COPD (chronic obstructive pulmonary disease)    Atrial fibrillation    Bladder cancer    HLD (hyperlipidemia)    H/O knee surgery    S/P CABG (coronary artery bypass graft)    Presence of stent of CABG        SOCIAL HISTORY  Smoking History:       REVIEW OF SYSTEMS:    CONSTITUTIONAL:  No distress    HEENT:  Eyes:  No diplopia or blurred vision. ENT:  No earache, sore throat or runny nose.    CARDIOVASCULAR:  No pressure, squeezing, tightness, heaviness or aching about the chest; no palpitations.    RESPIRATORY:  No cough, shortness of breath, PND or orthopnea. Mild SOBOE    GASTROINTESTINAL:  No nausea, vomiting or diarrhea.    GENITOURINARY:  No dysuria, frequency or urgency.    NEUROLOGIC:  No paresthesias, fasciculations, seizures or weakness.    PSYCHIATRIC:  No disorder of thought or mood.    Vital Signs Last 24 Hrs  T(C): 36.7 (2020 11:51), Max: 37 (2020 00:07)  T(F): 98.1 (2020 11:51), Max: 98.6 (2020 00:07)  HR: 84 (2020 12:00) (72 - 94)  BP: 136/64 (2020 12:00) (103/86 - 138/63)  BP(mean): 85 (2020 12:00) (64 - 105)  RR: 24 (2020 12:00) (18 - 31)  SpO2: 95% (2020 12:00) (92% - 100%)    PHYSICAL EXAMINATION:    GENERAL: The patient is awake and alert in no apparent distress.     HEENT: Head is normocephalic and atraumatic. Extraocular muscles are intact. Mucous membranes are moist.    NECK: Supple.    LUNGS: Clear to auscultation without wheezing, rales or rhonchi; respirations unlabored    HEART: Regular rate and rhythm without murmur.    ABDOMEN: Soft, nontender, and nondistended.      EXTREMITIES: Without any cyanosis, clubbing, rash, lesions or edema.    NEUROLOGIC: Grossly intact.    LABS:                        7.9    7.03  )-----------( 170      ( 2020 04:30 )             24.8     11-04    134<L>  |  96<L>  |  78.0<H>  ----------------------------<  78  5.0   |  21.0<L>  |  2.26<H>    Ca    8.1<L>      2020 04:30  Phos  6.1       Mg     1.9         TPro  5.4<L>  /  Alb  3.1<L>  /  TBili  0.7  /  DBili  x   /  AST  13  /  ALT  11  /  AlkPhos  222<H>      PTT - ( 2020 13:01 )  PTT:31.1 sec  Urinalysis Basic - ( 2020 22:14 )    Color: Yellow / Appearance: Clear / S.020 / pH: x  Gluc: x / Ketone: Negative  / Bili: Negative / Urobili: Negative mg/dL   Blood: x / Protein: 30 mg/dL / Nitrite: Negative   Leuk Esterase: Small / RBC: 25-50 /HPF / WBC 11-25   Sq Epi: x / Non Sq Epi: Few / Bacteria: Few      ABG - ( 2020 08:59 )  pH, Arterial: 7.31  pH, Blood: x     /  pCO2: 49    /  pO2: 68    / HCO3: 23    / Base Excess: -1.7  /  SaO2: 95                CARDIAC MARKERS ( 2020 10:48 )  x     / 0.10 ng/mL / x     / x     / x      CARDIAC MARKERS ( 2020 04:30 )  x     / 0.09 ng/mL / x     / x     / x      CARDIAC MARKERS ( 2020 21:52 )  x     / 0.09 ng/mL / 32 U/L / x     / x            Serum Pro-Brain Natriuretic Peptide: 25790 pg/mL (20 @ 22:18)          MICROBIOLOGY: Unrevealing    RADIOLOGY & ADDITIONAL STUDIES:     EXAM:  XR CHEST PORTABLE ROUTINE 1V                          PROCEDURE DATE:  2020          INTERPRETATION:  Portable chest radiograph    CLINICAL INFORMATION:   Short of breath.    TECHNIQUE:  Portable  AP view of the chest was obtained.    COMPARISON: 2020 chest available for review.    FINDINGS: There is haziness overlying the RIGHT hemithorax indicating a large RIGHT effusion and/or basilar airspace consolidation. There is a mild LEFT pleural effusion. RIGHT diaphragmatic contour not delineated. An  LEFT upper lobe clear.  The  heart is enlarged in transverse diameter. No hilar mass.  Status post coronary artery bypass graft procedure.  /Visualized osseous structures are intact.        IMPRESSION:   Moderate-large RIGHT effusion and/or basilar RIGHT airspace consolidation. LEFT effusion.  Upright/semiupright AP and lateral radiographs recommended for confirmation.    SAYRA GIBBS MD; Attending Radiologist  This document has been electronically signed. Nov  3 2020  9:51AM       1. Left ventricular ejection fraction, by visual estimation, is 60 to 65%.   2. Normal global left ventricular systolic function.   3. Severely enlarged right atrium.   4. Severely enlarged right ventricle.   5. Severely reduced RV systolic function.   6. Positive Baca Sign.   7. Normal left atrial size.   8. There is no evidence of pericardial effusion.   9. Mild-moderate tricuspid regurgitation.  10. Estimated pulmonary artery systolic pressure is 63.4 mmHg assuming a right atrial pressure of 15 mmHg, which is consistent with severe pulmonary hypertension.  11. Endocardial visualization was enhanced with intravenous echo contrast.    MD Niharika Electronically signed on 11/3/2020 at 6:34:55 PM

## 2020-11-05 LAB
ANION GAP SERPL CALC-SCNC: 12 MMOL/L — SIGNIFICANT CHANGE UP (ref 5–17)
BASOPHILS # BLD AUTO: 0.02 K/UL — SIGNIFICANT CHANGE UP (ref 0–0.2)
BASOPHILS NFR BLD AUTO: 0.3 % — SIGNIFICANT CHANGE UP (ref 0–2)
BUN SERPL-MCNC: 80 MG/DL — HIGH (ref 8–20)
CALCIUM SERPL-MCNC: 8.1 MG/DL — LOW (ref 8.6–10.2)
CHLORIDE SERPL-SCNC: 98 MMOL/L — SIGNIFICANT CHANGE UP (ref 98–107)
CO2 SERPL-SCNC: 26 MMOL/L — SIGNIFICANT CHANGE UP (ref 22–29)
CREAT SERPL-MCNC: 2.01 MG/DL — HIGH (ref 0.5–1.3)
EOSINOPHIL # BLD AUTO: 0.11 K/UL — SIGNIFICANT CHANGE UP (ref 0–0.5)
EOSINOPHIL NFR BLD AUTO: 1.6 % — SIGNIFICANT CHANGE UP (ref 0–6)
GAS PNL BLDA: SIGNIFICANT CHANGE UP
GLUCOSE SERPL-MCNC: 111 MG/DL — HIGH (ref 70–99)
HCT VFR BLD CALC: 23.3 % — LOW (ref 39–50)
HGB BLD-MCNC: 7.3 G/DL — LOW (ref 13–17)
IMM GRANULOCYTES NFR BLD AUTO: 0.6 % — SIGNIFICANT CHANGE UP (ref 0–1.5)
LYMPHOCYTES # BLD AUTO: 0.69 K/UL — LOW (ref 1–3.3)
LYMPHOCYTES # BLD AUTO: 10.1 % — LOW (ref 13–44)
MAGNESIUM SERPL-MCNC: 1.8 MG/DL — SIGNIFICANT CHANGE UP (ref 1.6–2.6)
MCHC RBC-ENTMCNC: 31.3 GM/DL — LOW (ref 32–36)
MCHC RBC-ENTMCNC: 34.8 PG — HIGH (ref 27–34)
MCV RBC AUTO: 111 FL — HIGH (ref 80–100)
MONOCYTES # BLD AUTO: 0.62 K/UL — SIGNIFICANT CHANGE UP (ref 0–0.9)
MONOCYTES NFR BLD AUTO: 9 % — SIGNIFICANT CHANGE UP (ref 2–14)
NEUTROPHILS # BLD AUTO: 5.38 K/UL — SIGNIFICANT CHANGE UP (ref 1.8–7.4)
NEUTROPHILS NFR BLD AUTO: 78.4 % — HIGH (ref 43–77)
NT-PROBNP SERPL-SCNC: HIGH PG/ML (ref 0–300)
PHOSPHATE SERPL-MCNC: 5 MG/DL — HIGH (ref 2.4–4.7)
PLATELET # BLD AUTO: 178 K/UL — SIGNIFICANT CHANGE UP (ref 150–400)
POTASSIUM SERPL-MCNC: 4.4 MMOL/L — SIGNIFICANT CHANGE UP (ref 3.5–5.3)
POTASSIUM SERPL-SCNC: 4.4 MMOL/L — SIGNIFICANT CHANGE UP (ref 3.5–5.3)
RBC # BLD: 2.1 M/UL — LOW (ref 4.2–5.8)
RBC # FLD: 17.1 % — HIGH (ref 10.3–14.5)
SODIUM SERPL-SCNC: 136 MMOL/L — SIGNIFICANT CHANGE UP (ref 135–145)
WBC # BLD: 6.86 K/UL — SIGNIFICANT CHANGE UP (ref 3.8–10.5)
WBC # FLD AUTO: 6.86 K/UL — SIGNIFICANT CHANGE UP (ref 3.8–10.5)

## 2020-11-05 PROCEDURE — 78580 LUNG PERFUSION IMAGING: CPT | Mod: 26

## 2020-11-05 PROCEDURE — 71045 X-RAY EXAM CHEST 1 VIEW: CPT | Mod: 26

## 2020-11-05 PROCEDURE — 93970 EXTREMITY STUDY: CPT | Mod: 26

## 2020-11-05 PROCEDURE — 99232 SBSQ HOSP IP/OBS MODERATE 35: CPT

## 2020-11-05 PROCEDURE — 99291 CRITICAL CARE FIRST HOUR: CPT

## 2020-11-05 PROCEDURE — 99233 SBSQ HOSP IP/OBS HIGH 50: CPT

## 2020-11-05 RX ORDER — FUROSEMIDE 40 MG
40 TABLET ORAL EVERY 12 HOURS
Refills: 0 | Status: DISCONTINUED | OUTPATIENT
Start: 2020-11-05 | End: 2020-11-07

## 2020-11-05 RX ORDER — ALLOPURINOL 300 MG
100 TABLET ORAL DAILY
Refills: 0 | Status: DISCONTINUED | OUTPATIENT
Start: 2020-11-05 | End: 2020-11-13

## 2020-11-05 RX ORDER — APIXABAN 2.5 MG/1
5 TABLET, FILM COATED ORAL EVERY 12 HOURS
Refills: 0 | Status: DISCONTINUED | OUTPATIENT
Start: 2020-11-05 | End: 2020-11-10

## 2020-11-05 RX ORDER — FUROSEMIDE 40 MG
40 TABLET ORAL DAILY
Refills: 0 | Status: DISCONTINUED | OUTPATIENT
Start: 2020-11-05 | End: 2020-11-05

## 2020-11-05 RX ADMIN — Medication 100 MILLIGRAM(S): at 16:23

## 2020-11-05 RX ADMIN — Medication 81 MILLIGRAM(S): at 12:41

## 2020-11-05 RX ADMIN — APIXABAN 5 MILLIGRAM(S): 2.5 TABLET, FILM COATED ORAL at 17:30

## 2020-11-05 RX ADMIN — BUDESONIDE AND FORMOTEROL FUMARATE DIHYDRATE 2 PUFF(S): 160; 4.5 AEROSOL RESPIRATORY (INHALATION) at 09:09

## 2020-11-05 RX ADMIN — Medication 3 MILLIGRAM(S): at 20:45

## 2020-11-05 RX ADMIN — SENNA PLUS 2 TABLET(S): 8.6 TABLET ORAL at 20:45

## 2020-11-05 RX ADMIN — Medication 500 MILLIGRAM(S): at 05:25

## 2020-11-05 RX ADMIN — CARVEDILOL PHOSPHATE 12.5 MILLIGRAM(S): 80 CAPSULE, EXTENDED RELEASE ORAL at 17:48

## 2020-11-05 RX ADMIN — BUDESONIDE AND FORMOTEROL FUMARATE DIHYDRATE 2 PUFF(S): 160; 4.5 AEROSOL RESPIRATORY (INHALATION) at 20:10

## 2020-11-05 RX ADMIN — ATORVASTATIN CALCIUM 20 MILLIGRAM(S): 80 TABLET, FILM COATED ORAL at 20:45

## 2020-11-05 RX ADMIN — Medication 3 MILLILITER(S): at 02:38

## 2020-11-05 RX ADMIN — Medication 3 MILLILITER(S): at 14:06

## 2020-11-05 RX ADMIN — Medication 3 MILLILITER(S): at 09:09

## 2020-11-05 RX ADMIN — CARVEDILOL PHOSPHATE 12.5 MILLIGRAM(S): 80 CAPSULE, EXTENDED RELEASE ORAL at 05:25

## 2020-11-05 RX ADMIN — ZINC SULFATE TAB 220 MG (50 MG ZINC EQUIVALENT) 220 MILLIGRAM(S): 220 (50 ZN) TAB at 12:41

## 2020-11-05 RX ADMIN — Medication 40 MILLIGRAM(S): at 17:48

## 2020-11-05 RX ADMIN — CHLORHEXIDINE GLUCONATE 1 APPLICATION(S): 213 SOLUTION TOPICAL at 12:42

## 2020-11-05 RX ADMIN — Medication 40 MILLIGRAM(S): at 09:22

## 2020-11-05 RX ADMIN — Medication 1 TABLET(S): at 12:41

## 2020-11-05 RX ADMIN — Medication 100 MILLIGRAM(S): at 12:41

## 2020-11-05 RX ADMIN — PANTOPRAZOLE SODIUM 40 MILLIGRAM(S): 20 TABLET, DELAYED RELEASE ORAL at 05:25

## 2020-11-05 RX ADMIN — TAMSULOSIN HYDROCHLORIDE 0.4 MILLIGRAM(S): 0.4 CAPSULE ORAL at 20:45

## 2020-11-05 RX ADMIN — Medication 3 MILLILITER(S): at 20:09

## 2020-11-05 RX ADMIN — Medication 1 MILLIGRAM(S): at 12:41

## 2020-11-05 RX ADMIN — APIXABAN 2.5 MILLIGRAM(S): 2.5 TABLET, FILM COATED ORAL at 05:25

## 2020-11-05 RX ADMIN — Medication 500 MILLIGRAM(S): at 17:30

## 2020-11-05 NOTE — PROGRESS NOTE ADULT - ASSESSMENT
Assess    Severe unexplained RV dysfunction  CXR and BNP suggest CHF - echo does not -  Echo suggests PE in the face of AC wiht apixaban (which has only been intermittently held for short periods)  Riociguat (Adempas) would be superior to Sildenafil for CTEPH (chronic thromboembolic pulmonary hypertension)  COPD  CAD  LLE hematoma  CKD    Rec    Perfusion lung scan  Leg duplex if possible  Non-contrast CT chest  AC  Neb Rx  02  Bipap as needed  EKG, Right sided V3-6  Right and Left heart cath a consideration     Discussed with SICU PA

## 2020-11-05 NOTE — PROGRESS NOTE ADULT - ASSESSMENT
71 y/o male admitted on 10/15 s/p fall with LLE hematoma. Hospital course complicated by ARF, COPD exacerbation, hypotensive/bradycardic episode post-operatively after reversal of anesthetic agents. Today patient noted to be short of breath with increasing oxygen requirements. Also w/ worsening hyponatremia and renal function. Pt normally lasix dependent - has not rcvd lasix since 10/31. Deterioration of respiratory status likely due to worsening R heart failure and severe pulmonary hypertension.     Neuro: Tylenol for pain control, minimize narcotics, melatonin for sleep hygiene, delirium precautions      CV: Continue hemodynamic monitoring. Vitals stable.  Responded to lasix in order to off lo  Unclear etiology of worsening pulmonary hypertension on echo, trend troponins. McConnells sign likely due to RV failure.     Pulm: COPD-bipap prn.  Pulm recs appreciated. McConnells sign- possible PE however, risk outweighs benefit of CT angio of chest.  Continue full anticoagulation.   Continue pulmonary toileting     GI/Nutrition: NPO, bowel regimen    /Renal: Webb- strict i/o's, f/u electrolytes     ID: afebrile. No issues     Endo: monitor blood glucose    Skin: LLE dressing change to performed by surgical team     Heme/DVT Prophylaxis: Hgb stable, Eliquis for atrial fib, SCD to RLE     Dispo: SICU.   69 y/o male admitted on 10/15 s/p fall with LLE hematoma. Hospital course complicated by ARF, COPD exacerbation, hypotensive/bradycardic episode post-operatively after reversal of anesthetic agents. Today patient noted to be short of breath with increasing oxygen requirements. Also w/ worsening hyponatremia and renal function. Pt normally lasix dependent - has not rcvd lasix since 10/31. Deterioration of respiratory status likely due to worsening R heart failure and severe pulmonary hypertension.     Neuro: Tylenol for pain control, minimize narcotics, melatonin for sleep hygiene, delirium precautions      CV: Continue hemodynamic monitoring. Vitals stable.  Responded to lasix in order to off lo  Unclear etiology of worsening pulmonary hypertension on echo, trend troponins. McConnells sign likely due to RV failure.     Pulm: COPD-bipap prn.  Pulm recs appreciated. McConnells sign- possible PE however, risk outweighs benefit of CT angio of chest.  Continue full anticoagulation.   Continue pulmonary toileting     GI/Nutrition: Regular, bowel regimen    /Renal: Webb- strict i/o's, f/u electrolytes     ID: afebrile. No issues     Endo: monitor blood glucose    Skin: LLE dressing change to performed by surgical team     Heme/DVT Prophylaxis: Hgb stable, Eliquis for atrial fib, SCD to RLE     Dispo: SICU.

## 2020-11-05 NOTE — PHYSICAL THERAPY INITIAL EVALUATION ADULT - GENERAL OBSERVATIONS, REHAB EVAL
awake in recliner, +telemonitor with , +2L/min o2 via nc, +heparin drip
Pt received supine in bed + O2 via nc + IV, monitor, agreeable to PT
Pt received supine in bed + chest tube + O2nc + IV, c/o 0/10 pain, pt agreeable to PT

## 2020-11-05 NOTE — PHYSICAL THERAPY INITIAL EVALUATION ADULT - PRECAUTIONS/LIMITATIONS, REHAB EVAL
oxygen therapy device and L/min/fall precautions
fall precautions/oxygen therapy device and L/min
no known precautions/limitations/fall precautions/oxygen therapy device and L/min

## 2020-11-05 NOTE — PHYSICAL THERAPY INITIAL EVALUATION ADULT - PERTINENT HX OF CURRENT PROBLEM, REHAB EVAL
69 yo M s/p fall 10/9 presents in acute renal failure with Cr 4.26 up from 1.8 on 10/12. Patient with no traumatic injuries on scans, however has extensive eccchymosis to entire L side. left LE hematoma.
69 yo M s/p fall 10/9 presents in acute renal failure with Cr 4.26 up from 1.8 on 10/12. Patient with no traumatic injuries on scans, however has extensive eccchymosis to entire L side. Left LE hematoma now s/p evacuation of hematoma and debridment of LLE. Pt also developed right pleural effusion, now s/p chest tube placement and removbal. Pt. transferred to MICU 11/2/20 due to respiratory distress
71 yo M s/p fall 10/9 presents in acute renal failure with Cr 4.26 up from 1.8 on 10/12. Patient with no traumatic injuries on scans, however has extensive eccchymosis to entire L side. Left LE hematoma now s/p evacuation of hematoma and debridment of LLE. Pt also developed right pleural effusion, now s/p chest tube placement.

## 2020-11-05 NOTE — PHYSICAL THERAPY INITIAL EVALUATION ADULT - MUSCLE TONE ASSESSMENT, REHAB EVAL
bilateral upper extremities/normal/bilateral lower extremities
bilateral upper extremities/bilateral lower extremities/normal
normal

## 2020-11-05 NOTE — PHYSICAL THERAPY INITIAL EVALUATION ADULT - ASR EQUIP NEEDS DISCH PT EVAL
rolling walker (5 inch wheels)
shower chair/rolling walker (5 inch wheels)/raised toilet seat
see OT eval for further recommendations/rolling walker (5 inch wheels)

## 2020-11-05 NOTE — PHARMACOTHERAPY INTERVENTION NOTE - COMMENTS
Called patient's pharmacy for medication list.
Recommended to restart home allopurinol
s/w resident to renew

## 2020-11-05 NOTE — PHYSICAL THERAPY INITIAL EVALUATION ADULT - GAIT DEVIATIONS NOTED, PT EVAL
decreased stride length/decreased step length
decreased weight-shifting ability/decreased norah
decreased step length/decreased stride length/decreased norah

## 2020-11-05 NOTE — PHYSICAL THERAPY INITIAL EVALUATION ADULT - NEUROVASCULAR ASSESSMENT LLE
no tingling/no numbness
no numbness
denies/left lower leg ecchymotic with anterior proximal hematoma

## 2020-11-05 NOTE — PHYSICAL THERAPY INITIAL EVALUATION ADULT - IMPAIRMENTS FOUND, PT EVAL
gait, locomotion, and balance/gross motor
gait, locomotion, and balance/aerobic capacity/endurance/muscle strength

## 2020-11-05 NOTE — PHYSICAL THERAPY INITIAL EVALUATION ADULT - LEVEL OF INDEPENDENCE: SIT/STAND, REHAB EVAL
minimum assist (75% patients effort)
moderate assist (50% patients effort)/minimum assist (75% patients effort)
supervision

## 2020-11-05 NOTE — PROGRESS NOTE ADULT - SUBJECTIVE AND OBJECTIVE BOX
HISTORY    24 HOUR EVENTS: Restarted Coreg, Per Cardiology, No cath for now. UOP 50-60 hourly. LLE dressing taken down by ACS. Graft site w/ xeroform, kerlex and ace wrap, donor site with xeroform open to air. During evening, patient removed the xerofrom from graft site. Pressure dressing applied and changed once throughout the night. He was advanced to a regular diet and tolerated without nausea or vomiting.     SUBJECTIVE/ROS:  [x] A ten-point review of systems was otherwise negative except as noted.  [ ] Due to altered mental status/intubation, subjective information were not able to be obtained from the patient. History was obtained, to the extent possible, from review of the chart and collateral sources of information.      NEURO  NEURO  RASS:     GCS:   15  CAM ICU:  Exam: No focal neuro deficits, KAY, 5/5 strength   Meds: acetaminophen   Tablet .. 650 milliGRAM(s) Oral every 6 hours PRN Mild Pain (1 - 3)  melatonin 3 milliGRAM(s) Oral at bedtime  ondansetron Injectable 4 milliGRAM(s) IV Push once PRN Nausea and/or Vomiting    [x] Adequacy of sedation and pain control has been assessed and adjusted      RESPIRATORY  RR: 20 (11-05-20 @ 04:00) (17 - 32)  SpO2: 96% (11-05-20 @ 04:00) (92% - 100%)  Wt(kg): --  Exam: unlabored, clear to auscultation bilaterally  Mechanical Ventilation:   ABG - ( 05 Nov 2020 04:57 )  pH: 7.30  /  pCO2: 55    /  pO2: 69    / HCO3: 25    / Base Excess: 0.6   /  SaO2: 95      Lactate: x            Meds: ALBUTerol    90 MICROgram(s) HFA Inhaler 1 Puff(s) Inhalation every 4 hours  albuterol/ipratropium for Nebulization 3 milliLiter(s) Nebulizer every 6 hours  albuterol/ipratropium for Nebulization. 3 milliLiter(s) Nebulizer once  budesonide 160 MICROgram(s)/formoterol 4.5 MICROgram(s) Inhaler 2 Puff(s) Inhalation two times a day        CARDIOVASCULAR  HR: 86 (11-05-20 @ 04:00) (78 - 91)  BP: 116/52 (11-05-20 @ 04:00) (109/55 - 138/63)  BP(mean): 73 (11-05-20 @ 04:00) (64 - 102)  ABP: --  ABP(mean): --  Wt(kg): --  CVP(cm H2O): --      Exam:  Cardiac Rhythm: rrr  Perfusion     [x ]Adequate   [ ]Inadequate  Mentation   [x ]Normal       [ ]Reduced  Extremities  [x ]Warm         [ ]Cool  Volume Status [ ]Hypervolemic [x ]Euvolemic [ ]Hypovolemic  Meds: tamsulosin 0.4 milliGRAM(s) Oral at bedtime      GI/NUTRITION  Exam: obese, soft, nttp, nd  Diet: NPO Exam:  Meds: pantoprazole    Tablet 40 milliGRAM(s) Oral before breakfast  senna 2 Tablet(s) Oral at bedtime      GENITOURINARY  I&O's Detail    11-03 @ 07:01  -  11-04 @ 07:00  --------------------------------------------------------  IN:    IV PiggyBack: 550 mL    multiple electrolytes Injection Type 1.: 252 mL  Total IN: 802 mL    OUT:    Indwelling Catheter - Urethral (mL): 2055 mL  Total OUT: 2055 mL    Total NET: -1253 mL      11-04 @ 07:01  -  11-05 @ 04:59  --------------------------------------------------------  IN:    IV PiggyBack: 300 mL    Oral Fluid: 750 mL  Total IN: 1050 mL    OUT:    Indwelling Catheter - Urethral (mL): 1740 mL  Total OUT: 1740 mL    Total NET: -690 mL          11-04    133<L>  |  94<L>  |  82.0<H>  ----------------------------<  123<H>  4.6   |  26.0  |  2.11<H>    Ca    8.5<L>      04 Nov 2020 14:53  Phos  5.6     11-04  Mg     2.1     11-04      [x ] Webb catheter, indication: strict i/o's   Meds: albumin human 25% IVPB 100 milliLiter(s) IV Intermittent every 4 hours  ascorbic acid 500 milliGRAM(s) Oral two times a day  folic acid 1 milliGRAM(s) Oral daily  multivitamin 1 Tablet(s) Oral daily  thiamine 100 milliGRAM(s) Oral daily  zinc sulfate 220 milliGRAM(s) Oral daily        HEMATOLOGIC  Meds: apixaban 2.5 milliGRAM(s) Oral two times a day  aspirin  chewable 81 milliGRAM(s) Oral daily    [x] VTE Prophylaxis                        7.9    7.03  )-----------( 170      ( 04 Nov 2020 04:30 )             24.8       Transfusion     [ ] PRBC   [ ] Platelets   [ ] FFP   [ ] Cryoprecipitate      INFECTIOUS DISEASES  T(C): 36.6 (11-05-20 @ 03:25), Max: 36.9 (11-04-20 @ 18:43)  Wt(kg): --    Recent Cultures:    Meds: epoetin felisha-epbx (RETACRIT) Injectable 26836 Unit(s) SubCutaneous <User Schedule>        ENDOCRINE  Capillary Blood Glucose    Meds: atorvastatin 20 milliGRAM(s) Oral at bedtime        ACCESS DEVICES:  [ ] Peripheral IV  [ ] Central Venous Line	[ ] R	[ ] L	[ ] IJ	[ ] Fem	[ ] SC	Placed:   [ ] Arterial Line		[ ] R	[ ] L	[ ] Fem	[ ] Rad	[ ] Ax	Placed:   [ ] PICC:					[ ] Mediport  [ ] Urinary Catheter, Date Placed:   [ ] Necessity of urinary, arterial, and venous catheters discussed[ ] Peripheral IV      OTHER MEDICATIONS:  benzocaine 15 mG/menthol 3.6 mG (Sugar-Free) Lozenge 1 Lozenge Oral every 3 hours PRN  chlorhexidine 2% Cloths 1 Application(s) Topical daily      CODE STATUS: FULL        30 minutes of critical care time spent providing medical care for patient's acute illness/conditions that impairs at least one vital organ system and/or poses a high risk of imminent or life threatening deterioration in the patient's condition. It includes time spent evaluating and treating the patient's acute illness as well as time spent reviewing labs, radiology, discussing goals of care with patient and/or patient's family, and discussing the case with a multidisciplinary team in an effort to prevent further life threatening deterioration or end organ damage. This time is independent of any procedures performed. HISTORY    24 HOUR EVENTS: Restarted Coreg, Per Cardiology, No cath for now. UOP 50-60 hourly. LLE dressing taken down by ACS. Graft site w/ xeroform, kerlex and ace wrap, donor site with xeroform open to air. During evening, patient removed the xerofrom from graft site. Pressure dressing applied and changed once throughout the night. He was advanced to a regular diet and tolerated without nausea or vomiting. Eliquis increased then subsequently decreased due to left extremity oozing.    SUBJECTIVE/ROS:  [x] A ten-point review of systems was otherwise negative except as noted.  [ ] Due to altered mental status/intubation, subjective information were not able to be obtained from the patient. History was obtained, to the extent possible, from review of the chart and collateral sources of information.      NEURO  NEURO  RASS:     GCS:   15  CAM ICU:  Exam: No focal neuro deficits, KAY, 5/5 strength   Meds: acetaminophen   Tablet .. 650 milliGRAM(s) Oral every 6 hours PRN Mild Pain (1 - 3)  melatonin 3 milliGRAM(s) Oral at bedtime  ondansetron Injectable 4 milliGRAM(s) IV Push once PRN Nausea and/or Vomiting    [x] Adequacy of sedation and pain control has been assessed and adjusted      RESPIRATORY  RR: 20 (11-05-20 @ 04:00) (17 - 32)  SpO2: 96% (11-05-20 @ 04:00) (92% - 100%)  Wt(kg): --  Exam: unlabored, clear to auscultation bilaterally  Mechanical Ventilation:   ABG - ( 05 Nov 2020 04:57 )  pH: 7.30  /  pCO2: 55    /  pO2: 69    / HCO3: 25    / Base Excess: 0.6   /  SaO2: 95      Lactate: x            Meds: ALBUTerol    90 MICROgram(s) HFA Inhaler 1 Puff(s) Inhalation every 4 hours  albuterol/ipratropium for Nebulization 3 milliLiter(s) Nebulizer every 6 hours  albuterol/ipratropium for Nebulization. 3 milliLiter(s) Nebulizer once  budesonide 160 MICROgram(s)/formoterol 4.5 MICROgram(s) Inhaler 2 Puff(s) Inhalation two times a day        CARDIOVASCULAR  HR: 86 (11-05-20 @ 04:00) (78 - 91)  BP: 116/52 (11-05-20 @ 04:00) (109/55 - 138/63)  BP(mean): 73 (11-05-20 @ 04:00) (64 - 102)  ABP: --  ABP(mean): --  Wt(kg): --  CVP(cm H2O): --      Exam:  Cardiac Rhythm: rrr  Perfusion     [x ]Adequate   [ ]Inadequate  Mentation   [x ]Normal       [ ]Reduced  Extremities  [x ]Warm         [ ]Cool  Volume Status [ ]Hypervolemic [x ]Euvolemic [ ]Hypovolemic  Meds: tamsulosin 0.4 milliGRAM(s) Oral at bedtime      GI/NUTRITION  Exam: obese, soft, nttp, nd  Diet: Reg  Meds: pantoprazole    Tablet 40 milliGRAM(s) Oral before breakfast  senna 2 Tablet(s) Oral at bedtime      GENITOURINARY  I&O's Detail    11-03 @ 07:01  -  11-04 @ 07:00  --------------------------------------------------------  IN:    IV PiggyBack: 550 mL    multiple electrolytes Injection Type 1.: 252 mL  Total IN: 802 mL    OUT:    Indwelling Catheter - Urethral (mL): 2055 mL  Total OUT: 2055 mL    Total NET: -1253 mL      11-04 @ 07:01  -  11-05 @ 04:59  --------------------------------------------------------  IN:    IV PiggyBack: 300 mL    Oral Fluid: 750 mL  Total IN: 1050 mL    OUT:    Indwelling Catheter - Urethral (mL): 1740 mL  Total OUT: 1740 mL    Total NET: -690 mL          11-04    133<L>  |  94<L>  |  82.0<H>  ----------------------------<  123<H>  4.6   |  26.0  |  2.11<H>    Ca    8.5<L>      04 Nov 2020 14:53  Phos  5.6     11-04  Mg     2.1     11-04      [x ] Webb catheter, indication: strict i/o's   Meds: albumin human 25% IVPB 100 milliLiter(s) IV Intermittent every 4 hours  ascorbic acid 500 milliGRAM(s) Oral two times a day  folic acid 1 milliGRAM(s) Oral daily  multivitamin 1 Tablet(s) Oral daily  thiamine 100 milliGRAM(s) Oral daily  zinc sulfate 220 milliGRAM(s) Oral daily        HEMATOLOGIC  Meds: apixaban 2.5 milliGRAM(s) Oral two times a day  aspirin  chewable 81 milliGRAM(s) Oral daily    [x] VTE Prophylaxis                        7.9    7.03  )-----------( 170      ( 04 Nov 2020 04:30 )             24.8       Transfusion     [ ] PRBC   [ ] Platelets   [ ] FFP   [ ] Cryoprecipitate      INFECTIOUS DISEASES  T(C): 36.6 (11-05-20 @ 03:25), Max: 36.9 (11-04-20 @ 18:43)  Wt(kg): --    Recent Cultures:    Meds: epoetin felisha-epbx (RETACRIT) Injectable 66321 Unit(s) SubCutaneous <User Schedule>        ENDOCRINE  Capillary Blood Glucose    Meds: atorvastatin 20 milliGRAM(s) Oral at bedtime        ACCESS DEVICES:  [ ] Peripheral IV  [ ] Central Venous Line	[ ] R	[ ] L	[ ] IJ	[ ] Fem	[ ] SC	Placed:   [ ] Arterial Line		[ ] R	[ ] L	[ ] Fem	[ ] Rad	[ ] Ax	Placed:   [ ] PICC:					[ ] Mediport  [ ] Urinary Catheter, Date Placed:   [ ] Necessity of urinary, arterial, and venous catheters discussed[ ] Peripheral IV      OTHER MEDICATIONS:  benzocaine 15 mG/menthol 3.6 mG (Sugar-Free) Lozenge 1 Lozenge Oral every 3 hours PRN  chlorhexidine 2% Cloths 1 Application(s) Topical daily      CODE STATUS: FULL        30 minutes of critical care time spent providing medical care for patient's acute illness/conditions that impairs at least one vital organ system and/or poses a high risk of imminent or life threatening deterioration in the patient's condition. It includes time spent evaluating and treating the patient's acute illness as well as time spent reviewing labs, radiology, discussing goals of care with patient and/or patient's family, and discussing the case with a multidisciplinary team in an effort to prevent further life threatening deterioration or end organ damage. This time is independent of any procedures performed.

## 2020-11-05 NOTE — PROGRESS NOTE ADULT - ASSESSMENT
A/P: 71 y/o M with a PMHx of COPD (newly placed on home O2), CAD s/p CABG x 3 (10/2013 at [place]), pulmonary HTN (severity not noted on Echo 06/2020), Afib (on Eliquis), CHF (unknown baseline EF), PVD, HTN, HLD, and CKD who presented to the ED after a mechanical fall. Patient initially fell on 10/9 down 7 stairs with a mechanical fall with hitting his head, but no LOC. Patient was in [facility] on 10/11 with negative CT and LLE Xrays, but noted to have diffuse ecchymosis on his left side. Patient was D/C'd 10/14, and began having worsening leg swelling, pain, decreased urine output, and dyspnea. Patient has had a complicated hospital course with ARF, COPD exacerbation, CHF exacerbation. Patient underwent a LLE debridement on 10/21/20, and post-operatively with hypotension and bradycardia, requiring reversal of anesthetic agents. Patient now receiving IV doses of lasix, but repeat Echo showed severe pulmonary HTN and severely reduced RV function. Patient still with some dyspnea, orthopnea, and LE swelling, but denies any chest pain or chest pressure. Patient denies any fevers, chills, CP, syncope, near syncope, headache, dizziness, abdominal pain, N/V/D.   Troponin <0.01 -> 0.09  x 2 (normal CK)    Severe Pulmonary HTN with RV Failure  - Echo performed on 06/2020 showed mild to moderate pulmonary HTN and normal RV function.   - Patient was off of AC from 10/11 until at least 10/15.   - Patient's initial echo 10/15/20 showed now severe pulmonary HTN.   - Patient was maintained on heparin gtt until 11/02, when he was started on Eliquis 2.5mg PO BID  - Patient only meets 1 of the criteria for lowering the dose (81 y/o or older; wt < 60kg; Cr > 1.5), would increase dose to 5mg PO BID.   - Eliquis 2.5mg PO BID is not an effective dose of AC for this patient to either treat possible PE or AC for Afib. If unable to give Eliquis 5mg PO BID, would recommend going back to a heparin gtt at this time.  - Concern at this time for pulmonary embolism as cause of acute worsening pulmonary HTN and severe RV failure, less likely cardiac ischemia.  Would recommend either CTA chest or V/Q scan to further evaluate.   - Continue IV lasix 80mg IV daily to meet goal negative -1L, can increase to BID dosing if needed.   - No indication for urgent LHC at this time, particularly in setting of GERALD     CAD s/p CABG x 3  - Patient with elevated troponin 0.09 x 2 in setting of GERALD with normal CK.   - No new RWMA on echo.   - If no recent ischemia evaluations, LHC prior to discharge when acute hospital issues resolved, including GERALD  - Continue aspirin and statin.   - Continue home coreg 12.5mg PO BID when able.   - maintain Hb >8    Atrial Fibrillation   - Rate controlled at this time.   - Restart home Coreg 12.5mg PO BID.   - Increase Eliquis to 5mg PO BID for AC (does not meet criteria for reduced dosing) or restart heparin gtt.  - Continue telemetry monitoring.     Preliminary evaluation, please await official recommendations by Dr. Geller   A/P: 71 y/o M with a PMHx of COPD (newly placed on home O2), CAD s/p CABG x 3 (10/2013 at [place]), pulmonary HTN (severity not noted on Echo 06/2020), Afib (on Eliquis), CHF (unknown baseline EF), PVD, HTN, HLD, and CKD who presented to the ED after a mechanical fall. Patient initially fell on 10/9 down 7 stairs with a mechanical fall with hitting his head, but no LOC. Patient was in [facility] on [facility] with negative CT and LLE Xrays, but noted to have diffuse ecchymosis on his left side. Patient was D/C'd 10/14, and began having worsening leg swelling, pain, decreased urine output, and dyspnea. Patient has had a complicated hospital course with ARF, COPD exacerbation, CHF exacerbation. Patient underwent a LLE debridement on 10/21/20, and post-operatively with hypotension and bradycardia, requiring reversal of anesthetic agents. Patient now receiving IV doses of lasix, but repeat Echo showed severe pulmonary HTN and severely reduced RV function. Patient still with some dyspnea, orthopnea, and LE swelling, but denies any chest pain or chest pressure. Patient denies any fevers, chills, CP, syncope, near syncope, headache, dizziness, abdominal pain, N/V/D.   Troponin <0.01 -> 0.09  x 2 (normal CK)    Severe Pulmonary HTN with RV Failure  - Echo performed on 06/2020 showed mild to moderate pulmonary HTN and normal RV function.   - Patient was off of AC from 10/11 until at least 10/15.   - Patient's initial echo 10/15/20 showed now severe pulmonary HTN.   - Patient was maintained on heparin gtt until 11/02, when he was started on Eliquis 2.5mg PO BID  - Patient only meets 1 of the criteria for lowering the dose (81 y/o or older; wt < 60kg; Cr > 1.5), would increase dose to 5mg PO BID.   - Eliquis 2.5mg PO BID is not an effective dose of AC for this patient to either treat possible PE or AC for Afib. If unable to give Eliquis 5mg PO BID, would recommend going back to a heparin gtt at this time.  - Concern at this time for pulmonary embolism as cause of acute worsening pulmonary HTN and severe RV failure, less likely cardiac ischemia.  Consider V/Q scan to further evaluate.   - Continue IV lasix 80mg IV daily to meet goal negative -1L, can increase to BID dosing if needed.  Would not transition to PO lasix at this time  - Follow up Pulmonary recommendations   - No indication for urgent LHC at this time, particularly in setting of GERALD     CAD s/p CABG x 3  - Patient with elevated troponin 0.09 x 2 in setting of GERALD with normal CK.   - No new RWMA on echo.   - If no recent ischemia evaluations, LHC prior to discharge when acute hospital issues resolved, including GERALD  - Continue aspirin and statin.   - Continue home coreg 12.5mg PO BID when able.   - maintain Hb >8    Atrial Fibrillation   - Rate controlled at this time.   - Restart home Coreg 12.5mg PO BID.   - Increase Eliquis to 5mg PO BID for AC (does not meet criteria for reduced dosing) or restart heparin gtt.  - Continue telemetry monitoring.

## 2020-11-05 NOTE — PHYSICAL THERAPY INITIAL EVALUATION ADULT - CRITERIA FOR SKILLED THERAPEUTIC INTERVENTIONS
impairments found
anticipated discharge recommendation/impairments found/Home with RW, Home PT
anticipated discharge recommendation/impairments found/functional limitations in following categories/anticipated equipment needs at discharge/therapy frequency/risk reduction/prevention/rehab potential

## 2020-11-05 NOTE — PHYSICAL THERAPY INITIAL EVALUATION ADULT - PLANNED THERAPY INTERVENTIONS, PT EVAL
gait training/transfer training/bed mobility training
gait training/strengthening/transfer training/balance training/bed mobility training
bed mobility training/gait training/balance training/transfer training/strengthening

## 2020-11-05 NOTE — PHYSICAL THERAPY INITIAL EVALUATION ADULT - REFERRAL TO ANOTHER SERVICE NEEDED, PT EVAL
consult in chart/occupational therapy
occupational therapy/PM&R
occupational therapy/consult in chart

## 2020-11-05 NOTE — PHYSICAL THERAPY INITIAL EVALUATION ADULT - MANUAL MUSCLE TESTING RESULTS, REHAB EVAL
UEs-see OT eval; bilateral hip flex 3+/5, knee ext 3+/5, ankle df 4/5
no strength deficits were identified/with exception to left hip flexion 3+/5. Otherwise 4/5 throughout
BLE WFL

## 2020-11-05 NOTE — PHYSICAL THERAPY INITIAL EVALUATION ADULT - DISCHARGE DISPOSITION, PT EVAL
Acute Rehabilitation vs [facility]
home w/ home PT
home with assist and home PT vs [facility], pending progress

## 2020-11-05 NOTE — PROGRESS NOTE ADULT - SUBJECTIVE AND OBJECTIVE BOX
PULMONARY PROGRESS NOTE      DEBI BADILLOAPARNA-[id number]    Patient is a 70y old  Male who presents with a chief complaint of CHF (04 Nov 2020 12:52)  COPD on 02  CAD post CABGX3  Afib on Eliquis  PVD, HTN, CKD  Severe pHTN with severe RV dysfunction       INTERVAL HPI/OVERNIGHT EVENTS:  Comfortable     MEDICATIONS  (STANDING):  ALBUTerol    90 MICROgram(s) HFA Inhaler 1 Puff(s) Inhalation every 4 hours  albuterol/ipratropium for Nebulization 3 milliLiter(s) Nebulizer every 6 hours  albuterol/ipratropium for Nebulization. 3 milliLiter(s) Nebulizer once  apixaban 2.5 milliGRAM(s) Oral two times a day  ascorbic acid 500 milliGRAM(s) Oral two times a day  aspirin  chewable 81 milliGRAM(s) Oral daily  atorvastatin 20 milliGRAM(s) Oral at bedtime  budesonide 160 MICROgram(s)/formoterol 4.5 MICROgram(s) Inhaler 2 Puff(s) Inhalation two times a day  carvedilol 12.5 milliGRAM(s) Oral every 12 hours  chlorhexidine 2% Cloths 1 Application(s) Topical daily  epoetin felisha-epbx (RETACRIT) Injectable 89414 Unit(s) SubCutaneous <User Schedule>  folic acid 1 milliGRAM(s) Oral daily  furosemide    Tablet 40 milliGRAM(s) Oral daily  melatonin 3 milliGRAM(s) Oral at bedtime  multivitamin 1 Tablet(s) Oral daily  pantoprazole    Tablet 40 milliGRAM(s) Oral before breakfast  senna 2 Tablet(s) Oral at bedtime  tamsulosin 0.4 milliGRAM(s) Oral at bedtime  thiamine 100 milliGRAM(s) Oral daily  zinc sulfate 220 milliGRAM(s) Oral daily      MEDICATIONS  (PRN):  acetaminophen   Tablet .. 650 milliGRAM(s) Oral every 6 hours PRN Mild Pain (1 - 3)  benzocaine 15 mG/menthol 3.6 mG (Sugar-Free) Lozenge 1 Lozenge Oral every 3 hours PRN Sore Throat  ondansetron Injectable 4 milliGRAM(s) IV Push once PRN Nausea and/or Vomiting      Allergies    No Known Allergies    Intolerances        PAST MEDICAL & SURGICAL HISTORY:  CKD (chronic kidney disease)    CHF (congestive heart failure)    Atrial fibrillation    COPD, severity to be determined    Coronary artery disease involving coronary bypass graft of native heart without angina pectoris    Chronic osteomyelitis, osteomyelitis of unspecified site    COPD (chronic obstructive pulmonary disease)    Atrial fibrillation    Bladder cancer    HLD (hyperlipidemia)    H/O knee surgery    S/P CABG (coronary artery bypass graft)    Presence of stent of CABG        SOCIAL HISTORY  Smoking History:       REVIEW OF SYSTEMS:    CONSTITUTIONAL:  No distress    HEENT:  Eyes:  No diplopia or blurred vision. ENT:  No earache, sore throat or runny nose.    CARDIOVASCULAR:  No pressure, squeezing, tightness, heaviness or aching about the chest; no palpitations.    RESPIRATORY:  No cough, shortness of breath, PND or orthopnea. Mild SOBOE    GASTROINTESTINAL:  No nausea, vomiting or diarrhea.    GENITOURINARY:  No dysuria, frequency or urgency.    NEUROLOGIC:  No paresthesias, fasciculations, seizures or weakness.    PSYCHIATRIC:  No disorder of thought or mood.    Vital Signs Last 24 Hrs  T(C): 36.7 (05 Nov 2020 08:01), Max: 36.9 (04 Nov 2020 18:43)  T(F): 98.1 (05 Nov 2020 08:01), Max: 98.4 (04 Nov 2020 18:43)  HR: 86 (05 Nov 2020 09:20) (71 - 91)  BP: 99/52 (05 Nov 2020 09:00) (99/52 - 136/64)  BP(mean): 68 (05 Nov 2020 09:00) (67 - 117)  RR: 30 (05 Nov 2020 09:00) (17 - 32)  SpO2: 98% (05 Nov 2020 09:00) (92% - 100%)    PHYSICAL EXAMINATION:    GENERAL: The patient is awake and alert in no apparent distress.     HEENT: Head is normocephalic and atraumatic. Extraocular muscles are intact. Mucous membranes are moist.    NECK: Supple.    LUNGS: Clear to auscultation without wheezing, rales or rhonchi; respirations unlabored    HEART: Regular rate and rhythm without murmur.    ABDOMEN: Soft, nontender, and nondistended.      EXTREMITIES: Without any cyanosis, clubbing, rash, lesions or edema.    NEUROLOGIC: Grossly intact.    LABS:                        7.3    6.86  )-----------( 178      ( 05 Nov 2020 05:56 )             23.3     11-05    136  |  98  |  80.0<H>  ----------------------------<  111<H>  4.4   |  26.0  |  2.01<H>    Ca    8.1<L>      05 Nov 2020 05:56  Phos  5.0     11-05  Mg     1.8     11-05          ABG - ( 05 Nov 2020 04:57 )  pH, Arterial: 7.30  pH, Blood: x     /  pCO2: 55    /  pO2: 69    / HCO3: 25    / Base Excess: 0.6   /  SaO2: 95                CARDIAC MARKERS ( 04 Nov 2020 10:48 )  x     / 0.10 ng/mL / x     / x     / x      CARDIAC MARKERS ( 04 Nov 2020 04:30 )  x     / 0.09 ng/mL / x     / x     / x      CARDIAC MARKERS ( 03 Nov 2020 21:52 )  x     / 0.09 ng/mL / 32 U/L / x     / x            Serum Pro-Brain Natriuretic Peptide: 29546 pg/mL (11-05-20 @ 05:56)  Serum Pro-Brain Natriuretic Peptide: 07795 pg/mL (11-02-20 @ 22:18)        RADIOLOGY & ADDITIONAL STUDIES:   EXAM:  XR CHEST PORTABLE ROUTINE 1V                          PROCEDURE DATE:  11/03/2020          INTERPRETATION:  Portable chest radiograph    CLINICAL INFORMATION:   Short of breath.    TECHNIQUE:  Portable  AP view of the chest was obtained.    COMPARISON: 11/2/2020 chest available for review.    FINDINGS: There is haziness overlying the RIGHT hemithorax indicating a large RIGHT effusion and/or basilar airspace consolidation. There is a mild LEFT pleural effusion. RIGHT diaphragmatic contour not delineated. An  LEFT upper lobe clear.  The  heart is enlarged in transverse diameter. No hilar mass.  Status post coronary artery bypass graft procedure.  /Visualized osseous structures are intact.        IMPRESSION:   Moderate-large RIGHT effusion and/or basilar RIGHT airspace consolidation. LEFT effusion.  Upright/semiupright AP and lateral radiographs recommended for confirmation.      SAYRA GIBBS MD; Attending Radiologist  This document has been electronically signed. Nov  3 2020  9:51AM      Echo on 11/3/20     1. Left ventricular ejection fraction, by visual estimation, is 60 to 65%.   2. Normal global left ventricular systolic function.   3. Severely enlarged right atrium.   4. Severely enlarged right ventricle.   5. Severely reduced RV systolic function.   6. Positive Baca Sign.   7. Normal left atrial size.   8. There is no evidence of pericardial effusion.   9. Mild-moderate tricuspid regurgitation.  10. Estimated pulmonary artery systolic pressure is 63.4 mmHg assuming a right atrial pressure of 15 mmHg, which is consistent with severe pulmonary hypertension.  11. Endocardial visualization was enhanced with intravenous echo contrast.    MD Niharika Electronically signed on 11/3/2020 at 6:34:55 PM

## 2020-11-05 NOTE — PHYSICAL THERAPY INITIAL EVALUATION ADULT - IMPAIRMENTS CONTRIBUTING TO GAIT DEVIATIONS, PT EVAL
pain/impaired balance
impaired balance/assist needed to manage lines
impaired balance/decreased strength/pain

## 2020-11-05 NOTE — PHYSICAL THERAPY INITIAL EVALUATION ADULT - DIAGNOSIS, PT EVAL
gait disturbance due to pain, decreased strength,decreased balance.
deconditioning
decreased functional mobility

## 2020-11-06 LAB
ANION GAP SERPL CALC-SCNC: 12 MMOL/L — SIGNIFICANT CHANGE UP (ref 5–17)
ANISOCYTOSIS BLD QL: SIGNIFICANT CHANGE UP
BASO STIPL BLD QL SMEAR: PRESENT — SIGNIFICANT CHANGE UP
BASOPHILS # BLD AUTO: 0.07 K/UL — SIGNIFICANT CHANGE UP (ref 0–0.2)
BASOPHILS NFR BLD AUTO: 0.9 % — SIGNIFICANT CHANGE UP (ref 0–2)
BLD GP AB SCN SERPL QL: SIGNIFICANT CHANGE UP
BUN SERPL-MCNC: 81 MG/DL — HIGH (ref 8–20)
CALCIUM SERPL-MCNC: 8 MG/DL — LOW (ref 8.6–10.2)
CHLORIDE SERPL-SCNC: 99 MMOL/L — SIGNIFICANT CHANGE UP (ref 98–107)
CO2 SERPL-SCNC: 27 MMOL/L — SIGNIFICANT CHANGE UP (ref 22–29)
CREAT SERPL-MCNC: 1.82 MG/DL — HIGH (ref 0.5–1.3)
EOSINOPHIL # BLD AUTO: 0.13 K/UL — SIGNIFICANT CHANGE UP (ref 0–0.5)
EOSINOPHIL NFR BLD AUTO: 1.7 % — SIGNIFICANT CHANGE UP (ref 0–6)
GLUCOSE SERPL-MCNC: 133 MG/DL — HIGH (ref 70–99)
HCT VFR BLD CALC: 23.1 % — LOW (ref 39–50)
HGB BLD-MCNC: 7.4 G/DL — LOW (ref 13–17)
LYMPHOCYTES # BLD AUTO: 0.73 K/UL — LOW (ref 1–3.3)
LYMPHOCYTES # BLD AUTO: 9.6 % — LOW (ref 13–44)
MACROCYTES BLD QL: SLIGHT — SIGNIFICANT CHANGE UP
MAGNESIUM SERPL-MCNC: 1.8 MG/DL — SIGNIFICANT CHANGE UP (ref 1.6–2.6)
MANUAL SMEAR VERIFICATION: SIGNIFICANT CHANGE UP
MCHC RBC-ENTMCNC: 32 GM/DL — SIGNIFICANT CHANGE UP (ref 32–36)
MCHC RBC-ENTMCNC: 36.5 PG — HIGH (ref 27–34)
MCV RBC AUTO: 113.8 FL — HIGH (ref 80–100)
METAMYELOCYTES # FLD: 0.9 % — HIGH (ref 0–0)
MONOCYTES # BLD AUTO: 0.59 K/UL — SIGNIFICANT CHANGE UP (ref 0–0.9)
MONOCYTES NFR BLD AUTO: 7.8 % — SIGNIFICANT CHANGE UP (ref 2–14)
NEUTROPHILS # BLD AUTO: 6.03 K/UL — SIGNIFICANT CHANGE UP (ref 1.8–7.4)
NEUTROPHILS NFR BLD AUTO: 79.1 % — HIGH (ref 43–77)
OVALOCYTES BLD QL SMEAR: SLIGHT — SIGNIFICANT CHANGE UP
PHOSPHATE SERPL-MCNC: 4 MG/DL — SIGNIFICANT CHANGE UP (ref 2.4–4.7)
PLAT MORPH BLD: NORMAL — SIGNIFICANT CHANGE UP
PLATELET # BLD AUTO: 180 K/UL — SIGNIFICANT CHANGE UP (ref 150–400)
POLYCHROMASIA BLD QL SMEAR: SLIGHT — SIGNIFICANT CHANGE UP
POTASSIUM SERPL-MCNC: 4.2 MMOL/L — SIGNIFICANT CHANGE UP (ref 3.5–5.3)
POTASSIUM SERPL-SCNC: 4.2 MMOL/L — SIGNIFICANT CHANGE UP (ref 3.5–5.3)
RBC # BLD: 2.03 M/UL — LOW (ref 4.2–5.8)
RBC # FLD: 18.8 % — HIGH (ref 10.3–14.5)
RBC BLD AUTO: ABNORMAL
SODIUM SERPL-SCNC: 138 MMOL/L — SIGNIFICANT CHANGE UP (ref 135–145)
STOMATOCYTES BLD QL SMEAR: SLIGHT — SIGNIFICANT CHANGE UP
WBC # BLD: 7.62 K/UL — SIGNIFICANT CHANGE UP (ref 3.8–10.5)
WBC # FLD AUTO: 7.62 K/UL — SIGNIFICANT CHANGE UP (ref 3.8–10.5)

## 2020-11-06 PROCEDURE — 99291 CRITICAL CARE FIRST HOUR: CPT

## 2020-11-06 RX ORDER — OXYCODONE HYDROCHLORIDE 5 MG/1
5 TABLET ORAL ONCE
Refills: 0 | Status: DISCONTINUED | OUTPATIENT
Start: 2020-11-06 | End: 2020-11-06

## 2020-11-06 RX ORDER — MAGNESIUM SULFATE 500 MG/ML
1 VIAL (ML) INJECTION ONCE
Refills: 0 | Status: COMPLETED | OUTPATIENT
Start: 2020-11-06 | End: 2020-11-06

## 2020-11-06 RX ADMIN — TAMSULOSIN HYDROCHLORIDE 0.4 MILLIGRAM(S): 0.4 CAPSULE ORAL at 22:13

## 2020-11-06 RX ADMIN — Medication 3 MILLILITER(S): at 20:54

## 2020-11-06 RX ADMIN — ZINC SULFATE TAB 220 MG (50 MG ZINC EQUIVALENT) 220 MILLIGRAM(S): 220 (50 ZN) TAB at 13:09

## 2020-11-06 RX ADMIN — Medication 3 MILLIGRAM(S): at 22:13

## 2020-11-06 RX ADMIN — OXYCODONE HYDROCHLORIDE 5 MILLIGRAM(S): 5 TABLET ORAL at 08:12

## 2020-11-06 RX ADMIN — Medication 100 MILLIGRAM(S): at 13:09

## 2020-11-06 RX ADMIN — CARVEDILOL PHOSPHATE 12.5 MILLIGRAM(S): 80 CAPSULE, EXTENDED RELEASE ORAL at 17:41

## 2020-11-06 RX ADMIN — Medication 1 MILLIGRAM(S): at 13:09

## 2020-11-06 RX ADMIN — Medication 3 MILLILITER(S): at 08:16

## 2020-11-06 RX ADMIN — CARVEDILOL PHOSPHATE 12.5 MILLIGRAM(S): 80 CAPSULE, EXTENDED RELEASE ORAL at 05:17

## 2020-11-06 RX ADMIN — Medication 81 MILLIGRAM(S): at 13:09

## 2020-11-06 RX ADMIN — Medication 40 MILLIGRAM(S): at 05:16

## 2020-11-06 RX ADMIN — Medication 3 MILLILITER(S): at 03:19

## 2020-11-06 RX ADMIN — Medication 1 TABLET(S): at 13:08

## 2020-11-06 RX ADMIN — Medication 500 MILLIGRAM(S): at 17:41

## 2020-11-06 RX ADMIN — Medication 3 MILLILITER(S): at 15:13

## 2020-11-06 RX ADMIN — Medication 500 MILLIGRAM(S): at 05:16

## 2020-11-06 RX ADMIN — Medication 40 MILLIGRAM(S): at 17:41

## 2020-11-06 RX ADMIN — SENNA PLUS 2 TABLET(S): 8.6 TABLET ORAL at 22:13

## 2020-11-06 RX ADMIN — ATORVASTATIN CALCIUM 20 MILLIGRAM(S): 80 TABLET, FILM COATED ORAL at 22:13

## 2020-11-06 RX ADMIN — OXYCODONE HYDROCHLORIDE 5 MILLIGRAM(S): 5 TABLET ORAL at 07:56

## 2020-11-06 RX ADMIN — ERYTHROPOIETIN 10000 UNIT(S): 10000 INJECTION, SOLUTION INTRAVENOUS; SUBCUTANEOUS at 13:46

## 2020-11-06 RX ADMIN — PANTOPRAZOLE SODIUM 40 MILLIGRAM(S): 20 TABLET, DELAYED RELEASE ORAL at 05:21

## 2020-11-06 RX ADMIN — CHLORHEXIDINE GLUCONATE 1 APPLICATION(S): 213 SOLUTION TOPICAL at 13:15

## 2020-11-06 RX ADMIN — APIXABAN 5 MILLIGRAM(S): 2.5 TABLET, FILM COATED ORAL at 05:16

## 2020-11-06 RX ADMIN — BUDESONIDE AND FORMOTEROL FUMARATE DIHYDRATE 2 PUFF(S): 160; 4.5 AEROSOL RESPIRATORY (INHALATION) at 20:55

## 2020-11-06 RX ADMIN — Medication 100 GRAM(S): at 06:17

## 2020-11-06 RX ADMIN — BUDESONIDE AND FORMOTEROL FUMARATE DIHYDRATE 2 PUFF(S): 160; 4.5 AEROSOL RESPIRATORY (INHALATION) at 08:18

## 2020-11-06 RX ADMIN — BENZOCAINE AND MENTHOL 1 LOZENGE: 5; 1 LIQUID ORAL at 22:14

## 2020-11-06 RX ADMIN — APIXABAN 5 MILLIGRAM(S): 2.5 TABLET, FILM COATED ORAL at 17:41

## 2020-11-06 NOTE — PROGRESS NOTE ADULT - SUBJECTIVE AND OBJECTIVE BOX
INTERVAL HPI/OVERNIGHT EVENTS:    Chest X-ray on 11/5 with improvement of L sided effusion.  Pt started on 40mg IV Lasix BID with good response. LE duplex with no DVT.  Pt received a VQ scan which revealed a low probability of PE.  Bilateral lower extremity duplex without DVT.  Pt tolerated nasal cannula throughout overnight without resp distress.  Creatinine downtrending.      MEDICATIONS  (STANDING):  ALBUTerol    90 MICROgram(s) HFA Inhaler 1 Puff(s) Inhalation every 4 hours  albuterol/ipratropium for Nebulization 3 milliLiter(s) Nebulizer every 6 hours  albuterol/ipratropium for Nebulization. 3 milliLiter(s) Nebulizer once  allopurinol 100 milliGRAM(s) Oral daily  apixaban 5 milliGRAM(s) Oral every 12 hours  ascorbic acid 500 milliGRAM(s) Oral two times a day  aspirin  chewable 81 milliGRAM(s) Oral daily  atorvastatin 20 milliGRAM(s) Oral at bedtime  budesonide 160 MICROgram(s)/formoterol 4.5 MICROgram(s) Inhaler 2 Puff(s) Inhalation two times a day  carvedilol 12.5 milliGRAM(s) Oral every 12 hours  chlorhexidine 2% Cloths 1 Application(s) Topical daily  epoetin felisha-epbx (RETACRIT) Injectable 06313 Unit(s) SubCutaneous <User Schedule>  folic acid 1 milliGRAM(s) Oral daily  furosemide   Injectable 40 milliGRAM(s) IV Push every 12 hours  melatonin 3 milliGRAM(s) Oral at bedtime  multivitamin 1 Tablet(s) Oral daily  pantoprazole    Tablet 40 milliGRAM(s) Oral before breakfast  senna 2 Tablet(s) Oral at bedtime  tamsulosin 0.4 milliGRAM(s) Oral at bedtime  thiamine 100 milliGRAM(s) Oral daily  zinc sulfate 220 milliGRAM(s) Oral daily    MEDICATIONS  (PRN):  acetaminophen   Tablet .. 650 milliGRAM(s) Oral every 6 hours PRN Mild Pain (1 - 3)  benzocaine 15 mG/menthol 3.6 mG (Sugar-Free) Lozenge 1 Lozenge Oral every 3 hours PRN Sore Throat  ondansetron Injectable 4 milliGRAM(s) IV Push once PRN Nausea and/or Vomiting      Drug Dosing Weight  Height (cm): 167.6 (15 Oct 2020 10:31)  Weight (kg): 96.5 (30 Oct 2020 18:20)  BMI (kg/m2): 34.4 (30 Oct 2020 18:20)  BSA (m2): 2.05 (30 Oct 2020 18:20)      PAST MEDICAL & SURGICAL HISTORY:  CKD (chronic kidney disease)    CHF (congestive heart failure)    Atrial fibrillation    COPD, severity to be determined    Coronary artery disease involving coronary bypass graft of native heart without angina pectoris    Chronic osteomyelitis, osteomyelitis of unspecified site    COPD (chronic obstructive pulmonary disease)    Atrial fibrillation    Bladder cancer    HLD (hyperlipidemia)    H/O knee surgery    S/P CABG (coronary artery bypass graft)    Presence of stent of CABG        ICU Vital Signs Last 24 Hrs  T(C): 36.8 (06 Nov 2020 04:07), Max: 37 (05 Nov 2020 12:05)  T(F): 98.3 (06 Nov 2020 04:07), Max: 98.6 (05 Nov 2020 12:05)  HR: 78 (06 Nov 2020 05:00) (70 - 95)  BP: 125/51 (06 Nov 2020 05:00) (93/75 - 156/134)  BP(mean): 72 (06 Nov 2020 05:00) (68 - 143)  ABP: --  ABP(mean): --  RR: 18 (06 Nov 2020 05:00) (15 - 30)  SpO2: 100% (06 Nov 2020 05:00) (90% - 100%)      ABG - ( 05 Nov 2020 04:57 )  pH, Arterial: 7.30  pH, Blood: x     /  pCO2: 55    /  pO2: 69    / HCO3: 25    / Base Excess: 0.6   /  SaO2: 95                  I&O's Detail    04 Nov 2020 07:01  -  05 Nov 2020 07:00  --------------------------------------------------------  IN:    IV PiggyBack: 300 mL    Oral Fluid: 850 mL  Total IN: 1150 mL    OUT:    Indwelling Catheter - Urethral (mL): 1915 mL  Total OUT: 1915 mL    Total NET: -765 mL      05 Nov 2020 07:01  -  06 Nov 2020 05:54  --------------------------------------------------------  IN:    Oral Fluid: 500 mL  Total IN: 500 mL    OUT:    Indwelling Catheter - Urethral (mL): 1430 mL  Total OUT: 1430 mL    Total NET: -930 mL        Physical Exam:    Neurological:  Alert and oriented x 3.  Non-focal.  Moving all extremities.  No appreciable motor deficits    HEENT: PERRLA, no drainage or redness.     Neck: Neck supple, No JVD    Respiratory:  Trachea midline, equal chest rise.  Diminished Breath Sounds to Right lower lung field.  Crackles to bilateral lower lung base.      Cardiovascular: Regular rate & rhythm, normal S1, S2    Gastrointestinal: Soft, non-tender, normal bowel sounds    Extremities: No peripheral edema, No cyanosis, clubbing     Vascular: Equal and normal pulses: 2+ peripheral pulses throughout    Skin: L thigh donor site covered w/ xeroform & abd pad, moderate serosanguenous oozing. Distal LLE w/ KI in place, overlying knee immoblizer, C/D/I, motor intact of foot and toes.  Foot is warm w/ brisk cap refill & palpable DP pulse.   scattered ecchymosis most notably to LUE & LLE      LABS:  CBC Full  -  ( 06 Nov 2020 05:08 )  WBC Count : 7.62 K/uL  RBC Count : 2.03 M/uL  Hemoglobin : 7.4 g/dL  Hematocrit : 23.1 %  Platelet Count - Automated : 180 K/uL  Mean Cell Volume : 113.8 fl  Mean Cell Hemoglobin : 36.5 pg  Mean Cell Hemoglobin Concentration : 32.0 gm/dL  Auto Neutrophil # : 6.03 K/uL  Auto Lymphocyte # : 0.73 K/uL  Auto Monocyte # : 0.59 K/uL  Auto Eosinophil # : 0.13 K/uL  Auto Basophil # : 0.07 K/uL  Auto Neutrophil % : 79.1 %  Auto Lymphocyte % : 9.6 %  Auto Monocyte % : 7.8 %  Auto Eosinophil % : 1.7 %  Auto Basophil % : 0.9 %    11-06    138  |  99  |  81.0<H>  ----------------------------<  133<H>  4.2   |  27.0  |  1.82<H>    Ca    8.0<L>      06 Nov 2020 05:08  Phos  4.0     11-06  Mg     1.8     11-06

## 2020-11-06 NOTE — PROGRESS NOTE ADULT - SUBJECTIVE AND OBJECTIVE BOX
PULMONARY PROGRESS NOTE      [facility][id number]    Patient is a 70y old  Male who presents with a chief complaint of CHF (05 Nov 2020 09:23)  71 yo M w/ PMH of COPD newly on home O2, A fib on eliquis, CHF, CKD who is s/p mechanical fall down 7 stairs on 10/9. Patient did not have LOC but did report hitting head. Patient presented to [facility] on 10/11. CT head/c-spine/CAP were negative for acute injuries. Patient was notted to have diffuse ecchymosis over L side. XR LLE were negative for acute fx. Patient was discharged home on 10/14. Patient presented to [facility] with worsening sob and lower leg pain with difficulty ambulating. Patient reports significantly lower urine output over past few days and noted that urine color was very dark. Patient denies other associated symptoms including f/c/n/v. Patient reports normal bowel movements. Patient takes eliquis for A fib.    INTERVAL HPI/OVERNIGHT EVENTS:  alert sitting up in chair on cannula  NAD  denies any CP or SOB  tolerating nocturnal NIV  MEDICATIONS  (STANDING):  ALBUTerol    90 MICROgram(s) HFA Inhaler 1 Puff(s) Inhalation every 4 hours  albuterol/ipratropium for Nebulization 3 milliLiter(s) Nebulizer every 6 hours  albuterol/ipratropium for Nebulization. 3 milliLiter(s) Nebulizer once  allopurinol 100 milliGRAM(s) Oral daily  apixaban 5 milliGRAM(s) Oral every 12 hours  ascorbic acid 500 milliGRAM(s) Oral two times a day  aspirin  chewable 81 milliGRAM(s) Oral daily  atorvastatin 20 milliGRAM(s) Oral at bedtime  budesonide 160 MICROgram(s)/formoterol 4.5 MICROgram(s) Inhaler 2 Puff(s) Inhalation two times a day  carvedilol 12.5 milliGRAM(s) Oral every 12 hours  chlorhexidine 2% Cloths 1 Application(s) Topical daily  epoetin felisha-epbx (RETACRIT) Injectable 45009 Unit(s) SubCutaneous <User Schedule>  folic acid 1 milliGRAM(s) Oral daily  furosemide   Injectable 40 milliGRAM(s) IV Push every 12 hours  melatonin 3 milliGRAM(s) Oral at bedtime  multivitamin 1 Tablet(s) Oral daily  pantoprazole    Tablet 40 milliGRAM(s) Oral before breakfast  senna 2 Tablet(s) Oral at bedtime  tamsulosin 0.4 milliGRAM(s) Oral at bedtime  thiamine 100 milliGRAM(s) Oral daily  zinc sulfate 220 milliGRAM(s) Oral daily      MEDICATIONS  (PRN):  acetaminophen   Tablet .. 650 milliGRAM(s) Oral every 6 hours PRN Mild Pain (1 - 3)  benzocaine 15 mG/menthol 3.6 mG (Sugar-Free) Lozenge 1 Lozenge Oral every 3 hours PRN Sore Throat  ondansetron Injectable 4 milliGRAM(s) IV Push once PRN Nausea and/or Vomiting      Allergies    No Known Allergies    Intolerances        PAST MEDICAL & SURGICAL HISTORY:  CKD (chronic kidney disease)    CHF (congestive heart failure)    Atrial fibrillation    COPD, severity to be determined    Coronary artery disease involving coronary bypass graft of native heart without angina pectoris    Chronic osteomyelitis, osteomyelitis of unspecified site    COPD (chronic obstructive pulmonary disease)    Atrial fibrillation    Bladder cancer    HLD (hyperlipidemia)    H/O knee surgery    S/P CABG (coronary artery bypass graft)    Presence of stent of CABG        SOCIAL HISTORY  Smoking History:       REVIEW OF SYSTEMS:    CONSTITUTIONAL:  No distress    HEENT:  Eyes:  No diplopia or blurred vision. ENT:  No earache, sore throat or runny nose.    CARDIOVASCULAR:  No pressure, squeezing, tightness, heaviness or aching about the chest; no palpitations.    RESPIRATORY:  see HPI  GASTROINTESTINAL:  No nausea, vomiting or diarrhea.    GENITOURINARY:  No dysuria, frequency or urgency.    NEUROLOGIC:  No paresthesias, fasciculations, seizures or weakness.    PSYCHIATRIC:  No disorder of thought or mood.    Vital Signs Last 24 Hrs  T(C): 37.3 (06 Nov 2020 11:40), Max: 37.3 (06 Nov 2020 11:40)  T(F): 99.1 (06 Nov 2020 11:40), Max: 99.1 (06 Nov 2020 11:40)  HR: 75 (06 Nov 2020 12:00) (70 - 95)  BP: 108/58 (06 Nov 2020 12:00) (93/75 - 156/134)  BP(mean): 69 (06 Nov 2020 12:00) (63 - 143)  RR: 21 (06 Nov 2020 12:00) (18 - 26)  SpO2: 90% (06 Nov 2020 12:00) (90% - 100%)    PHYSICAL EXAMINATION:    GENERAL: The patient is awake and alert in no apparent distress.     HEENT: Head is normocephalic and atraumatic. Extraocular muscles are intact. Mucous membranes are moist.    NECK: Supple.    LUNGS: moderate air entry bilat with decreased Bs R; respirations unlabored    HEART: Regular rate and rhythm without murmur.    ABDOMEN: Soft, nontender, and nondistended.      EXTREMITIES: Without any cyanosis, clubbing, rash, lesions or edema.    NEUROLOGIC: Grossly intact.    LABS:                        7.4    7.62  )-----------( 180      ( 06 Nov 2020 05:08 )             23.1     11-06    138  |  99  |  81.0<H>  ----------------------------<  133<H>  4.2   |  27.0  |  1.82<H>    Ca    8.0<L>      06 Nov 2020 05:08  Phos  4.0     11-06  Mg     1.8     11-06          ABG - ( 05 Nov 2020 04:57 )  pH, Arterial: 7.30  pH, Blood: x     /  pCO2: 55    /  pO2: 69    / HCO3: 25    / Base Excess: 0.6   /  SaO2: 95                      Serum Pro-Brain Natriuretic Peptide: 41133 pg/mL (11-05-20 @ 05:56)          MICROBIOLOGY:    RADIOLOGY & ADDITIONAL STUDIES:  CXrs, office notes reviewed

## 2020-11-06 NOTE — PROGRESS NOTE ADULT - ASSESSMENT
69 y/o male admitted on 10/15 s/p fall with LLE hematoma. Hospital course complicated by ARF, COPD exacerbation, hypotensive/bradycardic episode post-operatively after reversal of anesthetic agents. Today patient noted to be short of breath with increasing oxygen requirements. Also w/ worsening hyponatremia and renal function. Pt normally lasix dependent - has not rcvd lasix since 10/31. Deterioration of respiratory status likely due to worsening R heart failure and severe pulmonary hypertension.     Neuro: Tylenol for pain control, minimize narcotics, melatonin for sleep hygiene, delirium precautions      CV: Continue hemodynamic monitoring. Vitals stable.  Continue Lasix 40 mg bid and coreg.  Appreciate cardiology recs     Pulm: Currently tolerating Nasal Cannula without resp distress.  Continue Bipap prn for COPD/overload.  Lasix 40 mg bid.  Pulm recs appreciated. VQ scan performed with low probability of PE.  Continue full anticoagulation with eliquis.   Continue pulmonary toileting     GI/Nutrition: Regular, bowel regimen    /Renal: Lasix 40 mg bid.  Creatinine downtrending.  Webb- strict i/o's, f/u electrolytes     ID: afebrile. No issues     Endo: monitor blood glucose    Skin: LLE dressing change to performed by surgical team     Heme/DVT Prophylaxis: Hgb stable, Eliquis for atrial fib, SCD to RLE     Dispo: SICU.

## 2020-11-06 NOTE — PROGRESS NOTE ADULT - ASSESSMENT
acute on chronic hypercapnic, hypoxemic resp failure  worsening pulmonary Htn, Baca's sign per report  Perfusion scan limited, minimal perfusion R lung secondary to effusion, no segmental defects seen L   Duplex pending, COPD, home 02 by hx  Cardiology input noted  on AC corrected for renal function  large R effusion, had pigtail removed, sig increased BNP  on diuretics, good urine out put, follow CXR, CT non contrast if no improvement and eval need for repeat drainage  would benefit form RHC ? Mems if pulmonary venous with elevated wedge  continue nocturnal NIV, will need Trilogy at home to improve mortality and decrease re admission rate  remains critical, but pH and serum c02 slowly increasing  prognosis guarded

## 2020-11-06 NOTE — PROGRESS NOTE ADULT - ASSESSMENT
70M with pulmonary hypertension and predominantly right-sided heart failure    Severe Pulmonary HTN with RV Failure  - Echo performed on 06/2020 showed mild to moderate pulmonary HTN and normal RV function.   - Patient's initial echo 10/15/20 showed now severe pulmonary HTN.   - Patient only meets 1 of the criteria for lowering the dose (81 y/o or older; wt < 60kg; Cr > 1.5)of eliquis, continue 5mg PO BID.   - PE has been excluded via V/Q scan  - Continue IV diuretics, now on 40mg IV bid.  Approaching euvolemia, would transition to home PO dose of diuretics tomorrow  - Follow up Pulmonary recommendations   - No indication for urgent LHC at this time, particularly in setting of GERALD     CAD s/p CABG x 3  - Patient with elevated troponin 0.09 x 2 in setting of GERALD with normal CK.   - No new RWMA on echo.   - no indication for urgent LHC/RHC.  If patient agreeable, can perform prior to discharge  - Patient at this time not sure if he would want LHC/RHC at [facility] or as an outpatient with his Cardiologist at [facility]  - Continue aspirin and statin.   - Continue home coreg 12.5mg PO BID  - maintain Hb >8    Atrial Fibrillation   - Rate controlled at this time.   - continue Coreg 12.5mg PO BID.   - CHADSVasc is 4, continue eliquis at current dose; monitor Hb closely  - Continue telemetry monitoring.

## 2020-11-06 NOTE — PROGRESS NOTE ADULT - SUBJECTIVE AND OBJECTIVE BOX
[facility]                                                               Office: [address] [address]                                                              Telephone: [phone]. Fax:[phone]                                                                             PROGRESS NOTE    Reason for follow up: RV failure with severe Pulmonary HTN  Overnight: No new issues overnight.  V/Q scan with low probability of PE  Update: Feeling well today, no cardiac complaints     Review of Systems:  a 12 point ROS was negative except as per the HPI above.     	  Vitals:  T(C): 36.6 (11-06-20 @ 07:23), Max: 37 (11-05-20 @ 12:05)  HR: 86 (11-06-20 @ 10:00) (70 - 95)  BP: 114/55 (11-06-20 @ 10:00) (93/75 - 156/134)  RR: 22 (11-06-20 @ 10:00) (15 - 27)  SpO2: 97% (11-06-20 @ 10:00) (90% - 100%)  Wt(kg): --  I&O's Summary    05 Nov 2020 07:01  -  06 Nov 2020 07:00  --------------------------------------------------------  IN: 650 mL / OUT: 1565 mL / NET: -915 mL    06 Nov 2020 07:01  -  06 Nov 2020 10:58  --------------------------------------------------------  IN: 120 mL / OUT: 165 mL / NET: -45 mL            PHYSICAL EXAM:  Constitutional: Comfortable . No acute distress.   HEENT: Atraumatic and normocephalic , neck is supple . + JVD. No carotid bruit. PEERL   CNS: A&Ox3. No focal deficits. EOMI. Cranial nerves II-IX are intact.   Lymph Nodes: Cervical : Not palpable.  Respiratory: Bibasilar rales  Cardiovascular: S1S2 Irregularly irregular. No rubs or gallop. III/VI systolic murmur lsb  Gastrointestinal: Soft non-tender and non distended . +Bowel sounds. negative Woodson's sign.  Extremities: LLE with dressing in place, b/l LE 2+ LE edema, scattered ecchymosis LUE/LLE  Psychiatric: Calm . no agitation.  Skin: No skin rash/ulcers visualized to face, hands or feet.      CURRENT MEDICATIONS:  carvedilol 12.5 milliGRAM(s) Oral every 12 hours  furosemide   Injectable 40 milliGRAM(s) IV Push every 12 hours  tamsulosin 0.4 milliGRAM(s) Oral at bedtime    ALBUTerol    90 MICROgram(s) HFA Inhaler  albuterol/ipratropium for Nebulization  albuterol/ipratropium for Nebulization.  budesonide 160 MICROgram(s)/formoterol 4.5 MICROgram(s) Inhaler  melatonin  pantoprazole    Tablet  senna  allopurinol  atorvastatin  apixaban  ascorbic acid  aspirin  chewable  chlorhexidine 2% Cloths  epoetin felisha-epbx (RETACRIT) Injectable  folic acid  multivitamin  thiamine  zinc sulfate      DIAGNOSTIC TESTING:  [ ] Echocardiogram:     < from: TTE Echo Limited or F/U (11.03.20 @ 15:22) >  PHYSICIAN INTERPRETATION:  Left Ventricle: Endocardial visualization was enhanced with intravenous echo contrast. The left ventricular internal cavity size is normal.  Global LV systolic function was normal. Left ventricular ejection fraction, by visual estimation, is 60 to 65%. Abnormal (paradoxical) septal motion, consistent with left bundle branch block.  Right Ventricle: The right ventricular size is severely enlarged. RV systolicfunction is severely reduced. Positive Baca Sign. TV S' 0.1 m/s.  Left Atrium: Normal left atrial size.  Right Atrium: Severely enlarged right atrium.  Pericardium: There is no evidence of pericardial effusion.  Tricuspid Valve: The tricuspid valve is not well seen. Mild-moderate tricuspid regurgitation is visualized. Estimated pulmonary artery systolic pressure is 63.4 mmHg assuming a right atrial pressure of 15 mmHg, which is consistent with severe pulmonary hypertension.  Venous: The inferior vena cava was dilated, with respiratory size variation less than 50%.      Summary:   1. Left ventricular ejection fraction, by visual estimation, is 60 to 65%.   2. Normal global left ventricular systolic function.   3. Severely enlarged right atrium.   4. Severely enlarged right ventricle.   5. Severely reduced RV systolic function.   6. Positive Baca Sign.   7. Normal left atrial size.   8. There is no evidence of pericardial effusion.   9. Mild-moderate tricuspid regurgitation.  10. Estimated pulmonary artery systolic pressure is 63.4 mmHg assuming a right atrial pressure of 15 mmHg, which is consistent with severe pulmonary hypertension.  11. Endocardial visualization was enhanced with intravenous echo contrast.    MD Niharika Electronically signed on 11/3/2020 at 6:34:55 PM    < end of copied text >	      LABS:	 	  CARDIAC MARKERS ( 05 Nov 2020 05:56 )  x     / x     / x     / x     / x      p-BNP 05 Nov 2020 05:56: 75079 pg/mL, CARDIAC MARKERS ( 04 Nov 2020 10:48 )  x     / 0.10 ng/mL / x     / x     / x      p-BNP 04 Nov 2020 10:48: x    , CARDIAC MARKERS ( 04 Nov 2020 04:30 )  x     / 0.09 ng/mL / x     / x     / x      p-BNP 04 Nov 2020 04:30: x    , CARDIAC MARKERS ( 03 Nov 2020 21:52 )  x     / 0.09 ng/mL / 32 U/L / x     / x      p-BNP 03 Nov 2020 21:52: x                              7.4    7.62  )-----------( 180      ( 06 Nov 2020 05:08 )             23.1     11-06    138  |  99  |  81.0<H>  ----------------------------<  133<H>  4.2   |  27.0  |  1.82<H>    Ca    8.0<L>      06 Nov 2020 05:08  Phos  4.0     11-06  Mg     1.8     11-06      proBNP: Serum Pro-Brain Natriuretic Peptide: 46840 pg/mL (11-05 @ 05:56)  Serum Pro-Brain Natriuretic Peptide: 08211 pg/mL (11-02 @ 22:18)    Lipid Profile:   HgA1c:   TSH:       TELEMETRY: Reviewed    ECG:  Reviewed by me.

## 2020-11-06 NOTE — CHART NOTE - NSCHARTNOTEFT_GEN_A_CORE
Source: Patient [x ]  Family [ ]   other [ ]    Current Diet: Diet, DASH/TLC:   Sodium & Cholesterol Restricted  Supplement Feeding Modality:  Oral  Ensure Enlive Cans or Servings Per Day:  1       Frequency:  Three Times a day (11-04-20 @ 15:29)    PO intake:  < 50% [ ]   50-75%  [x ]   %  [x ]  other :    Source for PO intake [x ] Patient [ ] family [ ] chart [ ] staff [ ] other    Current Weight:   (11/6) 195.3 lbs   (10/28) 196.2 lbs  (10/19) 200.4 lbs  (10/18) 207.4 lbs  (10/17) 201.7 lbs  (10/16) 203.4 lbs    % Weight Change: 8# (3.94%) wt loss since admission (Generalized edema, L hip 2+ edema noted may be masking further wt loss)     Pertinent Medications: MEDICATIONS  (STANDING):  ALBUTerol    90 MICROgram(s) HFA Inhaler 1 Puff(s) Inhalation every 4 hours  albuterol/ipratropium for Nebulization 3 milliLiter(s) Nebulizer every 6 hours  albuterol/ipratropium for Nebulization. 3 milliLiter(s) Nebulizer once  allopurinol 100 milliGRAM(s) Oral daily  apixaban 5 milliGRAM(s) Oral every 12 hours  ascorbic acid 500 milliGRAM(s) Oral two times a day  aspirin  chewable 81 milliGRAM(s) Oral daily  atorvastatin 20 milliGRAM(s) Oral at bedtime  budesonide 160 MICROgram(s)/formoterol 4.5 MICROgram(s) Inhaler 2 Puff(s) Inhalation two times a day  carvedilol 12.5 milliGRAM(s) Oral every 12 hours  chlorhexidine 2% Cloths 1 Application(s) Topical daily  epoetin felisha-epbx (RETACRIT) Injectable 11033 Unit(s) SubCutaneous <User Schedule>  folic acid 1 milliGRAM(s) Oral daily  furosemide   Injectable 40 milliGRAM(s) IV Push every 12 hours  melatonin 3 milliGRAM(s) Oral at bedtime  multivitamin 1 Tablet(s) Oral daily  pantoprazole    Tablet 40 milliGRAM(s) Oral before breakfast  senna 2 Tablet(s) Oral at bedtime  tamsulosin 0.4 milliGRAM(s) Oral at bedtime  thiamine 100 milliGRAM(s) Oral daily  zinc sulfate 220 milliGRAM(s) Oral daily    MEDICATIONS  (PRN):  acetaminophen   Tablet .. 650 milliGRAM(s) Oral every 6 hours PRN Mild Pain (1 - 3)  benzocaine 15 mG/menthol 3.6 mG (Sugar-Free) Lozenge 1 Lozenge Oral every 3 hours PRN Sore Throat  ondansetron Injectable 4 milliGRAM(s) IV Push once PRN Nausea and/or Vomiting    Pertinent Labs: CBC Full  -  ( 06 Nov 2020 05:08 )  WBC Count : 7.62 K/uL  RBC Count : 2.03 M/uL  Hemoglobin : 7.4 g/dL  Hematocrit : 23.1 %  Platelet Count - Automated : 180 K/uL  Mean Cell Volume : 113.8 fl  Mean Cell Hemoglobin : 36.5 pg  Mean Cell Hemoglobin Concentration : 32.0 gm/dL  Auto Neutrophil # : 6.03 K/uL  Auto Lymphocyte # : 0.73 K/uL  Auto Monocyte # : 0.59 K/uL  Auto Eosinophil # : 0.13 K/uL  Auto Basophil # : 0.07 K/uL  Auto Neutrophil % : 79.1 %  Auto Lymphocyte % : 9.6 %  Auto Monocyte % : 7.8 %  Auto Eosinophil % : 1.7 %  Auto Basophil % : 0.9 %        Skin: Surgical site to L leg     Nutrition focused physical exam conducted - found signs of malnutrition [ ]absent [ x]present    Subcutaneous fat loss: MILD [x ] Orbital fat pads region, [ ]Buccal fat region, [ ]Triceps region,  [ ]Ribs region    Muscle wasting: MILD [x ]Temples region, [x ]Clavicle region, [ ]Shoulder region, [ ]Scapula region, [ ]Interosseous region,  [ ]thigh region, [ ]Calf region      Estimated Needs:   [ x] no change since previous assessment  [ ] recalculated:     Current Nutrition Diagnosis:  Pt remains at high nutrition risk secondary to malnutrition (moderate, acute) related to inability to meet increased nutrient needs in setting of COPD, CHF, LLE hematoma evacuation and debridement, s/p wound vac application to LLE wound, renal insufficiency and Deterioration of respiratory status likely due to worsening R heart failure and severe pulmonary hypertension as evidenced by meeting <75% nutrient needs >7 days, mild muscle loss of temples and clavicles, mild fat loss of orbitals, 3.5% wt loss x ~2 weeks, and +edema. Pt reports appetite improving, however does not "love" Ensure supplements, encouraged pt to eat as much of the meal as possible and sip ensure between meals. Pt agreeable. Pt with concerns of worsening renal function; and elevated K+ level. Reviewed high K+ foods with pt, pt with good understanding and reports staying away from high K+ foods. New food preferences obtained.     Recommendations:   1. RX: Nephro-markell and folic acid daily   2. Continue with Vit. C daily   3. Check wt daily to monitor trends  4. Consider changing Ensure supplement to Nepro shakes   5. Monitor Renal labs      Monitoring and Evaluation:   [x ] PO intake [x ] Tolerance to diet prescription [X] Weights  [X] Follow up per protocol [X] Labs:

## 2020-11-07 LAB
ANION GAP SERPL CALC-SCNC: 12 MMOL/L — SIGNIFICANT CHANGE UP (ref 5–17)
BASOPHILS # BLD AUTO: 0.03 K/UL — SIGNIFICANT CHANGE UP (ref 0–0.2)
BASOPHILS NFR BLD AUTO: 0.4 % — SIGNIFICANT CHANGE UP (ref 0–2)
BUN SERPL-MCNC: 80 MG/DL — HIGH (ref 8–20)
CALCIUM SERPL-MCNC: 8 MG/DL — LOW (ref 8.6–10.2)
CHLORIDE SERPL-SCNC: 98 MMOL/L — SIGNIFICANT CHANGE UP (ref 98–107)
CO2 SERPL-SCNC: 27 MMOL/L — SIGNIFICANT CHANGE UP (ref 22–29)
CREAT SERPL-MCNC: 1.93 MG/DL — HIGH (ref 0.5–1.3)
EOSINOPHIL # BLD AUTO: 0.23 K/UL — SIGNIFICANT CHANGE UP (ref 0–0.5)
EOSINOPHIL NFR BLD AUTO: 2.9 % — SIGNIFICANT CHANGE UP (ref 0–6)
GLUCOSE SERPL-MCNC: 126 MG/DL — HIGH (ref 70–99)
HCT VFR BLD CALC: 24.6 % — LOW (ref 39–50)
HGB BLD-MCNC: 7.6 G/DL — LOW (ref 13–17)
IMM GRANULOCYTES NFR BLD AUTO: 0.4 % — SIGNIFICANT CHANGE UP (ref 0–1.5)
LYMPHOCYTES # BLD AUTO: 0.86 K/UL — LOW (ref 1–3.3)
LYMPHOCYTES # BLD AUTO: 10.9 % — LOW (ref 13–44)
MAGNESIUM SERPL-MCNC: 2 MG/DL — SIGNIFICANT CHANGE UP (ref 1.6–2.6)
MCHC RBC-ENTMCNC: 30.9 GM/DL — LOW (ref 32–36)
MCHC RBC-ENTMCNC: 34.4 PG — HIGH (ref 27–34)
MCV RBC AUTO: 111.3 FL — HIGH (ref 80–100)
MONOCYTES # BLD AUTO: 0.75 K/UL — SIGNIFICANT CHANGE UP (ref 0–0.9)
MONOCYTES NFR BLD AUTO: 9.5 % — SIGNIFICANT CHANGE UP (ref 2–14)
NEUTROPHILS # BLD AUTO: 5.97 K/UL — SIGNIFICANT CHANGE UP (ref 1.8–7.4)
NEUTROPHILS NFR BLD AUTO: 75.9 % — SIGNIFICANT CHANGE UP (ref 43–77)
PHOSPHATE SERPL-MCNC: 5.2 MG/DL — HIGH (ref 2.4–4.7)
PLATELET # BLD AUTO: 207 K/UL — SIGNIFICANT CHANGE UP (ref 150–400)
POTASSIUM SERPL-MCNC: 5.1 MMOL/L — SIGNIFICANT CHANGE UP (ref 3.5–5.3)
POTASSIUM SERPL-SCNC: 5.1 MMOL/L — SIGNIFICANT CHANGE UP (ref 3.5–5.3)
RBC # BLD: 2.21 M/UL — LOW (ref 4.2–5.8)
RBC # FLD: 17.1 % — HIGH (ref 10.3–14.5)
SODIUM SERPL-SCNC: 137 MMOL/L — SIGNIFICANT CHANGE UP (ref 135–145)
WBC # BLD: 7.87 K/UL — SIGNIFICANT CHANGE UP (ref 3.8–10.5)
WBC # FLD AUTO: 7.87 K/UL — SIGNIFICANT CHANGE UP (ref 3.8–10.5)

## 2020-11-07 PROCEDURE — 99024 POSTOP FOLLOW-UP VISIT: CPT

## 2020-11-07 PROCEDURE — 99233 SBSQ HOSP IP/OBS HIGH 50: CPT

## 2020-11-07 RX ORDER — FUROSEMIDE 40 MG
40 TABLET ORAL EVERY 12 HOURS
Refills: 0 | Status: DISCONTINUED | OUTPATIENT
Start: 2020-11-07 | End: 2020-11-13

## 2020-11-07 RX ORDER — SODIUM CHLORIDE 9 MG/ML
500 INJECTION, SOLUTION INTRAVENOUS ONCE
Refills: 0 | Status: DISCONTINUED | OUTPATIENT
Start: 2020-11-07 | End: 2020-11-07

## 2020-11-07 RX ADMIN — Medication 3 MILLILITER(S): at 15:32

## 2020-11-07 RX ADMIN — Medication 3 MILLILITER(S): at 04:02

## 2020-11-07 RX ADMIN — Medication 3 MILLILITER(S): at 08:09

## 2020-11-07 RX ADMIN — Medication 81 MILLIGRAM(S): at 11:25

## 2020-11-07 RX ADMIN — Medication 3 MILLIGRAM(S): at 21:58

## 2020-11-07 RX ADMIN — Medication 100 MILLIGRAM(S): at 11:25

## 2020-11-07 RX ADMIN — Medication 1 TABLET(S): at 11:26

## 2020-11-07 RX ADMIN — TAMSULOSIN HYDROCHLORIDE 0.4 MILLIGRAM(S): 0.4 CAPSULE ORAL at 21:58

## 2020-11-07 RX ADMIN — BUDESONIDE AND FORMOTEROL FUMARATE DIHYDRATE 2 PUFF(S): 160; 4.5 AEROSOL RESPIRATORY (INHALATION) at 08:10

## 2020-11-07 RX ADMIN — Medication 40 MILLIGRAM(S): at 17:04

## 2020-11-07 RX ADMIN — Medication 500 MILLIGRAM(S): at 17:04

## 2020-11-07 RX ADMIN — Medication 100 MILLIGRAM(S): at 11:26

## 2020-11-07 RX ADMIN — APIXABAN 5 MILLIGRAM(S): 2.5 TABLET, FILM COATED ORAL at 05:40

## 2020-11-07 RX ADMIN — SENNA PLUS 2 TABLET(S): 8.6 TABLET ORAL at 21:58

## 2020-11-07 RX ADMIN — Medication 3 MILLILITER(S): at 20:20

## 2020-11-07 RX ADMIN — APIXABAN 5 MILLIGRAM(S): 2.5 TABLET, FILM COATED ORAL at 17:04

## 2020-11-07 RX ADMIN — ATORVASTATIN CALCIUM 20 MILLIGRAM(S): 80 TABLET, FILM COATED ORAL at 21:58

## 2020-11-07 RX ADMIN — Medication 40 MILLIGRAM(S): at 05:40

## 2020-11-07 RX ADMIN — CARVEDILOL PHOSPHATE 12.5 MILLIGRAM(S): 80 CAPSULE, EXTENDED RELEASE ORAL at 17:04

## 2020-11-07 RX ADMIN — CARVEDILOL PHOSPHATE 12.5 MILLIGRAM(S): 80 CAPSULE, EXTENDED RELEASE ORAL at 05:40

## 2020-11-07 RX ADMIN — BENZOCAINE AND MENTHOL 1 LOZENGE: 5; 1 LIQUID ORAL at 05:40

## 2020-11-07 RX ADMIN — Medication 500 MILLIGRAM(S): at 05:40

## 2020-11-07 RX ADMIN — ZINC SULFATE TAB 220 MG (50 MG ZINC EQUIVALENT) 220 MILLIGRAM(S): 220 (50 ZN) TAB at 11:26

## 2020-11-07 RX ADMIN — Medication 1 MILLIGRAM(S): at 11:26

## 2020-11-07 NOTE — PROGRESS NOTE ADULT - ASSESSMENT
acute on chronic hypercapnic, hypoxemic resp failure  worsening pulmonary Htn, Baca's sign per report  Perfusion scan limited, minimal perfusion R lung secondary to effusion, no segmental defects seen L   Duplex pending, COPD, home 02 by hx  Cardiology input noted  on AC corrected for renal function  large R effusion, had pigtail removed, sig increased BNP  on diuretics, good urine out put, follow CXR, CT non contrast if no improvement and eval need for repeat drainage  would benefit form RHC ? Mems if pulmonary venous with elevated wedge  continue nocturnal NIV, will need Trilogy at home to improve mortality and decrease re admission rate  remains critical, but pH and serum c02 slowly increasing, needs repeat ABG prior to transfer  prognosis guarded acute on chronic hypercapnic, hypoxemic resp failure  worsening pulmonary Htn, Baca's sign per report  Perfusion scan limited, minimal perfusion R lung secondary to effusion, no segmental defects seen L   Duplex pending, COPD, home 02 by hx  Cardiology input noted  on AC corrected for renal function  large R effusion, had pigtail removed, sig increased BNP  on diuretics, good urine out put, follow CXR, CT non contrast if no improvement and eval need for repeat drainage  would benefit form RHC ? Mems if pulmonary venous with elevated wedge  continue nocturnal NIV, will need Trilogy at home to improve mortality and decrease re admission rate  may benefit from short course of medrol  remains critical, but pH and serum c02 slowly increasing, needs repeat ABG prior to transfer  prognosis guarded

## 2020-11-07 NOTE — PROGRESS NOTE ADULT - ASSESSMENT
71 y/o male admitted on 10/15 s/p fall with LLE hematoma. Hospital course complicated by ARF, COPD exacerbation, hypotensive/bradycardic episode post-operatively after reversal of anesthetic agents. Pt was downgraded from SICU to floors today on O2 2L NC, lasix restarted, otherwise recovering well after surgery, doing well on the floors.    - continue home dose lasix (40 mg BID)  - cardiology following, plan for R heart cath before D/C - HGB > 8 for cath  - 2L home O2 (new prescription, patient has not used at home yet, but should have set up for discharge)  - PT/OT  - TOV passed

## 2020-11-07 NOTE — PROGRESS NOTE ADULT - SUBJECTIVE AND OBJECTIVE BOX
INTERVAL HPI/OVERNIGHT EVENTS:    Pt continues to do well clinically.  Pt continues without respiratory distress.  Cardiology states they will perform cardiac cath prior to DC.  Donor and graft site C/D/I.  Vitals stable.      MEDICATIONS  (STANDING):  ALBUTerol    90 MICROgram(s) HFA Inhaler 1 Puff(s) Inhalation every 4 hours  albuterol/ipratropium for Nebulization 3 milliLiter(s) Nebulizer every 6 hours  albuterol/ipratropium for Nebulization. 3 milliLiter(s) Nebulizer once  allopurinol 100 milliGRAM(s) Oral daily  apixaban 5 milliGRAM(s) Oral every 12 hours  ascorbic acid 500 milliGRAM(s) Oral two times a day  aspirin  chewable 81 milliGRAM(s) Oral daily  atorvastatin 20 milliGRAM(s) Oral at bedtime  budesonide 160 MICROgram(s)/formoterol 4.5 MICROgram(s) Inhaler 2 Puff(s) Inhalation two times a day  carvedilol 12.5 milliGRAM(s) Oral every 12 hours  chlorhexidine 2% Cloths 1 Application(s) Topical daily  epoetin felisha-epbx (RETACRIT) Injectable 96291 Unit(s) SubCutaneous <User Schedule>  folic acid 1 milliGRAM(s) Oral daily  furosemide   Injectable 40 milliGRAM(s) IV Push every 12 hours  melatonin 3 milliGRAM(s) Oral at bedtime  multivitamin 1 Tablet(s) Oral daily  pantoprazole    Tablet 40 milliGRAM(s) Oral before breakfast  senna 2 Tablet(s) Oral at bedtime  tamsulosin 0.4 milliGRAM(s) Oral at bedtime  thiamine 100 milliGRAM(s) Oral daily  zinc sulfate 220 milliGRAM(s) Oral daily    MEDICATIONS  (PRN):  acetaminophen   Tablet .. 650 milliGRAM(s) Oral every 6 hours PRN Mild Pain (1 - 3)  benzocaine 15 mG/menthol 3.6 mG (Sugar-Free) Lozenge 1 Lozenge Oral every 3 hours PRN Sore Throat  ondansetron Injectable 4 milliGRAM(s) IV Push once PRN Nausea and/or Vomiting      Drug Dosing Weight  Height (cm): 167.6 (15 Oct 2020 10:31)  Weight (kg): 96.5 (30 Oct 2020 18:20)  BMI (kg/m2): 34.4 (30 Oct 2020 18:20)  BSA (m2): 2.05 (30 Oct 2020 18:20)      PAST MEDICAL & SURGICAL HISTORY:  CKD (chronic kidney disease)    CHF (congestive heart failure)    Atrial fibrillation    COPD, severity to be determined    Coronary artery disease involving coronary bypass graft of native heart without angina pectoris    Chronic osteomyelitis, osteomyelitis of unspecified site    COPD (chronic obstructive pulmonary disease)    Atrial fibrillation    Bladder cancer    HLD (hyperlipidemia)    H/O knee surgery    S/P CABG (coronary artery bypass graft)    Presence of stent of CABG        ICU Vital Signs Last 24 Hrs  T(C): 36.5 (07 Nov 2020 03:39), Max: 37.4 (06 Nov 2020 15:24)  T(F): 97.7 (07 Nov 2020 03:39), Max: 99.3 (06 Nov 2020 15:24)  HR: 67 (07 Nov 2020 04:00) (67 - 86)  BP: 109/87 (07 Nov 2020 04:00) (104/48 - 132/66)  BP(mean): 92 (07 Nov 2020 04:00) (63 - 99)  ABP: --  ABP(mean): --  RR: 19 (07 Nov 2020 04:00) (13 - 26)  SpO2: 100% (07 Nov 2020 04:00) (90% - 100%)          I&O's Detail    05 Nov 2020 07:01  -  06 Nov 2020 07:00  --------------------------------------------------------  IN:    IV PiggyBack: 100 mL    Oral Fluid: 550 mL  Total IN: 650 mL    OUT:    Indwelling Catheter - Urethral (mL): 1565 mL  Total OUT: 1565 mL    Total NET: -915 mL      06 Nov 2020 07:01  -  07 Nov 2020 05:29  --------------------------------------------------------  IN:    Oral Fluid: 700 mL  Total IN: 700 mL    OUT:    Indwelling Catheter - Urethral (mL): 1155 mL  Total OUT: 1155 mL    Total NET: -455 mL      Physical Exam:    Neurological:  Alert and oriented x 3.  Non-focal.  Moving all extremities.  No appreciable motor deficits    HEENT: PERRLA, no drainage or redness.     Neck: Neck supple, No JVD    Respiratory:  Trachea midline, equal chest rise.  Diminished Breath Sounds to Right lower lung field.  Crackles to bilateral lower lung base.      Cardiovascular: Regular rate & rhythm, normal S1, S2    Gastrointestinal: Soft, non-tender, normal bowel sounds    Extremities: No peripheral edema, No cyanosis, clubbing     Vascular: Equal and normal pulses: 2+ peripheral pulses throughout    Skin: L thigh donor site covered w/ xeroform & abd pad, moderate serosanguenous oozing. Distal LLE w/ KI in place, overlying knee immoblizer, C/D/I, motor intact of foot and toes.  Foot is warm w/ brisk cap refill & palpable DP pulse.   scattered ecchymosis most notably to LUE & LLE      LABS:  CBC Full  -  ( 06 Nov 2020 05:08 )  WBC Count : 7.62 K/uL  RBC Count : 2.03 M/uL  Hemoglobin : 7.4 g/dL  Hematocrit : 23.1 %  Platelet Count - Automated : 180 K/uL  Mean Cell Volume : 113.8 fl  Mean Cell Hemoglobin : 36.5 pg  Mean Cell Hemoglobin Concentration : 32.0 gm/dL  Auto Neutrophil # : 6.03 K/uL  Auto Lymphocyte # : 0.73 K/uL  Auto Monocyte # : 0.59 K/uL  Auto Eosinophil # : 0.13 K/uL  Auto Basophil # : 0.07 K/uL  Auto Neutrophil % : 79.1 %  Auto Lymphocyte % : 9.6 %  Auto Monocyte % : 7.8 %  Auto Eosinophil % : 1.7 %  Auto Basophil % : 0.9 %    11-06    138  |  99  |  81.0<H>  ----------------------------<  133<H>  4.2   |  27.0  |  1.82<H>    Ca    8.0<L>      06 Nov 2020 05:08  Phos  4.0     11-06  Mg     1.8     11-06

## 2020-11-07 NOTE — PROGRESS NOTE ADULT - ASSESSMENT
69 y/o male admitted on 10/15 s/p fall with LLE hematoma. Hospital course complicated by ARF, COPD exacerbation, hypotensive/bradycardic episode post-operatively after reversal of anesthetic agents. Today patient noted to be short of breath with increasing oxygen requirements. Also w/ worsening hyponatremia and renal function. Pt normally lasix dependent - has not rcvd lasix since 10/31. Deterioration of respiratory status likely due to worsening R heart failure and severe pulmonary hypertension.     Neuro: Tylenol for pain control, minimize narcotics, melatonin for sleep hygiene, delirium precautions      CV: Cardiologist will perform cardiac cath prior to DC.  Continue hemodynamic monitoring. Vitals stable.  Continue Lasix 40 mg bid and coreg.  Appreciate cardiology recs     Pulm: Currently tolerating Nasal Cannula without resp distress.  Continue Bipap prn for COPD/overload.  Lasix 40 mg bid.  Pulm recs appreciated. VQ scan performed with low probability of PE.  Continue full anticoagulation with eliquis.   Continue pulmonary toileting     GI/Nutrition: Regular, bowel regimen    /Renal: Lasix 40 mg bid.  Creatinine downtrending.  Webb- strict i/o's, f/u electrolytes     ID: afebrile. No issues     Endo: monitor blood glucose    Skin: LLE dressing change to performed by surgical team     Heme/DVT Prophylaxis: Hgb stable, Eliquis for atrial fib, SCD to RLE     Dispo: Consider transfer to stepdown

## 2020-11-07 NOTE — CHART NOTE - NSCHARTNOTESELECT_GEN_ALL_CORE
Malnutrition Notification
Downgrade/Transfer Note
Event Note
Nephro c/s/Event Note
Nutrition Services
Transfer Note
Transfer Note
Transfer Note/Downgrade

## 2020-11-07 NOTE — PROGRESS NOTE ADULT - SUBJECTIVE AND OBJECTIVE BOX
Progress Note/SICU Downgrade note      INTERVAL HPI/OVERNIGHT EVENTS: Pt seen and examined at bedside. Downgrade from SICU this afternoon. Pt reports no complaints. Pain is well controlled. Passing flatus, no bm today. Denies n/v/f/SOB/CP.     STATUS POST:  evacuation of hematoma, sharp debridement to fascia, LLE, STSG, LLE    POST OPERATIVE DAY #: 17, 8      MEDICATIONS  (STANDING):  ALBUTerol    90 MICROgram(s) HFA Inhaler 1 Puff(s) Inhalation every 4 hours  albuterol/ipratropium for Nebulization 3 milliLiter(s) Nebulizer every 6 hours  albuterol/ipratropium for Nebulization. 3 milliLiter(s) Nebulizer once  allopurinol 100 milliGRAM(s) Oral daily  apixaban 5 milliGRAM(s) Oral every 12 hours  ascorbic acid 500 milliGRAM(s) Oral two times a day  aspirin  chewable 81 milliGRAM(s) Oral daily  atorvastatin 20 milliGRAM(s) Oral at bedtime  budesonide 160 MICROgram(s)/formoterol 4.5 MICROgram(s) Inhaler 2 Puff(s) Inhalation two times a day  carvedilol 12.5 milliGRAM(s) Oral every 12 hours  chlorhexidine 2% Cloths 1 Application(s) Topical daily  epoetin felisha-epbx (RETACRIT) Injectable 31718 Unit(s) SubCutaneous <User Schedule>  folic acid 1 milliGRAM(s) Oral daily  furosemide    Tablet 40 milliGRAM(s) Oral every 12 hours  melatonin 3 milliGRAM(s) Oral at bedtime  multivitamin 1 Tablet(s) Oral daily  pantoprazole    Tablet 40 milliGRAM(s) Oral before breakfast  senna 2 Tablet(s) Oral at bedtime  tamsulosin 0.4 milliGRAM(s) Oral at bedtime  thiamine 100 milliGRAM(s) Oral daily  zinc sulfate 220 milliGRAM(s) Oral daily    MEDICATIONS  (PRN):  acetaminophen   Tablet .. 650 milliGRAM(s) Oral every 6 hours PRN Mild Pain (1 - 3)  benzocaine 15 mG/menthol 3.6 mG (Sugar-Free) Lozenge 1 Lozenge Oral every 3 hours PRN Sore Throat  ondansetron Injectable 4 milliGRAM(s) IV Push once PRN Nausea and/or Vomiting      Vital Signs Last 24 Hrs  T(C): 36.7 (07 Nov 2020 13:27), Max: 36.7 (07 Nov 2020 08:00)  T(F): 98.1 (07 Nov 2020 13:27), Max: 98.1 (07 Nov 2020 08:00)  HR: 80 (07 Nov 2020 17:03) (67 - 84)  BP: 122/62 (07 Nov 2020 17:03) (103/61 - 140/94)  BP(mean): 70 (07 Nov 2020 13:00) (66 - 110)  RR: 20 (07 Nov 2020 13:27) (13 - 24)  SpO2: 97% (07 Nov 2020 15:33) (90% - 100%)    Constitutional: NAD, well-groomed, well-developed  HEENT: PERRLA, EOMI, no drainage or redness  Neck: No bruits; no thyromegaly or nodules,  No JVD  Respiratory: Breath Sounds equal & clear to percussion & auscultation, no accessory muscle use  Cardiovascular: Regular rate & rhythm, normal S1, S2; no murmurs, gallops or rubs; no S3, S4  Gastrointestinal: Soft, non-tender, no hepatosplenomegaly, normal bowel sounds  Extremities: No peripheral edema, No cyanosis, clubbing   Vascular: Equal and normal pulses: 2+ peripheral pulses throughout  Neurological: GCS: 15. A&O x 3; no sensory, motor or coordination deficits, normal reflexes  Psychiatric: Normal mood, normal affect  Musculoskeletal: No joint pain, swelling or deformity; no limitation of movement, L leg with ace wrap, kerlex, donor site with xeroform  Skin: No rashes      I&O's Detail    06 Nov 2020 07:01  -  07 Nov 2020 07:00  --------------------------------------------------------  IN:    Oral Fluid: 920 mL  Total IN: 920 mL    OUT:    Indwelling Catheter - Urethral (mL): 1275 mL  Total OUT: 1275 mL    Total NET: -355 mL      07 Nov 2020 07:01  -  07 Nov 2020 18:58  --------------------------------------------------------  IN:    Oral Fluid: 480 mL  Total IN: 480 mL    OUT:    Indwelling Catheter - Urethral (mL): 205 mL  Total OUT: 205 mL    Total NET: 275 mL          LABS:                        7.6    7.87  )-----------( 207      ( 07 Nov 2020 05:41 )             24.6     11-07    137  |  98  |  80.0<H>  ----------------------------<  126<H>  5.1   |  27.0  |  1.93<H>    Ca    8.0<L>      07 Nov 2020 05:41  Phos  5.2     11-07  Mg     2.0     11-07

## 2020-11-07 NOTE — PROGRESS NOTE ADULT - SUBJECTIVE AND OBJECTIVE BOX
PULMONARY PROGRESS NOTE      DEBI BADILLOAPARNA-[id number]    Patient is a 70y old  Male who presents with a chief complaint of pul htn (06 Nov 2020 13:25)      INTERVAL HPI/OVERNIGHT EVENTS:  sitting up in chair  NAD  denies any Cp or SOB  pt tolerating bipap  MEDICATIONS  (STANDING):  ALBUTerol    90 MICROgram(s) HFA Inhaler 1 Puff(s) Inhalation every 4 hours  albuterol/ipratropium for Nebulization 3 milliLiter(s) Nebulizer every 6 hours  albuterol/ipratropium for Nebulization. 3 milliLiter(s) Nebulizer once  allopurinol 100 milliGRAM(s) Oral daily  apixaban 5 milliGRAM(s) Oral every 12 hours  ascorbic acid 500 milliGRAM(s) Oral two times a day  aspirin  chewable 81 milliGRAM(s) Oral daily  atorvastatin 20 milliGRAM(s) Oral at bedtime  budesonide 160 MICROgram(s)/formoterol 4.5 MICROgram(s) Inhaler 2 Puff(s) Inhalation two times a day  carvedilol 12.5 milliGRAM(s) Oral every 12 hours  chlorhexidine 2% Cloths 1 Application(s) Topical daily  epoetin felisha-epbx (RETACRIT) Injectable 97460 Unit(s) SubCutaneous <User Schedule>  folic acid 1 milliGRAM(s) Oral daily  furosemide    Tablet 40 milliGRAM(s) Oral every 12 hours  melatonin 3 milliGRAM(s) Oral at bedtime  multivitamin 1 Tablet(s) Oral daily  pantoprazole    Tablet 40 milliGRAM(s) Oral before breakfast  senna 2 Tablet(s) Oral at bedtime  tamsulosin 0.4 milliGRAM(s) Oral at bedtime  thiamine 100 milliGRAM(s) Oral daily  zinc sulfate 220 milliGRAM(s) Oral daily      MEDICATIONS  (PRN):  acetaminophen   Tablet .. 650 milliGRAM(s) Oral every 6 hours PRN Mild Pain (1 - 3)  benzocaine 15 mG/menthol 3.6 mG (Sugar-Free) Lozenge 1 Lozenge Oral every 3 hours PRN Sore Throat  ondansetron Injectable 4 milliGRAM(s) IV Push once PRN Nausea and/or Vomiting      Allergies    No Known Allergies    Intolerances        PAST MEDICAL & SURGICAL HISTORY:  CKD (chronic kidney disease)    CHF (congestive heart failure)    Atrial fibrillation    COPD, severity to be determined    Coronary artery disease involving coronary bypass graft of native heart without angina pectoris    Chronic osteomyelitis, osteomyelitis of unspecified site    COPD (chronic obstructive pulmonary disease)    Atrial fibrillation    Bladder cancer    HLD (hyperlipidemia)    H/O knee surgery    S/P CABG (coronary artery bypass graft)    Presence of stent of CABG        SOCIAL HISTORY  Smoking History:       REVIEW OF SYSTEMS:    CONSTITUTIONAL:  No distress    HEENT:  Eyes:  No diplopia or blurred vision. ENT:  No earache, sore throat or runny nose.    CARDIOVASCULAR:  No pressure, squeezing, tightness, heaviness or aching about the chest; no palpitations.    RESPIRATORY:  see HPI    GASTROINTESTINAL:  No nausea, vomiting or diarrhea.    GENITOURINARY:  No dysuria, frequency or urgency.    NEUROLOGIC:  No paresthesias, fasciculations, seizures or weakness.    PSYCHIATRIC:  No disorder of thought or mood.    Vital Signs Last 24 Hrs  T(C): 36.7 (07 Nov 2020 08:00), Max: 37.4 (06 Nov 2020 15:24)  T(F): 98.1 (07 Nov 2020 08:00), Max: 99.3 (06 Nov 2020 15:24)  HR: 73 (07 Nov 2020 09:00) (67 - 86)  BP: 113/48 (07 Nov 2020 09:00) (104/63 - 140/94)  BP(mean): 66 (07 Nov 2020 09:00) (66 - 110)  RR: 16 (07 Nov 2020 09:00) (13 - 24)  SpO2: 92% (07 Nov 2020 09:00) (90% - 100%)    PHYSICAL EXAMINATION:    GENERAL: The patient is awake and alert in no apparent distress.     HEENT: Head is normocephalic and atraumatic. Extraocular muscles are intact. Mucous membranes are moist.    NECK: Supple.    LUNGS: moderate air entry  without wheezing, rales or rhonchi; respirations unlabored    HEART: Regular rate and rhythm without murmur.    ABDOMEN: Soft, nontender, and nondistended.      EXTREMITIES: Without any cyanosis, clubbing, rash, lesions or edema.    NEUROLOGIC: Grossly intact.    LABS:                        7.6    7.87  )-----------( 207      ( 07 Nov 2020 05:41 )             24.6     11-07    137  |  98  |  80.0<H>  ----------------------------<  126<H>  5.1   |  27.0  |  1.93<H>    Ca    8.0<L>      07 Nov 2020 05:41  Phos  5.2     11-07  Mg     2.0     11-07                  Serum Pro-Brain Natriuretic Peptide: 65326 pg/mL (11-05-20 @ 05:56)          MICROBIOLOGY:    RADIOLOGY & ADDITIONAL STUDIES:

## 2020-11-07 NOTE — CHART NOTE - NSCHARTNOTEFT_GEN_A_CORE
SICU TRANSFER NOTE  -----------------------------  ICU Admission Date: 11/2/2020  Transfer Date: 11-07-20 @ 12:56    Admission Diagnosis: respiratory failure    Active Problems/injuries: respiratory insufficiency 2/2 COPD, R heart failure, LLE skin graft, LL thigh skin graft donor site, resolving ARF    Procedures:   10/21: LLE wound debridement  10/30: LLE skin graft    Consultants:  cardiology  renal    Medications  acetaminophen   Tablet .. 650 milliGRAM(s) Oral every 6 hours PRN  ALBUTerol    90 MICROgram(s) HFA Inhaler 1 Puff(s) Inhalation every 4 hours  albuterol/ipratropium for Nebulization 3 milliLiter(s) Nebulizer every 6 hours  albuterol/ipratropium for Nebulization. 3 milliLiter(s) Nebulizer once  allopurinol 100 milliGRAM(s) Oral daily  apixaban 5 milliGRAM(s) Oral every 12 hours  ascorbic acid 500 milliGRAM(s) Oral two times a day  aspirin  chewable 81 milliGRAM(s) Oral daily  atorvastatin 20 milliGRAM(s) Oral at bedtime  benzocaine 15 mG/menthol 3.6 mG (Sugar-Free) Lozenge 1 Lozenge Oral every 3 hours PRN  budesonide 160 MICROgram(s)/formoterol 4.5 MICROgram(s) Inhaler 2 Puff(s) Inhalation two times a day  carvedilol 12.5 milliGRAM(s) Oral every 12 hours  chlorhexidine 2% Cloths 1 Application(s) Topical daily  epoetin felisha-epbx (RETACRIT) Injectable 15634 Unit(s) SubCutaneous <User Schedule>  folic acid 1 milliGRAM(s) Oral daily  furosemide    Tablet 40 milliGRAM(s) Oral every 12 hours  melatonin 3 milliGRAM(s) Oral at bedtime  multivitamin 1 Tablet(s) Oral daily  ondansetron Injectable 4 milliGRAM(s) IV Push once PRN  pantoprazole    Tablet 40 milliGRAM(s) Oral before breakfast  senna 2 Tablet(s) Oral at bedtime  tamsulosin 0.4 milliGRAM(s) Oral at bedtime  thiamine 100 milliGRAM(s) Oral daily  zinc sulfate 220 milliGRAM(s) Oral daily    [x] I attest I have reviewed and reconciled all medications prior to transfer    IV Fluids  lactated ringers Bolus:   500 milliLiter(s), IV Bolus, once, infuse over 2 Hour(s), Stop After 1 Doses  Provider's Contact #: [phone]  sodium chloride 0.9% Bolus:   1000 milliLiter(s), IV Bolus, once, infuse over 60 Minute(s), Stop After 1 Doses  Provider's Contact #: [phone]  sodium chloride 0.9% Bolus:   1000 milliLiter(s), IV Bolus, once, infuse over 60 Minute(s), Stop After 1 Doses  Provider's Contact #: [phone]  sodium chloride 0.9%.: Solution, 1000 milliLiter(s) infuse at 42 mL/Hr  Provider's Contact #: [phone]  sodium chloride 0.9%.: Solution, 1000 milliLiter(s) infuse at 42 mL/Hr  lactated ringers.: Solution, 1000 milliLiter(s) infuse at 75 mL/Hr  Provider's Contact #: [phone]  lactated ringers.: Solution, 1000 milliLiter(s) infuse at 100 mL/Hr  Provider's Contact #: [phone]    Indication: fluid balance    Antibiotics: none    I have discussed this case with [email or web address] [email or web address] upon transfer and all questions regarding ICU course were answered.    The following items are to be followed up:  - continue home dose lasix (40 mg BID)  - cardiology following, plan for R heart cath before D/C - HGB > 8 for cath  - 2L home O2 (new prescription, patient has not used at home yet, but should have set up for discharge)  - PT/OT  - Webb d/c this morning, follow up TOV by 5 PM SICU TRANSFER NOTE  -----------------------------  ICU Admission Date: 11/2/2020  Transfer Date: 11-07-20 @ 12:56    Admission Diagnosis: respiratory failure    Active Problems/injuries: respiratory insufficiency 2/2 COPD, R heart failure, LLE skin graft, LL thigh skin graft donor site, resolving ARF    Procedures:   10/21: LLE wound debridement  10/30: LLE skin graft    Consultants:  cardiology  renal    Medications  acetaminophen   Tablet .. 650 milliGRAM(s) Oral every 6 hours PRN  ALBUTerol    90 MICROgram(s) HFA Inhaler 1 Puff(s) Inhalation every 4 hours  albuterol/ipratropium for Nebulization 3 milliLiter(s) Nebulizer every 6 hours  albuterol/ipratropium for Nebulization. 3 milliLiter(s) Nebulizer once  allopurinol 100 milliGRAM(s) Oral daily  apixaban 5 milliGRAM(s) Oral every 12 hours  ascorbic acid 500 milliGRAM(s) Oral two times a day  aspirin  chewable 81 milliGRAM(s) Oral daily  atorvastatin 20 milliGRAM(s) Oral at bedtime  benzocaine 15 mG/menthol 3.6 mG (Sugar-Free) Lozenge 1 Lozenge Oral every 3 hours PRN  budesonide 160 MICROgram(s)/formoterol 4.5 MICROgram(s) Inhaler 2 Puff(s) Inhalation two times a day  carvedilol 12.5 milliGRAM(s) Oral every 12 hours  chlorhexidine 2% Cloths 1 Application(s) Topical daily  epoetin felisha-epbx (RETACRIT) Injectable 66076 Unit(s) SubCutaneous <User Schedule>  folic acid 1 milliGRAM(s) Oral daily  furosemide    Tablet 40 milliGRAM(s) Oral every 12 hours  melatonin 3 milliGRAM(s) Oral at bedtime  multivitamin 1 Tablet(s) Oral daily  ondansetron Injectable 4 milliGRAM(s) IV Push once PRN  pantoprazole    Tablet 40 milliGRAM(s) Oral before breakfast  senna 2 Tablet(s) Oral at bedtime  tamsulosin 0.4 milliGRAM(s) Oral at bedtime  thiamine 100 milliGRAM(s) Oral daily  zinc sulfate 220 milliGRAM(s) Oral daily    [x] I attest I have reviewed and reconciled all medications prior to transfer    IV Fluids  lactated ringers Bolus:   500 milliLiter(s), IV Bolus, once, infuse over 2 Hour(s), Stop After 1 Doses  Provider's Contact #: [phone]  sodium chloride 0.9% Bolus:   1000 milliLiter(s), IV Bolus, once, infuse over 60 Minute(s), Stop After 1 Doses  Provider's Contact #: [phone]  sodium chloride 0.9% Bolus:   1000 milliLiter(s), IV Bolus, once, infuse over 60 Minute(s), Stop After 1 Doses  Provider's Contact #: [phone]  sodium chloride 0.9%.: Solution, 1000 milliLiter(s) infuse at 42 mL/Hr  Provider's Contact #: [phone]  sodium chloride 0.9%.: Solution, 1000 milliLiter(s) infuse at 42 mL/Hr  lactated ringers.: Solution, 1000 milliLiter(s) infuse at 75 mL/Hr  Provider's Contact #: [phone]  lactated ringers.: Solution, 1000 milliLiter(s) infuse at 100 mL/Hr  Provider's Contact #: [phone]    Indication: fluid balance    Antibiotics: none    I have discussed this case with Dr. Espinosa upon transfer and all questions regarding ICU course were answered.    The following items are to be followed up:  - continue home dose lasix (40 mg BID)  - cardiology following, plan for R heart cath before D/C - HGB > 8 for cath  - 2L home O2 (new prescription, patient has not used at home yet, but should have set up for discharge)  - PT/OT  - Jon d/c this morning, follow up TOV by 5 PM SICU TRANSFER NOTE  -----------------------------  ICU Admission Date: 11/2/2020  Transfer Date: 11-07-20 @ 12:56    Admission Diagnosis: respiratory failure    Active Problems/injuries: respiratory insufficiency 2/2 COPD, R heart failure, LLE skin graft, LL thigh skin graft donor site, resolving ARF    Procedures:   10/21: LLE wound debridement  10/30: LLE skin graft    Consultants:  cardiology  renal  pulmonology     Medications  acetaminophen   Tablet .. 650 milliGRAM(s) Oral every 6 hours PRN  ALBUTerol    90 MICROgram(s) HFA Inhaler 1 Puff(s) Inhalation every 4 hours  albuterol/ipratropium for Nebulization 3 milliLiter(s) Nebulizer every 6 hours  albuterol/ipratropium for Nebulization. 3 milliLiter(s) Nebulizer once  allopurinol 100 milliGRAM(s) Oral daily  apixaban 5 milliGRAM(s) Oral every 12 hours  ascorbic acid 500 milliGRAM(s) Oral two times a day  aspirin  chewable 81 milliGRAM(s) Oral daily  atorvastatin 20 milliGRAM(s) Oral at bedtime  benzocaine 15 mG/menthol 3.6 mG (Sugar-Free) Lozenge 1 Lozenge Oral every 3 hours PRN  budesonide 160 MICROgram(s)/formoterol 4.5 MICROgram(s) Inhaler 2 Puff(s) Inhalation two times a day  carvedilol 12.5 milliGRAM(s) Oral every 12 hours  chlorhexidine 2% Cloths 1 Application(s) Topical daily  epoetin felisha-epbx (RETACRIT) Injectable 04242 Unit(s) SubCutaneous <User Schedule>  folic acid 1 milliGRAM(s) Oral daily  furosemide    Tablet 40 milliGRAM(s) Oral every 12 hours  melatonin 3 milliGRAM(s) Oral at bedtime  multivitamin 1 Tablet(s) Oral daily  ondansetron Injectable 4 milliGRAM(s) IV Push once PRN  pantoprazole    Tablet 40 milliGRAM(s) Oral before breakfast  senna 2 Tablet(s) Oral at bedtime  tamsulosin 0.4 milliGRAM(s) Oral at bedtime  thiamine 100 milliGRAM(s) Oral daily  zinc sulfate 220 milliGRAM(s) Oral daily    [x] I attest I have reviewed and reconciled all medications prior to transfer    IV Fluids  lactated ringers Bolus:   500 milliLiter(s), IV Bolus, once, infuse over 2 Hour(s), Stop After 1 Doses  Provider's Contact #: [phone]  sodium chloride 0.9% Bolus:   1000 milliLiter(s), IV Bolus, once, infuse over 60 Minute(s), Stop After 1 Doses  Provider's Contact #: [phone]  sodium chloride 0.9% Bolus:   1000 milliLiter(s), IV Bolus, once, infuse over 60 Minute(s), Stop After 1 Doses  Provider's Contact #: [phone]  sodium chloride 0.9%.: Solution, 1000 milliLiter(s) infuse at 42 mL/Hr  Provider's Contact #: [phone]  sodium chloride 0.9%.: Solution, 1000 milliLiter(s) infuse at 42 mL/Hr  lactated ringers.: Solution, 1000 milliLiter(s) infuse at 75 mL/Hr  Provider's Contact #: [phone]  lactated ringers.: Solution, 1000 milliLiter(s) infuse at 100 mL/Hr  Provider's Contact #: [phone]    Indication: fluid balance    Antibiotics: none    I have discussed this case with Dr. Espinosa upon transfer and all questions regarding ICU course were answered.    The following items are to be followed up:  - continue home dose lasix (40 mg BID)  - cardiology following, plan for R heart cath before D/C - HGB > 8 for cath  - 2L home O2 (new prescription, patient has not used at home yet, but should have set up for discharge)  - PT/OT  - Webb d/c this morning, follow up TOV by 5 PM  - PT/OT

## 2020-11-08 LAB
ALBUMIN SERPL ELPH-MCNC: 3.2 G/DL — LOW (ref 3.3–5.2)
ALP SERPL-CCNC: 184 U/L — HIGH (ref 40–120)
ALT FLD-CCNC: 13 U/L — SIGNIFICANT CHANGE UP
ANION GAP SERPL CALC-SCNC: 9 MMOL/L — SIGNIFICANT CHANGE UP (ref 5–17)
AST SERPL-CCNC: 14 U/L — SIGNIFICANT CHANGE UP
BASOPHILS # BLD AUTO: 0.03 K/UL — SIGNIFICANT CHANGE UP (ref 0–0.2)
BASOPHILS NFR BLD AUTO: 0.5 % — SIGNIFICANT CHANGE UP (ref 0–2)
BILIRUB SERPL-MCNC: 0.6 MG/DL — SIGNIFICANT CHANGE UP (ref 0.4–2)
BUN SERPL-MCNC: 85 MG/DL — HIGH (ref 8–20)
CALCIUM SERPL-MCNC: 8.2 MG/DL — LOW (ref 8.6–10.2)
CHLORIDE SERPL-SCNC: 99 MMOL/L — SIGNIFICANT CHANGE UP (ref 98–107)
CO2 SERPL-SCNC: 28 MMOL/L — SIGNIFICANT CHANGE UP (ref 22–29)
CREAT SERPL-MCNC: 2.05 MG/DL — HIGH (ref 0.5–1.3)
EOSINOPHIL # BLD AUTO: 0.26 K/UL — SIGNIFICANT CHANGE UP (ref 0–0.5)
EOSINOPHIL NFR BLD AUTO: 4 % — SIGNIFICANT CHANGE UP (ref 0–6)
GLUCOSE SERPL-MCNC: 101 MG/DL — HIGH (ref 70–99)
HCT VFR BLD CALC: 23.5 % — LOW (ref 39–50)
HGB BLD-MCNC: 7.2 G/DL — LOW (ref 13–17)
IMM GRANULOCYTES NFR BLD AUTO: 0.8 % — SIGNIFICANT CHANGE UP (ref 0–1.5)
LYMPHOCYTES # BLD AUTO: 0.86 K/UL — LOW (ref 1–3.3)
LYMPHOCYTES # BLD AUTO: 13.3 % — SIGNIFICANT CHANGE UP (ref 13–44)
MAGNESIUM SERPL-MCNC: 1.9 MG/DL — SIGNIFICANT CHANGE UP (ref 1.6–2.6)
MCHC RBC-ENTMCNC: 30.6 GM/DL — LOW (ref 32–36)
MCHC RBC-ENTMCNC: 33.5 PG — SIGNIFICANT CHANGE UP (ref 27–34)
MCV RBC AUTO: 109.3 FL — HIGH (ref 80–100)
MONOCYTES # BLD AUTO: 0.61 K/UL — SIGNIFICANT CHANGE UP (ref 0–0.9)
MONOCYTES NFR BLD AUTO: 9.5 % — SIGNIFICANT CHANGE UP (ref 2–14)
NEUTROPHILS # BLD AUTO: 4.64 K/UL — SIGNIFICANT CHANGE UP (ref 1.8–7.4)
NEUTROPHILS NFR BLD AUTO: 71.9 % — SIGNIFICANT CHANGE UP (ref 43–77)
PHOSPHATE SERPL-MCNC: 4.7 MG/DL — SIGNIFICANT CHANGE UP (ref 2.4–4.7)
PLATELET # BLD AUTO: 223 K/UL — SIGNIFICANT CHANGE UP (ref 150–400)
POTASSIUM SERPL-MCNC: 4.3 MMOL/L — SIGNIFICANT CHANGE UP (ref 3.5–5.3)
POTASSIUM SERPL-SCNC: 4.3 MMOL/L — SIGNIFICANT CHANGE UP (ref 3.5–5.3)
PROT SERPL-MCNC: 4.9 G/DL — LOW (ref 6.6–8.7)
RBC # BLD: 2.15 M/UL — LOW (ref 4.2–5.8)
RBC # FLD: 17.1 % — HIGH (ref 10.3–14.5)
SODIUM SERPL-SCNC: 136 MMOL/L — SIGNIFICANT CHANGE UP (ref 135–145)
WBC # BLD: 6.45 K/UL — SIGNIFICANT CHANGE UP (ref 3.8–10.5)
WBC # FLD AUTO: 6.45 K/UL — SIGNIFICANT CHANGE UP (ref 3.8–10.5)

## 2020-11-08 RX ADMIN — Medication 1 MILLIGRAM(S): at 11:17

## 2020-11-08 RX ADMIN — ATORVASTATIN CALCIUM 20 MILLIGRAM(S): 80 TABLET, FILM COATED ORAL at 21:21

## 2020-11-08 RX ADMIN — Medication 3 MILLIGRAM(S): at 21:21

## 2020-11-08 RX ADMIN — APIXABAN 5 MILLIGRAM(S): 2.5 TABLET, FILM COATED ORAL at 05:39

## 2020-11-08 RX ADMIN — BUDESONIDE AND FORMOTEROL FUMARATE DIHYDRATE 2 PUFF(S): 160; 4.5 AEROSOL RESPIRATORY (INHALATION) at 19:38

## 2020-11-08 RX ADMIN — Medication 3 MILLILITER(S): at 19:42

## 2020-11-08 RX ADMIN — Medication 500 MILLIGRAM(S): at 16:15

## 2020-11-08 RX ADMIN — Medication 3 MILLILITER(S): at 03:30

## 2020-11-08 RX ADMIN — Medication 100 MILLIGRAM(S): at 11:17

## 2020-11-08 RX ADMIN — Medication 40 MILLIGRAM(S): at 16:15

## 2020-11-08 RX ADMIN — Medication 3 MILLILITER(S): at 15:01

## 2020-11-08 RX ADMIN — Medication 1 TABLET(S): at 11:17

## 2020-11-08 RX ADMIN — Medication 500 MILLIGRAM(S): at 05:39

## 2020-11-08 RX ADMIN — Medication 3 MILLILITER(S): at 08:51

## 2020-11-08 RX ADMIN — BUDESONIDE AND FORMOTEROL FUMARATE DIHYDRATE 2 PUFF(S): 160; 4.5 AEROSOL RESPIRATORY (INHALATION) at 08:52

## 2020-11-08 RX ADMIN — Medication 81 MILLIGRAM(S): at 11:17

## 2020-11-08 RX ADMIN — APIXABAN 5 MILLIGRAM(S): 2.5 TABLET, FILM COATED ORAL at 16:15

## 2020-11-08 RX ADMIN — CARVEDILOL PHOSPHATE 12.5 MILLIGRAM(S): 80 CAPSULE, EXTENDED RELEASE ORAL at 16:16

## 2020-11-08 RX ADMIN — TAMSULOSIN HYDROCHLORIDE 0.4 MILLIGRAM(S): 0.4 CAPSULE ORAL at 21:21

## 2020-11-08 RX ADMIN — PANTOPRAZOLE SODIUM 40 MILLIGRAM(S): 20 TABLET, DELAYED RELEASE ORAL at 05:39

## 2020-11-08 RX ADMIN — ZINC SULFATE TAB 220 MG (50 MG ZINC EQUIVALENT) 220 MILLIGRAM(S): 220 (50 ZN) TAB at 16:15

## 2020-11-08 RX ADMIN — SENNA PLUS 2 TABLET(S): 8.6 TABLET ORAL at 21:21

## 2020-11-08 NOTE — PROGRESS NOTE ADULT - SUBJECTIVE AND OBJECTIVE BOX
Progress Note      INTERVAL HPI/OVERNIGHT EVENTS: Pt seen and examined at bedside. Downgrade from SICU this afternoon. Pt reports no complaints. Pain is well controlled. Passing flatus, no bm today. Denies n/v/f/SOB/CP.     STATUS POST:  evacuation of hematoma, sharp debridement to fascia, LLE, STSG, LLE    POST OPERATIVE DAY #: 17, 8      MEDICATIONS  (STANDING):  ALBUTerol    90 MICROgram(s) HFA Inhaler 1 Puff(s) Inhalation every 4 hours  albuterol/ipratropium for Nebulization 3 milliLiter(s) Nebulizer every 6 hours  albuterol/ipratropium for Nebulization. 3 milliLiter(s) Nebulizer once  allopurinol 100 milliGRAM(s) Oral daily  apixaban 5 milliGRAM(s) Oral every 12 hours  ascorbic acid 500 milliGRAM(s) Oral two times a day  aspirin  chewable 81 milliGRAM(s) Oral daily  atorvastatin 20 milliGRAM(s) Oral at bedtime  budesonide 160 MICROgram(s)/formoterol 4.5 MICROgram(s) Inhaler 2 Puff(s) Inhalation two times a day  carvedilol 12.5 milliGRAM(s) Oral every 12 hours  chlorhexidine 2% Cloths 1 Application(s) Topical daily  epoetin felisha-epbx (RETACRIT) Injectable 03113 Unit(s) SubCutaneous <User Schedule>  folic acid 1 milliGRAM(s) Oral daily  furosemide    Tablet 40 milliGRAM(s) Oral every 12 hours  melatonin 3 milliGRAM(s) Oral at bedtime  multivitamin 1 Tablet(s) Oral daily  pantoprazole    Tablet 40 milliGRAM(s) Oral before breakfast  senna 2 Tablet(s) Oral at bedtime  tamsulosin 0.4 milliGRAM(s) Oral at bedtime  thiamine 100 milliGRAM(s) Oral daily  zinc sulfate 220 milliGRAM(s) Oral daily    MEDICATIONS  (PRN):  acetaminophen   Tablet .. 650 milliGRAM(s) Oral every 6 hours PRN Mild Pain (1 - 3)  benzocaine 15 mG/menthol 3.6 mG (Sugar-Free) Lozenge 1 Lozenge Oral every 3 hours PRN Sore Throat  ondansetron Injectable 4 milliGRAM(s) IV Push once PRN Nausea and/or Vomiting      Vital Signs Last 24 Hrs  T(C): 36.7 (07 Nov 2020 13:27), Max: 36.7 (07 Nov 2020 08:00)  T(F): 98.1 (07 Nov 2020 13:27), Max: 98.1 (07 Nov 2020 08:00)  HR: 80 (07 Nov 2020 17:03) (67 - 84)  BP: 122/62 (07 Nov 2020 17:03) (103/61 - 140/94)  BP(mean): 70 (07 Nov 2020 13:00) (66 - 110)  RR: 20 (07 Nov 2020 13:27) (13 - 24)  SpO2: 97% (07 Nov 2020 15:33) (90% - 100%)    Constitutional: NAD, well-groomed, well-developed  HEENT: PERRLA, EOMI, no drainage or redness  Neck: No bruits; no thyromegaly or nodules,  No JVD  Respiratory: Breath Sounds equal & clear to percussion & auscultation, no accessory muscle use  Cardiovascular: Regular rate & rhythm, normal S1, S2; no murmurs, gallops or rubs; no S3, S4  Gastrointestinal: Soft, non-tender, no hepatosplenomegaly, normal bowel sounds  Extremities: No peripheral edema, No cyanosis, clubbing   Vascular: Equal and normal pulses: 2+ peripheral pulses throughout  Neurological: GCS: 15. A&O x 3; no sensory, motor or coordination deficits, normal reflexes  Psychiatric: Normal mood, normal affect  Musculoskeletal: No joint pain, swelling or deformity; no limitation of movement, L leg with ace wrap, kerlex, donor site with xeroform  Skin: No rashes      I&O's Detail    06 Nov 2020 07:01  -  07 Nov 2020 07:00  --------------------------------------------------------  IN:    Oral Fluid: 920 mL  Total IN: 920 mL    OUT:    Indwelling Catheter - Urethral (mL): 1275 mL  Total OUT: 1275 mL    Total NET: -355 mL      07 Nov 2020 07:01  -  07 Nov 2020 18:58  --------------------------------------------------------  IN:    Oral Fluid: 480 mL  Total IN: 480 mL    OUT:    Indwelling Catheter - Urethral (mL): 205 mL  Total OUT: 205 mL    Total NET: 275 mL          LABS:                        7.6    7.87  )-----------( 207      ( 07 Nov 2020 05:41 )             24.6     11-07    137  |  98  |  80.0<H>  ----------------------------<  126<H>  5.1   |  27.0  |  1.93<H>    Ca    8.0<L>      07 Nov 2020 05:41  Phos  5.2     11-07  Mg     2.0     11-07                Assessment and Plan:   · Assessment	  69 y/o male admitted on 10/15 s/p fall with LLE hematoma. Hospital course complicated by ARF, COPD exacerbation, hypotensive/bradycardic episode post-operatively after reversal of anesthetic agents. Pt was downgraded from SICU to floors today on O2 2L NC, lasix restarted, otherwise recovering well after surgery, doing well on the floors.    - continue home dose lasix (40 mg BID)  - cardiology following, plan for R heart cath before D/C - HGB > 8 for cath  - 2L home O2 (new prescription, patient has not used at home yet, but should have set up for discharge)  - PT/OT  - TOV passed

## 2020-11-09 LAB
ANION GAP SERPL CALC-SCNC: 9 MMOL/L — SIGNIFICANT CHANGE UP (ref 5–17)
ANISOCYTOSIS BLD QL: SLIGHT — SIGNIFICANT CHANGE UP
BASE EXCESS BLDA CALC-SCNC: 3.5 MMOL/L — HIGH (ref -3–3)
BASOPHILS # BLD AUTO: 0 K/UL — SIGNIFICANT CHANGE UP (ref 0–0.2)
BASOPHILS NFR BLD AUTO: 0 % — SIGNIFICANT CHANGE UP (ref 0–2)
BLOOD GAS COMMENTS ARTERIAL: SIGNIFICANT CHANGE UP
BUN SERPL-MCNC: 92 MG/DL — HIGH (ref 8–20)
CALCIUM SERPL-MCNC: 8 MG/DL — LOW (ref 8.6–10.2)
CHLORIDE SERPL-SCNC: 100 MMOL/L — SIGNIFICANT CHANGE UP (ref 98–107)
CO2 SERPL-SCNC: 29 MMOL/L — SIGNIFICANT CHANGE UP (ref 22–29)
CREAT SERPL-MCNC: 2.06 MG/DL — HIGH (ref 0.5–1.3)
DACRYOCYTES BLD QL SMEAR: SLIGHT — SIGNIFICANT CHANGE UP
EOSINOPHIL # BLD AUTO: 0.19 K/UL — SIGNIFICANT CHANGE UP (ref 0–0.5)
EOSINOPHIL NFR BLD AUTO: 2.8 % — SIGNIFICANT CHANGE UP (ref 0–6)
GAS PNL BLDA: SIGNIFICANT CHANGE UP
GLUCOSE SERPL-MCNC: 155 MG/DL — HIGH (ref 70–99)
HCO3 BLDA-SCNC: 28 MMOL/L — HIGH (ref 20–26)
HCT VFR BLD CALC: 22.7 % — LOW (ref 39–50)
HGB BLD-MCNC: 7.1 G/DL — LOW (ref 13–17)
HOROWITZ INDEX BLDA+IHG-RTO: SIGNIFICANT CHANGE UP
HYPOCHROMIA BLD QL: SLIGHT — SIGNIFICANT CHANGE UP
LYMPHOCYTES # BLD AUTO: 0.56 K/UL — LOW (ref 1–3.3)
LYMPHOCYTES # BLD AUTO: 8.3 % — LOW (ref 13–44)
MACROCYTES BLD QL: SLIGHT — SIGNIFICANT CHANGE UP
MAGNESIUM SERPL-MCNC: 1.8 MG/DL — SIGNIFICANT CHANGE UP (ref 1.6–2.6)
MANUAL SMEAR VERIFICATION: SIGNIFICANT CHANGE UP
MCHC RBC-ENTMCNC: 31.3 GM/DL — LOW (ref 32–36)
MCHC RBC-ENTMCNC: 36 PG — HIGH (ref 27–34)
MCV RBC AUTO: 115.2 FL — HIGH (ref 80–100)
MONOCYTES # BLD AUTO: 0.31 K/UL — SIGNIFICANT CHANGE UP (ref 0–0.9)
MONOCYTES NFR BLD AUTO: 4.6 % — SIGNIFICANT CHANGE UP (ref 2–14)
NEUTROPHILS # BLD AUTO: 5.67 K/UL — SIGNIFICANT CHANGE UP (ref 1.8–7.4)
NEUTROPHILS NFR BLD AUTO: 84.3 % — HIGH (ref 43–77)
NRBC # BLD: 1 /100 — HIGH (ref 0–0)
PCO2 BLDA: 58 MMHG — HIGH (ref 35–45)
PH BLDA: 7.32 — LOW (ref 7.35–7.45)
PHOSPHATE SERPL-MCNC: 4.5 MG/DL — SIGNIFICANT CHANGE UP (ref 2.4–4.7)
PLAT MORPH BLD: NORMAL — SIGNIFICANT CHANGE UP
PLATELET # BLD AUTO: 243 K/UL — SIGNIFICANT CHANGE UP (ref 150–400)
PO2 BLDA: 74 MMHG — LOW (ref 83–108)
POLYCHROMASIA BLD QL SMEAR: SLIGHT — SIGNIFICANT CHANGE UP
POTASSIUM SERPL-MCNC: 4.5 MMOL/L — SIGNIFICANT CHANGE UP (ref 3.5–5.3)
POTASSIUM SERPL-SCNC: 4.5 MMOL/L — SIGNIFICANT CHANGE UP (ref 3.5–5.3)
RBC # BLD: 1.97 M/UL — LOW (ref 4.2–5.8)
RBC # FLD: 19.8 % — HIGH (ref 10.3–14.5)
RBC BLD AUTO: ABNORMAL
SAO2 % BLDA: 96 % — SIGNIFICANT CHANGE UP (ref 95–99)
SODIUM SERPL-SCNC: 138 MMOL/L — SIGNIFICANT CHANGE UP (ref 135–145)
WBC # BLD: 6.73 K/UL — SIGNIFICANT CHANGE UP (ref 3.8–10.5)
WBC # FLD AUTO: 6.73 K/UL — SIGNIFICANT CHANGE UP (ref 3.8–10.5)

## 2020-11-09 PROCEDURE — 99232 SBSQ HOSP IP/OBS MODERATE 35: CPT | Mod: GC

## 2020-11-09 PROCEDURE — 99232 SBSQ HOSP IP/OBS MODERATE 35: CPT

## 2020-11-09 RX ADMIN — Medication 3 MILLILITER(S): at 08:18

## 2020-11-09 RX ADMIN — Medication 100 MILLIGRAM(S): at 22:17

## 2020-11-09 RX ADMIN — PANTOPRAZOLE SODIUM 40 MILLIGRAM(S): 20 TABLET, DELAYED RELEASE ORAL at 05:00

## 2020-11-09 RX ADMIN — Medication 100 MILLIGRAM(S): at 11:20

## 2020-11-09 RX ADMIN — SENNA PLUS 2 TABLET(S): 8.6 TABLET ORAL at 22:17

## 2020-11-09 RX ADMIN — Medication 3 MILLILITER(S): at 20:35

## 2020-11-09 RX ADMIN — ATORVASTATIN CALCIUM 20 MILLIGRAM(S): 80 TABLET, FILM COATED ORAL at 22:17

## 2020-11-09 RX ADMIN — Medication 3 MILLILITER(S): at 03:19

## 2020-11-09 RX ADMIN — Medication 81 MILLIGRAM(S): at 11:20

## 2020-11-09 RX ADMIN — ERYTHROPOIETIN 10000 UNIT(S): 10000 INJECTION, SOLUTION INTRAVENOUS; SUBCUTANEOUS at 14:19

## 2020-11-09 RX ADMIN — CARVEDILOL PHOSPHATE 12.5 MILLIGRAM(S): 80 CAPSULE, EXTENDED RELEASE ORAL at 05:00

## 2020-11-09 RX ADMIN — BUDESONIDE AND FORMOTEROL FUMARATE DIHYDRATE 2 PUFF(S): 160; 4.5 AEROSOL RESPIRATORY (INHALATION) at 08:20

## 2020-11-09 RX ADMIN — CARVEDILOL PHOSPHATE 12.5 MILLIGRAM(S): 80 CAPSULE, EXTENDED RELEASE ORAL at 17:40

## 2020-11-09 RX ADMIN — Medication 1 MILLIGRAM(S): at 11:20

## 2020-11-09 RX ADMIN — TAMSULOSIN HYDROCHLORIDE 0.4 MILLIGRAM(S): 0.4 CAPSULE ORAL at 22:17

## 2020-11-09 RX ADMIN — ZINC SULFATE TAB 220 MG (50 MG ZINC EQUIVALENT) 220 MILLIGRAM(S): 220 (50 ZN) TAB at 11:20

## 2020-11-09 RX ADMIN — Medication 500 MILLIGRAM(S): at 05:00

## 2020-11-09 RX ADMIN — Medication 40 MILLIGRAM(S): at 17:40

## 2020-11-09 RX ADMIN — Medication 500 MILLIGRAM(S): at 17:40

## 2020-11-09 RX ADMIN — Medication 1 TABLET(S): at 11:20

## 2020-11-09 RX ADMIN — BUDESONIDE AND FORMOTEROL FUMARATE DIHYDRATE 2 PUFF(S): 160; 4.5 AEROSOL RESPIRATORY (INHALATION) at 20:35

## 2020-11-09 RX ADMIN — Medication 3 MILLILITER(S): at 14:13

## 2020-11-09 RX ADMIN — Medication 40 MILLIGRAM(S): at 05:00

## 2020-11-09 RX ADMIN — APIXABAN 5 MILLIGRAM(S): 2.5 TABLET, FILM COATED ORAL at 05:00

## 2020-11-09 RX ADMIN — APIXABAN 5 MILLIGRAM(S): 2.5 TABLET, FILM COATED ORAL at 17:40

## 2020-11-09 NOTE — PROGRESS NOTE ADULT - ASSESSMENT
Acute on chronic hypercapnic, hypoxemic resp failure secondary to COPD and worsening pulmonary Htn, Baca's sign per report  Atrial Fib  Effusion may need to be addressed with pleurx cath and diuresis  prognosis guarded    Plan:  repeat chest xray  encourage use of BIPAP ST  ABG in AM

## 2020-11-09 NOTE — PROGRESS NOTE ADULT - SUBJECTIVE AND OBJECTIVE BOX
INTERVAL HPI/OVERNIGHT EVENTS:  Patient was seen and examined at bedside and was found to be in no acute distress. Patient's staples were removed yesterday and dressing was changed. Patient is voiding adequately and tolerating a DASH diet, and denying any n/v/f/c, cp/sob. Patient reports that he's ready to go home.    MEDICATIONS  (STANDING):  ALBUTerol    90 MICROgram(s) HFA Inhaler 1 Puff(s) Inhalation every 4 hours  albuterol/ipratropium for Nebulization 3 milliLiter(s) Nebulizer every 6 hours  albuterol/ipratropium for Nebulization. 3 milliLiter(s) Nebulizer once  allopurinol 100 milliGRAM(s) Oral daily  apixaban 5 milliGRAM(s) Oral every 12 hours  ascorbic acid 500 milliGRAM(s) Oral two times a day  aspirin  chewable 81 milliGRAM(s) Oral daily  atorvastatin 20 milliGRAM(s) Oral at bedtime  budesonide 160 MICROgram(s)/formoterol 4.5 MICROgram(s) Inhaler 2 Puff(s) Inhalation two times a day  carvedilol 12.5 milliGRAM(s) Oral every 12 hours  chlorhexidine 2% Cloths 1 Application(s) Topical daily  epoetin felisha-epbx (RETACRIT) Injectable 36249 Unit(s) SubCutaneous <User Schedule>  folic acid 1 milliGRAM(s) Oral daily  furosemide    Tablet 40 milliGRAM(s) Oral every 12 hours  melatonin 3 milliGRAM(s) Oral at bedtime  multivitamin 1 Tablet(s) Oral daily  pantoprazole    Tablet 40 milliGRAM(s) Oral before breakfast  senna 2 Tablet(s) Oral at bedtime  tamsulosin 0.4 milliGRAM(s) Oral at bedtime  thiamine 100 milliGRAM(s) Oral daily  zinc sulfate 220 milliGRAM(s) Oral daily    MEDICATIONS  (PRN):  acetaminophen   Tablet .. 650 milliGRAM(s) Oral every 6 hours PRN Mild Pain (1 - 3)  benzocaine 15 mG/menthol 3.6 mG (Sugar-Free) Lozenge 1 Lozenge Oral every 3 hours PRN Sore Throat  ondansetron Injectable 4 milliGRAM(s) IV Push once PRN Nausea and/or Vomiting      Vital Signs Last 24 Hrs  T(C): 36.8 (08 Nov 2020 23:33), Max: 36.8 (08 Nov 2020 09:06)  T(F): 98.2 (08 Nov 2020 23:33), Max: 98.3 (08 Nov 2020 09:06)  HR: 71 (09 Nov 2020 03:20) (71 - 90)  BP: 124/68 (08 Nov 2020 23:33) (106/55 - 124/68)  BP(mean): --  RR: 18 (08 Nov 2020 23:33) (18 - 20)  SpO2: 98% (09 Nov 2020 03:20) (92% - 100%)    Physical Exam:  Gen: NAD  Neurological:  No sensory/motor deficits  HEENT: PERRLA, EOMI  Respiratory: Breath Sounds equal & CTA bilaterally, no accessory muscle use  Cardiovascular: Regular rate & rhythm, normal S1, S2; no murmurs, gallops or rubs  Gastrointestinal: Soft, non-tender, nondistended  Vascular: Equal and normal pulses: 2+ peripheral pulses throughout  Musculoskeletal: No joint pain, swelling or deformity; no limitation of movement  Extremities: LLE shin dressing c/d/i with no strikethrough      I&O's Detail    07 Nov 2020 07:01  -  08 Nov 2020 07:00  --------------------------------------------------------  IN:    Oral Fluid: 480 mL  Total IN: 480 mL    OUT:    Indwelling Catheter - Urethral (mL): 205 mL    Voided (mL): 325 mL  Total OUT: 530 mL    Total NET: -50 mL      08 Nov 2020 07:01  -  09 Nov 2020 03:34  --------------------------------------------------------  IN:  Total IN: 0 mL    OUT:    Stool (mL): 1 mL    Voided (mL): 400 mL  Total OUT: 401 mL    Total NET: -401 mL          LABS:                        7.2    6.45  )-----------( 223      ( 08 Nov 2020 07:55 )             23.5     11-08    136  |  99  |  85.0<H>  ----------------------------<  101<H>  4.3   |  28.0  |  2.05<H>    Ca    8.2<L>      08 Nov 2020 07:55  Phos  4.7     11-08  Mg     1.9     11-08    TPro  4.9<L>  /  Alb  3.2<L>  /  TBili  0.6  /  DBili  x   /  AST  14  /  ALT  13  /  AlkPhos  184<H>  11-08      Assessment:  69 y/o male admitted on 10/15 s/p fall with LLE hematoma. Hospital course complicated by ARF, COPD exacerbation, hypotensive/bradycardic episode post-operatively after reversal of anesthetic agents. Pt was downgraded from SICU to floors on 10/7 on O2 2L NC, lasix restarted, otherwise recovering well after surgery, doing well on the floors.    - continue home dose lasix (40 mg BID)  - cardiology following, plan for R heart cath before D/C - HGB > 8 for cath  - 2L home O2 (new prescription, patient has not used at home yet, but should have set up for discharge)  - f/u PT/OT

## 2020-11-09 NOTE — PROGRESS NOTE ADULT - SUBJECTIVE AND OBJECTIVE BOX
PULMONARY PROGRESS NOTE      [id number]    Patient is a 70y old  Male who presents with a chief complaint of LLE anterior shin hematoma w/overlying skin necrosis s/p evacuation of hematoma (09 Nov 2020 03:33)      INTERVAL HPI/OVERNIGHT EVENTS:  Awake alert in NAD  Pt refusing BIPAP 10/5  No cough or sputum      MEDICATIONS  (STANDING):  ALBUTerol    90 MICROgram(s) HFA Inhaler 1 Puff(s) Inhalation every 4 hours  albuterol/ipratropium for Nebulization 3 milliLiter(s) Nebulizer every 6 hours  albuterol/ipratropium for Nebulization. 3 milliLiter(s) Nebulizer once  allopurinol 100 milliGRAM(s) Oral daily  apixaban 5 milliGRAM(s) Oral every 12 hours  ascorbic acid 500 milliGRAM(s) Oral two times a day  aspirin  chewable 81 milliGRAM(s) Oral daily  atorvastatin 20 milliGRAM(s) Oral at bedtime  budesonide 160 MICROgram(s)/formoterol 4.5 MICROgram(s) Inhaler 2 Puff(s) Inhalation two times a day  carvedilol 12.5 milliGRAM(s) Oral every 12 hours  epoetin felisha-epbx (RETACRIT) Injectable 85943 Unit(s) SubCutaneous <User Schedule>  folic acid 1 milliGRAM(s) Oral daily  furosemide    Tablet 40 milliGRAM(s) Oral every 12 hours  multivitamin 1 Tablet(s) Oral daily  pantoprazole    Tablet 40 milliGRAM(s) Oral before breakfast  senna 2 Tablet(s) Oral at bedtime  tamsulosin 0.4 milliGRAM(s) Oral at bedtime  thiamine 100 milliGRAM(s) Oral daily  zinc sulfate 220 milliGRAM(s) Oral daily      MEDICATIONS  (PRN):  acetaminophen   Tablet .. 650 milliGRAM(s) Oral every 6 hours PRN Mild Pain (1 - 3)  benzocaine 15 mG/menthol 3.6 mG (Sugar-Free) Lozenge 1 Lozenge Oral every 3 hours PRN Sore Throat  ondansetron Injectable 4 milliGRAM(s) IV Push once PRN Nausea and/or Vomiting      Allergies    No Known Allergies    Intolerances        PAST MEDICAL & SURGICAL HISTORY:  CKD (chronic kidney disease)    CHF (congestive heart failure)    Atrial fibrillation    COPD, severity to be determined    Coronary artery disease involving coronary bypass graft of native heart without angina pectoris    Chronic osteomyelitis, osteomyelitis of unspecified site    COPD (chronic obstructive pulmonary disease)    Atrial fibrillation    Bladder cancer    HLD (hyperlipidemia)    H/O knee surgery    S/P CABG (coronary artery bypass graft)    Presence of stent of CABG        SOCIAL HISTORY  Smoking History:       REVIEW OF SYSTEMS:    CONSTITUTIONAL:  No distress    HEENT:  Eyes:  No diplopia or blurred vision. ENT:  No earache, sore throat or runny nose.    CARDIOVASCULAR:  No pressure, squeezing, tightness, heaviness or aching about the chest; no palpitations.    RESPIRATORY:  No cough, shortness of breath, PND or orthopnea. Mild SOBOE    GASTROINTESTINAL:  No nausea, vomiting or diarrhea.    GENITOURINARY:  No dysuria, frequency or urgency.    NEUROLOGIC:  No paresthesias, fasciculations, seizures or weakness.    Extremities: No cyanosis, clubbing or edema    PSYCHIATRIC:  No disorder of thought or mood.    Vital Signs Last 24 Hrs  T(C): 36.8 (08 Nov 2020 23:33), Max: 36.8 (08 Nov 2020 23:33)  T(F): 98.2 (08 Nov 2020 23:33), Max: 98.2 (08 Nov 2020 23:33)  HR: 71 (09 Nov 2020 14:31) (65 - 90)  BP: 119/65 (09 Nov 2020 14:31) (112/59 - 132/72)  BP(mean): --  RR: 24 (09 Nov 2020 14:31) (16 - 24)  SpO2: 96% (09 Nov 2020 14:44) (94% - 100%)    PHYSICAL EXAMINATION:    GENERAL: The patient is awake and alert in no apparent distress.     HEENT: Head is normocephalic and atraumatic. Extraocular muscles are intact. Mucous membranes are moist.    NECK: Supple.    LUNGS: Clear to auscultation without wheezing, rales or rhonchi; respirations unlabored    HEART: Regular rate and rhythm without murmur.    ABDOMEN: Soft, nontender, and nondistended.      EXTREMITIES: Without any cyanosis, clubbing, rash, lesions or edema.    NEUROLOGIC: Grossly intact.    LABS:                        7.1    6.73  )-----------( 243      ( 09 Nov 2020 11:37 )             22.7     11-09    138  |  100  |  92.0<H>  ----------------------------<  155<H>  4.5   |  29.0  |  2.06<H>    Ca    8.0<L>      09 Nov 2020 11:37  Phos  4.5     11-09  Mg     1.8     11-09    TPro  4.9<L>  /  Alb  3.2<L>  /  TBili  0.6  /  DBili  x   /  AST  14  /  ALT  13  /  AlkPhos  184<H>  11-08        ABG - ( 09 Nov 2020 06:46 )  pH, Arterial: 7.32  pH, Blood: x     /  pCO2: 58    /  pO2: 74    / HCO3: 28    / Base Excess: 3.5   /  SaO2: 96        ECHO:  11/3/20  < from: TTE Echo Limited or F/U (11.03.20 @ 15:22) >  Summary:   1. Left ventricular ejection fraction, by visual estimation, is 60 to 65%.   2. Normal global left ventricular systolic function.   3. Severely enlarged right atrium.   4. Severely enlarged right ventricle.   5. Severely reduced RV systolic function.   6. Positive aBca Sign.   7. Normal left atrial size.   8. There is no evidence of pericardial effusion.   9. Mild-moderate tricuspid regurgitation.  10. Estimated pulmonary artery systolic pressure is 63.4 mmHg assuming a right atrial pressure of 15 mmHg, which is consistent with severe pulmonary hypertension.  11. Endocardial visualization was enhanced with intravenous echo contrast.    MD Niharika Electronically signed on 11/3/2020 at 6:34:55 PM                          MICROBIOLOGY:    RADIOLOGY & ADDITIONAL STUDIES:  < from: NM Pulmonary Perfusion Scan (11.05.20 @ 15:17) >   EXAM:  NM PULM PERFUSION IMG                          PROCEDURE DATE:  11/05/2020          INTERPRETATION:  CLINICAL INFORMATION: 70 year old male with new severe RV dysfunction and pulmonary hypertension; referred to evaluate for pulmonary embolism.    RADIOPHARMACEUTICAL:  3.1 mCi Tc-99m-MAA, I.V.    TECHNIQUE:  Perfusion images of the lungs were obtained following administration of Tc-99m-MAA. Images were obtained in the anterior, posterior, both lateral, and all 4 oblique projections. The study was interpreted in conjunction with a chest radiograph of 11/5/2020.    COMPARISON: No prior lung perfusion scan.    FINDINGS: There is diffusely decreased radionuclide activity in the right lung and heterogeneous distribution of radiopharmaceutical in the remainder of the lungs. There are no segmental perfusion defects in either lung.    IMPRESSION: Low probability of pulmonary embolus.              JONATHAN GLEZ MD; Attending Nuclear Medicine  This document has been electronically signed. Nov 5 2020  3:52PM    < end of copied text >  < from: US Duplex Venous Lower Ext Complete, Bilateral (11.05.20 @ 13:41) >     EXAM:  US DPLX LWR EXT VEINS COMPL BI                          PROCEDURE DATE:  11/05/2020          INTERPRETATION:  CLINICAL INFORMATION: Shortness of breath    COMPARISON: Left lower extremity ultrasound October 15, 2020.    TECHNIQUE: Duplex sonography of the BILATERAL LOWER extremity veins with color and spectral Doppler, with and without compression.    FINDINGS:    There is normal compressibility of the bilateral common femoral, femoral and popliteal veins.  Doppler examination shows normal spontaneous and phasic flow.    No right calf vein thrombosis is detected. The calf veins in the left leg could not be visualized due to the presence of bandages.    Note is made of a Baker's cyst measuring 2.8 0.7 x 1.4 cm in the right popliteal fossa.    IMPRESSION:  No evidence of deep venous thrombosis in the visualized veins of both lower extremities.  Unable to evaluate the calf veins in the left leg.                BRENDAN SMITH MD; Attending Radiologist  This document has been electronically signed. Nov 5 2020  1:50PM    < end of copied text >

## 2020-11-10 DIAGNOSIS — J44.9 CHRONIC OBSTRUCTIVE PULMONARY DISEASE, UNSPECIFIED: ICD-10-CM

## 2020-11-10 LAB
ANION GAP SERPL CALC-SCNC: 8 MMOL/L — SIGNIFICANT CHANGE UP (ref 5–17)
BASE EXCESS BLDA CALC-SCNC: 2.9 MMOL/L — SIGNIFICANT CHANGE UP (ref -3–3)
BASOPHILS # BLD AUTO: 0.02 K/UL — SIGNIFICANT CHANGE UP (ref 0–0.2)
BASOPHILS NFR BLD AUTO: 0.3 % — SIGNIFICANT CHANGE UP (ref 0–2)
BLOOD GAS COMMENTS ARTERIAL: SIGNIFICANT CHANGE UP
BUN SERPL-MCNC: 98 MG/DL — HIGH (ref 8–20)
CALCIUM SERPL-MCNC: 8 MG/DL — LOW (ref 8.6–10.2)
CHLORIDE SERPL-SCNC: 98 MMOL/L — SIGNIFICANT CHANGE UP (ref 98–107)
CO2 SERPL-SCNC: 31 MMOL/L — HIGH (ref 22–29)
CREAT SERPL-MCNC: 2.1 MG/DL — HIGH (ref 0.5–1.3)
EOSINOPHIL # BLD AUTO: 0.22 K/UL — SIGNIFICANT CHANGE UP (ref 0–0.5)
EOSINOPHIL NFR BLD AUTO: 3.1 % — SIGNIFICANT CHANGE UP (ref 0–6)
GAS PNL BLDA: SIGNIFICANT CHANGE UP
GAS PNL BLDA: SIGNIFICANT CHANGE UP
GLUCOSE SERPL-MCNC: 119 MG/DL — HIGH (ref 70–99)
HCO3 BLDA-SCNC: 27 MMOL/L — HIGH (ref 20–26)
HCT VFR BLD CALC: 23.8 % — LOW (ref 39–50)
HGB BLD-MCNC: 7.3 G/DL — LOW (ref 13–17)
HOROWITZ INDEX BLDA+IHG-RTO: SIGNIFICANT CHANGE UP
IMM GRANULOCYTES NFR BLD AUTO: 0.9 % — SIGNIFICANT CHANGE UP (ref 0–1.5)
LYMPHOCYTES # BLD AUTO: 1.12 K/UL — SIGNIFICANT CHANGE UP (ref 1–3.3)
LYMPHOCYTES # BLD AUTO: 15.9 % — SIGNIFICANT CHANGE UP (ref 13–44)
MAGNESIUM SERPL-MCNC: 1.9 MG/DL — SIGNIFICANT CHANGE UP (ref 1.8–2.6)
MCHC RBC-ENTMCNC: 30.7 GM/DL — LOW (ref 32–36)
MCHC RBC-ENTMCNC: 35.4 PG — HIGH (ref 27–34)
MCV RBC AUTO: 115.5 FL — HIGH (ref 80–100)
MONOCYTES # BLD AUTO: 0.72 K/UL — SIGNIFICANT CHANGE UP (ref 0–0.9)
MONOCYTES NFR BLD AUTO: 10.2 % — SIGNIFICANT CHANGE UP (ref 2–14)
NEUTROPHILS # BLD AUTO: 4.91 K/UL — SIGNIFICANT CHANGE UP (ref 1.8–7.4)
NEUTROPHILS NFR BLD AUTO: 69.6 % — SIGNIFICANT CHANGE UP (ref 43–77)
PCO2 BLDA: 61 MMHG — HIGH (ref 35–45)
PH BLDA: 7.3 — LOW (ref 7.35–7.45)
PHOSPHATE SERPL-MCNC: 4.9 MG/DL — HIGH (ref 2.4–4.7)
PLATELET # BLD AUTO: 248 K/UL — SIGNIFICANT CHANGE UP (ref 150–400)
PO2 BLDA: 115 MMHG — HIGH (ref 83–108)
POTASSIUM SERPL-MCNC: 4.4 MMOL/L — SIGNIFICANT CHANGE UP (ref 3.5–5.3)
POTASSIUM SERPL-SCNC: 4.4 MMOL/L — SIGNIFICANT CHANGE UP (ref 3.5–5.3)
RBC # BLD: 2.06 M/UL — LOW (ref 4.2–5.8)
RBC # FLD: 18.2 % — HIGH (ref 10.3–14.5)
SAO2 % BLDA: 100 % — HIGH (ref 95–99)
SODIUM SERPL-SCNC: 137 MMOL/L — SIGNIFICANT CHANGE UP (ref 135–145)
WBC # BLD: 7.05 K/UL — SIGNIFICANT CHANGE UP (ref 3.8–10.5)
WBC # FLD AUTO: 7.05 K/UL — SIGNIFICANT CHANGE UP (ref 3.8–10.5)

## 2020-11-10 PROCEDURE — 99232 SBSQ HOSP IP/OBS MODERATE 35: CPT

## 2020-11-10 PROCEDURE — 71045 X-RAY EXAM CHEST 1 VIEW: CPT | Mod: 26

## 2020-11-10 RX ORDER — ALPRAZOLAM 0.25 MG
0.25 TABLET ORAL ONCE
Refills: 0 | Status: DISCONTINUED | OUTPATIENT
Start: 2020-11-10 | End: 2020-11-10

## 2020-11-10 RX ADMIN — Medication 1 TABLET(S): at 08:51

## 2020-11-10 RX ADMIN — CARVEDILOL PHOSPHATE 12.5 MILLIGRAM(S): 80 CAPSULE, EXTENDED RELEASE ORAL at 05:44

## 2020-11-10 RX ADMIN — BUDESONIDE AND FORMOTEROL FUMARATE DIHYDRATE 2 PUFF(S): 160; 4.5 AEROSOL RESPIRATORY (INHALATION) at 08:42

## 2020-11-10 RX ADMIN — Medication 40 MILLIGRAM(S): at 17:05

## 2020-11-10 RX ADMIN — Medication 3 MILLILITER(S): at 08:43

## 2020-11-10 RX ADMIN — Medication 500 MILLIGRAM(S): at 17:05

## 2020-11-10 RX ADMIN — Medication 1 MILLIGRAM(S): at 08:51

## 2020-11-10 RX ADMIN — Medication 0.25 MILLIGRAM(S): at 01:54

## 2020-11-10 RX ADMIN — ATORVASTATIN CALCIUM 20 MILLIGRAM(S): 80 TABLET, FILM COATED ORAL at 21:25

## 2020-11-10 RX ADMIN — CARVEDILOL PHOSPHATE 12.5 MILLIGRAM(S): 80 CAPSULE, EXTENDED RELEASE ORAL at 17:05

## 2020-11-10 RX ADMIN — Medication 100 MILLIGRAM(S): at 08:51

## 2020-11-10 RX ADMIN — Medication 500 MILLIGRAM(S): at 05:44

## 2020-11-10 RX ADMIN — Medication 3 MILLILITER(S): at 15:24

## 2020-11-10 RX ADMIN — Medication 3 MILLILITER(S): at 20:40

## 2020-11-10 RX ADMIN — Medication 3 MILLILITER(S): at 03:28

## 2020-11-10 RX ADMIN — BUDESONIDE AND FORMOTEROL FUMARATE DIHYDRATE 2 PUFF(S): 160; 4.5 AEROSOL RESPIRATORY (INHALATION) at 20:40

## 2020-11-10 RX ADMIN — Medication 40 MILLIGRAM(S): at 05:44

## 2020-11-10 RX ADMIN — PANTOPRAZOLE SODIUM 40 MILLIGRAM(S): 20 TABLET, DELAYED RELEASE ORAL at 05:44

## 2020-11-10 RX ADMIN — Medication 0.25 MILLIGRAM(S): at 23:17

## 2020-11-10 RX ADMIN — TAMSULOSIN HYDROCHLORIDE 0.4 MILLIGRAM(S): 0.4 CAPSULE ORAL at 21:25

## 2020-11-10 RX ADMIN — ZINC SULFATE TAB 220 MG (50 MG ZINC EQUIVALENT) 220 MILLIGRAM(S): 220 (50 ZN) TAB at 08:51

## 2020-11-10 RX ADMIN — Medication 81 MILLIGRAM(S): at 08:51

## 2020-11-10 RX ADMIN — APIXABAN 5 MILLIGRAM(S): 2.5 TABLET, FILM COATED ORAL at 05:45

## 2020-11-10 NOTE — PROGRESS NOTE ADULT - SUBJECTIVE AND OBJECTIVE BOX
PULMONARY PROGRESS NOTE      DEBI BADILLOAPARNA-[id number]    Patient is a 70y old  Male who presents with a chief complaint of leg graft (09 Nov 2020 15:25)      INTERVAL HPI/OVERNIGHT EVENTS:  Episode of somnolence and hypoxia which has resolved  Pt stated he expectorated and felt better  No respiratory distress  Refused BIPAP ST    MEDICATIONS  (STANDING):  ALBUTerol    90 MICROgram(s) HFA Inhaler 1 Puff(s) Inhalation every 4 hours  albuterol/ipratropium for Nebulization 3 milliLiter(s) Nebulizer every 6 hours  albuterol/ipratropium for Nebulization. 3 milliLiter(s) Nebulizer once  allopurinol 100 milliGRAM(s) Oral daily  apixaban 5 milliGRAM(s) Oral every 12 hours  ascorbic acid 500 milliGRAM(s) Oral two times a day  aspirin  chewable 81 milliGRAM(s) Oral daily  atorvastatin 20 milliGRAM(s) Oral at bedtime  budesonide 160 MICROgram(s)/formoterol 4.5 MICROgram(s) Inhaler 2 Puff(s) Inhalation two times a day  carvedilol 12.5 milliGRAM(s) Oral every 12 hours  epoetin felisha-epbx (RETACRIT) Injectable 93301 Unit(s) SubCutaneous <User Schedule>  folic acid 1 milliGRAM(s) Oral daily  furosemide    Tablet 40 milliGRAM(s) Oral every 12 hours  multivitamin 1 Tablet(s) Oral daily  pantoprazole    Tablet 40 milliGRAM(s) Oral before breakfast  senna 2 Tablet(s) Oral at bedtime  tamsulosin 0.4 milliGRAM(s) Oral at bedtime  thiamine 100 milliGRAM(s) Oral daily  zinc sulfate 220 milliGRAM(s) Oral daily      MEDICATIONS  (PRN):  acetaminophen   Tablet .. 650 milliGRAM(s) Oral every 6 hours PRN Mild Pain (1 - 3)  benzocaine 15 mG/menthol 3.6 mG (Sugar-Free) Lozenge 1 Lozenge Oral every 3 hours PRN Sore Throat  ondansetron Injectable 4 milliGRAM(s) IV Push once PRN Nausea and/or Vomiting      Allergies    No Known Allergies    Intolerances        PAST MEDICAL & SURGICAL HISTORY:  CKD (chronic kidney disease)    CHF (congestive heart failure)    Atrial fibrillation    COPD, severity to be determined    Coronary artery disease involving coronary bypass graft of native heart without angina pectoris    Chronic osteomyelitis, osteomyelitis of unspecified site    COPD (chronic obstructive pulmonary disease)    Atrial fibrillation    Bladder cancer    HLD (hyperlipidemia)    H/O knee surgery    S/P CABG (coronary artery bypass graft)    Presence of stent of CABG        SOCIAL HISTORY  Smoking History:       REVIEW OF SYSTEMS:    CONSTITUTIONAL:  No distress    HEENT:  Eyes:  No diplopia or blurred vision. ENT:  No earache, sore throat or runny nose.    CARDIOVASCULAR:  No pressure, squeezing, tightness, heaviness or aching about the chest; no palpitations.    RESPIRATORY:  No cough, shortness of breath, PND or orthopnea. Mild SOBOE    GASTROINTESTINAL:  No nausea, vomiting or diarrhea.    GENITOURINARY:  No dysuria, frequency or urgency.    NEUROLOGIC:  No paresthesias, fasciculations, seizures or weakness.    Extremities: No cyanosis, clubbing or edema    PSYCHIATRIC:  No disorder of thought or mood.    Vital Signs Last 24 Hrs  T(C): 37.1 (10 Nov 2020 07:53), Max: 37.1 (10 Nov 2020 07:53)  T(F): 98.8 (10 Nov 2020 07:53), Max: 98.8 (10 Nov 2020 07:53)  HR: 68 (10 Nov 2020 10:00) (66 - 87)  BP: 131/48 (10 Nov 2020 10:00) (104/59 - 131/56)  BP(mean): --  RR: 17 (10 Nov 2020 10:00) (17 - 20)  SpO2: 100% (10 Nov 2020 10:00) (95% - 100%)    PHYSICAL EXAMINATION:    GENERAL: The patient is awake and alert in no apparent distress.     HEENT: Head is normocephalic and atraumatic. Extraocular muscles are intact. Mucous membranes are moist.    NECK: Supple.    LUNGS: bibasilar dullness rt > L, neg wheeze    HEART: Regular rate and rhythm without murmur.    ABDOMEN: Soft, nontender, and nondistended.      EXTREMITIES: Without any cyanosis, clubbing, rash, lesions or edema.    NEUROLOGIC: Grossly intact.    LABS:                        7.3    7.05  )-----------( 248      ( 10 Nov 2020 06:08 )             23.8     11-10    137  |  98  |  98.0<H>  ----------------------------<  119<H>  4.4   |  31.0<H>  |  2.10<H>    Ca    8.0<L>      10 Nov 2020 06:08  Phos  4.9     11-10  Mg     1.9     11-10          ABG - ( 10 Nov 2020 07:52 )  pH, Arterial: 7.30  pH, Blood: x     /  pCO2: 61    /  pO2: 115   / HCO3: 27    / Base Excess: 2.9   /  SaO2: 100             ECHO:  < from: TTE Echo Limited or F/U (11.03.20 @ 15:22) >  Summary:   1. Left ventricular ejection fraction, by visual estimation, is 60 to 65%.   2. Normal global left ventricular systolic function.   3. Severely enlarged right atrium.   4. Severely enlarged right ventricle.   5. Severely reduced RV systolic function.   6. Positive Baca Sign.   7. Normal left atrial size.   8. There is no evidence of pericardial effusion.   9. Mild-moderate tricuspid regurgitation.  10. Estimated pulmonary artery systolic pressure is 63.4 mmHg assuming a right atrial pressure of 15 mmHg, which is consistent with severe pulmonary hypertension.  11. Endocardial visualization was enhanced with intravenous echo contrast.    MD Niharika Electronically signed on 11/3/2020 at 6:34:55 PM              MICROBIOLOGY:    RADIOLOGY & ADDITIONAL STUDIES:  < from: Xray Chest 1 View- PORTABLE-Urgent (Xray Chest 1 View- PORTABLE-Urgent .) (11.10.20 @ 10:33) >     EXAM:  XR CHEST PORTABLE URGENT 1V                          PROCEDURE DATE:  11/10/2020          INTERPRETATION:  CHEST AP PORTABLE:    History: pleural effusion.    Date and time of exam: 11/10/2020 9:57 AM.    Technique: A single AP view of the chest was obtained.    Comparison exam: 11/5/2020.    Findings:  Cardio megaly. Post cardiac surgery changes. No hilar or mediastinal abnormality. There are bilateral pleural effusions, right larger than left, unchanged. Persistent pulmonary vascular congestion, unchanged..    Impression:  Evidence of congestive heart failure. No significant change since 11/5/2020..          EDITA ARREGUIN MD; Attending Radiologist  This document has been electronically signed. Nov 10 2020  1:31PM    < end of copied text >

## 2020-11-10 NOTE — PROGRESS NOTE ADULT - SUBJECTIVE AND OBJECTIVE BOX
[facility] Faculty Practice                                                               Office: [address] [address]                                                              Telephone: [phone]. Fax:[phone]                                                                             PROGRESS NOTE    Reason for follow up: RV failure with severe Pulmonary HTN  Overnight: No new issues overnight.  Agreeable to cath at [facility]  Update: Feeling well today, no cardiac complaints     Review of Systems:  a 12 point ROS was negative except as per the HPI above.      	  Vitals:  T(C): 37.1 (11-10-20 @ 07:53), Max: 37.1 (11-10-20 @ 07:53)  HR: 68 (11-10-20 @ 10:00) (66 - 87)  BP: 131/48 (11-10-20 @ 10:00) (104/59 - 131/56)  RR: 17 (11-10-20 @ 10:00) (17 - 19)  SpO2: 100% (11-10-20 @ 10:00) (95% - 100%)  Wt(kg): --  I&O's Summary    09 Nov 2020 07:01  -  10 Nov 2020 07:00  --------------------------------------------------------  IN: 120 mL / OUT: 951 mL / NET: -831 mL      Weight (kg): 98.5 (11-07 @ 13:18)      PHYSICAL EXAM:  Constitutional: Comfortable . No acute distress.   HEENT: Atraumatic and normocephalic , neck is supple . + JVD. No carotid bruit. PEERL   CNS: A&Ox3. No focal deficits. EOMI. Cranial nerves II-IX are intact.   Lymph Nodes: Cervical : Not palpable.  Respiratory: trace Bibasilar rales  Cardiovascular: S1S2 Irregularly irregular. No rubs or gallop. III/VI systolic murmur lsb  Gastrointestinal: Soft non-tender and non distended . +Bowel sounds. negative Woodson's sign.  Extremities: LLE with dressing in place, b/l LE 2+ LE edema, scattered ecchymosis LUE/LLE  Psychiatric: Calm . no agitation.  Skin: No skin rash/ulcers visualized to face, hands or feet.      CURRENT MEDICATIONS:  carvedilol 12.5 milliGRAM(s) Oral every 12 hours  furosemide    Tablet 40 milliGRAM(s) Oral every 12 hours  tamsulosin 0.4 milliGRAM(s) Oral at bedtime    ALBUTerol    90 MICROgram(s) HFA Inhaler  albuterol/ipratropium for Nebulization  albuterol/ipratropium for Nebulization.  budesonide 160 MICROgram(s)/formoterol 4.5 MICROgram(s) Inhaler  pantoprazole    Tablet  senna  allopurinol  atorvastatin  ascorbic acid  aspirin  chewable  epoetin felisha-epbx (RETACRIT) Injectable  folic acid  multivitamin  thiamine  zinc sulfate      DIAGNOSTIC TESTING:  [ ] Echocardiogram:     < from: TTE Echo Limited or F/U (11.03.20 @ 15:22) >  PHYSICIAN INTERPRETATION:  Left Ventricle: Endocardial visualization was enhanced with intravenous echo contrast. The left ventricular internal cavity size is normal.  Global LV systolic function was normal. Left ventricular ejection fraction, by visual estimation, is 60 to 65%. Abnormal (paradoxical) septal motion, consistent with left bundle branch block.  Right Ventricle: The right ventricular size is severely enlarged. RV systolicfunction is severely reduced. Positive Baca Sign. TV S' 0.1 m/s.  Left Atrium: Normal left atrial size.  Right Atrium: Severely enlarged right atrium.  Pericardium: There is no evidence of pericardial effusion.  Tricuspid Valve: The tricuspid valve is not well seen. Mild-moderate tricuspid regurgitation is visualized. Estimated pulmonary artery systolic pressure is 63.4 mmHg assuming a right atrial pressure of 15 mmHg, which is consistent with severe pulmonary hypertension.  Venous: The inferior vena cava was dilated, with respiratory size variation less than 50%.      Summary:   1. Left ventricular ejection fraction, by visual estimation, is 60 to 65%.   2. Normal global left ventricular systolic function.   3. Severely enlarged right atrium.   4. Severely enlarged right ventricle.   5. Severely reduced RV systolic function.   6. Positive Baca Sign.   7. Normal left atrial size.   8. There is no evidence of pericardial effusion.   9. Mild-moderate tricuspid regurgitation.  10. Estimated pulmonary artery systolic pressure is 63.4 mmHg assuming a right atrial pressure of 15 mmHg, which is consistent with severe pulmonary hypertension.  11. Endocardial visualization was enhanced with intravenous echo contrast.    MD Niharika Electronically signed on 11/3/2020 at 6:34:55 PM    < end of copied text >		      LABS:	 	                            7.3    7.05  )-----------( 248      ( 10 Nov 2020 06:08 )             23.8     11-10    137  |  98  |  98.0<H>  ----------------------------<  119<H>  4.4   |  31.0<H>  |  2.10<H>    Ca    8.0<L>      10 Nov 2020 06:08  Phos  4.9     11-10  Mg     1.9     11-10      TELEMETRY: Reviewed    ECG:  Reviewed by me.

## 2020-11-10 NOTE — PROGRESS NOTE ADULT - ASSESSMENT
Acute on chronic resp failure.  Change of MS this am may have been affect of sedation and worsening hypercapnea  Patient requires nocturnal non-invasive ventilation to prolong life, improve quality of life and prevent future or decrease future hospitalizations  Pleural effusions without change    Plan:  BIPAP  ABG in AM  If no change in effusions with diuresis will need pleurx cath

## 2020-11-10 NOTE — PROGRESS NOTE ADULT - ASSESSMENT
70M with pulmonary hypertension and predominantly right-sided heart failure    Severe Pulmonary HTN with RV Failure  - Echo performed on 06/2020 showed mild to moderate pulmonary HTN and normal RV function.   - Patient's initial echo 10/15/20 showed now severe pulmonary HTN.   - Patient only meets 1 of the criteria for lowering the dose (79 y/o or older; wt < 60kg; Cr > 1.5)of eliquis, continue 5mg PO BID.   - PE has been excluded via V/Q scan  - Euvolemic on exam, although continues to have pleural effusion.  Continue PO diuretics for now; consider thoracentesis if having difficulty weaning from NC O2  - Follow up Pulmonary recommendations     CAD s/p CABG x 3  - Patient with elevated troponin 0.09 x 2 in setting of GERALD with normal CK.   - No new RWMA on echo.   - patient agreeable to cath at [facility]; plan for LHC/RHC on 11/11/20  - hold tonight and tomorrow AM dose of apixaban  - NPO after midnight  - Continue aspirin and statin.   - Continue home coreg 12.5mg PO BID  - maintain Hb >8    Atrial Fibrillation   - Rate controlled at this time.   - continue Coreg 12.5mg PO BID.   - CHADSVasc is 4, continue eliquis at current dose; hold as above and resume after cath  - Continue telemetry monitoring.

## 2020-11-10 NOTE — PROGRESS NOTE ADULT - PROBLEM SELECTOR PLAN 3
pulmonary following, recommendations   Effusion may need to be addressed with pleurx cath and diuresis  prognosis guarded  Plan:  repeat chest xray  encourage use of BIPAP ST  ABG in AM

## 2020-11-10 NOTE — PROGRESS NOTE ADULT - SUBJECTIVE AND OBJECTIVE BOX
SUBJECTIVE/24 hour events:  Patient is a 70yMale with evacuation of hematoma to e now s/p STSG. Patient was somnolent in the am and upgraded to the stepdown unit. Patients sensorium cleared, no acute events overnight, asking for his home dose xanax so that he " can rest my mind and be able to sleep", dose halved and administered with no issues. Lomeli remains for retention.       Vital Signs Last 24 Hrs  T(C): 36.3 (09 Nov 2020 22:10), Max: 36.4 (09 Nov 2020 15:48)  T(F): 97.4 (09 Nov 2020 22:10), Max: 97.6 (09 Nov 2020 15:48)  HR: 72 (09 Nov 2020 22:10) (65 - 89)  BP: 131/56 (09 Nov 2020 22:10) (111/82 - 132/72)  BP(mean): --  RR: 18 (09 Nov 2020 22:10) (16 - 24)  SpO2: 95% (09 Nov 2020 22:10) (95% - 100%)  Drug Dosing Weight  Height (cm): 167.6 (15 Oct 2020 10:31)  Weight (kg): 98.5 (07 Nov 2020 13:18)  BMI (kg/m2): 35.1 (07 Nov 2020 13:18)  BSA (m2): 2.07 (07 Nov 2020 13:18)  I&O's Detail    08 Nov 2020 07:01  -  09 Nov 2020 07:00  --------------------------------------------------------  IN:  Total IN: 0 mL    OUT:    Stool (mL): 1 mL    Voided (mL): 400 mL  Total OUT: 401 mL    Total NET: -401 mL      09 Nov 2020 07:01  -  10 Nov 2020 01:59  --------------------------------------------------------  IN:    Oral Fluid: 60 mL  Total IN: 60 mL    OUT:    Indwelling Catheter - Urethral (mL): 450 mL    Stool (mL): 1 mL  Total OUT: 451 mL    Total NET: -391 mL        Allergies    No Known Allergies    Intolerances                              7.1    6.73  )-----------( 243      ( 09 Nov 2020 11:37 )             22.7   11-09    138  |  100  |  92.0<H>  ----------------------------<  155<H>  4.5   |  29.0  |  2.06<H>    Ca    8.0<L>      09 Nov 2020 11:37  Phos  4.5     11-09  Mg     1.8     11-09    TPro  4.9<L>  /  Alb  3.2<L>  /  TBili  0.6  /  DBili  x   /  AST  14  /  ALT  13  /  AlkPhos  184<H>  11-08      ROS:    PHYSICAL EXAM:  Constitutional: in no distress, " i just want to sleep"    Respiratory: no respiratory distress, no conversational dyspnea, on supplemental o2 via nasal canula 2 liters    Cardiovascular: on telemetery NSR    Gastrointestinal: abdomen soft, non-tender, atraumatic     Genitourinary: lomeli in place     Extremities: LLE nvi, compartments soft, warm to touch, ace bandage in place c/d/i, left lateral thigh donor site with xeroform well seated    Neurological: A&OX3, GCS 15     Skin: warm, dry and no rashes           MEDICATIONS  (STANDING):  ALBUTerol    90 MICROgram(s) HFA Inhaler 1 Puff(s) Inhalation every 4 hours  albuterol/ipratropium for Nebulization 3 milliLiter(s) Nebulizer every 6 hours  albuterol/ipratropium for Nebulization. 3 milliLiter(s) Nebulizer once  allopurinol 100 milliGRAM(s) Oral daily  apixaban 5 milliGRAM(s) Oral every 12 hours  ascorbic acid 500 milliGRAM(s) Oral two times a day  aspirin  chewable 81 milliGRAM(s) Oral daily  atorvastatin 20 milliGRAM(s) Oral at bedtime  budesonide 160 MICROgram(s)/formoterol 4.5 MICROgram(s) Inhaler 2 Puff(s) Inhalation two times a day  carvedilol 12.5 milliGRAM(s) Oral every 12 hours  epoetin felisha-epbx (RETACRIT) Injectable 52078 Unit(s) SubCutaneous <User Schedule>  folic acid 1 milliGRAM(s) Oral daily  furosemide    Tablet 40 milliGRAM(s) Oral every 12 hours  multivitamin 1 Tablet(s) Oral daily  pantoprazole    Tablet 40 milliGRAM(s) Oral before breakfast  senna 2 Tablet(s) Oral at bedtime  tamsulosin 0.4 milliGRAM(s) Oral at bedtime  thiamine 100 milliGRAM(s) Oral daily  zinc sulfate 220 milliGRAM(s) Oral daily    MEDICATIONS  (PRN):  acetaminophen   Tablet .. 650 milliGRAM(s) Oral every 6 hours PRN Mild Pain (1 - 3)  benzocaine 15 mG/menthol 3.6 mG (Sugar-Free) Lozenge 1 Lozenge Oral every 3 hours PRN Sore Throat  ondansetron Injectable 4 milliGRAM(s) IV Push once PRN Nausea and/or Vomiting      RADIOLOGY STUDIES:    CULTURES:

## 2020-11-10 NOTE — PROGRESS NOTE ADULT - PROBLEM SELECTOR PLAN 1
continue daily dressing changes  wbat  continue home meds  continue dash diet   pt/ot  encourage oob  incentive spirometer

## 2020-11-11 DIAGNOSIS — I25.810 ATHEROSCLEROSIS OF CORONARY ARTERY BYPASS GRAFT(S) WITHOUT ANGINA PECTORIS: ICD-10-CM

## 2020-11-11 DIAGNOSIS — J44.9 CHRONIC OBSTRUCTIVE PULMONARY DISEASE, UNSPECIFIED: ICD-10-CM

## 2020-11-11 LAB
ANION GAP SERPL CALC-SCNC: 8 MMOL/L — SIGNIFICANT CHANGE UP (ref 5–17)
BASOPHILS # BLD AUTO: 0.03 K/UL — SIGNIFICANT CHANGE UP (ref 0–0.2)
BASOPHILS NFR BLD AUTO: 0.4 % — SIGNIFICANT CHANGE UP (ref 0–2)
BUN SERPL-MCNC: 97 MG/DL — HIGH (ref 8–20)
CALCIUM SERPL-MCNC: 8.2 MG/DL — LOW (ref 8.6–10.2)
CHLORIDE SERPL-SCNC: 98 MMOL/L — SIGNIFICANT CHANGE UP (ref 98–107)
CO2 SERPL-SCNC: 29 MMOL/L — SIGNIFICANT CHANGE UP (ref 22–29)
CREAT SERPL-MCNC: 2.08 MG/DL — HIGH (ref 0.5–1.3)
EOSINOPHIL # BLD AUTO: 0.24 K/UL — SIGNIFICANT CHANGE UP (ref 0–0.5)
EOSINOPHIL NFR BLD AUTO: 3.2 % — SIGNIFICANT CHANGE UP (ref 0–6)
GLUCOSE SERPL-MCNC: 128 MG/DL — HIGH (ref 70–99)
HCT VFR BLD CALC: 24.7 % — LOW (ref 39–50)
HGB BLD-MCNC: 7.5 G/DL — LOW (ref 13–17)
IMM GRANULOCYTES NFR BLD AUTO: 0.7 % — SIGNIFICANT CHANGE UP (ref 0–1.5)
LYMPHOCYTES # BLD AUTO: 0.85 K/UL — LOW (ref 1–3.3)
LYMPHOCYTES # BLD AUTO: 11.5 % — LOW (ref 13–44)
MAGNESIUM SERPL-MCNC: 1.8 MG/DL — SIGNIFICANT CHANGE UP (ref 1.6–2.6)
MCHC RBC-ENTMCNC: 30.4 GM/DL — LOW (ref 32–36)
MCHC RBC-ENTMCNC: 34.2 PG — HIGH (ref 27–34)
MCV RBC AUTO: 112.8 FL — HIGH (ref 80–100)
MONOCYTES # BLD AUTO: 0.75 K/UL — SIGNIFICANT CHANGE UP (ref 0–0.9)
MONOCYTES NFR BLD AUTO: 10.1 % — SIGNIFICANT CHANGE UP (ref 2–14)
NEUTROPHILS # BLD AUTO: 5.48 K/UL — SIGNIFICANT CHANGE UP (ref 1.8–7.4)
NEUTROPHILS NFR BLD AUTO: 74.1 % — SIGNIFICANT CHANGE UP (ref 43–77)
PHOSPHATE SERPL-MCNC: 4.2 MG/DL — SIGNIFICANT CHANGE UP (ref 2.4–4.7)
PLATELET # BLD AUTO: 266 K/UL — SIGNIFICANT CHANGE UP (ref 150–400)
POTASSIUM SERPL-MCNC: 4.8 MMOL/L — SIGNIFICANT CHANGE UP (ref 3.5–5.3)
POTASSIUM SERPL-SCNC: 4.8 MMOL/L — SIGNIFICANT CHANGE UP (ref 3.5–5.3)
RBC # BLD: 2.19 M/UL — LOW (ref 4.2–5.8)
RBC # FLD: 16.7 % — HIGH (ref 10.3–14.5)
SODIUM SERPL-SCNC: 135 MMOL/L — SIGNIFICANT CHANGE UP (ref 135–145)
WBC # BLD: 7.4 K/UL — SIGNIFICANT CHANGE UP (ref 3.8–10.5)
WBC # FLD AUTO: 7.4 K/UL — SIGNIFICANT CHANGE UP (ref 3.8–10.5)

## 2020-11-11 PROCEDURE — 99232 SBSQ HOSP IP/OBS MODERATE 35: CPT

## 2020-11-11 PROCEDURE — 99223 1ST HOSP IP/OBS HIGH 75: CPT

## 2020-11-11 PROCEDURE — 99024 POSTOP FOLLOW-UP VISIT: CPT

## 2020-11-11 PROCEDURE — 99233 SBSQ HOSP IP/OBS HIGH 50: CPT

## 2020-11-11 RX ORDER — APIXABAN 2.5 MG/1
5 TABLET, FILM COATED ORAL EVERY 12 HOURS
Refills: 0 | Status: DISCONTINUED | OUTPATIENT
Start: 2020-11-11 | End: 2020-11-13

## 2020-11-11 RX ORDER — MAGNESIUM SULFATE 500 MG/ML
2 VIAL (ML) INJECTION ONCE
Refills: 0 | Status: COMPLETED | OUTPATIENT
Start: 2020-11-11 | End: 2020-11-11

## 2020-11-11 RX ORDER — TRAMADOL HYDROCHLORIDE 50 MG/1
25 TABLET ORAL ONCE
Refills: 0 | Status: DISCONTINUED | OUTPATIENT
Start: 2020-11-11 | End: 2020-11-11

## 2020-11-11 RX ORDER — APIXABAN 2.5 MG/1
5 TABLET, FILM COATED ORAL EVERY 12 HOURS
Refills: 0 | Status: DISCONTINUED | OUTPATIENT
Start: 2020-11-11 | End: 2020-11-11

## 2020-11-11 RX ADMIN — CARVEDILOL PHOSPHATE 12.5 MILLIGRAM(S): 80 CAPSULE, EXTENDED RELEASE ORAL at 17:11

## 2020-11-11 RX ADMIN — TRAMADOL HYDROCHLORIDE 25 MILLIGRAM(S): 50 TABLET ORAL at 03:04

## 2020-11-11 RX ADMIN — BUDESONIDE AND FORMOTEROL FUMARATE DIHYDRATE 2 PUFF(S): 160; 4.5 AEROSOL RESPIRATORY (INHALATION) at 08:57

## 2020-11-11 RX ADMIN — Medication 3 MILLILITER(S): at 08:57

## 2020-11-11 RX ADMIN — Medication 50 GRAM(S): at 09:56

## 2020-11-11 RX ADMIN — Medication 40 MILLIGRAM(S): at 17:06

## 2020-11-11 RX ADMIN — Medication 100 MILLIGRAM(S): at 17:06

## 2020-11-11 RX ADMIN — Medication 3 MILLILITER(S): at 03:51

## 2020-11-11 RX ADMIN — Medication 3 MILLILITER(S): at 20:48

## 2020-11-11 RX ADMIN — Medication 650 MILLIGRAM(S): at 02:15

## 2020-11-11 RX ADMIN — PANTOPRAZOLE SODIUM 40 MILLIGRAM(S): 20 TABLET, DELAYED RELEASE ORAL at 05:13

## 2020-11-11 RX ADMIN — ERYTHROPOIETIN 10000 UNIT(S): 10000 INJECTION, SOLUTION INTRAVENOUS; SUBCUTANEOUS at 10:44

## 2020-11-11 RX ADMIN — Medication 3 MILLILITER(S): at 15:29

## 2020-11-11 RX ADMIN — Medication 40 MILLIGRAM(S): at 05:13

## 2020-11-11 RX ADMIN — APIXABAN 5 MILLIGRAM(S): 2.5 TABLET, FILM COATED ORAL at 17:17

## 2020-11-11 RX ADMIN — Medication 1 TABLET(S): at 17:06

## 2020-11-11 RX ADMIN — TRAMADOL HYDROCHLORIDE 25 MILLIGRAM(S): 50 TABLET ORAL at 03:45

## 2020-11-11 RX ADMIN — CARVEDILOL PHOSPHATE 12.5 MILLIGRAM(S): 80 CAPSULE, EXTENDED RELEASE ORAL at 05:14

## 2020-11-11 RX ADMIN — Medication 650 MILLIGRAM(S): at 01:24

## 2020-11-11 RX ADMIN — Medication 500 MILLIGRAM(S): at 17:07

## 2020-11-11 RX ADMIN — TAMSULOSIN HYDROCHLORIDE 0.4 MILLIGRAM(S): 0.4 CAPSULE ORAL at 21:27

## 2020-11-11 RX ADMIN — Medication 81 MILLIGRAM(S): at 09:56

## 2020-11-11 RX ADMIN — ATORVASTATIN CALCIUM 20 MILLIGRAM(S): 80 TABLET, FILM COATED ORAL at 21:27

## 2020-11-11 RX ADMIN — BUDESONIDE AND FORMOTEROL FUMARATE DIHYDRATE 2 PUFF(S): 160; 4.5 AEROSOL RESPIRATORY (INHALATION) at 20:49

## 2020-11-11 RX ADMIN — Medication 500 MILLIGRAM(S): at 05:14

## 2020-11-11 RX ADMIN — Medication 1 MILLIGRAM(S): at 17:06

## 2020-11-11 RX ADMIN — ZINC SULFATE TAB 220 MG (50 MG ZINC EQUIVALENT) 220 MILLIGRAM(S): 220 (50 ZN) TAB at 17:06

## 2020-11-11 NOTE — PROGRESS NOTE ADULT - SUBJECTIVE AND OBJECTIVE BOX
[facility]  [address], [place] [address]    REASON FOR VISIT:  follow up on chf  UPDATE:  Scheduled for a cath today but has decided he does not want it He is worried about his kidney function worsening    11-11    135  |  98  |  97.0<H>  ----------------------------<  128<H>  4.8   |  29.0  |  2.08<H>    Ca    8.2<L>      11 Nov 2020 06:53  Phos  4.2     11-11  Mg     1.8     11-11    MEDICATIONS  (STANDING):  ALBUTerol    90 MICROgram(s) HFA Inhaler 1 Puff(s) Inhalation every 4 hours  albuterol/ipratropium for Nebulization 3 milliLiter(s) Nebulizer every 6 hours  albuterol/ipratropium for Nebulization. 3 milliLiter(s) Nebulizer once  allopurinol 100 milliGRAM(s) Oral daily  ascorbic acid 500 milliGRAM(s) Oral two times a day  aspirin  chewable 81 milliGRAM(s) Oral daily  atorvastatin 20 milliGRAM(s) Oral at bedtime  budesonide 160 MICROgram(s)/formoterol 4.5 MICROgram(s) Inhaler 2 Puff(s) Inhalation two times a day  carvedilol 12.5 milliGRAM(s) Oral every 12 hours  epoetin felisha-epbx (RETACRIT) Injectable 76497 Unit(s) SubCutaneous <User Schedule>  folic acid 1 milliGRAM(s) Oral daily  furosemide    Tablet 40 milliGRAM(s) Oral every 12 hours  multivitamin 1 Tablet(s) Oral daily  pantoprazole    Tablet 40 milliGRAM(s) Oral before breakfast  senna 2 Tablet(s) Oral at bedtime  tamsulosin 0.4 milliGRAM(s) Oral at bedtime  thiamine 100 milliGRAM(s) Oral daily  zinc sulfate 220 milliGRAM(s) Oral daily    ROS:    No fever chills  Cardiac  No cp+ sob or palp  Resp  no cough no mucus production  Gi  no abd pain no melana  Ext No calf tenderness, no edema    Vital Signs Last 24 Hrs  T(C): 36.3 (11 Nov 2020 08:25), Max: 36.8 (10 Nov 2020 21:17)  T(F): 97.3 (11 Nov 2020 08:25), Max: 98.3 (10 Nov 2020 21:17)  HR: 69 (11 Nov 2020 09:00) (67 - 83)  BP: 103/51 (11 Nov 2020 08:25) (103/51 - 126/68)  BP(mean): --  RR: 19 (11 Nov 2020 08:25) (18 - 22)  SpO2: 97% (11 Nov 2020 09:00) (92% - 100%)  T(C): 36.3 (11-11-20 @ 08:25), Max: 36.8 (11-10-20 @ 21:17)  HR: 69 (11-11-20 @ 09:00) (67 - 83)  BP: 103/51 (11-11-20 @ 08:25) (103/51 - 126/68)  RR: 19 (11-11-20 @ 08:25) (18 - 22)  SpO2: 97% (11-11-20 @ 09:00) (92% - 100%)    HEENT Head atraumatic eomi, oral mucosa moist  CV S1&S2      No r/g/ m   RESP  decreasd lung sounds on left  GI  Soft active bowel sounds non tender  EXT  No clubbing/Cyanosis /Edema  NEURO  Alert oriented  No gross motor or sensory deficits    < from: TTE Echo Limited or F/U (11.03.20 @ 15:22) >  Summary:   1. Left ventricular ejection fraction, by visual estimation, is 60 to 65%.   2. Normal global left ventricular systolic function.   3. Severely enlarged right atrium.   4. Severely enlarged right ventricle.   5. Severely reduced RV systolic function.   6. Positive Baca Sign.   7. Normal left atrial size.   8. There is no evidence of pericardial effusion.   9. Mild-moderate tricuspid regurgitation.  10. Estimated pulmonary artery systolic pressure is 63.4 mmHg assuming a right atrial pressure of 15 mmHg, which is consistent with severe pulmonary hypertension.  11. Endocardial visualization was enhanced with intravenous echo contrast.

## 2020-11-11 NOTE — PROGRESS NOTE ADULT - SUBJECTIVE AND OBJECTIVE BOX
SUBJECTIVE/24 hour events:  Patient is a 70yMale with large left lower extremity hematoma now s/p split thickness skin graft, on stepdown unit going for cardiac catheterization today. Patient had no acute events overnight, A&OX3, GCS 15, having minor issues of anxiety which he was given half his home dose of xanax 0.25 and complaints of pain to left thigh donor site and given 25mg tramadol with resolution of both pain and anxiety. Patient once medically stable will be able to be discharged home with assist.       Vital Signs Last 24 Hrs  T(C): 36.8 (10 Nov 2020 21:17), Max: 37.1 (10 Nov 2020 07:53)  T(F): 98.3 (10 Nov 2020 21:17), Max: 98.8 (10 Nov 2020 07:53)  HR: 72 (11 Nov 2020 03:51) (66 - 79)  BP: 117/57 (11 Nov 2020 02:52) (104/59 - 131/48)  BP(mean): --  RR: 18 (11 Nov 2020 01:17) (17 - 22)  SpO2: 96% (11 Nov 2020 03:51) (92% - 100%)  Drug Dosing Weight  Height (cm): 167.6 (15 Oct 2020 10:31)  Weight (kg): 98.5 (07 Nov 2020 13:18)  BMI (kg/m2): 35.1 (07 Nov 2020 13:18)  BSA (m2): 2.07 (07 Nov 2020 13:18)  I&O's Detail    09 Nov 2020 07:01  -  10 Nov 2020 07:00  --------------------------------------------------------  IN:    Oral Fluid: 120 mL  Total IN: 120 mL    OUT:    Indwelling Catheter - Urethral (mL): 950 mL    Stool (mL): 1 mL  Total OUT: 951 mL    Total NET: -831 mL      10 Nov 2020 07:01  -  11 Nov 2020 04:51  --------------------------------------------------------  IN:    Oral Fluid: 540 mL  Total IN: 540 mL    OUT:    Indwelling Catheter - Urethral (mL): 650 mL  Total OUT: 650 mL    Total NET: -110 mL        Allergies    No Known Allergies    Intolerances                              7.3    7.05  )-----------( 248      ( 10 Nov 2020 06:08 )             23.8   11-10    137  |  98  |  98.0<H>  ----------------------------<  119<H>  4.4   |  31.0<H>  |  2.10<H>    Ca    8.0<L>      10 Nov 2020 06:08  Phos  4.9     11-10  Mg     1.9     11-10        ROS:    PHYSICAL EXAM:  Constitutional: in good spirits " I had a good day"   Respiratory: in no respiratory distress, no dyspnea, on supplemental o2 via nasal canula 2 liters   Cardiovascular: afib on telemetry   Gastrointestinal: abdomen soft, non-tender, obese, atraumatic   Genitourinary: lomeli catheter in place draining clear yellow urine   Extremities: left lower extremity NVI, compartments soft, ace bandage dressing c/d/i, left thigh donor site xeroform c/d/i  Neurological: A&Ox3  Skin: warm, dry and no rashes           MEDICATIONS  (STANDING):  ALBUTerol    90 MICROgram(s) HFA Inhaler 1 Puff(s) Inhalation every 4 hours  albuterol/ipratropium for Nebulization 3 milliLiter(s) Nebulizer every 6 hours  albuterol/ipratropium for Nebulization. 3 milliLiter(s) Nebulizer once  allopurinol 100 milliGRAM(s) Oral daily  ascorbic acid 500 milliGRAM(s) Oral two times a day  aspirin  chewable 81 milliGRAM(s) Oral daily  atorvastatin 20 milliGRAM(s) Oral at bedtime  budesonide 160 MICROgram(s)/formoterol 4.5 MICROgram(s) Inhaler 2 Puff(s) Inhalation two times a day  carvedilol 12.5 milliGRAM(s) Oral every 12 hours  epoetin felisha-epbx (RETACRIT) Injectable 44068 Unit(s) SubCutaneous <User Schedule>  folic acid 1 milliGRAM(s) Oral daily  furosemide    Tablet 40 milliGRAM(s) Oral every 12 hours  multivitamin 1 Tablet(s) Oral daily  pantoprazole    Tablet 40 milliGRAM(s) Oral before breakfast  senna 2 Tablet(s) Oral at bedtime  tamsulosin 0.4 milliGRAM(s) Oral at bedtime  thiamine 100 milliGRAM(s) Oral daily  zinc sulfate 220 milliGRAM(s) Oral daily    MEDICATIONS  (PRN):  acetaminophen   Tablet .. 650 milliGRAM(s) Oral every 6 hours PRN Mild Pain (1 - 3)  benzocaine 15 mG/menthol 3.6 mG (Sugar-Free) Lozenge 1 Lozenge Oral every 3 hours PRN Sore Throat  ondansetron Injectable 4 milliGRAM(s) IV Push once PRN Nausea and/or Vomiting      RADIOLOGY STUDIES:    CULTURES:

## 2020-11-11 NOTE — PROGRESS NOTE ADULT - SUBJECTIVE AND OBJECTIVE BOX
PULMONARY PROGRESS NOTE      DEBI BADILLOAPARNA-[id number]    Patient is a 70y old  Male who presents with a chief complaint of LLE hematoma (10 Nov 2020 15:04)      INTERVAL HPI/OVERNIGHT EVENTS:  sitting up NAD  no cp or SOB  MEDICATIONS  (STANDING):  ALBUTerol    90 MICROgram(s) HFA Inhaler 1 Puff(s) Inhalation every 4 hours  albuterol/ipratropium for Nebulization 3 milliLiter(s) Nebulizer every 6 hours  albuterol/ipratropium for Nebulization. 3 milliLiter(s) Nebulizer once  allopurinol 100 milliGRAM(s) Oral daily  apixaban 5 milliGRAM(s) Oral every 12 hours  ascorbic acid 500 milliGRAM(s) Oral two times a day  aspirin  chewable 81 milliGRAM(s) Oral daily  atorvastatin 20 milliGRAM(s) Oral at bedtime  budesonide 160 MICROgram(s)/formoterol 4.5 MICROgram(s) Inhaler 2 Puff(s) Inhalation two times a day  carvedilol 12.5 milliGRAM(s) Oral every 12 hours  epoetin felisha-epbx (RETACRIT) Injectable 80934 Unit(s) SubCutaneous <User Schedule>  folic acid 1 milliGRAM(s) Oral daily  furosemide    Tablet 40 milliGRAM(s) Oral every 12 hours  multivitamin 1 Tablet(s) Oral daily  pantoprazole    Tablet 40 milliGRAM(s) Oral before breakfast  senna 2 Tablet(s) Oral at bedtime  tamsulosin 0.4 milliGRAM(s) Oral at bedtime  thiamine 100 milliGRAM(s) Oral daily  zinc sulfate 220 milliGRAM(s) Oral daily      MEDICATIONS  (PRN):  acetaminophen   Tablet .. 650 milliGRAM(s) Oral every 6 hours PRN Mild Pain (1 - 3)  benzocaine 15 mG/menthol 3.6 mG (Sugar-Free) Lozenge 1 Lozenge Oral every 3 hours PRN Sore Throat  ondansetron Injectable 4 milliGRAM(s) IV Push once PRN Nausea and/or Vomiting      Allergies    No Known Allergies    Intolerances        PAST MEDICAL & SURGICAL HISTORY:  CKD (chronic kidney disease)    CHF (congestive heart failure)    Atrial fibrillation    COPD, severity to be determined    Coronary artery disease involving coronary bypass graft of native heart without angina pectoris    Chronic osteomyelitis, osteomyelitis of unspecified site    COPD (chronic obstructive pulmonary disease)    Atrial fibrillation    Bladder cancer    HLD (hyperlipidemia)    H/O knee surgery    S/P CABG (coronary artery bypass graft)    Presence of stent of CABG        SOCIAL HISTORY  Smoking History:       REVIEW OF SYSTEMS:    CONSTITUTIONAL:  No distress    HEENT:  Eyes:  No diplopia or blurred vision. ENT:  No earache, sore throat or runny nose.    CARDIOVASCULAR:  No pressure, squeezing, tightness, heaviness or aching about the chest; no palpitations.    RESPIRATORY:  see HPI    GASTROINTESTINAL:  No nausea, vomiting or diarrhea.    GENITOURINARY:  No dysuria, frequency or urgency.    NEUROLOGIC:  No paresthesias, fasciculations, seizures or weakness.    PSYCHIATRIC:  No disorder of thought or mood.    Vital Signs Last 24 Hrs  T(C): 36.3 (11 Nov 2020 15:00), Max: 36.8 (10 Nov 2020 21:17)  T(F): 97.3 (11 Nov 2020 15:00), Max: 98.3 (10 Nov 2020 21:17)  HR: 74 (11 Nov 2020 15:31) (65 - 83)  BP: 103/53 (11 Nov 2020 15:00) (103/51 - 126/68)  BP(mean): --  RR: 19 (11 Nov 2020 15:00) (18 - 22)  SpO2: 97% (11 Nov 2020 15:31) (92% - 100%)    PHYSICAL EXAMINATION:    GENERAL: The patient is awake and alert in no apparent distress.     HEENT: Head is normocephalic and atraumatic. Extraocular muscles are intact. Mucous membranes are moist.    NECK: Supple.    LUNGS: moderate air entry bilat, decreased R base respirations unlabored    HEART: Regular rate and rhythm without murmur.    ABDOMEN: Soft, nontender, and nondistended.      EXTREMITIES: Without any cyanosis, clubbing, rash, lesions or edema.    NEUROLOGIC: Grossly intact.    LABS:                        7.5    7.40  )-----------( 266      ( 11 Nov 2020 06:53 )             24.7     11-11    135  |  98  |  97.0<H>  ----------------------------<  128<H>  4.8   |  29.0  |  2.08<H>    Ca    8.2<L>      11 Nov 2020 06:53  Phos  4.2     11-11  Mg     1.8     11-11          ABG - ( 10 Nov 2020 07:52 )  pH, Arterial: 7.30  pH, Blood: x     /  pCO2: 61    /  pO2: 115   / HCO3: 27    / Base Excess: 2.9   /  SaO2: 100                             MICROBIOLOGY:    RADIOLOGY & ADDITIONAL STUDIES:  CXrs reviewed

## 2020-11-11 NOTE — PROGRESS NOTE ADULT - ASSESSMENT
A70 y/o M with a PMHx of COPD (newly placed on home O2), CAD s/p CABG x 3 (10/2013 at [facility]), pulmonary HTN (severity not noted on Echo 06/2020), Afib (on Eliquis), CHF (unknown baseline EF), PVD, HTN, HLD, and CKD who presented to the ED after a mechanical fall. Patient initially fell on 10/9 down 7 stairs with a mechanical fall with hitting his head, but no LOC. Patient was in [facility] on 10/11 with negative CT and LLE Xrays, but noted to have diffuse ecchymosis on his left side. Patient was D/C'd 10/14, and began having worsening leg swelling, pain, decreased urine output, and dyspnea. Patient has had a complicated hospital course with ARF, COPD exacerbation, CHF exacerbation. Patient underwent a LLE debridement on 10/21/20, and post-operatively with hypotension and bradycardia, requiring reversal of anesthetic agents. Patient now receiving IV doses of lasix, but repeat Echo showed severe pulmonary HTN and severely reduced RV function. Patient still with some dyspnea, orthopnea, and LE swelling, but denies any chest pain or chest pressure. Patient denies any fevers, chills, CP, syncope, near syncope, headache, dizziness, abdominal pain, N/V/D. Echo    HFpef/Severe Pulm htn  Educated re low na diet  Leg elevation and compression to keep edema donw  Loop diuretic bid  (lasix 40 bid)  supplemental oxygen    COPD  supplemental oxygen   duoneb  Breo inhaler    HTN   Low nad diet  c/w coreg    CAD s/p cabg 2013  Cath advised but he is refusing  c/w asa, atorvastatin, coreg  Further work up out patient      MEDICATIONS  (STANDING):  aspirin  chewable 81 milliGRAM(s) Oral daily  atorvastatin 20 milliGRAM(s) Oral at bedtime  carvedilol 12.5 milliGRAM(s) Oral every 12 hours  furosemide    Tablet 40 milliGRAM(s) Oral every 12 hours   A70 y/o M with a PMHx of COPD (newly placed on home O2), CAD s/p CABG x 3 (10/2013 at [place]), pulmonary HTN (severity not noted on Echo 06/2020), Afib (on Eliquis), CHF (unknown baseline EF), PVD, HTN, HLD, and CKD who presented to the ED after a mechanical fall. Patient initially fell on 10/9 down 7 stairs with a mechanical fall with hitting his head, but no LOC. Patient was in [facility] on 10/11 with negative CT and LLE Xrays, but noted to have diffuse ecchymosis on his left side. Patient was D/C'd 10/14, and began having worsening leg swelling, pain, decreased urine output, and dyspnea. Patient has had a complicated hospital course with ARF, COPD exacerbation, CHF exacerbation. Patient underwent a LLE debridement on 10/21/20, and post-operatively with hypotension and bradycardia, requiring reversal of anesthetic agents. Patient now receiving IV doses of lasix, but repeat Echo showed severe pulmonary HTN and severely reduced RV function. Patient still with some dyspnea, orthopnea, and LE swelling, but denies any chest pain or chest pressure. Patient denies any fevers, chills, CP, syncope, near syncope, headache, dizziness, abdominal pain, N/V/D. Echo    HFpef/Severe Pulm htn  Educated re low na diet  Leg elevation and compression to keep edema donw  Loop diuretic bid  (lasix 40 bid)  supplemental oxygen  would consider thoracic consult for us of left lung  consider thoracentesis right lung    COPD  supplemental oxygen   duoneb  Breo inhaler    HTN   Low nad diet  c/w coreg    CAD s/p cabg 2013  Cath advised but he is refusing  c/w asa, atorvastatin, coreg  Further work up out patient    MACROCYTIC ANEMIA  Check b12 folate iron stores  consider procrit    MEDICATIONS  (STANDING):  aspirin  chewable 81 milliGRAM(s) Oral daily  atorvastatin 20 milliGRAM(s) Oral at bedtime  carvedilol 12.5 milliGRAM(s) Oral every 12 hours  furosemide    Tablet 40 milliGRAM(s) Oral every 12 hours

## 2020-11-11 NOTE — CONSULT NOTE ADULT - SUBJECTIVE AND OBJECTIVE BOX
70yM was admitted on 10-15 with worsened SOB and left leg pain. As per medical documentation, patient had a fall down the stairs on 10/9 and went to [facility] where workup was negative and was DC HOME 10/14. He was found to have a traumatic hematoma s/p irrigation and debridement and split thickness graft. Patient course complicated by right pleural effusion s/p drain, COPD exacerbation and pulmonary HTN.     Patient notes that fatigue has been his biggest functional limitation.   He reports no pain.   Has SOB but not an impediment to moving.     REVIEW OF SYSTEMS  Constitutional - No fever, No weight loss, +fatigue  HEENT - No eye pain, No visual disturbances, No difficulty hearing, No tinnitus, No vertigo, No neck pain  Respiratory - No cough, No wheezing, +shortness of breath  Cardiovascular - No chest pain, No palpitations  Gastrointestinal - No abdominal pain, No nausea, No vomiting, No diarrhea, No constipation  Genitourinary - No dysuria, No frequency, No hematuria, No incontinence  Neurological - No headaches, No memory loss, +loss of strength, No numbness, No tremors  Skin - No itching, No rashes, +lesions   Endocrine - No temperature intolerance  Musculoskeletal - No joint pain, +joint swelling, No muscle pain  Psychiatric - No depression, No anxiety    VITALS  T(C): 36.3 (11-11-20 @ 08:25), Max: 36.8 (11-10-20 @ 21:17)  HR: 69 (11-11-20 @ 09:00) (67 - 83)  BP: 103/51 (11-11-20 @ 08:25) (103/51 - 126/68)  RR: 19 (11-11-20 @ 08:25) (18 - 22)  SpO2: 97% (11-11-20 @ 09:00) (92% - 100%)  Wt(kg): --    PAST MEDICAL & SURGICAL HISTORY  CKD (chronic kidney disease)    CHF (congestive heart failure)    Atrial fibrillation    COPD, severity to be determined    Coronary artery disease involving coronary bypass graft of native heart without angina pectoris    Chronic osteomyelitis, osteomyelitis of unspecified site    COPD (chronic obstructive pulmonary disease)    Atrial fibrillation    Bladder cancer    HLD (hyperlipidemia)    H/O knee surgery    S/P CABG (coronary artery bypass graft)    Presence of stent of CABG        SOCIAL HISTORY - as per documentation/history  Smoking - None  EtOH - +  Drugs - None    FUNCTIONAL HISTORY  Lives with spouse, 0 FRANCISCO  Independent    CURRENT FUNCTIONAL STATUS  11/10  Sit-Stand Transfer Training  Transfer Training Sit-to-Stand Transfer: minimum assist (75% patient effort);  1 person assist;  verbal cues;  weight-bearing as tolerated   rolling walker  Transfer Training Stand-to-Sit Transfer: minimum assist (75% patient effort);  1 person assist;  verbal cues;  weight-bearing as tolerated   rolling walker  Sit-to-Stand Transfer Training Transfer Safety Analysis: decreased sequencing ability;  decreased weight-shifting ability;  decreased strength;  pain;  rolling walker    Gait Training  Gait Training: minimum assist (75% patient effort);  1 person assist;  verbal cues;  weight-bearing as tolerated   rolling walker;  40 ft  Gait Analysis: swing-through gait   crouch;  decreased hip/knee flexion;  shuffling;  decreased step length;  decreased strength;  40 ft;  rolling walker    FAMILY HISTORY   No pertinent family history in first degree relatives        RECENT LABS - Reviewed  CBC Full  -  ( 11 Nov 2020 06:53 )  WBC Count : 7.40 K/uL  RBC Count : 2.19 M/uL  Hemoglobin : 7.5 g/dL  Hematocrit : 24.7 %  Platelet Count - Automated : 266 K/uL  Mean Cell Volume : 112.8 fl  Mean Cell Hemoglobin : 34.2 pg  Mean Cell Hemoglobin Concentration : 30.4 gm/dL  Auto Neutrophil # : 5.48 K/uL  Auto Lymphocyte # : 0.85 K/uL  Auto Monocyte # : 0.75 K/uL  Auto Eosinophil # : 0.24 K/uL  Auto Basophil # : 0.03 K/uL  Auto Neutrophil % : 74.1 %  Auto Lymphocyte % : 11.5 %  Auto Monocyte % : 10.1 %  Auto Eosinophil % : 3.2 %  Auto Basophil % : 0.4 %    11-11    135  |  98  |  97.0<H>  ----------------------------<  128<H>  4.8   |  29.0  |  2.08<H>    Ca    8.2<L>      11 Nov 2020 06:53  Phos  4.2     11-11  Mg     1.8     11-11          ALLERGIES  No Known Allergies      MEDICATIONS   acetaminophen   Tablet .. 650 milliGRAM(s) Oral every 6 hours PRN  ALBUTerol    90 MICROgram(s) HFA Inhaler 1 Puff(s) Inhalation every 4 hours  albuterol/ipratropium for Nebulization 3 milliLiter(s) Nebulizer every 6 hours  albuterol/ipratropium for Nebulization. 3 milliLiter(s) Nebulizer once  allopurinol 100 milliGRAM(s) Oral daily  ascorbic acid 500 milliGRAM(s) Oral two times a day  aspirin  chewable 81 milliGRAM(s) Oral daily  atorvastatin 20 milliGRAM(s) Oral at bedtime  benzocaine 15 mG/menthol 3.6 mG (Sugar-Free) Lozenge 1 Lozenge Oral every 3 hours PRN  budesonide 160 MICROgram(s)/formoterol 4.5 MICROgram(s) Inhaler 2 Puff(s) Inhalation two times a day  carvedilol 12.5 milliGRAM(s) Oral every 12 hours  epoetin felisha-epbx (RETACRIT) Injectable 17451 Unit(s) SubCutaneous <User Schedule>  folic acid 1 milliGRAM(s) Oral daily  furosemide    Tablet 40 milliGRAM(s) Oral every 12 hours  multivitamin 1 Tablet(s) Oral daily  ondansetron Injectable 4 milliGRAM(s) IV Push once PRN  pantoprazole    Tablet 40 milliGRAM(s) Oral before breakfast  senna 2 Tablet(s) Oral at bedtime  tamsulosin 0.4 milliGRAM(s) Oral at bedtime  thiamine 100 milliGRAM(s) Oral daily  zinc sulfate 220 milliGRAM(s) Oral daily      ----------------------------------------------------------------------------------------  PHYSICAL EXAM  Constitutional - NAD, Comfortable  HEENT - NCAT, EOMI  Neck - Supple, No limited ROM  Chest - Breathing comfortably, No wheezing, +O2 via NC  Cardiovascular - S1S2   Abdomen - Distended, Obese  Extremities - BLE edema/ACE wrap   Neurologic Exam -                    Cognitive - Awake, Alert, AAO to self, place, date, year, situation     Motor - No focal deficits                    LEFT    UE - ShAB 5/5, EF 5/5, EE 5/5, WE 5/5,  5/5                    RIGHT UE - ShAB 5/5, EF 5/5, EE 5/5, WE 5/5,  5/5                    LEFT    LE - HF 5/5, KE 5/5, DF 5/5, PF 5/5                    RIGHT LE - HF 5/5, KE 5/5, DF 5/5, PF 5/5        Sensory - Intact to LT  Psychiatric - Mood stable, Affect WNL  ----------------------------------------------------------------------------------------  ASSESSMENT/PLAN  70yMale with functional deficits after debility related to a trauma  Left LE hematoma s/p evacuation - WBAT  AFIB - Eliquis  CAD - ASA  HTN - COreg  Pulm - Lasix, Duoneb, Proventil, Symbicort  Pain - Tylenol  DVT PPX - SCDs  Rehab - Expect patient to achieve functional goals for DC HOME with HOME CARE. Recommend daily PT. Will continue to follow. Functional progress will determine ongoing rehab dispo recommendations, which may change.    Continue bedside therapy as well as OOB throughout the day with mobilization by staff to maintain cardiopulmonary function and prevention of secondary complications related to debility.     Discussed with rehab team.

## 2020-11-11 NOTE — PROGRESS NOTE ADULT - ASSESSMENT
Acute on chronic resp failure.  severe Pulmonary Htn, moderate R eff, CKD  Patient requires nocturnal non-invasive ventilation to prolong life, improve quality of life and prevent future or decrease future hospitalizations  Needs nocturnal NIV, CT surgery re Pleural drainage, favor RHC with ? Mems  renal function may also impairing hco3 compensation, may benefit from po bicarbonate  prognosis guarded Acute on chronic resp failure.  severe Pulmonary Htn, moderate R eff, CKD  Patient requires nocturnal non-invasive ventilation/Trilogy to prolong life, improve quality of life and prevent future or decrease future hospitalizations  Needs nocturnal NIV, CT surgery re Pleural drainage, favor RHC with ? Mems  renal function may also impairing hco3 compensation, may benefit from po bicarbonate  continue nebs, has home 02  prognosis guarded

## 2020-11-12 ENCOUNTER — TRANSCRIPTION ENCOUNTER (OUTPATIENT)
Age: 70
End: 2020-11-12

## 2020-11-12 PROBLEM — J44.9 CHRONIC OBSTRUCTIVE PULMONARY DISEASE, UNSPECIFIED: Chronic | Status: ACTIVE | Noted: 2020-10-15

## 2020-11-12 PROBLEM — I50.9 HEART FAILURE, UNSPECIFIED: Chronic | Status: ACTIVE | Noted: 2020-10-15

## 2020-11-12 PROBLEM — I48.91 UNSPECIFIED ATRIAL FIBRILLATION: Chronic | Status: ACTIVE | Noted: 2020-10-15

## 2020-11-12 PROBLEM — N18.9 CHRONIC KIDNEY DISEASE, UNSPECIFIED: Chronic | Status: ACTIVE | Noted: 2020-10-15

## 2020-11-12 LAB
ANION GAP SERPL CALC-SCNC: 12 MMOL/L — SIGNIFICANT CHANGE UP (ref 5–17)
BUN SERPL-MCNC: 99 MG/DL — HIGH (ref 8–20)
CALCIUM SERPL-MCNC: 8.4 MG/DL — LOW (ref 8.6–10.2)
CHLORIDE SERPL-SCNC: 97 MMOL/L — LOW (ref 98–107)
CO2 SERPL-SCNC: 26 MMOL/L — SIGNIFICANT CHANGE UP (ref 22–29)
CREAT SERPL-MCNC: 2.1 MG/DL — HIGH (ref 0.5–1.3)
GLUCOSE SERPL-MCNC: 126 MG/DL — HIGH (ref 70–99)
HCT VFR BLD CALC: 25.5 % — LOW (ref 39–50)
HGB BLD-MCNC: 7.7 G/DL — LOW (ref 13–17)
MAGNESIUM SERPL-MCNC: 2.1 MG/DL — SIGNIFICANT CHANGE UP (ref 1.6–2.6)
MCHC RBC-ENTMCNC: 30.2 GM/DL — LOW (ref 32–36)
MCHC RBC-ENTMCNC: 33.6 PG — SIGNIFICANT CHANGE UP (ref 27–34)
MCV RBC AUTO: 111.4 FL — HIGH (ref 80–100)
PLATELET # BLD AUTO: 302 K/UL — SIGNIFICANT CHANGE UP (ref 150–400)
POTASSIUM SERPL-MCNC: 5 MMOL/L — SIGNIFICANT CHANGE UP (ref 3.5–5.3)
POTASSIUM SERPL-SCNC: 5 MMOL/L — SIGNIFICANT CHANGE UP (ref 3.5–5.3)
RBC # BLD: 2.29 M/UL — LOW (ref 4.2–5.8)
RBC # FLD: 16.6 % — HIGH (ref 10.3–14.5)
SARS-COV-2 RNA SPEC QL NAA+PROBE: SIGNIFICANT CHANGE UP
SODIUM SERPL-SCNC: 135 MMOL/L — SIGNIFICANT CHANGE UP (ref 135–145)
WBC # BLD: 8.25 K/UL — SIGNIFICANT CHANGE UP (ref 3.8–10.5)
WBC # FLD AUTO: 8.25 K/UL — SIGNIFICANT CHANGE UP (ref 3.8–10.5)

## 2020-11-12 PROCEDURE — 99233 SBSQ HOSP IP/OBS HIGH 50: CPT

## 2020-11-12 PROCEDURE — 99024 POSTOP FOLLOW-UP VISIT: CPT

## 2020-11-12 RX ORDER — ACETAMINOPHEN 500 MG
2 TABLET ORAL
Qty: 0 | Refills: 0 | DISCHARGE
Start: 2020-11-12

## 2020-11-12 RX ORDER — SENNA PLUS 8.6 MG/1
2 TABLET ORAL
Qty: 0 | Refills: 0 | DISCHARGE
Start: 2020-11-12

## 2020-11-12 RX ORDER — THIAMINE MONONITRATE (VIT B1) 100 MG
1 TABLET ORAL
Qty: 0 | Refills: 0 | DISCHARGE
Start: 2020-11-12

## 2020-11-12 RX ORDER — FOLIC ACID 0.8 MG
1 TABLET ORAL
Qty: 0 | Refills: 0 | DISCHARGE
Start: 2020-11-12

## 2020-11-12 RX ORDER — ALPRAZOLAM 0.25 MG
0.25 TABLET ORAL ONCE
Refills: 0 | Status: DISCONTINUED | OUTPATIENT
Start: 2020-11-12 | End: 2020-11-12

## 2020-11-12 RX ORDER — ASCORBIC ACID 60 MG
1 TABLET,CHEWABLE ORAL
Qty: 0 | Refills: 0 | DISCHARGE
Start: 2020-11-12

## 2020-11-12 RX ORDER — ZINC SULFATE TAB 220 MG (50 MG ZINC EQUIVALENT) 220 (50 ZN) MG
1 TAB ORAL
Qty: 0 | Refills: 0 | DISCHARGE
Start: 2020-11-12

## 2020-11-12 RX ADMIN — Medication 81 MILLIGRAM(S): at 11:58

## 2020-11-12 RX ADMIN — CARVEDILOL PHOSPHATE 12.5 MILLIGRAM(S): 80 CAPSULE, EXTENDED RELEASE ORAL at 05:04

## 2020-11-12 RX ADMIN — PANTOPRAZOLE SODIUM 40 MILLIGRAM(S): 20 TABLET, DELAYED RELEASE ORAL at 05:04

## 2020-11-12 RX ADMIN — CARVEDILOL PHOSPHATE 12.5 MILLIGRAM(S): 80 CAPSULE, EXTENDED RELEASE ORAL at 17:45

## 2020-11-12 RX ADMIN — BUDESONIDE AND FORMOTEROL FUMARATE DIHYDRATE 2 PUFF(S): 160; 4.5 AEROSOL RESPIRATORY (INHALATION) at 20:31

## 2020-11-12 RX ADMIN — Medication 3 MILLILITER(S): at 14:29

## 2020-11-12 RX ADMIN — APIXABAN 5 MILLIGRAM(S): 2.5 TABLET, FILM COATED ORAL at 05:04

## 2020-11-12 RX ADMIN — Medication 500 MILLIGRAM(S): at 17:45

## 2020-11-12 RX ADMIN — Medication 1 TABLET(S): at 11:58

## 2020-11-12 RX ADMIN — Medication 100 MILLIGRAM(S): at 11:58

## 2020-11-12 RX ADMIN — APIXABAN 5 MILLIGRAM(S): 2.5 TABLET, FILM COATED ORAL at 17:45

## 2020-11-12 RX ADMIN — BUDESONIDE AND FORMOTEROL FUMARATE DIHYDRATE 2 PUFF(S): 160; 4.5 AEROSOL RESPIRATORY (INHALATION) at 07:36

## 2020-11-12 RX ADMIN — Medication 1 MILLIGRAM(S): at 11:58

## 2020-11-12 RX ADMIN — ZINC SULFATE TAB 220 MG (50 MG ZINC EQUIVALENT) 220 MILLIGRAM(S): 220 (50 ZN) TAB at 11:58

## 2020-11-12 RX ADMIN — Medication 40 MILLIGRAM(S): at 05:04

## 2020-11-12 RX ADMIN — Medication 3 MILLILITER(S): at 07:35

## 2020-11-12 RX ADMIN — ATORVASTATIN CALCIUM 20 MILLIGRAM(S): 80 TABLET, FILM COATED ORAL at 22:47

## 2020-11-12 RX ADMIN — Medication 0.25 MILLIGRAM(S): at 22:47

## 2020-11-12 RX ADMIN — Medication 500 MILLIGRAM(S): at 05:04

## 2020-11-12 RX ADMIN — Medication 3 MILLILITER(S): at 20:30

## 2020-11-12 RX ADMIN — Medication 40 MILLIGRAM(S): at 17:45

## 2020-11-12 RX ADMIN — Medication 3 MILLILITER(S): at 03:28

## 2020-11-12 RX ADMIN — TAMSULOSIN HYDROCHLORIDE 0.4 MILLIGRAM(S): 0.4 CAPSULE ORAL at 22:47

## 2020-11-12 NOTE — PROGRESS NOTE ADULT - REASON FOR ADMISSION
Acute renal failure
LLE anterior shin hematoma w/overlying skin necrosis s/p evacuation of hematoma
Mechanical fall
Respiratory distress
post op intubation, bradycardia/hypotension
pul htn
AK I
Fall
GERALD
LLE hematoma
mechanical fall down steps
s/p fall
s/p fall
CHF
CHF
leg graft

## 2020-11-12 NOTE — OCCUPATIONAL THERAPY INITIAL EVALUATION ADULT - PHYSICAL ASSIST/NONPHYSICAL ASSIST: SIT/STAND, REHAB EVAL
Cues for sequencing of movement and for safety for proper hand placement prior to/during transfer/1 person + 1 person to manage equipment
1 person assist
supervision/1 person assist

## 2020-11-12 NOTE — OCCUPATIONAL THERAPY INITIAL EVALUATION ADULT - LEVEL OF INDEPENDENCE, OT EVAL
minimum assist (75% patients effort)
to sponge bathe/moderate assist (50% patients effort)
moderate assist (50% patients effort)

## 2020-11-12 NOTE — PROGRESS NOTE ADULT - SUBJECTIVE AND OBJECTIVE BOX
Patient sitting at edge of bed.   Discussed progress and concerns for return home.  Patient now wants rehab.     REVIEW OF SYSTEMS  Constitutional - No fever,  +fatigue  Neurological - No loss of strength    FUNCTIONAL PROGRESS  11/10  Sit-Stand Transfer Training  Transfer Training Sit-to-Stand Transfer: minimum assist (75% patient effort);  1 person assist;  verbal cues;  weight-bearing as tolerated   rolling walker  Transfer Training Stand-to-Sit Transfer: minimum assist (75% patient effort);  1 person assist;  verbal cues;  weight-bearing as tolerated   rolling walker  Sit-to-Stand Transfer Training Transfer Safety Analysis: decreased sequencing ability;  decreased weight-shifting ability;  decreased strength;  pain;  rolling walker    Gait Training  Gait Training: minimum assist (75% patient effort);  1 person assist;  verbal cues;  weight-bearing as tolerated   rolling walker;  40 ft  Gait Analysis: swing-through gait   crouch;  decreased hip/knee flexion;  shuffling;  decreased step length;  decreased strength;  40 ft;  rolling walker        VITALS  T(C): 36.2 (11-12-20 @ 08:47), Max: 36.7 (11-12-20 @ 04:51)  HR: 72 (11-12-20 @ 08:47) (63 - 81)  BP: 115/62 (11-12-20 @ 08:47) (103/53 - 127/66)  RR: 20 (11-12-20 @ 08:47) (18 - 20)  SpO2: 97% (11-12-20 @ 08:47) (92% - 99%)  Wt(kg): --    MEDICATIONS   acetaminophen   Tablet .. 650 milliGRAM(s) every 6 hours PRN  ALBUTerol    90 MICROgram(s) HFA Inhaler 1 Puff(s) every 4 hours  albuterol/ipratropium for Nebulization 3 milliLiter(s) every 6 hours  albuterol/ipratropium for Nebulization. 3 milliLiter(s) once  allopurinol 100 milliGRAM(s) daily  apixaban 5 milliGRAM(s) every 12 hours  ascorbic acid 500 milliGRAM(s) two times a day  aspirin  chewable 81 milliGRAM(s) daily  atorvastatin 20 milliGRAM(s) at bedtime  benzocaine 15 mG/menthol 3.6 mG (Sugar-Free) Lozenge 1 Lozenge every 3 hours PRN  budesonide 160 MICROgram(s)/formoterol 4.5 MICROgram(s) Inhaler 2 Puff(s) two times a day  carvedilol 12.5 milliGRAM(s) every 12 hours  epoetin felisha-epbx (RETACRIT) Injectable 49107 Unit(s) <User Schedule>  folic acid 1 milliGRAM(s) daily  furosemide    Tablet 40 milliGRAM(s) every 12 hours  multivitamin 1 Tablet(s) daily  ondansetron Injectable 4 milliGRAM(s) once PRN  pantoprazole    Tablet 40 milliGRAM(s) before breakfast  senna 2 Tablet(s) at bedtime  tamsulosin 0.4 milliGRAM(s) at bedtime  thiamine 100 milliGRAM(s) daily  zinc sulfate 220 milliGRAM(s) daily      RECENT LABS/IMAGING - Reviewed                        7.7    8.25  )-----------( 302      ( 12 Nov 2020 07:32 )             25.5     11-12    135  |  97<L>  |  99.0<H>  ----------------------------<  126<H>  5.0   |  26.0  |  2.10<H>    Ca    8.4<L>      12 Nov 2020 07:32  Phos  4.2     11-11  Mg     2.1     11-12                  ----------------------------------------------------------------------------------------  PHYSICAL EXAM  Constitutional - NAD, Comfortable  HEENT - NCAT, EOMI  Neck - Supple, No limited ROM  Chest - Breathing comfortably, No wheezing, +O2 via NC  Cardiovascular - S1S2   Abdomen - Distended, Obese  Extremities - BLE edema/ACE wrap   Neurologic Exam -                    Cognitive - Awake, Alert, AAO to self, place, date, year, situation     Motor - No focal deficits                    LEFT    UE - ShAB 5/5, EF 5/5, EE 5/5, WE 5/5,  5/5                    RIGHT UE - ShAB 5/5, EF 5/5, EE 5/5, WE 5/5,  5/5                    LEFT    LE - HF 5/5, KE 5/5, DF 5/5, PF 5/5                    RIGHT LE - HF 5/5, KE 5/5, DF 5/5, PF 5/5        Sensory - Intact to LT  Psychiatric - Mood stable, Affect WNL  ----------------------------------------------------------------------------------------  ASSESSMENT/PLAN  70yMale with functional deficits after debility related to a trauma  Left LE hematoma s/p evacuation - WBAT  AFIB - Eliquis  CAD - ASA  HTN - Coreg  Pulm - Lasix, Duoneb, Proventil, Symbicort  Pain - Tylenol  DVT PPX - SCDs  Rehab - Medically being optimized. Patient's comorbid medical conditions make [facility] more appropriate for medical oversight as he continues to functionally progress. Continue bedside therapy as well as OOB throughout the day with mobilization by staff to maintain cardiopulmonary function and prevention of secondary complications related to debility.     Discussed with rehab team.

## 2020-11-12 NOTE — PROGRESS NOTE ADULT - SUBJECTIVE AND OBJECTIVE BOX
no acute events overnight. patient refused RHC and will follow up outpatient. no pain. tolerating diet.       MEDICATIONS  (STANDING):  ALBUTerol    90 MICROgram(s) HFA Inhaler 1 Puff(s) Inhalation every 4 hours  albuterol/ipratropium for Nebulization 3 milliLiter(s) Nebulizer every 6 hours  albuterol/ipratropium for Nebulization. 3 milliLiter(s) Nebulizer once  allopurinol 100 milliGRAM(s) Oral daily  apixaban 5 milliGRAM(s) Oral every 12 hours  ascorbic acid 500 milliGRAM(s) Oral two times a day  aspirin  chewable 81 milliGRAM(s) Oral daily  atorvastatin 20 milliGRAM(s) Oral at bedtime  budesonide 160 MICROgram(s)/formoterol 4.5 MICROgram(s) Inhaler 2 Puff(s) Inhalation two times a day  carvedilol 12.5 milliGRAM(s) Oral every 12 hours  epoetin felisha-epbx (RETACRIT) Injectable 49638 Unit(s) SubCutaneous <User Schedule>  folic acid 1 milliGRAM(s) Oral daily  furosemide    Tablet 40 milliGRAM(s) Oral every 12 hours  multivitamin 1 Tablet(s) Oral daily  pantoprazole    Tablet 40 milliGRAM(s) Oral before breakfast  senna 2 Tablet(s) Oral at bedtime  tamsulosin 0.4 milliGRAM(s) Oral at bedtime  thiamine 100 milliGRAM(s) Oral daily  zinc sulfate 220 milliGRAM(s) Oral daily    MEDICATIONS  (PRN):  acetaminophen   Tablet .. 650 milliGRAM(s) Oral every 6 hours PRN Mild Pain (1 - 3)  benzocaine 15 mG/menthol 3.6 mG (Sugar-Free) Lozenge 1 Lozenge Oral every 3 hours PRN Sore Throat  ondansetron Injectable 4 milliGRAM(s) IV Push once PRN Nausea and/or Vomiting      Vital Signs Last 24 Hrs  T(C): 36.7 (12 Nov 2020 04:51), Max: 36.7 (12 Nov 2020 04:51)  T(F): 98 (12 Nov 2020 04:51), Max: 98 (12 Nov 2020 04:51)  HR: 78 (12 Nov 2020 07:48) (63 - 81)  BP: 127/66 (12 Nov 2020 04:51) (103/51 - 127/66)  BP(mean): --  RR: 18 (12 Nov 2020 04:51) (18 - 19)  SpO2: 98% (12 Nov 2020 04:51) (92% - 99%)    Physical Exam:    general: no acute distress, aox3  Respiratory: Breath Sounds equal & clear to auscultation, no accessory muscle use    Cardiovascular: Regular rate & rhythm, normal S1, S2; no murmurs, gallops or rubs    Gastrointestinal: Soft, non-tender, normal bowel sounds    Extremities: left lower extremity with skin graft healing well. donor site on left thigh with dried xeroform     Vascular: Equal and normal pulses: 2+ peripheral pulses throughout    Musculoskeletal: No joint pain, swelling or deformity; no limitation of movement    Skin: No rashes      I&O's Detail    11 Nov 2020 07:01  -  12 Nov 2020 07:00  --------------------------------------------------------  IN:  Total IN: 0 mL    OUT:    Voided (mL): 650 mL  Total OUT: 650 mL    Total NET: -650 mL          LABS:                        7.7    8.25  )-----------( 302      ( 12 Nov 2020 07:32 )             25.5     11-12    135  |  97<L>  |  99.0<H>  ----------------------------<  126<H>  5.0   |  26.0  |  2.10<H>    Ca    8.4<L>      12 Nov 2020 07:32  Phos  4.2     11-11  Mg     2.1     11-12            RADIOLOGY & ADDITIONAL STUDIES:

## 2020-11-12 NOTE — OCCUPATIONAL THERAPY INITIAL EVALUATION ADULT - MUSCLE TONE ASSESSMENT, REHAB EVAL
normal/bilateral upper extremities
bilateral upper extremities/normal
normal/bilateral upper extremities

## 2020-11-12 NOTE — OCCUPATIONAL THERAPY INITIAL EVALUATION ADULT - LEVEL OF INDEPENDENCE:TOILET, OT EVAL
to be assessed
+Webb/dependent (less than 25% patients effort)
minimum assist (75% patients effort)/for bowel movement on toilet, supervision/setup with urinal at bedside

## 2020-11-12 NOTE — OCCUPATIONAL THERAPY INITIAL EVALUATION ADULT - IMPAIRED TRANSFERS: SIT/STAND, REHAB EVAL
decreased flexibility
decreased ROM/impaired coordination/decreased strength/pain/impaired balance/impaired motor control
decreased strength/impaired balance/pain

## 2020-11-12 NOTE — OCCUPATIONAL THERAPY INITIAL EVALUATION ADULT - BATHING, PREVIOUS LEVEL OF FUNCTION, OT EVAL
independent
Pt reports using a shower chair/independent/needs device
independent/shower stall with doors, 2 grab bars

## 2020-11-12 NOTE — OCCUPATIONAL THERAPY INITIAL EVALUATION ADULT - VISUAL ACUITY
no vision c/o offered
Pt states wears reading glasses; does not offer any complaints or changes in vision
No new visual complaints.  Pt wears reading glasses.

## 2020-11-12 NOTE — DISCHARGE NOTE PROVIDER - NSDCMRMEDTOKEN_GEN_ALL_CORE_FT
acetaminophen 325 mg oral tablet: 2 tab(s) orally every 6 hours, As needed, Mild Pain (1 - 3)  Advair Diskus 250 mcg-50 mcg inhalation powder: 1 puff(s) inhaled 2 times a day  allopurinol 100 mg oral tablet: 1 tab(s) orally once a day  apixaban 2.5 mg oral tablet: 1 tab(s) orally 2 times a day  ascorbic acid 500 mg oral tablet: 1 tab(s) orally 2 times a day  aspirin 81 mg oral tablet: 1 tab(s) orally once a day  carvedilol 12.5 mg oral tablet: 1 tab(s) orally 2 times a day  Flomax 0.4 mg oral capsule: 1 cap(s) orally once a day  folic acid 1 mg oral tablet: 1 tab(s) orally once a day  furosemide 40 mg oral tablet: 1 tab(s) orally prn, As Needed  hydrALAZINE 25 mg oral tablet: 2 tab(s) orally 3 times a day  Lipitor 20 mg oral tablet: 1 tab(s) orally once a day  Multiple Vitamins oral tablet: 1 tab(s) orally once a day  omeprazole 40 mg oral delayed release capsule: 1 cap(s) orally once a day  senna oral tablet: 2 tab(s) orally once a day (at bedtime)  temazepam 30 mg oral capsule: 1 cap(s) orally once a day (at bedtime), As Needed  thiamine 100 mg oral tablet: 1 tab(s) orally once a day  zinc sulfate 220 mg oral capsule: 1 cap(s) orally once a day

## 2020-11-12 NOTE — OCCUPATIONAL THERAPY INITIAL EVALUATION ADULT - SOCIAL CONCERNS
Complex psychosocial needs/coping issues
Complex psychosocial needs/coping issues/Level of support available upon discharge

## 2020-11-12 NOTE — OCCUPATIONAL THERAPY INITIAL EVALUATION ADULT - LEVEL OF INDEPENDENCE: DRESS UPPER BODY, OT EVAL
to don gown/minimum assist (75% patients effort)
to don/doff gown seated/moderate assist (50% patients effort)
moderate assist (50% patients effort)/to manage chest tube, IV safely

## 2020-11-12 NOTE — OCCUPATIONAL THERAPY INITIAL EVALUATION ADULT - PHYSICAL ASSIST/NONPHYSICAL ASSIST:DRESS UPPER BODY, OT EVAL
1 person assist
verbal cues/additional assist 2* Left UE pain/discomfort and line management/set-up required/1 person assist
1 person assist

## 2020-11-12 NOTE — OCCUPATIONAL THERAPY INITIAL EVALUATION ADULT - PRECAUTIONS/LIMITATIONS, REHAB EVAL
fall precautions/oxygen therapy device and L/min/HOB 30-45 degrees
2L O2 via nasal canula/fall precautions/oxygen therapy device and L/min
cardiac precautions/fall precautions

## 2020-11-12 NOTE — DISCHARGE NOTE PROVIDER - NSDCCPTREATMENT_GEN_ALL_CORE_FT
PRINCIPAL PROCEDURE  Procedure: Graft, skin, split-thickness, extremity  Findings and Treatment:       SECONDARY PROCEDURE  Procedure: Irrigation and debridement, tissue, including muscle or fascia, excisional  Findings and Treatment:

## 2020-11-12 NOTE — OCCUPATIONAL THERAPY INITIAL EVALUATION ADULT - LEVEL OF INDEPENDENCE: SUPINE/SIT, REHAB EVAL
minimum assist (75% patients effort)/at trunk
Recievd pt in bedside recliner +bilateral LE elevated, NAD
minimum assist (75% patients effort)

## 2020-11-12 NOTE — PROGRESS NOTE ADULT - NSHPATTENDINGPLANDISCUSS_GEN_ALL_CORE
ACS team, all questions answered
ACs team
Patient, SICU staff, all questions answered
Patient
Patient, ACS team, all questions answered
Patient, SICU and ACS team, all questions answered
Patient, SICU/ACS team, all questions answered
Patient, wife, cardiology
pt, ICU nursing
pt, trauma surg team
Patient, ACS team, all questions answered
Patient, SICU all questions answered
patient
Patient, SICU staff, all questions answered
Surgery ACP Rich
pt, ICU nursing
patient
patient and wife
Patient
Patient, ACS team, all questions answered

## 2020-11-12 NOTE — OCCUPATIONAL THERAPY INITIAL EVALUATION ADULT - LEVEL OF INDEPENDENCE: SIT/SUPINE, REHAB EVAL
minimum assist (75% patients effort)
Left patient sitting upright in bedside recliner; NAD, CBWR, RN Lexie aware of pt position

## 2020-11-12 NOTE — OCCUPATIONAL THERAPY INITIAL EVALUATION ADULT - LIVES WITH, PROFILE
wife in a 3 story apartment with elevator/spouse
spouse/Pt reports living in a private 3 story apartment with his wife. Pt states no steps to enter and has an elevator inside. Pt states his wife works but can assist upon discharge.
spouse/She works but pt reports she can assist as needed.

## 2020-11-12 NOTE — OCCUPATIONAL THERAPY INITIAL EVALUATION ADULT - ADDITIONAL COMMENTS
Pt has shower stall with doors and grab bars  Pt does not own any DME  Pt is right handed
Pt states has a shower with doors and grab bars. Pt reports independent PTA without device. Pt owns a shower chair, RW and cane. Pt is recently on home O2 2L NC. Pt is right handed and drives. Pt reports independent for IADLs and shares responsibilities with his wife.
Pt recently moved into a private 2 story apartment.  There are no stairs to enter, and there is an elevator, so he reports no need to use stairs.  There are grab bars in shower, and pt recently started oxygen at home.

## 2020-11-12 NOTE — OCCUPATIONAL THERAPY INITIAL EVALUATION ADULT - NS ASR OT EQUIP NEEDS DISCH
rolling walker (5 inch wheels)/bathing/toileting
Recommendations pending patient progress with therapy
Ongoing assessment.  Pt has grab bars in shower and oxygen at home.   At time of eval, the following equipment is recommended:  RW, commode, shower seat.   Pt may need hip kit pending progress but would be advised to obtain on his own.

## 2020-11-12 NOTE — OCCUPATIONAL THERAPY INITIAL EVALUATION ADULT - LEVEL OF INDEPENDENCE: DRESS LOWER BODY, OT EVAL
maximum assist (25% patients effort)/to don socks
dependent (less than 25% patients effort)
maximum assist (25% patients effort)

## 2020-11-12 NOTE — DISCHARGE NOTE PROVIDER - HOSPITAL COURSE
71 yo M w/ PMH of COPD newly on home O2, A fib on eliquis, CHF, CKD who is s/p mechanical fall down 7 stairs on 10/9. Patient did not have LOC but did report hitting head. Patient presented to [facility] on 10/11. CT head/c-spine/CAP were negative for acute injuries. Patient was notted to have diffuse ecchymosis over L side. XR LLE were negative for acute fx. Patient was discharged home on 10/14. Patient presented to [facility] with worsening sob and lower leg pain with difficulty ambulating. Patient reports significantly lower urine output over past few days and noted that urine color was very dark. Patient denies other associated symptoms including f/c/n/v. Patient reports normal bowel movements. Patient takes eliquis for A fib. last dose prior to previous hospitalization. 69 yo M w/ PMH of COPD newly on home O2, A fib on eliquis, CHF, CKD who is s/p mechanical fall down 7 stairs on 10/9. Patient did not have LOC but did report hitting head. Patient presented to [facility] on 10/11. CT head/c-spine/CAP were negative for acute injuries. Patient was notted to have diffuse ecchymosis over L side. XR LLE were negative for acute fx. Patient was discharged home on 10/14. Patient presented to [facility] with worsening sob and lower leg pain with difficulty ambulating. Patient reports significantly lower urine output over past few days and noted that urine color was very dark. Patient denies other associated symptoms including f/c/n/v. Patient reports normal bowel movements. Patient takes eliquis for A fib. last dose prior to previous hospitalization.       SICU consulted for concerns of acute renal failure on CKD possibly 2/2 rhabdomyolysis. Upon evaluation in ED, patient is non-toxic appearing. As above, left arm and leg with diffuse ecchymosis. LLE includes hematoma just distal to patella, laterally. It is tender to light palpation. Compartments are soft and neurologically intact.  With SOB, also concerns for possible COPD exacerbation vs heart failure. Rhabdo unlikely as CK level is 23. To be worked up for cause of renal failure and SOB.     Pt was placed on HFNC as well as attempted diuresis.  Renal consulted.  Pt initially unresponsive to Lasix,  Given Bumex with  good response.    Pt had the following procedures:            Pt's hospital course prolonged and complicated by :    GERALD  left pleural effusion causing acute hypoxic respiratory failure requiring pigtail placement for drainage  COPD exacerbation  Multiple returns to the ICU for hypoxic respiratory failure    Pt was r/o for PE with VQ scan.  After aggressive diuresis, pt pulmonary status improved.      Cardiology consulted after third trip to the OR and return to ICU with consistent trop leak.  Pt also noted to have severe pulm HTN   on ECHO without new RWA in setting of GERALD.  Recs made for [facility] prior to dc.  Pt was offered procedure 11/11/2020 however pt refused at that time.       Post STSG, pt was evaluated by PT.  Recs for Acute vs ROCKY.  Eval by PMR recs for home w/ home care and daily home PT.     71 yo M w/ PMH of COPD newly on home O2, A fib on eliquis, CHF, CKD who is s/p mechanical fall down 7 stairs on 10/9. Patient did not have LOC but did report hitting head. Patient presented to [facility] on 10/11. CT head/c-spine/CAP were negative for acute injuries. Patient was notted to have diffuse ecchymosis over L side. XR LLE were negative for acute fx. Patient was discharged home on 10/14. Patient presented to [facility] with worsening sob and lower leg pain with difficulty ambulating. Patient reports significantly lower urine output over past few days and noted that urine color was very dark. Patient denies other associated symptoms including f/c/n/v. Patient reports normal bowel movements. Patient takes eliquis for A fib. last dose prior to previous hospitalization.       SICU consulted for concerns of acute renal failure on CKD possibly 2/2 rhabdomyolysis. Upon evaluation in ED, patient is non-toxic appearing. As above, left arm and leg with diffuse ecchymosis. LLE includes hematoma just distal to patella, laterally. It is tender to light palpation. Compartments are soft and neurologically intact.  With SOB, also concerns for possible COPD exacerbation vs heart failure. Rhabdo unlikely as CK level is 23. To be worked up for cause of renal failure and SOB.     Pt was placed on HFNC as well as attempted diuresis.  Renal consulted.  Pt initially unresponsive to Lasix,  Given Bumex with  good response.    Pt had the following procedures:            Pt's hospital course prolonged and complicated by :    GERALD  left pleural effusion causing acute hypoxic respiratory failure requiring pigtail placement for drainage  COPD exacerbation  Multiple returns to the ICU for hypoxic respiratory failure    Pt was r/o for PE with VQ scan.  After aggressive diuresis, pt pulmonary status improved.      Cardiology consulted after third trip to the OR and return to ICU with consistent trop leak.  Pt also noted to have severe pulm HTN   on ECHO without new RWA in setting of GERALD.  Recs made for [facility] prior to dc.  Pt was offered procedure 11/11/2020 however pt refused at that time.       Post STSG, pt was evaluated by PT.  Recs for Acute vs ROCKY.  Eval by PMR recs for home w/ home care and daily home PT.    Wound care: dressing on patient's left shin is changed daily while his graft site dressing doesn't need to be changed. Dressing change includes xeroform, 4x4 gauze, a Kerlex wrap above that and Ace wrap that is snug to decreased the swelling.     71 yo M w/ PMH of COPD newly on home O2, A fib on eliquis, CHF, CKD who is s/p mechanical fall down 7 stairs on 10/9. Patient did not have LOC but did report hitting head. Patient presented to [facility] on 10/11. CT head/c-spine/CAP were negative for acute injuries. Patient was notted to have diffuse ecchymosis over L side. XR LLE were negative for acute fx. Patient was discharged home on 10/14. Patient presented to [facility] with worsening sob and lower leg pain with difficulty ambulating. Patient reports significantly lower urine output over past few days and noted that urine color was very dark. Patient denies other associated symptoms including f/c/n/v. Patient reports normal bowel movements. Patient takes eliquis for A fib. last dose prior to previous hospitalization.       SICU consulted for concerns of acute renal failure on CKD possibly 2/2 rhabdomyolysis. Upon evaluation in ED, patient is non-toxic appearing. As above, left arm and leg with diffuse ecchymosis. LLE includes hematoma just distal to patella, laterally. It is tender to light palpation. Compartments are soft and neurologically intact.  With SOB, also concerns for possible COPD exacerbation vs heart failure. Rhabdo unlikely as CK level is 23. To be worked up for cause of renal failure and SOB.     Pt was placed on HFNC as well as attempted diuresis.  Renal consulted.  Pt initially unresponsive to Lasix,  Given Bumex with  good response.    Pt had the following procedures:  10/21: LLE wound debridement  10/30:RTOR LLE skin graft      Pt's hospital course prolonged and complicated by :    GERALD  left pleural effusion causing acute hypoxic respiratory failure requiring pigtail placement for drainage  COPD exacerbation  Multiple returns to the ICU for hypoxic respiratory failure    10/15:  PT admitted to SICU for renal failure believed to be secondary to crush injury.  CK only 23.  Pt appears volume overload with PMH of CHF.  Echo ordered.   SC TLC placed.  O/N Given hydrocort, duonebs, symbicort in ED with some relief. HFNC on arrival to SICU. ECHO read for 65-70% EF. Severe pulmonary HTN. Repeat labs with K of 5.3, Cr of 4.8, and BUN of 80. Attempted to reach renal- unsuccessful. Response to Lasix poor, given Bumex. Also given Insulin, D50, calcium, kayexalate, and bicarb. Refuses CPAP. Trop – x 2  10/16:  Pt now with UOP s/p Bumex. 1 unit PRBC given since now with UOP for Hgb of 7.  Lytes remain stable on repeat BMP.  Continues with adequate UOP.  10/21: From floor to OR for LLE wound debridement. Procedure uncomplicated. Got anesthesia reversal & acutely adriano & hypotensive. Atropine, calcium, epi. Vital signs returned to normal. To SICU intubated. Patient found to have R sided hydrothorax, drained 2.2L.  Troponin neg x1. TTE done. EKG unchanged.  10/30:RTOR LLE skin graft. Did not get anesthesia reversal, still hypotensive and adriano when anesthesia tried to extubate, got atropine and epi, taken to ICU.  ABG in OR with Ph 7.19 and CO2 77, changes made to vent in PACU. On arrival to ICU, patient awake and asking for tube to be removed. Repeat ABG obtained, Ph 7.34, oxygenating well, PCO2 significantly reduced. Extubated by respiratory without complication. PM Hep drip and other meds restarted.  PTT>200.  Held x 1 hr and Repeat PTT Sent.  11/2 Worsening pulmonary edema, conversational dyspnea, soft BP.  ICU reconsulted.  ON: Improved tachypnea and confusion. Did NOT respond to 40 lasix. POC US +B lines on R side however, hyperdynamic LV and >50% on variations.  Resent labs – more consistent with hypovolemia driving renal failure.  Given bolus and started on maintenance. A little more confused after relistor- d/reina.  Placed on constant observation. Given Haldol 2.5mg with response.    11/3: CXR more overloaded, lethargic. POCUS c/f R heart failure. Got 80 of Lasix and 250 albumin. ABG w/ mixed acidosis, mostly metabolic. Started on bipap, tolerating well. UOP increased to 50 – 125 / hr. ON: Called pulmonology for possible start of sildenafil however, felt cards should be consulted instead for possible R heart cath.  Due to acute change in pulmonary htn sent off troponins and ecg- negative for acute change.  Redosed Lasix to continue to off load the heart with albumin, 150/160 UOP response.    11/4: Restarted Coreg, No cath for now. UOP up to 150 ml/hr with Lasix. Goal net neg 1 hr. LLE dressing taken down by [facility]. Graft site w/ xeroform, kerlex and ace wrap, donor site with xeroform open to air (LET IT DRY OUT!) keep blankets off.   11/5: CXR w/ slight improvement of L sided effusion. Stared 40mg IV Lasix BID. LE duplex with no DVT. VQ scan w/ low probability of PE ON:  No acute events.  Creatinine downtrending.  No resp distress.        Cardiology consulted after second trip to the OR and return to ICU with consistent trop leak.  Pt also noted to have severe pulm HTN   on ECHO without new RWA in setting of GERALD.  Recs made for [facility] prior to dc.  Pt was offered procedure 11/11/2020 however pt refused at that time.       Post STSG, pt was evaluated by PT.  Recs for Acute vs ROCKY.  Eval by PMR recs for home w/ home care and daily home PT.    Wound care: dressing on patient's left shin is changed daily while his graft site dressing doesn't need to be changed. Dressing change includes xeroform, 4x4 gauze, a Kerlex wrap above that and Ace wrap that is snug to decreased the swelling.     71 yo M w/ PMH of COPD newly on home O2, A fib on eliquis, CHF, CKD who is s/p mechanical fall down 7 stairs on 10/9. Patient did not have LOC but did report hitting head. Patient presented to [facility] on 10/11. CT head/c-spine/CAP were negative for acute injuries. Patient was notted to have diffuse ecchymosis over L side. XR LLE were negative for acute fx. Patient was discharged home on 10/14. Patient presented to [facility] with worsening sob and lower leg pain with difficulty ambulating. Patient reports significantly lower urine output over past few days and noted that urine color was very dark. Patient denies other associated symptoms including f/c/n/v. Patient reports normal bowel movements. Patient takes eliquis for A fib. last dose prior to previous hospitalization.       SICU consulted for concerns of acute renal failure on CKD possibly 2/2 rhabdomyolysis. Upon evaluation in ED, patient is non-toxic appearing. As above, left arm and leg with diffuse ecchymosis. LLE includes hematoma just distal to patella, laterally. It is tender to light palpation. Compartments are soft and neurologically intact.  With SOB, also concerns for possible COPD exacerbation vs heart failure. Rhabdo unlikely as CK level is 23. To be worked up for cause of renal failure and SOB.     Pt was placed on HFNC as well as attempted diuresis.  Renal consulted.  Pt initially unresponsive to Lasix,  Given Bumex with  good response.    Pt had the following procedures:  10/21: LLE wound debridement  10/30:RTOR LLE skin graft      Pt's hospital course prolonged and complicated by :    GERALD  left pleural effusion causing acute hypoxic respiratory failure requiring pigtail placement for drainage  COPD exacerbation  Multiple returns to the ICU for hypoxic respiratory failure    10/15:  PT admitted to SICU for renal failure believed to be secondary to crush injury.  CK only 23.  Pt appears volume overload with PMH of CHF.  Echo ordered.   SC TLC placed.  O/N Given hydrocort, duonebs, symbicort in ED with some relief. HFNC on arrival to SICU. ECHO read for 65-70% EF. Severe pulmonary HTN. Repeat labs with K of 5.3, Cr of 4.8, and BUN of 80. Attempted to reach renal- unsuccessful. Response to Lasix poor, given Bumex. Also given Insulin, D50, calcium, kayexalate, and bicarb. Refuses CPAP. Trop – x 2  10/16:  Pt now with UOP s/p Bumex. 1 unit PRBC given since now with UOP for Hgb of 7.  Lytes remain stable on repeat BMP.  Continues with adequate UOP.  10/21: From floor to OR for LLE wound debridement. Procedure uncomplicated. Got anesthesia reversal & acutely adriano & hypotensive. Atropine, calcium, epi. Vital signs returned to normal. To SICU intubated. Patient found to have R sided hydrothorax, drained 2.2L.  Troponin neg x1. TTE done. EKG unchanged.  10/30:RTOR LLE skin graft. Did not get anesthesia reversal, still hypotensive and adriano when anesthesia tried to extubate, got atropine and epi, taken to ICU.  ABG in OR with Ph 7.19 and CO2 77, changes made to vent in PACU. On arrival to ICU, patient awake and asking for tube to be removed. Repeat ABG obtained, Ph 7.34, oxygenating well, PCO2 significantly reduced. Extubated by respiratory without complication. PM Hep drip and other meds restarted.  PTT>200.  Held x 1 hr and Repeat PTT Sent.  11/2 Worsening pulmonary edema, conversational dyspnea, soft BP.  ICU reconsulted.  ON: Improved tachypnea and confusion. Did NOT respond to 40 lasix. POC US +B lines on R side however, hyperdynamic LV and >50% on variations.  Resent labs – more consistent with hypovolemia driving renal failure.  Given bolus and started on maintenance. A little more confused after relistor- d/reina.  Placed on constant observation. Given Haldol 2.5mg with response.    11/3: CXR more overloaded, lethargic. POCUS c/f R heart failure. Got 80 of Lasix and 250 albumin. ABG w/ mixed acidosis, mostly metabolic. Started on bipap, tolerating well. UOP increased to 50 – 125 / hr. ON: Called pulmonology for possible start of sildenafil however, felt cards should be consulted instead for possible R heart cath.  Due to acute change in pulmonary htn sent off troponins and ecg- negative for acute change.  Redosed Lasix to continue to off load the heart with albumin, 150/160 UOP response.    11/4: Restarted Coreg, No cath for now. UOP up to 150 ml/hr with Lasix. Goal net neg 1 hr. LLE dressing taken down by [facility]. Graft site w/ xeroform, kerlex and ace wrap, donor site with xeroform open to air (LET IT DRY OUT!) keep blankets off.   11/5: CXR w/ slight improvement of L sided effusion. Stared 40mg IV Lasix BID. LE duplex with no DVT. VQ scan w/ low probability of PE ON:  No acute events.  Creatinine downtrending.  No resp distress.        Cardiology consulted after second trip to the OR and return to ICU with consistent trop leak.  Pt also noted to have severe pulm HTN   on ECHO without new RWA in setting of GERALD.  Recs made for [facility] prior to dc.  Pt was offered procedure 11/11/2020 however pt refused at that time.       Post STSG, pt was evaluated by PT.  Recs for Acute vs ROCKY.  Eval by PMR recs for home w/ home care and daily home PT.    Wound care: dressing on patient's left shin is changed daily while his graft site dressing doesn't need to be changed. Dressing change includes xeroform, 4x4 gauze, a Kerlex wrap above that and Ace wrap that is snug to decreased the swelling.    An appointment has been made for the pt with [facility] for 11/24 at 1015.     71 yo M w/ PMH of COPD newly on home O2, A fib on eliquis, CHF, CKD who is s/p mechanical fall down 7 stairs on 10/9. Patient did not have LOC but did report hitting head. Patient presented to [facility] on 10/11. CT head/c-spine/CAP were negative for acute injuries. Patient was notted to have diffuse ecchymosis over L side. XR LLE were negative for acute fx. Patient was discharged home on 10/14. Patient presented to [facility] with worsening sob and lower leg pain with difficulty ambulating. Patient reports significantly lower urine output over past few days and noted that urine color was very dark. Patient denies other associated symptoms including f/c/n/v. Patient reports normal bowel movements. Patient takes eliquis for A fib. last dose prior to previous hospitalization.       SICU consulted for concerns of acute renal failure on CKD possibly 2/2 rhabdomyolysis. Upon evaluation in ED, patient is non-toxic appearing. As above, left arm and leg with diffuse ecchymosis. LLE includes hematoma just distal to patella, laterally. It is tender to light palpation. Compartments are soft and neurologically intact.  With SOB, also concerns for possible COPD exacerbation vs heart failure. Rhabdo unlikely as CK level is 23. To be worked up for cause of renal failure and SOB.     Pt was placed on HFNC as well as attempted diuresis.  Renal consulted.  Pt initially unresponsive to Lasix,  Given Bumex with  good response.    Pt had the following procedures:  10/21: LLE wound debridement  10/30:RTOR LLE skin graft      Pt's hospital course prolonged and complicated by :    GERALD  left pleural effusion causing acute hypoxic respiratory failure requiring pigtail placement for drainage  COPD exacerbation  Multiple returns to the ICU for hypoxic respiratory failure    10/15:  PT admitted to SICU for renal failure believed to be secondary to crush injury.  CK only 23.  Pt appears volume overload with PMH of CHF.  Echo ordered.   SC TLC placed.  O/N Given hydrocort, duonebs, symbicort in ED with some relief. HFNC on arrival to SICU. ECHO read for 65-70% EF. Severe pulmonary HTN. Repeat labs with K of 5.3, Cr of 4.8, and BUN of 80. Attempted to reach renal- unsuccessful. Response to Lasix poor, given Bumex. Also given Insulin, D50, calcium, kayexalate, and bicarb. Refuses CPAP. Trop – x 2  10/16:  Pt now with UOP s/p Bumex. 1 unit PRBC given since now with UOP for Hgb of 7.  Lytes remain stable on repeat BMP.  Continues with adequate UOP.  10/21: From floor to OR for LLE wound debridement. Procedure uncomplicated. Got anesthesia reversal & acutely adriano & hypotensive. Atropine, calcium, epi. Vital signs returned to normal. To SICU intubated. Patient found to have R sided hydrothorax, drained 2.2L.  Troponin neg x1. TTE done. EKG unchanged.  10/30:RTOR LLE skin graft. Did not get anesthesia reversal, still hypotensive and adriano when anesthesia tried to extubate, got atropine and epi, taken to ICU.  ABG in OR with Ph 7.19 and CO2 77, changes made to vent in PACU. On arrival to ICU, patient awake and asking for tube to be removed. Repeat ABG obtained, Ph 7.34, oxygenating well, PCO2 significantly reduced. Extubated by respiratory without complication. PM Hep drip and other meds restarted.  PTT>200.  Held x 1 hr and Repeat PTT Sent.  11/2 Worsening pulmonary edema, conversational dyspnea, soft BP.  ICU reconsulted.  ON: Improved tachypnea and confusion. Did NOT respond to 40 lasix. POC US +B lines on R side however, hyperdynamic LV and >50% on variations.  Resent labs – more consistent with hypovolemia driving renal failure.  Given bolus and started on maintenance. A little more confused after relistor- d/reina.  Placed on constant observation. Given Haldol 2.5mg with response.    11/3: CXR more overloaded, lethargic. POCUS c/f R heart failure. Got 80 of Lasix and 250 albumin. ABG w/ mixed acidosis, mostly metabolic. Started on bipap, tolerating well. UOP increased to 50 – 125 / hr. ON: Called pulmonology for possible start of sildenafil however, felt cards should be consulted instead for possible R heart cath.  Due to acute change in pulmonary htn sent off troponins and ecg- negative for acute change.  Redosed Lasix to continue to off load the heart with albumin, 150/160 UOP response.    11/4: Restarted Coreg, No cath for now. UOP up to 150 ml/hr with Lasix. Goal net neg 1 hr. LLE dressing taken down by [facility]. Graft site w/ xeroform, kerlex and ace wrap, donor site with xeroform open to air (LET IT DRY OUT!) keep blankets off.   11/5: CXR w/ slight improvement of L sided effusion. Stared 40mg IV Lasix BID. LE duplex with no DVT. VQ scan w/ low probability of PE ON:  No acute events.  Creatinine downtrending.  No resp distress.        Cardiology consulted after second trip to the OR and return to ICU with consistent trop leak.  Pt also noted to have severe pulm HTN   on ECHO without new RWA in setting of GERALD.  Recs made for [facility] prior to dc.  Pt was offered procedure 11/11/2020 however pt refused at that time.       Post STSG, pt was evaluated by PT and will go to [facility].    Wound care: dressing on patient's left shin is changed daily while his graft site dressing doesn't need to be changed. Dressing change includes xeroform, 4x4 gauze, a Kerlex wrap above that and Ace wrap that is snug to decreased the swelling.    An appointment has been made for the pt with [facility] for 11/24 at 1015.

## 2020-11-12 NOTE — DISCHARGE NOTE PROVIDER - NSFOLLOWUPCLINICS_GEN_ALL_ED_FT
[facility] Acute Care Surgery  Acute Care Surgery  [address] [place] [id number]  Phone: [phone]  Fax:   Follow Up Time:

## 2020-11-12 NOTE — OCCUPATIONAL THERAPY INITIAL EVALUATION ADULT - MANUAL MUSCLE TESTING RESULTS, REHAB EVAL
bilat UEs WFLs/no strength deficits were identified
Right UE 4+/5 throughout. Left UE not formally assessed 2* pt with complaints of feeling "sore"; grossly assessed at least 3+/5, Left UE gross grasp 4+/5
Right UE 4+/5 throughout. Left UE not formally assessed 2* pt with complaints of feeling "sore"; grossly assessed at least 3+/5, Left UE gross grasp 4+/5

## 2020-11-12 NOTE — OCCUPATIONAL THERAPY INITIAL EVALUATION ADULT - PHYSICAL ASSIST/NONPHYSICAL ASSIST: STAND/SIT, REHAB EVAL
1 person assist
1 person + 1 person to manage equipment/Cues for sequencing of movement and for safety for proper hand placement prior to/during transfer

## 2020-11-12 NOTE — DISCHARGE NOTE PROVIDER - NSDCCPCAREPLAN_GEN_ALL_CORE_FT
PRINCIPAL DISCHARGE DIAGNOSIS  Diagnosis: Subcutaneous hematoma  Assessment and Plan of Treatment:   BATHING: Please do not submerge wound underwater. You may shower and/or sponge bathe.   DRESSINGS:  Shower daily.  Remove your dressing prior to the shower.  After you clean your wound with soap and water, dry.  Then apply Xeroform and kerlix after.  Once the edges of the xeroform on your donor site become hardened, you may trip them away with a scissor.  ACTIVITY: No heavy lifting or straining. Otherwise, you may return to your usual level of physical activity. If you are taking narcotic pain medication (such as Percocet) DO NOT drive a car, operate machinery or make important decisions.  DIET: Patient is advised to RETURN TO THE EMERGENCY DEPARTMENT for any of the following - worsening pain, fever/chills, nausea/vomiting, altered mental status, chest pain, shortness of breath, or any other new / worsening symptom. to your usual diet.  NOTIFY YOUR SURGEON IF: You have any bleeding that does not stop, any pus draining from your wound(s), any fever (over 100.4 F) or chills, persistent nausea/vomiting, persistent diarrhea, or if your pain is not controlled on your discharge pain medications.  FOLLOW-UP: Please follow up with your primary care physician and Acute Care Surgery clinic [phone] in 10-14 days regarding your hospitalization. Call for appointment upon discharge.        SECONDARY DISCHARGE DIAGNOSES  Diagnosis: COPD (chronic obstructive pulmonary disease)  Assessment and Plan of Treatment: COPD (chronic obstructive pulmonary disease)    Diagnosis: Coronary artery disease involving coronary bypass graft of native heart without angina pectoris  Assessment and Plan of Treatment: Coronary artery disease involving coronary bypass graft of native heart without angina pectoris     PRINCIPAL DISCHARGE DIAGNOSIS  Diagnosis: Subcutaneous hematoma  Assessment and Plan of Treatment: BATHING: Please do not submerge wound underwater. You may shower and/or sponge bathe.   DRESSINGS:  Shower daily.  Remove your dressing prior to the shower.  After you clean your wound with soap and water, dry.  Then apply Xeroform and kerlix after.  Once the edges of the xeroform on your donor site become hardened, you may trip them away with a scissor.  ACTIVITY: No heavy lifting or straining. Otherwise, you may return to your usual level of physical activity. If you are taking narcotic pain medication (such as Percocet) DO NOT drive a car, operate machinery or make important decisions.  DIET: Patient is advised to RETURN TO THE EMERGENCY DEPARTMENT for any of the following - worsening pain, fever/chills, nausea/vomiting, altered mental status, chest pain, shortness of breath, or any other new / worsening symptom. to your usual diet.  NOTIFY YOUR SURGEON IF: You have any bleeding that does not stop, any pus draining from your wound(s), any fever (over 100.4 F) or chills, persistent nausea/vomiting, persistent diarrhea, or if your pain is not controlled on your discharge pain medications.  FOLLOW-UP: An appointment has been made for you with the Acute Care Surgery clinic [phone] for 11/24 at 10:15am.  If this time does not work for you, you must call to flowers your appointment.        SECONDARY DISCHARGE DIAGNOSES  Diagnosis: COPD (chronic obstructive pulmonary disease)  Assessment and Plan of Treatment: COPD (chronic obstructive pulmonary disease)    Diagnosis: Coronary artery disease involving coronary bypass graft of native heart without angina pectoris  Assessment and Plan of Treatment: Coronary artery disease involving coronary bypass graft of native heart without angina pectoris

## 2020-11-12 NOTE — OCCUPATIONAL THERAPY INITIAL EVALUATION ADULT - ANTICIPATED DISCHARGE DISPOSITION, OT EVAL
subacute rehab
Home with assist and home care OT vs [facility]
home w/ OT/and assist as needed from spouse.

## 2020-11-12 NOTE — PROGRESS NOTE ADULT - ASSESSMENT
70 year old male s/p fall down steps with hematoma of left lower extremity requring debridement and evacuation of hematoma with eventual skin graft    -daily dresing changes to left lower extremity  -trim left thigh donor site  -keep skin moisturizer on extremity  -ok to shower with soap and water  f/u cards outpatient  -discharge planning to home toady with services

## 2020-11-13 ENCOUNTER — TRANSCRIPTION ENCOUNTER (OUTPATIENT)
Age: 70
End: 2020-11-13

## 2020-11-13 VITALS
SYSTOLIC BLOOD PRESSURE: 120 MMHG | RESPIRATION RATE: 20 BRPM | TEMPERATURE: 98 F | HEART RATE: 76 BPM | OXYGEN SATURATION: 94 % | DIASTOLIC BLOOD PRESSURE: 52 MMHG

## 2020-11-13 PROCEDURE — 99233 SBSQ HOSP IP/OBS HIGH 50: CPT

## 2020-11-13 RX ADMIN — Medication 500 MILLIGRAM(S): at 04:58

## 2020-11-13 RX ADMIN — Medication 3 MILLILITER(S): at 07:43

## 2020-11-13 RX ADMIN — CARVEDILOL PHOSPHATE 12.5 MILLIGRAM(S): 80 CAPSULE, EXTENDED RELEASE ORAL at 04:58

## 2020-11-13 RX ADMIN — Medication 650 MILLIGRAM(S): at 11:40

## 2020-11-13 RX ADMIN — Medication 100 MILLIGRAM(S): at 11:05

## 2020-11-13 RX ADMIN — BUDESONIDE AND FORMOTEROL FUMARATE DIHYDRATE 2 PUFF(S): 160; 4.5 AEROSOL RESPIRATORY (INHALATION) at 07:43

## 2020-11-13 RX ADMIN — ERYTHROPOIETIN 10000 UNIT(S): 10000 INJECTION, SOLUTION INTRAVENOUS; SUBCUTANEOUS at 11:06

## 2020-11-13 RX ADMIN — PANTOPRAZOLE SODIUM 40 MILLIGRAM(S): 20 TABLET, DELAYED RELEASE ORAL at 04:58

## 2020-11-13 RX ADMIN — APIXABAN 5 MILLIGRAM(S): 2.5 TABLET, FILM COATED ORAL at 04:58

## 2020-11-13 RX ADMIN — Medication 3 MILLILITER(S): at 03:29

## 2020-11-13 RX ADMIN — Medication 1 MILLIGRAM(S): at 11:05

## 2020-11-13 RX ADMIN — Medication 81 MILLIGRAM(S): at 11:05

## 2020-11-13 RX ADMIN — Medication 40 MILLIGRAM(S): at 04:58

## 2020-11-13 RX ADMIN — Medication 650 MILLIGRAM(S): at 11:10

## 2020-11-13 RX ADMIN — ZINC SULFATE TAB 220 MG (50 MG ZINC EQUIVALENT) 220 MILLIGRAM(S): 220 (50 ZN) TAB at 11:05

## 2020-11-13 RX ADMIN — Medication 1 TABLET(S): at 11:05

## 2020-11-13 NOTE — PROGRESS NOTE ADULT - SUBJECTIVE AND OBJECTIVE BOX
HPI/OVERNIGHT EVENTS: Patient seen and examined at bedside, no acute events. Denies pain, n/v, numbness/tingling/weakness of extremities. No new complaints. Tolerating diet.     MEDICATIONS  (STANDING):  ALBUTerol    90 MICROgram(s) HFA Inhaler 1 Puff(s) Inhalation every 4 hours  albuterol/ipratropium for Nebulization 3 milliLiter(s) Nebulizer every 6 hours  albuterol/ipratropium for Nebulization. 3 milliLiter(s) Nebulizer once  allopurinol 100 milliGRAM(s) Oral daily  apixaban 5 milliGRAM(s) Oral every 12 hours  ascorbic acid 500 milliGRAM(s) Oral two times a day  aspirin  chewable 81 milliGRAM(s) Oral daily  atorvastatin 20 milliGRAM(s) Oral at bedtime  budesonide 160 MICROgram(s)/formoterol 4.5 MICROgram(s) Inhaler 2 Puff(s) Inhalation two times a day  carvedilol 12.5 milliGRAM(s) Oral every 12 hours  epoetin felisha-epbx (RETACRIT) Injectable 07223 Unit(s) SubCutaneous <User Schedule>  folic acid 1 milliGRAM(s) Oral daily  furosemide    Tablet 40 milliGRAM(s) Oral every 12 hours  multivitamin 1 Tablet(s) Oral daily  pantoprazole    Tablet 40 milliGRAM(s) Oral before breakfast  senna 2 Tablet(s) Oral at bedtime  tamsulosin 0.4 milliGRAM(s) Oral at bedtime  thiamine 100 milliGRAM(s) Oral daily  zinc sulfate 220 milliGRAM(s) Oral daily    MEDICATIONS  (PRN):  acetaminophen   Tablet .. 650 milliGRAM(s) Oral every 6 hours PRN Mild Pain (1 - 3)  benzocaine 15 mG/menthol 3.6 mG (Sugar-Free) Lozenge 1 Lozenge Oral every 3 hours PRN Sore Throat  ondansetron Injectable 4 milliGRAM(s) IV Push once PRN Nausea and/or Vomiting      Vital Signs Last 24 Hrs  T(C): 36.6 (12 Nov 2020 22:05), Max: 36.7 (12 Nov 2020 04:51)  T(F): 97.8 (12 Nov 2020 22:05), Max: 98 (12 Nov 2020 04:51)  HR: 73 (12 Nov 2020 22:05) (66 - 81)  BP: 125/65 (12 Nov 2020 22:05) (115/62 - 127/66)  BP(mean): --  RR: 18 (12 Nov 2020 17:46) (18 - 20)  SpO2: 99% (12 Nov 2020 22:05) (92% - 100%)    Constitutional: patient resting comfortably in bed, in no acute distress  Respiratory: respirations are unlabored, no accessory muscle use, no conversational dyspnea  Cardiovascular: regular rate & rhythm, no m/r/g  Gastrointestinal: Abdomen soft, non-tender, non-distended, no rebound tenderness / guarding  Neurological: GCS: 15 (4/5/6). A&O x 3; no gross sensory / motor / coordination deficits  Psychiatric: Normal mood, normal affect  Vascular: Equal and normal pulses: 2+ peripheral pulses throughout  Musculoskeletal: Sensation and motor intact b/l LEs, ace bandage LLE clean and dry, no bleeding or discharge.       I&O's Detail    11 Nov 2020 07:01  -  12 Nov 2020 07:00  --------------------------------------------------------  IN:  Total IN: 0 mL    OUT:    Voided (mL): 650 mL  Total OUT: 650 mL    Total NET: -650 mL      12 Nov 2020 07:01  -  13 Nov 2020 03:20  --------------------------------------------------------  IN:  Total IN: 0 mL    OUT:    Voided (mL): 350 mL  Total OUT: 350 mL    Total NET: -350 mL          LABS:                        7.7    8.25  )-----------( 302      ( 12 Nov 2020 07:32 )             25.5     11-12    135  |  97<L>  |  99.0<H>  ----------------------------<  126<H>  5.0   |  26.0  |  2.10<H>    Ca    8.4<L>      12 Nov 2020 07:32  Phos  4.2     11-11  Mg     2.1     11-12

## 2020-11-13 NOTE — PROGRESS NOTE ADULT - NUTRITIONAL ASSESSMENT
This patient has been assessed with a concern for Malnutrition and has been determined to have a diagnosis/diagnoses of Moderate protein-calorie malnutrition.    This patient is being managed with:   Diet DASH/TLC-  Sodium & Cholesterol Restricted  Supplement Feeding Modality:  Oral  Ensure Enlive Cans or Servings Per Day:  1       Frequency:  Three Times a day  Entered: Nov 4 2020  3:29PM    
This patient has been assessed with a concern for Malnutrition and has been determined to have a diagnosis/diagnoses of Moderate protein-calorie malnutrition.    This patient is being managed with:   Diet NPO after Midnight-     NPO Start Date: 10-Nov-2020   NPO Start Time: 23:59  Entered: Nov 10 2020  3:42PM    Diet DASH/TLC-  Sodium & Cholesterol Restricted  Supplement Feeding Modality:  Oral  Ensure Enlive Cans or Servings Per Day:  1       Frequency:  Three Times a day  Entered: Nov 4 2020  3:29PM    
This patient has been assessed with a concern for Malnutrition and has been determined to have a diagnosis/diagnoses of Moderate protein-calorie malnutrition.    This patient is being managed with:   Diet DASH/TLC-  Sodium & Cholesterol Restricted  Supplement Feeding Modality:  Oral  Ensure Enlive Cans or Servings Per Day:  1       Frequency:  Three Times a day  Entered: Nov 4 2020  3:29PM    
This patient has been assessed with a concern for Malnutrition and has been determined to have a diagnosis/diagnoses of Moderate protein-calorie malnutrition.    This patient is being managed with:   Diet NPO after Midnight-     NPO Start Date: 10-Nov-2020   NPO Start Time: 23:59  Entered: Nov 10 2020  3:42PM    Diet DASH/TLC-  Sodium & Cholesterol Restricted  Supplement Feeding Modality:  Oral  Ensure Enlive Cans or Servings Per Day:  1       Frequency:  Three Times a day  Entered: Nov 4 2020  3:29PM

## 2020-11-13 NOTE — PROGRESS NOTE ADULT - ATTENDING COMMENTS
71 yo M  with PMH of COPD on home O2, afib on eliquis, CHF and CKD is s/p fall with development of acute renal failure, COPD exacerbation, and LLE hematoma  AAOx3, NAD, on 2L NC  PUL CTA  CV IRIR  GI benign  MS ELIZA, LLE lateral tibia with 15-20 cm hematoma with cyanotic skin changes, and half dollar size necrotic tissue prox to lateral ankle  Plan   1. GERALD resolving with fluid and Nephro following --home lasix on hold  2. Plan on Debridement of LLE on Wednesday when steroid santiago complete  3. Steroid santiago for home prednisone stops 10/20  4. HTN started Coreg 12.5 mg BID  5. VTE Prophylaxis: SCDs, heparin      code 99151
I have seen and examined the patient  events reviewed   no arrythmia overnight, extubated last night    PLan:  20 lasix for volume overload  dressing remove at donor site  ok to transfer to floor (previous work up was negative for perioperative bradycardia)  bed rest
The patient was seen and examined  Events noted  Complains of dyspnea this morning  Lungs B/L CTA  CXR improving right pleural effusion  Hgb 7.1 gms  Qj=304  Will transfuse 1 unit PRBC  Allow PO fluids and salt  DVT prophylaxis  Donor open to air
70-year-old male status post fall down steps suffered a left anterior tibial hematoma which underwent surgical debridement and STSG.  AAOx3, increased respiratory effort and increase O2 requirement. Sats 88  CXR with cephalization and pulmonary edema  ABG 7.23/ 64/ 62/ 23/-1.4  Na 132,  K 6, Cl 95  BUN 75/ creat 2.32  Plan  1.  Continue Immobility for STSG to LLE  2.  For increase work of breathing and CXR with cephalization would dose with lasix  3.  Transfer to ICU for increase O2 requirement and hypoxia      code 12144
Faal with LLE wound  LLE anterior tib wound 25x 10 cm with no granulation  Plan  1. Wound VAC therapy to enhance granulation    code 16478
I have seen and examined the patient   [id number]: Awake non focal  CV HD normal  Pulmo sat ok on supplemental O2  Abd: soft  Renal: urine flow adequate, renal function stable electrolytes ok  ID; no issues    A/P  S/P STSG  back on NOAC, HD normal  Ok to transfer to floor  dispo planning
I have seen and examined the patient  GERALD resolving, urine flow adequate  now with necrotic skin on hematoma   for debridement tomorrow
I have seen and examined the patient.  no  new arrythmia  patient extubated this am uneventfully  Wound with clean base.  ok to transfer to floor.  ok to resume hepatin drip.
Patient seen and examined during rounds  agree with note and exam  clinically improving in respiratory status, being diurese with lasix 40 BID  hemodynamically stable  PE work  up negative  on coreg and eliquis  tolerating diet, normal bm  uo: 60-80/hr  electrolytes normal  stable anemia  dressing site clean  plan  continue aggressive pulmonary toilet  net goal >1000ml/24 urine output  physical therapy   continue monitor h/h, will transfuse if hypotensive
Patient was seen and examined during rounds and throughout the day  overnight events noted  agreed with note and exam  failed to Lasix challenge of 40  more somnolence this am  sat 94,  chest xray worsening right sided effusion  persistent hypercarbia/hypoxemia, placed on BiPAP  sat now 97%  sbp 110, POCUS showed dilated RV, poor filling lv  albumin bolus with 80 Lasix given- urine output improved  Hyperkalemia, renal failure, now diuresing, will continue to follow  on Eliquis for afib, chronic anemia  wbc slight decrease, afebrile  plan  continue bipap  repeat abg  continue lasix  monitor electrolytes  pulmonary consult appreciated
Pt seen and examined  by me  agree with findings  plan STSG today  transfuse 1u PRBC
Pt seen and examined by me  agree with findings  cont BID dressing changes to LLE  will need anesthesia eval prior to surgery Monday 10/26  Chest tube backed out of chest wall--D/C'D  CXR pending  multiple medical comorbidities
The patient was seen and examined  Events noted  No new problems  For debridement of left leg - risks, benefits, and alternatives reviewed with the patient.
greater than 50% of time spent reviewing labs, notes, orders and radiographs, coordinating care
greater than 50% of time spent reviewing labs, notes, orders and radiographs, coordinating care
patient was seen and examined  was noted to be confused and not arousalble  on exam, patient was opening his eyes, knowing his wife name  mild crackles to lower lung base  abdomen soft nt/nd  bladder scan 300  lomeli placed  patient was upgraded to monitor bed
70-year-old male status post fall down steps suffered a left anterior tibial hematoma which underwent surgical debridement and STSG. Admitted to ICU 11/2 for increased respiratory effort. Treated with Lasix for increased PUL HTn and pulmonary edema.  AAOx3, more comfortable on 2 L NC. Sats 96%  PUL CTA  CV IRIR rate control  GI Benign  ABG 7.23/ 49/ 68/ 23/-1.7  Na 134,  K 5, Cl 96  BUN 78/ creat 2.26  Plan  1.  Trauma to eval LLE STSG  2.  Continue lasix for PUL HTN  3.  Cardiology recommend V?Q vs CTA chest for PE-- V/Q not done due to covid, and patient with ARF creat 2.26 so will hold on CTA. Will increase anticoagulation with Eliquis 5 mg  4. Continue Pul toilet with IS, Duonebs and CPT  5. Cards recommend no cardiacx cath so will resume diet   6. Continue ICU for continue respiratory observation and diuresis      code 38930   cct 50
70-year-old male status post fall downstairs where he developed left lower extremity hematoma which was debrided and now skin graft.  The patient is now stable to discharge to [facility].    Code 72917
I have seen and examined during SICU rounds.  events noted.    Neuro: awake, non focal.  CV: Hd normal, perfusion is adequate  Pulm: less dyspnea, saturation improved  GI: obese, soft, non tender  : urine flow improving with diuretic, no RRT required at this time  ID: por libertad low, no fevers, no signs of infection  Heme: glycemia ok    Plan:  Cont force diuresis.  will required debridement early next week.  no signs of infection, d/c abx  appreciate nephology input.
I have seen and examined the patient  no new issues  wound base clean, edges minimally necrotic.  cont dressing changes  OR for tentative STSG next week
The patient was seen and examined  Events noted  No new problems  Wound VAC applied yesterday  Skin looks healthy  AC  Will change VAC tomorrow  Nutritional support
70-year-old male status post fall down steps suffered a left anterior tibial hematoma which underwent surgical debridement and STSG. Admitted to ICU 11/2 for increased respiratory effort. Treated with Lasix for increased PUL HTN and pulmonary edema.  Given lasix last night with good results . Was on BiPap last night. AAOx3, more comfortable on 2 L NC. Sats 96%  PUL CTA  CV IRIR rate control  GI Benign  MS L tibia dressing intact, donor site xeroform in tact  ABG 7.30/ 55/ 69/ 25  BUN 80/ creat 2.01  Plan  1.  Perfusion scan low prob  2.  Continue lasix for PUL HTN  3.  Continue Eliquis 5 mg bid  4. Continue Pul toilet with IS, Duonebs, CPT, and Bipap as needed  5. Continue ICU for continue respiratory observation and diuresis      code 60545
70M with pulmonary hypertension and predominantly right-sided heart failure    Severe Pulmonary HTN with RV Failure  - Echo performed on 06/2020 showed mild to moderate pulmonary HTN and normal RV function.   - Patient's initial echo 10/15/20 showed now severe pulmonary HTN.   - Patient only meets 1 of the criteria for lowering the dose (81 y/o or older; wt < 60kg; Cr > 1.5)of eliquis, continue 5mg PO BID.   - PE has been excluded via V/Q scan  - Euvolemic on exam, although continues to have pleural effusion.  Continue PO diuretics for now; consider thoracentesis if having difficulty weaning from NC O2  - Follow up Pulmonary recommendations     CAD s/p CABG x 3  - Patient with elevated troponin 0.09 x 2 in setting of GERALD with normal CK.   - No new RWMA on echo.   - patient refusing RHC/LHC today due to concern for worsening renal function  - given no new WMA and alternative explanation for elevated troponin, no urgent need for inpatient cardiac cath at this time  - follow up with outpatient [facility]ologist at [facility] after Nephrology follow up for consideration of stress testing versus cath  - restart apixaban  - Continue aspirin and statin.   - Continue home coreg 12.5mg PO BID  - maintain Hb >8    Atrial Fibrillation   - Rate controlled at this time.   - continue Coreg 12.5mg PO BID.   - CHADSVasc is 4, continue eliquis at current dose
71 yo M s/p fall  with left tibia hematoma requred debridement and evacuation of hematoma, and a skin graft  AAOx3, NAD  PUL CTA,  CV RRR  GI obese  LLE STSG with good take.  Plan  1. Continue dresing changes to left lower extremity and trim left thigh donor site xeroform  2. May shower with soap and water  3. Stable for d/c to home with [facility] services    code 24637
I have seen and examined the patient    Neuro: awake CAM -  CV: Hd normal, perfusion is adequate  Puml: gas exhange adequate, no dyspnea,   GI: obese, soft, tolerating diet  : urine flow adequate, electrolytes ok, creatinine trending down  ID: no issues  Heme: H/H stable    Plan:  Start heparin drip for paroxysmal afib , elevated chads vas score  follow UO, has not required anymore diuretics  wean steroids  PT/Ot
The patient was seen and examined  Left leg wound looks okay - some detritus on the bed itself, may need more skin excised.  DVT prophylaxis  Wound care - BID  Pulmonary toilet
greater than 50% of time spent reviewing labs, notes, orders and radiographs, coordinating care
70-year-old male status post fall down steps suffered a left anterior tibial hematoma which underwent surgical debridement.  The patient currently has a wound VAC on which has some generalized oozing from a heparin drip.  The patient is on a heparin drip for A. fib.  His PTT was elevated at 84 which is improved from the greater than 100 that it was.  Plan  1.  Continue wound VAC therapy for increased granulation to the surgical site  2.  Continue heparin drip for A. fib with a target PTT of 50-70  3.  Check H&H this afternoon to evaluate the need for transfusion  4.  Plan for split-thickness skin graft in the morning
Pt seen and examined by me  agree with findings  will change wound vac today and eval for possible STSG
The patient was seen and examined  Events noted  No new problems  Wound VAC in place  AC  PT  Dressing change tomorrow  Nutritional support
I have seen and examined the patient  no new issues  tentative cardiac cath today  wound care  dispo planning

## 2020-11-13 NOTE — PROGRESS NOTE ADULT - ASSESSMENT
70 year old male s/p fall down steps with hematoma of left lower extremity requiring debridement and evacuation of hematoma with eventual skin graft    -daily dressing changes to left lower extremity  -trim bandage on left thigh donor site  -keep skin moisturizer on extremity  -ok to shower with soap and water  - refused right heart cath, f/u cards outpatient  -discharge to [facility] pending placement

## 2020-11-13 NOTE — DISCHARGE NOTE NURSING/CASE MANAGEMENT/SOCIAL WORK - PATIENT PORTAL LINK FT
You can access the FollowMyHealth Patient Portal offered by [facility] by registering at the following website: [email or web address]. By joining [employer]s FollowMyHealth portal, you will also be able to view your health information using other applications (apps) compatible with our system.

## 2020-11-13 NOTE — PROGRESS NOTE ADULT - SUBJECTIVE AND OBJECTIVE BOX
Patient doing well this AM.  He is already OOB and sitting in chair.  Reports he is breathing better.  Reports no pain.     REVIEW OF SYSTEMS  Constitutional - No fever,  +fatigue  Neurological - No loss of strength    FUNCTIONAL PROGRESS  11/12  Bed Mobility  Bed Mobility Training Sit-to-Supine: minimum assist (75% patient effort);  1 person assist  Bed Mobility Training Supine-to-Sit: minimum assist (75% patient effort);  1 person assist  Bed Mobility Training Limitations: decreased strength    Sit-Stand Transfer Training  Transfer Training Sit-to-Stand Transfer: minimum assist (75% patient effort);  1 person assist;  rolling walker  Transfer Training Stand-to-Sit Transfer: minimum assist (75% patient effort);  1 person assist;  rolling walker  Sit-to-Stand Transfer Training Transfer Safety Analysis: decreased strength    Gait Training  Gait Training: minimum assist (75% patient effort);  1 person assist;  rolling walker;  20 feet;  2L Nc O2  Gait Analysis: 3-point gait   decreased endurance, SOB;  decreased strength      VITALS  T(C): 36.2 (11-13-20 @ 10:00), Max: 36.6 (11-12-20 @ 22:05)  HR: 75 (11-13-20 @ 10:00) (66 - 81)  BP: 120/66 (11-13-20 @ 10:00) (112/66 - 126/72)  RR: 20 (11-13-20 @ 10:00) (18 - 21)  SpO2: 94% (11-13-20 @ 10:00) (93% - 100%)  Wt(kg): --    MEDICATIONS   acetaminophen   Tablet .. 650 milliGRAM(s) every 6 hours PRN  ALBUTerol    90 MICROgram(s) HFA Inhaler 1 Puff(s) every 4 hours  albuterol/ipratropium for Nebulization 3 milliLiter(s) every 6 hours  albuterol/ipratropium for Nebulization. 3 milliLiter(s) once  allopurinol 100 milliGRAM(s) daily  apixaban 5 milliGRAM(s) every 12 hours  ascorbic acid 500 milliGRAM(s) two times a day  aspirin  chewable 81 milliGRAM(s) daily  atorvastatin 20 milliGRAM(s) at bedtime  benzocaine 15 mG/menthol 3.6 mG (Sugar-Free) Lozenge 1 Lozenge every 3 hours PRN  budesonide 160 MICROgram(s)/formoterol 4.5 MICROgram(s) Inhaler 2 Puff(s) two times a day  carvedilol 12.5 milliGRAM(s) every 12 hours  epoetin felisha-epbx (RETACRIT) Injectable 20874 Unit(s) <User Schedule>  folic acid 1 milliGRAM(s) daily  furosemide    Tablet 40 milliGRAM(s) every 12 hours  multivitamin 1 Tablet(s) daily  ondansetron Injectable 4 milliGRAM(s) once PRN  pantoprazole    Tablet 40 milliGRAM(s) before breakfast  senna 2 Tablet(s) at bedtime  tamsulosin 0.4 milliGRAM(s) at bedtime  thiamine 100 milliGRAM(s) daily  zinc sulfate 220 milliGRAM(s) daily      RECENT LABS/IMAGING - Reviewed                        7.7    8.25  )-----------( 302      ( 12 Nov 2020 07:32 )             25.5     11-12    135  |  97<L>  |  99.0<H>  ----------------------------<  126<H>  5.0   |  26.0  |  2.10<H>    Ca    8.4<L>      12 Nov 2020 07:32  Mg     2.1     11-12                CXR 11/10 - Evidence of congestive heart failure. No significant change since 11/5/2020..    ----------------------------------------------------------------------------------------  PHYSICAL EXAM  Constitutional - NAD, Comfortable  Extremities - BLE edema   Neurologic Exam -                    Motor - No focal deficits     Sensory - Intact to LT  Psychiatric - Mood stable, Affect WNL  ----------------------------------------------------------------------------------------  ASSESSMENT/PLAN  70yMale with functional deficits after debility related to a trauma  Left LE hematoma s/p evacuation - WBAT  AFIB - Eliquis  CAD - ASA  HTN - Coreg  Pulm - Lasix, Duoneb, Proventil, Symbicort  Pain - Tylenol  DVT PPX - SCDs  Rehab - At this time, given progress and diagnosis, continue to recommend ROCKY. Continue bedside therapy as well as OOB throughout the day with mobilization by staff to maintain cardiopulmonary function and prevention of secondary complications related to debility.     Discussed with rehab team.

## 2020-11-15 ENCOUNTER — INPATIENT (INPATIENT)
Facility: HOSPITAL | Age: 70
LOS: 6 days | Discharge: HOME | DRG: 208 | End: 2020-11-22
Attending: HOSPITALIST | Admitting: INTERNAL MEDICINE
Payer: MEDICARE

## 2020-11-15 VITALS
HEIGHT: 66 IN | RESPIRATION RATE: 8 BRPM | WEIGHT: 264.55 LBS | SYSTOLIC BLOOD PRESSURE: 97 MMHG | OXYGEN SATURATION: 90 % | DIASTOLIC BLOOD PRESSURE: 46 MMHG | HEART RATE: 79 BPM

## 2020-11-15 DIAGNOSIS — Z98.890 OTHER SPECIFIED POSTPROCEDURAL STATES: Chronic | ICD-10-CM

## 2020-11-15 DIAGNOSIS — Z95.1 PRESENCE OF AORTOCORONARY BYPASS GRAFT: Chronic | ICD-10-CM

## 2020-11-15 DIAGNOSIS — J96.91 RESPIRATORY FAILURE, UNSPECIFIED WITH HYPOXIA: ICD-10-CM

## 2020-11-15 DIAGNOSIS — Z95.5 PRESENCE OF CORONARY ANGIOPLASTY IMPLANT AND GRAFT: Chronic | ICD-10-CM

## 2020-11-15 LAB
ALBUMIN SERPL ELPH-MCNC: 3.1 G/DL — LOW (ref 3.3–5.2)
ALBUMIN SERPL ELPH-MCNC: 3.4 G/DL — SIGNIFICANT CHANGE UP (ref 3.3–5.2)
ALP SERPL-CCNC: 207 U/L — HIGH (ref 40–120)
ALP SERPL-CCNC: 220 U/L — HIGH (ref 40–120)
ALT FLD-CCNC: 19 U/L — SIGNIFICANT CHANGE UP
ALT FLD-CCNC: 22 U/L — SIGNIFICANT CHANGE UP
ANION GAP SERPL CALC-SCNC: 12 MMOL/L — SIGNIFICANT CHANGE UP (ref 5–17)
ANION GAP SERPL CALC-SCNC: 9 MMOL/L — SIGNIFICANT CHANGE UP (ref 5–17)
ANISOCYTOSIS BLD QL: SIGNIFICANT CHANGE UP
APTT BLD: 104.4 SEC — HIGH (ref 27.5–35.5)
APTT BLD: 30.2 SEC — SIGNIFICANT CHANGE UP (ref 27.5–35.5)
APTT BLD: 30.3 SEC — SIGNIFICANT CHANGE UP (ref 27.5–35.5)
APTT BLD: 80.5 SEC — HIGH (ref 27.5–35.5)
AST SERPL-CCNC: 18 U/L — SIGNIFICANT CHANGE UP
AST SERPL-CCNC: 22 U/L — SIGNIFICANT CHANGE UP
B PERT IGG+IGM PNL SER: CLEAR — SIGNIFICANT CHANGE UP
BASE EXCESS BLDA CALC-SCNC: 3.4 MMOL/L — HIGH (ref -2–2)
BASE EXCESS BLDA CALC-SCNC: 4.1 MMOL/L — HIGH (ref -2–2)
BASE EXCESS BLDA CALC-SCNC: 5.3 MMOL/L — HIGH (ref -2–2)
BASO STIPL BLD QL SMEAR: PRESENT — SIGNIFICANT CHANGE UP
BASOPHILS # BLD AUTO: 0 K/UL — SIGNIFICANT CHANGE UP (ref 0–0.2)
BASOPHILS NFR BLD AUTO: 0 % — SIGNIFICANT CHANGE UP (ref 0–2)
BILIRUB SERPL-MCNC: 0.5 MG/DL — SIGNIFICANT CHANGE UP (ref 0.4–2)
BILIRUB SERPL-MCNC: 0.6 MG/DL — SIGNIFICANT CHANGE UP (ref 0.4–2)
BLOOD GAS COMMENTS ARTERIAL: SIGNIFICANT CHANGE UP
BUN SERPL-MCNC: 106 MG/DL — HIGH (ref 8–20)
BUN SERPL-MCNC: 106 MG/DL — HIGH (ref 8–20)
CALCIUM SERPL-MCNC: 8.1 MG/DL — LOW (ref 8.6–10.2)
CALCIUM SERPL-MCNC: 8.3 MG/DL — LOW (ref 8.6–10.2)
CHLORIDE SERPL-SCNC: 94 MMOL/L — LOW (ref 98–107)
CHLORIDE SERPL-SCNC: 95 MMOL/L — LOW (ref 98–107)
CO2 SERPL-SCNC: 26 MMOL/L — SIGNIFICANT CHANGE UP (ref 22–29)
CO2 SERPL-SCNC: 27 MMOL/L — SIGNIFICANT CHANGE UP (ref 22–29)
COLOR FLD: YELLOW
CREAT SERPL-MCNC: 2.14 MG/DL — HIGH (ref 0.5–1.3)
CREAT SERPL-MCNC: 2.27 MG/DL — HIGH (ref 0.5–1.3)
ELLIPTOCYTES BLD QL SMEAR: SLIGHT — SIGNIFICANT CHANGE UP
EOSINOPHIL # BLD AUTO: 0.08 K/UL — SIGNIFICANT CHANGE UP (ref 0–0.5)
EOSINOPHIL NFR BLD AUTO: 0.9 % — SIGNIFICANT CHANGE UP (ref 0–6)
FLUID INTAKE SUBSTANCE CLASS: SIGNIFICANT CHANGE UP
FLUID SEGMENTED GRANULOCYTES: 9 % — SIGNIFICANT CHANGE UP
GAS PNL BLDA: SIGNIFICANT CHANGE UP
GIANT PLATELETS BLD QL SMEAR: PRESENT — SIGNIFICANT CHANGE UP
GLUCOSE SERPL-MCNC: 114 MG/DL — HIGH (ref 70–99)
GLUCOSE SERPL-MCNC: 214 MG/DL — HIGH (ref 70–99)
GRAM STN FLD: SIGNIFICANT CHANGE UP
HCO3 BLDA-SCNC: 27 MMOL/L — HIGH (ref 20–26)
HCO3 BLDA-SCNC: 28 MMOL/L — HIGH (ref 20–26)
HCO3 BLDA-SCNC: 29 MMOL/L — HIGH (ref 20–26)
HCT VFR BLD CALC: 22.3 % — LOW (ref 39–50)
HCT VFR BLD CALC: 29.1 % — LOW (ref 39–50)
HGB BLD-MCNC: 7.9 G/DL — LOW (ref 13–17)
HGB BLD-MCNC: 8.6 G/DL — LOW (ref 13–17)
HOROWITZ INDEX BLDA+IHG-RTO: 100 — SIGNIFICANT CHANGE UP
HOROWITZ INDEX BLDA+IHG-RTO: 100 — SIGNIFICANT CHANGE UP
HOROWITZ INDEX BLDA+IHG-RTO: SIGNIFICANT CHANGE UP
INR BLD: 1.94 RATIO — HIGH (ref 0.88–1.16)
INR BLD: 2.21 RATIO — HIGH (ref 0.88–1.16)
LACTATE BLDV-MCNC: 0.5 MMOL/L — SIGNIFICANT CHANGE UP (ref 0.5–2)
LYMPHOCYTES # BLD AUTO: 0.32 K/UL — LOW (ref 1–3.3)
LYMPHOCYTES # BLD AUTO: 3.5 % — LOW (ref 13–44)
LYMPHOCYTES # FLD: 70 % — SIGNIFICANT CHANGE UP
MACROCYTES BLD QL: SIGNIFICANT CHANGE UP
MAGNESIUM SERPL-MCNC: 1.9 MG/DL — SIGNIFICANT CHANGE UP (ref 1.6–2.6)
MANUAL SMEAR VERIFICATION: SIGNIFICANT CHANGE UP
MCHC RBC-ENTMCNC: 29.6 GM/DL — LOW (ref 32–36)
MCHC RBC-ENTMCNC: 32.7 PG — SIGNIFICANT CHANGE UP (ref 27–34)
MCHC RBC-ENTMCNC: 35.4 GM/DL — SIGNIFICANT CHANGE UP (ref 32–36)
MCHC RBC-ENTMCNC: 40.3 PG — HIGH (ref 27–34)
MCV RBC AUTO: 110.6 FL — HIGH (ref 80–100)
MCV RBC AUTO: 113.8 FL — HIGH (ref 80–100)
MESOTHL CELL # FLD: 1 % — SIGNIFICANT CHANGE UP
MICROCYTES BLD QL: SLIGHT — SIGNIFICANT CHANGE UP
MONOCYTES # BLD AUTO: 0 K/UL — SIGNIFICANT CHANGE UP (ref 0–0.9)
MONOCYTES NFR BLD AUTO: 0 % — LOW (ref 2–14)
MONOS+MACROS # FLD: 20 % — SIGNIFICANT CHANGE UP
NEUTROPHILS # BLD AUTO: 8.65 K/UL — HIGH (ref 1.8–7.4)
NEUTROPHILS NFR BLD AUTO: 95.6 % — HIGH (ref 43–77)
NT-PROBNP SERPL-SCNC: HIGH PG/ML (ref 0–300)
OVALOCYTES BLD QL SMEAR: SLIGHT — SIGNIFICANT CHANGE UP
PCO2 BLDA: 53 MMHG — HIGH (ref 35–45)
PCO2 BLDA: 54 MMHG — HIGH (ref 35–45)
PCO2 BLDA: 63 MMHG — HIGH (ref 35–45)
PH BLDA: 7.29 — LOW (ref 7.35–7.45)
PH BLDA: 7.37 — SIGNIFICANT CHANGE UP (ref 7.35–7.45)
PH BLDA: 7.37 — SIGNIFICANT CHANGE UP (ref 7.35–7.45)
PH FLD: 8 — SIGNIFICANT CHANGE UP
PHOSPHATE SERPL-MCNC: 4.5 MG/DL — SIGNIFICANT CHANGE UP (ref 2.4–4.7)
PLAT MORPH BLD: NORMAL — SIGNIFICANT CHANGE UP
PLATELET # BLD AUTO: 339 K/UL — SIGNIFICANT CHANGE UP (ref 150–400)
PLATELET # BLD AUTO: 382 K/UL — SIGNIFICANT CHANGE UP (ref 150–400)
PO2 BLDA: 105 MMHG — SIGNIFICANT CHANGE UP (ref 83–108)
PO2 BLDA: 295 MMHG — HIGH (ref 83–108)
PO2 BLDA: 66 MMHG — LOW (ref 83–108)
POIKILOCYTOSIS BLD QL AUTO: SLIGHT — SIGNIFICANT CHANGE UP
POLYCHROMASIA BLD QL SMEAR: SLIGHT — SIGNIFICANT CHANGE UP
POTASSIUM SERPL-MCNC: 4.6 MMOL/L — SIGNIFICANT CHANGE UP (ref 3.5–5.3)
POTASSIUM SERPL-MCNC: 6.2 MMOL/L — CRITICAL HIGH (ref 3.5–5.3)
POTASSIUM SERPL-SCNC: 4.6 MMOL/L — SIGNIFICANT CHANGE UP (ref 3.5–5.3)
POTASSIUM SERPL-SCNC: 6.2 MMOL/L — CRITICAL HIGH (ref 3.5–5.3)
PROT SERPL-MCNC: 5.1 G/DL — LOW (ref 6.6–8.7)
PROT SERPL-MCNC: 5.2 G/DL — LOW (ref 6.6–8.7)
PROTHROM AB SERPL-ACNC: 21.8 SEC — HIGH (ref 10.6–13.6)
PROTHROM AB SERPL-ACNC: 24.7 SEC — HIGH (ref 10.6–13.6)
RBC # BLD: 1.96 M/UL — LOW (ref 4.2–5.8)
RBC # BLD: 2.63 M/UL — LOW (ref 4.2–5.8)
RBC # FLD: 16.6 % — HIGH (ref 10.3–14.5)
RBC # FLD: 20.9 % — HIGH (ref 10.3–14.5)
RBC BLD AUTO: ABNORMAL
RCV VOL RI: 220 /UL — HIGH (ref 0–0)
SAO2 % BLDA: 100 % — HIGH (ref 95–99)
SAO2 % BLDA: 100 % — HIGH (ref 95–99)
SAO2 % BLDA: 94 % — LOW (ref 95–99)
SARS-COV-2 IGG SERPL QL IA: NEGATIVE — SIGNIFICANT CHANGE UP
SARS-COV-2 IGM SERPL IA-ACNC: 0.12 INDEX — SIGNIFICANT CHANGE UP
SARS-COV-2 RNA SPEC QL NAA+PROBE: SIGNIFICANT CHANGE UP
SODIUM SERPL-SCNC: 131 MMOL/L — LOW (ref 135–145)
SODIUM SERPL-SCNC: 132 MMOL/L — LOW (ref 135–145)
SPECIMEN SOURCE: SIGNIFICANT CHANGE UP
STOMATOCYTES BLD QL SMEAR: SLIGHT — SIGNIFICANT CHANGE UP
TOTAL NUCLEATED CELL COUNT, BODY FLUID: 143 /UL — SIGNIFICANT CHANGE UP
TROPONIN T SERPL-MCNC: 0.03 NG/ML — SIGNIFICANT CHANGE UP (ref 0–0.06)
TUBE TYPE: SIGNIFICANT CHANGE UP
WBC # BLD: 9.05 K/UL — SIGNIFICANT CHANGE UP (ref 3.8–10.5)
WBC # BLD: 9.53 K/UL — SIGNIFICANT CHANGE UP (ref 3.8–10.5)
WBC # FLD AUTO: 9.05 K/UL — SIGNIFICANT CHANGE UP (ref 3.8–10.5)
WBC # FLD AUTO: 9.53 K/UL — SIGNIFICANT CHANGE UP (ref 3.8–10.5)

## 2020-11-15 PROCEDURE — 71250 CT THORAX DX C-: CPT | Mod: 26

## 2020-11-15 PROCEDURE — 99291 CRITICAL CARE FIRST HOUR: CPT | Mod: CS,25

## 2020-11-15 PROCEDURE — 93010 ELECTROCARDIOGRAM REPORT: CPT

## 2020-11-15 PROCEDURE — 70450 CT HEAD/BRAIN W/O DYE: CPT | Mod: 26

## 2020-11-15 PROCEDURE — 71045 X-RAY EXAM CHEST 1 VIEW: CPT | Mod: 26,77

## 2020-11-15 PROCEDURE — 32551 INSERTION OF CHEST TUBE: CPT

## 2020-11-15 PROCEDURE — 74176 CT ABD & PELVIS W/O CONTRAST: CPT | Mod: 26

## 2020-11-15 PROCEDURE — 31500 INSERT EMERGENCY AIRWAY: CPT

## 2020-11-15 PROCEDURE — 71045 X-RAY EXAM CHEST 1 VIEW: CPT | Mod: 26

## 2020-11-15 RX ORDER — HEPARIN SODIUM 5000 [USP'U]/ML
INJECTION INTRAVENOUS; SUBCUTANEOUS
Qty: 25000 | Refills: 0 | Status: DISCONTINUED | OUTPATIENT
Start: 2020-11-15 | End: 2020-11-15

## 2020-11-15 RX ORDER — ETOMIDATE 2 MG/ML
20 INJECTION INTRAVENOUS ONCE
Refills: 0 | Status: COMPLETED | OUTPATIENT
Start: 2020-11-15 | End: 2020-11-15

## 2020-11-15 RX ORDER — FUROSEMIDE 40 MG
80 TABLET ORAL ONCE
Refills: 0 | Status: COMPLETED | OUTPATIENT
Start: 2020-11-15 | End: 2020-11-15

## 2020-11-15 RX ORDER — PANTOPRAZOLE SODIUM 20 MG/1
40 TABLET, DELAYED RELEASE ORAL DAILY
Refills: 0 | Status: DISCONTINUED | OUTPATIENT
Start: 2020-11-15 | End: 2020-11-16

## 2020-11-15 RX ORDER — SUCCINYLCHOLINE CHLORIDE 100 MG/5ML
100 SYRINGE (ML) INTRAVENOUS ONCE
Refills: 0 | Status: COMPLETED | OUTPATIENT
Start: 2020-11-15 | End: 2020-11-15

## 2020-11-15 RX ORDER — HYDROMORPHONE HYDROCHLORIDE 2 MG/ML
1 INJECTION INTRAMUSCULAR; INTRAVENOUS; SUBCUTANEOUS ONCE
Refills: 0 | Status: DISCONTINUED | OUTPATIENT
Start: 2020-11-15 | End: 2020-11-15

## 2020-11-15 RX ORDER — CHLORHEXIDINE GLUCONATE 213 G/1000ML
1 SOLUTION TOPICAL
Refills: 0 | Status: DISCONTINUED | OUTPATIENT
Start: 2020-11-15 | End: 2020-11-17

## 2020-11-15 RX ORDER — INSULIN HUMAN 100 [IU]/ML
10 INJECTION, SOLUTION SUBCUTANEOUS ONCE
Refills: 0 | Status: COMPLETED | OUTPATIENT
Start: 2020-11-15 | End: 2020-11-15

## 2020-11-15 RX ORDER — HEPARIN SODIUM 5000 [USP'U]/ML
4500 INJECTION INTRAVENOUS; SUBCUTANEOUS EVERY 6 HOURS
Refills: 0 | Status: DISCONTINUED | OUTPATIENT
Start: 2020-11-15 | End: 2020-11-15

## 2020-11-15 RX ORDER — PIPERACILLIN AND TAZOBACTAM 4; .5 G/20ML; G/20ML
3.38 INJECTION, POWDER, LYOPHILIZED, FOR SOLUTION INTRAVENOUS EVERY 8 HOURS
Refills: 0 | Status: DISCONTINUED | OUTPATIENT
Start: 2020-11-15 | End: 2020-11-16

## 2020-11-15 RX ORDER — NOREPINEPHRINE BITARTRATE/D5W 8 MG/250ML
0.05 PLASTIC BAG, INJECTION (ML) INTRAVENOUS
Qty: 8 | Refills: 0 | Status: DISCONTINUED | OUTPATIENT
Start: 2020-11-15 | End: 2020-11-16

## 2020-11-15 RX ORDER — SENNA PLUS 8.6 MG/1
2 TABLET ORAL AT BEDTIME
Refills: 0 | Status: DISCONTINUED | OUTPATIENT
Start: 2020-11-15 | End: 2020-11-22

## 2020-11-15 RX ORDER — HEPARIN SODIUM 5000 [USP'U]/ML
INJECTION INTRAVENOUS; SUBCUTANEOUS
Qty: 25000 | Refills: 0 | Status: DISCONTINUED | OUTPATIENT
Start: 2020-11-15 | End: 2020-11-16

## 2020-11-15 RX ORDER — FENTANYL CITRATE 50 UG/ML
100 INJECTION INTRAVENOUS ONCE
Refills: 0 | Status: DISCONTINUED | OUTPATIENT
Start: 2020-11-15 | End: 2020-11-15

## 2020-11-15 RX ORDER — DEXTROSE 50 % IN WATER 50 %
50 SYRINGE (ML) INTRAVENOUS ONCE
Refills: 0 | Status: COMPLETED | OUTPATIENT
Start: 2020-11-15 | End: 2020-11-15

## 2020-11-15 RX ORDER — CHLORHEXIDINE GLUCONATE 213 G/1000ML
15 SOLUTION TOPICAL EVERY 12 HOURS
Refills: 0 | Status: DISCONTINUED | OUTPATIENT
Start: 2020-11-15 | End: 2020-11-16

## 2020-11-15 RX ORDER — ASPIRIN/CALCIUM CARB/MAGNESIUM 324 MG
81 TABLET ORAL DAILY
Refills: 0 | Status: DISCONTINUED | OUTPATIENT
Start: 2020-11-15 | End: 2020-11-22

## 2020-11-15 RX ORDER — PROPOFOL 10 MG/ML
20 INJECTION, EMULSION INTRAVENOUS
Qty: 1000 | Refills: 0 | Status: DISCONTINUED | OUTPATIENT
Start: 2020-11-15 | End: 2020-11-16

## 2020-11-15 RX ORDER — ALLOPURINOL 300 MG
100 TABLET ORAL DAILY
Refills: 0 | Status: DISCONTINUED | OUTPATIENT
Start: 2020-11-15 | End: 2020-11-22

## 2020-11-15 RX ORDER — FUROSEMIDE 40 MG
80 TABLET ORAL
Refills: 0 | Status: DISCONTINUED | OUTPATIENT
Start: 2020-11-15 | End: 2020-11-17

## 2020-11-15 RX ORDER — VANCOMYCIN HCL 1 G
1000 VIAL (EA) INTRAVENOUS DAILY
Refills: 0 | Status: DISCONTINUED | OUTPATIENT
Start: 2020-11-15 | End: 2020-11-16

## 2020-11-15 RX ORDER — MIDODRINE HYDROCHLORIDE 2.5 MG/1
15 TABLET ORAL EVERY 8 HOURS
Refills: 0 | Status: DISCONTINUED | OUTPATIENT
Start: 2020-11-15 | End: 2020-11-16

## 2020-11-15 RX ORDER — ATORVASTATIN CALCIUM 80 MG/1
20 TABLET, FILM COATED ORAL AT BEDTIME
Refills: 0 | Status: DISCONTINUED | OUTPATIENT
Start: 2020-11-15 | End: 2020-11-22

## 2020-11-15 RX ORDER — HEPARIN SODIUM 5000 [USP'U]/ML
8000 INJECTION INTRAVENOUS; SUBCUTANEOUS EVERY 6 HOURS
Refills: 0 | Status: DISCONTINUED | OUTPATIENT
Start: 2020-11-15 | End: 2020-11-16

## 2020-11-15 RX ORDER — ALBUTEROL 90 UG/1
2.5 AEROSOL, METERED ORAL
Refills: 0 | Status: COMPLETED | OUTPATIENT
Start: 2020-11-15 | End: 2020-11-15

## 2020-11-15 RX ORDER — IPRATROPIUM/ALBUTEROL SULFATE 18-103MCG
3 AEROSOL WITH ADAPTER (GRAM) INHALATION EVERY 6 HOURS
Refills: 0 | Status: DISCONTINUED | OUTPATIENT
Start: 2020-11-15 | End: 2020-11-17

## 2020-11-15 RX ORDER — PIPERACILLIN AND TAZOBACTAM 4; .5 G/20ML; G/20ML
3.38 INJECTION, POWDER, LYOPHILIZED, FOR SOLUTION INTRAVENOUS ONCE
Refills: 0 | Status: COMPLETED | OUTPATIENT
Start: 2020-11-15 | End: 2020-11-15

## 2020-11-15 RX ORDER — ACETYLCYSTEINE 200 MG/ML
3 VIAL (ML) MISCELLANEOUS EVERY 6 HOURS
Refills: 0 | Status: DISCONTINUED | OUTPATIENT
Start: 2020-11-15 | End: 2020-11-16

## 2020-11-15 RX ORDER — MAGNESIUM SULFATE 500 MG/ML
2 VIAL (ML) INJECTION ONCE
Refills: 0 | Status: COMPLETED | OUTPATIENT
Start: 2020-11-15 | End: 2020-11-15

## 2020-11-15 RX ORDER — SODIUM CHLORIDE 9 MG/ML
3 INJECTION INTRAMUSCULAR; INTRAVENOUS; SUBCUTANEOUS EVERY 6 HOURS
Refills: 0 | Status: DISCONTINUED | OUTPATIENT
Start: 2020-11-15 | End: 2020-11-16

## 2020-11-15 RX ORDER — HEPARIN SODIUM 5000 [USP'U]/ML
9500 INJECTION INTRAVENOUS; SUBCUTANEOUS EVERY 6 HOURS
Refills: 0 | Status: DISCONTINUED | OUTPATIENT
Start: 2020-11-15 | End: 2020-11-15

## 2020-11-15 RX ORDER — TAMSULOSIN HYDROCHLORIDE 0.4 MG/1
0.4 CAPSULE ORAL AT BEDTIME
Refills: 0 | Status: DISCONTINUED | OUTPATIENT
Start: 2020-11-15 | End: 2020-11-22

## 2020-11-15 RX ORDER — HEPARIN SODIUM 5000 [USP'U]/ML
4000 INJECTION INTRAVENOUS; SUBCUTANEOUS EVERY 6 HOURS
Refills: 0 | Status: DISCONTINUED | OUTPATIENT
Start: 2020-11-15 | End: 2020-11-16

## 2020-11-15 RX ADMIN — FENTANYL CITRATE 100 MICROGRAM(S): 50 INJECTION INTRAVENOUS at 00:50

## 2020-11-15 RX ADMIN — HEPARIN SODIUM 1800 UNIT(S)/HR: 5000 INJECTION INTRAVENOUS; SUBCUTANEOUS at 08:20

## 2020-11-15 RX ADMIN — HEPARIN SODIUM 1600 UNIT(S)/HR: 5000 INJECTION INTRAVENOUS; SUBCUTANEOUS at 21:15

## 2020-11-15 RX ADMIN — PIPERACILLIN AND TAZOBACTAM 25 GRAM(S): 4; .5 INJECTION, POWDER, LYOPHILIZED, FOR SOLUTION INTRAVENOUS at 21:09

## 2020-11-15 RX ADMIN — Medication 81 MILLIGRAM(S): at 11:13

## 2020-11-15 RX ADMIN — Medication 11.3 MICROGRAM(S)/KG/MIN: at 01:43

## 2020-11-15 RX ADMIN — Medication 3 MILLILITER(S): at 20:12

## 2020-11-15 RX ADMIN — INSULIN HUMAN 10 UNIT(S): 100 INJECTION, SOLUTION SUBCUTANEOUS at 02:17

## 2020-11-15 RX ADMIN — Medication 80 MILLIGRAM(S): at 03:55

## 2020-11-15 RX ADMIN — TAMSULOSIN HYDROCHLORIDE 0.4 MILLIGRAM(S): 0.4 CAPSULE ORAL at 21:09

## 2020-11-15 RX ADMIN — ALBUTEROL 2.5 MILLIGRAM(S): 90 AEROSOL, METERED ORAL at 02:15

## 2020-11-15 RX ADMIN — CHLORHEXIDINE GLUCONATE 15 MILLILITER(S): 213 SOLUTION TOPICAL at 17:59

## 2020-11-15 RX ADMIN — Medication 3 MILLILITER(S): at 13:58

## 2020-11-15 RX ADMIN — CHLORHEXIDINE GLUCONATE 1 APPLICATION(S): 213 SOLUTION TOPICAL at 14:32

## 2020-11-15 RX ADMIN — PIPERACILLIN AND TAZOBACTAM 200 GRAM(S): 4; .5 INJECTION, POWDER, LYOPHILIZED, FOR SOLUTION INTRAVENOUS at 08:11

## 2020-11-15 RX ADMIN — PROPOFOL 14.4 MICROGRAM(S)/KG/MIN: 10 INJECTION, EMULSION INTRAVENOUS at 01:43

## 2020-11-15 RX ADMIN — HEPARIN SODIUM 1600 UNIT(S)/HR: 5000 INJECTION INTRAVENOUS; SUBCUTANEOUS at 15:15

## 2020-11-15 RX ADMIN — ALBUTEROL 2.5 MILLIGRAM(S): 90 AEROSOL, METERED ORAL at 02:19

## 2020-11-15 RX ADMIN — HYDROMORPHONE HYDROCHLORIDE 1 MILLIGRAM(S): 2 INJECTION INTRAMUSCULAR; INTRAVENOUS; SUBCUTANEOUS at 02:17

## 2020-11-15 RX ADMIN — PIPERACILLIN AND TAZOBACTAM 25 GRAM(S): 4; .5 INJECTION, POWDER, LYOPHILIZED, FOR SOLUTION INTRAVENOUS at 13:15

## 2020-11-15 RX ADMIN — Medication 100 MILLIGRAM(S): at 00:45

## 2020-11-15 RX ADMIN — Medication 50 MILLILITER(S): at 02:18

## 2020-11-15 RX ADMIN — Medication 50 GRAM(S): at 14:22

## 2020-11-15 RX ADMIN — SODIUM CHLORIDE 3 MILLILITER(S): 9 INJECTION INTRAMUSCULAR; INTRAVENOUS; SUBCUTANEOUS at 10:19

## 2020-11-15 RX ADMIN — Medication 80 MILLIGRAM(S): at 17:59

## 2020-11-15 RX ADMIN — ETOMIDATE 20 MILLIGRAM(S): 2 INJECTION INTRAVENOUS at 00:45

## 2020-11-15 RX ADMIN — SODIUM CHLORIDE 3 MILLILITER(S): 9 INJECTION INTRAMUSCULAR; INTRAVENOUS; SUBCUTANEOUS at 20:13

## 2020-11-15 RX ADMIN — MIDODRINE HYDROCHLORIDE 15 MILLIGRAM(S): 2.5 TABLET ORAL at 21:10

## 2020-11-15 RX ADMIN — Medication 3 MILLILITER(S): at 10:19

## 2020-11-15 RX ADMIN — SENNA PLUS 2 TABLET(S): 8.6 TABLET ORAL at 21:09

## 2020-11-15 RX ADMIN — SODIUM CHLORIDE 3 MILLILITER(S): 9 INJECTION INTRAMUSCULAR; INTRAVENOUS; SUBCUTANEOUS at 13:58

## 2020-11-15 RX ADMIN — ALBUTEROL 2.5 MILLIGRAM(S): 90 AEROSOL, METERED ORAL at 02:17

## 2020-11-15 RX ADMIN — MIDODRINE HYDROCHLORIDE 15 MILLIGRAM(S): 2.5 TABLET ORAL at 13:15

## 2020-11-15 RX ADMIN — PANTOPRAZOLE SODIUM 40 MILLIGRAM(S): 20 TABLET, DELAYED RELEASE ORAL at 11:13

## 2020-11-15 RX ADMIN — ATORVASTATIN CALCIUM 20 MILLIGRAM(S): 80 TABLET, FILM COATED ORAL at 21:09

## 2020-11-15 RX ADMIN — Medication 250 MILLIGRAM(S): at 08:11

## 2020-11-15 RX ADMIN — Medication 100 MILLIGRAM(S): at 11:13

## 2020-11-15 RX ADMIN — Medication 3 MILLILITER(S): at 10:21

## 2020-11-15 NOTE — H&P ADULT - HISTORY OF PRESENT ILLNESS
Patient is a  70 year old male with a pmhx of w/ Afib, pulmonary HTN, chronic OM, CKD, COPD, CABG. Recently admitted s/p fall w/ hematoma of LLE requiring debridement and evacuation of hematoma, complicated by worsening GERALD on CKD and worsening respiratory status. Found to have severe pulm HTN, was being diuresed with lasix. Was reccomended for cath but patient declined for fear of worsening renal failure. Noted to have R pleural effusion s/p pigtail catheter that drained 2.2L. Eventually d/c to [facility] on 11/13. Presented today unresponsive, obtunded then intubated in the ED. Hypothermic and developed shock. Labs significant for hyperkalemia acute on chronic renal failure and worsening R pleural effusion. MICU consulted called.

## 2020-11-15 NOTE — H&P ADULT - NSICDXPASTSURGICALHX_GEN_ALL_CORE_FT
PAST SURGICAL HISTORY:  H/O knee surgery     Presence of stent of CABG     S/P CABG (coronary artery bypass graft)

## 2020-11-15 NOTE — ED ADULT NURSE NOTE - OBJECTIVE STATEMENT
Assumed pt. care at 0045 as CC RN. Pt. BIBA for unresponsive at [facility]. pt. was DCed yesterday from hospital. Pt. LKW was 2300. Staff went ot check on him and he was unresponsive. Pt. brought in on NRB. Pt. brought to CC for intubation. MD Valdez at bedside.

## 2020-11-15 NOTE — CONSULT NOTE ADULT - SUBJECTIVE AND OBJECTIVE BOX
TRAUMA SURGERY CONSULT    Consulting surgical team: Trauma Surgery  Consulting attending: Dr. Gallardo  Patient seen and examined: 11-15-20 @ 09:27    HPI:  Patient is a  70 year old male with a pmhx of w/ Afib, pulmonary HTN, chronic OM, CKD, COPD, CABG. Recently admitted s/p fall w/ hematoma of LLE requiring debridement and evacuation of hematoma, complicated by worsening GERALD on CKD and worsening respiratory status. Found to have severe pulm HTN, was being diuresed with lasix. Was reccomended for cath but patient declined for fear of worsening renal failure. Noted to have R pleural effusion s/p pigtail catheter that drained 2.2L. Eventually d/c to [facility] on 11/13. Presented today unresponsive, obtunded then intubated in the ED. Hypothermic and developed shock. Labs significant for hyperkalemia acute on chronic renal failure and worsening R pleural effusion. MICU consulted called.  (15 Nov 2020 05:20)    Surgery reconsulted for wound care instructions. Patient seen and evaluated. Currently intubated. Unable to fully interview. Patient had a STSG on 10/30. Xeroform at donor site seen and the xeroform had been attempted to be peeled off on the inferior medial aspect.      PAST MEDICAL HISTORY:  CKD (chronic kidney disease)    CHF (congestive heart failure)    Atrial fibrillation    COPD, severity to be determined    Coronary artery disease involving coronary bypass graft of native heart without angina pectoris    Chronic osteomyelitis, osteomyelitis of unspecified site    COPD (chronic obstructive pulmonary disease)    Atrial fibrillation    Bladder cancer    HLD (hyperlipidemia)        PAST SURGICAL HISTORY:  H/O knee surgery    S/P CABG (coronary artery bypass graft)    Presence of stent of CABG        ALLERGIES:  No Known Allergies      MEDICATIONS  (STANDING):  acetylcysteine 20%  Inhalation 3 milliLiter(s) Inhalation every 6 hours  albuterol/ipratropium for Nebulization. 3 milliLiter(s) Nebulizer every 6 hours  allopurinol 100 milliGRAM(s) Oral daily  aspirin  chewable 81 milliGRAM(s) Oral daily  atorvastatin 20 milliGRAM(s) Oral at bedtime  chlorhexidine 0.12% Liquid 15 milliLiter(s) Oral Mucosa every 12 hours  chlorhexidine 2% Cloths 1 Application(s) Topical <User Schedule>  furosemide   Injectable 80 milliGRAM(s) IV Push two times a day  heparin  Infusion.  Unit(s)/Hr (18 mL/Hr) IV Continuous <Continuous>  midodrine 15 milliGRAM(s) Oral every 8 hours  norepinephrine Infusion 0.05 MICROgram(s)/kG/Min (11.3 mL/Hr) IV Continuous <Continuous>  pantoprazole  Injectable 40 milliGRAM(s) IV Push daily  piperacillin/tazobactam IVPB.. 3.375 Gram(s) IV Intermittent every 8 hours  propofol Infusion 20 MICROgram(s)/kG/Min (14.4 mL/Hr) IV Continuous <Continuous>  senna 2 Tablet(s) Oral at bedtime  sodium chloride 3%  Inhalation 3 milliLiter(s) Inhalation every 6 hours  tamsulosin 0.4 milliGRAM(s) Oral at bedtime  vancomycin  IVPB 1000 milliGRAM(s) IV Intermittent daily    MEDICATIONS  (PRN):  heparin   Injectable 8000 Unit(s) IV Push every 6 hours PRN For aPTT less than 40  heparin   Injectable 4000 Unit(s) IV Push every 6 hours PRN For aPTT between 40 - 57      VITALS & I/Os:  Vital Signs Last 24 Hrs  T(C): 36 (15 Nov 2020 08:00), Max: 36.1 (15 Nov 2020 06:00)  T(F): 96.8 (15 Nov 2020 08:00), Max: 97 (15 Nov 2020 06:00)  HR: 90 (15 Nov 2020 08:45) (75 - 102)  BP: 119/59 (15 Nov 2020 08:30) (86/49 - 145/69)  BP(mean): 85 (15 Nov 2020 08:30) (83 - 90)  RR: 58 (15 Nov 2020 08:45) (4 - 58)  SpO2: 95% (15 Nov 2020 08:45) (90% - 100%)  CAPILLARY BLOOD GLUCOSE        Mode: AC/ CMV (Assist Control/ Continuous Mandatory Ventilation)  RR (machine): 14  TV (machine): 450  FiO2: 40  PEEP: 5  MAP: 10  PIP: 28    I&O's Summary    14 Nov 2020 07:01  -  15 Nov 2020 07:00  --------------------------------------------------------  IN: 119.4 mL / OUT: 350 mL / NET: -230.6 mL    15 Nov 2020 07:01  -  15 Nov 2020 09:27  --------------------------------------------------------  IN: 89.8 mL / OUT: 100 mL / NET: -10.2 mL          gen: intubated  cv: irregular irregular  resp: intubated  gi: soft, obese, nttp  gu: lomeli in place  msk: 2+ pitting edema b/l LE. LLE donor site (thigh) with xeroform. inferior aspect wiht xeroform that was peeled back, minimal bleeding from wound site. Gonzales with good take of graft. no signs of infection    LABS:                        8.6    9.05  )-----------( 382      ( 15 Nov 2020 01:12 )             29.1     11-15    131<L>  |  95<L>  |  106.0<H>  ----------------------------<  214<H>  6.2<HH>   |  27.0  |  2.27<H>    Ca    8.1<L>      15 Nov 2020 01:12    TPro  5.2<L>  /  Alb  3.4  /  TBili  0.5  /  DBili  x   /  AST  22  /  ALT  22  /  AlkPhos  220<H>  11-15      PT/INR - ( 15 Nov 2020 08:29 )   PT: 21.8 sec;   INR: 1.94 ratio         PTT - ( 15 Nov 2020 08:29 )  PTT:30.2 sec  ABG - ( 15 Nov 2020 06:19 )  pH, Arterial: 7.37  pH, Blood: x     /  pCO2: 53    /  pO2: 295   / HCO3: 28    / Base Excess: 4.1   /  SaO2: 100               CARDIAC MARKERS ( 15 Nov 2020 01:12 )  x     / 0.03 ng/mL / x     / x     / x          11-15 @ 01:11  0.5        IMAGING:

## 2020-11-15 NOTE — AIRWAY PLACEMENT NOTE ADULT - POST AIRWAY PLACEMENT ASSESSMENT:
breath sounds bilateral breath sounds bilateral/breath sounds equal/CXR pending/positive end tidal CO2 noted

## 2020-11-15 NOTE — ED ADULT TRIAGE NOTE - CHIEF COMPLAINT QUOTE
pt BIB EMS from [facility]. Pt found unresponsive, as per EMS LKW was 2300. Pt being bagged by EMS. Pt taken to critical care, MD Valdez at bedside.

## 2020-11-15 NOTE — ED PROVIDER NOTE - PMH
Atrial fibrillation    Atrial fibrillation    Bladder cancer    CHF (congestive heart failure)    Chronic osteomyelitis, osteomyelitis of unspecified site    CKD (chronic kidney disease)    COPD (chronic obstructive pulmonary disease)    COPD, severity to be determined    Coronary artery disease involving coronary bypass graft of native heart without angina pectoris    HLD (hyperlipidemia)

## 2020-11-15 NOTE — H&P ADULT - NSICDXPASTMEDICALHX_GEN_ALL_CORE_FT
PAST MEDICAL HISTORY:  Atrial fibrillation     Atrial fibrillation     Bladder cancer     CHF (congestive heart failure)     Chronic osteomyelitis, osteomyelitis of unspecified site     CKD (chronic kidney disease)     COPD (chronic obstructive pulmonary disease)     COPD, severity to be determined     Coronary artery disease involving coronary bypass graft of native heart without angina pectoris     HLD (hyperlipidemia)

## 2020-11-15 NOTE — H&P ADULT - NSHPPHYSICALEXAM_GEN_ALL_CORE
General: adult male intubated, obese   Heent: no JVD  Cardio: +s1/s2  Pulm: Decreased breath sounds on the right   abd: soft, umbilic hernia reproducible   extr: 2+ pitting edema b/l LE  Neuro: sedated

## 2020-11-15 NOTE — ED PROVIDER NOTE - CLINICAL SUMMARY MEDICAL DECISION MAKING FREE TEXT BOX
need for immediate intubation via RSI 2/2 resp failure and need for hyperk treatment to prevent life threatening arrythmia admitted to icu . all recent workup and hospital course reviewed

## 2020-11-15 NOTE — PROVIDER CONTACT NOTE (EICU) - RECOMMENDATIONS
70M w/ respiratory failure possibly due to sepsis vs decompensated right heart failure.  - admit to ICU  - agree w/ broad spectrum abx- f/u blood, sputum, urine cx; send MRSA swab  - would perform POCUS - may need thoracentesis vs chest tube placement for right pleural effusion-have to r/o as source of sepsis  - CT C/A/P pending to look for possible sources of sepsis  - GERALD on CKD- possibly due to sepsis vs decompensated RHF - give lasix x1, monitor electrolytes and UOP  - trend CE, get pro-BNP    Plan discussed w/ ICU JAYME Philip 70M w/ respiratory failure possibly due to sepsis vs decompensated right heart failure.  - admit to ICU  - agree w/ broad spectrum abx in setting of hypothermia- f/u blood, sputum, urine cx; send MRSA swab  - suspect decompensated right heart failure - recommend diuresis  - would perform POCUS - may need thoracentesis vs chest tube placement for right pleural effusion-have to r/o as source of sepsis  - CT C/A/P pending to look for possible sources of sepsis  - GERALD on CKD- possibly due to sepsis vs decompensated RHF - give lasix x1, monitor electrolytes and UOP  - trend CE, get pro-BNP    Plan discussed w/ ICU JAYME Philip

## 2020-11-15 NOTE — ED PROVIDER NOTE - OBJECTIVE STATEMENT
69 y/o male with PMHx of Atrial fibrillation, Bladder cancer, CHF, Chronic osteomyelitis, CKD, COPD, Coronary artery disease involving coronary bypass graft of native heart without angina pectoris, and HLD presents to ED BIBA unresponsive from nursing home. As per EMS, patient was last seen well at 2300, and was found to be unresponsive, EMS was ventilating the patient upon their arrival to the ED

## 2020-11-15 NOTE — H&P ADULT - ASSESSMENT
Patient is a  70 year old male with a pmhx of w/ Afib, pulmonary HTN, chronic OM, CKD, COPD, CABG, now with     1. AMS  2. Hypoxic resp failure   3. volume overload   4. Septic shock  5. R pleural effusion  6. ckd   7. uremia   8. Pulm HTN  9. Hyperkalemia     Plan  Neuro: AMS upon arrival multifactorial, uremia, volume overload, hypothermia. CT head negative. Start Propofol for sedation. Titrate to a RASS of 0 to -2  Cardio: Levo started for post intubation hypotension. Cont to actively titrate to a MAp of 65- 70. Add midodrine to aid in weaning of vasopressors. Will hold 30cc/kg of fluid as it appears he is volume overloaded. Will give 80mg of lasix and start on 80mg bid. send bnp  Pulm: Intubated for hypoxia and mental status. Cont on VAC and titrate to a sat of 92% and higher. R pleural effusions appears much larger then prior films, likely amendable to drainage. Pt did have chest tube in prior admission.   GI: NPO for now. start PPI. check ct abd/pelvis for source of sepsis  Renal: Strict I/Os, renally dose meds, will likely need renal consult in light of worsening uremia. Hyperkalemia was treated, send repeat bmp.  ID: Start vanco, zosyn for empiric abx given shock and hypothermia. Unclear if infected. Send blood, urine and sputum cx. MRSA pcr  Heme: heparin drip pending ct head   Endo: no active issues     Dispo: admit to MICU, case d/w eICU

## 2020-11-15 NOTE — PROVIDER CONTACT NOTE (EICU) - BACKGROUND
70M w/ Afib, pulmonary HTN, chronic OM, CKD, COPD, CABG. Recently admitted s/p fall w/ hematoma of LLE requiring debridement and evacuation of hematoma, complicated by worsening GERALD on CKD and worsening respiratory status. Noted to have R pleural effusion s/p pigtail catheter that drained 2.2L. EVentually set to ROCKY. Presented today unresponsive, intubated in the ED. Hypothermic and normal BP. Labs- normal WBC count, AI on CKD, hyperK, COVID19 negative. CXR shows loculated pleural effusion on right.

## 2020-11-15 NOTE — H&P ADULT - ATTENDING COMMENTS
71 yo male with hx of afib on eliquis, pulmonary HTN, chronic OM, CKD, COPD, CAD s/p CABG, recent admission after a fall requiring LLE hematoma debridement, complicated by GERALD on CKD, CHF, now admitted with altered mentals status, acute respiratory failure requiring intubation, large recurrent right sided pleural effusion, GERALD on CKD, possible sepsis.    I suspect his presentation is mostly due to decompensated CHF.  He was offered a left heart cath during the last admission but declined due to his renal failure.    Plan is for diuresis, drainage of pleural effusion, liberate from vent.  Will eventually need left heart cath.  On empiric abx, if all cultures negative can d/c.   Wife updated at bedside.

## 2020-11-15 NOTE — ED ADULT NURSE NOTE - BREATHING INTERVENTIONS
Consultation with Neurology as discussed, Dr Polo Maldonado  Further follow-up with Neurosurgery will be on an as needed basis  Return to Neurosurgery with seizures, significantly worsening frequency of headaches and other neurologic changes  Oxygen/Bag-valve-mask

## 2020-11-16 DIAGNOSIS — N18.9 CHRONIC KIDNEY DISEASE, UNSPECIFIED: ICD-10-CM

## 2020-11-16 DIAGNOSIS — J44.9 CHRONIC OBSTRUCTIVE PULMONARY DISEASE, UNSPECIFIED: ICD-10-CM

## 2020-11-16 DIAGNOSIS — I50.9 HEART FAILURE, UNSPECIFIED: ICD-10-CM

## 2020-11-16 DIAGNOSIS — J96.91 RESPIRATORY FAILURE, UNSPECIFIED WITH HYPOXIA: ICD-10-CM

## 2020-11-16 DIAGNOSIS — J90 PLEURAL EFFUSION, NOT ELSEWHERE CLASSIFIED: ICD-10-CM

## 2020-11-16 DIAGNOSIS — R29.6 REPEATED FALLS: ICD-10-CM

## 2020-11-16 DIAGNOSIS — I25.810 ATHEROSCLEROSIS OF CORONARY ARTERY BYPASS GRAFT(S) WITHOUT ANGINA PECTORIS: ICD-10-CM

## 2020-11-16 DIAGNOSIS — I48.91 UNSPECIFIED ATRIAL FIBRILLATION: ICD-10-CM

## 2020-11-16 LAB
ALBUMIN SERPL ELPH-MCNC: 2.9 G/DL — LOW (ref 3.3–5.2)
ALP SERPL-CCNC: 193 U/L — HIGH (ref 40–120)
ALT FLD-CCNC: 15 U/L — SIGNIFICANT CHANGE UP
ANION GAP SERPL CALC-SCNC: 11 MMOL/L — SIGNIFICANT CHANGE UP (ref 5–17)
APPEARANCE UR: CLEAR — SIGNIFICANT CHANGE UP
APTT BLD: 103.5 SEC — HIGH (ref 27.5–35.5)
APTT BLD: 118.4 SEC — HIGH (ref 27.5–35.5)
APTT BLD: 32.9 SEC — SIGNIFICANT CHANGE UP (ref 27.5–35.5)
APTT BLD: 64.6 SEC — HIGH (ref 27.5–35.5)
AST SERPL-CCNC: 12 U/L — SIGNIFICANT CHANGE UP
BACTERIA # UR AUTO: ABNORMAL
BASOPHILS # BLD AUTO: 0.04 K/UL — SIGNIFICANT CHANGE UP (ref 0–0.2)
BASOPHILS NFR BLD AUTO: 0.4 % — SIGNIFICANT CHANGE UP (ref 0–2)
BILIRUB SERPL-MCNC: 0.8 MG/DL — SIGNIFICANT CHANGE UP (ref 0.4–2)
BILIRUB UR-MCNC: NEGATIVE — SIGNIFICANT CHANGE UP
BLD GP AB SCN SERPL QL: SIGNIFICANT CHANGE UP
BUN SERPL-MCNC: 105 MG/DL — HIGH (ref 8–20)
CALCIUM SERPL-MCNC: 8 MG/DL — LOW (ref 8.6–10.2)
CHLORIDE SERPL-SCNC: 95 MMOL/L — LOW (ref 98–107)
CO2 SERPL-SCNC: 29 MMOL/L — SIGNIFICANT CHANGE UP (ref 22–29)
COLOR SPEC: YELLOW — SIGNIFICANT CHANGE UP
CREAT ?TM UR-MCNC: 31 MG/DL — SIGNIFICANT CHANGE UP
CREAT SERPL-MCNC: 2.31 MG/DL — HIGH (ref 0.5–1.3)
DIFF PNL FLD: ABNORMAL
EOSINOPHIL # BLD AUTO: 0.07 K/UL — SIGNIFICANT CHANGE UP (ref 0–0.5)
EOSINOPHIL NFR BLD AUTO: 0.8 % — SIGNIFICANT CHANGE UP (ref 0–6)
EPI CELLS # UR: SIGNIFICANT CHANGE UP
GLUCOSE FLD-MCNC: 138 MG/DL — SIGNIFICANT CHANGE UP
GLUCOSE SERPL-MCNC: 123 MG/DL — HIGH (ref 70–99)
GLUCOSE UR QL: NEGATIVE MG/DL — SIGNIFICANT CHANGE UP
GRAM STN FLD: SIGNIFICANT CHANGE UP
HCT VFR BLD CALC: 23.5 % — LOW (ref 39–50)
HCT VFR BLD CALC: 24.6 % — LOW (ref 39–50)
HGB BLD-MCNC: 7.3 G/DL — LOW (ref 13–17)
HGB BLD-MCNC: 7.5 G/DL — LOW (ref 13–17)
IMM GRANULOCYTES NFR BLD AUTO: 0.5 % — SIGNIFICANT CHANGE UP (ref 0–1.5)
KETONES UR-MCNC: NEGATIVE — SIGNIFICANT CHANGE UP
LDH SERPL L TO P-CCNC: 67 U/L — SIGNIFICANT CHANGE UP
LEUKOCYTE ESTERASE UR-ACNC: NEGATIVE — SIGNIFICANT CHANGE UP
LYMPHOCYTES # BLD AUTO: 0.65 K/UL — LOW (ref 1–3.3)
LYMPHOCYTES # BLD AUTO: 7 % — LOW (ref 13–44)
MAGNESIUM SERPL-MCNC: 2.1 MG/DL — SIGNIFICANT CHANGE UP (ref 1.6–2.6)
MCHC RBC-ENTMCNC: 30.5 GM/DL — LOW (ref 32–36)
MCHC RBC-ENTMCNC: 31.1 GM/DL — LOW (ref 32–36)
MCHC RBC-ENTMCNC: 32.9 PG — SIGNIFICANT CHANGE UP (ref 27–34)
MCHC RBC-ENTMCNC: 34.4 PG — HIGH (ref 27–34)
MCV RBC AUTO: 107.9 FL — HIGH (ref 80–100)
MCV RBC AUTO: 110.8 FL — HIGH (ref 80–100)
MONOCYTES # BLD AUTO: 0.78 K/UL — SIGNIFICANT CHANGE UP (ref 0–0.9)
MONOCYTES NFR BLD AUTO: 8.4 % — SIGNIFICANT CHANGE UP (ref 2–14)
NEUTROPHILS # BLD AUTO: 7.69 K/UL — HIGH (ref 1.8–7.4)
NEUTROPHILS NFR BLD AUTO: 82.9 % — HIGH (ref 43–77)
NITRITE UR-MCNC: NEGATIVE — SIGNIFICANT CHANGE UP
OSMOLALITY UR: 314 MOSM/KG — SIGNIFICANT CHANGE UP (ref 300–1000)
PH UR: 5 — SIGNIFICANT CHANGE UP (ref 5–8)
PHOSPHATE SERPL-MCNC: 4.7 MG/DL — SIGNIFICANT CHANGE UP (ref 2.4–4.7)
PLATELET # BLD AUTO: 316 K/UL — SIGNIFICANT CHANGE UP (ref 150–400)
PLATELET # BLD AUTO: 339 K/UL — SIGNIFICANT CHANGE UP (ref 150–400)
POTASSIUM SERPL-MCNC: 4.5 MMOL/L — SIGNIFICANT CHANGE UP (ref 3.5–5.3)
POTASSIUM SERPL-SCNC: 4.5 MMOL/L — SIGNIFICANT CHANGE UP (ref 3.5–5.3)
PROT ?TM UR-MCNC: 6 MG/DL — SIGNIFICANT CHANGE UP (ref 0–12)
PROT FLD-MCNC: 1.8 G/DL — SIGNIFICANT CHANGE UP
PROT SERPL-MCNC: 4.8 G/DL — LOW (ref 6.6–8.7)
PROT UR-MCNC: NEGATIVE MG/DL — SIGNIFICANT CHANGE UP
PROT/CREAT UR-RTO: 0.2 RATIO — SIGNIFICANT CHANGE UP
RBC # BLD: 2.12 M/UL — LOW (ref 4.2–5.8)
RBC # BLD: 2.28 M/UL — LOW (ref 4.2–5.8)
RBC # FLD: 16.5 % — HIGH (ref 10.3–14.5)
RBC # FLD: 17.7 % — HIGH (ref 10.3–14.5)
RBC CASTS # UR COMP ASSIST: ABNORMAL /HPF (ref 0–4)
SODIUM SERPL-SCNC: 135 MMOL/L — SIGNIFICANT CHANGE UP (ref 135–145)
SODIUM UR-SCNC: 54 MMOL/L — SIGNIFICANT CHANGE UP
SP GR SPEC: 1.01 — SIGNIFICANT CHANGE UP (ref 1.01–1.02)
SPECIMEN SOURCE: SIGNIFICANT CHANGE UP
UROBILINOGEN FLD QL: NEGATIVE MG/DL — SIGNIFICANT CHANGE UP
VANCOMYCIN TROUGH SERPL-MCNC: 7.8 UG/ML — LOW (ref 10–20)
WBC # BLD: 8.08 K/UL — SIGNIFICANT CHANGE UP (ref 3.8–10.5)
WBC # BLD: 9.28 K/UL — SIGNIFICANT CHANGE UP (ref 3.8–10.5)
WBC # FLD AUTO: 8.08 K/UL — SIGNIFICANT CHANGE UP (ref 3.8–10.5)
WBC # FLD AUTO: 9.28 K/UL — SIGNIFICANT CHANGE UP (ref 3.8–10.5)
WBC UR QL: SIGNIFICANT CHANGE UP

## 2020-11-16 PROCEDURE — 99223 1ST HOSP IP/OBS HIGH 75: CPT

## 2020-11-16 PROCEDURE — 99233 SBSQ HOSP IP/OBS HIGH 50: CPT

## 2020-11-16 PROCEDURE — 99291 CRITICAL CARE FIRST HOUR: CPT

## 2020-11-16 PROCEDURE — 99232 SBSQ HOSP IP/OBS MODERATE 35: CPT

## 2020-11-16 RX ORDER — PANTOPRAZOLE SODIUM 20 MG/1
40 TABLET, DELAYED RELEASE ORAL
Refills: 0 | Status: DISCONTINUED | OUTPATIENT
Start: 2020-11-17 | End: 2020-11-22

## 2020-11-16 RX ORDER — PANTOPRAZOLE SODIUM 20 MG/1
40 TABLET, DELAYED RELEASE ORAL ONCE
Refills: 0 | Status: COMPLETED | OUTPATIENT
Start: 2020-11-16 | End: 2020-11-16

## 2020-11-16 RX ADMIN — PIPERACILLIN AND TAZOBACTAM 25 GRAM(S): 4; .5 INJECTION, POWDER, LYOPHILIZED, FOR SOLUTION INTRAVENOUS at 05:19

## 2020-11-16 RX ADMIN — ATORVASTATIN CALCIUM 20 MILLIGRAM(S): 80 TABLET, FILM COATED ORAL at 21:44

## 2020-11-16 RX ADMIN — Medication 80 MILLIGRAM(S): at 17:33

## 2020-11-16 RX ADMIN — HEPARIN SODIUM 1400 UNIT(S)/HR: 5000 INJECTION INTRAVENOUS; SUBCUTANEOUS at 04:02

## 2020-11-16 RX ADMIN — Medication 3 MILLILITER(S): at 20:57

## 2020-11-16 RX ADMIN — CHLORHEXIDINE GLUCONATE 15 MILLILITER(S): 213 SOLUTION TOPICAL at 05:21

## 2020-11-16 RX ADMIN — Medication 3 MILLILITER(S): at 03:05

## 2020-11-16 RX ADMIN — Medication 3 MILLILITER(S): at 15:47

## 2020-11-16 RX ADMIN — MIDODRINE HYDROCHLORIDE 15 MILLIGRAM(S): 2.5 TABLET ORAL at 05:35

## 2020-11-16 RX ADMIN — SODIUM CHLORIDE 3 MILLILITER(S): 9 INJECTION INTRAMUSCULAR; INTRAVENOUS; SUBCUTANEOUS at 02:45

## 2020-11-16 RX ADMIN — Medication 80 MILLIGRAM(S): at 05:19

## 2020-11-16 RX ADMIN — CHLORHEXIDINE GLUCONATE 1 APPLICATION(S): 213 SOLUTION TOPICAL at 04:00

## 2020-11-16 RX ADMIN — HEPARIN SODIUM 1200 UNIT(S)/HR: 5000 INJECTION INTRAVENOUS; SUBCUTANEOUS at 09:03

## 2020-11-16 RX ADMIN — Medication 3 MILLILITER(S): at 09:38

## 2020-11-16 RX ADMIN — HEPARIN SODIUM 1200 UNIT(S)/HR: 5000 INJECTION INTRAVENOUS; SUBCUTANEOUS at 16:17

## 2020-11-16 RX ADMIN — Medication 100 MILLIGRAM(S): at 11:17

## 2020-11-16 RX ADMIN — Medication 81 MILLIGRAM(S): at 11:17

## 2020-11-16 RX ADMIN — SENNA PLUS 2 TABLET(S): 8.6 TABLET ORAL at 21:44

## 2020-11-16 RX ADMIN — PANTOPRAZOLE SODIUM 40 MILLIGRAM(S): 20 TABLET, DELAYED RELEASE ORAL at 11:17

## 2020-11-16 RX ADMIN — TAMSULOSIN HYDROCHLORIDE 0.4 MILLIGRAM(S): 0.4 CAPSULE ORAL at 21:44

## 2020-11-16 NOTE — CONSULT NOTE ADULT - SUBJECTIVE AND OBJECTIVE BOX
[place] [facility]                                                               Office:  [address] [address]                                                              Telephone: [phone]. Fax:[phone]                                                                        CARDIOLOGY CONSULTATION NOTE                                                                                             Consult requested by:    Reason for Consultation:   History obtained by: Patient and medical record   obtained: No    Chief complaint:    Patient is a 70y old  Male who presents with a chief complaint of Resp failure (16 Nov 2020 08:05)        HPI:  Patient is a  70 year old male with a pmhx of w/ Afib, pulmonary HTN, chronic OM, CKD, COPD, CABG. Recently admitted s/p fall w/ hematoma of LLE requiring debridement and evacuation of hematoma, complicated by worsening GERALD on CKD and worsening respiratory status. Found to have severe pulm HTN, was being diuresed with lasix. Was reccomended for cath but patient declined for fear of worsening renal failure. Noted to have R pleural effusion s/p pigtail catheter that drained 2.2L. Eventually d/c to [facility] on 11/13. Presented today unresponsive, obtunded then intubated in the ED. Hypothermic and developed shock. Labs significant for hyperkalemia acute on chronic renal failure and worsening R pleural effusion. MICU consulted called.  (15 Nov 2020 05:20)    Above Appreciated  Cardiology consult requested for       REVIEW OF SYMPTOMS:     CONSTITUTIONAL: No fever, weight loss, or fatigue  ENMT:  No difficulty hearing, tinnitus, vertigo; No sinus or throat pain  NECK: No pain or stiffness  CARDIOVASCULAR:See HPI  RESPIRATORY: No Dyspnea on exertion, Shortness of breath, cough, wheezing  : No dysuria, no hematuria   GI: No dark color stool, no melena, no diarrhea, no constipation, no abdominal pain   NEURO: No headache, no dizziness, no slurred speech   MUSCULOSKELETAL: No joint pain or swelling; No muscle, back, or extremity pain  PSYCH: No agitation, no anxiety.    ALL OTHER REVIEW OF SYSTEMS ARE NEGATIVE.      PREVIOUS DIAGNOSTIC TESTING  ECHO FINDINGS: < from: TTE Echo Limited or F/U (11.03.20 @ 15:22) >  Summary:   1. Left ventricular ejection fraction, by visual estimation, is 60 to 65%.   2. Normal global left ventricular systolic function.   3. Severely enlarged right atrium.   4. Severely enlarged right ventricle.   5. Severely reduced RV systolic function.   6. Positive Baca Sign.   7. Normal left atrial size.   8. There is no evidence of pericardial effusion.   9. Mild-moderate tricuspid regurgitation.  10. Estimated pulmonary artery systolic pressure is 63.4 mmHg assuming a right atrial pressure of 15 mmHg, which is consistent with severe pulmonary hypertension.  11. Endocardial visualization was enhanced with intravenous echo contrast.    Echo 06/30/2020 Outpatient Dr. Storm  EF 55-60%, mild asymmetric LVH, normal regional wall motion, moderate to severe diastolic dysfunction, enlarged LA, RV normal size and function, aortic root is dilated, mild MR, mild TR, pulmonary HTN is present, peak PA pressure 47 (mild to mod)        STRESS FINDINGS:       CATHETERIZATION FINDINGS:         ALLERGIES: Allergies    No Known Allergies    Intolerances          PAST MEDICAL HISTORY  CKD (chronic kidney disease)    CHF (congestive heart failure)    Atrial fibrillation    COPD, severity to be determined    Coronary artery disease involving coronary bypass graft of native heart without angina pectoris    Chronic osteomyelitis, osteomyelitis of unspecified site    COPD (chronic obstructive pulmonary disease)    Atrial fibrillation    Bladder cancer    HLD (hyperlipidemia)        PAST SURGICAL HISTORY  H/O knee surgery    S/P CABG (coronary artery bypass graft)    Presence of stent of CABG        FAMILY HISTORY:  No pertinent family history in first degree relatives      SOCIAL HISTORY:    CIGARETTES:   Former smoker, quit 2012. Smoked 1- 1.5 PPD x 20+ years  ALCOHOL: On weekends   DRUGS: Denies      CURRENT MEDICATIONS:  furosemide   Injectable 80 milliGRAM(s) IV Push two times a day  tamsulosin 0.4 milliGRAM(s) Oral at bedtime  albuterol/ipratropium for Nebulization.   pantoprazole    Tablet  pantoprazole    Tablet  senna  allopurinol  aspirin  chewable  atorvastatin  chlorhexidine 2% Cloths  heparin  Infusion.        HOME MEDICATIONS:    acetaminophen 325 mg oral tablet: 2 tab(s) orally every 6 hours, As needed, Mild Pain (1 - 3) (12 Nov 2020 10:14)  Advair Diskus 250 mcg-50 mcg inhalation powder: 1 puff(s) inhaled 2 times a day (19 Oct 2020 09:15)  allopurinol 100 mg oral tablet: 1 tab(s) orally once a day (19 Oct 2020 09:15)  apixaban 2.5 mg oral tablet: 1 tab(s) orally 2 times a day (19 Oct 2020 09:15)  ascorbic acid 500 mg oral tablet: 1 tab(s) orally 2 times a day (12 Nov 2020 10:14)  aspirin 81 mg oral tablet: 1 tab(s) orally once a day (19 Oct 2020 09:15)  carvedilol 12.5 mg oral tablet: 1 tab(s) orally 2 times a day (19 Oct 2020 09:15)  Flomax 0.4 mg oral capsule: 1 cap(s) orally once a day (19 Oct 2020 09:15)  folic acid 1 mg oral tablet: 1 tab(s) orally once a day (12 Nov 2020 10:14)  furosemide 40 mg oral tablet: 1 tab(s) orally prn, As Needed (19 Oct 2020 09:15)  hydrALAZINE 25 mg oral tablet: 2 tab(s) orally 3 times a day (19 Oct 2020 09:15)  Lipitor 20 mg oral tablet: 1 tab(s) orally once a day (19 Oct 2020 09:15)  Multiple Vitamins oral tablet: 1 tab(s) orally once a day (12 Nov 2020 10:14)  omeprazole 40 mg oral delayed release capsule: 1 cap(s) orally once a day (19 Oct 2020 09:15)  senna oral tablet: 2 tab(s) orally once a day (at bedtime) (12 Nov 2020 10:14)  temazepam 30 mg oral capsule: 1 cap(s) orally once a day (at bedtime), As Needed (19 Oct 2020 09:15)  thiamine 100 mg oral tablet: 1 tab(s) orally once a day (12 Nov 2020 10:14)  zinc sulfate 220 mg oral capsule: 1 cap(s) orally once a day (12 Nov 2020 10:14)      Vital Signs Last 24 Hrs  T(C): 37.2 (16 Nov 2020 08:00), Max: 37.2 (16 Nov 2020 05:00)  T(F): 99 (16 Nov 2020 08:00), Max: 99 (16 Nov 2020 05:00)  HR: 77 (16 Nov 2020 10:00) (76 - 94)  BP: 122/57 (16 Nov 2020 10:00) (89/50 - 134/60)  BP(mean): 80 (16 Nov 2020 10:00) (67 - 96)  RR: 18 (16 Nov 2020 10:00) (14 - 45)  SpO2: 96% (16 Nov 2020 10:00) (93% - 100%)      PHYSICAL EXAM:  Constitutional: Comfortable . No acute distress.   HEENT: Atraumatic and normocephalic , neck is supple . no JVD. No carotid bruit. PEERL   CNS: A&Ox0. pt intubated, No focal deficits. EOMI. Cranial nerves II-IX are intact.   Lymph Nodes: Cervical : Not palpable.  Respiratory: CTAB  Cardiovascular: S1S2 RRR. No murmur/rubs or gallop.  Gastrointestinal: Soft non-tender and non distended . +Bowel sounds. negative Woodson's sign.  Extremities: No edema.   Psychiatric: Calm . no agitation.  Skin: No skin rash/ulcers visualized to face, hands or feet.    Intake and output:   11-15 @ 07:01  -  11-16 @ 07:00  --------------------------------------------------------  IN: 1263.6 mL / OUT: 5740 mL / NET: -4476.4 mL    11-16 @ 07:01 - 11-16 @ 11:14  --------------------------------------------------------  IN: 38 mL / OUT: 885 mL / NET: -847 mL        LABS:                        7.5    9.28  )-----------( 339      ( 16 Nov 2020 03:15 )             24.6     11-16    135  |  95<L>  |  105.0<H>  ----------------------------<  123<H>  4.5   |  29.0  |  2.31<H>    Ca    8.0<L>      16 Nov 2020 03:15  Phos  4.7     11-16  Mg     2.1     11-16    TPro  4.8<L>  /  Alb  2.9<L>  /  TBili  0.8  /  DBili  x   /  AST  12  /  ALT  15  /  AlkPhos  193<H>  11-16    CARDIAC MARKERS ( 15 Nov 2020 01:12 )  x     / 0.03 ng/mL / x     / x     / x        ;p-BNP=Serum Pro-Brain Natriuretic Peptide: 76742 pg/mL (11-15 @ 01:12)    PT/INR - ( 15 Nov 2020 08:29 )   PT: 21.8 sec;   INR: 1.94 ratio         PTT - ( 16 Nov 2020 08:04 )  PTT:118.4 sec      INTERPRETATION OF TELEMETRY:  Afib HR @ 70-80s  ECG:      RADIOLOGY & ADDITIONAL STUDIES:    X-ray: < from: Xray Chest 1 View-PORTABLE IMMEDIATE (Xray Chest 1 View-PORTABLE IMMEDIATE .) (11.15.20 @ 17:42) >  FINDINGS:    Single frontal view of the chest demonstrates improved mild to moderate CHF. Moderate bilateral pleural effusions, improved. Tubes are unchanged. Mediastinal sternotomy wires. The cardiomediastinal silhouette isenlarged. No acute osseous abnormalities. Overlying EKG leads and wires are noted    IMPRESSION: Improved CHF and bilateral effusions.        CT scan: < from: CT Chest No Cont (11.15.20 @ 05:37) >  IMPRESSION:  Moderate to large right and small-to-moderate left pleural effusions with associated compressive atelectasis, most notably involving nearly the entirety of the right lower lobe.    Mild scattered groundglass airspace opacities and interlobular septal thickening, may reflect pulmonary edema.    Small intra-abdominal ascites.    Anasarca.             [place] [facility]                                                               Office:  [address] [address]                                                              Telephone: [phone]. Fax:[phone]                                                                        CARDIOLOGY CONSULTATION NOTE                                                                                             Consult requested by: JAYME Edwards   Reason for Consultation: CAD  History obtained by: Patient and medical record   obtained: No    Chief complaint:    Patient is a 70y old  Male who presents with a chief complaint of Resp failure (16 Nov 2020 08:05)        HPI:  Patient is a  70 year old male with a pmhx of w/ Afib, pulmonary HTN, chronic OM, CKD, COPD, CABG. Recently admitted s/p fall w/ hematoma of LLE requiring debridement and evacuation of hematoma, complicated by worsening GERALD on CKD and worsening respiratory status. Found to have severe pulm HTN, was being diuresed with lasix. Was reccomended for cath but patient declined for fear of worsening renal failure. Noted to have R pleural effusion s/p pigtail catheter that drained 2.2L. Eventually d/c to [facility] on 11/13. Presented today unresponsive, obtunded then intubated in the ED. Hypothermic and developed shock. Labs significant for hyperkalemia acute on chronic renal failure and worsening R pleural effusion. MICU consulted called.  (15 Nov 2020 05:20)    Above Appreciated  Cardiology consult requested for evaluation for CAD. Pt denies recall of any anginal symptoms prior to unresponsive episode. Pt currently extubated, alert and oriented.                      REVIEW OF SYMPTOMS:     CONSTITUTIONAL: No fever, weight loss, or fatigue  ENMT:  No difficulty hearing, tinnitus, vertigo; No sinus or throat pain  NECK: No pain or stiffness  CARDIOVASCULAR:See HPI  RESPIRATORY: No Dyspnea on exertion, Shortness of breath, cough, wheezing  : No dysuria, no hematuria   GI: No dark color stool, no melena, no diarrhea, no constipation, no abdominal pain   NEURO: No headache, no dizziness, no slurred speech   MUSCULOSKELETAL: No joint pain or swelling; No muscle, back, or extremity pain  PSYCH: No agitation, no anxiety.    ALL OTHER REVIEW OF SYSTEMS ARE NEGATIVE.      PREVIOUS DIAGNOSTIC TESTING  ECHO FINDINGS: < from: TTE Echo Limited or F/U (11.03.20 @ 15:22) >  Summary:   1. Left ventricular ejection fraction, by visual estimation, is 60 to 65%.   2. Normal global left ventricular systolic function.   3. Severely enlarged right atrium.   4. Severely enlarged right ventricle.   5. Severely reduced RV systolic function.   6. Positive Baca Sign.   7. Normal left atrial size.   8. There is no evidence of pericardial effusion.   9. Mild-moderate tricuspid regurgitation.  10. Estimated pulmonary artery systolic pressure is 63.4 mmHg assuming a right atrial pressure of 15 mmHg, which is consistent with severe pulmonary hypertension.  11. Endocardial visualization was enhanced with intravenous echo contrast.    Echo 06/30/2020 Outpatient Dr. Storm  EF 55-60%, mild asymmetric LVH, normal regional wall motion, moderate to severe diastolic dysfunction, enlarged LA, RV normal size and function, aortic root is dilated, mild MR, mild TR, pulmonary HTN is present, peak PA pressure 47 (mild to mod)      ALLERGIES: Allergies    No Known Allergies    Intolerances        PAST MEDICAL HISTORY  CKD (chronic kidney disease)    CHF (congestive heart failure)    Atrial fibrillation    COPD, severity to be determined    Coronary artery disease involving coronary bypass graft of native heart without angina pectoris    Chronic osteomyelitis, osteomyelitis of unspecified site    COPD (chronic obstructive pulmonary disease)    Atrial fibrillation    Bladder cancer    HLD (hyperlipidemia)        PAST SURGICAL HISTORY  H/O knee surgery    S/P CABG (coronary artery bypass graft)    Presence of stent of CABG        FAMILY HISTORY:  No pertinent family history in first degree relatives      SOCIAL HISTORY:    CIGARETTES: Former smoker, quit 2012. Smoked 1- 1.5 PPD x 20+ years  ALCOHOL: On weekends   DRUGS: Denies      CURRENT MEDICATIONS:  furosemide   Injectable 80 milliGRAM(s) IV Push two times a day  tamsulosin 0.4 milliGRAM(s) Oral at bedtime  albuterol/ipratropium for Nebulization.  pantoprazole    Tablet  pantoprazole    Tablet  senna  allopurinol  aspirin  chewable  atorvastatin  chlorhexidine 2% Cloths  heparin  Infusion.        HOME MEDICATIONS:    acetaminophen 325 mg oral tablet: 2 tab(s) orally every 6 hours, As needed, Mild Pain (1 - 3) (12 Nov 2020 10:14)  Advair Diskus 250 mcg-50 mcg inhalation powder: 1 puff(s) inhaled 2 times a day (19 Oct 2020 09:15)  allopurinol 100 mg oral tablet: 1 tab(s) orally once a day (19 Oct 2020 09:15)  apixaban 2.5 mg oral tablet: 1 tab(s) orally 2 times a day (19 Oct 2020 09:15)  ascorbic acid 500 mg oral tablet: 1 tab(s) orally 2 times a day (12 Nov 2020 10:14)  aspirin 81 mg oral tablet: 1 tab(s) orally once a day (19 Oct 2020 09:15)  carvedilol 12.5 mg oral tablet: 1 tab(s) orally 2 times a day (19 Oct 2020 09:15)  Flomax 0.4 mg oral capsule: 1 cap(s) orally once a day (19 Oct 2020 09:15)  folic acid 1 mg oral tablet: 1 tab(s) orally once a day (12 Nov 2020 10:14)  furosemide 40 mg oral tablet: 1 tab(s) orally prn, As Needed (19 Oct 2020 09:15)  hydrALAZINE 25 mg oral tablet: 2 tab(s) orally 3 times a day (19 Oct 2020 09:15)  Lipitor 20 mg oral tablet: 1 tab(s) orally once a day (19 Oct 2020 09:15)  Multiple Vitamins oral tablet: 1 tab(s) orally once a day (12 Nov 2020 10:14)  omeprazole 40 mg oral delayed release capsule: 1 cap(s) orally once a day (19 Oct 2020 09:15)  senna oral tablet: 2 tab(s) orally once a day (at bedtime) (12 Nov 2020 10:14)  temazepam 30 mg oral capsule: 1 cap(s) orally once a day (at bedtime), As Needed (19 Oct 2020 09:15)  thiamine 100 mg oral tablet: 1 tab(s) orally once a day (12 Nov 2020 10:14)  zinc sulfate 220 mg oral capsule: 1 cap(s) orally once a day (12 Nov 2020 10:14)      Vital Signs Last 24 Hrs  T(C): 37.2 (16 Nov 2020 08:00), Max: 37.2 (16 Nov 2020 05:00)  T(F): 99 (16 Nov 2020 08:00), Max: 99 (16 Nov 2020 05:00)  HR: 77 (16 Nov 2020 10:00) (76 - 94)  BP: 122/57 (16 Nov 2020 10:00) (89/50 - 134/60)  BP(mean): 80 (16 Nov 2020 10:00) (67 - 96)  RR: 18 (16 Nov 2020 10:00) (14 - 45)  SpO2: 96% (16 Nov 2020 10:00) (93% - 100%)      PHYSICAL EXAM:  Constitutional: Comfortable . No acute distress.   HEENT: Atraumatic and normocephalic , neck is supple . no JVD. No carotid bruit. PEERL   CNS: A&Ox3, No focal deficits. EOMI   Lymph Nodes: Cervical : Not palpable  Respiratory: upper resp. rhonchi, bilateral diminished breath sounds, +cough    Cardiovascular: S1S2 IRRR, No murmur/rubs or gallop  Gastrointestinal: Soft non-tender and non distended, +Bowel sounds. negative Woodson's sign  Extremities: Trace edema   Psychiatric: Calm . no agitation  Skin: No skin rash/ulcers visualized to face, hands or feet    Intake and output:   11-15 @ 07:01  -  11-16 @ 07:00  --------------------------------------------------------  IN: 1263.6 mL / OUT: 5740 mL / NET: -4476.4 mL    11-16 @ 07:01  -  11-16 @ 11:14  --------------------------------------------------------  IN: 38 mL / OUT: 885 mL / NET: -847 mL        LABS:                        7.5    9.28  )-----------( 339      ( 16 Nov 2020 03:15 )             24.6     11-16    135  |  95<L>  |  105.0<H>  ----------------------------<  123<H>  4.5   |  29.0  |  2.31<H>    Ca    8.0<L>      16 Nov 2020 03:15  Phos  4.7     11-16  Mg     2.1     11-16    TPro  4.8<L>  /  Alb  2.9<L>  /  TBili  0.8  /  DBili  x   /  AST  12  /  ALT  15  /  AlkPhos  193<H>  11-16    CARDIAC MARKERS ( 15 Nov 2020 01:12 )  x     / 0.03 ng/mL / x     / x     / x        ;p-BNP=Serum Pro-Brain Natriuretic Peptide: 90673 pg/mL (11-15 @ 01:12)    PT/INR - ( 15 Nov 2020 08:29 )   PT: 21.8 sec;   INR: 1.94 ratio         PTT - ( 16 Nov 2020 08:04 )  PTT:118.4 sec      INTERPRETATION OF TELEMETRY:  Afib HR @ 70-80s  ECG:       RADIOLOGY & ADDITIONAL STUDIES:    X-ray: < from: Xray Chest 1 View-PORTABLE IMMEDIATE (Xray Chest 1 View-PORTABLE IMMEDIATE .) (11.15.20 @ 17:42) >  FINDINGS:    Single frontal view of the chest demonstrates improved mild to moderate CHF. Moderate bilateral pleural effusions, improved. Tubes are unchanged. Mediastinal sternotomy wires. The cardiomediastinal silhouette isenlarged. No acute osseous abnormalities. Overlying EKG leads and wires are noted    IMPRESSION: Improved CHF and bilateral effusions.        CT scan: < from: CT Chest No Cont (11.15.20 @ 05:37) >  IMPRESSION:  Moderate to large right and small-to-moderate left pleural effusions with associated compressive atelectasis, most notably involving nearly the entirety of the right lower lobe.    Mild scattered groundglass airspace opacities and interlobular septal thickening, may reflect pulmonary edema.    Small intra-abdominal ascites.    Anasarca.             [place] [facility]                                                               Office:  [address] [address]                                                              Telephone: [phone]. Fax:[phone]                                                                        CARDIOLOGY CONSULTATION NOTE                                                                                             Consult requested by: JAYME Edwards   Reason for Consultation: CAD  History obtained by: Patient and medical record   obtained: No    Chief complaint:    Patient is a 70y old  Male who presents with a chief complaint of Resp failure (16 Nov 2020 08:05)        HPI:  Patient is a  70 year old male with a pmhx of w/ Afib, pulmonary HTN, chronic OM, CKD, COPD, CABG. Recently admitted s/p fall w/ hematoma of LLE requiring debridement and evacuation of hematoma, complicated by worsening GERALD on CKD and worsening respiratory status. Found to have severe pulm HTN, was being diuresed with lasix. Was reccomended for cath but patient declined for fear of worsening renal failure. Noted to have R pleural effusion s/p pigtail catheter that drained 2.2L. Eventually d/c to [facility] on 11/13. Presented today unresponsive, obtunded then intubated in the ED. Hypothermic and developed shock. Labs significant for hyperkalemia acute on chronic renal failure and worsening R pleural effusion. MICU consulted called.  (15 Nov 2020 05:20)    Above Appreciated  Cardiology consult requested for evaluation for CAD. Pt denies recall of any anginal symptoms prior to unresponsive episode. Pt currently extubated, alert and oriented.                      REVIEW OF SYMPTOMS:     CONSTITUTIONAL: No fever, weight loss, or fatigue  ENMT:  No difficulty hearing, tinnitus, vertigo; No sinus or throat pain  NECK: No pain or stiffness  CARDIOVASCULAR:See HPI  RESPIRATORY: No Dyspnea on exertion, Shortness of breath, cough, wheezing  : No dysuria, no hematuria   GI: No dark color stool, no melena, no diarrhea, no constipation, no abdominal pain   NEURO: No headache, no dizziness, no slurred speech   MUSCULOSKELETAL: No joint pain or swelling; No muscle, back, or extremity pain  PSYCH: No agitation, no anxiety.    ALL OTHER REVIEW OF SYSTEMS ARE NEGATIVE.      PREVIOUS DIAGNOSTIC TESTING  ECHO FINDINGS: < from: TTE Echo Limited or F/U (11.03.20 @ 15:22) >  Summary:   1. Left ventricular ejection fraction, by visual estimation, is 60 to 65%.   2. Normal global left ventricular systolic function.   3. Severely enlarged right atrium.   4. Severely enlarged right ventricle.   5. Severely reduced RV systolic function.   6. Positive Baca Sign.   7. Normal left atrial size.   8. There is no evidence of pericardial effusion.   9. Mild-moderate tricuspid regurgitation.  10. Estimated pulmonary artery systolic pressure is 63.4 mmHg assuming a right atrial pressure of 15 mmHg, which is consistent with severe pulmonary hypertension.  11. Endocardial visualization was enhanced with intravenous echo contrast.    Echo 06/30/2020 Outpatient Dr. Storm  EF 55-60%, mild asymmetric LVH, normal regional wall motion, moderate to severe diastolic dysfunction, enlarged LA, RV normal size and function, aortic root is dilated, mild MR, mild TR, pulmonary HTN is present, peak PA pressure 47 (mild to mod)      ALLERGIES: Allergies    No Known Allergies    Intolerances        PAST MEDICAL HISTORY  CKD (chronic kidney disease)    CHF (congestive heart failure)    Atrial fibrillation    COPD, severity to be determined    Coronary artery disease involving coronary bypass graft of native heart without angina pectoris    Chronic osteomyelitis, osteomyelitis of unspecified site    COPD (chronic obstructive pulmonary disease)    Atrial fibrillation    Bladder cancer    HLD (hyperlipidemia)        PAST SURGICAL HISTORY  H/O knee surgery    S/P CABG (coronary artery bypass graft)    Presence of stent of CABG        FAMILY HISTORY:  No pertinent family history in first degree relatives      SOCIAL HISTORY:    CIGARETTES: Former smoker, quit 2012. Smoked 1- 1.5 PPD x 20+ years  ALCOHOL: On weekends   DRUGS: Denies      CURRENT MEDICATIONS:  furosemide   Injectable 80 milliGRAM(s) IV Push two times a day  tamsulosin 0.4 milliGRAM(s) Oral at bedtime  albuterol/ipratropium for Nebulization.  pantoprazole    Tablet  pantoprazole    Tablet  senna  allopurinol  aspirin  chewable  atorvastatin  chlorhexidine 2% Cloths  heparin  Infusion.        HOME MEDICATIONS:    acetaminophen 325 mg oral tablet: 2 tab(s) orally every 6 hours, As needed, Mild Pain (1 - 3) (12 Nov 2020 10:14)  Advair Diskus 250 mcg-50 mcg inhalation powder: 1 puff(s) inhaled 2 times a day (19 Oct 2020 09:15)  allopurinol 100 mg oral tablet: 1 tab(s) orally once a day (19 Oct 2020 09:15)  apixaban 2.5 mg oral tablet: 1 tab(s) orally 2 times a day (19 Oct 2020 09:15)  ascorbic acid 500 mg oral tablet: 1 tab(s) orally 2 times a day (12 Nov 2020 10:14)  aspirin 81 mg oral tablet: 1 tab(s) orally once a day (19 Oct 2020 09:15)  carvedilol 12.5 mg oral tablet: 1 tab(s) orally 2 times a day (19 Oct 2020 09:15)  Flomax 0.4 mg oral capsule: 1 cap(s) orally once a day (19 Oct 2020 09:15)  folic acid 1 mg oral tablet: 1 tab(s) orally once a day (12 Nov 2020 10:14)  furosemide 40 mg oral tablet: 1 tab(s) orally prn, As Needed (19 Oct 2020 09:15)  hydrALAZINE 25 mg oral tablet: 2 tab(s) orally 3 times a day (19 Oct 2020 09:15)  Lipitor 20 mg oral tablet: 1 tab(s) orally once a day (19 Oct 2020 09:15)  Multiple Vitamins oral tablet: 1 tab(s) orally once a day (12 Nov 2020 10:14)  omeprazole 40 mg oral delayed release capsule: 1 cap(s) orally once a day (19 Oct 2020 09:15)  senna oral tablet: 2 tab(s) orally once a day (at bedtime) (12 Nov 2020 10:14)  temazepam 30 mg oral capsule: 1 cap(s) orally once a day (at bedtime), As Needed (19 Oct 2020 09:15)  thiamine 100 mg oral tablet: 1 tab(s) orally once a day (12 Nov 2020 10:14)  zinc sulfate 220 mg oral capsule: 1 cap(s) orally once a day (12 Nov 2020 10:14)      Vital Signs Last 24 Hrs  T(C): 37.2 (16 Nov 2020 08:00), Max: 37.2 (16 Nov 2020 05:00)  T(F): 99 (16 Nov 2020 08:00), Max: 99 (16 Nov 2020 05:00)  HR: 77 (16 Nov 2020 10:00) (76 - 94)  BP: 122/57 (16 Nov 2020 10:00) (89/50 - 134/60)  BP(mean): 80 (16 Nov 2020 10:00) (67 - 96)  RR: 18 (16 Nov 2020 10:00) (14 - 45)  SpO2: 96% (16 Nov 2020 10:00) (93% - 100%)      PHYSICAL EXAM:  Constitutional: Comfortable . No acute distress.   HEENT: Atraumatic and normocephalic , neck is supple . no JVD. No carotid bruit. PEERL   CNS: A&Ox3, No focal deficits. EOMI   Lymph Nodes: Cervical : Not palpable  Respiratory: upper resp. rhonchi, bilateral diminished breath sounds, +cough    Cardiovascular: S1S2 IRRR, No murmur/rubs or gallop  Gastrointestinal: Soft non-tender and non distended, +Bowel sounds. negative Woodson's sign  Extremities: Trace edema   Psychiatric: Calm . no agitation  Skin: No skin rash/ulcers visualized to face, hands or feet    Intake and output:   11-15 @ 07:01  -  11-16 @ 07:00  --------------------------------------------------------  IN: 1263.6 mL / OUT: 5740 mL / NET: -4476.4 mL    11-16 @ 07:01  -  11-16 @ 11:14  --------------------------------------------------------  IN: 38 mL / OUT: 885 mL / NET: -847 mL        LABS:                        7.5    9.28  )-----------( 339      ( 16 Nov 2020 03:15 )             24.6     11-16    135  |  95<L>  |  105.0<H>  ----------------------------<  123<H>  4.5   |  29.0  |  2.31<H>    Ca    8.0<L>      16 Nov 2020 03:15  Phos  4.7     11-16  Mg     2.1     11-16    TPro  4.8<L>  /  Alb  2.9<L>  /  TBili  0.8  /  DBili  x   /  AST  12  /  ALT  15  /  AlkPhos  193<H>  11-16    CARDIAC MARKERS ( 15 Nov 2020 01:12 )  x     / 0.03 ng/mL / x     / x     / x        ;p-BNP=Serum Pro-Brain Natriuretic Peptide: 57304 pg/mL (11-15 @ 01:12)    PT/INR - ( 15 Nov 2020 08:29 )   PT: 21.8 sec;   INR: 1.94 ratio         PTT - ( 16 Nov 2020 08:04 )  PTT:118.4 sec      INTERPRETATION OF TELEMETRY:  Afib HR @ 70-80s  ECG:       RADIOLOGY & ADDITIONAL STUDIES:    X-ray: < from: Xray Chest 1 View-PORTABLE IMMEDIATE (Xray Chest 1 View-PORTABLE IMMEDIATE .) (11.15.20 @ 17:42) >  FINDINGS:    Single frontal view of the chest demonstrates improved mild to moderate CHF. Moderate bilateral pleural effusions, improved. Tubes are unchanged. Mediastinal sternotomy wires. The cardiomediastinal silhouette isenlarged. No acute osseous abnormalities. Overlying EKG leads and wires are noted    IMPRESSION: Improved CHF and bilateral effusions.        CT scan: < from: CT Chest No Cont (11.15.20 @ 05:37) >  IMPRESSION:  Moderate to large right and small-to-moderate left pleural effusions with associated compressive atelectasis, most notably involving nearly the entirety of the right lower lobe.    Mild scattered groundglass airspace opacities and interlobular septal thickening, may reflect pulmonary edema.    Small intra-abdominal ascites.    Anasarca.

## 2020-11-16 NOTE — CONSULT NOTE ADULT - ATTENDING COMMENTS
71 yo M with pmhx of w/ Afib, pulmonary HTN, chronic OM, CKD, COPD, and CABG now presents unresponsive, obtunded and intubated in the ED. He is hypothermic and developed shock with labs significant for hyperkalemia acute on chronic renal failure and worsening R pleural effusion. He had prior STSG on 10/22 and surgery consulted for wound care management. During his previous admission the patient was evaluated by cardiology who recommended cardiac catheterization which he refused.   Intubated, vented  CV IRIR  GI Benign  MS left thigh donor site with xeroform, left tibia with 80% take  Plan   1. Thigh wound: Please trim edges of the xeroform as it peels off on it's own  2. Left tibia wound: xeroform to the wound, cover with gauze, and then wrap with kerlix  3. Will follow as needed     code [id number]
70M hx CAD s/p CABG, COPD, AF, CKD now re-admitted with likely hypoxic respiratory failure in setting of decompensated HF    Acute on Chronic HFpEF  - volume overloaded on admission, hypoxic, large R pleural effusion, hyperkalemia  - previously VDRF, now extubated today with improved respiratory function s/p lasix and chest tube  - continue lasix 80 IV bid today  - volume overload likely in setting of under diuresis; discharge record reviewed and was receiving lasix 40 PO PRN instead of q12h standing doses  - limited follow up echo today to reassess biventricular function and pulmonary pressures  - strict I&O; replace electrolytes PRN  - chest tube management per thoracic surgery  - patient is more agreeable for cath; plan for RHC/LHC when more euvolemic    AF  - rate controlled  - CHASDVasc 4  - previously prolonged hospitalization for hematoma, but prior to recent d/c appeared to have been tolerating eliquis  - currently anticoagulated on heparin drip; convert to eliquis when tolerating PO  - please note, therapeutic dose eliquis for this patient is 5mg/bid (does not meet 's criteria for reduced dosing)  - restart coreg as BP allows  - outpatient left atrial appendage closure device evaluation with EP    CAD  - no chest pain, unchanged EKG, negative troponin on admission  - mildly elevated troponin on previous admission in setting of reduced renal clearance  - LHC/RHC as above on this admission  - continue ASA, statin  - please obtain nephrology consult in setting of declining renal function and anticipated need for iodinated contrast   - follow up echo as above

## 2020-11-16 NOTE — PROGRESS NOTE ADULT - PROBLEM SELECTOR PLAN 1
extubated successfully.  Likely due to CHF exacerbation with element of volume overload/pleural effusions  s/p drainage of R pleural space  blood-tinged fluid, transudate extubated successfully.  Likely due to CHF exacerbation with element of volume overload/pleural effusions  less likely due to COPD exacerbation  Doubt sepsis -- will discontinue antibiotics.  no fever/wbc count, imaging consistent with volume overload  s/p drainage of R pleural space  blood-tinged fluid, transudate

## 2020-11-16 NOTE — PROGRESS NOTE ADULT - PROBLEM SELECTOR PLAN 4
Reconsult Dr Walker's group; CKD with hyperkalemia (improved with furosemide) but continued significant azotemia

## 2020-11-16 NOTE — PROGRESS NOTE ADULT - PROBLEM SELECTOR PLAN 2
may not be great candidate for continued anticoagulation for af; s/p grafting 10/30.  No injury this last fall.

## 2020-11-16 NOTE — PROGRESS NOTE ADULT - PROBLEM SELECTOR PLAN 3
as above.  consult cardiology service.  continue furosemide 80 IV q12.  Net negative >4L (with pleural fluid) last 24h

## 2020-11-16 NOTE — PROGRESS NOTE ADULT - SUBJECTIVE AND OBJECTIVE BOX
Patient is a 70y old  Male who presents with a chief complaint of Resp failure (15 Nov 2020 09:26)      BRIEF HOSPITAL COURSE: Patient is a  70 year old male with a pmhx of w/ Afib, pulmonary HTN, chronic OM, CKD, COPD, CABG. Recently admitted s/p fall w/ hematoma of LLE requiring debridement and evacuation of hematoma, complicated by worsening GERALD on CKD and worsening respiratory status. Found to have severe pulm HTN, was being diuresed with lasix. Was reccomended for cath but patient declined for fear of worsening renal failure. Noted to have R pleural effusion s/p pigtail catheter that drained 2.2L. Eventually d/c to [facility] on 11/13. Presented 11/15 unresponsive, obtunded then intubated in the ED. Hypothermic and developed shock. Labs significant for hyperkalemia acute on chronic renal failure and worsening R pleural effusion.     Events last 24 hours: extubated successfully    PAST MEDICAL & SURGICAL HISTORY:  CKD (chronic kidney disease)    CHF (congestive heart failure)    Atrial fibrillation    COPD, severity to be determined    Coronary artery disease involving coronary bypass graft of native heart without angina pectoris    Chronic osteomyelitis, osteomyelitis of unspecified site    COPD (chronic obstructive pulmonary disease)    Atrial fibrillation    Bladder cancer    HLD (hyperlipidemia)    H/O knee surgery    S/P CABG (coronary artery bypass graft)    Presence of stent of CABG          Medications:  piperacillin/tazobactam IVPB.. 3.375 Gram(s) IV Intermittent every 8 hours  vancomycin  IVPB 1000 milliGRAM(s) IV Intermittent daily    furosemide   Injectable 80 milliGRAM(s) IV Push two times a day  midodrine 15 milliGRAM(s) Oral every 8 hours  norepinephrine Infusion 0.05 MICROgram(s)/kG/Min IV Continuous <Continuous>  tamsulosin 0.4 milliGRAM(s) Oral at bedtime    acetylcysteine 20%  Inhalation 3 milliLiter(s) Inhalation every 6 hours  albuterol/ipratropium for Nebulization. 3 milliLiter(s) Nebulizer every 6 hours  sodium chloride 3%  Inhalation 3 milliLiter(s) Inhalation every 6 hours    propofol Infusion 20 MICROgram(s)/kG/Min IV Continuous <Continuous>      aspirin  chewable 81 milliGRAM(s) Oral daily  heparin   Injectable 8000 Unit(s) IV Push every 6 hours PRN  heparin   Injectable 4000 Unit(s) IV Push every 6 hours PRN  heparin  Infusion.  Unit(s)/Hr IV Continuous <Continuous>    pantoprazole  Injectable 40 milliGRAM(s) IV Push daily  senna 2 Tablet(s) Oral at bedtime      allopurinol 100 milliGRAM(s) Oral daily  atorvastatin 20 milliGRAM(s) Oral at bedtime        chlorhexidine 0.12% Liquid 15 milliLiter(s) Oral Mucosa every 12 hours  chlorhexidine 2% Cloths 1 Application(s) Topical <User Schedule>        Mode: CPAP with PS  TV (machine): 360  FiO2: 40  PEEP: 5  PS: 5  MAP: 8      ICU Vital Signs Last 24 Hrs  T(C): 37.2 (16 Nov 2020 07:00), Max: 37.2 (16 Nov 2020 05:00)  T(F): 99 (16 Nov 2020 07:00), Max: 99 (16 Nov 2020 05:00)  HR: 80 (16 Nov 2020 08:00) (76 - 94)  BP: 123/60 (16 Nov 2020 08:00) (89/50 - 134/60)  BP(mean): 86 (16 Nov 2020 08:00) (67 - 96)  ABP: --  ABP(mean): --  RR: 17 (16 Nov 2020 08:00) (14 - 58)  SpO2: 95% (16 Nov 2020 08:00) (92% - 100%)      ABG - ( 15 Nov 2020 22:50 )  pH, Arterial: 7.37  pH, Blood: x     /  pCO2: 54    /  pO2: 105   / HCO3: 29    / Base Excess: 5.3   /  SaO2: 100                 I&O's Detail    15 Nov 2020 07:01  -  16 Nov 2020 07:00  --------------------------------------------------------  IN:    Enteral Tube Flush: 60 mL    Heparin Infusion: 358 mL    IV PiggyBack: 200 mL    IV PiggyBack: 250 mL    IV PiggyBack: 50 mL    Norepinephrine: 37.6 mL    Oral Fluid: 180 mL    Propofol: 128 mL  Total IN: 1263.6 mL    OUT:    Chest Tube (mL): 2800 mL    Ureteral Catheter (mL): 2940 mL  Total OUT: 5740 mL    Total NET: -4476.4 mL      16 Nov 2020 07:01  -  16 Nov 2020 08:06  --------------------------------------------------------  IN:    Heparin Infusion: 14 mL  Total IN: 14 mL    OUT:    Chest Tube (mL): 100 mL    Norepinephrine: 0 mL    Propofol: 0 mL    Ureteral Catheter (mL): 125 mL  Total OUT: 225 mL    Total NET: -211 mL            LABS:                        7.5    9.28  )-----------( 339      ( 16 Nov 2020 03:15 )             24.6     11-16    135  |  95<L>  |  105.0<H>  ----------------------------<  123<H>  4.5   |  29.0  |  2.31<H>    Ca    8.0<L>      16 Nov 2020 03:15  Phos  4.7     11-16  Mg     2.1     11-16    TPro  4.8<L>  /  Alb  2.9<L>  /  TBili  0.8  /  DBili  x   /  AST  12  /  ALT  15  /  AlkPhos  193<H>  11-16      CARDIAC MARKERS ( 15 Nov 2020 01:12 )  x     / 0.03 ng/mL / x     / x     / x          CAPILLARY BLOOD GLUCOSE        PT/INR - ( 15 Nov 2020 08:29 )   PT: 21.8 sec;   INR: 1.94 ratio         PTT - ( 16 Nov 2020 03:16 )  PTT:103.5 sec    CULTURES:      Physical Examination:    General: No acute distress.      HEENT: Pupils equal, reactive to light.  Symmetric.    PULM: Clear to auscultation bilaterally, no significant sputum production    NECK: Supple, no lymphadenopathy, trachea midline    CVS: Regular rate and rhythm, no murmurs, rubs, or gallops    ABD: Soft, nondistended, nontender, normoactive bowel sounds, no masses    EXT: No edema, nontender    SKIN: Warm and well perfused, no rashes noted.    NEURO: Alert, oriented, interactive, nonfocal    DEVICES:     RADIOLOGY: ***    CRITICAL CARE TIME SPENT: ***   Patient is a 70y old  Male who presents with a chief complaint of Resp failure (15 Nov 2020 09:26)      BRIEF HOSPITAL COURSE: Patient is a  70 year old male with a pmhx of w/ Afib, pulmonary HTN, chronic OM, CKD, COPD, CABG. Recently admitted s/p fall w/ hematoma of LLE requiring debridement and evacuation of hematoma, complicated by worsening GERALD on CKD and worsening respiratory status. Found to have severe pulm HTN, was being diuresed with lasix. Was reccomended for cath but patient declined for fear of worsening renal failure. Noted to have R pleural effusion s/p pigtail catheter that drained 2.2L. Eventually d/c to [facility] on 11/13. Presented 11/15 unresponsive, obtunded then intubated in the ED. Hypothermic and developed shock. Labs significant for hyperkalemia acute on chronic renal failure and worsening R pleural effusion.     Events last 24 hours: extubated successfully; states he is hungry, no pain, no dyspnea.    PAST MEDICAL & SURGICAL HISTORY:  CKD (chronic kidney disease)    CHF (congestive heart failure)    Atrial fibrillation    COPD, severity to be determined    Coronary artery disease involving coronary bypass graft of native heart without angina pectoris    Chronic osteomyelitis, osteomyelitis of unspecified site    COPD (chronic obstructive pulmonary disease)    Atrial fibrillation    Bladder cancer    HLD (hyperlipidemia)    H/O knee surgery    S/P CABG (coronary artery bypass graft)    Presence of stent of CABG          Medications:  piperacillin/tazobactam IVPB.. 3.375 Gram(s) IV Intermittent every 8 hours  vancomycin  IVPB 1000 milliGRAM(s) IV Intermittent daily    furosemide   Injectable 80 milliGRAM(s) IV Push two times a day  midodrine 15 milliGRAM(s) Oral every 8 hours  norepinephrine Infusion 0.05 MICROgram(s)/kG/Min IV Continuous <Continuous>  tamsulosin 0.4 milliGRAM(s) Oral at bedtime    acetylcysteine 20%  Inhalation 3 milliLiter(s) Inhalation every 6 hours  albuterol/ipratropium for Nebulization. 3 milliLiter(s) Nebulizer every 6 hours  sodium chloride 3%  Inhalation 3 milliLiter(s) Inhalation every 6 hours    propofol Infusion 20 MICROgram(s)/kG/Min IV Continuous <Continuous>      aspirin  chewable 81 milliGRAM(s) Oral daily  heparin   Injectable 8000 Unit(s) IV Push every 6 hours PRN  heparin   Injectable 4000 Unit(s) IV Push every 6 hours PRN  heparin  Infusion.  Unit(s)/Hr IV Continuous <Continuous>    pantoprazole  Injectable 40 milliGRAM(s) IV Push daily  senna 2 Tablet(s) Oral at bedtime      allopurinol 100 milliGRAM(s) Oral daily  atorvastatin 20 milliGRAM(s) Oral at bedtime        chlorhexidine 0.12% Liquid 15 milliLiter(s) Oral Mucosa every 12 hours  chlorhexidine 2% Cloths 1 Application(s) Topical <User Schedule>        Mode: CPAP with PS  TV (machine): 360  FiO2: 40  PEEP: 5  PS: 5  MAP: 8      ICU Vital Signs Last 24 Hrs  T(C): 37.2 (16 Nov 2020 07:00), Max: 37.2 (16 Nov 2020 05:00)  T(F): 99 (16 Nov 2020 07:00), Max: 99 (16 Nov 2020 05:00)  HR: 80 (16 Nov 2020 08:00) (76 - 94)  BP: 123/60 (16 Nov 2020 08:00) (89/50 - 134/60)  BP(mean): 86 (16 Nov 2020 08:00) (67 - 96)  ABP: --  ABP(mean): --  RR: 17 (16 Nov 2020 08:00) (14 - 58)  SpO2: 95% (16 Nov 2020 08:00) (92% - 100%)      ABG - ( 15 Nov 2020 22:50 )  pH, Arterial: 7.37  pH, Blood: x     /  pCO2: 54    /  pO2: 105   / HCO3: 29    / Base Excess: 5.3   /  SaO2: 100                 I&O's Detail    15 Nov 2020 07:01  -  16 Nov 2020 07:00  --------------------------------------------------------  IN:    Enteral Tube Flush: 60 mL    Heparin Infusion: 358 mL    IV PiggyBack: 200 mL    IV PiggyBack: 250 mL    IV PiggyBack: 50 mL    Norepinephrine: 37.6 mL    Oral Fluid: 180 mL    Propofol: 128 mL  Total IN: 1263.6 mL    OUT:    Chest Tube (mL): 2800 mL    Ureteral Catheter (mL): 2940 mL  Total OUT: 5740 mL    Total NET: -4476.4 mL      16 Nov 2020 07:01  -  16 Nov 2020 08:06  --------------------------------------------------------  IN:    Heparin Infusion: 14 mL  Total IN: 14 mL    OUT:    Chest Tube (mL): 100 mL    Norepinephrine: 0 mL    Propofol: 0 mL    Ureteral Catheter (mL): 125 mL  Total OUT: 225 mL    Total NET: -211 mL            LABS:                        7.5    9.28  )-----------( 339      ( 16 Nov 2020 03:15 )             24.6     11-16    135  |  95<L>  |  105.0<H>  ----------------------------<  123<H>  4.5   |  29.0  |  2.31<H>    Ca    8.0<L>      16 Nov 2020 03:15  Phos  4.7     11-16  Mg     2.1     11-16    TPro  4.8<L>  /  Alb  2.9<L>  /  TBili  0.8  /  DBili  x   /  AST  12  /  ALT  15  /  AlkPhos  193<H>  11-16      CARDIAC MARKERS ( 15 Nov 2020 01:12 )  x     / 0.03 ng/mL / x     / x     / x          CAPILLARY BLOOD GLUCOSE        PT/INR - ( 15 Nov 2020 08:29 )   PT: 21.8 sec;   INR: 1.94 ratio         PTT - ( 16 Nov 2020 03:16 )  PTT:103.5 sec    CULTURES:      Physical Examination:    General: No acute distress.      HEENT: Pupils equal, reactive to light.  Symmetric.    PULM: Clear to auscultation bilaterally, no significant sputum production, no wheezing, no accessory muscle usage, 3L nc    NECK: Supple, no lymphadenopathy, trachea midline    CVS: irreg irregrate and rhythm, no murmurs, rubs, or gallops    ABD: Soft, nondistended, nontender, normoactive bowel sounds, no masses    EXT: No edema, nontender    SKIN: Warm and well perfused, no rashes noted.    NEURO: Alert, oriented, interactive, nonfocal    DEVICES: lomeli -- d/c; R pigtail -- continue, is draining.    RADIOLOGY: reviewed   Patient is a 70y old  Male who presents with a chief complaint of Resp failure (15 Nov 2020 09:26)      BRIEF HOSPITAL COURSE: Patient is a  70 year old male with a pmhx of w/ Afib, pulmonary HTN, chronic OM, CKD, COPD, CABG. Recently admitted s/p fall w/ hematoma of LLE requiring debridement and evacuation of hematoma, complicated by worsening GERALD on CKD and worsening respiratory status. Found to have severe pulm HTN, was being diuresed with lasix. Was reccomended for cath but patient declined for fear of worsening renal failure. Noted to have R pleural effusion s/p pigtail catheter that drained 2.2L. Eventually d/c to [facility] on 11/13. Presented 11/15 unresponsive, obtunded then intubated in the ED. Hypothermic and developed shock. Labs significant for hyperkalemia acute on chronic renal failure and worsening R pleural effusion.     Events last 24 hours: extubated successfully; states he is hungry, no pain, no dyspnea.    PAST MEDICAL & SURGICAL HISTORY:  CKD (chronic kidney disease)    CHF (congestive heart failure)    Atrial fibrillation    COPD, severity to be determined    Coronary artery disease involving coronary bypass graft of native heart without angina pectoris    Chronic osteomyelitis, osteomyelitis of unspecified site    COPD (chronic obstructive pulmonary disease)    Atrial fibrillation    Bladder cancer    HLD (hyperlipidemia)    H/O knee surgery    S/P CABG (coronary artery bypass graft)    Presence of stent of CABG          Medications:  piperacillin/tazobactam IVPB.. 3.375 Gram(s) IV Intermittent every 8 hours  vancomycin  IVPB 1000 milliGRAM(s) IV Intermittent daily    furosemide   Injectable 80 milliGRAM(s) IV Push two times a day  midodrine 15 milliGRAM(s) Oral every 8 hours  norepinephrine Infusion 0.05 MICROgram(s)/kG/Min IV Continuous <Continuous>  tamsulosin 0.4 milliGRAM(s) Oral at bedtime    acetylcysteine 20%  Inhalation 3 milliLiter(s) Inhalation every 6 hours  albuterol/ipratropium for Nebulization. 3 milliLiter(s) Nebulizer every 6 hours  sodium chloride 3%  Inhalation 3 milliLiter(s) Inhalation every 6 hours    propofol Infusion 20 MICROgram(s)/kG/Min IV Continuous <Continuous>      aspirin  chewable 81 milliGRAM(s) Oral daily  heparin   Injectable 8000 Unit(s) IV Push every 6 hours PRN  heparin   Injectable 4000 Unit(s) IV Push every 6 hours PRN  heparin  Infusion.  Unit(s)/Hr IV Continuous <Continuous>    pantoprazole  Injectable 40 milliGRAM(s) IV Push daily  senna 2 Tablet(s) Oral at bedtime      allopurinol 100 milliGRAM(s) Oral daily  atorvastatin 20 milliGRAM(s) Oral at bedtime        chlorhexidine 0.12% Liquid 15 milliLiter(s) Oral Mucosa every 12 hours  chlorhexidine 2% Cloths 1 Application(s) Topical <User Schedule>        Mode: CPAP with PS  TV (machine): 360  FiO2: 40  PEEP: 5  PS: 5  MAP: 8      ICU Vital Signs Last 24 Hrs  T(C): 37.2 (16 Nov 2020 07:00), Max: 37.2 (16 Nov 2020 05:00)  T(F): 99 (16 Nov 2020 07:00), Max: 99 (16 Nov 2020 05:00)  HR: 80 (16 Nov 2020 08:00) (76 - 94)  BP: 123/60 (16 Nov 2020 08:00) (89/50 - 134/60)  BP(mean): 86 (16 Nov 2020 08:00) (67 - 96)  ABP: --  ABP(mean): --  RR: 17 (16 Nov 2020 08:00) (14 - 58)  SpO2: 95% (16 Nov 2020 08:00) (92% - 100%)      ABG - ( 15 Nov 2020 22:50 )  pH, Arterial: 7.37  pH, Blood: x     /  pCO2: 54    /  pO2: 105   / HCO3: 29    / Base Excess: 5.3   /  SaO2: 100                 I&O's Detail    15 Nov 2020 07:01  -  16 Nov 2020 07:00  --------------------------------------------------------  IN:    Enteral Tube Flush: 60 mL    Heparin Infusion: 358 mL    IV PiggyBack: 200 mL    IV PiggyBack: 250 mL    IV PiggyBack: 50 mL    Norepinephrine: 37.6 mL    Oral Fluid: 180 mL    Propofol: 128 mL  Total IN: 1263.6 mL    OUT:    Chest Tube (mL): 2800 mL    Ureteral Catheter (mL): 2940 mL  Total OUT: 5740 mL    Total NET: -4476.4 mL      16 Nov 2020 07:01  -  16 Nov 2020 08:06  --------------------------------------------------------  IN:    Heparin Infusion: 14 mL  Total IN: 14 mL    OUT:    Chest Tube (mL): 100 mL    Norepinephrine: 0 mL    Propofol: 0 mL    Ureteral Catheter (mL): 125 mL  Total OUT: 225 mL    Total NET: -211 mL            LABS:                        7.5    9.28  )-----------( 339      ( 16 Nov 2020 03:15 )             24.6     11-16    135  |  95<L>  |  105.0<H>  ----------------------------<  123<H>  4.5   |  29.0  |  2.31<H>    Ca    8.0<L>      16 Nov 2020 03:15  Phos  4.7     11-16  Mg     2.1     11-16    TPro  4.8<L>  /  Alb  2.9<L>  /  TBili  0.8  /  DBili  x   /  AST  12  /  ALT  15  /  AlkPhos  193<H>  11-16      CARDIAC MARKERS ( 15 Nov 2020 01:12 )  x     / 0.03 ng/mL / x     / x     / x          CAPILLARY BLOOD GLUCOSE        PT/INR - ( 15 Nov 2020 08:29 )   PT: 21.8 sec;   INR: 1.94 ratio         PTT - ( 16 Nov 2020 03:16 )  PTT:103.5 sec    CULTURES:      Physical Examination:    General: No acute distress.      HEENT: Pupils equal, reactive to light.  Symmetric.    PULM: Clear to auscultation bilaterally, no significant sputum production, no wheezing, no accessory muscle usage, 3L nc    NECK: Supple, no lymphadenopathy, trachea midline    CVS: irreg irregrate and rhythm, no murmurs, rubs, or gallops    ABD: Soft, nondistended, nontender, normoactive bowel sounds, no masses    EXT: No edema, nontender    SKIN: Warm and well perfused, LLE wound with granulation tissue; Lower ext with gauze wrap    NEURO: Alert, oriented, interactive, nonfocal    DEVICES: lomeli -- d/c; R pigtail -- continue, is draining.    RADIOLOGY: reviewed

## 2020-11-16 NOTE — CONSULT NOTE ADULT - SUBJECTIVE AND OBJECTIVE BOX
PULMONARY CONSULT NOTE      DEBI BADILLOAPARNA-[id number]    Patient is a 70y old  Male who presents with a chief complaint of Resp failure (16 Nov 2020 11:45)      HISTORY OF PRESENT ILLNESS: Patient is a  70 year old male with a pmhx of w/ Afib, pulmonary HTN, chronic OM, CKD, COPD, VANITA not compliant with CPAP, CABG. Recently admitted s/p fall w/ hematoma of LLE requiring debridement and evacuation of hematoma, complicated by worsening GERALD on CKD and worsening respiratory status. Found to have severe pulm HTN, was being diuresed with lasix. Was reccomended for cath but patient declined for fear of worsening renal failure. Noted to have R pleural effusion s/p pigtail catheter that drained 2.2L. Eventually d/c to [facility] on 11/13. Presented 11/15 unresponsive, obtunded then intubated in the ED. Hypothermic and developed shock. Labs significant for hyperkalemia acute on chronic renal failure and worsening R pleural effusion.     Events last 24 hours: extubated successfully; states he is hungry, no pain, no dyspnea. On NCO2 which he has at home since last hospitalization.    MEDICATIONS  (STANDING):  albuterol/ipratropium for Nebulization. 3 milliLiter(s) Nebulizer every 6 hours  allopurinol 100 milliGRAM(s) Oral daily  aspirin  chewable 81 milliGRAM(s) Oral daily  atorvastatin 20 milliGRAM(s) Oral at bedtime  chlorhexidine 2% Cloths 1 Application(s) Topical <User Schedule>  furosemide   Injectable 80 milliGRAM(s) IV Push two times a day  heparin  Infusion.  Unit(s)/Hr (18 mL/Hr) IV Continuous <Continuous>  pantoprazole    Tablet 40 milliGRAM(s) Oral before breakfast  senna 2 Tablet(s) Oral at bedtime  tamsulosin 0.4 milliGRAM(s) Oral at bedtime      MEDICATIONS  (PRN):  heparin   Injectable 8000 Unit(s) IV Push every 6 hours PRN For aPTT less than 40  heparin   Injectable 4000 Unit(s) IV Push every 6 hours PRN For aPTT between 40 - 57      Allergies    No Known Allergies    Intolerances        PAST MEDICAL & SURGICAL HISTORY:  CKD (chronic kidney disease)    CHF (congestive heart failure)    Atrial fibrillation    COPD, severity to be determined    Coronary artery disease involving coronary bypass graft of native heart without angina pectoris    Chronic osteomyelitis, osteomyelitis of unspecified site    COPD (chronic obstructive pulmonary disease)    Atrial fibrillation    Bladder cancer    HLD (hyperlipidemia)    H/O knee surgery    S/P CABG (coronary artery bypass graft)    Presence of stent of CABG        FAMILY HISTORY:  No pertinent family history in first degree relatives        SOCIAL HISTORY  Smoking History: former, quit 8 y/a    REVIEW OF SYSTEMS:    CONSTITUTIONAL:  No fevers, chills, sweats    HEENT:  Eyes:  No diplopia or blurred vision. ENT:  No earache, sore throat or runny nose.    CARDIOVASCULAR:  per HPI    RESPIRATORY: per HPI      GASTROINTESTINAL:  No abdominal pain, nausea, vomiting or diarrhea.    GENITOURINARY:  No dysuria, frequency or urgency.    NEUROLOGIC:  No paresthesias, fasciculations, seizures or weakness.    PSYCHIATRIC:  No disorder of thought or mood.    Vital Signs Last 24 Hrs  T(C): 37.2 (16 Nov 2020 08:00), Max: 37.2 (16 Nov 2020 05:00)  T(F): 99 (16 Nov 2020 08:00), Max: 99 (16 Nov 2020 05:00)  HR: 83 (16 Nov 2020 11:00) (77 - 94)  BP: 109/53 (16 Nov 2020 11:00) (97/51 - 134/60)  BP(mean): 77 (16 Nov 2020 11:00) (69 - 96)  RR: 23 (16 Nov 2020 11:00) (17 - 45)  SpO2: 96% (16 Nov 2020 11:00) (93% - 100%)    PHYSICAL EXAMINATION:    GENERAL: The patient is  in no apparent distress.     HEENT: Head is normocephalic and atraumatic.   Mucous membranes are moist.     NECK: Supple.     LUNGS: rales, no wheeze, chest tube in place, respirations unlabored    HEART: Regular rate and rhythm      ABDOMEN: Soft, nontender, and nondistended.       EXTREMITIES: Without any cyanosis, clubbing, rash, lesions or edema.    NEUROLOGIC: Grossly intact.      LABS:                        7.5    9.28  )-----------( 339      ( 16 Nov 2020 03:15 )             24.6     11-16    135  |  95<L>  |  105.0<H>  ----------------------------<  123<H>  4.5   |  29.0  |  2.31<H>    Ca    8.0<L>      16 Nov 2020 03:15  Phos  4.7     11-16  Mg     2.1     11-16    TPro  4.8<L>  /  Alb  2.9<L>  /  TBili  0.8  /  DBili  x   /  AST  12  /  ALT  15  /  AlkPhos  193<H>  11-16    PT/INR - ( 15 Nov 2020 08:29 )   PT: 21.8 sec;   INR: 1.94 ratio         PTT - ( 16 Nov 2020 08:04 )  PTT:118.4 sec    ABG - ( 15 Nov 2020 22:50 )  pH, Arterial: 7.37  pH, Blood: x     /  pCO2: 54    /  pO2: 105   / HCO3: 29    / Base Excess: 5.3   /  SaO2: 100            CARDIAC MARKERS ( 15 Nov 2020 01:12 )  x     / 0.03 ng/mL / x     / x     / x            Serum Pro-Brain Natriuretic Peptide: [id number] pg/mL (11-15-20 @ 01:12)          MICROBIOLOGY:  COVID-19 PCR: NotDetec: This test has been validated by [facility] to be accurate;  though it has not been FDA cleared/approved by the usual pathway  As with all laboratory test, results should be correlated with clinical  findings.  https://www.fda.gov/media/669202/download  https://www.fda.gov/media/158611/download (11.15.20 @ 01:17)        RADIOLOGY & ADDITIONAL STUDIES:  < from: Xray Chest 1 View-PORTABLE IMMEDIATE (Xray Chest 1 View-PORTABLE IMMEDIATE .) (11.15.20 @ 17:42) >   EXAM:  XR CHEST PORTABLE IMMED 1V                          PROCEDURE DATE:  11/15/2020          INTERPRETATION:  TECHNIQUE: Single portable view of the chest.    COMPARISON: 11/15/2020.    CLINICAL HISTORY: chest tubelarge right sided pleural effusion, chest tube placed    FINDINGS:    Single frontal view of the chest demonstrates improved mild to moderate CHF. Moderate bilateral pleural effusions, improved. Tubes are unchanged. Mediastinal sternotomy wires. The cardiomediastinal silhouette isenlarged. No acute osseous abnormalities. Overlying EKG leads and wires are noted    IMPRESSION: Improved CHF and bilateral effusions.            DAPHNEY HAYDEN MD; Attending Radiologist  This document has been electronically signed. Nov 16 2020  9:47AM    < end of copied text >  < from: CT Chest No Cont (11.15.20 @ 05:37) >   EXAM:  CT CHEST                          PROCEDURE DATE:  11/15/2020          INTERPRETATION:  CLINICAL INFORMATION: Sepsis. Respiratory illness.    COMPARISON: CT chest, abdomen, and pelvis of 12/9/2016.    PROCEDURE:  CT of the Chest, Abdomen and Pelvis was performed without intravenous contrast.  Intravenous contrast: None.  Oral contrast: None.  Sagittal and coronal reformats were performed.    FINDINGS:  CHEST:  LUNGS AND LARGE AIRWAYS:Endotracheal tube with tip terminating above the level of the harvey. Mild scattered groundglass airspace opacities and interlobular septal thickening. Bilateral compressive atelectasis involving nearly the entirety of the right lower lobe.  PLEURA: Moderate to large right and small-to-moderate left pleural effusions.  VESSELS: Atherosclerosis of the thoracic aorta, which is normal in caliber.  HEART: Heart size is normal. No pericardial effusion. Status post CABG.  MEDIASTINUM AND JULIA: No lymphadenopathy.  CHEST WALL AND LOWER NECK: Status post median sternotomy.  Mild bilateral symmetric gynecomastia.      < end of copied text >   PULMONARY CONSULT NOTE      DEBI BADILLOAPARNA-[id number]    Patient is a 70y old  Male who presents with a chief complaint of Resp failure (16 Nov 2020 11:45)      HISTORY OF PRESENT ILLNESS: Patient is a  70 year old male with a pmhx of w/ Afib, pulmonary HTN, chronic OM, CKD, COPD, VANITA not compliant with CPAP, CABG. Recently admitted s/p fall w/ hematoma of LLE requiring debridement and evacuation of hematoma, complicated by worsening GERALD on CKD and worsening respiratory status. Found to have severe pulm HTN, was being diuresed with lasix. Was reccomended for cath but patient declined for fear of worsening renal failure. Noted to have R pleural effusion s/p pigtail catheter that drained 2.2L. Eventually d/c to [facility] on 11/13. Presented 11/15 unresponsive, obtunded then intubated in the ED. Hypothermic and developed shock. Labs significant for hyperkalemia acute on chronic renal failure and worsening R pleural effusion.     Events last 24 hours: extubated successfully; states he is hungry, no pain, no dyspnea. On NCO2 which he has at home since last hospitalization.    Sees a pulmonologist in [place]. Just recently moved here to [place].    MEDICATIONS  (STANDING):  albuterol/ipratropium for Nebulization. 3 milliLiter(s) Nebulizer every 6 hours  allopurinol 100 milliGRAM(s) Oral daily  aspirin  chewable 81 milliGRAM(s) Oral daily  atorvastatin 20 milliGRAM(s) Oral at bedtime  chlorhexidine 2% Cloths 1 Application(s) Topical <User Schedule>  furosemide   Injectable 80 milliGRAM(s) IV Push two times a day  heparin  Infusion.  Unit(s)/Hr (18 mL/Hr) IV Continuous <Continuous>  pantoprazole    Tablet 40 milliGRAM(s) Oral before breakfast  senna 2 Tablet(s) Oral at bedtime  tamsulosin 0.4 milliGRAM(s) Oral at bedtime      MEDICATIONS  (PRN):  heparin   Injectable 8000 Unit(s) IV Push every 6 hours PRN For aPTT less than 40  heparin   Injectable 4000 Unit(s) IV Push every 6 hours PRN For aPTT between 40 - 57      Allergies    No Known Allergies    Intolerances        PAST MEDICAL & SURGICAL HISTORY:  CKD (chronic kidney disease)    CHF (congestive heart failure)    Atrial fibrillation    COPD, severity to be determined    Coronary artery disease involving coronary bypass graft of native heart without angina pectoris    Chronic osteomyelitis, osteomyelitis of unspecified site    COPD (chronic obstructive pulmonary disease)    Atrial fibrillation    Bladder cancer    HLD (hyperlipidemia)    H/O knee surgery    S/P CABG (coronary artery bypass graft)    Presence of stent of CABG        FAMILY HISTORY:  No pertinent family history in first degree relatives        SOCIAL HISTORY  Smoking History: former, quit 8 y/a    REVIEW OF SYSTEMS:    CONSTITUTIONAL:  No fevers, chills, sweats    HEENT:  Eyes:  No diplopia or blurred vision. ENT:  No earache, sore throat or runny nose.    CARDIOVASCULAR:  per HPI    RESPIRATORY: per HPI      GASTROINTESTINAL:  No abdominal pain, nausea, vomiting or diarrhea.    GENITOURINARY:  No dysuria, frequency or urgency.    NEUROLOGIC:  No paresthesias, fasciculations, seizures or weakness.    PSYCHIATRIC:  No disorder of thought or mood.    Vital Signs Last 24 Hrs  T(C): 37.2 (16 Nov 2020 08:00), Max: 37.2 (16 Nov 2020 05:00)  T(F): 99 (16 Nov 2020 08:00), Max: 99 (16 Nov 2020 05:00)  HR: 83 (16 Nov 2020 11:00) (77 - 94)  BP: 109/53 (16 Nov 2020 11:00) (97/51 - 134/60)  BP(mean): 77 (16 Nov 2020 11:00) (69 - 96)  RR: 23 (16 Nov 2020 11:00) (17 - 45)  SpO2: 96% (16 Nov 2020 11:00) (93% - 100%)    PHYSICAL EXAMINATION:    GENERAL: The patient is  in no apparent distress.     HEENT: Head is normocephalic and atraumatic.   Mucous membranes are moist.     NECK: Supple.     LUNGS: rales, no wheeze, chest tube in place, respirations unlabored    HEART: Regular rate and rhythm      ABDOMEN: Soft, nontender, and nondistended.       EXTREMITIES: Without any cyanosis, clubbing, rash, lesions or edema.    NEUROLOGIC: Grossly intact.      LABS:                        7.5    9.28  )-----------( 339      ( 16 Nov 2020 03:15 )             24.6     11-16    135  |  95<L>  |  105.0<H>  ----------------------------<  123<H>  4.5   |  29.0  |  2.31<H>    Ca    8.0<L>      16 Nov 2020 03:15  Phos  4.7     11-16  Mg     2.1     11-16    TPro  4.8<L>  /  Alb  2.9<L>  /  TBili  0.8  /  DBili  x   /  AST  12  /  ALT  15  /  AlkPhos  193<H>  11-16    PT/INR - ( 15 Nov 2020 08:29 )   PT: 21.8 sec;   INR: 1.94 ratio         PTT - ( 16 Nov 2020 08:04 )  PTT:118.4 sec    ABG - ( 15 Nov 2020 22:50 )  pH, Arterial: 7.37  pH, Blood: x     /  pCO2: 54    /  pO2: 105   / HCO3: 29    / Base Excess: 5.3   /  SaO2: 100            CARDIAC MARKERS ( 15 Nov 2020 01:12 )  x     / 0.03 ng/mL / x     / x     / x            Serum Pro-Brain Natriuretic Peptide: 18260 pg/mL (11-15-20 @ 01:12)          MICROBIOLOGY:  COVID-19 PCR: NotDetec: This test has been validated by [facility] to be accurate;  though it has not been FDA cleared/approved by the usual pathway  As with all laboratory test, results should be correlated with clinical  findings.  https://www.fda.gov/media/488848/download  https://www.fda.gov/media/347156/download (11.15.20 @ 01:17)        RADIOLOGY & ADDITIONAL STUDIES:  < from: Xray Chest 1 View-PORTABLE IMMEDIATE (Xray Chest 1 View-PORTABLE IMMEDIATE .) (11.15.20 @ 17:42) >   EXAM:  XR CHEST PORTABLE IMMED 1V                          PROCEDURE DATE:  11/15/2020          INTERPRETATION:  TECHNIQUE: Single portable view of the chest.    COMPARISON: 11/15/2020.    CLINICAL HISTORY: chest tubelarge right sided pleural effusion, chest tube placed    FINDINGS:    Single frontal view of the chest demonstrates improved mild to moderate CHF. Moderate bilateral pleural effusions, improved. Tubes are unchanged. Mediastinal sternotomy wires. The cardiomediastinal silhouette isenlarged. No acute osseous abnormalities. Overlying EKG leads and wires are noted    IMPRESSION: Improved CHF and bilateral effusions.            DAPHNEY HAYDEN MD; Attending Radiologist  This document has been electronically signed. Nov 16 2020  9:47AM    < end of copied text >  < from: CT Chest No Cont (11.15.20 @ 05:37) >   EXAM:  CT CHEST                          PROCEDURE DATE:  11/15/2020          INTERPRETATION:  CLINICAL INFORMATION: Sepsis. Respiratory illness.    COMPARISON: CT chest, abdomen, and pelvis of 12/9/2016.    PROCEDURE:  CT of the Chest, Abdomen and Pelvis was performed without intravenous contrast.  Intravenous contrast: None.  Oral contrast: None.  Sagittal and coronal reformats were performed.    FINDINGS:  CHEST:  LUNGS AND LARGE AIRWAYS:Endotracheal tube with tip terminating above the level of the harvey. Mild scattered groundglass airspace opacities and interlobular septal thickening. Bilateral compressive atelectasis involving nearly the entirety of the right lower lobe.  PLEURA: Moderate to large right and small-to-moderate left pleural effusions.  VESSELS: Atherosclerosis of the thoracic aorta, which is normal in caliber.  HEART: Heart size is normal. No pericardial effusion. Status post CABG.  MEDIASTINUM AND JULIA: No lymphadenopathy.  CHEST WALL AND LOWER NECK: Status post median sternotomy.  Mild bilateral symmetric gynecomastia.      < end of copied text >

## 2020-11-16 NOTE — CONSULT NOTE ADULT - SUBJECTIVE AND OBJECTIVE BOX
Chief complaint:    Patient is a 70y old  Male who presents with a chief complaint of Resp failure (16 Nov 2020 08:05)    HPI:  Patient is a  70 year old male with a pmhx of w/ Afib, pulmonary HTN, chronic OM, CKD, COPD, CABG. Recently admitted s/p fall w/ hematoma of LLE requiring debridement and evacuation of hematoma, complicated by worsening GERALD on CKD and worsening respiratory status. Found to have severe pulm HTN, was being diuresed with lasix. Was reccomended for cath but patient declined for fear of worsening renal failure. Noted to have R pleural effusion s/p pigtail catheter that drained 2.2L. Eventually d/c to [facility] on 11/13. Presented today unresponsive, obtunded then intubated in the ED. Hypothermic and developed shock. Labs significant for hyperkalemia acute on chronic renal failure and worsening R pleural effusion. MICU consulted called.  (15 Nov 2020 05:20)          REVIEW OF SYMPTOMS:     CONSTITUTIONAL: No fever, weight loss, or fatigue  ENMT:  No difficulty hearing, tinnitus, vertigo; No sinus or throat pain  NECK: No pain or stiffness  CARDIOVASCULAR:See HPI  RESPIRATORY: No Dyspnea on exertion, Shortness of breath, cough, wheezing  : No dysuria, no hematuria   GI: No dark color stool, no melena, no diarrhea, no constipation, no abdominal pain   NEURO: No headache, no dizziness, no slurred speech   MUSCULOSKELETAL: No joint pain or swelling; No muscle, back, or extremity pain  PSYCH: No agitation, no anxiety.    ALL OTHER REVIEW OF SYSTEMS ARE NEGATIVE.    PREVIOUS DIAGNOSTIC TESTING  ECHO FINDINGS: TTE Echo Limited or F/U (11.03.20 @ 15:22)     Summary:     1. Left ventricular ejection fraction, by visual estimation, is 60 to 65%.   2. Normal global left ventricular systolic function.   3. Severely enlarged right atrium.   4. Severely enlarged right ventricle.   5. Severely reduced RV systolic function.   6. Positive Baca Sign.   7. Normal left atrial size.   8. There is no evidence of pericardial effusion.   9. Mild-moderate tricuspid regurgitation.  10. Estimated pulmonary artery systolic pressure is 63.4 mmHg assuming a right atrial pressure of 15 mmHg, which is consistent with severe pulmonary hypertension.  11. Endocardial visualization was enhanced with intravenous echo contrast.    Echo 06/30/2020 Outpatient Dr. Storm  EF 55-60%, mild asymmetric LVH, normal regional wall motion, moderate to severe diastolic dysfunction, enlarged LA, RV normal size and function, aortic root is dilated, mild MR, mild TR, pulmonary HTN is present, peak PA pressure 47 (mild to mod)    ALLERGIES: Allergies    No Known Allergies    PAST MEDICAL HISTORY  CKD (chronic kidney disease)    CHF (congestive heart failure)    Atrial fibrillation    COPD, severity to be determined    Coronary artery disease involving coronary bypass graft of native heart without angina pectoris    Chronic osteomyelitis, osteomyelitis of unspecified site    COPD (chronic obstructive pulmonary disease)    Atrial fibrillation    Bladder cancer    HLD (hyperlipidemia)    PAST SURGICAL HISTORY  H/O knee surgery    S/P CABG (coronary artery bypass graft)    Presence of stent of CABG    FAMILY HISTORY:  No pertinent family history in first degree relatives    SOCIAL HISTORY:    CIGARETTES: Former smoker, quit 2012. Smoked 1- 1.5 PPD x 20+ years  ALCOHOL: On weekends   DRUGS: Denies    CURRENT MEDICATIONS:  furosemide   Injectable 80 milliGRAM(s) IV Push two times a day  tamsulosin 0.4 milliGRAM(s) Oral at bedtime  albuterol/ipratropium for Nebulization.  pantoprazole    Tablet  pantoprazole    Tablet  senna  allopurinol  aspirin  chewable  atorvastatin  chlorhexidine 2% Cloths  heparin  Infusion.    HOME MEDICATIONS:    acetaminophen 325 mg oral tablet: 2 tab(s) orally every 6 hours, As needed, Mild Pain (1 - 3) (12 Nov 2020 10:14)  Advair Diskus 250 mcg-50 mcg inhalation powder: 1 puff(s) inhaled 2 times a day (19 Oct 2020 09:15)  allopurinol 100 mg oral tablet: 1 tab(s) orally once a day (19 Oct 2020 09:15)  apixaban 2.5 mg oral tablet: 1 tab(s) orally 2 times a day (19 Oct 2020 09:15)  ascorbic acid 500 mg oral tablet: 1 tab(s) orally 2 times a day (12 Nov 2020 10:14)  aspirin 81 mg oral tablet: 1 tab(s) orally once a day (19 Oct 2020 09:15)  carvedilol 12.5 mg oral tablet: 1 tab(s) orally 2 times a day (19 Oct 2020 09:15)  Flomax 0.4 mg oral capsule: 1 cap(s) orally once a day (19 Oct 2020 09:15)  folic acid 1 mg oral tablet: 1 tab(s) orally once a day (12 Nov 2020 10:14)  furosemide 40 mg oral tablet: 1 tab(s) orally prn, As Needed (19 Oct 2020 09:15)  hydrALAZINE 25 mg oral tablet: 2 tab(s) orally 3 times a day (19 Oct 2020 09:15)  Lipitor 20 mg oral tablet: 1 tab(s) orally once a day (19 Oct 2020 09:15)  Multiple Vitamins oral tablet: 1 tab(s) orally once a day (12 Nov 2020 10:14)  omeprazole 40 mg oral delayed release capsule: 1 cap(s) orally once a day (19 Oct 2020 09:15)  senna oral tablet: 2 tab(s) orally once a day (at bedtime) (12 Nov 2020 10:14)  temazepam 30 mg oral capsule: 1 cap(s) orally once a day (at bedtime), As Needed (19 Oct 2020 09:15)  thiamine 100 mg oral tablet: 1 tab(s) orally once a day (12 Nov 2020 10:14)  zinc sulfate 220 mg oral capsule: 1 cap(s) orally once a day (12 Nov 2020 10:14)      Vital Signs Last 24 Hrs  T(C): 37.2 (16 Nov 2020 08:00), Max: 37.2 (16 Nov 2020 05:00)  T(F): 99 (16 Nov 2020 08:00), Max: 99 (16 Nov 2020 05:00)  HR: 77 (16 Nov 2020 10:00) (76 - 94)  BP: 122/57 (16 Nov 2020 10:00) (89/50 - 134/60)  BP(mean): 80 (16 Nov 2020 10:00) (67 - 96)  RR: 18 (16 Nov 2020 10:00) (14 - 45)  SpO2: 96% (16 Nov 2020 10:00) (93% - 100%)    PHYSICAL EXAM:  Constitutional: Comfortable . No acute distress.   HEENT: Atraumatic and normocephalic , neck is supple . no JVD. No carotid bruit. PEERL   CNS: A&Ox3, No focal deficits. EOMI   Lymph Nodes: Cervical : Not palpable  Respiratory: upper resp. rhonchi, bilateral diminished breath sounds, +cough    Cardiovascular: S1S2 IRRR, No murmur/rubs or gallop  Gastrointestinal: Soft non-tender and non distended, +Bowel sounds. negative Woodson's sign  Extremities: Trace edema   Psychiatric: Calm . no agitation  Skin: No skin rash/ulcers visualized to face, hands or feet    Intake and output:   11-15 @ 07:01  -  11-16 @ 07:00  --------------------------------------------------------  IN: 1263.6 mL / OUT: 5740 mL / NET: -4476.4 mL    11-16 @ 07:01  -  11-16 @ 11:14  --------------------------------------------------------  IN: 38 mL / OUT: 885 mL / NET: -847 mL    LABS:                        7.5    9.28  )-----------( 339      ( 16 Nov 2020 03:15 )             24.6     11-16    135  |  95<L>  |  105.0<H>  ----------------------------<  123<H>  4.5   |  29.0  |  2.31<H>    Ca    8.0<L>      16 Nov 2020 03:15  Phos  4.7     11-16  Mg     2.1     11-16    TPro  4.8<L>  /  Alb  2.9<L>  /  TBili  0.8  /  DBili  x   /  AST  12  /  ALT  15  /  AlkPhos  193<H>  11-16    CARDIAC MARKERS ( 15 Nov 2020 01:12 )  x     / 0.03 ng/mL / x     / x     / x        ;p-BNP=Serum Pro-Brain Natriuretic Peptide: 06740 pg/mL (11-15 @ 01:12)    PT/INR - ( 15 Nov 2020 08:29 )   PT: 21.8 sec;   INR: 1.94 ratio         PTT - ( 16 Nov 2020 08:04 )  PTT:118.4 sec    INTERPRETATION OF TELEMETRY:  Afib HR @ 70-80s  ECG:       RADIOLOGY & ADDITIONAL STUDIES:    X-ray: < from: Xray Chest 1 View-PORTABLE IMMEDIATE (Xray Chest 1 View-PORTABLE IMMEDIATE .) (11.15.20 @ 17:42) >  FINDINGS:    Single frontal view of the chest demonstrates improved mild to moderate CHF. Moderate bilateral pleural effusions, improved. Tubes are unchanged. Mediastinal sternotomy wires. The cardiomediastinal silhouette isenlarged. No acute osseous abnormalities. Overlying EKG leads and wires are noted    IMPRESSION: Improved CHF and bilateral effusions.    CT Chest No Cont (11.15.20 @ 05:37) >  IMPRESSION:  Moderate to large right and small-to-moderate left pleural effusions with associated compressive atelectasis, most notably involving nearly the entirety of the right lower lobe.    Mild scattered groundglass airspace opacities and interlobular septal thickening, may reflect pulmonary edema.    Small intra-abdominal ascites.    Anasarca.      Patient is a  70 year old male with a pmhx of w/ Afib, pulmonary HTN, chronic OM, CKD, COPD, CABG. Recently admitted s/p fall w/ hematoma of LLE requiring debridement and evacuation of hematoma, complicated by worsening GERALD on CKD and worsening respiratory status. Found to have severe pulm HTN, was being diuresed with lasix. Was reccomended for cath but patient declined for fear of worsening renal failure. Noted to have R pleural effusion s/p pigtail catheter that drained 2.2L. Eventually d/c to [facility] on 11/13. Presented today unresponsive, obtunded then intubated in the ED. Hypothermic and developed shock. Labs significant for hyperkalemia acute on chronic renal failure and worsening R pleural effusion. MICU consulted called.  (15 Nov 2020 05:20)    +CAD. Pt denies recall of any anginal symptoms prior to unresponsive episode. Pt currently extubated, alert and oriented.      Severe Pulm HTN  - Pt was found unresponsive  - Echo performed on 06/2020 showed mild to moderate pulmonary HTN and normal RV function   - Patient's initial echo 10/15/20 showed new severe pulmonary HTN  - Pt was discharged from [facility] on only PRN diuresis  - Continue IV diuresis lasix 80 mg IVP BID  - Plan for eventual R+LHC when pt stabilized    CAD s/p CABG x 3  - No new RWMA on echo  - If no recent ischemia evaluations, LHC prior to discharge when acute hospital issues resolved, including GERALD  - Continue aspirin and statin   - Restart home coreg 12.5mg PO BID when able   - maintain Hb >8    Atrial Fibrillation   - Rate controlled at this time   - Restart home Coreg 12.5mg PO BID   - Patient only meets 1 of the criteria for lowering AC dose (79 y/o or older; wt < 60kg; Cr > 1.5), Increase Eliquis to 5mg PO BID for AC when cleared by neuro team  - Continue telemetry monitoring    70M hx CAD s/p CABG, COPD, AF, CKD now re-admitted with likely hypoxic respiratory failure in setting of decompensated HF    Acute on Chronic HFpEF  - volume overloaded on admission, hypoxic, large R pleural effusion, hyperkalemia  - previously VDRF, now extubated today with improved respiratory function s/p lasix and chest tube  - continue lasix 80 IV bid today  - volume overload likely in setting of under diuresis; discharge record reviewed and was receiving lasix 40 PO PRN instead of q12h standing doses  - limited follow up echo today to reassess biventricular function and pulmonary pressures  - strict I&O; replace electrolytes PRN  - chest tube management per thoracic surgery  - patient is more agreeable for cath; plan for RHC/LHC when more euvolemic    AF  - rate controlled  - CHASDVasc 4  - previously prolonged hospitalization for hematoma, but prior to recent d/c appeared to have been tolerating eliquis  - currently anticoagulated on heparin drip; convert to eliquis when tolerating PO  - please note, therapeutic dose eliquis for this patient is 5mg/bid (does not meet 's criteria for reduced dosing)  - restart coreg as BP allows  - outpatient left atrial appendage closure device evaluation with EP    CAD  - no chest pain, unchanged EKG, negative troponin on admission  - mildly elevated troponin on previous admission in setting of reduced renal clearance  - LHC/RHC as above on this admission  - continue ASA, statin  - follow up echo as above.    Needs IV Contrast    Contrast-induced GERALD (CI-GERALD, also referred to as contrast-associated GERALD) is a specific form of GERALD that usually manifests as a transient small increase in Scr concentration within a few days of exposure to intravascular iodinated contrast. Despite its usually self-limited course, CI-GERALD is associated with increased short- and long-term mortality, as well as progressive CKD. Recently, the degree to which radiocontrast affects the kidney has been debated because several studies (both meta-analyses and cohort studies) have suggested that in the aggregate population, the risk for GERALD after contrast administration is perhaps overemphasized.    Nonetheless, in clinical practice, for any given study requiring iodinated contrast, the potential risks and benefits should be weighed closely. Along the same lines, patient- and procedure-level factors contribute to the risk for CI-GERALD and should be assessed. The primary risk factor for CI-GERALD is CKD, and the incidence of CI-GERALD increases incrementally as GFR decreases or proteinuria/albuminuria increases. Diabetes further increases the risk in those with CKD. Additional patient-specific risk factors include low effective circulating blood volume and nonsteroidal anti-inflammatory drug use. Procedure-related risk factors include higher contrast volume, intra-arterial procedures, multiple contrast exposures in a short interval, and hyperosmolar contrast agents.    Management of CI-GERALD aims primarily at prevention. Consideration should be given to alternative noncontrast studies if possible. Those who undergo iodinated contrast studies should have treatment with nonsteroidal anti-inflammatory drugs and other nephrotoxins discontinued, ideally at least 24 hours before the procedure. Low- or iso-osmolar radiocontrast should be used, at the lowest possible volume required. Isotonic intravenous fluid administration reduces the risk for CI-GERALD and should be used in those at elevated risk. Typical regimens consist of a 1-mL/kg/h infusion 12 hours before and 12 hours after contrast exposure, or 3 mL/kg/h 1 hour before and 1.5 mL/kg/h for 4 to 6 hours postprocedure. With regard to fluid selection, although small studies suggested a benefit to the use of isotonic sodium bicarbonate solution, a large randomized clinical trial of isotonic bicarbonate versus normal saline solution (factorialized with N-acetylcysteine vs placebo) in high-risk patients undergoing angiography showed no benefit with bicarbonate or N-acetylcysteine with regard to a composite end point of death, RRT, and 50% reduction in GFR at 90 days. There have been a variety of other pharmacotherapies evaluated for CI-GERALD prevention, none of which is clearly beneficial. Hemodialysis after administration of contrast is ineffective for preventing CI-GERALD and may cause harm.   Contrast-induced GERALD (CI-GERALD, also referred to as contrast-associated GERALD) is a specific form of GERALD that usually manifests as a transient small increase in Scr concentration within a few days of exposure to intravascular iodinated contrast. Despite its usually self-limited course, CI-GERALD is associated with increased short- and long-term mortality, as well as progressive CKD. Recently, the degree to which radiocontrast affects the kidney has been debated because several studies (both meta-analyses and cohort studies) have suggested that in the aggregate population, the risk for GERALD after contrast administration is perhaps overemphasized.

## 2020-11-16 NOTE — CONSULT NOTE ADULT - SUBJECTIVE AND OBJECTIVE BOX
HPI:  Patient is a  70 year old male with a pmhx of w/ Afib, pulmonary HTN, chronic OM, CKD, COPD, CABG. Recently admitted s/p fall w/ hematoma of LLE requiring debridement and evacuation of hematoma, complicated by worsening GERALD on CKD and worsening respiratory status. Found to have severe pulm HTN, was being diuresed with lasix. Was reccomended for cath but patient declined for fear of worsening renal failure. Noted to have R pleural effusion s/p pigtail catheter that drained 2.2L. Eventually d/c to [facility] on 11/13. Presented today unresponsive, obtunded then intubated in the ED. Hypothermic and developed shock. Labs significant for hyperkalemia acute on chronic renal failure and worsening R pleural effusion. MICU consulted called.  (15 Nov 2020 05:20)      PAST MEDICAL & SURGICAL HISTORY:  CKD (chronic kidney disease)    CHF (congestive heart failure)    Atrial fibrillation    COPD, severity to be determined    Coronary artery disease involving coronary bypass graft of native heart without angina pectoris    Chronic osteomyelitis, osteomyelitis of unspecified site    COPD (chronic obstructive pulmonary disease)    Atrial fibrillation    Bladder cancer    HLD (hyperlipidemia)    H/O knee surgery    S/P CABG (coronary artery bypass graft)    Presence of stent of CABG        REVIEW OF SYSTEMS      General:No Weight change/ Fatigue/ HA/Dizzy	    Skin/Breast: No Rashes/ Lesions/ Masses  	  Ophthalmologic: No Blurry vision/ Glaucoma/ Blindness  	  ENMT: No Hearing loss/ Drainage/ Lesions	    Respiratory and Thorax: No Cough/ Wheezing/ SOB/ Hemoptysis/ Sputum production  	  Cardiovascular: No Chest pain/ Palpitations/ Diaphoresis	    Gastrointestinal: No Nausea/ Vomiting/ Constipation/ Appetite Change	    Genitourinary: No Heamturia/ Dysuria/ Frequency change/ Impotence	    Musculoskeletal: No Pain/ Weakness/ Claudication	    Neurological: No Seizures/ TIA/CVA/ Parastesias	    Psychiatric: No Dementia/ Depression/ SI/HI	    Hematology/Lymphatics: No hx of bleeding/ Edema	    Endocrine:	No Hyperglycemia/ Hypoglycemia    Allergic/Immunologic:	 No Anaphylaxis/ Intolerance/ Recent illnesses    MEDICATIONS  (STANDING):  albuterol/ipratropium for Nebulization. 3 milliLiter(s) Nebulizer every 6 hours  allopurinol 100 milliGRAM(s) Oral daily  aspirin  chewable 81 milliGRAM(s) Oral daily  atorvastatin 20 milliGRAM(s) Oral at bedtime  chlorhexidine 2% Cloths 1 Application(s) Topical <User Schedule>  furosemide   Injectable 80 milliGRAM(s) IV Push two times a day  heparin  Infusion.  Unit(s)/Hr (18 mL/Hr) IV Continuous <Continuous>  pantoprazole    Tablet 40 milliGRAM(s) Oral before breakfast  senna 2 Tablet(s) Oral at bedtime  tamsulosin 0.4 milliGRAM(s) Oral at bedtime    MEDICATIONS  (PRN):  heparin   Injectable 8000 Unit(s) IV Push every 6 hours PRN For aPTT less than 40  heparin   Injectable 4000 Unit(s) IV Push every 6 hours PRN For aPTT between 40 - 57      Allergies    No Known Allergies    Intolerances        SOCIAL HISTORY:    FAMILY HISTORY:  No pertinent family history in first degree relatives        Vital Signs Last 24 Hrs  T(C): 37.2 (16 Nov 2020 08:00), Max: 37.2 (16 Nov 2020 05:00)  T(F): 99 (16 Nov 2020 08:00), Max: 99 (16 Nov 2020 05:00)  HR: 83 (16 Nov 2020 11:00) (77 - 94)  BP: 109/53 (16 Nov 2020 11:00) (89/50 - 134/60)  BP(mean): 77 (16 Nov 2020 11:00) (67 - 96)  RR: 23 (16 Nov 2020 11:00) (17 - 45)  SpO2: 96% (16 Nov 2020 11:00) (93% - 100%)    General: WN/WD NAD  Neurology: Awake, nonfocal, KAY x 4  Eyes: Scleras clear, PERRLA/ EOMI, Gross vision intact  ENT:Gross hearing intact, grossly patent pharynx, no stridor  Neck: Neck supple, trachea midline, No JVD,   Respiratory: CTA B/L, No wheezing, rales, rhonchi  CV: RRR, S1S2, no murmurs, rubs or gallops  Abdominal: Soft, NT, ND +BS,   Extremities: No edema, + peripheral pulses  Skin: No Rashes, Hematoma, Ecchymosis  Lymphatic: No Neck, axilla, groin LAD  Psych: Oriented x 3, normal affect  Incisions:   Tubes:    LABS:                        7.5    9.28  )-----------( 339      ( 16 Nov 2020 03:15 )             24.6     11-16    135  |  95<L>  |  105.0<H>  ----------------------------<  123<H>  4.5   |  29.0  |  2.31<H>    Ca    8.0<L>      16 Nov 2020 03:15  Phos  4.7     11-16  Mg     2.1     11-16    TPro  4.8<L>  /  Alb  2.9<L>  /  TBili  0.8  /  DBili  x   /  AST  12  /  ALT  15  /  AlkPhos  193<H>  11-16    PT/INR - ( 15 Nov 2020 08:29 )   PT: 21.8 sec;   INR: 1.94 ratio         PTT - ( 16 Nov 2020 08:04 )  PTT:118.4 sec      RADIOLOGY & ADDITIONAL STUDIES:    ASSESSMENT:   70yMalePAST MEDICAL & SURGICAL HISTORY:  CKD (chronic kidney disease)    CHF (congestive heart failure)    Atrial fibrillation    COPD, severity to be determined    Coronary artery disease involving coronary bypass graft of native heart without angina pectoris    Chronic osteomyelitis, osteomyelitis of unspecified site    COPD (chronic obstructive pulmonary disease)    Atrial fibrillation    Bladder cancer    HLD (hyperlipidemia)    H/O knee surgery    S/P CABG (coronary artery bypass graft)    Presence of stent of CABG    HEALTH ISSUES - PROBLEM Dx:  Atrial fibrillation  Atrial fibrillation    Coronary artery disease involving coronary bypass graft of native heart without angina pectoris  Coronary artery disease involving coronary bypass graft of native heart without angina pectoris    COPD, severity to be determined  COPD, severity to be determined    CKD (chronic kidney disease)  CKD (chronic kidney disease)    CHF (congestive heart failure)  CHF (congestive heart failure)    Recurrent falls  Recurrent falls    Respiratory failure with hypoxia  Respiratory failure with hypoxia        HEALTH ISSUES - R/O PROBLEM Dx:      PLAN: HPI:  Patient is a  70 year old male with a pmhx of w/ Afib, pulmonary HTN, chronic OM, CKD, COPD, CABG.10/9 admitted s/p fall w/ hematoma of LLE requiring debridement and evacuation of hematoma, complicated by worsening GERALD on CKD and worsening respiratory status. Found to have severe pulm HTN, was being diuresed with lasix. Was reccomended for cath but patient declined for fear of worsening renal failure. Noted to have R pleural effusion s/p pigtail catheter that drained 2.2L. Eventually d/c to [facility] on 11/13. Presented from [facility] 11/15  unresponsive, obtunded then intubated in the ED. Hypothermic and developed shock. Labs significant for hyperkalemia acute on chronic renal failure and worsening R pleural effusion. MICU consulted called.  Subseq extubation R pigtail placement for recurrence of right effusion      PAST MEDICAL & SURGICAL HISTORY:  CKD (chronic kidney disease)    CHF (congestive heart failure)    Atrial fibrillation    COPD, severity to be determined    Coronary artery disease involving coronary bypass graft of native heart without angina pectoris    Chronic osteomyelitis, osteomyelitis of unspecified site    COPD (chronic obstructive pulmonary disease)    Atrial fibrillation    Bladder cancer    HLD (hyperlipidemia)    H/O knee surgery    S/P CABG (coronary artery bypass graft)    Presence of stent of CABG        REVIEW OF SYSTEMS      General:No Weight change/ +Fatigue  	  Respiratory and Thorax: + Cough/ Wheezing/ SOB/ Hemoptysis/ Sputum production  	  Cardiovascular: No Chest pain/ Palpitations/ Diaphoresis	    Gastrointestinal: No Nausea/ Vomiting/ Constipation/ Appetite Change	    Genitourinary: No Heamturia/ Dysuria  + lomeli	    Musculoskeletal: No Pain/ Weakness/ Claudication	    Neurological: No Seizures/ TIA/CVA/ Parastesias	    Psychiatric: No Dementia/ Depression/ SI/HI	    Hematology/Lymphatics: No hx of bleeding/ Edema	    Endocrine:	No Hyperglycemia/ Hypoglycemia    Allergic/Immunologic:	 No Anaphylaxis/ Intolerance/ Recent illnesses    MEDICATIONS  (STANDING):  albuterol/ipratropium for Nebulization. 3 milliLiter(s) Nebulizer every 6 hours  allopurinol 100 milliGRAM(s) Oral daily  aspirin  chewable 81 milliGRAM(s) Oral daily  atorvastatin 20 milliGRAM(s) Oral at bedtime  chlorhexidine 2% Cloths 1 Application(s) Topical <User Schedule>  furosemide   Injectable 80 milliGRAM(s) IV Push two times a day  heparin  Infusion.  Unit(s)/Hr (18 mL/Hr) IV Continuous <Continuous>  pantoprazole    Tablet 40 milliGRAM(s) Oral before breakfast  senna 2 Tablet(s) Oral at bedtime  tamsulosin 0.4 milliGRAM(s) Oral at bedtime    MEDICATIONS  (PRN):  heparin   Injectable 8000 Unit(s) IV Push every 6 hours PRN For aPTT less than 40  heparin   Injectable 4000 Unit(s) IV Push every 6 hours PRN For aPTT between 40 - 57      Allergies    No Known Allergies    Intolerances        SOCIAL HISTORY:    FAMILY HISTORY:  No pertinent family history in first degree relatives        Vital Signs Last 24 Hrs  T(C): 37.2 (16 Nov 2020 08:00), Max: 37.2 (16 Nov 2020 05:00)  T(F): 99 (16 Nov 2020 08:00), Max: 99 (16 Nov 2020 05:00)  HR: 83 (16 Nov 2020 11:00) (77 - 94)  BP: 109/53 (16 Nov 2020 11:00) (89/50 - 134/60)  BP(mean): 77 (16 Nov 2020 11:00) (67 - 96)  RR: 23 (16 Nov 2020 11:00) (17 - 45)  SpO2: 96% (16 Nov 2020 11:00) (93% - 100%)    General: WN/WD NAD  Neurology: Awake, nonfocal, KAY x 4  Eyes: Scleras clear, PERRLA/ EOMI, Gross vision intact  Respiratory: CTA B/L, No wheezing, rales, rhonchi  CV: RRR, S1S2, no murmurs, rubs or gallops  Abdominal: Soft, NT, ND +BS,   Extremities: No edema, + peripheral pulses  Skin: No Rashes, Hematoma, Ecchymosis  Psych: Oriented x 3, normal affect    Tubes: R lateral pigtail>bloody drainage    LABS:                        7.5    9.28  )-----------( 339      ( 16 Nov 2020 03:15 )             24.6     11-16    135  |  95<L>  |  105.0<H>  ----------------------------<  123<H>  4.5   |  29.0  |  2.31<H>    Ca    8.0<L>      16 Nov 2020 03:15  Phos  4.7     11-16  Mg     2.1     11-16    TPro  4.8<L>  /  Alb  2.9<L>  /  TBili  0.8  /  DBili  x   /  AST  12  /  ALT  15  /  AlkPhos  193<H>  11-16    PT/INR - ( 15 Nov 2020 08:29 )   PT: 21.8 sec;   INR: 1.94 ratio         PTT - ( 16 Nov 2020 08:04 )  PTT:118.4 sec      RADIOLOGY & ADDITIONAL STUDIES:    ASSESSMENT:   70yMalePAST MEDICAL & SURGICAL HISTORY:  CKD (chronic kidney disease)    CHF (congestive heart failure)    Atrial fibrillation    COPD, severity to be determined    Coronary artery disease involving coronary bypass graft of native heart without angina pectoris    Chronic osteomyelitis, osteomyelitis of unspecified site    COPD (chronic obstructive pulmonary disease)    Atrial fibrillation    Bladder cancer    HLD (hyperlipidemia)    H/O knee surgery    S/P CABG (coronary artery bypass graft)    Presence of stent of CABG    HEALTH ISSUES - PROBLEM Dx:  Atrial fibrillation  Atrial fibrillation    Coronary artery disease involving coronary bypass graft of native heart without angina pectoris  Coronary artery disease involving coronary bypass graft of native heart without angina pectoris    COPD, severity to be determined  COPD, severity to be determined    CKD (chronic kidney disease)  CKD (chronic kidney disease)    CHF (congestive heart failure)  CHF (congestive heart failure)    Recurrent falls  Recurrent falls    Respiratory failure with hypoxia  Respiratory failure with hypoxia        HEALTH ISSUES - R/O PROBLEM Dx:      PLAN:  MICU placed r pigtail  Will continue to manage pigtail  Discussed with dr Rueda

## 2020-11-17 LAB
-  AMIKACIN: SIGNIFICANT CHANGE UP
-  AMOXICILLIN/CLAVULANIC ACID: SIGNIFICANT CHANGE UP
-  AMPICILLIN/SULBACTAM: SIGNIFICANT CHANGE UP
-  AMPICILLIN: SIGNIFICANT CHANGE UP
-  AZTREONAM: SIGNIFICANT CHANGE UP
-  CEFAZOLIN: SIGNIFICANT CHANGE UP
-  CEFEPIME: SIGNIFICANT CHANGE UP
-  CEFOXITIN: SIGNIFICANT CHANGE UP
-  CEFTRIAXONE: SIGNIFICANT CHANGE UP
-  CIPROFLOXACIN: SIGNIFICANT CHANGE UP
-  ERTAPENEM: SIGNIFICANT CHANGE UP
-  GENTAMICIN: SIGNIFICANT CHANGE UP
-  IMIPENEM: SIGNIFICANT CHANGE UP
-  LEVOFLOXACIN: SIGNIFICANT CHANGE UP
-  MEROPENEM: SIGNIFICANT CHANGE UP
-  PIPERACILLIN/TAZOBACTAM: SIGNIFICANT CHANGE UP
-  TOBRAMYCIN: SIGNIFICANT CHANGE UP
-  TRIMETHOPRIM/SULFAMETHOXAZOLE: SIGNIFICANT CHANGE UP
ANION GAP SERPL CALC-SCNC: 11 MMOL/L — SIGNIFICANT CHANGE UP (ref 5–17)
BUN SERPL-MCNC: 99 MG/DL — HIGH (ref 8–20)
CALCIUM SERPL-MCNC: 8 MG/DL — LOW (ref 8.6–10.2)
CHLORIDE SERPL-SCNC: 93 MMOL/L — LOW (ref 98–107)
CO2 SERPL-SCNC: 30 MMOL/L — HIGH (ref 22–29)
CREAT SERPL-MCNC: 2.46 MG/DL — HIGH (ref 0.5–1.3)
CULTURE RESULTS: SIGNIFICANT CHANGE UP
GLUCOSE SERPL-MCNC: 119 MG/DL — HIGH (ref 70–99)
HCT VFR BLD CALC: 22.6 % — LOW (ref 39–50)
HGB BLD-MCNC: 7.4 G/DL — LOW (ref 13–17)
MAGNESIUM SERPL-MCNC: 2 MG/DL — SIGNIFICANT CHANGE UP (ref 1.6–2.6)
MCHC RBC-ENTMCNC: 32.7 GM/DL — SIGNIFICANT CHANGE UP (ref 32–36)
MCHC RBC-ENTMCNC: 34.7 PG — HIGH (ref 27–34)
MCV RBC AUTO: 106.1 FL — HIGH (ref 80–100)
METHOD TYPE: SIGNIFICANT CHANGE UP
ORGANISM # SPEC MICROSCOPIC CNT: SIGNIFICANT CHANGE UP
ORGANISM # SPEC MICROSCOPIC CNT: SIGNIFICANT CHANGE UP
PHOSPHATE SERPL-MCNC: 4.8 MG/DL — HIGH (ref 2.4–4.7)
PLATELET # BLD AUTO: 274 K/UL — SIGNIFICANT CHANGE UP (ref 150–400)
POTASSIUM SERPL-MCNC: 3.9 MMOL/L — SIGNIFICANT CHANGE UP (ref 3.5–5.3)
POTASSIUM SERPL-SCNC: 3.9 MMOL/L — SIGNIFICANT CHANGE UP (ref 3.5–5.3)
RBC # BLD: 2.13 M/UL — LOW (ref 4.2–5.8)
RBC # FLD: 19.7 % — HIGH (ref 10.3–14.5)
SODIUM SERPL-SCNC: 134 MMOL/L — LOW (ref 135–145)
SPECIMEN SOURCE: SIGNIFICANT CHANGE UP
WBC # BLD: 7.47 K/UL — SIGNIFICANT CHANGE UP (ref 3.8–10.5)
WBC # FLD AUTO: 7.47 K/UL — SIGNIFICANT CHANGE UP (ref 3.8–10.5)

## 2020-11-17 PROCEDURE — 99233 SBSQ HOSP IP/OBS HIGH 50: CPT

## 2020-11-17 PROCEDURE — 99223 1ST HOSP IP/OBS HIGH 75: CPT

## 2020-11-17 PROCEDURE — 99222 1ST HOSP IP/OBS MODERATE 55: CPT

## 2020-11-17 PROCEDURE — 71045 X-RAY EXAM CHEST 1 VIEW: CPT | Mod: 26

## 2020-11-17 PROCEDURE — 99232 SBSQ HOSP IP/OBS MODERATE 35: CPT

## 2020-11-17 RX ORDER — TEMAZEPAM 15 MG/1
30 CAPSULE ORAL AT BEDTIME
Refills: 0 | Status: DISCONTINUED | OUTPATIENT
Start: 2020-11-17 | End: 2020-11-22

## 2020-11-17 RX ORDER — FUROSEMIDE 40 MG
40 TABLET ORAL
Refills: 0 | Status: DISCONTINUED | OUTPATIENT
Start: 2020-11-17 | End: 2020-11-22

## 2020-11-17 RX ORDER — IPRATROPIUM/ALBUTEROL SULFATE 18-103MCG
3 AEROSOL WITH ADAPTER (GRAM) INHALATION EVERY 6 HOURS
Refills: 0 | Status: DISCONTINUED | OUTPATIENT
Start: 2020-11-17 | End: 2020-11-22

## 2020-11-17 RX ORDER — BUDESONIDE AND FORMOTEROL FUMARATE DIHYDRATE 160; 4.5 UG/1; UG/1
2 AEROSOL RESPIRATORY (INHALATION)
Refills: 0 | Status: DISCONTINUED | OUTPATIENT
Start: 2020-11-17 | End: 2020-11-22

## 2020-11-17 RX ORDER — FUROSEMIDE 40 MG
1 TABLET ORAL
Qty: 0 | Refills: 0 | DISCHARGE

## 2020-11-17 RX ORDER — HEPARIN SODIUM 5000 [USP'U]/ML
5000 INJECTION INTRAVENOUS; SUBCUTANEOUS EVERY 12 HOURS
Refills: 0 | Status: DISCONTINUED | OUTPATIENT
Start: 2020-11-17 | End: 2020-11-22

## 2020-11-17 RX ORDER — TAMSULOSIN HYDROCHLORIDE 0.4 MG/1
1 CAPSULE ORAL
Qty: 0 | Refills: 0 | DISCHARGE

## 2020-11-17 RX ORDER — OMEPRAZOLE 10 MG/1
1 CAPSULE, DELAYED RELEASE ORAL
Qty: 0 | Refills: 0 | DISCHARGE

## 2020-11-17 RX ORDER — ATORVASTATIN CALCIUM 80 MG/1
1 TABLET, FILM COATED ORAL
Qty: 0 | Refills: 0 | DISCHARGE

## 2020-11-17 RX ADMIN — Medication 3 MILLILITER(S): at 03:23

## 2020-11-17 RX ADMIN — BUDESONIDE AND FORMOTEROL FUMARATE DIHYDRATE 2 PUFF(S): 160; 4.5 AEROSOL RESPIRATORY (INHALATION) at 19:54

## 2020-11-17 RX ADMIN — HEPARIN SODIUM 5000 UNIT(S): 5000 INJECTION INTRAVENOUS; SUBCUTANEOUS at 18:32

## 2020-11-17 RX ADMIN — Medication 81 MILLIGRAM(S): at 13:19

## 2020-11-17 RX ADMIN — Medication 100 MILLIGRAM(S): at 13:19

## 2020-11-17 RX ADMIN — Medication 3 MILLILITER(S): at 14:50

## 2020-11-17 RX ADMIN — CHLORHEXIDINE GLUCONATE 1 APPLICATION(S): 213 SOLUTION TOPICAL at 05:31

## 2020-11-17 RX ADMIN — Medication 3 MILLILITER(S): at 09:36

## 2020-11-17 RX ADMIN — TAMSULOSIN HYDROCHLORIDE 0.4 MILLIGRAM(S): 0.4 CAPSULE ORAL at 21:44

## 2020-11-17 RX ADMIN — Medication 40 MILLIGRAM(S): at 18:32

## 2020-11-17 RX ADMIN — ATORVASTATIN CALCIUM 20 MILLIGRAM(S): 80 TABLET, FILM COATED ORAL at 21:44

## 2020-11-17 RX ADMIN — PANTOPRAZOLE SODIUM 40 MILLIGRAM(S): 20 TABLET, DELAYED RELEASE ORAL at 13:19

## 2020-11-17 RX ADMIN — Medication 80 MILLIGRAM(S): at 05:30

## 2020-11-17 NOTE — DIETITIAN INITIAL EVALUATION ADULT. - OTHER INFO
70 year old male with a pmhx of w/ Afib, pulmonary HTN, chronic OM, CKD, COPD, CABG. Recently admitted s/p fall w/ hematoma of LLE requiring debridement and evacuation of hematoma, complicated by worsening GERALD on CKD and worsening respiratory status. Found to have severe pulm HTN, was being diuresed with lasix. Was recommended for cath but patient declined for fear of worsening renal failure. Noted to have R pleural effusion s/p pigtail catheter that drained 2.2L. Eventually d/c to [facility] on 11/13. Presented back on 11/15/20 unresponsive, obtunded then intubated in the ED. Hypothermic and developed shock. Labs significant for hyperkalemia acute on chronic renal failure and worsening R pleural effusion. Admitted to MICU. Pt now has a right pigtail placement for recurrence of right effusion, CTS following. Pt extubated, GERALD on CKD improving. Pt transferred to medical service.  Pt reports eating well PTA, denies wt loss. Pt reports following a low sodium diet, declining further diet education at this time. Pt started on PO diet this morning; denies trouble chewing or swallowing post extubation. Food preferences obtained.

## 2020-11-17 NOTE — DIETITIAN INITIAL EVALUATION ADULT. - PERTINENT LABORATORY DATA
11-17 Na134 mmol/L<L> Glu 119 mg/dL<H> K+ 3.9 mmol/L Cr  2.46 mg/dL<H> BUN 99.0 mg/dL<H> Phos 4.8 mg/dL<H> Alb n/a   PAB n/a

## 2020-11-17 NOTE — PROGRESS NOTE ADULT - ASSESSMENT
70M hx CAD s/p CABG, COPD, AF, CKD now re-admitted with likely hypoxic respiratory failure in setting of decompensated HF    Acute on Chronic HFpEF  - volume overloaded on admission, hypoxic, large R pleural effusion, hyperkalemia  - now extubated, chest tube R side  - change lasix to 40mg PO BID   - volume overload likely in setting of under diuresis  - limited F/u TTE- severe pulm HTN, mild reduced systolic function, mod TR  - strict I&O; replace electrolytes PRN- hyperkalemia resolved   - chest tube management per thoracic surgery  - patient is more agreeable for cath    AF  - rate controlled  - CHASDVasc 4  - heparin gtt d/c secondary to ozzing at chest tube, and IV sites.   - one unit PRBCs given yesterday with no improvement in HH   - previously prolonged hospitalization for hematoma, but prior to recent d/c appeared to have been tolerating eliquis  - resume coreg as BP allows  - EP to evaluate for watchman procedure, as pt not able to tolerate long tem AC    CAD  - no chest pain, unchanged EKG, negative troponin on admission  - mildly elevated troponin on previous admission in setting of reduced renal clearance  - LHC/RHC this hospitalization when renal function improves, anemia resolved and able to tolerate DAPT   - continue ASA, statin  - nephrology consulted

## 2020-11-17 NOTE — PROGRESS NOTE ADULT - SUBJECTIVE AND OBJECTIVE BOX
CC: right chest fluid      HPI:  70 year old male with a pmhx of w/ Afib, pulmonary HTN, chronic OM, CKD, COPD, CABG. Recently admitted s/p fall w/ hematoma of LLE requiring debridement and evacuation of hematoma, complicated by worsening GERALD on CKD and worsening respiratory status. Found to have severe pulm HTN, was being diuresed with lasix. Was recommended for cath but patient declined for fear of worsening renal failure. Noted to have R pleural effusion s/p pigtail catheter that drained 2.2L. Eventually d/c to [facility] on . Presented back on 11/15/20 unresponsive, obtunded then intubated in the ED. Hypothermic and developed shock. Labs significant for hyperkalemia acute on chronic renal failure and worsening R pleural effusion. Admitted to MICU. Pt now has a right pigtail placement for recurrence of right effusion, CTS following. Pt extubated, GERALD on CKD improving. Pt transferred to medical service.     Pt denies any sob, no chest pain, no cough, no n/v or abd pain.      PAST MEDICAL & SURGICAL HISTORY:  CKD (chronic kidney disease)  CHF (congestive heart failure)  Atrial fibrillation  COPD, severity to be determined  Coronary artery disease involving coronary bypass graft of native heart without angina pectoris  Chronic osteomyelitis, osteomyelitis of unspecified site  COPD (chronic obstructive pulmonary disease)  Atrial fibrillation  Bladder cancer s/p surgery  HLD (hyperlipidemia)  H/O Right knee surgery  S/P CABG (coronary artery bypass graft)  Presence of stent of CABG      Social History:  Tobacco - Quit 8 years go  ETOH - Social drinker  Illicit drug abuse - denies    FAMILY HISTORY:  Father passed away at age 33, unknown etiology  Mother no health issues      Allergies  No Known Allergies       REVIEW OF SYSTEMS:  All other ROS are negative except noted in HPI    MEDICATIONS  (STANDING):  albuterol/ipratropium for Nebulization. 3 milliLiter(s) Nebulizer every 6 hours  allopurinol 100 milliGRAM(s) Oral daily  aspirin  chewable 81 milliGRAM(s) Oral daily  atorvastatin 20 milliGRAM(s) Oral at bedtime  furosemide   Injectable 80 milliGRAM(s) IV Push two times a day  pantoprazole    Tablet 40 milliGRAM(s) Oral before breakfast  senna 2 Tablet(s) Oral at bedtime  tamsulosin 0.4 milliGRAM(s) Oral at bedtime    MEDICATIONS  (PRN):      Vital Signs Last 24 Hrs  T(C): 36.9 (2020 07:24), Max: 37.3 (2020 01:45)  T(F): 98.5 (2020 07:24), Max: 99.1 (2020 01:45)  HR: 95 (2020 09:00) (77 - 152)  BP: 112/58 (2020 09:00) (103/51 - 139/76)  BP(mean): 82 (2020 09:00) (69 - 106)  RR: 23 (2020 09:00) (18 - 33)  SpO2: 97% (2020 09:00) (94% - 100%)    PHYSICAL EXAM:    GENERAL: NAD, well-groomed, well-developed  HEAD:  Atraumatic, Normocephalic  EYES: EOMI, PERRLA, conjunctiva and sclera clear  NERVOUS SYSTEM:  Alert & Oriented X3, Good concentration; Motor Strength 5/5 B/L upper and lower extremities  CHEST/LUNG: Decreased BS at abses, no rales, rhonchi, wheezing  HEART: Regular rate and rhythm; No murmurs  ABDOMEN: Soft, Nontender, Nondistended; Bowel sounds present  EXTREMITIES:  2+ Peripheral Pulses, No clubbing, cyanosis, + LE edema , Left LE wraped in ace band      LABS:                        7.4    7.47  )-----------( 274      ( 2020 04:51 )             22.6     11-17    134<L>  |  93<L>  |  99.0<H>  ----------------------------<  119<H>  3.9   |  30.0<H>  |  2.46<H>    Ca    8.0<L>      2020 04:51  Phos  4.8     11-17  Mg     2.0     11-17    TPro  4.8<L>  /  Alb  2.9<L>  /  TBili  0.8  /  DBili  x   /  AST  12  /  ALT  15  /  AlkPhos  193<H>  11-16    PTT - ( 2020 22:48 )  PTT:32.9 sec  Urinalysis Basic - ( 2020 14:00 )    Color: Yellow / Appearance: Clear / S.010 / pH: x  Gluc: x / Ketone: Negative  / Bili: Negative / Urobili: Negative mg/dL   Blood: x / Protein: Negative mg/dL / Nitrite: Negative   Leuk Esterase: Negative / RBC: 6-10 /HPF / WBC 0-2   Sq Epi: x / Non Sq Epi: Occasional / Bacteria: Occasional

## 2020-11-17 NOTE — CONSULT NOTE ADULT - SUBJECTIVE AND OBJECTIVE BOX
[facility]                                                               CARDIAC ELECTROPHYSIOLOGY                                                       [facility] [place]                                                      Office: [address] [facility], [place] [address]                                                       Telephone: [phone]. Fax:[phone]    HPI:  Zaida Garcia is a 70 year old male with COPD (long-standing persistent AF     Patient is a  70 year old male with a pmhx of w/ Afib, pulmonary HTN, chronic OM, CKD, COPD, CABG. Recently admitted s/p fall w/ hematoma of LLE requiring debridement and evacuation of hematoma, complicated by worsening GERALD on CKD and worsening respiratory status. Found to have severe pulm HTN, was being diuresed with lasix. Was reccomended for cath but patient declined for fear of worsening renal failure. Noted to have R pleural effusion s/p pigtail catheter that drained 2.2L. Eventually d/c to [facility] on . Presented today unresponsive, obtunded then intubated in the ED. Hypothermic and developed shock. Labs significant for hyperkalemia acute on chronic renal failure and worsening R pleural effusion. MICU consulted called.  (15 Nov 2020 05:20)      PAST MEDICAL & SURGICAL HISTORY:  CKD (chronic kidney disease)    CHF (congestive heart failure)    Atrial fibrillation    COPD, severity to be determined    Coronary artery disease involving coronary bypass graft of native heart without angina pectoris    Chronic osteomyelitis, osteomyelitis of unspecified site    COPD (chronic obstructive pulmonary disease)    Atrial fibrillation    Bladder cancer    HLD (hyperlipidemia)    H/O knee surgery    S/P CABG (coronary artery bypass graft)    Presence of stent of CABG        REVIEW OF SYSTEMS: As per HPI. Remaining 10 point ROS were reviewed and are negative.      MEDICATIONS  (STANDING):  albuterol/ipratropium for Nebulization. 3 milliLiter(s) Nebulizer every 6 hours  allopurinol 100 milliGRAM(s) Oral daily  aspirin  chewable 81 milliGRAM(s) Oral daily  atorvastatin 20 milliGRAM(s) Oral at bedtime  furosemide    Tablet 40 milliGRAM(s) Oral two times a day  heparin   Injectable 5000 Unit(s) SubCutaneous every 12 hours  pantoprazole    Tablet 40 milliGRAM(s) Oral before breakfast  senna 2 Tablet(s) Oral at bedtime  tamsulosin 0.4 milliGRAM(s) Oral at bedtime    MEDICATIONS  (PRN):      Allergies    No Known Allergies    Intolerances        SOCIAL HISTORY:    FAMILY HISTORY:  No pertinent family history in first degree relatives        Vital Signs Last 24 Hrs  T(C): 37.2 (2020 10:55), Max: 37.3 (2020 01:45)  T(F): 98.9 (2020 10:55), Max: 99.1 (2020 01:45)  HR: 93 (2020 11:00) (80 - 128)  BP: 122/63 (2020 11:00) (108/56 - 139/76)  BP(mean): 87 (2020 11:00) (69 - 106)  RR: 24 (2020 11:00) (18 - 33)  SpO2: 97% (2020 11:00) (95% - 100%)    Physical Exam:  Appearance: Normal, well nourished	  HEENT:   Normal oral mucosa, PERRL, EOMI, sclera non-icteric	  Lymphatic: No cervical lymphadenopathy  Cardiovascular: Normal S1 S2, No JVD, No cardiac murmurs, No carotid bruits, No peripheral edema  Respiratory: Lungs clear to auscultation, unlabored, no rhonchi/rales/wheezes  Psychiatry: A & O x 3, Mood & affect appropriate  Gastrointestinal:  Soft, Non-tender, + BS, no bruits	  Skin: No rashes, No ecchymoses, No cyanosis  Neurologic: Grossly non-focal with full strength in all four extremities  Extremities: Normal range of motion, No clubbing, cyanosis or edema  Vascular: Peripheral pulses palpable 2+ bilaterally    LABS:                        7.4    7.47  )-----------( 274      ( 2020 04:51 )             22.6   11-17    134<L>  |  93<L>  |  99.0<H>  ----------------------------<  119<H>  3.9   |  30.0<H>  |  2.46<H>    Ca    8.0<L>      2020 04:51  Phos  4.8     -  Mg     2.0     -    TPro  4.8<L>  /  Alb  2.9<L>  /  TBili  0.8  /  DBili  x   /  AST  12  /  ALT  15  /  AlkPhos  193<H>  11-16  LIVER FUNCTIONS - ( 2020 03:15 )  Alb: 2.9 g/dL / Pro: 4.8 g/dL / ALK PHOS: 193 U/L / ALT: 15 U/L / AST: 12 U/L / GGT: x           PTT - ( 2020 22:48 )  PTT:32.9 sec    Urinalysis Basic - ( 2020 14:00 )    Color: Yellow / Appearance: Clear / S.010 / pH: x  Gluc: x / Ketone: Negative  / Bili: Negative / Urobili: Negative mg/dL   Blood: x / Protein: Negative mg/dL / Nitrite: Negative   Leuk Esterase: Negative / RBC: 6-10 /HPF / WBC 0-2   Sq Epi: x / Non Sq Epi: Occasional / Bacteria: Occasional        RADIOLOGY & ADDITIONAL STUDIES:   [facility]                                                               CARDIAC ELECTROPHYSIOLOGY                                                       [facility] [place]                                                      Office: [address] [facility], [place] [address]                                                       Telephone: [phone]. Fax:[phone]    HPI:  Zaida Garcia is a 70 year old male with COPD (home O2 prescribed but does not use), CKD, chronic osteomyelitis, pulmonary HTN, coronary artery disease s/p CABG, HFpEF and long-standing persistent AF intolerant of anticoagulation who is referred by Dr. Geller in consideration of Watchman implantation.    The patient states that he has had AF for many years. He had a cardioversion about 10 years ago with restoration of normal rhythm. He maintained normal rhythm until he went for colonoscopy a few years later when he went back into AF. Since that time he has been on anticoagulation but it has had to be interrupted multiple times. Most recently, he was admitted with a fall and large left lower extremity hematoma requiring evacuation and debridement. His hospital course was complicated by acute on chronic kidney injury and respiratory failure. He was discharged to [facility] on  but he returned to [facility] with unresponsiveness. He required intubation int he ER and as found to be hypothermic with shock. Labs were notable for hyperkalemia and again acute on chronic kidney injury. He was noted to have a right pleural effusion which was subsequently drained. He was eventually extubated.    Currently the patient feels well and notes his breathing has improved significantly. He denies having any fevers, chills, sweats, cough, chest pain/pressure, palpitations, dizziness, lightheadedness, near syncope, syncope, nausea, vomiting, diarrhea, peripheral edema.    PAST MEDICAL & SURGICAL HISTORY:  CKD (chronic kidney disease)  CHF (congestive heart failure)  Atrial fibrillation  COPD, severity to be determined  Coronary artery disease involving coronary bypass graft of native heart without angina pectoris  Chronic osteomyelitis, osteomyelitis of unspecified site  COPD (chronic obstructive pulmonary disease)  Atrial fibrillation  Bladder cancer  HLD (hyperlipidemia)  H/O knee surgery  S/P CABG (coronary artery bypass graft)  Presence of stent of CABG    REVIEW OF SYSTEMS: As per HPI. Remaining 10 point ROS were reviewed and are negative.    MEDICATIONS  (STANDING):  albuterol/ipratropium for Nebulization. 3 milliLiter(s) Nebulizer every 6 hours  allopurinol 100 milliGRAM(s) Oral daily  aspirin  chewable 81 milliGRAM(s) Oral daily  atorvastatin 20 milliGRAM(s) Oral at bedtime  furosemide    Tablet 40 milliGRAM(s) Oral two times a day  heparin   Injectable 5000 Unit(s) SubCutaneous every 12 hours  pantoprazole    Tablet 40 milliGRAM(s) Oral before breakfast  senna 2 Tablet(s) Oral at bedtime  tamsulosin 0.4 milliGRAM(s) Oral at bedtime    Allergies  No Known Allergies    SOCIAL HISTORY:  Former smoker  Social EtOH  Denies illicit drug use    FAMILY HISTORY:  No pertinent family history in first degree relatives      Vital Signs Last 24 Hrs  T(C): 37.2 (2020 10:55), Max: 37.3 (2020 01:45)  T(F): 98.9 (2020 10:55), Max: 99.1 (2020 01:45)  HR: 93 (2020 11:00) (80 - 128)  BP: 122/63 (2020 11:00) (108/56 - 139/76)  BP(mean): 87 (2020 11:00) (69 - 106)  RR: 24 (2020 11:00) (18 - 33)  SpO2: 97% (2020 11:00) (95% - 100%)    Physical Exam:  Appearance: Normal, well nourished  HEENT: PERRL, EOMI, sclera non-icteric	  Cardiovascular: Normal S1 S2, Irregularly irregular, No JVD, No cardiac murmurs, No carotid bruits, 2+ pitting edema bilaterally  Respiratory: Coarse breath sounds on right, otherwise good air movement  Psychiatry: A & O x 3, Mood & affect appropriate  Gastrointestinal:  Soft, Non-tender, + BS, no bruits	  Skin: No rashes, No ecchymoses, No cyanosis  Neurologic: Grossly non-focal with full strength in all four extremities  Extremities: Normal range of motion, No clubbing, cyanosis      LABS:                        7.4    7.47  )-----------( 274      ( 2020 04:51 )             22.6   11-17    134<L>  |  93<L>  |  99.0<H>  ----------------------------<  119<H>  3.9   |  30.0<H>  |  2.46<H>    Ca    8.0<L>      2020 04:51  Phos  4.8       Mg     2.0         TPro  4.8<L>  /  Alb  2.9<L>  /  TBili  0.8  /  DBili  x   /  AST  12  /  ALT  15  /  AlkPhos  193<H>  -  LIVER FUNCTIONS - ( 2020 03:15 )  Alb: 2.9 g/dL / Pro: 4.8 g/dL / ALK PHOS: 193 U/L / ALT: 15 U/L / AST: 12 U/L / GGT: x           PTT - ( 2020 22:48 )  PTT:32.9 sec    Urinalysis Basic - ( 2020 14:00 )    Color: Yellow / Appearance: Clear / S.010 / pH: x  Gluc: x / Ketone: Negative  / Bili: Negative / Urobili: Negative mg/dL   Blood: x / Protein: Negative mg/dL / Nitrite: Negative   Leuk Esterase: Negative / RBC: 6-10 /HPF / WBC 0-2   Sq Epi: x / Non Sq Epi: Occasional / Bacteria: Occasional    RADIOLOGY & ADDITIONAL STUDIES:  ECG 11/15/2020: AF. Septal infarct with incomplete RBBB. Low voltage in limb leads. Non-specific ST-T wave changes.    < from: TTE Echo Limited or F/U (20 @ 21:06) >  PHYSICIAN INTERPRETATION:  Left Ventricle: The left ventricular internal cavity size is normal.  Global LV systolic function was normal. Left ventricular ejection fraction, by visual estimation, is 60 to 65%.  Right Ventricle: The right ventricular size is mildly enlarged. RV systolic function is mildly reduced.  Tricuspid Valve: The tricuspid valve is not well seen. Moderate tricuspid regurgitation is visualized. Estimated pulmonary artery systolic pressure is 67.7 mmHg assuming a right atrial pressure of 15 mmHg, which is consistent with severe pulmonary hypertension.  Venous: The inferior vena cava was dilated, with respiratory size variation less than 50%.    Summary:   1. Left ventricular ejection fraction, by visual estimation, is 60 to 65%.  2. Normal global left ventricular systolic function.   3. Mildly enlarged right ventricle.   4. Mildly reduced RV systolic function.   5. Moderate tricuspid regurgitation.   6. Estimated pulmonary artery systolic pressure is 67.7 mmHg assuming a right atrial pressure of 15 mmHg, which is consistent with severe pulmonary hypertension.    < end of copied text >  < from: TTE Echo Limited or F/U (20 @ 21:06) >  PHYSICIAN INTERPRETATION:  Left Ventricle: The left ventricular internal cavity size is normal.  Global LV systolic function was normal. Left ventricular ejection fraction, by visual estimation, is 60 to 65%.  Right Ventricle: The right ventricular size is mildly enlarged. RV systolic function is mildly reduced.  Tricuspid Valve: The tricuspid valve is not well seen. Moderate tricuspid regurgitation is visualized. Estimated pulmonary artery systolic pressure is 67.7 mmHg assuming a right atrial pressure of 15 mmHg, which is consistent with severe pulmonary hypertension.  Venous: The inferior vena cava was dilated, with respiratory size variation less than 50%.      Summary:   1. Left ventricular ejection fraction, by visual estimation, is 60 to 65%.  2. Normal global left ventricular systolic function.   3. Mildly enlarged right ventricle.   4. Mildly reduced RV systolic function.   5. Moderate tricuspid regurgitation.   6. Estimated pulmonary artery systolic pressure is 67.7 mmHg assuming a right atrial pressure of 15 mmHg, which is consistent with severe pulmonary hypertension.    MD Dana Electronically signed on 2020 at 10:11:35 AM    < end of copied text >    ECHO FINDINGS: < from: TTE Echo Limited or F/U (20 @ 15:22) >  Summary:   1. Left ventricular ejection fraction, by visual estimation, is 60 to 65%.   2. Normal global left ventricular systolic function.   3. Severely enlarged right atrium.   4. Severely enlarged right ventricle.   5. Severely reduced RV systolic function.   6. Positive Baca Sign.   7. Normal left atrial size.   8. There is no evidence of pericardial effusion.   9. Mild-moderate tricuspid regurgitation.  10. Estimated pulmonary artery systolic pressure is 63.4 mmHg assuming a right atrial pressure of 15 mmHg, which is consistent with severe pulmonary hypertension.  11. Endocardial visualization was enhanced with intravenous echo contrast.    Echo 2020 Outpatient Dr. Storm  EF 55-60%, mild asymmetric LVH, normal regional wall motion, moderate to severe diastolic dysfunction, enlarged LA, RV normal size and function, aortic root is dilated, mild MR, mild TR, pulmonary HTN is present, peak PA pressure 47 (mild to mod)

## 2020-11-17 NOTE — CHART NOTE - NSCHARTNOTEFT_GEN_A_CORE
Pt endorsed to Dr. Cuello, hospitalist for transfer from MICU to tele bed with .     Pt is a 70 y.o. M admitted for respiratory failure, AMS, acute on chronic respiratory failure. Subsequently extubated, ambulating, tolerating diet, voiding. On diuretics, GI ppx. Cardiology recommended cardiac cath however pt declining any invasive procedures at this time. Pt is a with right pigtail catheter in place, serosanguinous output, followed by thoracic surgery.     Consultants:  - Thoracic Surgery  - Pulmonology  - Cardiology  - Nephrology  - Palliative

## 2020-11-17 NOTE — PHYSICAL THERAPY INITIAL EVALUATION ADULT - ADDITIONAL COMMENTS
pt reports he lives with his wife in an apartment with 0 steps to enter and 0 steps inside. pt reports wife is able to assist as needed. pt reports independence with all ADLs and no need for an assistive device prior to admission. pt drives. pt owns a RW, cane and shower bench.

## 2020-11-17 NOTE — PROGRESS NOTE ADULT - ASSESSMENT
70 year old male with a pmhx of w/ Afib (on eliquis), pulmonary HTN, COPD, chronic OM, CKD, COPD, CHF, CABG, bladder CA. Recently admitted s/p fall w/ hematoma of LLE requiring debridement and evacuation of hematoma, complicated by worsening GERALD on CKD and worsening respiratory status. Found to have severe pulm HTN, was being diuresed with lasix. Was recommended for cath but patient declined for fear of worsening renal failure. Noted to have R pleural effusion s/p pigtail catheter that drained 2.2L. Eventually d/c to [facility] on 11/13. Presented 11/15 unresponsive, obtunded then intubated in the ED. Hypothermic and developed shock. Labs significant for hyperkalemia acute on chronic renal failure and worsening R pleural effusion. Pigtail placed MICU team.   Consulted for further thoracic  and pigtail management.      maintain chest tube to water seal  record output per shift  daily CXR  Dressing change PRN  diuresis as tolerated for CHF  trend H/H    Ruth DELACRUZ  Thoracic Sx

## 2020-11-17 NOTE — CONSULT NOTE ADULT - ASSESSMENT
-s/p Vent dep resp failure  -Acute on chronic hypoxic resp failure  -COPD  -pulm edema, pleural effusion  -CHF with decompensation  -VANITA; has CPAP at home but not using it  -Anemia  -Hypercarbic resp failure; compensated  -Renal insufficiency    RECC:  Nebs. O2; titrate. Diurese. Chest tube to suction; seen by T-surg. Cardiology eval. Follow H/H. Lytes. Outpt pulm f/u. Should use CPAP; reviewed with patient.
70 year old male with a pmhx of w/ Afib, pulmonary HTN, chronic OM, CKD, COPD, CABG. Recently admitted s/p fall w/ hematoma of LLE requiring debridement and evacuation of hematoma, complicated by worsening GERALD on CKD and worsening respiratory status. Found to have severe pulm HTN, was being diuresed with lasix. Was reccomended for cath but patient declined for fear of worsening renal failure. Noted to have R pleural effusion s/p pigtail catheter that drained 2.2L. Eventually d/c to [facility] on 11/13. Presented 11/15 unresponsive, obtunded then intubated in the ED. Hypothermic and developed shock. Labs significant for hyperkalemia acute on chronic renal failure and worsening R pleural effusion. > pigtail placed MICU team  Consulted for further thoracic  and pigtail management  
70yoM with prior STSG on 10/22 due to debridement of hematoma 2/2 fall on 10/30. Surgery consulted for wound care management  - Thigh wound: Please trim edges of the xeroform as it peels off on it's own  - shin wound: Please do daily dressing changes with xeroform to the wound (minimize xeroform on skin), cover with gauze, and then wrap with kerlix    - f/u in surgery clinic once discharged
Patient is a  70 year old male with a pmhx of w/ Afib, pulmonary HTN, chronic OM, CKD, COPD, CABG. Recently admitted s/p fall w/ hematoma of LLE requiring debridement and evacuation of hematoma, complicated by worsening GERALD on CKD and worsening respiratory status. Found to have severe pulm HTN, was being diuresed with lasix. Was reccomended for cath but patient declined for fear of worsening renal failure. Noted to have R pleural effusion s/p pigtail catheter that drained 2.2L. Eventually d/c to [facility] on 11/13. Presented today unresponsive, obtunded then intubated in the ED. Hypothermic and developed shock. Labs significant for hyperkalemia acute on chronic renal failure and worsening R pleural effusion. MICU consulted called.  (15 Nov 2020 05:20)    Above Appreciated  Cardiology consult requested for evaluation for CAD. Pt denies recall of any anginal symptoms prior to unresponsive episode. Pt currently extubated, alert and oriented.      Severe Pulm HTN  - Pt was found unresponsive  - Echo performed on 06/2020 showed mild to moderate pulmonary HTN and normal RV function   - Patient's initial echo 10/15/20 showed new severe pulmonary HTN  - Pt was discharged from [facility] on only PRN diuresis  - Continue IV diuresis lasix 80 mg IVP BID  - Plan for eventual R+LHC when pt stabilized    CAD s/p CABG x 3  - No new RWMA on echo  - If no recent ischemia evaluations, LHC prior to discharge when acute hospital issues resolved, including GERALD  - Continue aspirin and statin   - Restart home coreg 12.5mg PO BID when able   - maintain Hb >8    Atrial Fibrillation   - Rate controlled at this time   - Restart home Coreg 12.5mg PO BID   - Patient only meets 1 of the criteria for lowering AC dose (79 y/o or older; wt < 60kg; Cr > 1.5), Increase Eliquis to 5mg PO BID for AC when cleared by neuro team  - Continue telemetry monitoring      
Zaida Garcia is a 70 year old male with COPD (home O2 prescribed but does not use), CKD, chronic osteomyselitis, pulmonary HTN, coronary artery disease s/p CABG, HFpEF and long-standing persistent AF intolerant of anticoagulation who is referred by Dr. Geller in consideration of Watchman implantation.    We discussed the role of left atrial appendage occlusion at length. I informed the patient that the Watchman device is not a replacement for anticoagulation, as anticoagulation is still the best method of preventing stroke. However, in individuals who are intolerant of anticoagulation, the Watchman is proven to be non-inferior to anticoagulation with warfarin (PREVAIL trial).    The risks with Watchman implantation were described in detail and include, but are not limited to: pain, bleeding, infection, vascular injury, cardiac perforation and tamponade with potential for requiring open heart surgery, death, heart attack, or stroke. We also discussed that device related thrombi are noted but are uncommon (<5%). The patient expressed understanding and was provided the opportunity to ask questions, of which all were answered to the patient's satisfaction.    If the patient prefers to pursue Watchman implantation, they would require a JORGE before hand to see if they are a suitable candidate. Anticoagulation will need to be continued before implantation and up to 6 weeks after implant, at which time a JORGE will be repeated. If JORGE is acceptable, then anticoagulation will be changed to ASA + Plavix for 6 months, after which ASA will be continued alone. A JORGE is repeated at 1 year to confirm adequate seal of the Watchman device and to rule out thrombus.    I recommended that the patient continue getting optimized during this admission and that we discuss Watchman implantation as an outpatient. He was agreeable with this plan.    Recommendations:  - Restart anticoagulation with Apixaban when able (CHADS2-VASc of 3 (HFpEF, Agex1, CAD)  - Outpatient follow up for Watchman     Discussed with Dr. Geller.    Thank you for this consult.    Aaron Abernathy MD  Clinical Cardiac Electrophysiology

## 2020-11-17 NOTE — PROGRESS NOTE ADULT - ASSESSMENT
70M hx CAD s/p CABG, COPD, AF, CKD now re-admitted with likely hypoxic respiratory failure in setting of decompensated HF    Acute on Chronic HFpEF  - volume overloaded on admission, hypoxic, large R pleural effusion, hyperkalemia  - now extubated, chest tube R side  - TTE- severe pulm HTN, mild reduced systolic function, mod TR    AF  - Rate controlled, 80s,  - CHASDVasc 4  - heparin gtt d/c secondary to ozzing at chest tube, and IV sites.   - one unit PRBCs given yesterday with no improvement in HH   - previously prolonged hospitalization for hematoma,   - Dr. Abernathy Evaluated for  watchman procedure, as pt not able to tolerate long tem AC,     CAD:  - LHC/RHC this hospitalization when renal function improves, anemia resolved and able to tolerate DAPT   - On  ASA, statin    Recurrent CHF episodes and mildly elevated troponin on previous hospitalization.   Also discussed risks/benefits/indications to continued Systemic anticoagulation for embolic CVA risk reduction for AF.  In setting of multiple falls and ongoing anemia issues, patient verbalized understanding of bleeding risk but would still prefer to minimize his risk of CVA.      Hgb downtrended overnight, transfused 1 unit PRBC with minimal improvement in Hgb, and heparin d/c'd,    OLVIN closure device on this admission versus near future.      Cardiac cath will be postponed until Hb and  SCr.,  stabilized,     KDIGO Care Bundle:    Avoidance of nephrotoxins/nephrotoxin medication adjustment  Medication dosage adjustment for kidney function  Continuous IVF administration or Diuresis as Indicated,   Discontinuation of ACE/ARBs,  Close monitoring of serum Creatinine and UO  Acid-base, electrolyte and albumin status management  Avoidance of hyperglycemia f,  Consider alternatives to radiocontrast administration  Optimizing hemodynamics:

## 2020-11-17 NOTE — DIETITIAN INITIAL EVALUATION ADULT. - ETIOLOGY
Related to COPD, GERALD on CKD, recurrent pleural effusions requiring pigtail placement on this admission

## 2020-11-17 NOTE — PROGRESS NOTE ADULT - SUBJECTIVE AND OBJECTIVE BOX
Subjective: Patient saturating well on 2 L NC. Denies SOB, chest pain, palpitations, dizziness, fever, chills, cough, hemoptysis. S/P 1 U PRBC without response overnight. Hep gtt discontinued yesterday.     Vital Signs:  Vital Signs Last 24 Hrs  T(C): 36.9 (11-17-20 @ 07:24), Max: 37.3 (11-17-20 @ 01:45)  T(F): 98.5 (11-17-20 @ 07:24), Max: 99.1 (11-17-20 @ 01:45)  HR: 128 (11-17-20 @ 09:37) (77 - 152)  BP: 112/58 (11-17-20 @ 09:00) (103/51 - 139/76)  RR: 23 (11-17-20 @ 09:00) (18 - 33)  SpO2: 100% (11-17-20 @ 09:37) (94% - 100%) on (O2)    I&O's Detail    16 Nov 2020 07:01  -  17 Nov 2020 07:00  --------------------------------------------------------  IN:    Heparin Infusion: 146 mL    Oral Fluid: 120 mL  Total IN: 266 mL    OUT:    Chest Tube (mL): 1190 mL    Norepinephrine: 0 mL    Propofol: 0 mL    Ureteral Catheter (mL): 735 mL    Voided (mL): 675 mL  Total OUT: 2600 mL    Total NET: -2334 mL      17 Nov 2020 07:01  -  17 Nov 2020 09:44  --------------------------------------------------------  IN:    Oral Fluid: 240 mL  Total IN: 240 mL    OUT:    Chest Tube (mL): 130 mL    Voided (mL): 400 mL  Total OUT: 530 mL    Total NET: -290 mL      General: NAD  Neurology: Awake, nonfocal  Eyes: Scleras clear, PERRLA/ EOMI, Gross vision intact  ENT: Gross hearing intact, grossly patent pharynx, no stridor  Neck: Neck supple, trachea midline, No JVD  Respiratory: B/L BS  CV: S1S2, no murmurs, rubs or gallops  Abdominal: Soft, NT, ND  Extremities: RUE and LLE wounds with dressings, no edema  Psych: Oriented x 3, normal affect  Tubes: R PTC to WS, 1190 out in 24H, serosanguineous fluid, no air leak     Relevant labs, radiology and Medications reviewed                        7.4    7.47  )-----------( 274      ( 17 Nov 2020 04:51 )             22.6     11-17    134<L>  |  93<L>  |  99.0<H>  ----------------------------<  119<H>  3.9   |  30.0<H>  |  2.46<H>    Ca    8.0<L>      17 Nov 2020 04:51  Phos  4.8     11-17  Mg     2.0     11-17    TPro  4.8<L>  /  Alb  2.9<L>  /  TBili  0.8  /  DBili  x   /  AST  12  /  ALT  15  /  AlkPhos  193<H>  11-16    PTT - ( 16 Nov 2020 22:48 )  PTT:32.9 sec  MEDICATIONS  (STANDING):  albuterol/ipratropium for Nebulization. 3 milliLiter(s) Nebulizer every 6 hours  allopurinol 100 milliGRAM(s) Oral daily  aspirin  chewable 81 milliGRAM(s) Oral daily  atorvastatin 20 milliGRAM(s) Oral at bedtime  furosemide   Injectable 80 milliGRAM(s) IV Push two times a day  pantoprazole    Tablet 40 milliGRAM(s) Oral before breakfast  senna 2 Tablet(s) Oral at bedtime  tamsulosin 0.4 milliGRAM(s) Oral at bedtime    MEDICATIONS  (PRN):        Assessment  70y Male  w/ PAST MEDICAL & SURGICAL HISTORY:  CKD (chronic kidney disease)    CHF (congestive heart failure)    Atrial fibrillation    COPD, severity to be determined    Coronary artery disease involving coronary bypass graft of native heart without angina pectoris    Chronic osteomyelitis, osteomyelitis of unspecified site    COPD (chronic obstructive pulmonary disease)    Atrial fibrillation    Bladder cancer    HLD (hyperlipidemia)    H/O knee surgery    S/P CABG (coronary artery bypass graft)    Presence of stent of CABG    admitted with complaints of Patient is a 70y old  Male who presents with a chief complaint of Resp failure (16 Nov 2020 12:17)

## 2020-11-17 NOTE — PHARMACOTHERAPY INTERVENTION NOTE - COMMENTS
updated based on records from [facility]    [facility] list
Pharmacy-Directed Vancomycin Dosing   Pt on 1 Gm q24h empirically. Renal function unstable. Level entered prior to next dose, with regimen to be adjusted per level.

## 2020-11-17 NOTE — PHYSICAL THERAPY INITIAL EVALUATION ADULT - GENERAL OBSERVATIONS, REHAB EVAL
pt received seated OOB in chair + 2L O2 via NC + monitor with  + chest tube and NAD, willing to participate with PT.

## 2020-11-17 NOTE — PROGRESS NOTE ADULT - SUBJECTIVE AND OBJECTIVE BOX
[place] [facility]                                                               Office: [address] [address]                                                              Telephone: [phone]. Fax:[phone]                                                                             PROGRESS NOTE  Reason for follow up: CAD  Update: Pt awake alert sitting up in chair. States feeling much better. Supplemental Oxygen NC 2lpm. denies chest pain, palpitations, dizziness. some dyspnea on exertion. Chest tube to water seal.     	  Vitals:  T(C): 36.9 (11-17-20 @ 07:24), Max: 37.3 (11-17-20 @ 01:45)  HR: 128 (11-17-20 @ 09:37) (80 - 152)  BP: 112/58 (11-17-20 @ 09:00) (103/51 - 139/76)  RR: 23 (11-17-20 @ 09:00) (18 - 33)  SpO2: 100% (11-17-20 @ 09:37) (94% - 100%)    I&O's Summary    16 Nov 2020 07:01  -  17 Nov 2020 07:00  --------------------------------------------------------  IN: 266 mL / OUT: 2600 mL / NET: -2334 mL    17 Nov 2020 07:01  -  17 Nov 2020 10:32  --------------------------------------------------------  IN: 240 mL / OUT: 530 mL / NET: -290 mL      Weight (kg): 100.2 (11-15 @ 06:51)    < from: TTE Echo Limited or F/U (11.16.20 @ 21:06) >  Summary:   1. Left ventricular ejection fraction, by visual estimation, is 60 to 65%.  2. Normal global left ventricular systolic function.   3. Mildly enlarged right ventricle.   4. Mildly reduced RV systolic function.   5. Moderate tricuspid regurgitation.   6. Estimated pulmonary artery systolic pressure is 67.7 mmHg assuming a right atrial pressure of 15 mmHg, which is consistent with severe pulmonary hypertension.    MD Dana Electronically signed on 11/17/2020 at 10:11:35 AM    < end of copied text >    PHYSICAL EXAM:  Appearance: Comfortable. No acute distress  HEENT: Atraumatic. Normocephalic. Normal oral mucosa, PERRL, Neck is supple. + JVD  Neurologic: A & O x 3, no focal deficits. EOMI.   Cardiovascular: Normal S1 S2, No murmur, rubs/gallops. No JVD, No edema  Respiratory: R side coarse breath sounds, O2 NC 2lpm.   Gastrointestinal:  Soft, Non-tender, + BS  Lower Extremities: +2 edema  Psychiatry: Patient is calm. No agitation. Mood & affect appropriate  Skin: LLE dressing intact. R side chest tube, dressing c/d/i       CURRENT MEDICATIONS:  furosemide   Injectable 80 milliGRAM(s) IV Push two times a day  tamsulosin 0.4 milliGRAM(s) Oral at bedtime  albuterol/ipratropium for Nebulization.  pantoprazole    Tablet  senna  allopurinol  atorvastatin  aspirin  chewable  heparin   Injectable      LABS:	 	  CARDIAC MARKERS ( 15 Nov 2020 01:12 )  x     / 0.03 ng/mL / x     / x     / x      p-BNP 15 Nov 2020 01:12: 78588 pg/mL                          7.4    7.47  )-----------( 274      ( 17 Nov 2020 04:51 )             22.6     11-17    134<L>  |  93<L>  |  99.0<H>  ----------------------------<  119<H>  3.9   |  30.0<H>  |  2.46<H>    Ca    8.0<L>      17 Nov 2020 04:51  Phos  4.8     11-17  Mg     2.0     11-17    TPro  4.8<L>  /  Alb  2.9<L>  /  TBili  0.8  /  DBili  x   /  AST  12  /  ALT  15  /  AlkPhos  193<H>  11-16    proBNP: Serum Pro-Brain Natriuretic Peptide: 86139 pg/mL (11-15 @ 01:12)    TELEMETRY: SR 93bpm, artifact.

## 2020-11-17 NOTE — PROGRESS NOTE ADULT - SUBJECTIVE AND OBJECTIVE BOX
Patient seen and examined    I&O's Summary    16 Nov 2020 07:01  -  17 Nov 2020 07:00  --------------------------------------------------------  IN: 266 mL / OUT: 2600 mL / NET: -2334 mL    17 Nov 2020 07:01  -  17 Nov 2020 13:59  --------------------------------------------------------  IN: 240 mL / OUT: 530 mL / NET: -290 mL    REVIEW OF SYSTEMS:    CONSTITUTIONAL: No F/C  RESPIRATORY: No cough or SOB  CARDIOVASCULAR: No CP/palpitations,    GASTROINTESTINAL: No abdominal pain , NVD   GENITOURINARY: No UTI sx  NEUROLOGICAL: No headaches/wk/numbness  MUSCULOSKELETAL:  No joint pain/swelling; No LBP  EXTREMITIES : + swelling,    Vital Signs Last 24 Hrs  T(C): 37.2 (17 Nov 2020 10:55), Max: 37.3 (17 Nov 2020 01:45)  T(F): 98.9 (17 Nov 2020 10:55), Max: 99.1 (17 Nov 2020 01:45)  HR: 93 (17 Nov 2020 12:00) (80 - 128)  BP: 130/60 (17 Nov 2020 12:00) (108/56 - 139/76)  BP(mean): 87 (17 Nov 2020 12:00) (69 - 106)  RR: 25 (17 Nov 2020 12:00) (18 - 33)  SpO2: 95% (17 Nov 2020 12:00) (95% - 100%)    PHYSICAL EXAM:    GENERAL: NAD, Pale, In Chair, Weak & Lethargic,   EYES:  conjunctiva and sclera clear  NECK: Supple, No JVD/Bruit  NERVOUS SYSTEM:  A/O x3,   CHEST:  CTA ,No rales or rhonchi  HEART:  RRR, 2/6  murmur,   ABDOMEN: Soft, NT/ND BS+  EXTREMITIES:  + Edema;  SKIN: No rashes    LABS:                        7.4    7.47  )-----------( 274      ( 17 Nov 2020 04:51 )             22.6     11-17    134<L>  |  93<L>  |  99.0<H>  ----------------------------<  119<H>  3.9   |  30.0<H>  |  2.46<H>    Ca    8.0<L>      17 Nov 2020 04:51  Phos  4.8     11-17  Mg     2.0     11-17    TPro  4.8<L>  /  Alb  2.9<L>  /  TBili  0.8  /  DBili  x   /  AST  12  /  ALT  15  /  AlkPhos  193<H>  11-16    MEDICATIONS  (STANDING):  albuterol/ipratropium for Nebulization.  allopurinol  aspirin  chewable  atorvastatin  furosemide    Tablet  heparin   Injectable  pantoprazole    Tablet  senna  tamsulosin  Creatinine, Serum: 2.46 mg/dL (11.17.20 @ 04:51)   Historical Values  Creatinine, Serum: 2.46 mg/dL (11.17.20 @ 04:51)   Creatinine, Serum: 2.31 mg/dL (11.16.20 @ 03:15)   Creatinine, Serum: 2.14 mg/dL (11.15.20 @ 09:46)   Creatinine, Serum: 2.27 mg/dL (11.15.20 @ 01:12)   Creatinine, Serum: 2.10 mg/dL (11.12.20 @ 07:32)   Creatinine, Serum: 2.08 mg/dL (11.11.20 @ 06:53)   Creatinine, Serum: 2.10 mg/dL (11.10.20 @ 06:08)   Creatinine, Serum: 2.06 mg/dL (11.09.20 @ 11:37)   Creatinine, Serum: 2.05 mg/dL (11.08.20 @ 07:55)   Creatinine, Serum: 1.93 mg/dL (11.07.20 @ 05:41)   Creatinine, Serum: 1.82 mg/dL (11.06.20 @ 05:08)   Creatinine, Serum: 2.01 mg/dL (11.05.20 @ 05:56)   Creatinine, Serum: 2.11 mg/dL (11.04.20 @ 14:53)   Creatinine, Serum: 2.26 mg/dL (11.04.20 @ 04:30)   Creatinine, Serum: 2.40 mg/dL (11.03.20 @ 18:09)   Creatinine, Serum: 2.52 mg/dL (11.03.20 @ 11:04)   Creatinine, Serum: 2.64 mg/dL (11.03.20 @ 04:24)   Creatinine, Serum: 2.84 mg/dL (11.02.20 @ 22:18)   Creatinine, Serum: 2.76 mg/dL (11.02.20 @ 16:17)   Creatinine, Serum: 2.32 mg/dL (11.02.20 @ 05:52)   Creatinine, Serum: 1.77 mg/dL (11.01.20 @ 07:42)   Creatinine, Serum: 1.34 mg/dL (10.31.20 @ 04:35)   Creatinine, Serum: 1.24 mg/dL (10.30.20 @ 19:28)   Creatinine, Serum: 1.34 mg/dL (10.30.20 @ 05:48)   Creatinine, Serum: 1.58 mg/dL (10.28.20 @ 05:46)   Creatinine, Serum: 1.56 mg/dL (10.27.20 @ 02:36)   Creatinine, Serum: 1.40 mg/dL (10.26.20 @ 05:36)   Creatinine, Serum: 1.45 mg/dL (10.25.20 @ 06:28)   Creatinine, Serum: 1.51 mg/dL (10.24.20 @ 06:23)   Creatinine, Serum: 1.44 mg/dL (10.23.20 @ 05:49)   Creatinine, Serum: 1.44 mg/dL (10.22.20 @ 05:41)   Creatinine, Serum: 1.85 mg/dL (10.21.20 @ 14:35)   Creatinine, Serum: 1.97 mg/dL (10.20.20 @ 07:30)   Creatinine, Serum: 2.18 mg/dL (10.19.20 @ 06:16)   Creatinine, Serum: 3.15 mg/dL (10.18.20 @ 04:32)   Creatinine, Serum: 3.93 mg/dL (10.17.20 @ 06:13)   Creatinine, Serum: 4.29 mg/dL (10.16.20 @ 21:10)   Creatinine, Serum: 4.53 mg/dL (10.16.20 @ 12:06)   Creatinine, Serum: 4.79 mg/dL (10.16.20 @ 04:28)   Creatinine, Serum: 4.84 mg/dL (10.15.20 @ 22:28)   Creatinine, Serum: 4.26 mg/dL (10.15.20 @ 12:15)   Creatinine, Serum: 1.57 mg/dL (03.28.17 @ 07:31)   Creatinine, Serum: 1.79 mg/dL (03.27.17 @ 08:14)   Creatinine, Serum: 1.70 mg/dL (03.26.17 @ 11:52)   Creatinine, Serum: 1.63 mg/dL (03.25.17 @ 05:09)   Creatinine, Serum: 1.76 mg/dL (03.24.17 @ 17:21)   Creatinine, Serum: 1.62 mg/dL (03.17.17 @ 07:50)   Creatinine, Serum: 1.69 mg/dL (03.14.17 @ 09:37)   Creatinine, Serum: 1.41 mg/dL (03.10.17 @ 07:47)   Creatinine, Serum: 1.50 mg/dL (03.07.17 @ 10:47)   Creatinine, Serum: 1.47 mg/dL (03.03.17 @ 07:53)   Creatinine, Serum: 1.18 mg/dL (02.28.17 @ 08:07)   Creatinine, Serum: 1.18 mg/dL (02.24.17 @ 10:38)   Creatinine, Serum: 1.16 mg/dL (02.21.17 @ 08:49)   Creatinine, Serum: 1.61 mg/dL (02.17.17 @ 10:45)   Creatinine, Serum: 1.50 mg/dL (02.14.17 @ 07:53)   Creatinine, Serum: 1.30 mg/dL (02.10.17 @ 10:31)   Creatinine, Serum: 1.37 mg/dL (02.07.17 @ 07:53)   Creatinine, Serum: 1.59 mg/dL (02.03.17 @ 11:08)   Creatinine, Serum: 1.30 mg/dL (01.31.17 @ 07:50)   Creatinine, Serum: 1.17 mg/dL (01.27.17 @ 10:42)   Creatinine, Serum: 1.35 mg/dL (01.24.17 @ 07:53)   Creatinine, Serum: 1.45 mg/dL (01.20.17 @ 09:04)   Creatinine, Serum: 1.22 mg/dL (01.17.17 @ 08:15)   Creatinine, Serum: 1.35 mg/dL (01.14.17 @ 09:35)   Creatinine, Serum: 1.46 mg/dL (01.13.17 @ 10:49)   Creatinine, Serum: 1.24 mg/dL (01.10.17 @ 07:47)   Creatinine, Serum: 1.31 mg/dL (01.06.17 @ 09:19)   Creatinine, Serum: 1.20 mg/dL (01.04.17 @ 07:48)   Creatinine, Serum: 0.91 mg/dL (01.02.17 @ 11:22)   Creatinine, Serum: 1.24 mg/dL (12.27.16 @ 12:13)   Creatinine, Serum: 1.04 mg/dL (12.21.16 @ 08:06)   Creatinine, Serum: 1.12 mg/dL (12.20.16 @ 08:12)   Creatinine, Serum: 1.17 mg/dL (12.19.16 @ 10:44)   Creatinine, Serum: 1.09 mg/dL (12.18.16 @ 09:09)   Creatinine, Serum: 1.17 mg/dL (12.16.16 @ 08:24)   Creatinine, Serum: 1.28 mg/dL (12.15.16 @ 08:25)   Creatinine, Serum: 1.36 mg/dL (12.14.16 @ 08:09)   Creatinine, Serum: 1.52 mg/dL (12.13.16 @ 07:56)   Creatinine, Serum: 2.03 mg/dL (12.12.16 @ 07:33)   Creatinine, Serum: 3.14 mg/dL (12.11.16 @ 10:09)   Creatinine, Serum: 3.63 mg/dL (12.10.16 @ 15:35)   Creatinine, Serum: 4.17 mg/dL (12.10.16 @ 10:46)   Creatinine, Serum: 5.09 mg/dL (12.10.16 @ 03:10)   Creatinine, Serum: 5.95 mg/dL (12.09.16 @ 23:25)   Creatinine, Serum: 6.20 mg/dL (12.09.16 @ 18:47)   Creatinine, Serum: 6.55 mg/dL (12.09.16 @ 15:13)   Creatinine, Serum: 7.17 mg/dL (12.09.16 @ 11:25)

## 2020-11-17 NOTE — PHYSICAL THERAPY INITIAL EVALUATION ADULT - PERTINENT HX OF CURRENT PROBLEM, REHAB EVAL
70M hx CAD s/p CABG, COPD, AF, CKD now re-admitted with likely hypoxic respiratory failure in setting of decompensated HF

## 2020-11-17 NOTE — PROGRESS NOTE ADULT - ASSESSMENT
-s/p Vent dep resp failure  -Acute on chronic hypoxic resp failure; see below  -COPD  -pulm edema, pleural effusion; improving  -CHF with decompensation  -VANITA; has CPAP at home but not using it  -Anemia  -Hypercarbic resp failure; compensated  -Renal insufficiency    RECC:  Symbicort (on advair at home). Change to prn Nebs. O2; titrate. Diurese. Chest tube to suction; seen by T-surg. Cardiology f/u. Follow H/H. [place]. Outpt pulm f/u. Should use CPAP; reviewed with patient.

## 2020-11-17 NOTE — PROGRESS NOTE ADULT - ASSESSMENT
70 year old male with a pmhx of w/ Afib, pulmonary HTN, chronic OM, CKD, COPD, CABG. Recently admitted s/p fall w/ hematoma of LLE requiring debridement and evacuation of hematoma, complicated by worsening GERALD on CKD and worsening respiratory status. Found to have severe pulm HTN, was being diuresed with lasix. Was recommended for cath but patient declined for fear of worsening renal failure. Noted to have R pleural effusion s/p pigtail catheter that drained 2.2L. Eventually d/c to [facility] [place] on 11/13. Presented back on 11/15/20 unresponsive, obtunded then intubated in the ED. Hypothermic and developed shock. Labs significant for hyperkalemia acute on chronic renal failure and worsening R pleural effusion. Admitted to MICU. Pt now has a right pigtail placement for recurrence of right effusion, CTS following. Pt extubated, GERALD on CKD improving. Pt transferred to medical service.    1) Acute hypoxic respiratory failure with right pleural effusion and pulmonary HTN  - s/p extubation   - on O2  - IV diuresis   - Cardio and pulmonary following  - pulse ox/tele  - s/p right chest pigtail tube  - CTS following    2) Severe Pulmonary HTN  - per cardio: Plan for eventual R+LHC when pt stabilized  - on IV diuresis     3) CAD s/p CABG  - on statin and aspirin    4) Afib  - rate controlled   - cardio following  - Pt seem to be having frequent falls per record, currently A/C has been on hold  - discuss with cardio regarding A/C    5) GERALD on CKD  - monitor level  - consult Illamathi to eval     6) COPD  - stable  - pulmonary following  - O2 & nebs    7) Prophylactic measure  - DVT ppx: heparin SQ     70 year old male with a pmhx of w/ Afib, pulmonary HTN, chronic OM, CKD, COPD, CABG. Recently admitted s/p fall w/ hematoma of LLE requiring debridement and evacuation of hematoma, complicated by worsening GERALD on CKD and worsening respiratory status. Found to have severe pulm HTN, was being diuresed with lasix. Was recommended for cath but patient declined for fear of worsening renal failure. Noted to have R pleural effusion s/p pigtail catheter that drained 2.2L. Eventually d/c to [facility] on 11/13. Presented back on 11/15/20 unresponsive, obtunded then intubated in the ED. Hypothermic and developed shock. Labs significant for hyperkalemia acute on chronic renal failure and worsening R pleural effusion. Admitted to MICU. Pt now has a right pigtail placement for recurrence of right effusion, CTS following. Pt extubated, GERALD on CKD improving. Pt transferred to medical service.    1) Acute hypoxic respiratory failure with right pleural effusion and pulmonary HTN  - s/p extubation   - on O2  - IV diuresis   - Cardio and pulmonary following  - pulse ox/tele  - s/p right chest pigtail tube  - CTS following    2) Severe Pulmonary HTN  - per cardio: Plan for eventual R+LHC when pt stabilized  - on IV diuresis     3) CAD s/p CABG  - on statin and aspirin    4) Afib  - rate controlled   - cardio following  - Pt seem to be having frequent falls per record, also A.C held yesterday due to drop in Hbg   - holding A/C for now, will need to resume once H/H stable and no evidence of bleed     5) GERALD on CKD  - monitor level  - consult Nahomy to alexx     6) Anemia  - s/p 1 unit of blood   - monitor  - A/C was held yesterday    6) COPD  - stable  - pulmonary following  - O2 & nebs    7) Prophylactic measure  - DVT ppx: heparin SQ

## 2020-11-18 ENCOUNTER — TRANSCRIPTION ENCOUNTER (OUTPATIENT)
Age: 70
End: 2020-11-18

## 2020-11-18 LAB
ANION GAP SERPL CALC-SCNC: 11 MMOL/L — SIGNIFICANT CHANGE UP (ref 5–17)
BUN SERPL-MCNC: 89 MG/DL — HIGH (ref 8–20)
CALCIUM SERPL-MCNC: 8 MG/DL — LOW (ref 8.6–10.2)
CHLORIDE SERPL-SCNC: 95 MMOL/L — LOW (ref 98–107)
CO2 SERPL-SCNC: 30 MMOL/L — HIGH (ref 22–29)
CREAT SERPL-MCNC: 2.04 MG/DL — HIGH (ref 0.5–1.3)
GLUCOSE SERPL-MCNC: 102 MG/DL — HIGH (ref 70–99)
HCT VFR BLD CALC: 23.3 % — LOW (ref 39–50)
HGB BLD-MCNC: 7.2 G/DL — LOW (ref 13–17)
MAGNESIUM SERPL-MCNC: 1.9 MG/DL — SIGNIFICANT CHANGE UP (ref 1.6–2.6)
MCHC RBC-ENTMCNC: 30.9 GM/DL — LOW (ref 32–36)
MCHC RBC-ENTMCNC: 33 PG — SIGNIFICANT CHANGE UP (ref 27–34)
MCV RBC AUTO: 106.9 FL — HIGH (ref 80–100)
PLATELET # BLD AUTO: 253 K/UL — SIGNIFICANT CHANGE UP (ref 150–400)
POTASSIUM SERPL-MCNC: 3.7 MMOL/L — SIGNIFICANT CHANGE UP (ref 3.5–5.3)
POTASSIUM SERPL-SCNC: 3.7 MMOL/L — SIGNIFICANT CHANGE UP (ref 3.5–5.3)
RBC # BLD: 2.18 M/UL — LOW (ref 4.2–5.8)
RBC # FLD: 17.8 % — HIGH (ref 10.3–14.5)
SODIUM SERPL-SCNC: 136 MMOL/L — SIGNIFICANT CHANGE UP (ref 135–145)
WBC # BLD: 7.11 K/UL — SIGNIFICANT CHANGE UP (ref 3.8–10.5)
WBC # FLD AUTO: 7.11 K/UL — SIGNIFICANT CHANGE UP (ref 3.8–10.5)

## 2020-11-18 PROCEDURE — 99233 SBSQ HOSP IP/OBS HIGH 50: CPT

## 2020-11-18 PROCEDURE — 99231 SBSQ HOSP IP/OBS SF/LOW 25: CPT

## 2020-11-18 PROCEDURE — 99223 1ST HOSP IP/OBS HIGH 75: CPT

## 2020-11-18 PROCEDURE — 99232 SBSQ HOSP IP/OBS MODERATE 35: CPT

## 2020-11-18 PROCEDURE — 71045 X-RAY EXAM CHEST 1 VIEW: CPT | Mod: 26

## 2020-11-18 RX ORDER — CARVEDILOL PHOSPHATE 80 MG/1
3.12 CAPSULE, EXTENDED RELEASE ORAL EVERY 12 HOURS
Refills: 0 | Status: DISCONTINUED | OUTPATIENT
Start: 2020-11-18 | End: 2020-11-22

## 2020-11-18 RX ADMIN — HEPARIN SODIUM 5000 UNIT(S): 5000 INJECTION INTRAVENOUS; SUBCUTANEOUS at 18:07

## 2020-11-18 RX ADMIN — ATORVASTATIN CALCIUM 20 MILLIGRAM(S): 80 TABLET, FILM COATED ORAL at 21:27

## 2020-11-18 RX ADMIN — Medication 40 MILLIGRAM(S): at 18:07

## 2020-11-18 RX ADMIN — BUDESONIDE AND FORMOTEROL FUMARATE DIHYDRATE 2 PUFF(S): 160; 4.5 AEROSOL RESPIRATORY (INHALATION) at 20:04

## 2020-11-18 RX ADMIN — TAMSULOSIN HYDROCHLORIDE 0.4 MILLIGRAM(S): 0.4 CAPSULE ORAL at 21:27

## 2020-11-18 RX ADMIN — Medication 100 MILLIGRAM(S): at 12:27

## 2020-11-18 RX ADMIN — PANTOPRAZOLE SODIUM 40 MILLIGRAM(S): 20 TABLET, DELAYED RELEASE ORAL at 05:48

## 2020-11-18 RX ADMIN — HEPARIN SODIUM 5000 UNIT(S): 5000 INJECTION INTRAVENOUS; SUBCUTANEOUS at 05:48

## 2020-11-18 RX ADMIN — CARVEDILOL PHOSPHATE 3.12 MILLIGRAM(S): 80 CAPSULE, EXTENDED RELEASE ORAL at 18:07

## 2020-11-18 RX ADMIN — Medication 40 MILLIGRAM(S): at 05:48

## 2020-11-18 RX ADMIN — Medication 81 MILLIGRAM(S): at 12:27

## 2020-11-18 RX ADMIN — TEMAZEPAM 30 MILLIGRAM(S): 15 CAPSULE ORAL at 00:39

## 2020-11-18 RX ADMIN — BUDESONIDE AND FORMOTEROL FUMARATE DIHYDRATE 2 PUFF(S): 160; 4.5 AEROSOL RESPIRATORY (INHALATION) at 09:51

## 2020-11-18 NOTE — PROGRESS NOTE ADULT - ASSESSMENT
70 male with pleural effusion, thoracic following chest tube.    - Continue chest tube drainage until less than 200cc/24 hours.   -daily chest x-ray while tube in place  - Incentive spirometry, cough/deep breathe, mobilize as able  - Treat underlying medical conditions  - Discussed with team at AM rounds.

## 2020-11-18 NOTE — DISCHARGE NOTE NURSING/CASE MANAGEMENT/SOCIAL WORK - PATIENT PORTAL LINK FT
You can access the FollowMyHealth Patient Portal offered by [facility] by registering at the following website: [email or web address]. By joining [facility]’s FollowMyHealth portal, you will also be able to view your health information using other applications (apps) compatible with our system.

## 2020-11-18 NOTE — PROGRESS NOTE ADULT - SUBJECTIVE AND OBJECTIVE BOX
70y Male s/p right pigtail for pleural effusion    Subjective: No complaints.  Not currently SOB at rest.  denies chest pain, cough, wheeze    T(C): 36.4 (11-18-20 @ 11:00), Max: 36.8 (11-17-20 @ 16:18)  HR: 79 (11-18-20 @ 11:00) (57 - 93)  BP: 141/79 (11-18-20 @ 11:00) (122/64 - 141/79)  ABP: --  ABP(mean): --  RR: 18 (11-18-20 @ 11:00) (18 - 24)  SpO2: 97% (11-18-20 @ 11:00) (93% - 98%)  Wt(kg): --  CVP(mm Hg): --  CO: --  CI: --  PA: --         11-18    136  |  95<L>  |  89.0<H>  ----------------------------<  102<H>  3.7   |  30.0<H>  |  2.04<H>    Ca    8.0<L>      18 Nov 2020 06:51  Phos  4.8     11-17  Mg     1.9     11-18                                 7.2    7.11  )-----------( 253      ( 18 Nov 2020 06:51 )             23.3        PTT - ( 16 Nov 2020 22:48 )  PTT:32.9 sec             CAPILLARY BLOOD GLUCOSE               CXR:    I&O's Detail    17 Nov 2020 07:01  -  18 Nov 2020 07:00  --------------------------------------------------------  IN:    Oral Fluid: 480 mL  Total IN: 480 mL    OUT:    Chest Tube (mL): 650 mL    Voided (mL): 700 mL  Total OUT: 1350 mL    Total NET: -870 mL          MEDICATIONS  (STANDING):  allopurinol 100 milliGRAM(s) Oral daily  aspirin  chewable 81 milliGRAM(s) Oral daily  atorvastatin 20 milliGRAM(s) Oral at bedtime  budesonide 160 MICROgram(s)/formoterol 4.5 MICROgram(s) Inhaler 2 Puff(s) Inhalation two times a day  carvedilol 3.125 milliGRAM(s) Oral every 12 hours  furosemide    Tablet 40 milliGRAM(s) Oral two times a day  heparin   Injectable 5000 Unit(s) SubCutaneous every 12 hours  pantoprazole    Tablet 40 milliGRAM(s) Oral before breakfast  senna 2 Tablet(s) Oral at bedtime  tamsulosin 0.4 milliGRAM(s) Oral at bedtime    MEDICATIONS  (PRN):  albuterol/ipratropium for Nebulization 3 milliLiter(s) Nebulizer every 6 hours PRN Shortness of Breath and/or Wheezing  temazepam 30 milliGRAM(s) Oral at bedtime PRN Insomnia      Physical Exam  Neuro: A+O x 3, non-focal, speech clear and intact  Pulm: CTA, equal bilaterally R pigtail in place, >600cc/24H, no air leak      -----------------------------------------------------------------------------------------------------------------------------------------------------------------------------  -----------------------------------------------------------------------------------------------------------------------------------------------------------------------------

## 2020-11-18 NOTE — PROGRESS NOTE ADULT - SUBJECTIVE AND OBJECTIVE BOX
PULMONARY PROGRESS NOTE      DEBI BADILLO  MRN-[id number]    Patient is a 70y old  Male who presents with a chief complaint of Resp failure (2020 10:47)      INTERVAL HPI/OVERNIGHT EVENTS:    Patient is awake and alert  Does not use CPAP at home for VANITA  Has outside pulm f/u    MEDICATIONS  (STANDING):  allopurinol 100 milliGRAM(s) Oral daily  aspirin  chewable 81 milliGRAM(s) Oral daily  atorvastatin 20 milliGRAM(s) Oral at bedtime  budesonide 160 MICROgram(s)/formoterol 4.5 MICROgram(s) Inhaler 2 Puff(s) Inhalation two times a day  carvedilol 3.125 milliGRAM(s) Oral every 12 hours  furosemide    Tablet 40 milliGRAM(s) Oral two times a day  heparin   Injectable 5000 Unit(s) SubCutaneous every 12 hours  pantoprazole    Tablet 40 milliGRAM(s) Oral before breakfast  senna 2 Tablet(s) Oral at bedtime  tamsulosin 0.4 milliGRAM(s) Oral at bedtime      MEDICATIONS  (PRN):  albuterol/ipratropium for Nebulization 3 milliLiter(s) Nebulizer every 6 hours PRN Shortness of Breath and/or Wheezing  temazepam 30 milliGRAM(s) Oral at bedtime PRN Insomnia      Allergies    No Known Allergies    Intolerances        PAST MEDICAL & SURGICAL HISTORY:  CKD (chronic kidney disease)    CHF (congestive heart failure)    Atrial fibrillation    COPD, severity to be determined    Coronary artery disease involving coronary bypass graft of native heart without angina pectoris    Chronic osteomyelitis, osteomyelitis of unspecified site    COPD (chronic obstructive pulmonary disease)    Atrial fibrillation    Bladder cancer    HLD (hyperlipidemia)    H/O knee surgery    S/P CABG (coronary artery bypass graft)    Presence of stent of CABG          REVIEW OF SYSTEMS:    CONSTITUTIONAL:  No distress    HEENT:  Eyes:  No diplopia or blurred vision. ENT:  No earache, sore throat or runny nose.    CARDIOVASCULAR:  No pressure, squeezing, tightness, heaviness or aching about the chest; no palpitations.    RESPIRATORY:  No cough, shortness of breath, PND or orthopnea. Mild SOBOE    GASTROINTESTINAL:  No nausea, vomiting or diarrhea.    GENITOURINARY:  No dysuria, frequency or urgency.    NEUROLOGIC:  No paresthesias, fasciculations, seizures or weakness.    PSYCHIATRIC:  No disorder of thought or mood.    Vital Signs Last 24 Hrs  T(C): 36.4 (2020 11:00), Max: 36.8 (2020 16:18)  T(F): 97.5 (2020 11:00), Max: 98.3 (2020 16:18)  HR: 79 (2020 11:00) (57 - 93)  BP: 141/79 (2020 11:00) (122/64 - 141/79)  BP(mean): 80 (2020 15:23) (80 - 80)  RR: 18 (2020 11:00) (18 - 24)  SpO2: 97% (2020 11:00) (93% - 98%)    PHYSICAL EXAMINATION:    GENERAL: The patient is awake and alert in no apparent distress.     HEENT: Head is normocephalic and atraumatic. Extraocular muscles are intact. Mucous membranes are moist.    NECK: Supple.    LUNGS: Clear to auscultation without wheezing, rales or rhonchi; respirations unlabored; R chest tube    HEART: Irregular rate and rhythm with murmur.    ABDOMEN: Soft, nontender, and nondistended.      EXTREMITIES: Without any cyanosis, clubbing, with edema.    NEUROLOGIC: Grossly intact.    LABS:                        7.2    7.11  )-----------( 253      ( 2020 06:51 )             23.3     11-18    136  |  95<L>  |  89.0<H>  ----------------------------<  102<H>  3.7   |  30.0<H>  |  2.04<H>    Ca    8.0<L>      2020 06:51  Phos  4.8     11-17  Mg     1.9     -18      PTT - ( 2020 22:48 )  PTT:32.9 sec  Urinalysis Basic - ( 2020 14:00 )    Color: Yellow / Appearance: Clear / S.010 / pH: x  Gluc: x / Ketone: Negative  / Bili: Negative / Urobili: Negative mg/dL   Blood: x / Protein: Negative mg/dL / Nitrite: Negative   Leuk Esterase: Negative / RBC: 6-10 /HPF / WBC 0-2   Sq Epi: x / Non Sq Epi: Occasional / Bacteria: Occasional      MICROBIOLOGY:    COVID-19 PCR . (15.20 @ 01:17)    COVID-19 PCR: NotDetec: This test has been validated by [email or web address] to be accurate;  though it has not been FDA cleared/approved by the usual pathway  As with all laboratory test, results should be correlated with clinical  findings.  https://www.fda.gov/media/484931/download  https://www.fda.gov/media/302529/download        RADIOLOGY & ADDITIONAL STUDIES:     EXAM:  XR CHEST PORTABLE ROUTINE 1V                          PROCEDURE DATE:  2020          INTERPRETATION:  Procedure: Chest x-ray    History: s/p R CT for effusion    Comment:  Frontal view of the chest was obtained and compared to the prior study dated 2020. The patient is status post sternotomy. There is a right chest pigtail drain superposed on the right lung base medially. The cardiac silhouette is prominent.  There is vascular congestion with bibasilar atelectatic changes. There is a moderate sized left effusion and tiny right effusion. There is a small right apical pneumothorax of less than 5%. The osseous structures are unremarkable.    Impression:  1. Right chest pigtail drain in place. Small right hydropneumothorax, slightly improved  2. Moderate left effusion, unchanged  3. Vascular congestion suggestive of CHF              MALORIE CORONADO MD; Attending Radiologist  This document has been electronically signed. 2020 10:59AM      ECHO:    Summary:   1. Left ventricular ejection fraction, by visual estimation, is 60 to 65%.  2. Normal global left ventricular systolic function.   3. Mildly enlarged right ventricle.   4. Mildly reduced RV systolic function.   5. Moderate tricuspid regurgitation.   6. Estimated pulmonary artery systolic pressure is 67.7 mmHg assuming a right atrial pressure of 15 mmHg, which is consistent with severe pulmonary hypertension.    MD Dana Electronically signed on 2020 at 10:11:35 AM

## 2020-11-18 NOTE — PROGRESS NOTE ADULT - SUBJECTIVE AND OBJECTIVE BOX
DEBI BADILLO    [id number]    70y      Male    INTERVAL HPI/OVERNIGHT EVENTS: patient being seen for pleural effusions with chest tube, acute on chronic renal failure and anemia. patient seen at bedside and states feeling better    patient continues to have chest tube     REVIEW OF SYSTEMS:    CONSTITUTIONAL: No fever, weight loss, or fatigue  RESPIRATORY: No cough, wheezing, hemoptysis; No shortness of breath  CARDIOVASCULAR: No chest pain, palpitations  GASTROINTESTINAL: No abdominal or epigastric pain. No nausea, vomiting  NEUROLOGICAL: No headaches, memory loss, loss of strength.  MISCELLANEOUS:      Vital Signs Last 24 Hrs  T(C): 36.4 (2020 04:22), Max: 37.2 (2020 10:55)  T(F): 97.5 (2020 04:22), Max: 98.9 (2020 10:55)  HR: 66 (2020 04:22) (57 - 93)  BP: 127/63 (2020 04:22) (122/63 - 136/63)  BP(mean): 80 (2020 15:23) (80 - 87)  RR: 19 (2020 04:22) (18 - 25)  SpO2: 96% (2020 04:22) (93% - 98%)    PHYSICAL EXAM:    GENERAL: NAD, ill appearing,   HEAD:  Atraumatic, Normocephalic  EYES: EOMI, PERRLA, conjunctiva and sclera clear  NERVOUS SYSTEM:  Alert & Oriented X3, Good concentration; Motor Strength 5/5 B/L upper and lower extremities  CHEST/LUNG: Decreased BS, right chest tube   HEART: Regular rate and rhythm; No murmurs  ABDOMEN: Soft, Nontender, Nondistended; Bowel sounds present  EXTREMITIES:  2+ Peripheral Pulses, No clubbing, cyanosis, + LE edema , Left LE wraped in ace      LABS:                        7.2    7.11  )-----------( 253      ( 2020 06:51 )             23.3     11-18    136  |  95<L>  |  89.0<H>  ----------------------------<  102<H>  3.7   |  30.0<H>  |  2.04<H>    Ca    8.0<L>      2020 06:51  Phos  4.8     11-17  Mg     1.9     11-18      PTT - ( 2020 22:48 )  PTT:32.9 sec  Urinalysis Basic - ( 2020 14:00 )    Color: Yellow / Appearance: Clear / S.010 / pH: x  Gluc: x / Ketone: Negative  / Bili: Negative / Urobili: Negative mg/dL   Blood: x / Protein: Negative mg/dL / Nitrite: Negative   Leuk Esterase: Negative / RBC: 6-10 /HPF / WBC 0-2   Sq Epi: x / Non Sq Epi: Occasional / Bacteria: Occasional          MEDICATIONS  (STANDING):  allopurinol 100 milliGRAM(s) Oral daily  aspirin  chewable 81 milliGRAM(s) Oral daily  atorvastatin 20 milliGRAM(s) Oral at bedtime  budesonide 160 MICROgram(s)/formoterol 4.5 MICROgram(s) Inhaler 2 Puff(s) Inhalation two times a day  furosemide    Tablet 40 milliGRAM(s) Oral two times a day  heparin   Injectable 5000 Unit(s) SubCutaneous every 12 hours  pantoprazole    Tablet 40 milliGRAM(s) Oral before breakfast  senna 2 Tablet(s) Oral at bedtime  tamsulosin 0.4 milliGRAM(s) Oral at bedtime    MEDICATIONS  (PRN):  albuterol/ipratropium for Nebulization 3 milliLiter(s) Nebulizer every 6 hours PRN Shortness of Breath and/or Wheezing  temazepam 30 milliGRAM(s) Oral at bedtime PRN Insomnia      RADIOLOGY & ADDITIONAL TESTS:

## 2020-11-18 NOTE — PROGRESS NOTE ADULT - SUBJECTIVE AND OBJECTIVE BOX
Reason for follow up: CRS,    Overnight: Downgraded from MICU,     Update: Feels well when seen at bedside today, dyspnea improved, continues to have lower extremity edema    Subjective: 70M hx CAD s/p CABG, COPD, AF, CKD-3,  now re-admitted with hypoxic respiratory failure in  the setting of decompensated HF    Review of Systems: a 12 point ROS was negative except as per the HPI above.   	  Vitals:  T(C): 36.4 (11-18-20 @ 04:22), Max: 37.2 (11-17-20 @ 10:55)  HR: 66 (11-18-20 @ 04:22) (57 - 93)  BP: 127/63 (11-18-20 @ 04:22) (122/63 - 136/63)  RR: 19 (11-18-20 @ 04:22) (18 - 25)  SpO2: 96% (11-18-20 @ 04:22) (93% - 98%)    I&O's Summary    17 Nov 2020 07:01  -  18 Nov 2020 07:00  --------------------------------------------------------  IN: 480 mL / OUT: 1350 mL / NET: -870 mL    Weight (kg): 100.2 (11-15 @ 06:51)    PHYSICAL EXAM:  Appearance: Comfortable. No acute distress  HEENT:  Head and neck: Atraumatic. Normocephalic.  Normal oral mucosa, PERRL, Neck is supple. No JVD, No carotid bruit.   Neurologic: A & O x 3, no focal deficits. EOMI.  Lymphatic: No cervical lymphadenopathy  Cardiovascular: Normal S1 S2, irreg/irreg, No murmur, rubs/gallops. No JVD, non-pitting b/l LE edema  Respiratory: Lungs clear to auscultation, R chest tube in place, serosanguinous output  Gastrointestinal:  Soft, Non-tender, + BS  Lower Extremities: bilateral non-pitting edema  Psychiatry: Patient is calm. No agitation. Mood & affect appropriate  Skin: extensive ecchymosis b/l UE and LE    CURRENT MEDICATIONS:  furosemide    Tablet 40 milliGRAM(s) Oral two times a day  tamsulosin 0.4 milliGRAM(s) Oral at bedtime  budesonide 160 MICROgram(s)/formoterol 4.5 MICROgram(s) Inhaler  pantoprazole    Tablet  senna  allopurinol  atorvastatin  aspirin  chewable  heparin   Injectable    DIAGNOSTIC TESTING:  [ ] Echocardiogram:     TTE Echo Limited or F/U (11.16.20 @ 21:06)     Summary:   1. Left ventricular ejection fraction, by visual estimation, is 60 to 65%.  2.  Normal global left ventricular systolic function.   3. Mildly enlarged right ventricle.   4. Mildly reduced RV systolic function.   5. Moderate tricuspid regurgitation.   6. Estimated pulmonary artery systolic pressure is 67.7 mmHg assuming a right atrial pressure of 15 mmHg, which is consistent with severe pulmonary hypertension.    Boris Pierce MD Electronically signed on 11/17/2020 at 10:11:35 AM    OTHER:     Xray Chest 1 View- PORTABLE-Routine (Xray Chest 1 View- PORTABLE-Routine in AM.) (11.17.20 @ 05:30)   IMPRESSION:   RIGHT chest tube in place. RIGHT lung clear.  Residual LEFT lower lobe retrocardiac airspace consolidation and/or effusion..    SAYRA GIBBS MD; Attending Radiologist  This document has been electronically signed. Nov 17 2020 12:19PM    LABS:	 	                        7.2    7.11  )-----------( 253      ( 18 Nov 2020 06:51 )             23.3     11-18    136  |  95<L>  |  89.0<H>  ----------------------------<  102<H>  3.7   |  30.0<H>  |  2.04<H>    Ca    8.0<L>      18 Nov 2020 06:51  Phos  4.8     11-17  Mg     1.9     11-18    Serum Pro-Brain Natriuretic Peptide: 59049 pg/mL (11-15 @ 01:12)

## 2020-11-18 NOTE — DISCHARGE NOTE NURSING/CASE MANAGEMENT/SOCIAL WORK - NSDCFUADDAPPT_GEN_ALL_CORE_FT
A DC FOLLOW UP APPT. HAS BEEN MADE FOR YOU WITH YOUR PULMONOLOGIST  DORIS DOMÍNGUEZ 11/25  AT 11:15   OFFICE [phone]  WILL USE HER OWN PERSONAL PHARMACY  FOR HER MEDICATIONS  [facility] IN [place]

## 2020-11-18 NOTE — PROGRESS NOTE ADULT - ASSESSMENT
-s/p resp failure  -Acute on chronic hypoxic resp failure  -COPD  -pulm edema, pleural effusion; improving  -CHF with decompensation  -VANITA; has CPAP at home but not using it  -Anemia  -Hypercarbic resp failure; compensated  -Renal insufficiency  -Chest tube for draining effusion    REC:    Symbicort (on advair at home)  Prn Nebs  O2; titrate as tolerates  Rate control  Diurese  Optimize cardiac function  Chest tube management per T-surg  Cardiology f/u  Follow [facility].   Outpt pulm f/u  Encouraged outpt CPAP use  OOB

## 2020-11-18 NOTE — CONSULT NOTE ADULT - SUBJECTIVE AND OBJECTIVE BOX
70yM was readmitted on 11-15 after DC to [facility] on  unresponsive and obtunded, required intubation in the ED. Workup showed hyperkalemia,  acute/chronic renal failure and worsening right pleural effusion. Patient s/p chest tube placement for drainage.     Imaging reviewed showed:  HEAD CT 11/15 - No acute intracranial hemorrhage, territorial infarct or mass effect.    CT CAP 11/15 - Moderate to large right and small-to-moderate left pleural effusions with associated compressive atelectasis, most notably involving nearly the entirety of the right lower lobe. Mild scattered groundglass airspace opacities and interlobular septal thickening, may reflect pulmonary edema. Small intra-abdominal ascites. Anasarca.    CXR  - RIGHT chest tube in place. RIGHT lung clear. Residual LEFT lower lobe retrocardiac airspace consolidation and/or effusion..      Patient reports he is feeling much better. Is thinking about retuning home but is uncertain. Discussed progress and agree given medical complexity would need to wait a bit more to determine safety in DC home.     REVIEW OF SYSTEMS  Constitutional - No fever, No weight loss, +fatigue  HEENT - No eye pain, No visual disturbances, No difficulty hearing, No tinnitus, No vertigo, No neck pain  Respiratory - No cough, No wheezing, +shortness of breath  Cardiovascular - +chest pain, No palpitations  Gastrointestinal - No abdominal pain, No nausea, No vomiting, No diarrhea, No constipation  Genitourinary - No dysuria, No frequency, No hematuria, No incontinence  Neurological - No headaches, No memory loss, +loss of strength, No numbness, No tremors  Skin - No itching, No rashes, +lesions   Endocrine - No temperature intolerance  Musculoskeletal - +joint pain, No joint swelling, +muscle pain  Psychiatric - +depression, No anxiety    VITALS  T(C): 36.4 (20 @ 04:22), Max: 37.2 (20 @ 10:55)  HR: 66 (20 @ 04:22) (57 - 93)  BP: 127/63 (20 @ 04:22) (122/63 - 136/63)  RR: 19 (20 @ 04:22) (18 - 25)  SpO2: 96% (20 @ 04:22) (93% - 98%)  Wt(kg): --    PAST MEDICAL & SURGICAL HISTORY  CKD (chronic kidney disease)    CHF (congestive heart failure)    Atrial fibrillation    COPD, severity to be determined    Coronary artery disease involving coronary bypass graft of native heart without angina pectoris    Chronic osteomyelitis, osteomyelitis of unspecified site    COPD (chronic obstructive pulmonary disease)    Atrial fibrillation    Bladder cancer    HLD (hyperlipidemia)    H/O knee surgery    S/P CABG (coronary artery bypass graft)    Presence of stent of CABG        SOCIAL HISTORY - as per documentation/history  Smoking - None  EtOH - +  Drugs - None    FUNCTIONAL HISTORY  Lives with spouse, 0 FRANCISCO  Independent prior to beth hospitalization    CURRENT FUNCTIONAL STATUS    Bed Mobility: Rolling/Turning:     · Level of Sibley	unable to perform; pt found and left OOB in chair    Transfer: Bed to Chair:     Transfer Skill: Bed to Chair   · Level of Sibley	unable to perform; pt found and left OOB in chair    Transfer: Sit to Stand:     · Level of Sibley	contact guard  · Physical Assist/Nonphysical Assist	1 person assist; verbal cues  · Weight-Bearing Restrictions	full weight-bearing  · Assistive Device	rolling walker    Transfer: Stand to Sit:     · Level of Sibley	contact guard  · Physical Assist/Nonphysical Assist	1 person assist; verbal cues  · Weight-Bearing Restrictions	full weight-bearing  · Assistive Device	rolling walker    Sit/Stand Transfer Safety Analysis:     · Transfer Safety Concerns Noted	decreased balance during turns; decreased step length; decreased weight-shifting ability  · Impairments Contributing to Impaired Transfers	impaired balance; pain; decreased strength    Gait Skills:     · Level of Sibley	supervision  · Physical Assist/Nonphysical Assist	1 person assist; verbal cues  · Weight-Bearing Restrictions	full weight-bearing  · Assistive Device	rolling walker  · Gait Distance	25 feet; pt reports increased left LE pain, 7/10 pain, requests to return to chair.    Gait Analysis:     · Gait Pattern Used	swing-through gait  · Gait Deviations Noted	decreased norah; decreased stride length; decreased step length; decreased weight-shifting ability  · Impairments Contributing to Gait Deviations	impaired balance; pain; decreased strength    Stair Negotiation:     · Level of Sibley	TBA        FAMILY HISTORY   No pertinent family history in first degree relatives        RECENT LABS - Reviewed  CBC Full  -  ( 2020 06:51 )  WBC Count : 7.11 K/uL  RBC Count : 2.18 M/uL  Hemoglobin : 7.2 g/dL  Hematocrit : 23.3 %  Platelet Count - Automated : 253 K/uL  Mean Cell Volume : 106.9 fl  Mean Cell Hemoglobin : 33.0 pg  Mean Cell Hemoglobin Concentration : 30.9 gm/dL  Auto Neutrophil # : x  Auto Lymphocyte # : x  Auto Monocyte # : x  Auto Eosinophil # : x  Auto Basophil # : x  Auto Neutrophil % : x  Auto Lymphocyte % : x  Auto Monocyte % : x  Auto Eosinophil % : x  Auto Basophil % : x    11-18    136  |  95<L>  |  89.0<H>  ----------------------------<  102<H>  3.7   |  30.0<H>  |  2.04<H>    Ca    8.0<L>      2020 06:51  Phos  4.8     11-17  Mg     1.9           Urinalysis Basic - ( 2020 14:00 )    Color: Yellow / Appearance: Clear / S.010 / pH: x  Gluc: x / Ketone: Negative  / Bili: Negative / Urobili: Negative mg/dL   Blood: x / Protein: Negative mg/dL / Nitrite: Negative   Leuk Esterase: Negative / RBC: 6-10 /HPF / WBC 0-2   Sq Epi: x / Non Sq Epi: Occasional / Bacteria: Occasional        ALLERGIES  No Known Allergies      MEDICATIONS   albuterol/ipratropium for Nebulization 3 milliLiter(s) Nebulizer every 6 hours PRN  allopurinol 100 milliGRAM(s) Oral daily  aspirin  chewable 81 milliGRAM(s) Oral daily  atorvastatin 20 milliGRAM(s) Oral at bedtime  budesonide 160 MICROgram(s)/formoterol 4.5 MICROgram(s) Inhaler 2 Puff(s) Inhalation two times a day  furosemide    Tablet 40 milliGRAM(s) Oral two times a day  heparin   Injectable 5000 Unit(s) SubCutaneous every 12 hours  pantoprazole    Tablet 40 milliGRAM(s) Oral before breakfast  senna 2 Tablet(s) Oral at bedtime  tamsulosin 0.4 milliGRAM(s) Oral at bedtime  temazepam 30 milliGRAM(s) Oral at bedtime PRN      ----------------------------------------------------------------------------------------  PHYSICAL EXAM  Constitutional - NAD, Comfortable  HEENT - NCAT, EOMI  Neck - Supple, No limited ROM  Chest - Breathing comfortably, No wheezing, +O2 via NC  Cardiovascular - S1S2   Abdomen - Distended, Obese  Extremities - BLE edema/ACE wrap   Neurologic Exam -                    Cognitive - Awake, Alert, AAO to self, place, date, year, situation     Motor - No focal deficits                    LEFT    UE - ShAB 5/5, EF 5/5, EE 5/5, WE 5/5,  5/5                    RIGHT UE - ShAB 5/5, EF 5/5, EE 5/5, WE 5/5,  5/5                    LEFT    LE - HF 5/5, KE 5/5, DF 5/5, PF 5/5                    RIGHT LE - HF 5/5, KE 5/5, DF 5/5, PF 5/5        Sensory - Intact to LT  Psychiatric - Mood stable, Affect WNL  ----------------------------------------------------------------------------------------  ASSESSMENT/PLAN  70yMale with functional deficits after debility related to a pleural effusion - as a consequence of a previous trauma  Right pleural effusion - +Chest tube  Left LE hematoma s/p evacuation - WBAT  CAD - ASA, Lipitor  HypoNa+ - 1.L fluid restriction   Pulm - Lasix, Duoneb, Proventil, Symbicort  Pain - Tylenol  DVT PPX - SCDs, Heparin  Rehab - Patient hesitant about DC HOME. Thinking about [facility]. Patient medically being optimized and PT aware to continue working with patient to improve overall cardiopulmonary function. Once medically optimized and preparred for DC, can reassess functional goals for DC HOME with HOME CARE. Will continue to follow. Functional progress will determine ongoing rehab dispo recommendations, which may change.    Continue bedside therapy as well as OOB throughout the day with mobilization by staff to maintain cardiopulmonary function and prevention of secondary complications related to debility.     Discussed with rehab team.

## 2020-11-18 NOTE — PROGRESS NOTE ADULT - SUBJECTIVE AND OBJECTIVE BOX
[place] CARDIOLOGY-[facility] Faculty Practice                                                               Office: [address] [address]                                                              Telephone: [phone]. Fax:[phone]                                                                             PROGRESS NOTE  Reason for follow up: HF  Overnight: Downgraded from MICU, no new issues otherwise  Update: Feels well when seen at bedside today, dyspnea improved, continues to have lower extremity edema    Subjective: 70M hx CAD s/p CABG, COPD, AF, CKD now re-admitted with likely hypoxic respiratory failure in setting of decompensated HF    Review of Systems: a 12 point ROS was negative except as per the HPI above.     	  Vitals:  T(C): 36.4 (11-18-20 @ 04:22), Max: 37.2 (11-17-20 @ 10:55)  HR: 66 (11-18-20 @ 04:22) (57 - 93)  BP: 127/63 (11-18-20 @ 04:22) (122/63 - 136/63)  RR: 19 (11-18-20 @ 04:22) (18 - 25)  SpO2: 96% (11-18-20 @ 04:22) (93% - 98%)  Wt(kg): --  I&O's Summary    17 Nov 2020 07:01  -  18 Nov 2020 07:00  --------------------------------------------------------  IN: 480 mL / OUT: 1350 mL / NET: -870 mL      Weight (kg): 100.2 (11-15 @ 06:51)      PHYSICAL EXAM:  Appearance: Comfortable. No acute distress  HEENT:  Head and neck: Atraumatic. Normocephalic.  Normal oral mucosa, PERRL, Neck is supple. No JVD, No carotid bruit.   Neurologic: A & O x 3, no focal deficits. EOMI.  Lymphatic: No cervical lymphadenopathy  Cardiovascular: Normal S1 S2, irreg/irreg, No murmur, rubs/gallops. No JVD, non-pitting b/l LE edema  Respiratory: Lungs clear to auscultation, R chest tube in place, serosanguinous output  Gastrointestinal:  Soft, Non-tender, + BS  Lower Extremities: bilateral non-pitting edema  Psychiatry: Patient is calm. No agitation. Mood & affect appropriate  Skin: extensive ecchymosis b/l UE and LE      CURRENT MEDICATIONS:  furosemide    Tablet 40 milliGRAM(s) Oral two times a day  tamsulosin 0.4 milliGRAM(s) Oral at bedtime    budesonide 160 MICROgram(s)/formoterol 4.5 MICROgram(s) Inhaler  pantoprazole    Tablet  senna  allopurinol  atorvastatin  aspirin  chewable  heparin   Injectable      DIAGNOSTIC TESTING:  [ ] Echocardiogram:   < from: TTE Echo Limited or F/U (11.16.20 @ 21:06) >  Summary:   1. Left ventricular ejection fraction, by visual estimation, is 60 to 65%.  2. Normal global left ventricular systolic function.   3. Mildly enlarged right ventricle.   4. Mildly reduced RV systolic function.   5. Moderate tricuspid regurgitation.   6. Estimated pulmonary artery systolic pressure is 67.7 mmHg assuming a right atrial pressure of 15 mmHg, which is consistent with severe pulmonary hypertension.    MD Dana Electronically signed on 11/17/2020 at 10:11:35 AM    < end of copied text >    OTHER:   < from: Xray Chest 1 View- PORTABLE-Routine (Xray Chest 1 View- PORTABLE-Routine in AM.) (11.17.20 @ 05:30) >  IMPRESSION:   RIGHT chest tube in place. RIGHT lung clear.  Residual LEFT lower lobe retrocardiac airspace consolidation and/or effusion..                SAYRA GIBBS MD; Attending Radiologist  This document has been electronically signed. Nov 17 2020 12:19PM    < end of copied text >  	      LABS:	 	                            7.2    7.11  )-----------( 253      ( 18 Nov 2020 06:51 )             23.3     11-18    136  |  95<L>  |  89.0<H>  ----------------------------<  102<H>  3.7   |  30.0<H>  |  2.04<H>    Ca    8.0<L>      18 Nov 2020 06:51  Phos  4.8     11-17  Mg     1.9     11-18      proBNP: Serum Pro-Brain Natriuretic Peptide: 07510 pg/mL (11-15 @ 01:12)      TELEMETRY: Reviewed    ECG:  Reviewed by me.

## 2020-11-18 NOTE — PROGRESS NOTE ADULT - ASSESSMENT
70M hx CAD s/p CABG, COPD, AF, CKD now re-admitted with likely hypoxic respiratory failure in setting of decompensated HF    Per Cardiology,     Acute on Chronic HFpEF  - volume overloaded on admission, hypoxic, large R pleural effusion, hyperkalemia  - Euvolemic now, CHF - Compensated,   - On  maintenance dose of Lasix 40mg PO bid  - limited F/u TTE- severe pulm HTN, mild reduced systolic function, mod TR  - strict I&O; replace electrolytes PRN- hyperkalemia resolved   - chest tube management per thoracic surgery    AF  - Rate controlled, but with episodes of NSVT overnight on telemetry  - CHASDVasc 3  - Heparin gtt d/c secondary to bleed  at chest tube, and IV sites.   - Hgb continues to trend downwards;   - ideally maintain Hb >8 gms.,   - resume Coreg at 3.125/bid today  - EP evaluation for  Watchman Procedure  noted,     CAD, CRS:  - on ASA, statin    LHC/ RHC Deferred now,

## 2020-11-18 NOTE — PROGRESS NOTE ADULT - ASSESSMENT
70 year old male with a pmhx of w/ Afib, pulmonary HTN, chronic OM, CKD, COPD, CABG. Recently admitted s/p fall w/ hematoma of LLE requiring debridement and evacuation of hematoma, complicated by worsening GERALD on CKD and worsening respiratory status. Found to have severe pulm HTN, was being diuresed with lasix. Was recommended for cath but patient declined for fear of worsening renal failure. Noted to have R pleural effusion s/p pigtail catheter that drained 2.2L. Eventually d/c to [facility] on 11/13. Presented back on 11/15/20 unresponsive, obtunded then intubated in the ED. Hypothermic and developed shock. Labs significant for hyperkalemia acute on chronic renal failure and worsening R pleural effusion. Admitted to MICU. Pt now has a right pigtail placement for recurrence of right effusion, CTS following. Pt extubated, GERALD on CKD improving. Pt transferred to medical service.    1) Acute hypoxic respiratory failure with right pleural effusion and pulmonary HTN - s.p chest tube placement  - s/p extubation   - now on lasix po bid  - Cardio and pulmonary following  - CTS following    2) Severe Pulmonary HTN  -cardio following  --> patient and wife are absolutely refusing cath     3) CAD s/p CABG  - on statin and aspirin    4) Afib  - rate controlled   - cardio following  - Pt seem to be having frequent falls per record, also A.C held due to drop in Hbg   - holding A/C for now, will need to resume once H/H stable and no evidence of bleed     5) GERALD on CKD  - monitor level  -renal following  --> cr improved    6) Anemia  - s/p 1 unit of blood   - ac on hold  --> send iron stuides    7) COPD  - stable  - pulmonary following  - O2 & nebs    8) Prophylactic measure  - DVT ppx: heparin SQ    prognosis guarded  full code, pallaitive following

## 2020-11-19 DIAGNOSIS — N17.9 ACUTE KIDNEY FAILURE, UNSPECIFIED: ICD-10-CM

## 2020-11-19 DIAGNOSIS — D64.9 ANEMIA, UNSPECIFIED: ICD-10-CM

## 2020-11-19 DIAGNOSIS — Z51.5 ENCOUNTER FOR PALLIATIVE CARE: ICD-10-CM

## 2020-11-19 DIAGNOSIS — I27.20 PULMONARY HYPERTENSION, UNSPECIFIED: ICD-10-CM

## 2020-11-19 DIAGNOSIS — R53.81 OTHER MALAISE: ICD-10-CM

## 2020-11-19 LAB
ALBUMIN SERPL ELPH-MCNC: 3 G/DL — LOW (ref 3.3–5.2)
ALP SERPL-CCNC: 221 U/L — HIGH (ref 40–120)
ALT FLD-CCNC: 22 U/L — SIGNIFICANT CHANGE UP
ANION GAP SERPL CALC-SCNC: 10 MMOL/L — SIGNIFICANT CHANGE UP (ref 5–17)
ANISOCYTOSIS BLD QL: SLIGHT — SIGNIFICANT CHANGE UP
AST SERPL-CCNC: 21 U/L — SIGNIFICANT CHANGE UP
BASE EXCESS BLDA CALC-SCNC: 6.3 MMOL/L — HIGH (ref -2–2)
BASOPHILS # BLD AUTO: 0 K/UL — SIGNIFICANT CHANGE UP (ref 0–0.2)
BASOPHILS NFR BLD AUTO: 0 % — SIGNIFICANT CHANGE UP (ref 0–2)
BILIRUB SERPL-MCNC: 0.5 MG/DL — SIGNIFICANT CHANGE UP (ref 0.4–2)
BLOOD GAS COMMENTS ARTERIAL: SIGNIFICANT CHANGE UP
BUN SERPL-MCNC: 86 MG/DL — HIGH (ref 8–20)
CALCIUM SERPL-MCNC: 7.8 MG/DL — LOW (ref 8.6–10.2)
CHLORIDE SERPL-SCNC: 94 MMOL/L — LOW (ref 98–107)
CO2 SERPL-SCNC: 30 MMOL/L — HIGH (ref 22–29)
CREAT SERPL-MCNC: 1.95 MG/DL — HIGH (ref 0.5–1.3)
DACRYOCYTES BLD QL SMEAR: SLIGHT — SIGNIFICANT CHANGE UP
EOSINOPHIL # BLD AUTO: 0.09 K/UL — SIGNIFICANT CHANGE UP (ref 0–0.5)
EOSINOPHIL NFR BLD AUTO: 2.1 % — SIGNIFICANT CHANGE UP (ref 0–6)
GAS PNL BLDA: SIGNIFICANT CHANGE UP
GLUCOSE SERPL-MCNC: 194 MG/DL — HIGH (ref 70–99)
HCO3 BLDA-SCNC: 30 MMOL/L — HIGH (ref 20–26)
HCT VFR BLD CALC: 23.8 % — LOW (ref 39–50)
HGB BLD-MCNC: 7.4 G/DL — LOW (ref 13–17)
HOROWITZ INDEX BLDA+IHG-RTO: SIGNIFICANT CHANGE UP
HYPOCHROMIA BLD QL: SLIGHT — SIGNIFICANT CHANGE UP
IMM GRANULOCYTES NFR BLD AUTO: 0.5 % — SIGNIFICANT CHANGE UP (ref 0–1.5)
IRON SATN MFR SERPL: 23 UG/DL — LOW (ref 59–158)
IRON SATN MFR SERPL: 8 % — LOW (ref 16–55)
LYMPHOCYTES # BLD AUTO: 0.4 K/UL — LOW (ref 1–3.3)
LYMPHOCYTES # BLD AUTO: 9.4 % — LOW (ref 13–44)
MACROCYTES BLD QL: SIGNIFICANT CHANGE UP
MAGNESIUM SERPL-MCNC: 1.9 MG/DL — SIGNIFICANT CHANGE UP (ref 1.6–2.6)
MANUAL SMEAR VERIFICATION: SIGNIFICANT CHANGE UP
MCHC RBC-ENTMCNC: 31.1 GM/DL — LOW (ref 32–36)
MCHC RBC-ENTMCNC: 34.3 PG — HIGH (ref 27–34)
MCV RBC AUTO: 110.2 FL — HIGH (ref 80–100)
MICROCYTES BLD QL: SLIGHT — SIGNIFICANT CHANGE UP
MONOCYTES # BLD AUTO: 0.17 K/UL — SIGNIFICANT CHANGE UP (ref 0–0.9)
MONOCYTES NFR BLD AUTO: 4 % — SIGNIFICANT CHANGE UP (ref 2–14)
NEUTROPHILS # BLD AUTO: 3.57 K/UL — SIGNIFICANT CHANGE UP (ref 1.8–7.4)
NEUTROPHILS NFR BLD AUTO: 84 % — HIGH (ref 43–77)
OVALOCYTES BLD QL SMEAR: SLIGHT — SIGNIFICANT CHANGE UP
PCO2 BLDA: 56 MMHG — HIGH (ref 35–45)
PH BLDA: 7.37 — SIGNIFICANT CHANGE UP (ref 7.35–7.45)
PLAT MORPH BLD: NORMAL — SIGNIFICANT CHANGE UP
PLATELET # BLD AUTO: 248 K/UL — SIGNIFICANT CHANGE UP (ref 150–400)
PO2 BLDA: 77 MMHG — LOW (ref 83–108)
POIKILOCYTOSIS BLD QL AUTO: SLIGHT — SIGNIFICANT CHANGE UP
POLYCHROMASIA BLD QL SMEAR: SLIGHT — SIGNIFICANT CHANGE UP
POTASSIUM SERPL-MCNC: 3.9 MMOL/L — SIGNIFICANT CHANGE UP (ref 3.5–5.3)
POTASSIUM SERPL-SCNC: 3.9 MMOL/L — SIGNIFICANT CHANGE UP (ref 3.5–5.3)
PROT SERPL-MCNC: 5.1 G/DL — LOW (ref 6.6–8.7)
RBC # BLD: 2.16 M/UL — LOW (ref 4.2–5.8)
RBC # BLD: 2.16 M/UL — LOW (ref 4.2–5.8)
RBC # FLD: 18.1 % — HIGH (ref 10.3–14.5)
RBC BLD AUTO: ABNORMAL
RETICS #: 79.1 K/UL — SIGNIFICANT CHANGE UP (ref 25–125)
RETICS/RBC NFR: 3.7 % — HIGH (ref 0.5–2.5)
SAO2 % BLDA: 97 % — SIGNIFICANT CHANGE UP (ref 95–99)
SODIUM SERPL-SCNC: 134 MMOL/L — LOW (ref 135–145)
STOMATOCYTES BLD QL SMEAR: SLIGHT — SIGNIFICANT CHANGE UP
TIBC SERPL-MCNC: 272 UG/DL — SIGNIFICANT CHANGE UP (ref 220–430)
TRANSFERRIN SERPL-MCNC: 190 MG/DL — SIGNIFICANT CHANGE UP (ref 180–329)
WBC # BLD: 4.25 K/UL — SIGNIFICANT CHANGE UP (ref 3.8–10.5)
WBC # FLD AUTO: 4.25 K/UL — SIGNIFICANT CHANGE UP (ref 3.8–10.5)

## 2020-11-19 PROCEDURE — 99232 SBSQ HOSP IP/OBS MODERATE 35: CPT

## 2020-11-19 PROCEDURE — 99497 ADVNCD CARE PLAN 30 MIN: CPT | Mod: 25

## 2020-11-19 PROCEDURE — 99233 SBSQ HOSP IP/OBS HIGH 50: CPT

## 2020-11-19 PROCEDURE — 71045 X-RAY EXAM CHEST 1 VIEW: CPT | Mod: 26

## 2020-11-19 PROCEDURE — 99222 1ST HOSP IP/OBS MODERATE 55: CPT

## 2020-11-19 RX ORDER — IRON SUCROSE 20 MG/ML
200 INJECTION, SOLUTION INTRAVENOUS EVERY 24 HOURS
Refills: 0 | Status: COMPLETED | OUTPATIENT
Start: 2020-11-19 | End: 2020-11-21

## 2020-11-19 RX ORDER — FOLIC ACID 0.8 MG
1 TABLET ORAL DAILY
Refills: 0 | Status: DISCONTINUED | OUTPATIENT
Start: 2020-11-19 | End: 2020-11-22

## 2020-11-19 RX ORDER — PREGABALIN 225 MG/1
1000 CAPSULE ORAL DAILY
Refills: 0 | Status: DISCONTINUED | OUTPATIENT
Start: 2020-11-19 | End: 2020-11-22

## 2020-11-19 RX ADMIN — TAMSULOSIN HYDROCHLORIDE 0.4 MILLIGRAM(S): 0.4 CAPSULE ORAL at 22:19

## 2020-11-19 RX ADMIN — TEMAZEPAM 30 MILLIGRAM(S): 15 CAPSULE ORAL at 00:06

## 2020-11-19 RX ADMIN — BUDESONIDE AND FORMOTEROL FUMARATE DIHYDRATE 2 PUFF(S): 160; 4.5 AEROSOL RESPIRATORY (INHALATION) at 20:49

## 2020-11-19 RX ADMIN — CARVEDILOL PHOSPHATE 3.12 MILLIGRAM(S): 80 CAPSULE, EXTENDED RELEASE ORAL at 05:57

## 2020-11-19 RX ADMIN — Medication 1 MILLIGRAM(S): at 17:42

## 2020-11-19 RX ADMIN — Medication 100 MILLIGRAM(S): at 13:21

## 2020-11-19 RX ADMIN — Medication 81 MILLIGRAM(S): at 13:21

## 2020-11-19 RX ADMIN — Medication 40 MILLIGRAM(S): at 05:57

## 2020-11-19 RX ADMIN — PREGABALIN 1000 MICROGRAM(S): 225 CAPSULE ORAL at 13:21

## 2020-11-19 RX ADMIN — IRON SUCROSE 110 MILLIGRAM(S): 20 INJECTION, SOLUTION INTRAVENOUS at 13:21

## 2020-11-19 RX ADMIN — BUDESONIDE AND FORMOTEROL FUMARATE DIHYDRATE 2 PUFF(S): 160; 4.5 AEROSOL RESPIRATORY (INHALATION) at 09:28

## 2020-11-19 RX ADMIN — HEPARIN SODIUM 5000 UNIT(S): 5000 INJECTION INTRAVENOUS; SUBCUTANEOUS at 17:42

## 2020-11-19 RX ADMIN — Medication 1 TABLET(S): at 13:21

## 2020-11-19 RX ADMIN — CARVEDILOL PHOSPHATE 3.12 MILLIGRAM(S): 80 CAPSULE, EXTENDED RELEASE ORAL at 17:42

## 2020-11-19 RX ADMIN — HEPARIN SODIUM 5000 UNIT(S): 5000 INJECTION INTRAVENOUS; SUBCUTANEOUS at 05:57

## 2020-11-19 RX ADMIN — Medication 40 MILLIGRAM(S): at 17:42

## 2020-11-19 RX ADMIN — PANTOPRAZOLE SODIUM 40 MILLIGRAM(S): 20 TABLET, DELAYED RELEASE ORAL at 05:57

## 2020-11-19 RX ADMIN — ATORVASTATIN CALCIUM 20 MILLIGRAM(S): 80 TABLET, FILM COATED ORAL at 22:19

## 2020-11-19 NOTE — PROGRESS NOTE ADULT - ASSESSMENT
70 year old male with a pmhx of w/ Afib (on eliquis), pulmonary HTN, COPD, chronic OM, CKD, COPD, CHF, CABG, bladder CA. Recently admitted s/p fall w/ hematoma of LLE requiring debridement and evacuation of hematoma, complicated by worsening GERALD on CKD and worsening respiratory status. Found to have severe pulm HTN, was being diuresed with lasix. Was recommended for cath but patient declined for fear of worsening renal failure. Noted to have R pleural effusion s/p pigtail catheter that drained 2.2L. Eventually d/c to [facility] on 11/13. Presented 11/15 unresponsive, obtunded then intubated in the ED. Hypothermic and developed shock. Labs significant for hyperkalemia acute on chronic renal failure and worsening R pleural effusion. Pigtail placed MICU team.   Consulted for further thoracic  and pigtail management.      maintain chest tube to water seal  record output per shift  daily CXR  pain control  IS/OOB/PT  Dressing change PRN  diuresis as tolerated for CHF  trend H/H    Ruth DELACRUZ  Thoracic Sx

## 2020-11-19 NOTE — CONSULT NOTE ADULT - REASON FOR ADMISSION
Resp failure

## 2020-11-19 NOTE — PROGRESS NOTE ADULT - SUBJECTIVE AND OBJECTIVE BOX
[facility] DIVISION OF KIDNEY DISEASES AND HYPERTENSION -- FOLLOW UP NOTE  --------------------------------------------------------------------------------  Chief Complaint: shahriar on ckd    24 hour events/subjective:  no acute event  pt states he feels well        PAST HISTORY  --------------------------------------------------------------------------------  No significant changes to PMH, PSH, FHx, SHx, unless otherwise noted    ALLERGIES & MEDICATIONS  --------------------------------------------------------------------------------  Allergies    No Known Allergies    Intolerances      Standing Inpatient Medications  allopurinol 100 milliGRAM(s) Oral daily  aspirin  chewable 81 milliGRAM(s) Oral daily  atorvastatin 20 milliGRAM(s) Oral at bedtime  budesonide 160 MICROgram(s)/formoterol 4.5 MICROgram(s) Inhaler 2 Puff(s) Inhalation two times a day  carvedilol 3.125 milliGRAM(s) Oral every 12 hours  cyanocobalamin 1000 MICROGram(s) Oral daily  folic acid 1 milliGRAM(s) Oral daily  furosemide    Tablet 40 milliGRAM(s) Oral two times a day  heparin   Injectable 5000 Unit(s) SubCutaneous every 12 hours  iron sucrose IVPB 200 milliGRAM(s) IV Intermittent every 24 hours  multivitamin 1 Tablet(s) Oral daily  pantoprazole    Tablet 40 milliGRAM(s) Oral before breakfast  senna 2 Tablet(s) Oral at bedtime  tamsulosin 0.4 milliGRAM(s) Oral at bedtime    PRN Inpatient Medications  albuterol/ipratropium for Nebulization 3 milliLiter(s) Nebulizer every 6 hours PRN  temazepam 30 milliGRAM(s) Oral at bedtime PRN      REVIEW OF SYSTEMS  --------------------------------------------------------------------------------  Gen: No weight changes, fatigue, fevers/chills, weakness  Skin: No rashes  Head/Eyes/Ears/Mouth: No headache; Normal hearing; Normal vision w/o blurriness; No sinus pain/discomfort, sore throat  Respiratory: No dyspnea, cough, wheezing, hemoptysis  CV: No chest pain, PND, orthopnea  GI: No abdominal pain, diarrhea, constipation, nausea, vomiting, melena, hematochezia  : No increased frequency, dysuria, hematuria, nocturia  MSK: No joint pain/swelling; no back pain; no edema  Neuro: No dizziness/lightheadedness, weakness, seizures, numbness, tingling  Heme: No easy bruising or bleeding  Endo: No heat/cold intolerance  Psych: No significant nervousness, anxiety, stress, depression    All other systems were reviewed and are negative, except as noted.    VITALS/PHYSICAL EXAM  --------------------------------------------------------------------------------  T(C): 36.9 (11-19-20 @ 10:32), Max: 36.9 (11-19-20 @ 10:32)  HR: 82 (11-19-20 @ 10:32) (66 - 84)  BP: 120/59 (11-19-20 @ 10:32) (119/64 - 129/67)  RR: 18 (11-19-20 @ 10:32) (18 - 18)  SpO2: 98% (11-19-20 @ 10:32) (97% - 99%)  Wt(kg): --        11-18-20 @ 07:01  -  11-19-20 @ 07:00  --------------------------------------------------------  IN: 0 mL / OUT: 2165 mL / NET: -2165 mL      Physical Exam:  	Gen: NAD  	HEENT: , supple neck, clear oropharynx  	Pulm: chest tube+  	CV: RRR, S1S2; no rub  	Back: No spinal or CVA tenderness; no sacral edema  	Abd: +BS, soft, nontender/nondistended  	: No suprapubic tenderness  	UE: Warm, no edema; no asterixis  	LE: Warm, ; no edema  	Neuro: No focal deficits,  	Psych: Normal affect and mood  	Skin: Warm  LABS/STUDIES  --------------------------------------------------------------------------------              7.4    4.25  >-----------<  248      [11-19-20 @ 08:03]              23.8     134  |  94  |  86.0  ----------------------------<  194      [11-19-20 @ 08:03]  3.9   |  30.0  |  1.95        Ca     7.8     [11-19-20 @ 08:03]      Mg     1.9     [11-19-20 @ 08:03]    TPro  5.1  /  Alb  3.0  /  TBili  0.5  /  DBili  x   /  AST  21  /  ALT  22  /  AlkPhos  221  [11-19-20 @ 08:03]          Creatinine Trend:  SCr 1.95 [11-19 @ 08:03]  SCr 2.04 [11-18 @ 06:51]  SCr 2.46 [11-17 @ 04:51]  SCr 2.31 [11-16 @ 03:15]  SCr 2.14 [11-15 @ 09:46]    Urinalysis - [11-16-20 @ 14:00]      Color Yellow / Appearance Clear / SG 1.010 / pH 5.0      Gluc Negative / Ketone Negative  / Bili Negative / Urobili Negative       Blood Moderate / Protein Negative / Leuk Est Negative / Nitrite Negative      RBC 6-10 / WBC 0-2 / Hyaline  / Gran  / Sq Epi  / Non Sq Epi Occasional / Bacteria Occasional    Urine Creatinine 31      [11-16-20 @ 13:59]  Urine Protein 6.0      [11-16-20 @ 13:59]  Urine Sodium 54      [11-16-20 @ 13:59]  Urine Osmolality 314      [11-16-20 @ 14:00]    Iron 23, TIBC 272, %sat 8      [11-19-20 @ 08:03]  PTH -- (Ca 8.1)      [10-26-20 @ 15:25]   56  Vitamin D (25OH) 60.6      [10-26-20 @ 15:25]  HbA1c 5.4      [03-25-17 @ 07:51]

## 2020-11-19 NOTE — CONSULT NOTE ADULT - PROBLEM SELECTOR RECOMMENDATION 2
-followed by cardiology  -JORGE 11/16/2020 revealed: Left ventricular ejection fraction, by visual estimation, is 60 to 65%.; normal global left ventricular systolic function; mildly enlarged right ventricle; mildly reduced RV systolic function; moderate tricuspid regurgitation.  -on lasix
AMARILIS reddy insitu  Daily CXR

## 2020-11-19 NOTE — PROGRESS NOTE ADULT - SUBJECTIVE AND OBJECTIVE BOX
[place] CARDIOLOGY-[facility] Practice                                                               Office: [address] [address]                                                              Telephone: [phone]. Fax:[phone]                                                                             PROGRESS NOTE  Reason for follow up:   Overnight: No new events.   Update:     Subjective: "  ______________________"      	  Vitals:  T(C): 36.6 (11-19-20 @ 04:15), Max: 36.7 (11-18-20 @ 21:21)  HR: 80 (11-19-20 @ 09:34) (66 - 84)  BP: 119/64 (11-19-20 @ 04:15) (119/64 - 141/79)  RR: 18 (11-19-20 @ 04:15) (18 - 18)  SpO2: 98% (11-19-20 @ 04:15) (97% - 99%)  Wt(kg): --  I&O's Summary    18 Nov 2020 07:01  -  19 Nov 2020 07:00  --------------------------------------------------------  IN: 0 mL / OUT: 2165 mL / NET: -2165 mL      Weight (kg): 100.2 (11-15 @ 06:51)      PHYSICAL EXAM:  Appearance: Comfortable. No acute distress  HEENT:  Head and neck: Atraumatic. Normocephalic.  Normal oral mucosa, PERRL, Neck is supple. No JVD, No carotid bruit.   Neurologic: A & O x 3, no focal deficits. EOMI.  Lymphatic: No cervical lymphadenopathy  Cardiovascular: Normal S1 S2, No murmur, rubs/gallops. No JVD, No edema  Respiratory: Lungs clear to auscultation  Gastrointestinal:  Soft, Non-tender, + BS  Lower Extremities: No edema  Psychiatry: Patient is calm. No agitation. Mood & affect appropriate  Skin: No rashes/ ecchymoses/cyanosis/ulcers visualized on the face, hands or feet.      CURRENT MEDICATIONS:  carvedilol 3.125 milliGRAM(s) Oral every 12 hours  furosemide    Tablet 40 milliGRAM(s) Oral two times a day  tamsulosin 0.4 milliGRAM(s) Oral at bedtime    budesonide 160 MICROgram(s)/formoterol 4.5 MICROgram(s) Inhaler  pantoprazole    Tablet  senna  allopurinol  atorvastatin  aspirin  chewable  heparin   Injectable      DIAGNOSTIC TESTING:  [ ] Echocardiogram:   [ ]  Catheterization:  [ ] Stress Test:    OTHER: 	      LABS:	 	                            7.4    4.25  )-----------( 248      ( 19 Nov 2020 08:03 )             23.8     11-19    134<L>  |  94<L>  |  86.0<H>  ----------------------------<  194<H>  3.9   |  30.0<H>  |  1.95<H>    Ca    7.8<L>      19 Nov 2020 08:03  Mg     1.9     11-19    TPro  5.1<L>  /  Alb  3.0<L>  /  TBili  0.5  /  DBili  x   /  AST  21  /  ALT  22  /  AlkPhos  221<H>  11-19    proBNP: Serum Pro-Brain Natriuretic Peptide: 78914 pg/mL (11-15 @ 01:12)    Lipid Profile:   HgA1c:   TSH:       TELEMETRY: Reviewed    ECG:  Reviewed by me. 	       [place] CARDIOLOGY-[facility] Practice                                                               Office: [address] [address]                                                              Telephone: [phone]. Fax:[phone]                                                                             PROGRESS NOTE  Reason for follow up: Heart Failure   Overnight: No new events.   Update: Feels better, c/o dyspnea, still requiring supplemental O2, NC. LE edema. HH <8. Denies chest pain, palpitations, dizziness. all other ROS negative     	  Vitals:  T(C): 36.6 (11-19-20 @ 04:15), Max: 36.7 (11-18-20 @ 21:21)  HR: 80 (11-19-20 @ 09:34) (66 - 84)  BP: 119/64 (11-19-20 @ 04:15) (119/64 - 141/79)  RR: 18 (11-19-20 @ 04:15) (18 - 18)  SpO2: 98% (11-19-20 @ 04:15) (97% - 99%)    I&O's Summary    18 Nov 2020 07:01  -  19 Nov 2020 07:00  --------------------------------------------------------  IN: 0 mL / OUT: 2165 mL / NET: -2165 mL      Weight (kg): 100.2 (11-15 @ 06:51)      PHYSICAL EXAM:  Appearance: Comfortable. No acute distress  HEENT: Atraumatic. Normocephalic.  Normal oral mucosa  Neurologic: A & O x 3, no focal deficits. EOMI.  Cardiovascular: Normal S1 S2, No murmur, rubs/gallops. No JVD, + edema  Respiratory: Diminished crackles B/L.   Gastrointestinal:  Soft, Non-tender, + BS  Lower Extremities: +2 edema  Psychiatry: Patient is calm. No agitation. Mood & affect appropriate  Skin: No rashes/ ecchymoses/cyanosis/ulcers visualized on the face, hands or feet.      CURRENT MEDICATIONS:  carvedilol 3.125 milliGRAM(s) Oral every 12 hours  furosemide Tablet 40 milliGRAM(s) Oral two times a day  tamsulosin 0.4 milliGRAM(s) Oral at bedtime  budesonide 160 MICROgram(s)/formoterol 4.5 MICROgram(s) Inhaler  pantoprazole    Tablet  senna  allopurinol  atorvastatin  aspirin  chewable  heparin   Injectable    LABS:	 	                        7.4    4.25  )-----------( 248      ( 19 Nov 2020 08:03 )             23.8     11-19    134<L>  |  94<L>  |  86.0<H>  ----------------------------<  194<H>  3.9   |  30.0<H>  |  1.95<H>    Ca    7.8<L>      19 Nov 2020 08:03  Mg     1.9     11-19    TPro  5.1<L>  /  Alb  3.0<L>  /  TBili  0.5  /  DBili  x   /  AST  21  /  ALT  22  /  AlkPhos  221<H>  11-19    proBNP: Serum Pro-Brain Natriuretic Peptide: 55106 pg/mL (11-15 @ 01:12)      TELEMETRY: Afib rate at 95, NSVT 3 beats      [place] CARDIOLOGY-[facility] Practice                                                               Office: [address] [address]                                                              Telephone: [phone]. Fax:[phone]                                                                             PROGRESS NOTE  Reason for follow up: Heart Failure   Overnight: No new events.   Update: Feels better, c/o mild dyspnea, still requiring supplemental O2, NC. LE edema. HH <8. Denies chest pain, palpitations, dizziness. all other ROS negative     	  Vitals:  T(C): 36.6 (11-19-20 @ 04:15), Max: 36.7 (11-18-20 @ 21:21)  HR: 80 (11-19-20 @ 09:34) (66 - 84)  BP: 119/64 (11-19-20 @ 04:15) (119/64 - 141/79)  RR: 18 (11-19-20 @ 04:15) (18 - 18)  SpO2: 98% (11-19-20 @ 04:15) (97% - 99%)    I&O's Summary    18 Nov 2020 07:01  -  19 Nov 2020 07:00  --------------------------------------------------------  IN: 0 mL / OUT: 2165 mL / NET: -2165 mL      Weight (kg): 100.2 (11-15 @ 06:51)      PHYSICAL EXAM:  Appearance: Comfortable. No acute distress  HEENT:  Head and neck: Atraumatic. Normocephalic.  Normal oral mucosa, PERRL, Neck is supple. No JVD, No carotid bruit.   Neurologic: A & O x 3, no focal deficits. EOMI.  Lymphatic: No cervical lymphadenopathy  Cardiovascular: Normal S1 S2, irreg/irreg, No murmur, rubs/gallops. No JVD, non-pitting b/l LE edema  Respiratory: Lungs clear to auscultation, R chest tube in place, serosanguinous output  Gastrointestinal:  Soft, Non-tender, + BS  Lower Extremities: bilateral non-pitting edema  Psychiatry: Patient is calm. No agitation. Mood & affect appropriate  Skin: extensive ecchymosis b/l UE and LE      CURRENT MEDICATIONS:  carvedilol 3.125 milliGRAM(s) Oral every 12 hours  furosemide Tablet 40 milliGRAM(s) Oral two times a day  tamsulosin 0.4 milliGRAM(s) Oral at bedtime  budesonide 160 MICROgram(s)/formoterol 4.5 MICROgram(s) Inhaler  pantoprazole    Tablet  senna  allopurinol  atorvastatin  aspirin  chewable  heparin   Injectable    LABS:	 	                        7.4    4.25  )-----------( 248      ( 19 Nov 2020 08:03 )             23.8     11-19    134<L>  |  94<L>  |  86.0<H>  ----------------------------<  194<H>  3.9   |  30.0<H>  |  1.95<H>    Ca    7.8<L>      19 Nov 2020 08:03  Mg     1.9     11-19    TPro  5.1<L>  /  Alb  3.0<L>  /  TBili  0.5  /  DBili  x   /  AST  21  /  ALT  22  /  AlkPhos  221<H>  11-19    proBNP: Serum Pro-Brain Natriuretic Peptide: 65616 pg/mL (11-15 @ 01:12)      TELEMETRY: Afib rate at 95, NSVT 3 beats

## 2020-11-19 NOTE — CONSULT NOTE ADULT - PROBLEM SELECTOR RECOMMENDATION 6
-supportive care  -dietary following, rehab notes home with home care.  Attending notes that wife wants ROCKY -supportive care  -dietary following, rehab notes home with home care.  Attending notes that wife wants ROCKY.  -discussed home supportive care program with the patient.  Should he go home with home care, he is amenable to it. -supportive care  -dietary following, rehab notes home with home care.  Attending notes that wife wants ROCKY.  -if patient goes home on d/c, consider advanced illness program.  Discussed program with patient, and he is amenable.

## 2020-11-19 NOTE — CONSULT NOTE ADULT - SUBJECTIVE AND OBJECTIVE BOX
HPI:  Patient is a  70 year old male with a pmhx of w/ Afib, pulmonary HTN, chronic OM, CKD, COPD, CABG. Recently admitted s/p fall w/ hematoma of LLE requiring debridement and evacuation of hematoma, complicated by worsening GERALD on CKD and worsening respiratory status. Found to have severe pulm HTN, was being diuresed with lasix. Was reccomended for cath but patient declined for fear of worsening renal failure. Noted to have R pleural effusion s/p pigtail catheter that drained 2.2L. Eventually d/c to [facility] on 11/13. Presented today unresponsive, obtunded then intubated in the ED. Hypothermic and developed shock. Labs significant for hyperkalemia acute on chronic renal failure and worsening R pleural effusion. MICU consulted called.  (15 Nov 2020 05:20)      HPI:  71 yo male with a history of afib, pulmonary HTN, chronic OM, CKD, COPD, CABG, with a recent admission in the setting of a fall, found with a LLE hematoma requiring debridement and evacuation, complicated by worsening GERALD on CKD, worsening respiratory status, found with severe pulmonary hypertension, being diuresed with lasix, declined recommended cath due to concern for worsening renal failure, with right pleural effusion s/p pigtail cath that drained 2.2 liters, presents from [facility], where he went on d/c, unresponsive.  Patient obtunded, intubated in the ED.  Found hypotthermic, developed shock.  Found with worsening right pleural effusion, acute on chronic renal failure, hyperkalemia.  MICU was consulted, and patient was admitted to the MICU, where he was extubated and treated with diuretics for CHF exacerbation.  Right pigtail placed for recurrent pleural effusion by CT surgery.  Now downgraded to the medicine.  Patient, wife refuse cath.        70 year old male with a pmhx of w/ Afib, pulmonary HTN, chronic OM, CKD, COPD, CABG. Recently admitted s/p fall w/ hematoma of LLE requiring debridement and evacuation of hematoma, complicated by worsening GERALD on CKD and worsening respiratory status. Found to have severe pulm HTN, was being diuresed with lasix. Was recommended for cath but patient declined for fear of worsening renal failure. Noted to have R pleural effusion s/p pigtail catheter that drained 2.2L. Eventually d/c to [facility] on 11/13. Presented back on 11/15/20 unresponsive, obtunded then intubated in the ED. Hypothermic and developed shock. Labs significant for hyperkalemia acute on chronic renal failure and worsening R pleural effusion. Admitted to MICU. Pt now has a right pigtail placement for recurrence of right effusion, CTS following. Pt extubated, GERALD on CKD improving, followed by renal. Cardiology following.  A/C on hold, as patient noted with history of falls, and a drop in hemoglobin.      Pt transferred to medical service.      Given multiple medical problems, palliative care consulted for goals of care.      PERTINENT PMH REVIEWED: Yes No    PAST MEDICAL & SURGICAL HISTORY:  CKD (chronic kidney disease)    CHF (congestive heart failure)    Atrial fibrillation    COPD, severity to be determined    Coronary artery disease involving coronary bypass graft of native heart without angina pectoris    Chronic osteomyelitis, osteomyelitis of unspecified site    COPD (chronic obstructive pulmonary disease)    Atrial fibrillation    Bladder cancer    HLD (hyperlipidemia)    H/O knee surgery    S/P CABG (coronary artery bypass graft)    Presence of stent of CABG        SOCIAL HISTORY:                                     Admitted from:  home  SNF  [facility]     Surrogate/HCP/Guardian: Phone#:    FAMILY HISTORY:  No pertinent family history in first degree relatives        Baseline ADLs (prior to admission):  Independent/ Dependent      Allergies    No Known Allergies    Intolerances        Present Symptoms:     Dyspnea: 0 1 2 3   Nausea/Vomiting: Yes No  Anxiety:  Yes No  Depression: Yes No  Fatigue: Yes No  Loss of appetite: Yes No    Pain:             Character-            Duration-            Effect-            Factors-            Frequency-            Location-            Severity-    Review of Systems: Reviewed                     Negative:                     Positive:  Unable to obtain due to poor mentation   All others negative    MEDICATIONS  (STANDING):  allopurinol 100 milliGRAM(s) Oral daily  aspirin  chewable 81 milliGRAM(s) Oral daily  atorvastatin 20 milliGRAM(s) Oral at bedtime  budesonide 160 MICROgram(s)/formoterol 4.5 MICROgram(s) Inhaler 2 Puff(s) Inhalation two times a day  carvedilol 3.125 milliGRAM(s) Oral every 12 hours  furosemide    Tablet 40 milliGRAM(s) Oral two times a day  heparin   Injectable 5000 Unit(s) SubCutaneous every 12 hours  pantoprazole    Tablet 40 milliGRAM(s) Oral before breakfast  senna 2 Tablet(s) Oral at bedtime  tamsulosin 0.4 milliGRAM(s) Oral at bedtime    MEDICATIONS  (PRN):  albuterol/ipratropium for Nebulization 3 milliLiter(s) Nebulizer every 6 hours PRN Shortness of Breath and/or Wheezing  temazepam 30 milliGRAM(s) Oral at bedtime PRN Insomnia      PHYSICAL EXAM:    Vital Signs Last 24 Hrs  T(C): 36.6 (19 Nov 2020 04:15), Max: 36.7 (18 Nov 2020 21:21)  T(F): 97.8 (19 Nov 2020 04:15), Max: 98 (18 Nov 2020 21:21)  HR: 66 (19 Nov 2020 04:15) (66 - 84)  BP: 119/64 (19 Nov 2020 04:15) (119/64 - 141/79)  BP(mean): --  RR: 18 (19 Nov 2020 04:15) (18 - 18)  SpO2: 98% (19 Nov 2020 04:15) (97% - 99%)    General: alert  oriented x ____ lethargic agitated                  cachexia  nonverbal  coma    Karnofsky:  %    HEENT: normal  dry mouth  ET tube/trach    Lungs: comfortable tachypnea/labored breathing  excessive secretions    CV: normal  tachycardia    GI: normal  distended  tender  no BS               PEG/NG/OG tube  constipation  last BM:     : normal  incontinent  oliguria/anuria  lomeli    MSK: normal  weakness  edema             ambulatory  bedbound/wheelchair bound    Skin: normal  pressure ulcers- Stage_____  no rash    LABS:                        7.4    4.25  )-----------( 248      ( 19 Nov 2020 08:03 )             23.8     11-19    134<L>  |  94<L>  |  86.0<H>  ----------------------------<  194<H>  3.9   |  30.0<H>  |  1.95<H>    Ca    7.8<L>      19 Nov 2020 08:03  Mg     1.9     11-19    TPro  5.1<L>  /  Alb  3.0<L>  /  TBili  0.5  /  DBili  x   /  AST  21  /  ALT  22  /  AlkPhos  221<H>  11-19        I&O's Summary    18 Nov 2020 07:01  -  19 Nov 2020 07:00  --------------------------------------------------------  IN: 0 mL / OUT: 2165 mL / NET: -2165 mL        RADIOLOGY & ADDITIONAL STUDIES:    ADVANCE DIRECTIVES:   DNR YES NO  Completed on:                     MOLST  YES NO   Completed on:  Living Will  YES NO   Completed on:     HPI:  Patient is a  70 year old male with a pmhx of w/ Afib, pulmonary HTN, chronic OM, CKD, COPD, CABG. Recently admitted s/p fall w/ hematoma of LLE requiring debridement and evacuation of hematoma, complicated by worsening GERALD on CKD and worsening respiratory status. Found to have severe pulm HTN, was being diuresed with lasix. Was reccomended for cath but patient declined for fear of worsening renal failure. Noted to have R pleural effusion s/p pigtail catheter that drained 2.2L. Eventually d/c to [facility] on 11/13. Presented today unresponsive, obtunded then intubated in the ED. Hypothermic and developed shock. Labs significant for hyperkalemia acute on chronic renal failure and worsening R pleural effusion. MICU consulted called.  (15 Nov 2020 05:20)      HPI:  71 yo male with a history of afib, pulmonary HTN, chronic OM, CKD, COPD, CABG, with a recent admission in the setting of a fall, found with a LLE hematoma requiring debridement and evacuation, complicated by worsening GERALD on CKD, worsening respiratory status, found with severe pulmonary hypertension, being diuresed with lasix, declined recommended cath due to concern for worsening renal failure, with right pleural effusion s/p pigtail cath that drained 2.2 liters, presents from [facility], where he went on d/c, unresponsive.  Patient obtunded, intubated in the ED.  Found hypothermic, developed shock.  Found with worsening right pleural effusion, acute on chronic renal failure, hyperkalemia.  MICU was consulted, and patient was admitted to the MICU, where he was subsequently extubated and treated with diuretics for CHF exacerbation.  Right pigtail placed for recurrent pleural effusion by CT surgery.  Patient, wife refuse cath.    GERALD on CKD improving, followed by renal. Cardiology following, for acute on chronic HFpEF.  A/C on hold, as patient noted with history of falls, and a drop in hemoglobin.      Pt transferred to medical service.    Given multiple medical problems, palliative care consulted for goals of care.    Per patient, he went to rehab, and recalled going to sleep without feeling ill, or in respiratory distress.  The next thing he recalls is waking up intubated.      During the first week of May, as he was about to return from [place], where he and his wife spend the winter, patient recalls feeling "winded," which prompted him to get bloodwork.  He reports that he was found to be anemic, and received a unit of PRBCs, and iron.  With this, he stated that he felt much better.  He reports that he had a mechanical fall last month, as he and he wife had just moved from [place] to a new home in [place].  He reports a decision was made to avoid a cath in consultation with his cardiologist.      Shortness of breath, once present, now has resolved.  He does admit to some mild shortness of breath with activity.    PERTINENT PMH REVIEWED: Yes    PAST MEDICAL & SURGICAL HISTORY:  CKD (chronic kidney disease)  CHF (congestive heart failure)  Atrial fibrillation  COPD, severity to be determined  Coronary artery disease involving coronary bypass graft of native heart without angina pectoris  Chronic osteomyelitis, osteomyelitis of unspecified site  COPD (chronic obstructive pulmonary disease)  Atrial fibrillation  Bladder cancer  HLD (hyperlipidemia)  H/O knee surgery  S/P CABG (coronary artery bypass graft)  Presence of stent of CABG        SOCIAL HISTORY:  from [facility], but prior to previous hospitalization, [facility] home, where he resides with his wife and cat.  He has an elevator in his home.  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     20+ years, three stepchildren.                                       Surrogate/HCP/Guardian: Kathy Garcia Phone#: [phone]    FAMILY HISTORY:  no family history of lung or kidney problems        Baseline ADLs (prior to admission): in [facility] prior to admission (briefly) prior to prior hospitalization, independent with ADLs, IADLs, driving      Allergies  No Known Allergies  Intolerances        Present Symptoms:     Dyspnea: none at present, mild with actiity  Nausea/Vomiting: No  Anxiety:  No  Depression: Yes, related to multiple hospitalizations and an inability to enjoy his new home.  No SIHI  Fatigue: No  Loss of appetite: No    Pain: denies            Review of Systems: Reviewed  Negative: dizziness, lighheadedness, palpitations, cough                     Positive: general weakness  All others negative    MEDICATIONS  (STANDING):  allopurinol 100 milliGRAM(s) Oral daily  aspirin  chewable 81 milliGRAM(s) Oral daily  atorvastatin 20 milliGRAM(s) Oral at bedtime  budesonide 160 MICROgram(s)/formoterol 4.5 MICROgram(s) Inhaler 2 Puff(s) Inhalation two times a day  carvedilol 3.125 milliGRAM(s) Oral every 12 hours  furosemide    Tablet 40 milliGRAM(s) Oral two times a day  heparin   Injectable 5000 Unit(s) SubCutaneous every 12 hours  pantoprazole    Tablet 40 milliGRAM(s) Oral before breakfast  senna 2 Tablet(s) Oral at bedtime  tamsulosin 0.4 milliGRAM(s) Oral at bedtime    MEDICATIONS  (PRN):  albuterol/ipratropium for Nebulization 3 milliLiter(s) Nebulizer every 6 hours PRN Shortness of Breath and/or Wheezing  temazepam 30 milliGRAM(s) Oral at bedtime PRN Insomnia      PHYSICAL EXAM:    Vital Signs Last 24 Hrs  T(C): 36.6 (19 Nov 2020 04:15), Max: 36.7 (18 Nov 2020 21:21)  T(F): 97.8 (19 Nov 2020 04:15), Max: 98 (18 Nov 2020 21:21)  HR: 66 (19 Nov 2020 04:15) (66 - 84)  BP: 119/64 (19 Nov 2020 04:15) (119/64 - 141/79)  BP(mean): --  RR: 18 (19 Nov 2020 04:15) (18 - 18)  SpO2: 98% (19 Nov 2020 04:15) (97% - 99%)    General: alert  oriented x 3, demonstrates insight into condition    Karnofsky: 30 %    Gen: In NAD.  No pain behaviors or work of breathing noted  Neuro: alert and oriented x 3, awake, alert, communicates needs.  Lethargic, rouses briefly to verbal/tactile stim and returns to sleep  Head: NC/AT, bitemporal wasting  Eyes: sclerae non-icteric  ENT: moist oral mucosa, dry oral mucosa  Resp: clear, unlabored  CV: S1, S2  Abd: soft, non-tender, + bowels sounds  : lomeli bag/external urine collection system without gross hematuria  Peripheral Vasc: no pedal edema, anasarca, +b/l LE edema, cool LE digits  Int: warm and dry  Psych: no psychomotor agitation      LABS:                        7.4    4.25  )-----------( 248      ( 19 Nov 2020 08:03 )             23.8     11-19    134<L>  |  94<L>  |  86.0<H>  ----------------------------<  194<H>  3.9   |  30.0<H>  |  1.95<H>    Ca    7.8<L>      19 Nov 2020 08:03  Mg     1.9     11-19    TPro  5.1<L>  /  Alb  3.0<L>  /  TBili  0.5  /  DBili  x   /  AST  21  /  ALT  22  /  AlkPhos  221<H>  11-19        I&O's Summary    18 Nov 2020 07:01  -  19 Nov 2020 07:00  --------------------------------------------------------  IN: 0 mL / OUT: 2165 mL / NET: -2165 mL        RADIOLOGY & ADDITIONAL STUDIES:    ADVANCE DIRECTIVES:   DNR YES NO  Completed on:                     STEVEN  YES NO   Completed on:  Living Will  YES NO   Completed on:     HPI:  Patient is a  70 year old male with a pmhx of w/ Afib, pulmonary HTN, chronic OM, CKD, COPD, CABG. Recently admitted s/p fall w/ hematoma of LLE requiring debridement and evacuation of hematoma, complicated by worsening GERALD on CKD and worsening respiratory status. Found to have severe pulm HTN, was being diuresed with lasix. Was reccomended for cath but patient declined for fear of worsening renal failure. Noted to have R pleural effusion s/p pigtail catheter that drained 2.2L. Eventually d/c to [facility] on 11/13. Presented today unresponsive, obtunded then intubated in the ED. Hypothermic and developed shock. Labs significant for hyperkalemia acute on chronic renal failure and worsening R pleural effusion. MICU consulted called.  (15 Nov 2020 05:20)      HPI:  71 yo male with a history of afib, pulmonary HTN, chronic OM, CKD, COPD, CABG, with a recent admission in the setting of a fall, found with a LLE hematoma requiring debridement and evacuation, complicated by worsening GERALD on CKD, worsening respiratory status, found with severe pulmonary hypertension, being diuresed with lasix, declined recommended cath due to concern for worsening renal failure, with right pleural effusion s/p pigtail cath that drained 2.2 liters, presents from [facility], where he went on d/c, unresponsive.  Patient obtunded, intubated in the ED.  Found hypothermic, developed shock.  Found with worsening right pleural effusion, acute on chronic renal failure, hyperkalemia.  MICU was consulted, and patient was admitted to the MICU, where he was subsequently extubated and treated with diuretics for CHF exacerbation.  Right pigtail placed for recurrent pleural effusion by CT surgery.  Patient, wife refuse cath.    GERALD on CKD improving, followed by renal. Cardiology following, for acute on chronic HFpEF.  A/C on hold, as patient noted with history of falls, and a drop in hemoglobin.      Pt transferred to medical service.  Given multiple medical problems, palliative care consulted for goals of care.    Per patient, he went to rehab, and recalled going to sleep without feeling ill, or in respiratory distress.  The next thing he recalls is waking up intubated.      During the first week of May, as he was about to return from [place], where he and his wife spend the winter, patient recalls feeling "winded," which prompted him to get bloodwork.  He reports that he was found to be anemic, and received a unit of PRBCs, and iron.  With this, he stated that he felt much better.  He reports that he had a mechanical fall last month, as he and he wife had just moved from [place] to a new home in [place].     Shortness of breath, once present, now has resolved.  He does admit to some mild shortness of breath with activity.  Diagnosis of pulmonary hypertension is new.  Patient reports that he and his wife declined cath in consultation with his cardiologist, given renal function.  He reports that he was told that his CT will stay in today and it will be re-evaluated for removal tomorrow.    PERTINENT PMH REVIEWED: Yes    PAST MEDICAL & SURGICAL HISTORY:  CKD (chronic kidney disease)  CHF (congestive heart failure)  Atrial fibrillation  COPD, severity to be determined  Coronary artery disease involving coronary bypass graft of native heart without angina pectoris  Chronic osteomyelitis, osteomyelitis of unspecified site  COPD (chronic obstructive pulmonary disease)  Atrial fibrillation  Bladder cancer  HLD (hyperlipidemia)  H/O knee surgery  S/P CABG (coronary artery bypass graft)  Presence of stent of CABG        SOCIAL HISTORY:  from [facility], but prior to previous hospitalization, from home, where he resides with his wife and cat.  He has an elevator in his home.  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     20+ years, three stepchildren.                                       Surrogate/HCP/Guardian: Kathy Garcia Phone#: [phone]    FAMILY HISTORY:  no family history of lung or kidney problems        Baseline ADLs (prior to admission): in [facility] prior to admission (briefly) prior to prior hospitalization, independent with ADLs, IADLs, driving      Allergies  No Known Allergies  Intolerances        Present Symptoms:     Dyspnea: none at present, mild with activity  Nausea/Vomiting: No  Anxiety:  No  Depression: Yes, related to multiple hospitalizations and an inability to enjoy his new home.   Fatigue: No  Loss of appetite: No    Pain: denies            Review of Systems: Reviewed  Negative: dizziness, lighheadedness, palpitations, cough                     Positive: general weakness  All others negative    MEDICATIONS  (STANDING):  allopurinol 100 milliGRAM(s) Oral daily  aspirin  chewable 81 milliGRAM(s) Oral daily  atorvastatin 20 milliGRAM(s) Oral at bedtime  budesonide 160 MICROgram(s)/formoterol 4.5 MICROgram(s) Inhaler 2 Puff(s) Inhalation two times a day  carvedilol 3.125 milliGRAM(s) Oral every 12 hours  furosemide    Tablet 40 milliGRAM(s) Oral two times a day  heparin   Injectable 5000 Unit(s) SubCutaneous every 12 hours  pantoprazole    Tablet 40 milliGRAM(s) Oral before breakfast  senna 2 Tablet(s) Oral at bedtime  tamsulosin 0.4 milliGRAM(s) Oral at bedtime    MEDICATIONS  (PRN):  albuterol/ipratropium for Nebulization 3 milliLiter(s) Nebulizer every 6 hours PRN Shortness of Breath and/or Wheezing  temazepam 30 milliGRAM(s) Oral at bedtime PRN Insomnia      PHYSICAL EXAM:    Vital Signs Last 24 Hrs  T(C): 36.6 (19 Nov 2020 04:15), Max: 36.7 (18 Nov 2020 21:21)  T(F): 97.8 (19 Nov 2020 04:15), Max: 98 (18 Nov 2020 21:21)  HR: 66 (19 Nov 2020 04:15) (66 - 84)  BP: 119/64 (19 Nov 2020 04:15) (119/64 - 141/79)  BP(mean): --  RR: 18 (19 Nov 2020 04:15) (18 - 18)  SpO2: 98% (19 Nov 2020 04:15) (97% - 99%)    General: alert  oriented x 3, demonstrates insight into condition    Karnofsky: 30 %    Gen: In NAD.  No pain behaviors or work of breathing noted  Neuro: alert and oriented x 3 communicates needs.  Head: NC/AT  Eyes: sclerae non-icteric, EOMI  ENT: moist oral mucosa  Resp: diminished at right base, unlabored  CV: S1, S2  Abd: soft, non-tender, + bowels sounds  Peripheral Vasc:  +b/l LE edema, LLE in gauze  Int: warm and dry  Psych: no psychomotor agitation, pleasant, affect appropriate.  No SI/HI      LABS:                        7.4    4.25  )-----------( 248      ( 19 Nov 2020 08:03 )             23.8     11-19    134<L>  |  94<L>  |  86.0<H>  ----------------------------<  194<H>  3.9   |  30.0<H>  |  1.95<H>    Ca    7.8<L>      19 Nov 2020 08:03  Mg     1.9     11-19    TPro  5.1<L>  /  Alb  3.0<L>  /  TBili  0.5  /  DBili  x   /  AST  21  /  ALT  22  /  AlkPhos  221<H>  11-19        I&O's Summary    18 Nov 2020 07:01  -  19 Nov 2020 07:00  --------------------------------------------------------  IN: 0 mL / OUT: 2165 mL / NET: -2165 mL        RADIOLOGY & ADDITIONAL STUDIES: reviewed     ADVANCE DIRECTIVES:   DNR NO   MOLST  NO    Living Will  YES.  Not in hand, at home with his wife     HPI:  Patient is a  70 year old male with a pmhx of w/ Afib, pulmonary HTN, chronic OM, CKD, COPD, CABG. Recently admitted s/p fall w/ hematoma of LLE requiring debridement and evacuation of hematoma, complicated by worsening GERALD on CKD and worsening respiratory status. Found to have severe pulm HTN, was being diuresed with lasix. Was reccomended for cath but patient declined for fear of worsening renal failure. Noted to have R pleural effusion s/p pigtail catheter that drained 2.2L. Eventually d/c to [facility] on 11/13. Presented today unresponsive, obtunded then intubated in the ED. Hypothermic and developed shock. Labs significant for hyperkalemia acute on chronic renal failure and worsening R pleural effusion. MICU consulted called.  (15 Nov 2020 05:20)      HPI:  71 yo male with a history of afib, pulmonary HTN, chronic OM, CKD, COPD, CABG, with a recent admission in the setting of a fall, found with a LLE hematoma requiring debridement and evacuation, complicated by worsening GERALD on CKD, worsening respiratory status, found with severe pulmonary hypertension, being diuresed with lasix, declined recommended cath due to concern for worsening renal failure, with right pleural effusion s/p pigtail cath that drained 2.2 liters, presents from [facility], where he went on d/c, unresponsive.  Patient obtunded, intubated in the ED.  Found hypothermic, developed shock.  Found with worsening right pleural effusion, acute on chronic renal failure, hyperkalemia.  MICU was consulted, and patient was admitted to the MICU, where he was subsequently extubated and treated with diuretics for CHF exacerbation.  Right pigtail placed for recurrent pleural effusion by CT surgery.  Patient, wife refuse cath.    GERALD on CKD improving, followed by renal. Cardiology following, for acute on chronic HFpEF.  A/C on hold, as patient noted with history of falls, and a drop in hemoglobin.      Pt transferred to medical service.  Given multiple medical problems, palliative care consulted for goals of care.    Per patient, he went to rehab, and recalled going to sleep without feeling ill, or in respiratory distress.  The next thing he recalls is waking up intubated.      During the first week of May, as he was about to return from [place], where he and his wife spend the winter, patient recalls feeling "winded," which prompted him to get bloodwork.  He reports that he was found to be anemic, and received a unit of PRBCs, and iron.  With this, he stated that he felt much better.   He notes that he has had GI testing done, but that the last endoscopy/colonoscopy was about three years ago. He reports that he had a mechanical fall last month, as he and he wife had just moved from [place] to a new home in [place].     Shortness of breath, once present, now has resolved.  He does admit to some mild shortness of breath with activity.  Diagnosis of pulmonary hypertension is new.  Patient reports that he and his wife declined cath in consultation with his cardiologist, given renal function.  He reports that he was told that his CT will stay in today and it will be re-evaluated for removal tomorrow.    PERTINENT PMH REVIEWED: Yes    PAST MEDICAL & SURGICAL HISTORY:  CKD (chronic kidney disease)  CHF (congestive heart failure)  Atrial fibrillation  COPD, severity to be determined  Coronary artery disease involving coronary bypass graft of native heart without angina pectoris  Chronic osteomyelitis, osteomyelitis of unspecified site  COPD (chronic obstructive pulmonary disease)  Atrial fibrillation  Bladder cancer  HLD (hyperlipidemia)  H/O knee surgery  S/P CABG (coronary artery bypass graft)  Presence of stent of CABG        SOCIAL HISTORY:  from [facility], but prior to previous hospitalization, from home, where he resides with his wife and cat.  He has an elevator in his home.  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     20+ years, three stepchildren.                                       Surrogate/HCP/Guardian: Kathy Garcia Phone#: [phone]    FAMILY HISTORY:  no family history of lung or kidney problems        Baseline ADLs (prior to admission): in [facility] prior to admission (briefly) prior to prior hospitalization, independent with ADLs, IADLs, driving      Allergies  No Known Allergies  Intolerances        Present Symptoms:     Dyspnea: none at present, mild with activity  Nausea/Vomiting: No  Anxiety:  No  Depression: Yes, related to multiple hospitalizations and an inability to enjoy his new home.   Fatigue: No  Loss of appetite: No    Pain: denies            Review of Systems: Reviewed  Negative: dizziness, lighheadedness, palpitations, cough                     Positive: general weakness  All others negative    MEDICATIONS  (STANDING):  allopurinol 100 milliGRAM(s) Oral daily  aspirin  chewable 81 milliGRAM(s) Oral daily  atorvastatin 20 milliGRAM(s) Oral at bedtime  budesonide 160 MICROgram(s)/formoterol 4.5 MICROgram(s) Inhaler 2 Puff(s) Inhalation two times a day  carvedilol 3.125 milliGRAM(s) Oral every 12 hours  furosemide    Tablet 40 milliGRAM(s) Oral two times a day  heparin   Injectable 5000 Unit(s) SubCutaneous every 12 hours  pantoprazole    Tablet 40 milliGRAM(s) Oral before breakfast  senna 2 Tablet(s) Oral at bedtime  tamsulosin 0.4 milliGRAM(s) Oral at bedtime    MEDICATIONS  (PRN):  albuterol/ipratropium for Nebulization 3 milliLiter(s) Nebulizer every 6 hours PRN Shortness of Breath and/or Wheezing  temazepam 30 milliGRAM(s) Oral at bedtime PRN Insomnia      PHYSICAL EXAM:    Vital Signs Last 24 Hrs  T(C): 36.6 (19 Nov 2020 04:15), Max: 36.7 (18 Nov 2020 21:21)  T(F): 97.8 (19 Nov 2020 04:15), Max: 98 (18 Nov 2020 21:21)  HR: 66 (19 Nov 2020 04:15) (66 - 84)  BP: 119/64 (19 Nov 2020 04:15) (119/64 - 141/79)  BP(mean): --  RR: 18 (19 Nov 2020 04:15) (18 - 18)  SpO2: 98% (19 Nov 2020 04:15) (97% - 99%)    General: alert  oriented x 3, demonstrates insight into condition    Karnofsky: 30 %    Gen: In NAD.  No pain behaviors or work of breathing noted  Neuro: alert and oriented x 3 communicates needs.  Head: NC/AT  Eyes: sclerae non-icteric, EOMI  ENT: moist oral mucosa  Resp: diminished at right base, unlabored  CV: S1, S2  Abd: soft, non-tender, + bowels sounds  Peripheral Vasc:  +b/l LE edema, LLE in gauze  Int: warm and dry  Psych: no psychomotor agitation, pleasant, affect appropriate.  No SI/HI      LABS:                        7.4    4.25  )-----------( 248      ( 19 Nov 2020 08:03 )             23.8     11-19    134<L>  |  94<L>  |  86.0<H>  ----------------------------<  194<H>  3.9   |  30.0<H>  |  1.95<H>    Ca    7.8<L>      19 Nov 2020 08:03  Mg     1.9     11-19    TPro  5.1<L>  /  Alb  3.0<L>  /  TBili  0.5  /  DBili  x   /  AST  21  /  ALT  22  /  AlkPhos  221<H>  11-19        I&O's Summary    18 Nov 2020 07:01  -  19 Nov 2020 07:00  --------------------------------------------------------  IN: 0 mL / OUT: 2165 mL / NET: -2165 mL        RADIOLOGY & ADDITIONAL STUDIES: reviewed     ADVANCE DIRECTIVES:   DNR NO   MOLST  NO    Living Will  YES.  Not in hand, at home with his wife     HPI:  Patient is a 70 year old male with a pmhx of w/ Afib, pulmonary HTN, chronic OM, CKD, COPD, CABG. Recently admitted s/p fall w/ hematoma of LLE requiring debridement and evacuation of hematoma, complicated by worsening GERALD on CKD and worsening respiratory status. Found to have severe pulm HTN, was being diuresed with lasix. Was reccomended for cath but patient declined for fear of worsening renal failure. Noted to have R pleural effusion s/p pigtail catheter that drained 2.2L. Eventually d/c to [facility] on 11/13. Presented today unresponsive, obtunded then intubated in the ED. Hypothermic and developed shock. Labs significant for hyperkalemia acute on chronic renal failure and worsening R pleural effusion. MICU consulted called.  (15 Nov 2020 05:20)      HPI:  69 yo male with a history of afib, pulmonary HTN, chronic OM, CKD, COPD, CABG, with a recent admission in the setting of a fall, found with a LLE hematoma requiring debridement and evacuation, complicated by worsening GERALD on CKD, worsening respiratory status, found with severe pulmonary hypertension, being diuresed with lasix, declined recommended cath due to concern for worsening renal failure, with right pleural effusion s/p pigtail cath that drained 2.2 liters, presents from [facility], where he went on d/c, unresponsive.  Patient obtunded, intubated in the ED.  Found hypothermic, developed shock.  Found with worsening right pleural effusion, acute on chronic renal failure, hyperkalemia.  MICU was consulted, and patient was admitted to the MICU, where he was subsequently extubated and treated with diuretics for CHF exacerbation.  Right pigtail placed for recurrent pleural effusion by CT surgery.  Patient, wife refuse cath.    GERALD on CKD improving, followed by renal. Cardiology following, for acute on chronic HFpEF.  A/C on hold, as patient noted with history of falls, and a drop in hemoglobin.      Pt transferred to medical service.  Given multiple medical problems, palliative care consulted for goals of care.    Per patient, he went to rehab, and recalled going to sleep without feeling ill, or in respiratory distress.  The next thing he recalls is waking up intubated.      During the first week of May, as he was about to return from [place], where he and his wife spend the winter, patient recalls feeling "winded," which prompted him to get bloodwork.  He reports that he was found to be anemic, and received a unit of PRBCs, and iron. With this, he stated that he felt much better.   States he had seen a hematologist.  He notes that he has had GI testing done, but that the last endoscopy/colonoscopy was about three years ago. He reports that he had a mechanical fall last month, as he and he wife had just moved from [place] to a new home in [place].     Shortness of breath, once present, now has resolved.  He does admit to some mild shortness of breath with activity.  Diagnosis of pulmonary hypertension is new.  Patient reports that he and his wife declined cath in consultation with his cardiologist, given renal function.  He reports that he was told that his CT will stay in today and it will be re-evaluated for removal tomorrow.    PERTINENT PMH REVIEWED: Yes    PAST MEDICAL & SURGICAL HISTORY:  CKD (chronic kidney disease)  CHF (congestive heart failure)  Atrial fibrillation  COPD, severity to be determined  Coronary artery disease involving coronary bypass graft of native heart without angina pectoris  Chronic osteomyelitis, osteomyelitis of unspecified site  COPD (chronic obstructive pulmonary disease)  Atrial fibrillation  Bladder cancer  HLD (hyperlipidemia)  H/O knee surgery  S/P CABG (coronary artery bypass graft)  Presence of stent of CABG        SOCIAL HISTORY:  from [facility], but prior to previous hospitalization, from home, where he resides with his wife and cat.  He has an elevator in his home.  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     20+ years, three stepchildren.                                       Surrogate/HCP/Guardian: Kathy Garcia Phone#: [phone]    FAMILY HISTORY:  no family history of lung or kidney problems        Baseline ADLs (prior to admission): in [facility] prior to admission (briefly) prior to prior hospitalization, independent with ADLs, IADLs, driving      Allergies  No Known Allergies  Intolerances        Present Symptoms:     Dyspnea: none at present, mild with activity  Nausea/Vomiting: No  Anxiety:  No  Depression: Yes, related to multiple hospitalizations and an inability to enjoy his new home.   Fatigue: No  Loss of appetite: No    Pain: denies            Review of Systems: Reviewed  Negative: dizziness, lighheadedness, palpitations, cough                     Positive: general weakness  All others negative    MEDICATIONS  (STANDING):  allopurinol 100 milliGRAM(s) Oral daily  aspirin  chewable 81 milliGRAM(s) Oral daily  atorvastatin 20 milliGRAM(s) Oral at bedtime  budesonide 160 MICROgram(s)/formoterol 4.5 MICROgram(s) Inhaler 2 Puff(s) Inhalation two times a day  carvedilol 3.125 milliGRAM(s) Oral every 12 hours  furosemide    Tablet 40 milliGRAM(s) Oral two times a day  heparin   Injectable 5000 Unit(s) SubCutaneous every 12 hours  pantoprazole    Tablet 40 milliGRAM(s) Oral before breakfast  senna 2 Tablet(s) Oral at bedtime  tamsulosin 0.4 milliGRAM(s) Oral at bedtime    MEDICATIONS  (PRN):  albuterol/ipratropium for Nebulization 3 milliLiter(s) Nebulizer every 6 hours PRN Shortness of Breath and/or Wheezing  temazepam 30 milliGRAM(s) Oral at bedtime PRN Insomnia      PHYSICAL EXAM:    Vital Signs Last 24 Hrs  T(C): 36.6 (19 Nov 2020 04:15), Max: 36.7 (18 Nov 2020 21:21)  T(F): 97.8 (19 Nov 2020 04:15), Max: 98 (18 Nov 2020 21:21)  HR: 66 (19 Nov 2020 04:15) (66 - 84)  BP: 119/64 (19 Nov 2020 04:15) (119/64 - 141/79)  BP(mean): --  RR: 18 (19 Nov 2020 04:15) (18 - 18)  SpO2: 98% (19 Nov 2020 04:15) (97% - 99%)    General: alert  oriented x 3, demonstrates insight into condition    Karnofsky: 30 %    Gen: In NAD.  No pain behaviors or work of breathing noted  Neuro: alert and oriented x 3 communicates needs.  Head: NC/AT  Eyes: sclerae non-icteric, EOMI  ENT: moist oral mucosa  Resp: diminished at right base, unlabored  CV: S1, S2  Abd: soft, non-tender, + bowels sounds  Peripheral Vasc:  +b/l LE edema, LLE in gauze  Int: warm and dry  Psych: no psychomotor agitation, pleasant, affect appropriate.  No SI/HI      LABS:                        7.4    4.25  )-----------( 248      ( 19 Nov 2020 08:03 )             23.8     11-19    134<L>  |  94<L>  |  86.0<H>  ----------------------------<  194<H>  3.9   |  30.0<H>  |  1.95<H>    Ca    7.8<L>      19 Nov 2020 08:03  Mg     1.9     11-19    TPro  5.1<L>  /  Alb  3.0<L>  /  TBili  0.5  /  DBili  x   /  AST  21  /  ALT  22  /  AlkPhos  221<H>  11-19        I&O's Summary    18 Nov 2020 07:01  -  19 Nov 2020 07:00  --------------------------------------------------------  IN: 0 mL / OUT: 2165 mL / NET: -2165 mL        RADIOLOGY & ADDITIONAL STUDIES: reviewed     ADVANCE DIRECTIVES:   DNR NO   MOLST  NO    Living Will  YES.  Not in hand, at home with his wife

## 2020-11-19 NOTE — CONSULT NOTE ADULT - PROBLEM SELECTOR RECOMMENDATION 3
-JORGE 11/16/2020 revealed estimated pulmonary artery systolic pressure is 67.7 mmHg assuming a right atrial pressure of 15 mmHg, which is consistent with severe pulmonary hypertension.  -Meets criteria for hospice, 2/2 sever pulmonary HTN, though patient not ready for this.  Independent prior to admission.  This option can be considered in the future.  -patient declined cath in consultation with his cardiologist given renal function -JROGE 11/16/2020 revealed estimated pulmonary artery systolic pressure is 67.7 mmHg assuming a right atrial pressure of 15 mmHg, which is consistent with severe pulmonary hypertension.  -Meets criteria for hospice, 2/2 severe pulmonary HTN, though patient not ready for this.  Independent prior to admission.  This option can be considered in the future.  -patient declined cath in consultation with his cardiologist given renal function

## 2020-11-19 NOTE — PROGRESS NOTE ADULT - ASSESSMENT
1) Acute hypoxic respiratory failure with right pleural effusion and pulmonary HTN - s.p chest tube placement  - s/p extubation   - now on lasix po bid  - Cardio and pulmonary following  - CTS following  - stable     2) Severe Pulmonary HTN  -cardio following  --> patient and wife are absolutely refusing cath   outpatient cardio follow up     3) CAD s/p CABG  - on statin and aspirin  - cardio following     4) Afib  - rate controlled   - cardio following  - Pt seem to be having frequent falls per record, also A.C held due to drop in Hbg   - holding A/C for now, will need to resume once H/H stable and no evidence of bleed     5) GERALD on CKD  - monitor level  -renal following  --> cr improving daily     6) macrocytic Anemia with iron deficiency  - s/p 1 unit of blood   - ac on hold  --> patient wants to hold off on transfusion for now  --> start iv iron  --> will also start folic acid, vit b12 and MVI    7) COPD  - stable  - pulmonary following  - O2 & inhalers    dvt prop hep sub q    prognosis guarded  full code, pallaitive following   debility - spoke to wife, wants larisa eventually

## 2020-11-19 NOTE — CONSULT NOTE ADULT - PROBLEM SELECTOR RECOMMENDATION 5
-s/p 1 unit of PRBCs, iron deficiency  -received venofer  -a/c on hold  -patient notes having an outpt hematologist who may be able to provide more collateral on a workup.  May consider GI eval if no clear etiology for anemia, and it has not already been done. -s/p 1 unit of PRBCs, iron deficiency  -received venofer  -a/c on hold  -patient notes having an outpt hematologist who may be able to provide more collateral on a workup.  May consider GI eval if no clear etiology for anemia; per patient, last endoscopy/colonoscopy was about three years ago.

## 2020-11-19 NOTE — PROGRESS NOTE ADULT - ASSESSMENT
Brandon on CKD stage 3  Acute on Chronic HFpEF  AF- Rate controlled  Rt pleural effusion s/p pig tail catheter  Severe pulmonary HTN      Baseline SCr ~ 1.7 -2.0  SCr now at baseline  LHC/ RHC deferred for now  COntinue diuresis

## 2020-11-19 NOTE — PROGRESS NOTE ADULT - SUBJECTIVE AND OBJECTIVE BOX
Subjective: Patient comfortable OOB to chair. Saturating well on supplemental O2. Admits to pain at insertion site. Denies SOB, fever, chills, cough, dizziness, palpitations.     Vital Signs:  Vital Signs Last 24 Hrs  T(C): 36.6 (11-19-20 @ 04:15), Max: 36.7 (11-18-20 @ 21:21)  T(F): 97.8 (11-19-20 @ 04:15), Max: 98 (11-18-20 @ 21:21)  HR: 80 (11-19-20 @ 09:34) (66 - 84)  BP: 119/64 (11-19-20 @ 04:15) (119/64 - 141/79)  RR: 18 (11-19-20 @ 04:15) (18 - 18)  SpO2: 98% (11-19-20 @ 04:15) (97% - 99%) on (O2)    I&O's Detail    18 Nov 2020 07:01  -  19 Nov 2020 07:00  --------------------------------------------------------  IN:  Total IN: 0 mL    OUT:    Chest Tube (mL): 1665 mL    Voided (mL): 500 mL  Total OUT: 2165 mL    Total NET: -2165 mL        General: WN/WD NAD  Neurology: Awake, nonfocal, KAY x 4  Eyes: Scleras clear, EOMI, Gross vision intact  ENT: Gross hearing intact, grossly patent pharynx, no stridor  Neck: Neck supple, trachea midline, No JVD  Respiratory: Decreased BS at right base  CV:S1S2, no murmurs, rubs or gallops  Abdominal: Soft, NT, ND  Extremities: RUE and LLE wounds with dressings, no edema  Psych: Oriented x 3, normal affect  Tubes: R PTC to WS now but was to -20 suction, 1665 out in 24H, serosanguineous fluid, no air leak     Relevant labs, radiology and Medications reviewed                        7.4    4.25  )-----------( 248      ( 19 Nov 2020 08:03 )             23.8     11-19    134<L>  |  94<L>  |  86.0<H>  ----------------------------<  194<H>  3.9   |  30.0<H>  |  1.95<H>    Ca    7.8<L>      19 Nov 2020 08:03  Mg     1.9     11-19    TPro  5.1<L>  /  Alb  3.0<L>  /  TBili  0.5  /  DBili  x   /  AST  21  /  ALT  22  /  AlkPhos  221<H>  11-19      MEDICATIONS  (STANDING):  allopurinol 100 milliGRAM(s) Oral daily  aspirin  chewable 81 milliGRAM(s) Oral daily  atorvastatin 20 milliGRAM(s) Oral at bedtime  budesonide 160 MICROgram(s)/formoterol 4.5 MICROgram(s) Inhaler 2 Puff(s) Inhalation two times a day  carvedilol 3.125 milliGRAM(s) Oral every 12 hours  furosemide    Tablet 40 milliGRAM(s) Oral two times a day  heparin   Injectable 5000 Unit(s) SubCutaneous every 12 hours  pantoprazole    Tablet 40 milliGRAM(s) Oral before breakfast  senna 2 Tablet(s) Oral at bedtime  tamsulosin 0.4 milliGRAM(s) Oral at bedtime    MEDICATIONS  (PRN):  albuterol/ipratropium for Nebulization 3 milliLiter(s) Nebulizer every 6 hours PRN Shortness of Breath and/or Wheezing  temazepam 30 milliGRAM(s) Oral at bedtime PRN Insomnia        Assessment  70y Male  w/ PAST MEDICAL & SURGICAL HISTORY:  CKD (chronic kidney disease)    CHF (congestive heart failure)    Atrial fibrillation    COPD, severity to be determined    Coronary artery disease involving coronary bypass graft of native heart without angina pectoris    Chronic osteomyelitis, osteomyelitis of unspecified site    COPD (chronic obstructive pulmonary disease)    Atrial fibrillation    Bladder cancer    HLD (hyperlipidemia)    H/O knee surgery    S/P CABG (coronary artery bypass graft)    Presence of stent of CABG    admitted with complaints of Patient is a 70y old  Male who presents with a chief complaint of Resp failure (19 Nov 2020 10:11)

## 2020-11-19 NOTE — PROGRESS NOTE ADULT - SUBJECTIVE AND OBJECTIVE BOX
PULMONARY PROGRESS NOTE      [id number]    Patient is a 70y old  Male who presents with a chief complaint of Resp failure (19 Nov 2020 14:05)      INTERVAL HPI/OVERNIGHT EVENTS:  sitting up at bedside  no CP or SOB  had problems with bipap  feels much better  MEDICATIONS  (STANDING):  allopurinol 100 milliGRAM(s) Oral daily  aspirin  chewable 81 milliGRAM(s) Oral daily  atorvastatin 20 milliGRAM(s) Oral at bedtime  budesonide 160 MICROgram(s)/formoterol 4.5 MICROgram(s) Inhaler 2 Puff(s) Inhalation two times a day  carvedilol 3.125 milliGRAM(s) Oral every 12 hours  cyanocobalamin 1000 MICROGram(s) Oral daily  folic acid 1 milliGRAM(s) Oral daily  furosemide    Tablet 40 milliGRAM(s) Oral two times a day  heparin   Injectable 5000 Unit(s) SubCutaneous every 12 hours  iron sucrose IVPB 200 milliGRAM(s) IV Intermittent every 24 hours  multivitamin 1 Tablet(s) Oral daily  pantoprazole    Tablet 40 milliGRAM(s) Oral before breakfast  senna 2 Tablet(s) Oral at bedtime  tamsulosin 0.4 milliGRAM(s) Oral at bedtime      MEDICATIONS  (PRN):  albuterol/ipratropium for Nebulization 3 milliLiter(s) Nebulizer every 6 hours PRN Shortness of Breath and/or Wheezing  temazepam 30 milliGRAM(s) Oral at bedtime PRN Insomnia      Allergies    No Known Allergies    Intolerances        PAST MEDICAL & SURGICAL HISTORY:  CKD (chronic kidney disease)    CHF (congestive heart failure)    Atrial fibrillation    COPD, severity to be determined    Coronary artery disease involving coronary bypass graft of native heart without angina pectoris    Chronic osteomyelitis, osteomyelitis of unspecified site    COPD (chronic obstructive pulmonary disease)    Atrial fibrillation    Bladder cancer    HLD (hyperlipidemia)    H/O knee surgery    S/P CABG (coronary artery bypass graft)    Presence of stent of CABG        SOCIAL HISTORY  Smoking History:       REVIEW OF SYSTEMS:    CONSTITUTIONAL:  No distress    HEENT:  Eyes:  No diplopia or blurred vision. ENT:  No earache, sore throat or runny nose.    CARDIOVASCULAR:  No pressure, squeezing, tightness, heaviness or aching about the chest; no palpitations.    RESPIRATORY:  see HPI    GASTROINTESTINAL:  No nausea, vomiting or diarrhea.    GENITOURINARY:  No dysuria, frequency or urgency.    NEUROLOGIC:  No paresthesias, fasciculations, seizures or weakness.    PSYCHIATRIC:  No disorder of thought or mood.    Vital Signs Last 24 Hrs  T(C): 36.4 (19 Nov 2020 16:06), Max: 36.9 (19 Nov 2020 10:32)  T(F): 97.5 (19 Nov 2020 16:06), Max: 98.4 (19 Nov 2020 10:32)  HR: 77 (19 Nov 2020 16:06) (66 - 84)  BP: 117/60 (19 Nov 2020 16:06) (117/60 - 129/67)  BP(mean): --  RR: 18 (19 Nov 2020 16:06) (18 - 18)  SpO2: 98% (19 Nov 2020 16:06) (97% - 99%)    PHYSICAL EXAMINATION:    GENERAL: The patient is awake and alert in no apparent distress.     HEENT: Head is normocephalic and atraumatic. Extraocular muscles are intact. Mucous membranes are moist.    NECK: Supple.    LUNGS: moderate air entry bilat  without wheezing, rales or rhonchi; respirations unlabored    HEART: Regular rate and rhythm without murmur.    ABDOMEN: Soft, nontender, and nondistended.      EXTREMITIES: Without any cyanosis, clubbing, rash, lesions or edema.    NEUROLOGIC: Grossly intact.    LABS:                        7.4    4.25  )-----------( 248      ( 19 Nov 2020 08:03 )             23.8     11-19    134<L>  |  94<L>  |  86.0<H>  ----------------------------<  194<H>  3.9   |  30.0<H>  |  1.95<H>    Ca    7.8<L>      19 Nov 2020 08:03  Mg     1.9     11-19    TPro  5.1<L>  /  Alb  3.0<L>  /  TBili  0.5  /  DBili  x   /  AST  21  /  ALT  22  /  AlkPhos  221<H>  11-19                        MICROBIOLOGY:    RADIOLOGY & ADDITIONAL STUDIES:  CXR reviewed

## 2020-11-19 NOTE — PROGRESS NOTE ADULT - SUBJECTIVE AND OBJECTIVE BOX
Patient doing well this AM.  Making progress with PT.  Patient wants to go home and will reassess tomorrow pending tube removal.   Still considering ROCK[facility].    REVIEW OF SYSTEMS  Constitutional - No fever,  No fatigue  HEENT - No vertigo, No neck pain  Neurological - No headaches, No memory loss, +loss of strength, No numbness, No tremors  Musculoskeletal - +joint pain, No joint swelling, No muscle pain  Psychiatric - +depression, +anxiety    FUNCTIONAL PROGRESS  11/18  Bed Mobility  Bed Mobility Training Supine-to-Sit: minimum assist (75% patient effort);  1 person assist  Bed Mobility Training Limitations: decreased ability to use legs for bridging/pushing;  decreased ability to use arms for pushing/pulling;  decreased strength;  impaired balance    Sit-Stand Transfer Training  Transfer Training Sit-to-Stand Transfer: contact guard;  1 person assist;  full weight-bearing   rolling walker  Transfer Training Stand-to-Sit Transfer: supervsion;  supervision;  full weight-bearing   rolling walker  Sit-to-Stand Transfer Training Transfer Safety Analysis: decreased balance;  decreased strength;  impaired balance;  rolling walker    Gait Training  Gait Training: supervsion;  full weight-bearing   rolling walker;  close S;  300 feet;  100 feet;  includes turns  Gait Analysis: 3-point gait   decreased norah;  uneven and antalgic pattern;  decreased strength;  impaired balance;  100 feet;  rolling walker    Therapeutic Exercise  Therapeutic Exercise Detail: reviewed LE exercises as edema management,ankle pumps, heelslides       VITALS  T(C): 36.9 (11-19-20 @ 10:32), Max: 36.9 (11-19-20 @ 10:32)  HR: 82 (11-19-20 @ 10:32) (66 - 84)  BP: 120/59 (11-19-20 @ 10:32) (119/64 - 129/67)  RR: 18 (11-19-20 @ 10:32) (18 - 18)  SpO2: 98% (11-19-20 @ 10:32) (97% - 99%)  Wt(kg): --    MEDICATIONS   albuterol/ipratropium for Nebulization 3 milliLiter(s) every 6 hours PRN  allopurinol 100 milliGRAM(s) daily  aspirin  chewable 81 milliGRAM(s) daily  atorvastatin 20 milliGRAM(s) at bedtime  budesonide 160 MICROgram(s)/formoterol 4.5 MICROgram(s) Inhaler 2 Puff(s) two times a day  carvedilol 3.125 milliGRAM(s) every 12 hours  cyanocobalamin 1000 MICROGram(s) daily  folic acid 1 milliGRAM(s) daily  furosemide    Tablet 40 milliGRAM(s) two times a day  heparin   Injectable 5000 Unit(s) every 12 hours  iron sucrose IVPB 200 milliGRAM(s) every 24 hours  multivitamin 1 Tablet(s) daily  pantoprazole    Tablet 40 milliGRAM(s) before breakfast  senna 2 Tablet(s) at bedtime  tamsulosin 0.4 milliGRAM(s) at bedtime  temazepam 30 milliGRAM(s) at bedtime PRN      RECENT LABS/IMAGING - Reviewed                        7.4    4.25  )-----------( 248      ( 19 Nov 2020 08:03 )             23.8     11-19    134<L>  |  94<L>  |  86.0<H>  ----------------------------<  194<H>  3.9   |  30.0<H>  |  1.95<H>    Ca    7.8<L>      19 Nov 2020 08:03  Mg     1.9     11-19    TPro  5.1<L>  /  Alb  3.0<L>  /  TBili  0.5  /  DBili  x   /  AST  21  /  ALT  22  /  AlkPhos  221<H>  11-19              HEAD CT 11/15 - No acute intracranial hemorrhage, territorial infarct or mass effect.    CT CAP 11/15 - Moderate to large right and small-to-moderate left pleural effusions with associated compressive atelectasis, most notably involving nearly the entirety of the right lower lobe. Mild scattered groundglass airspace opacities and interlobular septal thickening, may reflect pulmonary edema. Small intra-abdominal ascites. Anasarca.    CXR 11/17 - RIGHT chest tube in place. RIGHT lung clear. Residual LEFT lower lobe retrocardiac airspace consolidation and/or effusion..    CXR 11/19 - RIGHT multi-sidehole pigtail catheter overlies RIGHT lower hemithorax. RIGHT lung clear  . Small LEFT effusion.  ----------------------------------------------------------------------------------------  PHYSICAL EXAM  Constitutional - NAD, Comfortable  Chest - Increased work of breathing, +O2 via NC  Extremities - BLE edema   Neurologic Exam -                    Motor - No focal deficits     Sensory - Intact to LT  Psychiatric - Mood anxious  ----------------------------------------------------------------------------------------  ASSESSMENT/PLAN  70yMale with functional deficits after debility related to a pleural effusion - as a consequence of a previous trauma  Right pleural effusion - +Chest tube  Left LE hematoma s/p evacuation - WBAT  CAD - ASA, Lipitor  HTN - Coreg  HypoNa+ - 1.L fluid restriction   Pulm - Lasix, Duoneb, Proventil, Symbicort  Pain - Tylenol  DVT PPX - SCDs, Heparin  Rehab - Patient would like to go home and think he will do better there and be able to move more and progress. Patient medically being optimized and PT aware to continue working with patient to improve overall cardiopulmonary function. Once medically optimized and prepared for DC, can reassess functional goals for DC HOME with HOME CARE. Will continue to follow. Functional progress will determine ongoing rehab dispo recommendations, which may change.    Continue bedside therapy as well as OOB throughout the day with mobilization by staff to maintain cardiopulmonary function and prevention of secondary complications related to debility.     Discussed with rehab team.

## 2020-11-19 NOTE — CONSULT NOTE ADULT - CONSULT REQUESTED DATE/TIME
15-Nov-2020 09:27
16-Nov-2020
16-Nov-2020 10:30
16-Nov-2020 11:13
16-Nov-2020 12:17
17-Nov-2020 13:16
18-Nov-2020 10:15
19-Nov-2020 11:15

## 2020-11-19 NOTE — CONSULT NOTE ADULT - PROVIDER SPECIALTY LIST ADULT
Cardiology
Palliative Care
Electrophysiology
Nephrology
Rehab Medicine
Trauma Surgery
Pulmonology
Thoracic Surgery

## 2020-11-19 NOTE — PROGRESS NOTE ADULT - SUBJECTIVE AND OBJECTIVE BOX
DEBI BADILLO    [id number]    70y      Male    INTERVAL HPI/OVERNIGHT EVENTS:    off service note  Patient is a 70 year old male with a pmhx of w/ Afib, pulmonary HTN, chronic OM, CKD, COPD, CABG. Recently admitted s/p fall w/ hematoma of LLE requiring debridement and evacuation of hematoma, complicated by worsening GERALD on CKD and worsening respiratory status. Found to have severe pulm HTN, was being diuresed with lasix. Was recommended for cath but patient declined for fear of worsening renal failure. Noted to have R pleural effusion s/p pigtail catheter that drained 2.2L. Eventually d/c to [facility] on 11/13. Presented back on 11/15/20 unresponsive, obtunded then intubated in the ED. Hypothermic and developed shock. Labs significant for hyperkalemia acute on chronic renal failure and worsening R pleural effusion. Admitted to MICU. Pt had a right pigtail placed by ct surgery for recurrence of right effusion, CTS following. Pt extubated, GERALD on CKD improving. Pt then transferred to medical service on 11/17.     patient being seen by cardio, ct surgery, pulmonary, nephro and EP in consult     Patient seen at bedside and denies any complaints.       REVIEW OF SYSTEMS:    CONSTITUTIONAL: No fever, weight loss, or fatigue  RESPIRATORY: No cough, wheezing, hemoptysis; No shortness of breath  CARDIOVASCULAR: No chest pain, palpitations  GASTROINTESTINAL: No abdominal or epigastric pain. No nausea, vomiting  NEUROLOGICAL: No headaches, memory loss, loss of strength.  MISCELLANEOUS:      Vital Signs Last 24 Hrs  T(C): 36.9 (19 Nov 2020 10:32), Max: 36.9 (19 Nov 2020 10:32)  T(F): 98.4 (19 Nov 2020 10:32), Max: 98.4 (19 Nov 2020 10:32)  HR: 82 (19 Nov 2020 10:32) (66 - 84)  BP: 120/59 (19 Nov 2020 10:32) (119/64 - 129/67)  BP(mean): --  RR: 18 (19 Nov 2020 10:32) (18 - 18)  SpO2: 98% (19 Nov 2020 10:32) (97% - 99%)    PHYSICAL EXAM:    GENERAL: NAD, ill appearing,   HEAD:  Atraumatic, Normocephalic  EYES: EOMI, PERRLA, conjunctiva and sclera clear  NERVOUS SYSTEM:  Alert & Oriented X3, Good concentration; Motor Strength 5/5 B/L upper and lower extremities  CHEST/LUNG: Decreased BS, right chest tube   HEART: Regular rate and rhythm; No murmurs  ABDOMEN: Soft, Nontender, Nondistended; Bowel sounds present  EXTREMITIES:  2+ Peripheral Pulses, No clubbing, cyanosis, + LE edema , Left LE wraped in ace      LABS:                        7.4    4.25  )-----------( 248      ( 19 Nov 2020 08:03 )             23.8     11-19    134<L>  |  94<L>  |  86.0<H>  ----------------------------<  194<H>  3.9   |  30.0<H>  |  1.95<H>    Ca    7.8<L>      19 Nov 2020 08:03  Mg     1.9     11-19    TPro  5.1<L>  /  Alb  3.0<L>  /  TBili  0.5  /  DBili  x   /  AST  21  /  ALT  22  /  AlkPhos  221<H>  11-19            MEDICATIONS  (STANDING):  allopurinol 100 milliGRAM(s) Oral daily  aspirin  chewable 81 milliGRAM(s) Oral daily  atorvastatin 20 milliGRAM(s) Oral at bedtime  budesonide 160 MICROgram(s)/formoterol 4.5 MICROgram(s) Inhaler 2 Puff(s) Inhalation two times a day  carvedilol 3.125 milliGRAM(s) Oral every 12 hours  cyanocobalamin 1000 MICROGram(s) Oral daily  folic acid 1 milliGRAM(s) Oral daily  furosemide    Tablet 40 milliGRAM(s) Oral two times a day  heparin   Injectable 5000 Unit(s) SubCutaneous every 12 hours  iron sucrose IVPB 200 milliGRAM(s) IV Intermittent every 24 hours  multivitamin 1 Tablet(s) Oral daily  pantoprazole    Tablet 40 milliGRAM(s) Oral before breakfast  senna 2 Tablet(s) Oral at bedtime  tamsulosin 0.4 milliGRAM(s) Oral at bedtime    MEDICATIONS  (PRN):  albuterol/ipratropium for Nebulization 3 milliLiter(s) Nebulizer every 6 hours PRN Shortness of Breath and/or Wheezing  temazepam 30 milliGRAM(s) Oral at bedtime PRN Insomnia      RADIOLOGY & ADDITIONAL TESTS:

## 2020-11-19 NOTE — CONSULT NOTE ADULT - PROBLEM SELECTOR RECOMMENDATION 7
-Introduced role of palliative care in symptom management, care planning, support, and transitions in care to those with serious illness.  Emotional support offered.  -Patient reports that his wife, CRISTIAN Garcia, is his health care proxy, not certain of alternate.  Paperwork at home.  -Patient reports that intubation was a difficult experience.  While he would be open to being reintubated again, he would not want intubation/ respiratory for a prolonged period of time, where he required a life dependent on a ventilator.  Offered to document this preference on a MOLST, patient declined.  -Patient with preferences for CPR.  -Given multiple hospitalizations, intubation, encouraged patient to consider/discuss with wife what matters most (he notes that wife is his health care proxy).  Explained that as health care providers understand this, a plan of care that attends to the patient's priorities can be developed.  -Patient shares that he values a life where he can be in [place], socializing with friends at the pool.  -Palliative care will continue to explore if patient wishes to document definitive preferences once he has spoken to his wife.  Discussed with patient that he can have preferences documented on a MOLST, should he come to a conclusion. -Introduced role of palliative care in symptom management, care planning, support, and transitions in care to those with serious illness.  Emotional support offered.  -Patient reports that his wife, CRISTIAN Garcia, is his health care proxy, not certain of alternate.  Paperwork at home.  -Patient reports that intubation was a difficult experience.  While he would be open to being reintubated again, he would not want intubation/ventilatory support for a prolonged period of time, where he required a life dependent on a ventilator.  Offered to document this preference on a MOLST, patient declined.  -Patient with preferences for CPR.  -Given multiple hospitalizations, intubation, encouraged patient to consider/discuss with wife what matters most (he notes that wife is his health care proxy).  Explained that as health care providers understand this, a plan of care that attends to the patient's priorities can be developed.  -Patient shares that he values a life where he can be in [place], socializing with friends at the pool.  -Palliative care will continue to explore if patient wishes to document definitive preferences once he has spoken to his wife.  Discussed with patient that he can have preferences documented on a MOLST, should he come to a conclusion, by other clinicians as well. -Introduced role of palliative care in symptom management, care planning, support, and transitions in care to those with serious illness.  Emotional support offered.  -Patient reports that his wife, CRISTIAN Garcia, is his health care proxy, not certain of alternate.  Paperwork at home.  -Patient reports that intubation was a difficult experience.  While he would be open to being reintubated again, he would not want intubation/ventilatory support for a prolonged period of time, where he required a life dependent on a ventilator.  Offered to document this preference on a MOLST, patient declined.  -Patient with preferences for CPR.  -Given multiple hospitalizations, intubation, encouraged patient to consider/discuss with wife what matters most (he notes that wife is his health care proxy).  Explained that as health care providers understand this, a plan of care that attends to the patient's priorities can be developed.  -Patient shares that he values a life where he can be in [place], socializing with friends at the pool.  -Palliative care will continue to explore if patient wishes to document definitive preferences once he has spoken to his wife.  Discussed with patient that he can have preferences documented on a MOLST, should he come to a conclusion, by other clinicians as well.  -20 minute spent in advanced care planning.

## 2020-11-19 NOTE — CONSULT NOTE ADULT - PROBLEM SELECTOR RECOMMENDATION 4
-GERALD on CKD  -Renal following  -creatinine improving  -per patient, decision made to avoid cath out of concern for worsening renal function

## 2020-11-19 NOTE — PROGRESS NOTE ADULT - ASSESSMENT
- HH low  -hold AC at this time    full note to follow 70M hx CAD s/p CABG, COPD, AF, CKD now re-admitted with likely hypoxic respiratory failure in setting of decompensated HF    Acute on Chronic HFpEF  - lower extremity edema  - continue lasix 40mg PO bid  - limited F/u TTE- severe pulm HTN, mild reduced systolic function, mod TR  - strict I&O; replace electrolytes PRN- hyperkalemia resolved   - chest tube management per thoracic surgery  - long discussion with patient and wife at bedside and then additionally with patient's outpatient Cardiologist Dr Zia Storm ([phone]).  Patient, wife, and Dr Storm at this time would prefer to defer angiogram in order to spare remaining renal function.  They prefer to continue diuretics to allow his renal and respiratory function to recover, and will pursue ischemia evaluation as an outpatient    AF  - rate controlled, but with episodes of NSVT overnight on telemetry  [facility]  - heparin gtt d/c secondary to ozzing at chest tube, and IV sites.   - Hb continues to trend downwards; Hb needs to be at least stabilized prior to trial of restarting AC.  - therapeutic dose eliquis for this patient is 5mg/bid- Pt was on 2.5mg/BID (does not meet 's criteria for reduced dosing)   - ideally maintain Hb >8  - cont Coreg   - EP evaluation for Watchman appreciated, will follow up after current hospitalization    CAD  - no chest pain, unchanged EKG, negative troponin on admission  - mildly elevated troponin on previous admission in setting of reduced renal clearance  - patient, family, and outpatient Cardiologist at this time preferring to defer angiogram   - continue ASA, statin  - nephrology consulted, follow up recommendations 70M hx CAD s/p CABG, COPD, AF, CKD now re-admitted with likely hypoxic respiratory failure in setting of decompensated HF    Acute on Chronic HFpEF  - lower extremity edema, but likely overall intravascularly euvolemic  - continue lasix 40mg PO bid  - limited F/u TTE showed severe pulm HTN, mild reduced systolic function, mod TR  - strict I&O; replace electrolytes PRN- hyperkalemia resolved   - chest tube management per thoracic surgery  - BP management, continue coreg    AF  - rate controlled, but with episode of NSVT overnight on telemetry  - [facility] 3  - heparin gtt d/c secondary to ozzing at chest tube, and IV sites.   - Hb stabilized last 48 hours  - therapeutic dose eliquis for this patient is 5mg/bid- Pt was on 2.5mg/BID (does not meet 's criteria for reduced dosing)   - will discuss with Dr Storm if would prefer patient back on reduced dosing of eliquis and plan to restart tomorrow if Hb remains stable  - ideally maintain Hb >8  - cont Coreg   - EP evaluation for Watchman appreciated, will follow up after current hospitalization    CAD  - no chest pain, unchanged EKG, negative troponin on admission  - mildly elevated troponin on previous admission in setting of reduced renal clearance  - patient, family, and outpatient Cardiologist at this time preferring to defer angiogram in order to spare renal function   - continue ASA, statin  - nephrology consulted, follow up recommendations

## 2020-11-19 NOTE — CONSULT NOTE ADULT - NSTELEHEALTH_GEN_ALL_CORE
No
No
Burow's Advancement Flap Text: The defect edges were debeveled with a #15 scalpel blade.  Given the location of the defect and the proximity to free margins a Burow's advancement flap was deemed most appropriate.  Using a sterile surgical marker, the appropriate advancement flap was drawn incorporating the defect and placing the expected incisions within the relaxed skin tension lines where possible.    The area thus outlined was incised deep to adipose tissue with a #15 scalpel blade.  The skin margins were undermined to an appropriate distance in all directions utilizing iris scissors.

## 2020-11-20 LAB
ALBUMIN SERPL ELPH-MCNC: 3 G/DL — LOW (ref 3.3–5.2)
ALP SERPL-CCNC: 200 U/L — HIGH (ref 40–120)
ALT FLD-CCNC: 23 U/L — SIGNIFICANT CHANGE UP
ANION GAP SERPL CALC-SCNC: 10 MMOL/L — SIGNIFICANT CHANGE UP (ref 5–17)
AST SERPL-CCNC: 19 U/L — SIGNIFICANT CHANGE UP
BASOPHILS # BLD AUTO: 0.02 K/UL — SIGNIFICANT CHANGE UP (ref 0–0.2)
BASOPHILS NFR BLD AUTO: 0.2 % — SIGNIFICANT CHANGE UP (ref 0–2)
BILIRUB SERPL-MCNC: 0.4 MG/DL — SIGNIFICANT CHANGE UP (ref 0.4–2)
BUN SERPL-MCNC: 87 MG/DL — HIGH (ref 8–20)
CALCIUM SERPL-MCNC: 7.9 MG/DL — LOW (ref 8.6–10.2)
CHLORIDE SERPL-SCNC: 93 MMOL/L — LOW (ref 98–107)
CO2 SERPL-SCNC: 29 MMOL/L — SIGNIFICANT CHANGE UP (ref 22–29)
CREAT SERPL-MCNC: 1.87 MG/DL — HIGH (ref 0.5–1.3)
CULTURE RESULTS: SIGNIFICANT CHANGE UP
CULTURE RESULTS: SIGNIFICANT CHANGE UP
EOSINOPHIL # BLD AUTO: 0.15 K/UL — SIGNIFICANT CHANGE UP (ref 0–0.5)
EOSINOPHIL NFR BLD AUTO: 1.7 % — SIGNIFICANT CHANGE UP (ref 0–6)
GLUCOSE SERPL-MCNC: 149 MG/DL — HIGH (ref 70–99)
HCT VFR BLD CALC: 24.3 % — LOW (ref 39–50)
HGB BLD-MCNC: 7.2 G/DL — LOW (ref 13–17)
IMM GRANULOCYTES NFR BLD AUTO: 0.6 % — SIGNIFICANT CHANGE UP (ref 0–1.5)
LYMPHOCYTES # BLD AUTO: 0.83 K/UL — LOW (ref 1–3.3)
LYMPHOCYTES # BLD AUTO: 9.4 % — LOW (ref 13–44)
MAGNESIUM SERPL-MCNC: 1.9 MG/DL — SIGNIFICANT CHANGE UP (ref 1.6–2.6)
MCHC RBC-ENTMCNC: 29.6 GM/DL — LOW (ref 32–36)
MCHC RBC-ENTMCNC: 31.6 PG — SIGNIFICANT CHANGE UP (ref 27–34)
MCV RBC AUTO: 106.6 FL — HIGH (ref 80–100)
MONOCYTES # BLD AUTO: 0.87 K/UL — SIGNIFICANT CHANGE UP (ref 0–0.9)
MONOCYTES NFR BLD AUTO: 9.9 % — SIGNIFICANT CHANGE UP (ref 2–14)
NEUTROPHILS # BLD AUTO: 6.9 K/UL — SIGNIFICANT CHANGE UP (ref 1.8–7.4)
NEUTROPHILS NFR BLD AUTO: 78.2 % — HIGH (ref 43–77)
PLATELET # BLD AUTO: 232 K/UL — SIGNIFICANT CHANGE UP (ref 150–400)
POTASSIUM SERPL-MCNC: 4 MMOL/L — SIGNIFICANT CHANGE UP (ref 3.5–5.3)
POTASSIUM SERPL-SCNC: 4 MMOL/L — SIGNIFICANT CHANGE UP (ref 3.5–5.3)
PROT SERPL-MCNC: 4.9 G/DL — LOW (ref 6.6–8.7)
RBC # BLD: 2.28 M/UL — LOW (ref 4.2–5.8)
RBC # FLD: 16.7 % — HIGH (ref 10.3–14.5)
SODIUM SERPL-SCNC: 132 MMOL/L — LOW (ref 135–145)
SPECIMEN SOURCE: SIGNIFICANT CHANGE UP
SPECIMEN SOURCE: SIGNIFICANT CHANGE UP
WBC # BLD: 8.82 K/UL — SIGNIFICANT CHANGE UP (ref 3.8–10.5)
WBC # FLD AUTO: 8.82 K/UL — SIGNIFICANT CHANGE UP (ref 3.8–10.5)

## 2020-11-20 PROCEDURE — 71045 X-RAY EXAM CHEST 1 VIEW: CPT | Mod: 26

## 2020-11-20 PROCEDURE — 99233 SBSQ HOSP IP/OBS HIGH 50: CPT

## 2020-11-20 PROCEDURE — 99232 SBSQ HOSP IP/OBS MODERATE 35: CPT

## 2020-11-20 PROCEDURE — 99231 SBSQ HOSP IP/OBS SF/LOW 25: CPT

## 2020-11-20 RX ORDER — FAMOTIDINE 10 MG/ML
10 INJECTION INTRAVENOUS ONCE
Refills: 0 | Status: COMPLETED | OUTPATIENT
Start: 2020-11-20 | End: 2020-11-20

## 2020-11-20 RX ORDER — FUROSEMIDE 40 MG
20 TABLET ORAL ONCE
Refills: 0 | Status: COMPLETED | OUTPATIENT
Start: 2020-11-20 | End: 2020-11-21

## 2020-11-20 RX ADMIN — TEMAZEPAM 30 MILLIGRAM(S): 15 CAPSULE ORAL at 00:39

## 2020-11-20 RX ADMIN — BUDESONIDE AND FORMOTEROL FUMARATE DIHYDRATE 2 PUFF(S): 160; 4.5 AEROSOL RESPIRATORY (INHALATION) at 20:19

## 2020-11-20 RX ADMIN — HEPARIN SODIUM 5000 UNIT(S): 5000 INJECTION INTRAVENOUS; SUBCUTANEOUS at 18:32

## 2020-11-20 RX ADMIN — ATORVASTATIN CALCIUM 20 MILLIGRAM(S): 80 TABLET, FILM COATED ORAL at 22:13

## 2020-11-20 RX ADMIN — Medication 40 MILLIGRAM(S): at 05:22

## 2020-11-20 RX ADMIN — HEPARIN SODIUM 5000 UNIT(S): 5000 INJECTION INTRAVENOUS; SUBCUTANEOUS at 05:22

## 2020-11-20 RX ADMIN — IRON SUCROSE 110 MILLIGRAM(S): 20 INJECTION, SOLUTION INTRAVENOUS at 16:55

## 2020-11-20 RX ADMIN — FAMOTIDINE 10 MILLIGRAM(S): 10 INJECTION INTRAVENOUS at 03:33

## 2020-11-20 RX ADMIN — SENNA PLUS 2 TABLET(S): 8.6 TABLET ORAL at 22:13

## 2020-11-20 RX ADMIN — TAMSULOSIN HYDROCHLORIDE 0.4 MILLIGRAM(S): 0.4 CAPSULE ORAL at 22:13

## 2020-11-20 RX ADMIN — CARVEDILOL PHOSPHATE 3.12 MILLIGRAM(S): 80 CAPSULE, EXTENDED RELEASE ORAL at 18:32

## 2020-11-20 RX ADMIN — Medication 40 MILLIGRAM(S): at 18:32

## 2020-11-20 RX ADMIN — CARVEDILOL PHOSPHATE 3.12 MILLIGRAM(S): 80 CAPSULE, EXTENDED RELEASE ORAL at 05:22

## 2020-11-20 RX ADMIN — TEMAZEPAM 30 MILLIGRAM(S): 15 CAPSULE ORAL at 23:13

## 2020-11-20 NOTE — PROGRESS NOTE ADULT - ASSESSMENT
#Acute hypoxic respiratory failure with right pleural effusion and pulmonary HTN   - s/p chest tube placement  - s/p extubation   - lasix   - cardio consult appreciated  - pulm consult appreciated  - ct surgery consult appreciated- chest tube mgmt per ct sugery    #positive sputum culture  - follow up outpatient   - as patient asymptomatic- no cough no wbc elevation afebrile;  will not treat at this time- monitor vitals     #Severe Pulmonary HTN  - cardio consult appreciated   - patient and wife are absolutely refusing cath   - outpatient cardio follow up     #CAD s/p CABG  - statin and aspirin  - cardio consult appreciated    #Afib- rate controlled   - cardio consult appreciated   - Pt seem to be having frequent falls per record, also A.C held due to drop in Hbg   - holding A/C for now, will need to resume once H/H stable and no evidence of bleed     #GREALD on CKD  - monitor cr  - avoid nephrotoxic meds  - nephro consult appreciated     #macrocytic Anemia with iron deficiency  - s/p prbc- will give additional unit today as patient now accepting w/ lasix after   - ac on hold  - iv iron  - folic acid, b12, mvi    #COPD  - not in exacerbation   - pulmonary consult appreciated   - O2 & inhalers    #dvt prophylaxis  - heparin SC     Dispo - pending chest tube 3-4 days     Spoke to patient wife on phone [phone] hospital course to date reviewed. all questions answered. patient and wife continue to refuse cardiac cath at this time

## 2020-11-20 NOTE — PROGRESS NOTE ADULT - SUBJECTIVE AND OBJECTIVE BOX
Subjective: "I feel tethered by this tube" NAD seated in chair, denies cp, sob.     Vital Signs:  Vital Signs Last 24 Hrs  T(C): 36.6 (11-20-20 @ 10:29), Max: 37.1 (11-20-20 @ 02:02)  T(F): 97.8 (11-20-20 @ 10:29), Max: 98.8 (11-20-20 @ 04:10)  HR: 82 (11-20-20 @ 10:29) (64 - 82)  BP: 123/65 (11-20-20 @ 10:29) (117/60 - 153/65)  RR: 18 (11-20-20 @ 10:29) (18 - 18)  SpO2: 94% (11-20-20 @ 10:29) (94% - 98%) on 2 L NC    Relevant labs, radiology and Medications reviewed    Pertinent Physical Exam  General: WN/WD NAD  Neurology: A&Ox3, nonfocal, KAY x 4  Respiratory: diminished right base  CV: RRR, S1S2, no murmurs, rubs or gallops  Abdominal: Soft, NT, ND +BS, Last BM  Extremities: + edema, + peripheral pulses  Tubes: serosang drainage, no air leak    11-19 @ 07:01  -  11-20 @ 07:00  --------------------------------------------------------  IN:    Oral Fluid: 237 mL  Total IN: 237 mL    OUT:    Chest Tube (mL): 475 mL    Voided (mL): 1000 mL  Total OUT: 1475 mL    Total NET: -1238 mL

## 2020-11-20 NOTE — CHART NOTE - NSCHARTNOTEFT_GEN_A_CORE
Notified by RN pt Notified by RN pt c/o epigastric pain. Upon arrival to pt bedside pt sleeping in bed, appears comfortable. Patient A&O x3, reports pain 4/10, intermittent, non-radiating dull aching and burning sensation to epigastric region. Pt reports he has had this pain before and he takes Pepto-Bismol at home which alleviates the pain. Patient denies c/o CP, chest pressure, SOB, N/V, constipation, fever, chills. Abd: +mild tenderness upon palpation to epigastric region, no guarding, no rebound tenderness, no palpable masses. Abd soft, nondistended, + bowel sounds x4 quads. Cardiac: +S1/S2, RRR. Lungs with decreased breath sounds B/LLL otherwise no wheezing, rhonchi or rales. +right chest tube in place. Vital Signs: T(F): 98.7 oral HR: 76 BP: 123/70 RR: 18 SpO2: 95% on NC 2L/min. Ordered Pepcid 10mg PO x1 dose, reassess for relief of pain. RN instructed to continue to monitor pt and notify provider of any changes in pt status.

## 2020-11-20 NOTE — PROGRESS NOTE ADULT - ASSESSMENT
Brandon on CKD stage 3  Acute on Chronic HFpEF  AF- Rate controlled  Rt pleural effusion s/p pig tail catheter  Severe pulmonary HTN      Baseline SCr ~ 1.7 -2.0  SCr now at baseline  LHC/ RHC deferred for now  COntinue diuresis ; lasix 20mg IVP qd      dw Dr Kaur

## 2020-11-20 NOTE — PROGRESS NOTE ADULT - ASSESSMENT
70M hx CAD s/p CABG, COPD, AF, CKD now re-admitted with likely hypoxic respiratory failure in setting of decompensated HF    Acute on Chronic HFpEF  - lower extremity edema, but likely overall intravascularly euvolemic  - continue lasix 40mg PO bid  - limited F/u TTE showed severe pulm HTN, mild reduced systolic function, mod TR  - strict I&O; replace electrolytes PRN- hyperkalemia resolved   - chest tube management per thoracic surgery  - BP management, continue coreg    AF  - rate controlled  - CHASDVasc 3  - heparin gtt d/c secondary to ozzing at chest tube, and IV sites.   - Hb stabilized last 72 hours  - therapeutic dose eliquis for this patient is 5mg/bid- Pt was on 2.5mg/BID (does not meet 's criteria for reduced dosing)   - Outpatient Cardiologist Dr Storm prefers reduced dose eliquis in view of abnormal renal function  - restart eliquis today at 2.5/bid; closely monitor Hb and chest tube output over the next 24 hours  - ideally maintain Hb >8  - cont Coreg   - EP evaluation for Watchman appreciated, will follow up after current hospitalization    CAD  - no chest pain, unchanged EKG, negative troponin on admission  - mildly elevated troponin on previous admission in setting of reduced renal clearance  - patient, family, and outpatient Cardiologist at this time preferring to defer angiogram in order to spare renal function   - continue ASA, statin  - nephrology consulted, follow up recommendations

## 2020-11-20 NOTE — PROGRESS NOTE ADULT - SUBJECTIVE AND OBJECTIVE BOX
[facility] DIVISION OF KIDNEY DISEASES AND HYPERTENSION -- FOLLOW UP NOTE  --------------------------------------------------------------------------------  Chief Complaint:  shahriar on ckd    24 hour events/subjective:  no acute event    PAST HISTORY  --------------------------------------------------------------------------------  No significant changes to PMH, PSH, FHx, SHx, unless otherwise noted    ALLERGIES & MEDICATIONS  --------------------------------------------------------------------------------  Allergies    No Known Allergies    Intolerances      Standing Inpatient Medications  allopurinol 100 milliGRAM(s) Oral daily  aspirin  chewable 81 milliGRAM(s) Oral daily  atorvastatin 20 milliGRAM(s) Oral at bedtime  budesonide 160 MICROgram(s)/formoterol 4.5 MICROgram(s) Inhaler 2 Puff(s) Inhalation two times a day  carvedilol 3.125 milliGRAM(s) Oral every 12 hours  cyanocobalamin 1000 MICROGram(s) Oral daily  folic acid 1 milliGRAM(s) Oral daily  furosemide    Tablet 40 milliGRAM(s) Oral two times a day  furosemide   Injectable 20 milliGRAM(s) IV Push once  heparin   Injectable 5000 Unit(s) SubCutaneous every 12 hours  iron sucrose IVPB 200 milliGRAM(s) IV Intermittent every 24 hours  multivitamin 1 Tablet(s) Oral daily  pantoprazole    Tablet 40 milliGRAM(s) Oral before breakfast  senna 2 Tablet(s) Oral at bedtime  tamsulosin 0.4 milliGRAM(s) Oral at bedtime    PRN Inpatient Medications  albuterol/ipratropium for Nebulization 3 milliLiter(s) Nebulizer every 6 hours PRN  temazepam 30 milliGRAM(s) Oral at bedtime PRN      REVIEW OF SYSTEMS  --------------------------------------------------------------------------------  Gen: No weight changes, fatigue, fevers/chills, weakness  Skin: No rashes  Head/Eyes/Ears/Mouth: No headache; Normal hearing; Normal vision w/o blurriness; No sinus pain/discomfort, sore throat  Respiratory: No dyspnea, cough, wheezing, hemoptysis  CV: No chest pain, PND, orthopnea  GI: No abdominal pain, diarrhea, constipation, nausea, vomiting, melena, hematochezia  : No increased frequency, dysuria, hematuria, nocturia  MSK: No joint pain/swelling; no back pain; no edema  Neuro: No dizziness/lightheadedness, weakness, seizures, numbness, tingling  Heme: No easy bruising or bleeding  Endo: No heat/cold intolerance  Psych: No significant nervousness, anxiety, stress, depression    All other systems were reviewed and are negative, except as noted.    VITALS/PHYSICAL EXAM  --------------------------------------------------------------------------------  T(C): 36.6 (11-20-20 @ 10:29), Max: 37.1 (11-20-20 @ 02:02)  HR: 82 (11-20-20 @ 10:29) (64 - 82)  BP: 123/65 (11-20-20 @ 10:29) (117/60 - 153/65)  RR: 18 (11-20-20 @ 10:29) (18 - 18)  SpO2: 94% (11-20-20 @ 10:29) (94% - 98%)  Wt(kg): --        11-19-20 @ 07:01  -  11-20-20 @ 07:00  --------------------------------------------------------  IN: 237 mL / OUT: 1475 mL / NET: -1238 mL      Physical Exam:  	Gen: NAD, well-appearing  	HEENT: PERRL, supple neck, clear oropharynx  	Pulm: CTA B/L  	CV: RRR, S1S2; no rub  	Back: No spinal or CVA tenderness; no sacral edema  	Abd: +BS, soft, nontender/nondistended  	: No suprapubic tenderness  	UE: Warm, FROM, no clubbing, intact strength; no edema; no asterixis  	LE: Warm, FROM, no clubbing, intact strength; no edema  	Neuro: No focal deficits, intact gait  	Psych: Normal affect and mood  	Skin: Warm, without rashes  	Vascular access:    LABS/STUDIES  --------------------------------------------------------------------------------              7.2    8.82  >-----------<  232      [11-20-20 @ 07:30]              24.3     132  |  93  |  87.0  ----------------------------<  149      [11-20-20 @ 07:30]  4.0   |  29.0  |  1.87        Ca     7.9     [11-20-20 @ 07:30]      Mg     1.9     [11-20-20 @ 07:30]    TPro  4.9  /  Alb  3.0  /  TBili  0.4  /  DBili  x   /  AST  19  /  ALT  23  /  AlkPhos  200  [11-20-20 @ 07:30]          Creatinine Trend:  SCr 1.87 [11-20 @ 07:30]  SCr 1.95 [11-19 @ 08:03]  SCr 2.04 [11-18 @ 06:51]  SCr 2.46 [11-17 @ 04:51]  SCr 2.31 [11-16 @ 03:15]    Urinalysis - [11-16-20 @ 14:00]      Color Yellow / Appearance Clear / SG 1.010 / pH 5.0      Gluc Negative / Ketone Negative  / Bili Negative / Urobili Negative       Blood Moderate / Protein Negative / Leuk Est Negative / Nitrite Negative      RBC 6-10 / WBC 0-2 / Hyaline  / Gran  / Sq Epi  / Non Sq Epi Occasional / Bacteria Occasional    Urine Creatinine 31      [11-16-20 @ 13:59]  Urine Protein 6.0      [11-16-20 @ 13:59]  Urine Sodium 54      [11-16-20 @ 13:59]  Urine Osmolality 314      [11-16-20 @ 14:00]    Iron 23, TIBC 272, %sat 8      [11-19-20 @ 08:03]  PTH -- (Ca 8.1)      [10-26-20 @ 15:25]   56  Vitamin D (25OH) 60.6      [10-26-20 @ 15:25]  HbA1c 5.4      [03-25-17 @ 07:51]

## 2020-11-20 NOTE — PROGRESS NOTE ADULT - SUBJECTIVE AND OBJECTIVE BOX
[facility]                                                               Office: [address] [address]                                                              Telephone: [phone]. Fax:[phone]                                                                             PROGRESS NOTE  Reason for follow up: Heart Failure   Overnight: No new events.   Update: Feels better, still requiring supplemental O2, NC. LE edema. HH <8. Denies chest pain, palpitations, dizziness. all other ROS negative     	  Vitals:  T(C): 36.6 (11-20-20 @ 10:29), Max: 37.1 (11-20-20 @ 02:02)  HR: 82 (11-20-20 @ 10:29) (64 - 82)  BP: 123/65 (11-20-20 @ 10:29) (117/60 - 153/65)  RR: 18 (11-20-20 @ 10:29) (18 - 18)  SpO2: 94% (11-20-20 @ 10:29) (94% - 98%)  Wt(kg): --  I&O's Summary    19 Nov 2020 07:01  -  20 Nov 2020 07:00  --------------------------------------------------------  IN: 237 mL / OUT: 1475 mL / NET: -1238 mL            PHYSICAL EXAM:  Appearance: Comfortable. No acute distress  HEENT:  Head and neck: Atraumatic. Normocephalic.  Normal oral mucosa, PERRL, Neck is supple. No JVD, No carotid bruit.   Neurologic: A & O x 3, no focal deficits. EOMI.  Lymphatic: No cervical lymphadenopathy  Cardiovascular: Normal S1 S2, irreg/irreg, No murmur, rubs/gallops. No JVD, non-pitting b/l LE edema  Respiratory: Lungs clear to auscultation, R chest tube in place, serosanguinous output  Gastrointestinal:  Soft, Non-tender, + BS  Lower Extremities: bilateral non-pitting edema  Psychiatry: Patient is calm. No agitation. Mood & affect appropriate  Skin: extensive ecchymosis b/l UE and LE      CURRENT MEDICATIONS:  carvedilol 3.125 milliGRAM(s) Oral every 12 hours  furosemide    Tablet 40 milliGRAM(s) Oral two times a day  tamsulosin 0.4 milliGRAM(s) Oral at bedtime    budesonide 160 MICROgram(s)/formoterol 4.5 MICROgram(s) Inhaler  pantoprazole    Tablet  senna  allopurinol  atorvastatin  aspirin  chewable  cyanocobalamin  folic acid  heparin   Injectable  iron sucrose IVPB  multivitamin      DIAGNOSTIC TESTING:  [ ] Echocardiogram:   < from: TTE Echo Limited or F/U (11.16.20 @ 21:06) >  Summary:   1. Left ventricular ejection fraction, by visual estimation, is 60 to 65%.  2. Normal global left ventricular systolic function.   3. Mildly enlarged right ventricle.   4. Mildly reduced RV systolic function.   5. Moderate tricuspid regurgitation.   6. Estimated pulmonary artery systolic pressure is 67.7 mmHg assuming a right atrial pressure of 15 mmHg, which is consistent with severe pulmonary hypertension.    MD Dana Electronically signed on 11/17/2020 at 10:11:35 AM    < end of copied text >      OTHER:   < from: Xray Chest 1 View- PORTABLE-Routine (Xray Chest 1 View- PORTABLE-Routine in AM.) (11.19.20 @ 05:32) >  IMPRESSION:   RIGHT multi-sidehole pigtail catheter overlies RIGHT lower hemithorax. RIGHT lung clear  . Small LEFT effusion.                SAYRA GIBBS MD; Attending Radiologist    < end of copied text >  	      LABS:	 	                            7.2    8.82  )-----------( 232      ( 20 Nov 2020 07:30 )             24.3     11-20    132<L>  |  93<L>  |  87.0<H>  ----------------------------<  149<H>  4.0   |  29.0  |  1.87<H>    Ca    7.9<L>      20 Nov 2020 07:30  Mg     1.9     11-20    TPro  4.9<L>  /  Alb  3.0<L>  /  TBili  0.4  /  DBili  x   /  AST  19  /  ALT  23  /  AlkPhos  200<H>  11-20       TELEMETRY: Reviewed    ECG:  Reviewed by me.

## 2020-11-20 NOTE — PROGRESS NOTE ADULT - SUBJECTIVE AND OBJECTIVE BOX
Patient is a 70y old  Male who presents with a chief complaint of Resp failure (20 Nov 2020 11:28)    Patient seen and examined at bedside.     ALLERGIES:  No Known Allergies    MEDICATIONS  (STANDING):  allopurinol 100 milliGRAM(s) Oral daily  aspirin  chewable 81 milliGRAM(s) Oral daily  atorvastatin 20 milliGRAM(s) Oral at bedtime  budesonide 160 MICROgram(s)/formoterol 4.5 MICROgram(s) Inhaler 2 Puff(s) Inhalation two times a day  carvedilol 3.125 milliGRAM(s) Oral every 12 hours  cyanocobalamin 1000 MICROGram(s) Oral daily  folic acid 1 milliGRAM(s) Oral daily  furosemide    Tablet 40 milliGRAM(s) Oral two times a day  furosemide   Injectable 20 milliGRAM(s) IV Push once  heparin   Injectable 5000 Unit(s) SubCutaneous every 12 hours  iron sucrose IVPB 200 milliGRAM(s) IV Intermittent every 24 hours  multivitamin 1 Tablet(s) Oral daily  pantoprazole    Tablet 40 milliGRAM(s) Oral before breakfast  senna 2 Tablet(s) Oral at bedtime  tamsulosin 0.4 milliGRAM(s) Oral at bedtime    MEDICATIONS  (PRN):  albuterol/ipratropium for Nebulization 3 milliLiter(s) Nebulizer every 6 hours PRN Shortness of Breath and/or Wheezing  temazepam 30 milliGRAM(s) Oral at bedtime PRN Insomnia    Vital Signs Last 24 Hrs  T(F): 97.8 (20 Nov 2020 10:29), Max: 98.8 (20 Nov 2020 04:10)  HR: 82 (20 Nov 2020 10:29) (64 - 82)  BP: 123/65 (20 Nov 2020 10:29) (117/60 - 153/65)  RR: 18 (20 Nov 2020 10:29) (18 - 18)  SpO2: 94% (20 Nov 2020 10:29) (94% - 98%)  I&O's Summary    19 Nov 2020 07:01  -  20 Nov 2020 07:00  --------------------------------------------------------  IN: 237 mL / OUT: 1475 mL / NET: -1238 mL    PHYSICAL EXAM:  General: NAD, Alert;   ENT: MMM, no thrush  Neck: Supple, No JVD  Lungs: good air entry, non-labored breathing +right chest tube  Cardio: +s1/s2  Abdomen: Soft, Nontender, Nondistended; Bowel sounds present  Extremities: No calf tenderness    LABS:                        7.2    8.82  )-----------( 232      ( 20 Nov 2020 07:30 )             24.3     11-20    132  |  93  |  87.0  ----------------------------<  149  4.0   |  29.0  |  1.87    Ca    7.9      20 Nov 2020 07:30  Mg     1.9     11-20    TPro  4.9  /  Alb  3.0  /  TBili  0.4  /  DBili  x   /  AST  19  /  ALT  23  /  AlkPhos  200  11-20    eGFR if Non African American: 36 mL/min/1.73M2 (11-20-20 @ 07:30)  eGFR if : 41 mL/min/1.73M2 (11-20-20 @ 07:30)    ABG - ( 19 Nov 2020 16:45 )  pH, Arterial: 7.37  pH, Blood: x     /  pCO2: 56    /  pO2: 77    / HCO3: 30    / Base Excess: 6.3   /  SaO2: 97        Culture  Culture - Body Fluid with Gram Stain (collected 16 Nov 2020 03:19)  Source: .Body Fluid Pleural Fluid  Gram Stain (16 Nov 2020 04:33):    polymorphonuclear leukocytes seen    No organisms seen    by cytocentrifuge  Preliminary Report (17 Nov 2020 10:14):    No growth to date.    Culture - Sputum (collected 15 Nov 2020 21:47)  Source: .Sputum Sputum  Gram Stain (15 Nov 2020 22:34):    Moderate polymorphonuclear leukocytes per low power field    No Squamous epithelial cells per low power field    Few Gram positive cocci in pairs per oil power field  Final Report (17 Nov 2020 18:43):    Moderate Escherichia coli    Normal Respiratory Sandra present  Organism: Escherichia coli (17 Nov 2020 18:43)  Organism: Escherichia coli (17 Nov 2020 18:43)    Culture - Blood (collected 15 Nov 2020 01:16)  Source: .Blood Blood-Peripheral  Final Report (20 Nov 2020 03:00):    No growth at 5 days.    Culture - Blood (collected 15 Nov 2020 01:16)  Source: .Blood Blood-Peripheral  Final Report (20 Nov 2020 03:00):    No growth at 5 days.    RADIOLOGY & ADDITIONAL TESTS:  < from: Xray Chest 1 View- PORTABLE-Routine (Xray Chest 1 View- PORTABLE-Routine in AM.) (11.20.20 @ 07:33) >  Impression:  Probable region of loculated pleural fluid at the right lung base, new since the prior day, otherwise stable  < end of copied text >

## 2020-11-20 NOTE — PROGRESS NOTE ADULT - SUBJECTIVE AND OBJECTIVE BOX
`PULMONARY PROGRESS NOTE      DEBI [id number]    Patient is a 70y old  Male who presents with a chief complaint of Resp failure (20 Nov 2020 12:02)      INTERVAL HPI/OVERNIGHT EVENTS:  Awake alert in NAD on NCO  Sitting in chair  Chest tube on water seal    MEDICATIONS  (STANDING):  allopurinol 100 milliGRAM(s) Oral daily  aspirin  chewable 81 milliGRAM(s) Oral daily  atorvastatin 20 milliGRAM(s) Oral at bedtime  budesonide 160 MICROgram(s)/formoterol 4.5 MICROgram(s) Inhaler 2 Puff(s) Inhalation two times a day  carvedilol 3.125 milliGRAM(s) Oral every 12 hours  cyanocobalamin 1000 MICROGram(s) Oral daily  folic acid 1 milliGRAM(s) Oral daily  furosemide    Tablet 40 milliGRAM(s) Oral two times a day  furosemide   Injectable 20 milliGRAM(s) IV Push once  heparin   Injectable 5000 Unit(s) SubCutaneous every 12 hours  iron sucrose IVPB 200 milliGRAM(s) IV Intermittent every 24 hours  multivitamin 1 Tablet(s) Oral daily  pantoprazole    Tablet 40 milliGRAM(s) Oral before breakfast  senna 2 Tablet(s) Oral at bedtime  tamsulosin 0.4 milliGRAM(s) Oral at bedtime      MEDICATIONS  (PRN):  albuterol/ipratropium for Nebulization 3 milliLiter(s) Nebulizer every 6 hours PRN Shortness of Breath and/or Wheezing  temazepam 30 milliGRAM(s) Oral at bedtime PRN Insomnia      Allergies    No Known Allergies    Intolerances        PAST MEDICAL & SURGICAL HISTORY:  CKD (chronic kidney disease)    CHF (congestive heart failure)    Atrial fibrillation    COPD, severity to be determined    Coronary artery disease involving coronary bypass graft of native heart without angina pectoris    Chronic osteomyelitis, osteomyelitis of unspecified site    COPD (chronic obstructive pulmonary disease)    Atrial fibrillation    Bladder cancer    HLD (hyperlipidemia)    H/O knee surgery    S/P CABG (coronary artery bypass graft)    Presence of stent of CABG        SOCIAL HISTORY  Smoking History:       REVIEW OF SYSTEMS:    CONSTITUTIONAL:  No distress    HEENT:  Eyes:  No diplopia or blurred vision. ENT:  No earache, sore throat or runny nose.    CARDIOVASCULAR:  No pressure, squeezing, tightness, heaviness or aching about the chest; no palpitations.    RESPIRATORY:  above    GASTROINTESTINAL:  No nausea, vomiting or diarrhea.    GENITOURINARY:  No dysuria, frequency or urgency.    NEUROLOGIC:  No paresthesias, fasciculations, seizures or weakness.    Extremities: No cyanosis, clubbing or edema    PSYCHIATRIC:  No disorder of thought or mood.    Vital Signs Last 24 Hrs  T(C): 36.6 (20 Nov 2020 10:29), Max: 37.1 (20 Nov 2020 02:02)  T(F): 97.8 (20 Nov 2020 10:29), Max: 98.8 (20 Nov 2020 04:10)  HR: 82 (20 Nov 2020 10:29) (64 - 82)  BP: 123/65 (20 Nov 2020 10:29) (117/60 - 153/65)  BP(mean): --  RR: 18 (20 Nov 2020 10:29) (18 - 18)  SpO2: 94% (20 Nov 2020 10:29) (94% - 98%)    PHYSICAL EXAMINATION:    GENERAL: The patient is awake and alert in no apparent distress.     HEENT: Head is normocephalic and atraumatic. Extraocular muscles are intact. Mucous membranes are moist.    NECK: Supple.    LUNGS: Clear to auscultation without wheezing, rales or rhonchi; respirations unlabored. Rt chest tube    HEART: Regular rate and rhythm without murmur.    ABDOMEN: Soft, nontender, and nondistended.      EXTREMITIES: Without any cyanosis, clubbing, rash, lesions or edema.    NEUROLOGIC: Grossly intact.    LABS:                        7.2    8.82  )-----------( 232      ( 20 Nov 2020 07:30 )             24.3     11-20    132<L>  |  93<L>  |  87.0<H>  ----------------------------<  149<H>  4.0   |  29.0  |  1.87<H>    Ca    7.9<L>      20 Nov 2020 07:30  Mg     1.9     11-20    TPro  4.9<L>  /  Alb  3.0<L>  /  TBili  0.4  /  DBili  x   /  AST  19  /  ALT  23  /  AlkPhos  200<H>  11-20        ABG - ( 19 Nov 2020 16:45 )  pH, Arterial: 7.37  pH, Blood: x     /  pCO2: 56    /  pO2: 77    / HCO3: 30    / Base Excess: 6.3   /  SaO2: 97                              MICROBIOLOGY:    RADIOLOGY & ADDITIONAL STUDIES:  < from: Xray Chest 1 View- PORTABLE-Routine (Xray Chest 1 View- PORTABLE-Routine in AM.) (11.20.20 @ 07:33) >   EXAM:  XR CHEST PORTABLE ROUTINE 1V                          PROCEDURE DATE:  11/20/2020          INTERPRETATION:  CHEST AP PORTABLE:    History: s/p R chest tube.    Date and time of exam: 11/20/2020 6:02 AM.    Technique: A single AP view of thechest was obtained.    Comparison exam: 11/19/2020 4:00 AM.    Findings:  Cardiomegaly. No hilar or mediastinal abnormality. Again noted is a right pigtail chest tube at the right lung base. The density is noted at the right lung base suggesting loculated pleural fluid, new since the prior day..    Impression:  Probable region of loculated pleural fluid at the right lung base, new since the prior day, otherwise stable            EDITA ARREGUIN MD; Attending Radiologist  This document has been electronically signed. Nov 20 2020 11:33AM    < end of copied text >

## 2020-11-20 NOTE — GOALS OF CARE CONVERSATION - ADVANCED CARE PLANNING - CONVERSATION DETAILS
SW met with patient to provide support in coping with hospitalization and medical issues. patient frustrated with medical issues and reports was planning to go to [place]. Patient reports just recently moving into [facility] with wife in [place] and have home in [place] . patient is a snow bird and was elaborating in his experiences in [place]. patient evasive with talking about concerns on medical issues or directives. SW was attempting to follow up on prior palliative discussions to complete MOLST.

## 2020-11-20 NOTE — PROGRESS NOTE ADULT - ASSESSMENT
70 year old male with a pmhx of w/ Afib (on eliquis), pulmonary HTN, COPD, chronic OM, CKD, COPD, CHF, CABG, bladder CA. Recently admitted s/p fall w/ hematoma of LLE requiring debridement and evacuation of hematoma, complicated by worsening GERALD on CKD and worsening respiratory status. Found to have severe pulm HTN, was being diuresed with lasix. Was recommended for cath but patient declined for fear of worsening renal failure. Noted to have R pleural effusion s/p pigtail catheter that drained 2.2L. Eventually d/c to [facility] on 11/13. Presented 11/15 unresponsive, obtunded then intubated in the ED. Hypothermic and developed shock. Labs significant for hyperkalemia acute on chronic renal failure and worsening R pleural effusion. Pigtail placed MICU team.   Consulted for further thoracic  and pigtail management.      maintain chest tube to water seal  record output per shift  daily CXR  pain control  IS/OOB/PT  on iron for anemia  Dressing change PRN  diuresis as tolerated for CHF  trend H/H  Discussed with Dr Rueda in AM rounds

## 2020-11-20 NOTE — PROGRESS NOTE ADULT - ASSESSMENT
Transudative Rt pleural effusion by LDH and protein criteria secondary to heart disease  No need for NIV based on recent ABG but will need to be followed as outpt. Well compensated resp acidosis  COPD stable    Plan:  Resume outpt pulm meds on dc  f/u with us or other Pulm on  DC  will need ABG and PFT's  will sign off  Recall as needed  Per card, t surg and renal

## 2020-11-21 LAB
ANION GAP SERPL CALC-SCNC: 7 MMOL/L — SIGNIFICANT CHANGE UP (ref 5–17)
BUN SERPL-MCNC: 96 MG/DL — HIGH (ref 8–20)
CALCIUM SERPL-MCNC: 8 MG/DL — LOW (ref 8.6–10.2)
CHLORIDE SERPL-SCNC: 94 MMOL/L — LOW (ref 98–107)
CO2 SERPL-SCNC: 32 MMOL/L — HIGH (ref 22–29)
CREAT SERPL-MCNC: 1.92 MG/DL — HIGH (ref 0.5–1.3)
CULTURE RESULTS: SIGNIFICANT CHANGE UP
GLUCOSE SERPL-MCNC: 124 MG/DL — HIGH (ref 70–99)
HCT VFR BLD CALC: 25.9 % — LOW (ref 39–50)
HGB BLD-MCNC: 7.9 G/DL — LOW (ref 13–17)
MAGNESIUM SERPL-MCNC: 1.9 MG/DL — SIGNIFICANT CHANGE UP (ref 1.6–2.6)
MCHC RBC-ENTMCNC: 30.5 GM/DL — LOW (ref 32–36)
MCHC RBC-ENTMCNC: 33.1 PG — SIGNIFICANT CHANGE UP (ref 27–34)
MCV RBC AUTO: 108.4 FL — HIGH (ref 80–100)
PHOSPHATE SERPL-MCNC: 3.2 MG/DL — SIGNIFICANT CHANGE UP (ref 2.4–4.7)
PLATELET # BLD AUTO: 233 K/UL — SIGNIFICANT CHANGE UP (ref 150–400)
POTASSIUM SERPL-MCNC: 4.5 MMOL/L — SIGNIFICANT CHANGE UP (ref 3.5–5.3)
POTASSIUM SERPL-SCNC: 4.5 MMOL/L — SIGNIFICANT CHANGE UP (ref 3.5–5.3)
RBC # BLD: 2.39 M/UL — LOW (ref 4.2–5.8)
RBC # FLD: 18.6 % — HIGH (ref 10.3–14.5)
SODIUM SERPL-SCNC: 133 MMOL/L — LOW (ref 135–145)
SPECIMEN SOURCE: SIGNIFICANT CHANGE UP
WBC # BLD: 7.14 K/UL — SIGNIFICANT CHANGE UP (ref 3.8–10.5)
WBC # FLD AUTO: 7.14 K/UL — SIGNIFICANT CHANGE UP (ref 3.8–10.5)

## 2020-11-21 PROCEDURE — 99232 SBSQ HOSP IP/OBS MODERATE 35: CPT

## 2020-11-21 PROCEDURE — 99233 SBSQ HOSP IP/OBS HIGH 50: CPT

## 2020-11-21 PROCEDURE — 71045 X-RAY EXAM CHEST 1 VIEW: CPT | Mod: 26

## 2020-11-21 PROCEDURE — 71045 X-RAY EXAM CHEST 1 VIEW: CPT | Mod: 26,77

## 2020-11-21 PROCEDURE — 99231 SBSQ HOSP IP/OBS SF/LOW 25: CPT

## 2020-11-21 RX ADMIN — IRON SUCROSE 110 MILLIGRAM(S): 20 INJECTION, SOLUTION INTRAVENOUS at 22:29

## 2020-11-21 RX ADMIN — HEPARIN SODIUM 5000 UNIT(S): 5000 INJECTION INTRAVENOUS; SUBCUTANEOUS at 05:39

## 2020-11-21 RX ADMIN — CARVEDILOL PHOSPHATE 3.12 MILLIGRAM(S): 80 CAPSULE, EXTENDED RELEASE ORAL at 17:12

## 2020-11-21 RX ADMIN — SENNA PLUS 2 TABLET(S): 8.6 TABLET ORAL at 22:27

## 2020-11-21 RX ADMIN — HEPARIN SODIUM 5000 UNIT(S): 5000 INJECTION INTRAVENOUS; SUBCUTANEOUS at 17:12

## 2020-11-21 RX ADMIN — Medication 81 MILLIGRAM(S): at 11:30

## 2020-11-21 RX ADMIN — Medication 100 MILLIGRAM(S): at 11:30

## 2020-11-21 RX ADMIN — Medication 1 TABLET(S): at 11:30

## 2020-11-21 RX ADMIN — Medication 40 MILLIGRAM(S): at 05:39

## 2020-11-21 RX ADMIN — TEMAZEPAM 30 MILLIGRAM(S): 15 CAPSULE ORAL at 22:27

## 2020-11-21 RX ADMIN — Medication 20 MILLIGRAM(S): at 17:12

## 2020-11-21 RX ADMIN — PANTOPRAZOLE SODIUM 40 MILLIGRAM(S): 20 TABLET, DELAYED RELEASE ORAL at 05:35

## 2020-11-21 RX ADMIN — BUDESONIDE AND FORMOTEROL FUMARATE DIHYDRATE 2 PUFF(S): 160; 4.5 AEROSOL RESPIRATORY (INHALATION) at 08:28

## 2020-11-21 RX ADMIN — TAMSULOSIN HYDROCHLORIDE 0.4 MILLIGRAM(S): 0.4 CAPSULE ORAL at 22:27

## 2020-11-21 RX ADMIN — Medication 40 MILLIGRAM(S): at 17:14

## 2020-11-21 RX ADMIN — ATORVASTATIN CALCIUM 20 MILLIGRAM(S): 80 TABLET, FILM COATED ORAL at 22:27

## 2020-11-21 RX ADMIN — CARVEDILOL PHOSPHATE 3.12 MILLIGRAM(S): 80 CAPSULE, EXTENDED RELEASE ORAL at 05:35

## 2020-11-21 RX ADMIN — Medication 1 MILLIGRAM(S): at 11:30

## 2020-11-21 RX ADMIN — BUDESONIDE AND FORMOTEROL FUMARATE DIHYDRATE 2 PUFF(S): 160; 4.5 AEROSOL RESPIRATORY (INHALATION) at 20:52

## 2020-11-21 NOTE — PROGRESS NOTE ADULT - PROBLEM SELECTOR PLAN 1
Pigtail drainage minimal over past 24 hours.  Total 1200 cc serousy fluid drained.  Will d/c pigtail today  Signing off today.  Please reconsult if needed

## 2020-11-21 NOTE — PROGRESS NOTE ADULT - SUBJECTIVE AND OBJECTIVE BOX
HPI:  Patient is a  70 year old male with a pmhx of w/ Afib, pulmonary HTN, chronic OM, CKD, COPD, CABG. Recently admitted s/p fall w/ hematoma of LLE requiring debridement and evacuation of hematoma, complicated by worsening GERALD on CKD and worsening respiratory status. Found to have severe pulm HTN, was being diuresed with lasix. Was reccomended for cath but patient declined for fear of worsening renal failure. Noted to have R pleural effusion s/p pigtail catheter that drained 2.2L. Eventually d/c to [facility] on 11/13. Presented today unresponsive, obtunded then intubated in the ED. Hypothermic and developed shock. Labs significant for hyperkalemia acute on chronic renal failure and worsening R pleural effusion. MICU consulted called.  (15 Nov 2020 05:20)    PAST MEDICAL & SURGICAL HISTORY:  CKD (chronic kidney disease)    CHF (congestive heart failure)    Atrial fibrillation    COPD, severity to be determined    Coronary artery disease involving coronary bypass graft of native heart without angina pectoris    Chronic osteomyelitis, osteomyelitis of unspecified site    COPD (chronic obstructive pulmonary disease)    Atrial fibrillation    Bladder cancer    HLD (hyperlipidemia)    H/O knee surgery    S/P CABG (coronary artery bypass graft)    Presence of stent of CABG    Allergies    No Known Allergies    MEDICATIONS  (STANDING):  allopurinol 100 milliGRAM(s) Oral daily  aspirin  chewable 81 milliGRAM(s) Oral daily  atorvastatin 20 milliGRAM(s) Oral at bedtime  budesonide 160 MICROgram(s)/formoterol 4.5 MICROgram(s) Inhaler 2 Puff(s) Inhalation two times a day  carvedilol 3.125 milliGRAM(s) Oral every 12 hours  cyanocobalamin 1000 MICROGram(s) Oral daily  folic acid 1 milliGRAM(s) Oral daily  furosemide    Tablet 40 milliGRAM(s) Oral two times a day  furosemide   Injectable 20 milliGRAM(s) IV Push once  heparin   Injectable 5000 Unit(s) SubCutaneous every 12 hours  iron sucrose IVPB 200 milliGRAM(s) IV Intermittent every 24 hours  multivitamin 1 Tablet(s) Oral daily  pantoprazole    Tablet 40 milliGRAM(s) Oral before breakfast  senna 2 Tablet(s) Oral at bedtime  tamsulosin 0.4 milliGRAM(s) Oral at bedtime    FAMILY HISTORY:  No pertinent family history in first degree relatives    ICU Vital Signs Last 24 Hrs  T(C): 36.4 (21 Nov 2020 05:00), Max: 36.4 (20 Nov 2020 22:10)  T(F): 97.6 (21 Nov 2020 05:00), Max: 97.6 (20 Nov 2020 22:10)  HR: 71 (21 Nov 2020 10:30) (65 - 78)  BP: 130/69 (21 Nov 2020 10:30) (127/64 - 133/75)  BP(mean): --  ABP: --  ABP(mean): --  RR: 18 (21 Nov 2020 10:30) (18 - 18)  SpO2: 97% (21 Nov 2020 10:30) (93% - 100%)    |11-21-20 @ 07:14            7.9<L>  7.14>--------------<233            25.9<L>      133<L>  |  94<L>  |  96.0<H>  ----------------------------<124<H>  4.5  |  8.0<L>  |  1.92<H>    Mg 1.9 / Phos3.2    < from: PACS Image (Xray Chest 1 View- PORTABLE-Routine in AM.) (11.21.20 @ 03:58) >      < end of copied text >  < from: PACS Image (Xray Chest 1 View- PORTABLE-Routine in AM.) (11.21.20 @ 03:58) >      < end of copied text >    EXAM:  XR CHEST PORTABLE ROUTINE 1V                          PROCEDURE DATE:  11/21/2020          INTERPRETATION:  Portable chest radiograph    CLINICAL INFORMATION:   Chest tube. Follow-up.    TECHNIQUE:  Portable  AP view of the chest was obtained.    COMPARISON: 11/20/2020 chest available for review.    FINDINGS: RIGHT multi-sidehole pigtail catheter overlies RIGHT lower hemithorax.   The lungs show of fluid loculated within the minor fissure. No pneumothorax. There is a small LEFT pleural effusion and/or basilar airspace disease. Upper lobes clear.    The  heart is enlarged in transverse diameter. No hilar mass.  Status post coronary artery bypass graft procedure.  .. No pneumothorax.      Visualized osseous structures are intact.        IMPRESSION:   No significant change..

## 2020-11-21 NOTE — PROGRESS NOTE ADULT - ASSESSMENT
Patient had right pigtail placed for pleural effusion.  1300 cc serosanguinous fluid drained.  Minimal drainage over past 24 hours.

## 2020-11-21 NOTE — PROGRESS NOTE ADULT - ATTENDING COMMENTS
greater than 50% of time spent reviewing labs, notes, orders and radiographs, coordinating care
Extensive discussion with patient and his wife at bedside on 11/17/20 and with patient today regarding anticipated need for cardiac cath in setting of recurrent CHF episodes and mildly elevated troponin on previous hospitalization.  Nephrology consulted, case discussed with Dr Corea, regarding possible additional renal optimization in anticipation of iodinated contrast use.  Also discussed risks/benefits/indications to continued systemic anticoagulation for embolic CVA risk reduction for AF.  In setting of multiple falls and ongoing anemia issues, patient verbalized understanding of bleeding risk but would still prefer to minimize his risk of CVA.  Hb downtrended overnight, transfused 1 unit PRBC with minimal improvement in Hb, and heparin d/c'd by ICU staff.  Will request EP evaluation for possible OLVIN closure device on this admission versus near future.  Cardiac cath will be postponed until Hb and Cr stabilized, but still planned for this admission.  Patient's respiratory status improved compared to admission, appears comfortable.  Transition to PO lasix today, repeat labs and CXR tomorrow.
Pt is seen, examined, chart reviewed, d/w np/pa.  Management as outlined above.  AF: rate controlled. Restart eliquis at 2.5/bid as soon as pt hemoglobin stable, ideally maintain Hb >8
Consult pulmonary  Consult nephrology  Consult cardiology  Consult [facility]  Will ask CT surgery to follow pigtail if able to downgrade from ICU later today.  Discussed plan with him.  Big topic of discussion will be ?discontinuation of anticoagulation.  ?Watchman's procedure?

## 2020-11-21 NOTE — PROGRESS NOTE ADULT - SUBJECTIVE AND OBJECTIVE BOX
[facility]                                                               Office: [address] [address]                                                              Telephone: [phone]. Fax:[phone]                                                                             PROGRESS NOTE  Reason for follow up:  Resp failure  Overnight: No new events.   Update: 11/21: Pt denies chest pain, palpitations, SOB. Tele- Afib HR @ 70-80s, NSVT 3 beats. Pt discussed R+LHC plan with wife and primary cardiologist who disagreed and advised against procedure. Pt wishes to refuse R+LHC during this admission. Cont. current medication regimen.  Restart eliquis today at 2.5/bid as soon as pt hemoglobin stable      Subjective:No new events     	  Vitals:  T(C): 36.4 (11-21-20 @ 16:08), Max: 36.4 (11-20-20 @ 22:10)  HR: 77 (11-21-20 @ 16:08) (65 - 78)  BP: 135/66 (11-21-20 @ 16:08) (127/64 - 135/66)  RR: 18 (11-21-20 @ 10:30) (18 - 18)  SpO2: 97% (11-21-20 @ 10:30) (93% - 100%)    I&O's Summary    20 Nov 2020 07:01  -  21 Nov 2020 07:00  --------------------------------------------------------  IN: 0 mL / OUT: 330 mL / NET: -330 mL            PHYSICAL EXAM:  Appearance: Comfortable. No acute distress  HEENT:  Head and neck: Atraumatic. Normocephalic.  Normal oral mucosa, PERRL, Neck is supple. No JVD, No carotid bruit.   Neurologic: A & O x 3, no focal deficits. EOMI.  Lymphatic: No cervical lymphadenopathy  Cardiovascular: Normal S1 S2,IRRR, +sys. murmur, grade III/VI  Respiratory: bibasilar diminished breath sounds  Gastrointestinal:  Soft, Non-tender, + BS  Lower Extremities: bilateral lower extremity pitting +2/+2 edema  Psychiatry: Patient is calm. No agitation. Mood & affect appropriate  Skin: LLE covered in DSD and Ace wrap    CURRENT MEDICATIONS:  carvedilol 3.125 milliGRAM(s) Oral every 12 hours  furosemide    Tablet 40 milliGRAM(s) Oral two times a day  tamsulosin 0.4 milliGRAM(s) Oral at bedtime  budesonide 160 MICROgram(s)/formoterol 4.5 MICROgram(s) Inhaler  pantoprazole    Tablet  senna  allopurinol  atorvastatin  aspirin  chewable  cyanocobalamin  folic acid  heparin   Injectable  iron sucrose IVPB  multivitamin      DIAGNOSTIC TESTING:  [ ] Echocardiogram: < from: TTE Echo Limited or F/U (11.16.20 @ 21:06) >  Summary:   1. Left ventricular ejection fraction, by visual estimation, is 60 to 65%.  2. Normal global left ventricular systolic function.   3. Mildly enlarged right ventricle.   4. Mildly reduced RV systolic function.   5. Moderate tricuspid regurgitation.   6. Estimated pulmonary artery systolic pressure is 67.7 mmHg assuming a right atrial pressure of 15 mmHg, which is consistent with severe pulmonary hypertension.    OTHER: 	  XRAY: < from: Xray Chest 1 View-PORTABLE IMMEDIATE (Xray Chest 1 View-PORTABLE IMMEDIATE .) (11.21.20 @ 13:11) >  FINDINGS:    Prior sternotomy and CABG. Heart is enlarged. Small to mild bilateral pleural effusions with bibasilar atelectasis. Apices are unremarkable. Degenerative changes.        IMPRESSION:    Small to mild bilateral pleural effusions with bibasilar atelectasis          LABS:	 	                            7.9    7.14  )-----------( 233      ( 21 Nov 2020 07:14 )             25.9     11-21    133<L>  |  94<L>  |  96.0<H>  ----------------------------<  124<H>  4.5   |  32.0<H>  |  1.92<H>    Ca    8.0<L>      21 Nov 2020 07:14  Phos  3.2     11-21  Mg     1.9     11-21    TPro  4.9<L>  /  Alb  3.0<L>  /  TBili  0.4  /  DBili  x   /  AST  19  /  ALT  23  /  AlkPhos  200<H>  11-20      TELEMETRY: Afib HR @ 70-80s, NSVT 3 beats

## 2020-11-21 NOTE — PROGRESS NOTE ADULT - ASSESSMENT
70M hx CAD s/p CABG, COPD, AF, CKD now re-admitted with likely hypoxic respiratory failure in setting of decompensated HF    11/21: Pt denies chest pain, palpitations, SOB. Tele- Afib HR @ 70-80s, NSVT 3 beats. Pt discussed R+LHC plan with wife and primary cardiologist who disagreed and advised against procedure. Pt wishes to refuse R+LHC during this admission. Cont. current medication regimen.  Restart eliquis today at 2.5/bid as soon as pt hemoglobin stable    Acute on Chronic HFpEF  - lower extremity edema, but likely overall intravascularly euvolemic  - continue lasix 40mg PO bid  - limited F/u TTE showed severe pulm HTN, mild reduced systolic function, mod TR  - strict I&O; replace electrolytes PRN- hyperkalemia resolved   - BP management, continue coreg    AF  - rate controlled  - CHASDVasc 3  - heparin gtt d/c secondary to ozzing at chest tube, and IV sites.   - Hb stabilized last 72 hours  - therapeutic dose eliquis for this patient is 5mg/bid- Pt was on 2.5mg/BID (does not meet 's criteria for reduced dosing)   - Outpatient Cardiologist Dr Storm prefers reduced dose eliquis in view of abnormal renal function  - Restart eliquis today at 2.5/bid as soon as pt hemoglobin stable  - ideally maintain Hb >8  - cont Coreg   - EP evaluation for Watchman appreciated, will follow up after current hospitalization    CAD  - no chest pain, unchanged EKG, negative troponin on admission  - mildly elevated troponin on previous admission in setting of reduced renal clearance  - patient, family, and outpatient Cardiologist at this time preferring to defer angiogram in order to spare renal function   - continue ASA, statin  - nephrology consulted, follow up recommendations

## 2020-11-21 NOTE — PROGRESS NOTE ADULT - ASSESSMENT
#Acute hypoxic respiratory failure with right pleural effusion and pulmonary HTN   - s/p chest tube placement  - s/p extubation   - lasix   - cardio consult appreciated  - pulm consult appreciated  - ct surgery consult appreciated- chest tube mgmt per ct sugcheri    #positive sputum culture  - follow up outpatient   - as patient asymptomatic- no cough no wbc elevation afebrile;  will not treat at this time- monitor vitals     #Severe Pulmonary HTN  - cardio consult appreciated   - patient and wife are absolutely refusing cath   - outpatient cardio follow up     #CAD s/p CABG  - statin and aspirin  - cardio consult appreciated    #Afib- rate controlled   - cardio consult appreciated   - Pt seem to be having frequent falls per record, also A.C held due to drop in Hbg   - holding A/C for now, will need to resume once H/H stable and no evidence of bleed     #CKD3 (Baseline SCr ~ 1.7 -2.0 per nephro)  - monitor cr  - avoid nephrotoxic meds  - nephro consult appreciated     #macrocytic Anemia with iron deficiency  - s/p prbc- will give additional unit today as patient now accepting w/ lasix after   - ac on hold  - iv iron  - folic acid, b12, mvi    #COPD  - not in exacerbation   - pulmonary consult appreciated   - O2 & inhalers    #dvt prophylaxis  - heparin SC     Dispo - pending chest tube will be d/c by ct surgery today per documentation  2-3 days     Spoke to patient wife on phone [phone] hospital course to date reviewed. all questions answered. #Acute hypoxic respiratory failure with right pleural effusion and pulmonary HTN   - s/p chest tube placement  - s/p extubation   - lasix   - cardio consult appreciated  - pulm consult appreciated  - ct surgery consult appreciated- chest tube mgmt per ct sugcheri    #positive sputum culture  - follow up outpatient   - as patient asymptomatic- no cough no wbc elevation afebrile;  will not treat at this time- monitor vitals     #Severe Pulmonary HTN  - cardio consult appreciated   - patient and wife are absolutely refusing cath   - outpatient cardio follow up     #CAD s/p CABG  - statin and aspirin  - cardio consult appreciated    #Afib- rate controlled   - cardio consult appreciated   - Pt seem to be having frequent falls per record, also A.C held due to drop in Hbg   - holding A/C for now, will need to resume once H/H stable and no evidence of bleed     #CKD3 (Baseline SCr ~ 1.7 -2.0 per nephro)  - monitor cr  - avoid nephrotoxic meds  - nephro consult appreciated     #macrocytic Anemia with iron deficiency  - s/p prbc  - ac on hold  - iv iron  - folic acid, b12, mvi    #COPD  - not in exacerbation   - pulmonary consult appreciated   - O2 & inhalers    #dvt prophylaxis  - heparin SC     Dispo - pending chest tube will be d/c by ct surgery today per documentation  2-3 days     Spoke to patient wife on phone [phone] hospital course to date reviewed. all questions answered.

## 2020-11-21 NOTE — PROGRESS NOTE ADULT - SUBJECTIVE AND OBJECTIVE BOX
Patient is a 70y old  Male who presents with a chief complaint of Right effusion (21 Nov 2020 11:03)    Patient seen and examined at bedside.     ALLERGIES:  No Known Allergies    MEDICATIONS  (STANDING):  allopurinol 100 milliGRAM(s) Oral daily  aspirin  chewable 81 milliGRAM(s) Oral daily  atorvastatin 20 milliGRAM(s) Oral at bedtime  budesonide 160 MICROgram(s)/formoterol 4.5 MICROgram(s) Inhaler 2 Puff(s) Inhalation two times a day  carvedilol 3.125 milliGRAM(s) Oral every 12 hours  cyanocobalamin 1000 MICROGram(s) Oral daily  folic acid 1 milliGRAM(s) Oral daily  furosemide    Tablet 40 milliGRAM(s) Oral two times a day  furosemide   Injectable 20 milliGRAM(s) IV Push once  heparin   Injectable 5000 Unit(s) SubCutaneous every 12 hours  iron sucrose IVPB 200 milliGRAM(s) IV Intermittent every 24 hours  multivitamin 1 Tablet(s) Oral daily  pantoprazole    Tablet 40 milliGRAM(s) Oral before breakfast  senna 2 Tablet(s) Oral at bedtime  tamsulosin 0.4 milliGRAM(s) Oral at bedtime    MEDICATIONS  (PRN):  albuterol/ipratropium for Nebulization 3 milliLiter(s) Nebulizer every 6 hours PRN Shortness of Breath and/or Wheezing  temazepam 30 milliGRAM(s) Oral at bedtime PRN Insomnia    Vital Signs Last 24 Hrs  T(F): 97.6 (21 Nov 2020 05:00), Max: 97.6 (20 Nov 2020 22:10)  HR: 71 (21 Nov 2020 10:30) (65 - 78)  BP: 130/69 (21 Nov 2020 10:30) (127/64 - 133/75)  RR: 18 (21 Nov 2020 10:30) (18 - 18)  SpO2: 97% (21 Nov 2020 10:30) (93% - 100%)  I&O's Summary    20 Nov 2020 07:01  -  21 Nov 2020 07:00  --------------------------------------------------------  IN: 0 mL / OUT: 330 mL / NET: -330 mL      PHYSICAL EXAM:  General: NAD, Alert;   ENT: MMM, no thrush  Neck: Supple, No JVD  Lungs: good air entry, non-labored breathing +right chest tube  Cardio: +s1/s2  Abdomen: Soft, Nontender, Nondistended; Bowel sounds present  Extremities: No calf tenderness    LABS:                        7.9    7.14  )-----------( 233      ( 21 Nov 2020 07:14 )             25.9     11-21    133  |  94  |  96.0  ----------------------------<  124  4.5   |  32.0  |  1.92    Ca    8.0      21 Nov 2020 07:14  Phos  3.2     11-21  Mg     1.9     11-21    TPro  4.9  /  Alb  3.0  /  TBili  0.4  /  DBili  x   /  AST  19  /  ALT  23  /  AlkPhos  200  11-20    eGFR if Non African American: 34 mL/min/1.73M2 (11-21-20 @ 07:14)  eGFR if African American: 40 mL/min/1.73M2 (11-21-20 @ 07:14)    ABG - ( 19 Nov 2020 16:45 )  pH, Arterial: 7.37  pH, Blood: x     /  pCO2: 56    /  pO2: 77    / HCO3: 30    / Base Excess: 6.3   /  SaO2: 97        Culture  Culture - Body Fluid with Gram Stain (collected 16 Nov 2020 03:19)  Source: .Body Fluid Pleural Fluid  Gram Stain (16 Nov 2020 04:33):    polymorphonuclear leukocytes seen    No organisms seen    by cytocentrifuge  Final Report (21 Nov 2020 09:23):    No growth at 5 days    Culture - Sputum (collected 15 Nov 2020 21:47)  Source: .Sputum Sputum  Gram Stain (15 Nov 2020 22:34):    Moderate polymorphonuclear leukocytes per low power field    No Squamous epithelial cells per low power field    Few Gram positive cocci in pairs per oil power field  Final Report (17 Nov 2020 18:43):    Moderate Escherichia coli    Normal Respiratory Sandra present  Organism: Escherichia coli (17 Nov 2020 18:43)  Organism: Escherichia coli (17 Nov 2020 18:43)     Culture - Blood (collected 15 Nov 2020 01:16)  Source: .Blood Blood-Peripheral  Final Report (20 Nov 2020 03:00):    No growth at 5 days.    Culture - Blood (collected 15 Nov 2020 01:16)  Source: .Blood Blood-Peripheral  Final Report (20 Nov 2020 03:00):    No growth at 5 days.

## 2020-11-22 ENCOUNTER — TRANSCRIPTION ENCOUNTER (OUTPATIENT)
Age: 70
End: 2020-11-22

## 2020-11-22 VITALS — OXYGEN SATURATION: 96 %

## 2020-11-22 PROCEDURE — 99239 HOSP IP/OBS DSCHRG MGMT >30: CPT

## 2020-11-22 PROCEDURE — 99233 SBSQ HOSP IP/OBS HIGH 50: CPT

## 2020-11-22 RX ORDER — THIAMINE MONONITRATE (VIT B1) 100 MG
1 TABLET ORAL
Qty: 30 | Refills: 0
Start: 2020-11-22 | End: 2020-12-21

## 2020-11-22 RX ORDER — FUROSEMIDE 40 MG
1 TABLET ORAL
Qty: 0 | Refills: 0 | DISCHARGE

## 2020-11-22 RX ORDER — ERYTHROPOIETIN 10000 [IU]/ML
10000 INJECTION, SOLUTION INTRAVENOUS; SUBCUTANEOUS
Refills: 0 | Status: DISCONTINUED | OUTPATIENT
Start: 2020-11-22 | End: 2020-11-22

## 2020-11-22 RX ORDER — PREGABALIN 225 MG/1
1 CAPSULE ORAL
Qty: 30 | Refills: 0
Start: 2020-11-22 | End: 2020-12-21

## 2020-11-22 RX ORDER — HYDRALAZINE HCL 50 MG
2 TABLET ORAL
Qty: 0 | Refills: 0 | DISCHARGE

## 2020-11-22 RX ORDER — FERROUS SULFATE 325(65) MG
1 TABLET ORAL
Qty: 30 | Refills: 0
Start: 2020-11-22 | End: 2020-12-21

## 2020-11-22 RX ORDER — FUROSEMIDE 40 MG
1 TABLET ORAL
Qty: 60 | Refills: 0
Start: 2020-11-22 | End: 2020-12-21

## 2020-11-22 RX ORDER — CARVEDILOL PHOSPHATE 80 MG/1
1 CAPSULE, EXTENDED RELEASE ORAL
Qty: 0 | Refills: 0 | DISCHARGE

## 2020-11-22 RX ORDER — SENNA PLUS 8.6 MG/1
2 TABLET ORAL
Qty: 0 | Refills: 0 | DISCHARGE
Start: 2020-11-22

## 2020-11-22 RX ORDER — FOLIC ACID 0.8 MG
1 TABLET ORAL
Qty: 30 | Refills: 0
Start: 2020-11-22 | End: 2020-12-21

## 2020-11-22 RX ORDER — CARVEDILOL PHOSPHATE 80 MG/1
1 CAPSULE, EXTENDED RELEASE ORAL
Qty: 60 | Refills: 0
Start: 2020-11-22 | End: 2020-12-21

## 2020-11-22 RX ADMIN — PREGABALIN 1000 MICROGRAM(S): 225 CAPSULE ORAL at 11:14

## 2020-11-22 RX ADMIN — Medication 1 MILLIGRAM(S): at 11:14

## 2020-11-22 RX ADMIN — HEPARIN SODIUM 5000 UNIT(S): 5000 INJECTION INTRAVENOUS; SUBCUTANEOUS at 05:40

## 2020-11-22 RX ADMIN — BUDESONIDE AND FORMOTEROL FUMARATE DIHYDRATE 2 PUFF(S): 160; 4.5 AEROSOL RESPIRATORY (INHALATION) at 08:23

## 2020-11-22 RX ADMIN — Medication 100 MILLIGRAM(S): at 11:14

## 2020-11-22 RX ADMIN — Medication 1 TABLET(S): at 11:14

## 2020-11-22 RX ADMIN — ERYTHROPOIETIN 10000 UNIT(S): 10000 INJECTION, SOLUTION INTRAVENOUS; SUBCUTANEOUS at 11:14

## 2020-11-22 RX ADMIN — Medication 81 MILLIGRAM(S): at 11:14

## 2020-11-22 RX ADMIN — CARVEDILOL PHOSPHATE 3.12 MILLIGRAM(S): 80 CAPSULE, EXTENDED RELEASE ORAL at 05:40

## 2020-11-22 RX ADMIN — Medication 40 MILLIGRAM(S): at 05:40

## 2020-11-22 RX ADMIN — PANTOPRAZOLE SODIUM 40 MILLIGRAM(S): 20 TABLET, DELAYED RELEASE ORAL at 05:40

## 2020-11-22 NOTE — PROGRESS NOTE ADULT - REASON FOR ADMISSION
Resp failure
Right effusion
Resp failure

## 2020-11-22 NOTE — PROGRESS NOTE ADULT - ASSESSMENT
#Acute hypoxic respiratory failure with right pleural effusion and pulmonary HTN - resolved   - s/p chest tube placement  - s/p extubation   - lasix   - cardio consult appreciated  - pulm consult appreciated  - ct surgery consult appreciated- s/p chest tube     #positive sputum culture  - follow up outpatient   - as patient asymptomatic- no cough no wbc elevation afebrile;  will not treat at this time- monitor vitals     #Severe Pulmonary HTN  - cardio consult appreciated   - patient and wife are absolutely refusing cath   - outpatient cardio follow up     #CAD s/p CABG  - statin and aspirin  - cardio consult appreciated    #Afib- rate controlled   - cardio consult appreciated   - eliquis   - coreg     #CKD3 (Baseline SCr ~ 1.7 -2.0 per nephro)  - monitor cr  - avoid nephrotoxic meds  - nephro consult appreciated - d/w Dr Walker- will need epogen outpatient his office will set up once patient follows up     #macrocytic Anemia with iron deficiency  - s/p prbc- will give additional unit today as patient now accepting w/ lasix after   - folic acid, b12, mvi, iron    #COPD  - not in exacerbation   - pulmonary consult appreciated   - O2 & inhalers    #dvt prophylaxis  - heparin SC     Spoke to patient wife on phone [phone] hospital course to date reviewed. all questions answered.

## 2020-11-22 NOTE — DISCHARGE NOTE PROVIDER - NSDCFUSCHEDAPPT_GEN_ALL_CORE_FT
DEBI BADILLO ; 11/24/2020 ; NPP Surg Trauma [address]   DEBI BADILLO ; 11/25/2020 ; NPP PulmMed [address] [facility]

## 2020-11-22 NOTE — PROGRESS NOTE ADULT - SUBJECTIVE AND OBJECTIVE BOX
Patient is a 70y old  Male who presents with a chief complaint of Resp failure (22 Nov 2020 11:35)    Patient seen and examined at bedside.     ALLERGIES:  No Known Allergies    MEDICATIONS  (STANDING):  allopurinol 100 milliGRAM(s) Oral daily  aspirin  chewable 81 milliGRAM(s) Oral daily  atorvastatin 20 milliGRAM(s) Oral at bedtime  budesonide 160 MICROgram(s)/formoterol 4.5 MICROgram(s) Inhaler 2 Puff(s) Inhalation two times a day  carvedilol 3.125 milliGRAM(s) Oral every 12 hours  cyanocobalamin 1000 MICROGram(s) Oral daily  epoetin felisha-epbx (RETACRIT) Injectable 84150 Unit(s) SubCutaneous <User Schedule>  folic acid 1 milliGRAM(s) Oral daily  furosemide    Tablet 40 milliGRAM(s) Oral two times a day  heparin   Injectable 5000 Unit(s) SubCutaneous every 12 hours  multivitamin 1 Tablet(s) Oral daily  pantoprazole    Tablet 40 milliGRAM(s) Oral before breakfast  senna 2 Tablet(s) Oral at bedtime  tamsulosin 0.4 milliGRAM(s) Oral at bedtime    MEDICATIONS  (PRN):  albuterol/ipratropium for Nebulization 3 milliLiter(s) Nebulizer every 6 hours PRN Shortness of Breath and/or Wheezing  temazepam 30 milliGRAM(s) Oral at bedtime PRN Insomnia    Vital Signs Last 24 Hrs  T(F): 97.6 (22 Nov 2020 03:57), Max: 98.1 (21 Nov 2020 21:30)  HR: 73 (22 Nov 2020 08:23) (72 - 80)  BP: 111/49 (22 Nov 2020 03:57) (111/49 - 135/66)  RR: 18 (22 Nov 2020 03:57) (18 - 18)  SpO2: 96% (22 Nov 2020 08:23) (96% - 98%)  I&O's Summary    PHYSICAL EXAM:  General: NAD, Alert;   ENT: MMM, no thrush  Neck: Supple, No JVD  Lungs: good air entry, non-labored breathing   Cardio: +s1/s2  Abdomen: Soft, Nontender, Nondistended; Bowel sounds present  Extremities: No calf tenderness    LABS:                        7.9    7.14  )-----------( 233      ( 21 Nov 2020 07:14 )             25.9     11-21    133  |  94  |  96.0  ----------------------------<  124  4.5   |  32.0  |  1.92    Ca    8.0      21 Nov 2020 07:14  Phos  3.2     11-21  Mg     1.9     11-21    TPro  4.9  /  Alb  3.0  /  TBili  0.4  /  DBili  x   /  AST  19  /  ALT  23  /  AlkPhos  200  11-20    eGFR if Non African American: 34 mL/min/1.73M2 (11-21-20 @ 07:14)  eGFR if African American: 40 mL/min/1.73M2 (11-21-20 @ 07:14)    ABG - ( 19 Nov 2020 16:45 )  pH, Arterial: 7.37  pH, Blood: x     /  pCO2: 56    /  pO2: 77    / HCO3: 30    / Base Excess: 6.3   /  SaO2: 97        Cultures  Culture - Body Fluid with Gram Stain (collected 16 Nov 2020 03:19)  Source: .Body Fluid Pleural Fluid  Gram Stain (16 Nov 2020 04:33):    polymorphonuclear leukocytes seen    No organisms seen    by cytocentrifuge  Final Report (21 Nov 2020 09:23):    No growth at 5 days    Culture - Sputum (collected 15 Nov 2020 21:47)  Source: .Sputum Sputum  Gram Stain (15 Nov 2020 22:34):    Moderate polymorphonuclear leukocytes per low power field    No Squamous epithelial cells per low power field    Few Gram positive cocci in pairs per oil power field  Final Report (17 Nov 2020 18:43):    Moderate Escherichia coli    Normal Respiratory Sandra present  Organism: Escherichia coli (17 Nov 2020 18:43)  Organism: Escherichia coli (17 Nov 2020 18:43)    RADIOLOGY & ADDITIONAL TESTS:  - no new tests    Care Discussed with Consultants/Other Providers:   Pulmonary

## 2020-11-22 NOTE — DISCHARGE NOTE PROVIDER - NSDCFUADDAPPT_GEN_ALL_CORE_FT
A DC FOLLOW UP APPT. HAS BEEN MADE FOR YOU WITH YOUR PULMONOLOGIST  DORIS DOMÍNGUEZ 11/25  AT 11:15   OFFICE [phone]

## 2020-11-22 NOTE — DISCHARGE NOTE PROVIDER - CARE PROVIDER_API CALL
Madeline Walker  INTERNAL MEDICINE  [address] [place] [address]  Phone: [phone]  Fax: [phone]  Follow Up Time:     Tamir Fraga  CRITICAL CARE MEDICINE  [address] [facility], [address]  [place] [address]  Phone: [phone]  Fax: [phone]  Follow Up Time:     Cardiology,   Phone: (   )    -  Fax: (   )    -  Follow Up Time:     Zia Storm  CARDIOVASCULAR DISEASE  [address] [facility], [address]  [place] [address]  Phone: [phone]  Fax: [phone]  Follow Up Time:     Dav Rueda  SURGERY  [address] [facility]  [address] [place] [address]  Phone: [phone]  Fax: [phone]  Follow Up Time:     Aaron Abernathy)  Cardiology; Internal Medicine  [address] [facility], [address]  [place] [address]  Phone: [phone]  Fax: [phone]  Follow Up Time:

## 2020-11-22 NOTE — DISCHARGE NOTE PROVIDER - NSDCMRMEDTOKEN_GEN_ALL_CORE_FT
Advair Diskus 250 mcg-50 mcg inhalation powder: 1 puff(s) inhaled 2 times a day  allopurinol 100 mg oral tablet: 1 tab(s) orally once a day  apixaban 2.5 mg oral tablet: 1 tab(s) orally 2 times a day  aspirin 81 mg oral tablet: 1 tab(s) orally once a day  Coreg 3.125 mg oral tablet: 1 tab(s) orally every 12 hours  cyanocobalamin 1000 mcg oral tablet: 1 tab(s) orally once a day  FeroSul 325 mg (65 mg elemental iron) oral tablet: 1 tab(s) orally once a day   Flomax 0.4 mg oral capsule: 1 cap(s) orally once a day  folic acid 1 mg oral tablet: 1 tab(s) orally once a day  Lasix 40 mg oral tablet: 1 tab(s) orally 2 times a day  Lipitor 20 mg oral tablet: 1 tab(s) orally once a day  Multiple Vitamins oral tablet: 1 tab(s) orally once a day   omeprazole 40 mg oral delayed release capsule: 1 cap(s) orally once a day  senna oral tablet: 2 tab(s) orally once a day (at bedtime)  temazepam 30 mg oral capsule: 1 cap(s) orally once a day (at bedtime), As Needed  thiamine 100 mg oral tablet: 1 tab(s) orally once a day

## 2020-11-22 NOTE — DISCHARGE NOTE PROVIDER - CARE PROVIDERS DIRECT ADDRESSES
,DirectAddress_Unknown,[email or web address],DirectAddress_Unknown,DirectAddress_Unknown,[email or web address],DirectAddress_Unknown

## 2020-11-22 NOTE — PROGRESS NOTE ADULT - SUBJECTIVE AND OBJECTIVE BOX
Patient seen and examined    I&O's Summary    REVIEW OF SYSTEMS:    CONSTITUTIONAL: No F/C  RESPIRATORY: No cough or SOB  CARDIOVASCULAR: No CP/palpitations,    GASTROINTESTINAL: No abdominal pain , NVD   GENITOURINARY: No UTI sx  NEUROLOGICAL: No headaches/wk/numbness  MUSCULOSKELETAL:  No joint pain/swelling; No LBP  EXTREMITIES : no swelling,    Vital Signs Last 24 Hrs  T(C): 36.4 (22 Nov 2020 03:57), Max: 36.7 (21 Nov 2020 21:30)  T(F): 97.6 (22 Nov 2020 03:57), Max: 98.1 (21 Nov 2020 21:30)  HR: 73 (22 Nov 2020 08:23) (71 - 80)  BP: 111/49 (22 Nov 2020 03:57) (111/49 - 135/66)  RR: 18 (22 Nov 2020 03:57) (18 - 18)  SpO2: 96% (22 Nov 2020 08:23) (96% - 98%)    PHYSICAL EXAM:    GENERAL: NAD, Pale, In Bed,   EYES:  conjunctiva and sclera clear  NECK: Supple, No JVD/Bruit  NERVOUS SYSTEM:  A/O x3,   CHEST:  CTA ,No rales or rhonchi  HEART:  RRR, No murmurs  ABDOMEN: Soft, NT/ND BS+  EXTREMITIES:  + Edema;  SKIN: No rashes    LABS:                        7.9    7.14  )-----------( 233      ( 21 Nov 2020 07:14 )             25.9     11-21    133<L>  |  94<L>  |  96.0<H>  ----------------------------<  124<H>  4.5   |  32.0<H>  |  1.92<H>    Ca    8.0<L>      21 Nov 2020 07:14  Phos  3.2     11-21  Mg     1.9     11-21    MEDICATIONS  (STANDING):  albuterol/ipratropium for Nebulization PRN  allopurinol  aspirin  chewable  atorvastatin  budesonide 160 MICROgram(s)/formoterol 4.5 MICROgram(s) Inhaler  carvedilol  cyanocobalamin  folic acid  furosemide    Tablet  heparin   Injectable  multivitamin  pantoprazole    Tablet  senna  tamsulosin  temazepam PRN

## 2020-11-22 NOTE — DISCHARGE NOTE PROVIDER - PROVIDER TOKENS
PROVIDER:[TOKEN:[[id number]]],PROVIDER:[TOKEN:[[id number]]],FREE:[LAST:[Cardiology],PHONE:[(   )    -],FAX:[(   )    -]],PROVIDER:[TOKEN:[[id number]]],PROVIDER:[TOKEN:[[id number]]],PROVIDER:[TOKEN:[[id number]]]

## 2020-11-22 NOTE — DISCHARGE NOTE PROVIDER - NSDCCPCAREPLAN_GEN_ALL_CORE_FT
PRINCIPAL DISCHARGE DIAGNOSIS  Diagnosis: Respiratory failure with hypoxia  Assessment and Plan of Treatment: - 2/2 pulmonary htn and plueral effusion s/p chest tube   - follow up with pmd, cardiology, EP Cardiology (Dr Abernathy- possible watchman), nephrology (anemia will need epogen weekly), pulmonary (Dr Krishnan)  - take medications as rx

## 2020-11-22 NOTE — DISCHARGE NOTE PROVIDER - HOSPITAL COURSE
70 year old male with pmh of afib, pulm htn, chronic om, ckd, copd on home 02, cabg coming to hospital unresponsive from [facility]. Patient found to have AMS, acute on chronic respiratory failure. Initially due to this patient was intubated and extubated. Was in micu however as extubated and toleration ambulation diet and voiding transferred from MICU. patient also pleural effusions s/p pigtail catheter via CT Surgery. Patient hospital course complicated by anemia which stablized and eliquis restarted, patient s/p prbc. patient seen by cardio, pulm, thoracic surgery, nephro while admitted. patient being d/c in stable condition with home o2. cleopatran recommended for cardiac cath for pulm htn however patient and wife refusing given outpatient cardiology concerns     Time spent on patients discharge 32 minutes

## 2020-11-22 NOTE — PROGRESS NOTE ADULT - ASSESSMENT
69 YO M > CAD s/p CABG, COPD, AF, CKD3a, now re-admitted with  hypoxic respiratory failure in setting of decompensated HF    11/21: Pt denies chest pain, palpitations, SOB. Tele- Afib HR @ 70-80s, NSVT 3 beats. Pt discussed R+LHC plan with wife and primary cardiologist who disagreed and advised against procedure. Pt wishes to refuse R+LHC during this admission. Cont. current medication regimen.  Restart eliquis today at 2.5/bid as soon as pt hemoglobin stable    Acute on Chronic HFpEF  - lower extremity edema, but likely overall intravascularly euvolemic  - continue lasix 40mg PO bid  - limited F/u TTE showed severe pulm HTN, mild reduced systolic function, mod TR  - strict I&O; replace electrolytes PRN- hyperkalemia resolved   - BP management, continue coreg    AF  - rate controlled  - CHASDVasc 3  - heparin gtt d/c secondary to ozzing at chest tube, and IV sites.   - Hb stabilized last 72 hours  - therapeutic dose eliquis for this patient is 5mg/bid- Pt was on 2.5mg/BID (does not meet 's criteria for reduced dosing)   - Outpatient Cardiologist Dr Storm prefers reduced dose eliquis in view of abnormal renal function  - Restart eliquis today at 2.5/bid as soon as pt hemoglobin stable  - ideally maintain Hb >8  - cont Coreg   - EP evaluation for Watchman appreciated, will follow up after current hospitalization    CAD  - no chest pain, unchanged EKG, negative troponin on admission  - mildly elevated troponin on previous admission in setting of reduced renal clearance  - patient, family, and outpatient Cardiologist at this time preferring to defer angiogram in order to spare renal function   - continue ASA, statin  - nephrology consulted, follow up recommendations    Attending Attestation:   Pt is seen, examined, chart reviewed, d/w np/pa.  Management as outlined above.  AF: rate controlled. Restart eliquis at 2.5/bid as soon as pt hemoglobin stable, ideally maintain Hb >8 .     I have personally seen, examined, and participated in the care of this patient.  I have reviewed all pertinent clinical information, including history, physical exam, plan and the Medical/PA/NP Student’s note and agree except as noted..     69 YO M > CAD s/p CABG, COPD, AF, CKD3a, now re-admitted with  hypoxic respiratory failure in setting of decompensated HF    11/21: Pt denies chest pain, palpitations, Orthopnea. Tele- Afib HR @ 70-80s, NSVT 3 beats. Pt discussed R+LHC Deferredc for Now,    On  Eliquis  2.5/bid ( Renal Dose )     Acute on Chronic HFpEF  - On Coreg &  Lasix   - TTE showed severe pulm HTN, mild reduced systolic function, mod TR    AF  - Rate controlled  - CHASDVasc 3  - Hgb stabilized last 72 hours,  - On  Coreg     CAD, CRS,  - continue ASA, statin    OP F.U , TY.

## 2020-11-24 ENCOUNTER — APPOINTMENT (OUTPATIENT)
Dept: TRAUMA SURGERY | Facility: CLINIC | Age: 70
End: 2020-11-24
Payer: MEDICARE

## 2020-11-24 ENCOUNTER — APPOINTMENT (OUTPATIENT)
Dept: ELECTROPHYSIOLOGY | Facility: CLINIC | Age: 70
End: 2020-11-24

## 2020-11-24 PROCEDURE — 99024 POSTOP FOLLOW-UP VISIT: CPT

## 2020-11-24 NOTE — HISTORY OF PRESENT ILLNESS
[FreeTextEntry1] : The patient is well known to [facility]\par He had full thickness skin necrosis from a hematoma secondary to anticoagulation\par S/P STSG to the left leg\par Complicated hospital course from a cardiovascular standpoint\par He was discharged from the medical service 2 days ago\par No specific complaints regarding his graft or donor site

## 2020-11-25 ENCOUNTER — APPOINTMENT (OUTPATIENT)
Dept: PULMONOLOGY | Facility: CLINIC | Age: 70
End: 2020-11-25
Payer: MEDICARE

## 2020-11-25 VITALS — HEART RATE: 73 BPM | OXYGEN SATURATION: 96 % | HEIGHT: 66 IN | BODY MASS INDEX: 31.66 KG/M2 | WEIGHT: 197 LBS

## 2020-11-25 VITALS — DIASTOLIC BLOOD PRESSURE: 60 MMHG | SYSTOLIC BLOOD PRESSURE: 120 MMHG

## 2020-11-25 DIAGNOSIS — N18.30 CHRONIC KIDNEY DISEASE, STAGE 3 UNSPECIFIED: ICD-10-CM

## 2020-11-25 DIAGNOSIS — Z86.79 PERSONAL HISTORY OF OTHER DISEASES OF THE CIRCULATORY SYSTEM: ICD-10-CM

## 2020-11-25 DIAGNOSIS — Z85.51 PERSONAL HISTORY OF MALIGNANT NEOPLASM OF BLADDER: ICD-10-CM

## 2020-11-25 PROCEDURE — 99214 OFFICE O/P EST MOD 30 MIN: CPT | Mod: 25

## 2020-11-25 PROCEDURE — 99358 PROLONG SERVICE W/O CONTACT: CPT

## 2020-11-25 NOTE — REASON FOR VISIT
[Follow-Up - From Hospitalization] : a follow-up visit after a recent hospitalization [TextBox_44] : Hypoxia

## 2020-11-25 NOTE — PHYSICAL EXAM
[No Acute Distress] : no acute distress [Normal Appearance] : normal appearance [No Neck Mass] : no neck mass [No Murmurs] : no murmurs [No Resp Distress] : no resp distress [Clear to Auscultation Bilaterally] : clear to auscultation bilaterally [No Abnormalities] : no abnormalities [Benign] : benign [Normal Gait] : normal gait [No Clubbing] : no clubbing [No Cyanosis] : no cyanosis [Normal Color/ Pigmentation] : normal color/ pigmentation [No Focal Deficits] : no focal deficits [Oriented x3] : oriented x3 [Normal Affect] : normal affect [Elongated Uvula] : elongated uvula [Enlarged Base of the Tongue] : enlarged base of the tongue [II] : Mallampati Class: II [3+ Pitting] : 3+ pitting [TextBox_54] : Irreg - irreg

## 2020-11-25 NOTE — ASSESSMENT
[FreeTextEntry1] : Presumed diastolic heart failure related pulmonary hypertension\par Hypercarbia with acidosis due to CKD and inability to adequately raise serum bicarb\par Probable significant COPD\par Cor Pulmonale\par CKD

## 2020-11-25 NOTE — HISTORY OF PRESENT ILLNESS
[Obstructive Sleep Apnea] : obstructive sleep apnea [Daytime Somnolence] : daytime somnolence [Nonrestorative Sleep] : nonrestorative sleep [Snoring] : snoring [TextBox_4] : Hospital Course: \par Discharge Date	22-Nov-2020 \par Admission Date	15-Nov-2020 03:55 \par Reason for Admission	Resp failure \par Hospital Course	 \par 70 year old male with pmh of afib, pulm htn, chronic om, ckd, copd on home 02, \par cabg coming to hospital unresponsive from [facility]. Patient found to have AMS, acute \par on chronic respiratory failure. Initially due to this patient was intubated and \par extubated. Was in micu however as extubated and toleration ambulation diet and \par voiding transferred from MICU. patient also transudative pleural effusions s/p pigtail \par catheter via CT Surgery. Patient hospital course complicated by anemia which \par stabilized and eliquis restarted, patient s/p prbc. patient seen by cardio, \par pulm, thoracic surgery, nephro while admitted. patient being d/c in stable \par condition with home o2. patietn recommended for cardiac cath for pulm htn \par however patient and wife refusing given outpatient cardiology concerns\par \par I SPENT 35 MINIUTE REVIEWING NOTES, ORDERS, LABS AND IMAGES FROM HIS HOSPITALIZATION PRIOR TO THIS VISIT \par \par 11/25/20\par 75 pack year smoker - dc IN 2012\par COPD previously treated by Dr Ji with Advair\par No prostatism or glaucoma\par H/O VANITA - intolerant to CPAP or Bipap\par Sleeping in a recliner\par On 02\par Significant leg edema  [ESS] : 8

## 2020-11-25 NOTE — CONSULT LETTER
[Dear  ___] : Dear  [unfilled], [Consult Letter:] : I had the pleasure of evaluating your patient, [unfilled]. [Please see my note below.] : Please see my note below. [Consult Closing:] : Thank you very much for allowing me to participate in the care of this patient.  If you have any questions, please do not hesitate to contact me. [Sincerely,] : Sincerely, [DrChristine  ___] : Dr. SORTO

## 2020-12-03 ENCOUNTER — APPOINTMENT (OUTPATIENT)
Dept: TRAUMA SURGERY | Facility: CLINIC | Age: 70
End: 2020-12-03
Payer: MEDICARE

## 2020-12-03 VITALS
HEART RATE: 69 BPM | SYSTOLIC BLOOD PRESSURE: 130 MMHG | DIASTOLIC BLOOD PRESSURE: 73 MMHG | TEMPERATURE: 97.6 F | OXYGEN SATURATION: 95 % | HEIGHT: 66 IN

## 2020-12-03 PROCEDURE — 99024 POSTOP FOLLOW-UP VISIT: CPT

## 2020-12-04 NOTE — VITALS
[Tender] : tender [Occasional] : occasional [FreeTextEntry3] : CRISTHIAN [FreeTextEntry1] : pt unsure [FreeTextEntry2] : pt unsure

## 2020-12-04 NOTE — ASSESSMENT
[FreeTextEntry1] : Wound  cleaned   with  NS   Santyl applied  to  2  areas and   Xeroform and  gauze  applied    Unna boot  supplies  given to pt so VNS can apply  and pt can  take  a shower prior to application\par RTC next  week  for  recheck \par any acute symptoms  and  or  concerns  call back [facility] or  go  directly to [facility]

## 2020-12-04 NOTE — PHYSICAL EXAM
[Alert] : alert [Oriented to Person] : oriented to person [Oriented to Place] : oriented to place [Oriented to Time] : oriented to time [Calm] : calm [de-identified] : wdwn male  in nad [de-identified] : Unna  boot  removed   Small areas of detritus at upper medial  corner  of  greaft  and  distal  end     no signs  of  cellulitis   Santyl appled to  these  areas   Pedal          pulse  present.  Donor site okay no signs  of cellultis

## 2020-12-04 NOTE — HISTORY OF PRESENT ILLNESS
[FreeTextEntry1] : The patient is well known to [facility]\par He had full thickness skin necrosis from a hematoma secondary to anticoagulation\par S/P STSG to the left leg\par Complicated hospital course from a cardiovascular standpoint\par \par No specific complaints regarding his graft or donor site   Patient  was  seen last  week a nd  unna boot  applied  to  left  lower leg  Patient presents  today  for  unna boot  change  and  wound  check  Patients    VNS  would  like  to  get  order  and  she  will maintain  the  unna  boot  change patient  is  wheelchair  bound

## 2020-12-04 NOTE — REVIEW OF SYSTEMS
[Negative] : Psychiatric [FreeTextEntry6] : O2 dependent  [de-identified] : STSG to  left  lower leg

## 2020-12-10 ENCOUNTER — APPOINTMENT (OUTPATIENT)
Dept: TRAUMA SURGERY | Facility: CLINIC | Age: 70
End: 2020-12-10
Payer: MEDICARE

## 2020-12-10 VITALS
DIASTOLIC BLOOD PRESSURE: 70 MMHG | HEART RATE: 76 BPM | HEIGHT: 66 IN | TEMPERATURE: 97.2 F | SYSTOLIC BLOOD PRESSURE: 121 MMHG | OXYGEN SATURATION: 85 %

## 2020-12-10 DIAGNOSIS — Z94.5 SKIN TRANSPLANT STATUS: ICD-10-CM

## 2020-12-10 PROCEDURE — 99024 POSTOP FOLLOW-UP VISIT: CPT

## 2020-12-11 PROBLEM — Z94.5 S/P SPLIT THICKNESS SKIN GRAFT: Status: ACTIVE | Noted: 2020-12-04

## 2020-12-11 NOTE — ASSESSMENT
[FreeTextEntry1] : Discussed  with  Dr Bedoya - patient no longer  requires  unna  boot    Shower  and  use  soap to clean   Santyl to the heel  and previous guerrero of concern on a daily basis   Xeroform to  graft  area     gauze applied  and  Kerlix   Ace  with  light  compression\par RTC next  week  for  wound check \par Any acute  concerns  and  or  symptoms  call back [facility] or go  directly to [facility]

## 2020-12-11 NOTE — REVIEW OF SYSTEMS
[Negative] : Psychiatric [FreeTextEntry6] : O2 dependent  [de-identified] : STSG to  left  lower leg

## 2020-12-11 NOTE — PHYSICAL EXAM
[Purpura] : no purpura  [Petechiae] : no petechiae [Skin Induration] : no induration [Alert] : alert [Oriented to Person] : oriented to person [Oriented to Place] : oriented to place [Oriented to Time] : oriented to time [Calm] : calm [de-identified] : wdwn male  in nad [de-identified] : PERRLA  EOMS intact  and nl    non icteric sclera [de-identified] : O2 dependent [de-identified] :    Small areas of detritus at upper medial  corner  of  graft  and  distal  end     no        signs  of  cellulitis   Santyl appled to  these  areas  Ulceration  to  posteriro  heel of  left  foot  no  signs  of  cellultis   Pedal          pulse  present.  Donor site okay no         signs  of cellulitis   LLE   skin graft  taking  well  sakin edges   are leveleing  smoothly   no  fot  drop

## 2020-12-11 NOTE — HISTORY OF PRESENT ILLNESS
[FreeTextEntry1] : The patient is well known to [facility]\par He had full thickness skin necrosis from a hematoma secondary to anticoagulation\par S/P STSG to the left leg\par Complicated hospital course from a cardiovascular standpoint\par \par No specific complaints regarding his graft or donor site   Patient  was  seen last  week a nd  unna boot  applied  to  left  lower leg  Patient presents  today  for  unna boot  change  and  wound  check   Patient  denies  any f ever  no  chills   no increase  in pain  to lower leg Patient  removed  unna boot prior to visit  to take a shower

## 2020-12-14 ENCOUNTER — INPATIENT (INPATIENT)
Facility: HOSPITAL | Age: 70
LOS: 12 days | Discharge: HOME | DRG: 291 | End: 2020-12-27
Attending: INTERNAL MEDICINE | Admitting: STUDENT IN AN ORGANIZED HEALTH CARE EDUCATION/TRAINING PROGRAM
Payer: MEDICARE

## 2020-12-14 VITALS
DIASTOLIC BLOOD PRESSURE: 73 MMHG | OXYGEN SATURATION: 98 % | WEIGHT: 160.06 LBS | HEIGHT: 66 IN | SYSTOLIC BLOOD PRESSURE: 138 MMHG | TEMPERATURE: 97 F | HEART RATE: 76 BPM | RESPIRATION RATE: 20 BRPM

## 2020-12-14 DIAGNOSIS — Z95.1 PRESENCE OF AORTOCORONARY BYPASS GRAFT: Chronic | ICD-10-CM

## 2020-12-14 DIAGNOSIS — J96.02 ACUTE RESPIRATORY FAILURE WITH HYPERCAPNIA: ICD-10-CM

## 2020-12-14 DIAGNOSIS — Z95.5 PRESENCE OF CORONARY ANGIOPLASTY IMPLANT AND GRAFT: Chronic | ICD-10-CM

## 2020-12-14 DIAGNOSIS — Z98.890 OTHER SPECIFIED POSTPROCEDURAL STATES: Chronic | ICD-10-CM

## 2020-12-14 LAB
ALBUMIN SERPL ELPH-MCNC: 3.4 G/DL — SIGNIFICANT CHANGE UP (ref 3.3–5.2)
ALLERGY+IMMUNOLOGY DIAG STUDY NOTE: SIGNIFICANT CHANGE UP
ALP SERPL-CCNC: 203 U/L — HIGH (ref 40–120)
ALT FLD-CCNC: 9 U/L — SIGNIFICANT CHANGE UP
ANION GAP SERPL CALC-SCNC: 10 MMOL/L — SIGNIFICANT CHANGE UP (ref 5–17)
ANISOCYTOSIS BLD QL: SIGNIFICANT CHANGE UP
ANTIBODY INTERPRETATION 2: SIGNIFICANT CHANGE UP
APTT BLD: 34.2 SEC — SIGNIFICANT CHANGE UP (ref 27.5–35.5)
AST SERPL-CCNC: 15 U/L — SIGNIFICANT CHANGE UP
BASE EXCESS BLDA CALC-SCNC: 3.6 MMOL/L — HIGH (ref -2–2)
BASE EXCESS BLDA CALC-SCNC: 4.3 MMOL/L — HIGH (ref -2–2)
BASOPHILS # BLD AUTO: 0 K/UL — SIGNIFICANT CHANGE UP (ref 0–0.2)
BASOPHILS NFR BLD AUTO: 0 % — SIGNIFICANT CHANGE UP (ref 0–2)
BILIRUB SERPL-MCNC: 0.5 MG/DL — SIGNIFICANT CHANGE UP (ref 0.4–2)
BLOOD GAS COMMENTS ARTERIAL: SIGNIFICANT CHANGE UP
BLOOD GAS COMMENTS ARTERIAL: SIGNIFICANT CHANGE UP
BUN SERPL-MCNC: 65 MG/DL — HIGH (ref 8–20)
CALCIUM SERPL-MCNC: 8.4 MG/DL — LOW (ref 8.6–10.2)
CHLORIDE SERPL-SCNC: 96 MMOL/L — LOW (ref 98–107)
CO2 SERPL-SCNC: 30 MMOL/L — HIGH (ref 22–29)
CREAT SERPL-MCNC: 2.79 MG/DL — HIGH (ref 0.5–1.3)
CRP SERPL-MCNC: 1.06 MG/DL — HIGH (ref 0–0.4)
EOSINOPHIL # BLD AUTO: 0.19 K/UL — SIGNIFICANT CHANGE UP (ref 0–0.5)
EOSINOPHIL NFR BLD AUTO: 2.6 % — SIGNIFICANT CHANGE UP (ref 0–6)
ERYTHROCYTE [SEDIMENTATION RATE] IN BLOOD: 12 MM/HR — SIGNIFICANT CHANGE UP (ref 0–20)
FLU A RESULT: SIGNIFICANT CHANGE UP
FLU A RESULT: SIGNIFICANT CHANGE UP
FLUAV AG NPH QL: SIGNIFICANT CHANGE UP
FLUBV AG NPH QL: SIGNIFICANT CHANGE UP
GAS PNL BLDA: SIGNIFICANT CHANGE UP
GAS PNL BLDA: SIGNIFICANT CHANGE UP
GIANT PLATELETS BLD QL SMEAR: PRESENT — SIGNIFICANT CHANGE UP
GLUCOSE SERPL-MCNC: 145 MG/DL — HIGH (ref 70–99)
HCO3 BLDA-SCNC: 28 MMOL/L — HIGH (ref 20–26)
HCO3 BLDA-SCNC: 28 MMOL/L — HIGH (ref 20–26)
HCT VFR BLD CALC: 32.8 % — LOW (ref 39–50)
HGB BLD-MCNC: 9.6 G/DL — LOW (ref 13–17)
HOROWITZ INDEX BLDA+IHG-RTO: SIGNIFICANT CHANGE UP
HOROWITZ INDEX BLDA+IHG-RTO: SIGNIFICANT CHANGE UP
INR BLD: 1.63 RATIO — HIGH (ref 0.88–1.16)
LYMPHOCYTES # BLD AUTO: 0.95 K/UL — LOW (ref 1–3.3)
LYMPHOCYTES # BLD AUTO: 13.2 % — SIGNIFICANT CHANGE UP (ref 13–44)
MACROCYTES BLD QL: SIGNIFICANT CHANGE UP
MAGNESIUM SERPL-MCNC: 2.4 MG/DL — SIGNIFICANT CHANGE UP (ref 1.6–2.6)
MANUAL SMEAR VERIFICATION: SIGNIFICANT CHANGE UP
MCHC RBC-ENTMCNC: 29.3 GM/DL — LOW (ref 32–36)
MCHC RBC-ENTMCNC: 31.8 PG — SIGNIFICANT CHANGE UP (ref 27–34)
MCV RBC AUTO: 108.6 FL — HIGH (ref 80–100)
METAMYELOCYTES # FLD: 0.9 % — HIGH (ref 0–0)
MONOCYTES # BLD AUTO: 0.25 K/UL — SIGNIFICANT CHANGE UP (ref 0–0.9)
MONOCYTES NFR BLD AUTO: 3.5 % — SIGNIFICANT CHANGE UP (ref 2–14)
NEUTROPHILS # BLD AUTO: 5.73 K/UL — SIGNIFICANT CHANGE UP (ref 1.8–7.4)
NEUTROPHILS NFR BLD AUTO: 79.8 % — HIGH (ref 43–77)
NT-PROBNP SERPL-SCNC: HIGH PG/ML (ref 0–300)
PCO2 BLDA: 66 MMHG — HIGH (ref 35–45)
PCO2 BLDA: 73 MMHG — CRITICAL HIGH (ref 35–45)
PH BLDA: 7.25 — LOW (ref 7.35–7.45)
PH BLDA: 7.3 — LOW (ref 7.35–7.45)
PLAT MORPH BLD: NORMAL — SIGNIFICANT CHANGE UP
PLATELET # BLD AUTO: 218 K/UL — SIGNIFICANT CHANGE UP (ref 150–400)
PO2 BLDA: 67 MMHG — LOW (ref 83–108)
PO2 BLDA: 72 MMHG — LOW (ref 83–108)
POLYCHROMASIA BLD QL SMEAR: SIGNIFICANT CHANGE UP
POTASSIUM SERPL-MCNC: 4.2 MMOL/L — SIGNIFICANT CHANGE UP (ref 3.5–5.3)
POTASSIUM SERPL-SCNC: 4.2 MMOL/L — SIGNIFICANT CHANGE UP (ref 3.5–5.3)
PROT SERPL-MCNC: 5.7 G/DL — LOW (ref 6.6–8.7)
PROTHROM AB SERPL-ACNC: 18.5 SEC — HIGH (ref 10.6–13.6)
RBC # BLD: 3.02 M/UL — LOW (ref 4.2–5.8)
RBC # FLD: 17.5 % — HIGH (ref 10.3–14.5)
RBC BLD AUTO: ABNORMAL
RSV RESULT: SIGNIFICANT CHANGE UP
RSV RNA RESP QL NAA+PROBE: SIGNIFICANT CHANGE UP
SAO2 % BLDA: 94 % — LOW (ref 95–99)
SAO2 % BLDA: 94 % — LOW (ref 95–99)
SARS-COV-2 RNA SPEC QL NAA+PROBE: SIGNIFICANT CHANGE UP
SODIUM SERPL-SCNC: 136 MMOL/L — SIGNIFICANT CHANGE UP (ref 135–145)
TROPONIN T SERPL-MCNC: 0.03 NG/ML — SIGNIFICANT CHANGE UP (ref 0–0.06)
WBC # BLD: 7.18 K/UL — SIGNIFICANT CHANGE UP (ref 3.8–10.5)
WBC # FLD AUTO: 7.18 K/UL — SIGNIFICANT CHANGE UP (ref 3.8–10.5)

## 2020-12-14 PROCEDURE — 85025 COMPLETE CBC W/AUTO DIFF WBC: CPT

## 2020-12-14 PROCEDURE — U0003: CPT

## 2020-12-14 PROCEDURE — 97535 SELF CARE MNGMENT TRAINING: CPT

## 2020-12-14 PROCEDURE — 82306 VITAMIN D 25 HYDROXY: CPT

## 2020-12-14 PROCEDURE — 83935 ASSAY OF URINE OSMOLALITY: CPT

## 2020-12-14 PROCEDURE — 82436 ASSAY OF URINE CHLORIDE: CPT

## 2020-12-14 PROCEDURE — 83735 ASSAY OF MAGNESIUM: CPT

## 2020-12-14 PROCEDURE — 93971 EXTREMITY STUDY: CPT

## 2020-12-14 PROCEDURE — 84145 PROCALCITONIN (PCT): CPT

## 2020-12-14 PROCEDURE — 82947 ASSAY GLUCOSE BLOOD QUANT: CPT

## 2020-12-14 PROCEDURE — 82435 ASSAY OF BLOOD CHLORIDE: CPT

## 2020-12-14 PROCEDURE — P9045: CPT

## 2020-12-14 PROCEDURE — 85014 HEMATOCRIT: CPT

## 2020-12-14 PROCEDURE — 83036 HEMOGLOBIN GLYCOSYLATED A1C: CPT

## 2020-12-14 PROCEDURE — 86803 HEPATITIS C AB TEST: CPT

## 2020-12-14 PROCEDURE — 83970 ASSAY OF PARATHORMONE: CPT

## 2020-12-14 PROCEDURE — 84300 ASSAY OF URINE SODIUM: CPT

## 2020-12-14 PROCEDURE — 80053 COMPREHEN METABOLIC PANEL: CPT

## 2020-12-14 PROCEDURE — 36430 TRANSFUSION BLD/BLD COMPNT: CPT

## 2020-12-14 PROCEDURE — 94760 N-INVAS EAR/PLS OXIMETRY 1: CPT

## 2020-12-14 PROCEDURE — P9047: CPT

## 2020-12-14 PROCEDURE — 85730 THROMBOPLASTIN TIME PARTIAL: CPT

## 2020-12-14 PROCEDURE — 94640 AIRWAY INHALATION TREATMENT: CPT

## 2020-12-14 PROCEDURE — 81001 URINALYSIS AUTO W/SCOPE: CPT

## 2020-12-14 PROCEDURE — 93308 TTE F-UP OR LMTD: CPT

## 2020-12-14 PROCEDURE — 84132 ASSAY OF SERUM POTASSIUM: CPT

## 2020-12-14 PROCEDURE — P9016: CPT

## 2020-12-14 PROCEDURE — 84560 ASSAY OF URINE/URIC ACID: CPT

## 2020-12-14 PROCEDURE — 85018 HEMOGLOBIN: CPT

## 2020-12-14 PROCEDURE — 84100 ASSAY OF PHOSPHORUS: CPT

## 2020-12-14 PROCEDURE — 51702 INSERT TEMP BLADDER CATH: CPT

## 2020-12-14 PROCEDURE — 94003 VENT MGMT INPAT SUBQ DAY: CPT

## 2020-12-14 PROCEDURE — 97110 THERAPEUTIC EXERCISES: CPT

## 2020-12-14 PROCEDURE — 86769 SARS-COV-2 COVID-19 ANTIBODY: CPT

## 2020-12-14 PROCEDURE — 36415 COLL VENOUS BLD VENIPUNCTURE: CPT

## 2020-12-14 PROCEDURE — 93970 EXTREMITY STUDY: CPT

## 2020-12-14 PROCEDURE — 87635 SARS-COV-2 COVID-19 AMP PRB: CPT

## 2020-12-14 PROCEDURE — 82962 GLUCOSE BLOOD TEST: CPT

## 2020-12-14 PROCEDURE — 82570 ASSAY OF URINE CREATININE: CPT

## 2020-12-14 PROCEDURE — 97530 THERAPEUTIC ACTIVITIES: CPT

## 2020-12-14 PROCEDURE — 94660 CPAP INITIATION&MGMT: CPT

## 2020-12-14 PROCEDURE — C8929: CPT

## 2020-12-14 PROCEDURE — 99291 CRITICAL CARE FIRST HOUR: CPT | Mod: CS

## 2020-12-14 PROCEDURE — 97116 GAIT TRAINING THERAPY: CPT

## 2020-12-14 PROCEDURE — 80048 BASIC METABOLIC PNL TOTAL CA: CPT

## 2020-12-14 PROCEDURE — 93010 ELECTROCARDIOGRAM REPORT: CPT

## 2020-12-14 PROCEDURE — 73030 X-RAY EXAM OF SHOULDER: CPT

## 2020-12-14 PROCEDURE — 87631 RESP VIRUS 3-5 TARGETS: CPT

## 2020-12-14 PROCEDURE — 86870 RBC ANTIBODY IDENTIFICATION: CPT

## 2020-12-14 PROCEDURE — 88304 TISSUE EXAM BY PATHOLOGIST: CPT

## 2020-12-14 PROCEDURE — 99223 1ST HOSP IP/OBS HIGH 75: CPT

## 2020-12-14 PROCEDURE — 86850 RBC ANTIBODY SCREEN: CPT

## 2020-12-14 PROCEDURE — 36600 WITHDRAWAL OF ARTERIAL BLOOD: CPT

## 2020-12-14 PROCEDURE — 97163 PT EVAL HIGH COMPLEX 45 MIN: CPT

## 2020-12-14 PROCEDURE — 73620 X-RAY EXAM OF FOOT: CPT | Mod: 26,LT

## 2020-12-14 PROCEDURE — 78580 LUNG PERFUSION IMAGING: CPT

## 2020-12-14 PROCEDURE — 84295 ASSAY OF SERUM SODIUM: CPT

## 2020-12-14 PROCEDURE — 99285 EMERGENCY DEPT VISIT HI MDM: CPT | Mod: 25

## 2020-12-14 PROCEDURE — 73060 X-RAY EXAM OF HUMERUS: CPT

## 2020-12-14 PROCEDURE — 85027 COMPLETE CBC AUTOMATED: CPT

## 2020-12-14 PROCEDURE — 82803 BLOOD GASES ANY COMBINATION: CPT

## 2020-12-14 PROCEDURE — 86905 BLOOD TYPING RBC ANTIGENS: CPT

## 2020-12-14 PROCEDURE — 82550 ASSAY OF CK (CPK): CPT

## 2020-12-14 PROCEDURE — 94002 VENT MGMT INPAT INIT DAY: CPT

## 2020-12-14 PROCEDURE — A9540: CPT

## 2020-12-14 PROCEDURE — 82330 ASSAY OF CALCIUM: CPT

## 2020-12-14 PROCEDURE — 73590 X-RAY EXAM OF LOWER LEG: CPT

## 2020-12-14 PROCEDURE — 84133 ASSAY OF URINE POTASSIUM: CPT

## 2020-12-14 PROCEDURE — 86900 BLOOD TYPING SEROLOGIC ABO: CPT

## 2020-12-14 PROCEDURE — 73590 X-RAY EXAM OF LOWER LEG: CPT | Mod: 26,LT

## 2020-12-14 PROCEDURE — 99233 SBSQ HOSP IP/OBS HIGH 50: CPT

## 2020-12-14 PROCEDURE — 71045 X-RAY EXAM CHEST 1 VIEW: CPT

## 2020-12-14 PROCEDURE — 84484 ASSAY OF TROPONIN QUANT: CPT

## 2020-12-14 PROCEDURE — 86901 BLOOD TYPING SEROLOGIC RH(D): CPT

## 2020-12-14 PROCEDURE — 71045 X-RAY EXAM CHEST 1 VIEW: CPT | Mod: 26

## 2020-12-14 PROCEDURE — 86880 COOMBS TEST DIRECT: CPT

## 2020-12-14 PROCEDURE — 83605 ASSAY OF LACTIC ACID: CPT

## 2020-12-14 PROCEDURE — 0225U NFCT DS DNA&RNA 21 SARSCOV2: CPT

## 2020-12-14 PROCEDURE — 85610 PROTHROMBIN TIME: CPT

## 2020-12-14 PROCEDURE — 86922 COMPATIBILITY TEST ANTIGLOB: CPT

## 2020-12-14 PROCEDURE — 84540 ASSAY OF URINE/UREA-N: CPT

## 2020-12-14 PROCEDURE — 93005 ELECTROCARDIOGRAM TRACING: CPT

## 2020-12-14 PROCEDURE — 83880 ASSAY OF NATRIURETIC PEPTIDE: CPT

## 2020-12-14 PROCEDURE — 97167 OT EVAL HIGH COMPLEX 60 MIN: CPT

## 2020-12-14 PROCEDURE — 82310 ASSAY OF CALCIUM: CPT

## 2020-12-14 PROCEDURE — 93970 EXTREMITY STUDY: CPT | Mod: 26

## 2020-12-14 RX ORDER — SENNA PLUS 8.6 MG/1
2 TABLET ORAL AT BEDTIME
Refills: 0 | Status: DISCONTINUED | OUTPATIENT
Start: 2020-12-14 | End: 2020-12-27

## 2020-12-14 RX ORDER — ASPIRIN/CALCIUM CARB/MAGNESIUM 324 MG
81 TABLET ORAL DAILY
Refills: 0 | Status: DISCONTINUED | OUTPATIENT
Start: 2020-12-14 | End: 2020-12-27

## 2020-12-14 RX ORDER — CARVEDILOL PHOSPHATE 80 MG/1
3.12 CAPSULE, EXTENDED RELEASE ORAL EVERY 12 HOURS
Refills: 0 | Status: DISCONTINUED | OUTPATIENT
Start: 2020-12-14 | End: 2020-12-18

## 2020-12-14 RX ORDER — PREGABALIN 225 MG/1
1000 CAPSULE ORAL DAILY
Refills: 0 | Status: DISCONTINUED | OUTPATIENT
Start: 2020-12-14 | End: 2020-12-27

## 2020-12-14 RX ORDER — TEMAZEPAM 15 MG/1
30 CAPSULE ORAL AT BEDTIME
Refills: 0 | Status: DISCONTINUED | OUTPATIENT
Start: 2020-12-14 | End: 2020-12-21

## 2020-12-14 RX ORDER — APIXABAN 2.5 MG/1
2.5 TABLET, FILM COATED ORAL EVERY 12 HOURS
Refills: 0 | Status: DISCONTINUED | OUTPATIENT
Start: 2020-12-14 | End: 2020-12-27

## 2020-12-14 RX ORDER — PIPERACILLIN AND TAZOBACTAM 4; .5 G/20ML; G/20ML
3.38 INJECTION, POWDER, LYOPHILIZED, FOR SOLUTION INTRAVENOUS ONCE
Refills: 0 | Status: COMPLETED | OUTPATIENT
Start: 2020-12-14 | End: 2020-12-14

## 2020-12-14 RX ORDER — FUROSEMIDE 40 MG
40 TABLET ORAL ONCE
Refills: 0 | Status: COMPLETED | OUTPATIENT
Start: 2020-12-14 | End: 2020-12-14

## 2020-12-14 RX ORDER — ALLOPURINOL 300 MG
100 TABLET ORAL DAILY
Refills: 0 | Status: DISCONTINUED | OUTPATIENT
Start: 2020-12-14 | End: 2020-12-27

## 2020-12-14 RX ORDER — IPRATROPIUM/ALBUTEROL SULFATE 18-103MCG
3 AEROSOL WITH ADAPTER (GRAM) INHALATION ONCE
Refills: 0 | Status: DISCONTINUED | OUTPATIENT
Start: 2020-12-14 | End: 2020-12-27

## 2020-12-14 RX ORDER — AZITHROMYCIN 500 MG/1
500 TABLET, FILM COATED ORAL EVERY 24 HOURS
Refills: 0 | Status: DISCONTINUED | OUTPATIENT
Start: 2020-12-14 | End: 2020-12-15

## 2020-12-14 RX ORDER — FOLIC ACID 0.8 MG
1 TABLET ORAL DAILY
Refills: 0 | Status: DISCONTINUED | OUTPATIENT
Start: 2020-12-14 | End: 2020-12-27

## 2020-12-14 RX ORDER — TAMSULOSIN HYDROCHLORIDE 0.4 MG/1
0.4 CAPSULE ORAL AT BEDTIME
Refills: 0 | Status: DISCONTINUED | OUTPATIENT
Start: 2020-12-14 | End: 2020-12-27

## 2020-12-14 RX ORDER — FERROUS SULFATE 325(65) MG
325 TABLET ORAL DAILY
Refills: 0 | Status: DISCONTINUED | OUTPATIENT
Start: 2020-12-14 | End: 2020-12-27

## 2020-12-14 RX ORDER — ACETAMINOPHEN 500 MG
650 TABLET ORAL EVERY 6 HOURS
Refills: 0 | Status: DISCONTINUED | OUTPATIENT
Start: 2020-12-14 | End: 2020-12-27

## 2020-12-14 RX ORDER — ALLOPURINOL 300 MG
1 TABLET ORAL
Qty: 0 | Refills: 0 | DISCHARGE

## 2020-12-14 RX ORDER — PANTOPRAZOLE SODIUM 20 MG/1
40 TABLET, DELAYED RELEASE ORAL
Refills: 0 | Status: DISCONTINUED | OUTPATIENT
Start: 2020-12-14 | End: 2020-12-27

## 2020-12-14 RX ORDER — FLUTICASONE PROPIONATE AND SALMETEROL 50; 250 UG/1; UG/1
1 POWDER ORAL; RESPIRATORY (INHALATION)
Qty: 0 | Refills: 0 | DISCHARGE

## 2020-12-14 RX ORDER — ATORVASTATIN CALCIUM 80 MG/1
20 TABLET, FILM COATED ORAL AT BEDTIME
Refills: 0 | Status: DISCONTINUED | OUTPATIENT
Start: 2020-12-14 | End: 2020-12-27

## 2020-12-14 RX ORDER — ASPIRIN/CALCIUM CARB/MAGNESIUM 324 MG
1 TABLET ORAL
Qty: 0 | Refills: 0 | DISCHARGE

## 2020-12-14 RX ORDER — THIAMINE MONONITRATE (VIT B1) 100 MG
100 TABLET ORAL DAILY
Refills: 0 | Status: DISCONTINUED | OUTPATIENT
Start: 2020-12-14 | End: 2020-12-27

## 2020-12-14 RX ORDER — FUROSEMIDE 40 MG
40 TABLET ORAL DAILY
Refills: 0 | Status: DISCONTINUED | OUTPATIENT
Start: 2020-12-14 | End: 2020-12-15

## 2020-12-14 RX ADMIN — Medication 40 MILLIGRAM(S): at 23:22

## 2020-12-14 RX ADMIN — Medication 125 MILLIGRAM(S): at 17:34

## 2020-12-14 RX ADMIN — PIPERACILLIN AND TAZOBACTAM 200 GRAM(S): 4; .5 INJECTION, POWDER, LYOPHILIZED, FOR SOLUTION INTRAVENOUS at 17:34

## 2020-12-14 RX ADMIN — AZITHROMYCIN 255 MILLIGRAM(S): 500 TABLET, FILM COATED ORAL at 23:22

## 2020-12-14 RX ADMIN — Medication 40 MILLIGRAM(S): at 12:25

## 2020-12-14 NOTE — H&P ADULT - NSHPLABSRESULTS_GEN_ALL_CORE
9.6    7.18  )-----------( 218      ( 14 Dec 2020 11:06 )             32.8       12-14    136  |  96<L>  |  65.0<H>  ----------------------------<  145<H>  4.2   |  30.0<H>  |  2.79<H>    Ca    8.4<L>      14 Dec 2020 11:06  Mg     2.4     12-14    TPro  5.7<L>  /  Alb  3.4  /  TBili  0.5  /  DBili  x   /  AST  15  /  ALT  9   /  AlkPhos  203<H>  12-14      Xray L-tib/fib and L-foot: no evidence of rubén destruction

## 2020-12-14 NOTE — ED PROVIDER NOTE - ATTENDING CONTRIBUTION TO CARE
70yoM; with pmh of afib, pulm htn, chronic om, ckd, copd on home 02, cabg, recent admission in Nov for L LE hematoma s/p evacuation, GERALD, acute resp failure (s/p intubation and downgraded to 2.5L home O2, s/p pigtail for R pleural effusion); now p/w increasing generalized malaise and sob. patient reports in the past 2 weeks increased sob, hartman, orthopnea and is sleeping on recliner 2/2 orthopnea.  wife reports increasing sleepiness and malaise. denies cp/palp. denies cough. denies f/c/s. report having left LE wound cared for by the home visiting nurse and he states the wound is not getting worse.  General:   mild resp distress, drowsy  Head:     NC/AT, EOMI, oral mucosa moist  Neck:     trachea midline  Lungs:    bibasilsar crackles, R>L, no tachypnea  CVS:     S1S2, RRR, no m/g/r  Abd:     +BS, s/nt/nd, no organomegaly  Ext:    2+ radial and pedal pulses, bilateral trace pedal edema  Neuro: AAOx3, no sensory/motor deficits  A/P: 70yoM; wiht pmh signif for Afib, pHTN, CKD, CoPD on Home O2, CAD s/p CABG; now p/w increasing malaise and sob  -labs, xray, ekg, cardiac monitor, abg 70yoM; with pmh of afib, pulm htn, chronic om, ckd, copd on home 02, cabg, recent admission in Nov for L LE hematoma s/p evacuation, GERALD, acute resp failure (s/p intubation and downgraded to 2.5L home O2, s/p pigtail for R pleural effusion); now p/w increasing generalized malaise and sob. patient reports in the past 2 weeks increased sob, hartman, orthopnea and is sleeping on recliner 2/2 orthopnea.  wife reports increasing sleepiness and malaise. denies cp/palp. denies cough. denies f/c/s. report having left LE wound cared for by the home visiting nurse and he states the wound is not getting worse.  General:   mild resp distress, drowsy  Head:     NC/AT, EOMI, oral mucosa moist  Neck:     trachea midline  Lungs:    bibasilsar crackles, R>L, no tachypnea  CVS:     S1S2, RRR, no m/g/r  Abd:     +BS, s/nt/nd, no organomegaly  Ext:    2+ radial and pedal pulses, bilateral trace pedal edema.  L LE with well healing ulceration 8x4cm over anterior tibial area and heel.   Neuro: AAOx3, no sensory/motor deficits  A/P: 70yoM; wiht pmh signif for Afib, pHTN, CKD, CoPD on Home O2, CAD s/p CABG; now p/w increasing malaise and sob  -labs, xray, ekg, cardiac monitor, abg

## 2020-12-14 NOTE — ED ADULT NURSE NOTE - OBJECTIVE STATEMENT
Patient BIBA to ED today from home with c/o SOB, weakness, and retaining water.  Patient reports no chest pain, dizziness, headache, n/v, fever, numbness or tingling.

## 2020-12-14 NOTE — ED ADULT NURSE REASSESSMENT NOTE - NS ED NURSE REASSESS COMMENT FT1
received pt from previous RN. pt aao x4. respirations even and unlabored .tolerating bipap 12/6/30%. denies chest pain, sob. no distress noted. positioned in bed. will continue to monitor.

## 2020-12-14 NOTE — ED PROVIDER NOTE - CLINICAL SUMMARY MEDICAL DECISION MAKING FREE TEXT BOX
patient arrived with progressively worsening sob, found to have bilateral pleural effusions and elevated bnp. found to be hypercarbic on abg, placed on BiPAP for CO2 retention, will admit for further treatment.

## 2020-12-14 NOTE — ED PROVIDER NOTE - OBJECTIVE STATEMENT
69 yo M with hx of CHF (follows with Dr. Zia Storm), COPD, Bypass sx 2013 (on eliquis), and bladder CA (s/p TUR 2013) presenting to the ED for fatigue and difficulty breathing for the last 2 months after a fall. Patient states that he has had SOB since a fall Oct 9, 2020 after which he sustained a LLE hematoma. Patient was d/c on home O2 normally at 2.5L. Patient states that he has increasing O2 needs when engaging in any activity, raising his O2 to 5L NS when he is at physical therapy. Patient states that he has also been unintentionally gaining weight the last month - of note patient's furosimide regimen has been recently changed to torsemide by his cardiologist. Patient is concerned because his SOB is not bettering with time and he is increasingly fatigued. Patient denies any recent travel, sick contacts, fevers, cough, CP, abdominal pain, changes in urination, changes in bowel movements. 69 yo M with hx of CHF (follows with Dr. Zia Storm), COPD, Bypass sx 2013 (on eliquis), and bladder CA (s/p TUR 2013) presenting to the ED for fatigue and difficulty breathing for the last 2 months after a fall. PCP is Dr. Hakan Ramos. Patient states that he has had SOB since a fall Oct 9, 2020 after which he sustained a LLE hematoma. Patient was d/c on home O2 normally at 2.5L. Patient states that he has increasing O2 needs when engaging in any activity, raising his O2 to 5L NS when he is at physical therapy. Patient states that he has also been unintentionally gaining weight the last month - of note patient's furosimide regimen has been recently changed to torsemide by his cardiologist. Patient is concerned because his SOB is not bettering with time and he is increasingly fatigued. Patient denies any recent travel, sick contacts, fevers, cough, CP, abdominal pain, changes in urination, changes in bowel movements. 69 yo M with hx of Afib (on eliquis), CHF (follows with Dr. Zia Storm), COPD, Bypass sx 2013, and bladder CA (s/p TUR 2013) presenting to the ED for fatigue and difficulty breathing for the last 2 months after a fall. PCP is Dr. Hakan Ramos. Patient states that he has had SOB since a fall Oct 9, 2020 after which he sustained a LLE hematoma. Patient was d/c on home O2 normally at 2.5L. Patient states that he has increasing O2 needs when engaging in any activity, raising his O2 to 5L NS when he is at physical therapy. Patient states that he has also been unintentionally gaining weight the last month - of note patient's furosimide regimen has been recently changed to torsemide by his cardiologist. Patient is concerned because his SOB is not bettering with time and he is increasingly fatigued. Patient denies any recent travel, sick contacts, fevers, cough, CP, abdominal pain, changes in urination, changes in bowel movements.

## 2020-12-14 NOTE — ED PROVIDER NOTE - CARE PLAN
Principal Discharge DX:	Acute respiratory failure with hypercapnia  Secondary Diagnosis:	Acute on chronic congestive heart failure, unspecified heart failure type

## 2020-12-14 NOTE — H&P ADULT - NSICDXPASTMEDICALHX_GEN_ALL_CORE_FT
PAST MEDICAL HISTORY:  Atrial fibrillation     Atrial fibrillation     Bladder cancer     CHF (congestive heart failure)     Chronic osteomyelitis, osteomyelitis of unspecified site     CKD (chronic kidney disease)     COPD (chronic obstructive pulmonary disease)     COPD, severity to be determined     Coronary artery disease involving coronary bypass graft of native heart without angina pectoris     HLD (hyperlipidemia)     Ulcer of left lower extremity, unspecified ulcer stage Chronic LE ulceration

## 2020-12-14 NOTE — ED PROVIDER NOTE - NSALCOHOLFREQ_GEN_A_CORE_SD
GI Consultation Note Pedro Machidi)    NAME: Renzo Martel : 1999 MRN: [id number]   ATTG: Dr. Hayley El PCP: Brittanie Terrazas [facility] [address]     Ashish Ray MD at [facility]  [facility] [address] (1/2/2019)    24 yo M with stricturing, fistulizing Manish Camara MD 2-3 times/wk

## 2020-12-14 NOTE — CONSULT NOTE ADULT - ASSESSMENT
1: Acute on chronic hypoxic and hypercapnic respiratory failure   2: Acute on chronic diastolic heart failure with underlying Anasarca   3: Severe Pulmonary HTN with RV failure   4: AECOPD  5: Afib on AC, CAD with CABG, bladder Cancer s/p TURP, recent left foot hematoma     Patient seen and examined   Recommend:     - Admission to SDU/ tele with   - Albumin 25% 100 cc q6 to q8 followed by 20 -40 mg of Iv Lasix for ~48 hours with strict I&Os and closer electrolyte monitoring with replacement as needed   - BiPAP at 14/6 at 30% with leak of < 10 and minute ventilation of 10-15 lpm with RR of 20; so would repeat ABG and if pH > 7.35, would give him few hours of break and c/w PRN and HS of NIV as these setting   - IV steroids, Would recommend empiric zosyn as he has had E. coli in sputum as colonizer recently, Duoneb ATC with PRN Albuterol neb once COVID-19 PCR is ruled out   - Sputum Cx, legionella and strep pneumo urinary Ag and PCT with RVP and COVID 19 PCR   - Pulmonary, Cardio and nephrology eval     We will sign off; Please call us with status change/ questions or concerns

## 2020-12-14 NOTE — ED PROVIDER NOTE - PMH
Atrial fibrillation    Atrial fibrillation    Bladder cancer    CHF (congestive heart failure)    Chronic osteomyelitis, osteomyelitis of unspecified site    CKD (chronic kidney disease)    COPD (chronic obstructive pulmonary disease)    COPD, severity to be determined    Coronary artery disease involving coronary bypass graft of native heart without angina pectoris    HLD (hyperlipidemia)    Ulcer of left lower extremity, unspecified ulcer stage  Chronic LE ulceration

## 2020-12-14 NOTE — H&P ADULT - HISTORY OF PRESENT ILLNESS
71 y/o M w/ a PMH of AFib (on eliquis), HFpEF (follows with Dr. Zia Storm; last known EF 60-65% on 11/17), COPD (on 2L home O2), CABG 2013, and bladder CA (s/p TURP 2013) presented to [facility]  w/ AMS x1 day w/ associated lethargy.  Pt also reports SOB x2 months that has acutely worsened the past few days.  Pt also reports fatigue, weight gain, inability to sleep flat.  Pt reports increased supplemental O2 requirement over the past few days to 5L.   He denies nocturnal cough, fevers, chills, myalgias, sick contacts, recent travel, cp, palpitations, abd pain, N/V, diarrhea.

## 2020-12-14 NOTE — ED PROVIDER NOTE - PHYSICAL EXAMINATION
General: Patient in bed on 2L O2, speaking softly   HEENT: pale conjunctiva  Cardiac: irregular rhythm, no rubs/gallops   Pulm: decreased breath sounds b/l, no wheezing, no rhonchi  GI: nontender abdomen, large abdominal girth, no fluid wave appreciated, umbilical hernia noted on exam, no bruising, unable to appreciate any organomegaly   Extremities: large LLE ulcerations spanning the entire shin no notable weeping or redness of the site, notable bruising on the pt's L hand, no other bruising noted, LRE +2 pitting edema and redness, no pain on palpation.

## 2020-12-14 NOTE — H&P ADULT - NSHPPHYSICALEXAM_GEN_ALL_CORE
GENERAL: pt examined bedside, laying comfortably in bed in NAD wearing BIPAP  HEENT: NC/AT, dry oral mucosa, clear conjunctiva, sclera nonicteric, EOMI  RESPIRATORY: Normal respiratory effort, CTA b/l, no wheezing, rhonchi, rales  CARDIOVASCULAR: RRR, normal S1 and S2, no murmur/rub/gallop  ABDOMEN: soft, NT/ND, normoactive bowel sounds, no rebound/guarding  EXTREMITIES: No cynaosis, no clubbing, 2+ b/l pretibial/pedal edema, peripheral pulses are 1+ bilaterally  PSYCH: affect appropriate and cooperative  NEUROLOGY: A+O to person, place, and time, no focal neurologic deficits appreciated   SKIN: b/l stasis dermatitis like skin changes, RLE warm to touch/erythema, LLE chronic wound/ulcer without drainage

## 2020-12-14 NOTE — H&P ADULT - NSHPREVIEWOFSYSTEMS_GEN_ALL_CORE
CONSTITUTIONAL: no fevers, chills, night sweats, weight changes  HEENT: no vision changes or diplopia, no tinnitus, no sore throat  RESPIRATORY: denies dyspnea, +sob, no nocturnal cough, sputum or hemoptysis  CARDIOVASCULAR: no CP, palpitations or lower extremity edema  GI: no dysphagia, nausea, abd pain, constipation, diarrhea, stool change or blood in stool  : no dysuria or hematuria, no flank pain, no incontinence or urinary retention  MSK: no joint pain or swelling  INTEGUMENTARY: no rashes or lesions  NEUROLOGICAL: no HA, confusion, syncope, numbness, weakness, tremors or ataxia  PSYCH: denies depression  ENDOCRINE: no polyuria, polydipsia, no temp intolerance, no tremors, no changes in skin, hair or nails  HEMATOLOGIC/LYMPHATIC: no lymph node enlargement, abnormal bruising or bleeding CONSTITUTIONAL: no fevers, chills, night sweats, +weight gain  HEENT: no vision changes or diplopia, no tinnitus, no sore throat  RESPIRATORY: denies dyspnea, +sob, no nocturnal cough, sputum or hemoptysis  CARDIOVASCULAR: no CP, palpitations, + lower extremity edema  GI: no dysphagia, nausea, abd pain, constipation, diarrhea, stool change or blood in stool  : no dysuria or hematuria, no flank pain, no incontinence or urinary retention  MSK: no joint pain or swelling  INTEGUMENTARY: no rashes or lesions  NEUROLOGICAL: no HA, confusion, syncope, numbness, weakness, tremors or ataxia  PSYCH: denies depression  ENDOCRINE: no polyuria, polydipsia, no temp intolerance, no tremors, no changes in skin, hair or nails  HEMATOLOGIC/LYMPHATIC: no lymph node enlargement, abnormal bruising or bleeding

## 2020-12-14 NOTE — ED ADULT NURSE NOTE - ED STAT RN HANDOFF DETAILS
report given to CDU SIVAN Rajput. pt is resting comfortably. aao x4. respirations even and unlabored.

## 2020-12-14 NOTE — ED ADULT TRIAGE NOTE - CHIEF COMPLAINT QUOTE
pt a+ox3, BIBA c/o worsening lethargy x 2 weeks. pt reports recent surgery to left leg and since has notices he is "filling up with water". denies any pain or discomfort.

## 2020-12-14 NOTE — ED PROVIDER NOTE - PROGRESS NOTE DETAILS
called for admission. dr. diez requesting repeat abg called for admission, dr. barragan requesting icu evaluation ICU recommends steroids, abx to cover pna given prior e coli in sputum, and albumin between diuresis.  Dr. Shore called for admission, states she will call back.

## 2020-12-14 NOTE — H&P ADULT - ASSESSMENT
69 y/o M w/ a PMH of AFib (on eliquis), HFpEF (follows with Dr. Zia Storm; last known EF 60-65% on 11/17), COPD (on 2L home O2), CABG 2013, and bladder CA (s/p TURP 2013) presented to [facility]  w/ AMS x1 day w/ associated lethargy.  Pt also reports SOB x2 months that has acutely worsened the past few days.  Pt also reports fatigue, weight gain, inability to sleep flat.  Pt was noted to have evidence of fluid overload on exam, pBNP was 11K and CXR showed small pleural effusions consistent w/ CHF exacerbation  ABG also showed pt had hypoxemia and hypercapnea.  Pt was placed on BIPAP and mentation improved.  Pt will be admitted to tele/pulse ox for acute hypoxic hypercapnic respiratory failure.        Acute hypoxemic hypercapnic respiratory failure   - Likely multifactorial 2/2 CHF exacerbation and COPD exacerbation  -       Acute decompensated HFpEF    - Follows with Dr. Zia Storm  - Last known EF 60-65% on 11/17      Acute metabolic encephalopathy likely 2/2 CO2 narcosis   - pCO2 improved w/ BIPAP  - Mentation back to baseline       Acute COPD exacerbation  -      AFib, rate controlled  - c/w Eliquis      LLE chronic wound   - Will consult surgery for wound care recs   - Xray L-tib/fib and L-foot: no evidence of rubén destruction      DVT ppx: on Eliquis    PCP is Dr. Hakan Ramos 69 y/o M w/ a PMH of AFib (on eliquis), HFpEF (follows with Dr. Zia Storm; last known EF 60-65% on 11/17), COPD (on 2L home O2), CABG 2013, and bladder CA (s/p TURP 2013) presented to [facility]  w/ AMS x1 day w/ associated lethargy.  Pt also reports SOB x2 months that has acutely worsened the past few days.  Pt also reports fatigue, weight gain, inability to sleep flat.  Pt was noted to have evidence of fluid overload on exam, pBNP was 11K and CXR showed small pleural effusions consistent w/ CHF exacerbation  ABG also showed pt had hypoxemia and hypercapnea.  Pt was placed on BIPAP and mentation improved.  Pt will be admitted to tele/pulse ox for acute hypoxic hypercapnic respiratory failure.        Acute hypoxemic hypercapnic respiratory failure   - Likely multifactorial 2/2 CHF exacerbation and COPD exacerbation  - Monitor on  and c/w Supplemental O2 to maintain O2 sats 88-92%  - BIPAP on standby as needed and nocturnal  - ABG reviewed   - CXR and elevated pBNP consistent w/ fluid overload   - c/w IV diuresis   - c/w Solumedrol and duonebs  - Head of bed elevation to 30 degrees      Acute decompensated HFpEF    - Follows with Dr. Zia Storm  - Last known EF 60-65% on 11/17  - IV lasix for diuresis.  Monitor lytes and renal function.  - Goal K~4 and Mg~2  - Daily weights, strict I/O's and fluid restrict  - f/u cardio recs      Acute metabolic encephalopathy likely 2/2 CO2 narcosis   - pCO2 improved w/ BIPAP  - Mentation back to baseline       Acute COPD exacerbation  -      AFib, rate controlled  - c/w Eliquis      LLE chronic wound   - Will consult surgery for wound care recs   - Xray L-tib/fib and L-foot: no evidence of rubén destruction      DVT ppx: on Eliquis    PCP is Dr. Hakan Ramos 71 y/o M w/ a PMH of AFib (on eliquis), HFpEF (follows with Dr. Zia Storm; last known EF 60-65% on 11/17), COPD (on 2L home O2), CABG 2013, and bladder CA (s/p TURP 2013) presented to [facility]  w/ AMS x1 day w/ associated lethargy.  Pt also reports SOB x2 months that has acutely worsened the past few days.  Pt also reports fatigue, weight gain, inability to sleep flat.  Pt was noted to have evidence of fluid overload on exam, pBNP was 11K and CXR showed small pleural effusions consistent w/ CHF exacerbation  ABG also showed pt had hypoxemia and hypercapnea.  Pt was placed on BIPAP and mentation improved.  Pt will be admitted to tele/pulse ox for acute hypoxic hypercapnic respiratory failure.        Acute hypoxemic hypercapnic respiratory failure   - Likely multifactorial 2/2 CHF exacerbation and COPD exacerbation  - Monitor on  and c/w Supplemental O2 to maintain O2 sats 88-92%  - BIPAP on standby as needed and nocturnal  - ABG reviewed   - CXR and elevated pBNP consistent w/ fluid overload   - c/w IV diuresis   - c/w Solumedrol and duonebs  - Head of bed elevation to 30 degrees      Acute decompensated HFpEF    - Follows with Dr. Zia Storm  - Last known EF 60-65% on 11/17  - CXR shows b/l pleural effusions and pBNP 11,000 on admission  - Monitor on telemetry  - IV lasix for diuresis.  Monitor lytes and renal function.  - Goal K~4 and Mg~2  - Daily weights, strict I/O's and fluid restrict  - f/u cardio recs      Acute metabolic encephalopathy likely 2/2 CO2 narcosis   - pCO2 improved w/ BIPAP  - Mentation back to baseline       Acute COPD exacerbation  - Titrate supplemental O2 to maintain O2 sat 88-92%   - IV solumedrol and taper as tolerated  - Zithromax for antiinflammatory purposes  - c/w duonebs prn/standing      AFib, rate controlled  - c/w Eliquis      LLE chronic wound   - Will consult surgery for wound care recs   - Xray L-tib/fib and L-foot: no evidence of rubén destruction      DVT ppx: on Eliquis    PCP is Dr. Hakan Ramos 69 y/o M w/ a PMH of AFib (on eliquis), HFpEF (follows with Dr. Zia Storm; last known EF 60-65% on 11/17), COPD (on 2L home O2), CABG 2013, and bladder CA (s/p TURP 2013) presented to [facility]  w/ AMS x1 day w/ associated lethargy.  Pt also reports SOB x2 months that has acutely worsened the past few days.  Pt also reports fatigue, weight gain, inability to sleep flat.  Pt was noted to have evidence of fluid overload on exam, pBNP was 11K and CXR showed small pleural effusions consistent w/ CHF exacerbation  ABG also showed pt had hypoxemia and hypercapnea.  Pt was placed on BIPAP and mentation improved.  Pt will be admitted to tele/pulse ox for acute hypoxic hypercapnic respiratory failure.        Acute hypoxemic hypercapnic respiratory failure   - Likely multifactorial 2/2 CHF exacerbation and COPD exacerbation  - Monitor on  and c/w Supplemental O2 to maintain O2 sats 88-92%  - BIPAP on standby as needed and nocturnal  - ABG reviewed   - CXR and elevated pBNP consistent w/ fluid overload   - c/w IV diuresis   - c/w Solumedrol and duonebs  - Head of bed elevation to 30 degrees      Acute decompensated HFpEF    - Follows with Dr. Zia Storm  - Last known EF 60-65% on 11/17  - CXR shows b/l pleural effusions and pBNP 11,000 on admission  - Monitor on telemetry  - Hold home torsemide and place on IV lasix for more aggressive diuresis  - In light of active diuresis monitor lytes and renal function.  Goal K~4 and Mg~2  - Daily weights, strict I/O's and fluid restrict  - f/u cardio recs      Acute metabolic encephalopathy likely 2/2 CO2 narcosis   - pCO2 improved w/ BIPAP  - Mentation back to baseline       Acute COPD exacerbation  - Titrate supplemental O2 to maintain O2 sat 88-92%   - IV solumedrol and taper as tolerated  - Zithromax for antiinflammatory purposes  - c/w duonebs prn/standing      AFib, rate controlled  - c/w Eliquis 2.5mg q12h  - Monitor on telemetry      LLE chronic wound   - Will consult surgery for wound care recs   - Xray L-tib/fib and L-foot: no evidence of rubén destruction  - Doppler US b/l LE to r/o DVT      GERALD on 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  Anticipate possible d/c home w/ home care in 3-4 days.    PCP is Dr. Hakan Ramos

## 2020-12-15 ENCOUNTER — TRANSCRIPTION ENCOUNTER (OUTPATIENT)
Age: 70
End: 2020-12-15

## 2020-12-15 DIAGNOSIS — J90 PLEURAL EFFUSION, NOT ELSEWHERE CLASSIFIED: ICD-10-CM

## 2020-12-15 DIAGNOSIS — J96.22 ACUTE AND CHRONIC RESPIRATORY FAILURE WITH HYPERCAPNIA: ICD-10-CM

## 2020-12-15 DIAGNOSIS — J44.9 CHRONIC OBSTRUCTIVE PULMONARY DISEASE, UNSPECIFIED: ICD-10-CM

## 2020-12-15 DIAGNOSIS — J96.11 CHRONIC RESPIRATORY FAILURE WITH HYPOXIA: ICD-10-CM

## 2020-12-15 LAB
ANION GAP SERPL CALC-SCNC: 10 MMOL/L — SIGNIFICANT CHANGE UP (ref 5–17)
ANION GAP SERPL CALC-SCNC: 11 MMOL/L — SIGNIFICANT CHANGE UP (ref 5–17)
BASOPHILS # BLD AUTO: 0 K/UL — SIGNIFICANT CHANGE UP (ref 0–0.2)
BASOPHILS # BLD AUTO: 0 K/UL — SIGNIFICANT CHANGE UP (ref 0–0.2)
BASOPHILS NFR BLD AUTO: 0 % — SIGNIFICANT CHANGE UP (ref 0–2)
BASOPHILS NFR BLD AUTO: 0 % — SIGNIFICANT CHANGE UP (ref 0–2)
BUN SERPL-MCNC: 69 MG/DL — HIGH (ref 8–20)
BUN SERPL-MCNC: 71 MG/DL — HIGH (ref 8–20)
CALCIUM SERPL-MCNC: 8.3 MG/DL — LOW (ref 8.6–10.2)
CALCIUM SERPL-MCNC: 8.4 MG/DL — LOW (ref 8.6–10.2)
CHLORIDE SERPL-SCNC: 96 MMOL/L — LOW (ref 98–107)
CHLORIDE SERPL-SCNC: 97 MMOL/L — LOW (ref 98–107)
CO2 SERPL-SCNC: 29 MMOL/L — SIGNIFICANT CHANGE UP (ref 22–29)
CO2 SERPL-SCNC: 30 MMOL/L — HIGH (ref 22–29)
CREAT SERPL-MCNC: 2.52 MG/DL — HIGH (ref 0.5–1.3)
CREAT SERPL-MCNC: 2.88 MG/DL — HIGH (ref 0.5–1.3)
EOSINOPHIL # BLD AUTO: 0 K/UL — SIGNIFICANT CHANGE UP (ref 0–0.5)
EOSINOPHIL # BLD AUTO: 0 K/UL — SIGNIFICANT CHANGE UP (ref 0–0.5)
EOSINOPHIL NFR BLD AUTO: 0 % — SIGNIFICANT CHANGE UP (ref 0–6)
EOSINOPHIL NFR BLD AUTO: 0 % — SIGNIFICANT CHANGE UP (ref 0–6)
GAS PNL BLDA: SIGNIFICANT CHANGE UP
GLUCOSE BLDC GLUCOMTR-MCNC: 235 MG/DL — HIGH (ref 70–99)
GLUCOSE BLDC GLUCOMTR-MCNC: 264 MG/DL — HIGH (ref 70–99)
GLUCOSE SERPL-MCNC: 228 MG/DL — HIGH (ref 70–99)
GLUCOSE SERPL-MCNC: 258 MG/DL — HIGH (ref 70–99)
HCT VFR BLD CALC: 29.1 % — LOW (ref 39–50)
HCT VFR BLD CALC: 29.6 % — LOW (ref 39–50)
HGB BLD-MCNC: 8.6 G/DL — LOW (ref 13–17)
HGB BLD-MCNC: 9 G/DL — LOW (ref 13–17)
IMM GRANULOCYTES NFR BLD AUTO: 0.5 % — SIGNIFICANT CHANGE UP (ref 0–1.5)
IMM GRANULOCYTES NFR BLD AUTO: 0.7 % — SIGNIFICANT CHANGE UP (ref 0–1.5)
LYMPHOCYTES # BLD AUTO: 0.4 K/UL — LOW (ref 1–3.3)
LYMPHOCYTES # BLD AUTO: 0.46 K/UL — LOW (ref 1–3.3)
LYMPHOCYTES # BLD AUTO: 10 % — LOW (ref 13–44)
LYMPHOCYTES # BLD AUTO: 7.9 % — LOW (ref 13–44)
MAGNESIUM SERPL-MCNC: 2.3 MG/DL — SIGNIFICANT CHANGE UP (ref 1.6–2.6)
MCHC RBC-ENTMCNC: 29.6 GM/DL — LOW (ref 32–36)
MCHC RBC-ENTMCNC: 30.4 GM/DL — LOW (ref 32–36)
MCHC RBC-ENTMCNC: 32 PG — SIGNIFICANT CHANGE UP (ref 27–34)
MCHC RBC-ENTMCNC: 32.8 PG — SIGNIFICANT CHANGE UP (ref 27–34)
MCV RBC AUTO: 108 FL — HIGH (ref 80–100)
MCV RBC AUTO: 108.2 FL — HIGH (ref 80–100)
MONOCYTES # BLD AUTO: 0.02 K/UL — SIGNIFICANT CHANGE UP (ref 0–0.9)
MONOCYTES # BLD AUTO: 0.12 K/UL — SIGNIFICANT CHANGE UP (ref 0–0.9)
MONOCYTES NFR BLD AUTO: 0.5 % — LOW (ref 2–14)
MONOCYTES NFR BLD AUTO: 2.1 % — SIGNIFICANT CHANGE UP (ref 2–14)
NEUTROPHILS # BLD AUTO: 3.56 K/UL — SIGNIFICANT CHANGE UP (ref 1.8–7.4)
NEUTROPHILS # BLD AUTO: 5.2 K/UL — SIGNIFICANT CHANGE UP (ref 1.8–7.4)
NEUTROPHILS NFR BLD AUTO: 88.8 % — HIGH (ref 43–77)
NEUTROPHILS NFR BLD AUTO: 89.5 % — HIGH (ref 43–77)
PLATELET # BLD AUTO: 199 K/UL — SIGNIFICANT CHANGE UP (ref 150–400)
PLATELET # BLD AUTO: 199 K/UL — SIGNIFICANT CHANGE UP (ref 150–400)
PLATELET # BLD AUTO: 211 K/UL — SIGNIFICANT CHANGE UP (ref 150–400)
POTASSIUM SERPL-MCNC: 5.1 MMOL/L — SIGNIFICANT CHANGE UP (ref 3.5–5.3)
POTASSIUM SERPL-MCNC: 5.1 MMOL/L — SIGNIFICANT CHANGE UP (ref 3.5–5.3)
POTASSIUM SERPL-SCNC: 5.1 MMOL/L — SIGNIFICANT CHANGE UP (ref 3.5–5.3)
POTASSIUM SERPL-SCNC: 5.1 MMOL/L — SIGNIFICANT CHANGE UP (ref 3.5–5.3)
RBC # BLD: 2.69 M/UL — LOW (ref 4.2–5.8)
RBC # BLD: 2.74 M/UL — LOW (ref 4.2–5.8)
RBC # FLD: 17.5 % — HIGH (ref 10.3–14.5)
RBC # FLD: 17.6 % — HIGH (ref 10.3–14.5)
SODIUM SERPL-SCNC: 135 MMOL/L — SIGNIFICANT CHANGE UP (ref 135–145)
SODIUM SERPL-SCNC: 137 MMOL/L — SIGNIFICANT CHANGE UP (ref 135–145)
WBC # BLD: 4.01 K/UL — SIGNIFICANT CHANGE UP (ref 3.8–10.5)
WBC # BLD: 5.81 K/UL — SIGNIFICANT CHANGE UP (ref 3.8–10.5)
WBC # FLD AUTO: 4.01 K/UL — SIGNIFICANT CHANGE UP (ref 3.8–10.5)
WBC # FLD AUTO: 5.81 K/UL — SIGNIFICANT CHANGE UP (ref 3.8–10.5)

## 2020-12-15 PROCEDURE — 99222 1ST HOSP IP/OBS MODERATE 55: CPT

## 2020-12-15 PROCEDURE — 99233 SBSQ HOSP IP/OBS HIGH 50: CPT

## 2020-12-15 PROCEDURE — 99223 1ST HOSP IP/OBS HIGH 75: CPT

## 2020-12-15 RX ORDER — SODIUM CHLORIDE 9 MG/ML
1000 INJECTION, SOLUTION INTRAVENOUS
Refills: 0 | Status: DISCONTINUED | OUTPATIENT
Start: 2020-12-15 | End: 2020-12-27

## 2020-12-15 RX ORDER — GLUCAGON INJECTION, SOLUTION 0.5 MG/.1ML
1 INJECTION, SOLUTION SUBCUTANEOUS ONCE
Refills: 0 | Status: DISCONTINUED | OUTPATIENT
Start: 2020-12-15 | End: 2020-12-27

## 2020-12-15 RX ORDER — DEXTROSE 50 % IN WATER 50 %
12.5 SYRINGE (ML) INTRAVENOUS ONCE
Refills: 0 | Status: DISCONTINUED | OUTPATIENT
Start: 2020-12-15 | End: 2020-12-27

## 2020-12-15 RX ORDER — IPRATROPIUM/ALBUTEROL SULFATE 18-103MCG
3 AEROSOL WITH ADAPTER (GRAM) INHALATION EVERY 6 HOURS
Refills: 0 | Status: DISCONTINUED | OUTPATIENT
Start: 2020-12-15 | End: 2020-12-27

## 2020-12-15 RX ORDER — DEXTROSE 50 % IN WATER 50 %
25 SYRINGE (ML) INTRAVENOUS ONCE
Refills: 0 | Status: DISCONTINUED | OUTPATIENT
Start: 2020-12-15 | End: 2020-12-27

## 2020-12-15 RX ORDER — FUROSEMIDE 40 MG
60 TABLET ORAL EVERY 12 HOURS
Refills: 0 | Status: DISCONTINUED | OUTPATIENT
Start: 2020-12-15 | End: 2020-12-21

## 2020-12-15 RX ORDER — DEXTROSE 50 % IN WATER 50 %
15 SYRINGE (ML) INTRAVENOUS ONCE
Refills: 0 | Status: DISCONTINUED | OUTPATIENT
Start: 2020-12-15 | End: 2020-12-27

## 2020-12-15 RX ADMIN — CARVEDILOL PHOSPHATE 3.12 MILLIGRAM(S): 80 CAPSULE, EXTENDED RELEASE ORAL at 17:25

## 2020-12-15 RX ADMIN — Medication 325 MILLIGRAM(S): at 13:27

## 2020-12-15 RX ADMIN — SENNA PLUS 2 TABLET(S): 8.6 TABLET ORAL at 23:40

## 2020-12-15 RX ADMIN — Medication 40 MILLIGRAM(S): at 13:28

## 2020-12-15 RX ADMIN — TAMSULOSIN HYDROCHLORIDE 0.4 MILLIGRAM(S): 0.4 CAPSULE ORAL at 23:33

## 2020-12-15 RX ADMIN — Medication 81 MILLIGRAM(S): at 13:27

## 2020-12-15 RX ADMIN — Medication 3 MILLILITER(S): at 14:45

## 2020-12-15 RX ADMIN — PREGABALIN 1000 MICROGRAM(S): 225 CAPSULE ORAL at 17:25

## 2020-12-15 RX ADMIN — TEMAZEPAM 30 MILLIGRAM(S): 15 CAPSULE ORAL at 23:33

## 2020-12-15 RX ADMIN — APIXABAN 2.5 MILLIGRAM(S): 2.5 TABLET, FILM COATED ORAL at 17:25

## 2020-12-15 RX ADMIN — Medication 100 MILLIGRAM(S): at 13:27

## 2020-12-15 RX ADMIN — PANTOPRAZOLE SODIUM 40 MILLIGRAM(S): 20 TABLET, DELAYED RELEASE ORAL at 07:55

## 2020-12-15 RX ADMIN — Medication 1 MILLIGRAM(S): at 13:27

## 2020-12-15 RX ADMIN — APIXABAN 2.5 MILLIGRAM(S): 2.5 TABLET, FILM COATED ORAL at 07:54

## 2020-12-15 RX ADMIN — Medication 40 MILLIGRAM(S): at 05:30

## 2020-12-15 RX ADMIN — Medication 60 MILLIGRAM(S): at 17:25

## 2020-12-15 RX ADMIN — CARVEDILOL PHOSPHATE 3.12 MILLIGRAM(S): 80 CAPSULE, EXTENDED RELEASE ORAL at 07:55

## 2020-12-15 RX ADMIN — Medication 40 MILLIGRAM(S): at 23:39

## 2020-12-15 RX ADMIN — ATORVASTATIN CALCIUM 20 MILLIGRAM(S): 80 TABLET, FILM COATED ORAL at 23:40

## 2020-12-15 RX ADMIN — Medication 3 MILLILITER(S): at 20:41

## 2020-12-15 NOTE — CONSULT NOTE ADULT - ASSESSMENT
A/P: 69 y/o M with a PMHx of Afib (on Eilquis), severe pulmonary HTN, RV dysfunction, HFpEF (EF 60-65%), COPD (on 2L home O2), CAD s/p CABG 2013 (recommended for RHC/LHC on last 2 admissions, but patient refusing), chronic OM, GERALD/CKD, hematoma of LLE s/p debridement, bladder CA s/p TURP who presented to the ED with AMS and lethargy. Per patient he feels that ever since his last 2 hospitalizations he has never really had the fluid off, and his breathing has never significantly improved. Patient was sent out on Lasix 40mg PO BID, and states that his right leg continued to fill up with fluid and he still had dyspnea at rest. Patient's Cardiologist (Dr. Zia Nieto) then transitioned him to Torsemid 40mg PO qam and 20mg PO qPM. Patient states that he still feels like his weight is going up and his legs are still swollen, and his O2 requirement when up to 5L. Patient was seen and examined at bedside on BiPAP. Patient denies any fevers, chills, CP, syncope, near syncope, abdominal pain, N/V/D, headache, or dizziness.   Troponin 0.03  BNP 61406    Acute Decompensated HFpEF with Severe Pulmonary HTN with RV dysfunction  - BNP is lower then previous, but patient still with significant dyspnea and worsening RLE edema now.   - Was on torsemide 40mg PO qAM and 20mg PO qPM with no improvement in symptoms.   - Increase Lasix to 60mg IV BID.   - Nephrology consult for GERALD/CKD.   - Continue coreg.   - Monitor on telemetry.    - Strict i/o and daily weights.    - Keep K > 4, Mg > 2.    - Monitor renal function with ongoing diuresis.      AF  - rate controlled  - CHASDVasc 3  - Continue Eliquis 2.5mg PO BID as per patient's Cardiologist Dr. Nieto (although patient does not meet 's criteria for reduced dosing)  - Continue Coreg.     CAD  - no chest pain, unchanged EKG, negative troponin on admission  - mildly elevated troponin on previous admission in setting of reduced renal clearance  - Patient, family, and outpatient Cardiologist at this time preferring to defer angiogram in order to spare renal function   - continue ASA, statin  - nephrology consulted, follow up recommendations    GERALD/CKD  - Nephrology consult.    Preliminary evaluation, please await official recommendations by Dr. Geller   A/P: 69 y/o M with a PMHx of Afib (on Eilquis), severe pulmonary HTN, RV dysfunction, HFpEF (EF 60-65%), COPD (on 2L home O2), CAD s/p CABG 2013 (recommended for RHC/LHC on last 2 admissions, but patient refusing), chronic OM, GERALD/CKD, hematoma of LLE s/p debridement, bladder CA s/p TURP who presented to the ED with AMS and lethargy. Per patient he feels that ever since his last 2 hospitalizations he has never really had the fluid off, and his breathing has never significantly improved. Patient was sent out on Lasix 40mg PO BID, and states that his right leg continued to fill up with fluid and he still had dyspnea at rest. Patient's Cardiologist (Dr. Zia Nieto) then transitioned him to Torsemid 40mg PO qam and 20mg PO qPM. Patient states that he still feels like his weight is going up and his legs are still swollen, and his O2 requirement when up to 5L. Patient was seen and examined at bedside on BiPAP. Patient denies any fevers, chills, CP, syncope, near syncope, abdominal pain, N/V/D, headache, or dizziness.   Troponin 0.03  BNP 27004    Acute Decompensated HFpEF with Severe Pulmonary HTN with RV dysfunction  - BNP is lower then previous, but patient still with significant dyspnea and worsening RLE edema now.   - Was on torsemide 40mg PO qAM and 20mg PO qPM with no improvement in symptoms.   - Increase Lasix to 60mg IV BID.   - Nephrology consult for GERALD/CKD.   - Continue coreg.   - Monitor on telemetry.    - Strict i/o and daily weights.    - Keep K > 4, Mg > 2.    - Monitor renal function with ongoing diuresis.      AF  - rate controlled  - CHASDVasc 3  - Continue Eliquis 2.5mg PO BID as per patient's Cardiologist Dr. Nieto (although patient does not meet 's criteria for reduced dosing)  - Continue Coreg.     CAD  - no chest pain, unchanged EKG, negative troponin on admission  - mildly elevated troponin on previous admission in setting of reduced renal clearance  - Patient, family, and outpatient Cardiologist at this time preferring to defer angiogram in order to spare renal function   - continue ASA, statin  - nephrology consulted, follow up recommendations    GERALD/CKD  - Nephrology consult.

## 2020-12-15 NOTE — PROGRESS NOTE ADULT - SUBJECTIVE AND OBJECTIVE BOX
CHIEF COMPLAINT/INTERVAL HISTORY:    Patient is a 70y old  Male who presents with a chief complaint of CHF exacerbation (15 Dec 2020 16:12)      HPI:  69 y/o M w/ a PMH of AFib (on eliquis), HFpEF (follows with Dr. Zia Storm; last known EF 60-65% on 11/17), COPD (on 2L home O2), CABG 2013, and bladder CA (s/p TURP 2013) presented to [facility]  w/ AMS x1 day w/ associated lethargy.  Pt also reports SOB x2 months that has acutely worsened the past few days.  Pt also reports fatigue, weight gain, inability to sleep flat.  Pt reports increased supplemental O2 requirement over the past few days to 5L.   He denies nocturnal cough, fevers, chills, myalgias, sick contacts, recent travel, cp, palpitations, abd pain, N/V, diarrhea.         (14 Dec 2020 18:45)      SUBJECTIVE & OBJECTIVE/ ROS: Pt seen and examined at bedside. Improved SOB. No chest pain, palpitations, light headedness/dizziness,  fevers/chills, abdominal pain, n/v, diarrhea/constipation, dysuria or increased urinary frequency.     ICU Vital Signs Last 24 Hrs  T(C): 36.4 (15 Dec 2020 15:48), Max: 36.8 (15 Dec 2020 00:29)  T(F): 97.6 (15 Dec 2020 15:48), Max: 98.2 (15 Dec 2020 00:29)  HR: 80 (15 Dec 2020 15:48) (70 - 111)  BP: 138/71 (15 Dec 2020 15:48) (113/60 - 150/69)  BP(mean): 81 (15 Dec 2020 04:19) (81 - 81)  ABP: --  ABP(mean): --  RR: 20 (15 Dec 2020 15:48) (18 - 26)  SpO2: 96% (15 Dec 2020 15:48) (92% - 100%)      REVIEW OF SYSTEMS:    CONSTITUTIONAL: No weakness, fevers or chills  EYES/ENT: No visual changes;  No vertigo or throat pain   NECK: No pain or stiffness  RESPIRATORY: No cough, wheezing, hemoptysis; No shortness of breath  CARDIOVASCULAR: No chest pain or palpitations  GASTROINTESTINAL: No abdominal or epigastric pain. No nausea, vomiting, or hematemesis; No diarrhea or constipation. No melena or hematochezia.  GENITOURINARY: No dysuria, frequency or hematuria  NEUROLOGICAL: No numbness or weakness  SKIN: No itching, rashes        MEDICATIONS  (STANDING):  albuterol/ipratropium for Nebulization 3 milliLiter(s) Nebulizer every 6 hours  albuterol/ipratropium for Nebulization. 3 milliLiter(s) Nebulizer once  allopurinol 100 milliGRAM(s) Oral daily  apixaban 2.5 milliGRAM(s) Oral every 12 hours  aspirin  chewable 81 milliGRAM(s) Oral daily  atorvastatin 20 milliGRAM(s) Oral at bedtime  carvedilol 3.125 milliGRAM(s) Oral every 12 hours  cyanocobalamin 1000 MICROGram(s) Oral daily  ferrous    sulfate 325 milliGRAM(s) Oral daily  folic acid 1 milliGRAM(s) Oral daily  furosemide   Injectable 60 milliGRAM(s) IV Push every 12 hours  methylPREDNISolone sodium succinate Injectable 40 milliGRAM(s) IV Push every 8 hours  pantoprazole    Tablet 40 milliGRAM(s) Oral before breakfast  senna 2 Tablet(s) Oral at bedtime  tamsulosin 0.4 milliGRAM(s) Oral at bedtime  thiamine 100 milliGRAM(s) Oral daily    MEDICATIONS  (PRN):  acetaminophen   Tablet .. 650 milliGRAM(s) Oral every 6 hours PRN Temp greater or equal to 38C (100.4F), Mild Pain (1 - 3)  temazepam 30 milliGRAM(s) Oral at bedtime PRN Insomnia      LABS:                        9.0    4.01  )-----------( 211      ( 15 Dec 2020 11:15 )             29.6     12-15    137  |  97<L>  |  69.0<H>  ----------------------------<  228<H>  5.1   |  29.0  |  2.52<H>    Ca    8.4<L>      15 Dec 2020 11:15  Mg     2.4     12-14    TPro  5.7<L>  /  Alb  3.4  /  TBili  0.5  /  DBili  x   /  AST  15  /  ALT  9   /  AlkPhos  203<H>  12-14    PT/INR - ( 14 Dec 2020 11:06 )   PT: 18.5 sec;   INR: 1.63 ratio         PTT - ( 14 Dec 2020 11:06 )  PTT:34.2 sec      CAPILLARY BLOOD GLUCOSE          RECENT CULTURES:        12-14-20 @ 07:01  -  12-15-20 @ 07:00  --------------------------------------------------------  IN: 0 mL / OUT: 425 mL / NET: -425 mL    12-15-20 @ 07:01  -  12-15-20 @ 17:00  --------------------------------------------------------  IN: 0 mL / OUT: 350 mL / NET: -350 mL          RADIOLOGY & ADDITIONAL TESTS:      PHYSICAL EXAM:    GENERAL: NAD, +morbidly obese, +BiPAP  HEAD:  Atraumatic, Normocephalic  EYES: EOMI, PERRLA, conjunctiva and sclera clear  ENMT: Moist mucous membranes  NECK: Supple, No JVD  NERVOUS SYSTEM:  Alert & Oriented X3, Motor Strength 5/5 B/L upper and lower extremities; DTRs 2+ intact and symmetric  CHEST/LUNG: decreased BS bilaterally; No rales, rhonchi, wheezing, or rubs  HEART: Regular rate and rhythm; No murmurs, rubs, or gallops  ABDOMEN: Soft, Nontender, Nondistended; Bowel sounds present  EXTREMITIES:  2+ Peripheral Pulses, No clubbing, cyanosis, or edema

## 2020-12-15 NOTE — CONSULT NOTE ADULT - ASSESSMENT
acute on chronic hypercapnic respiratory failure  CHFpEF, severe pulmonary hypertension, CKD  COPD, home 02 dep, no active bronchospasm or change in sputum  diuresis, nebs, short course medrol  continue bipap, wean to nocturnal  pt refuses LHC, favor RHC with [facility]- given recurrent CHF and CKD  prognosis guarded, follow CXR, ehsan acute on chronic hypercapnic respiratory failure  CHFpEF, severe pulmonary hypertension, CKD  COPD, home 02 dep, no active bronchospasm or change in sputum  diuresis, nebs, short course medrol, d/c azithro Qtc increased, no evidence of acute infection  continue bipap, wean to nocturnal, pH improving  pt refuses LHC, favor RHC with [facility]- given recurrent CHF and CKD  prognosis guarded, follow CXR, gurwindertes

## 2020-12-15 NOTE — DISCHARGE NOTE NURSING/CASE MANAGEMENT/SOCIAL WORK - PATIENT PORTAL LINK FT
You can access the FollowMyHealth Patient Portal offered by [facility] by registering at the following website: [email or web address]. By joining [facility]’s FollowMyHealth portal, you will also be able to view your health information using other applications (apps) compatible with our system.

## 2020-12-15 NOTE — PROGRESS NOTE ADULT - ASSESSMENT
71 y/o M w/ a PMH of AFib (on eliquis), HFpEF (follows with Dr. Zia Storm; last known EF 60-65% on 11/17), COPD (on 2L home O2), CABG 2013, and bladder CA (s/p TURP 2013) presented to [facility]  w/ AMS x1 day w/ associated lethargy.  Pt also reports SOB x2 months that has acutely worsened the past few days.  Pt also reports fatigue, weight gain, inability to sleep flat.  Pt was noted to have evidence of fluid overload on exam, pBNP was 11K and CXR showed small pleural effusions consistent w/ CHF exacerbation  ABG also showed pt had hypoxemia and hypercapnea.  Pt was placed on BIPAP and mentation improved.  Pt will be admitted to tele/pulse ox for acute hypoxic hypercapnic respiratory failure.        Acute hypoxemic hypercapnic respiratory failure   - Likely multifactorial 2/2 Acute on chronic diastolic CHF and COPD exacerbation  - Monitor on  and c/w Supplemental O2 to maintain O2 sats 88-92%  - BIPAP on standby as needed and nocturnal  - ABG reviewed   - CXR and elevated pBNP consistent w/ fluid overload   - c/w Solumedrol and duonebs  - Head of bed elevation to 30 degrees  - Follows with Dr. Zia Storm (cardio)  - Last known EF 60-65% on 11/17  - CXR shows b/l pleural effusions and pBNP 11,000 on admission  -  Increase Lasix to 60mg IV BID.   - Monitor on telemetry  - Hold home torsemide and place on IV lasix for more aggressive diuresis  - In light of active diuresis monitor lytes and renal function.  Goal K~4 and Mg~2  - Daily weights, strict I/O's and fluid restrict  - f/u cardio recs  Pulm appreciated  COVID -ve      Acute metabolic encephalopathy likely 2/2 CO2 narcosis   - pCO2 improved w/ BIPAP  - Mentation back to baseline         AFib, rate controlled  - c/w Eliquis 2.5mg q12h  - Monitor on telemetry      LLE chronic wound   - Xray L-tib/fib and L-foot: no evidence of rubén destruction  appears like a healed skin graft         GERALD on CKD likely prerenal  - Last known Cr 1.92 on 11/21/20  - Monitor renal fxn closely given active diuresis w/ IV lasix  - Avoid nephrotoxic medications or renally dose if needed  - will consult nephrology  Will have to tolerate some degree of azotemia      Anemia likely of chronic dx  - H/H at baseline and pt hemodynamically stable  - Monitor CBC daily   - Transfuse if Hb <7-8  - c/w supplemental iron      DVT ppx: on Eliquis    Dispo: Anticipate possible d/c home w/ home care in 3-4 days.    PCP is Dr. Hakan Ramos

## 2020-12-15 NOTE — DISCHARGE NOTE NURSING/CASE MANAGEMENT/SOCIAL WORK - NSDCFUADDAPPT_GEN_ALL_CORE_FT
Pt A&OX4 admitted for Resp Failure with Hypoxia. Pt lives with spouse Kathy Garcia [phone], independent with some ADL's, but require some help. Pt has cane, RW, walking shower with bench. Home oxygen 2-2.5 L NC, from [employer]. Continuous BIPAP at home. Aide 3 times a week, for 2 hours. [facility] reach out to pt's spouse with pt's permission, both Pt and spouse not able to recall agency; however sppuse states pt has visiting nurse Felicia [phone] from [facility], also PT, and aide are form [facility]. Per Felicia just send referral to [facility] when pt is ready, then she will set up the aide. PCP: Dr. Hakan Ramos in [place] [phone]. Pulmonary: Dr. Krishnan [phone]. Per pt's spouse pt already have a schedule appointment with Dr. Krishnan on January 13, 2021, if needed to reschedule she will takes care of that. Pt declined meds to bed at this time. [facility] [address] in [place]. D/C plan unclear, Star patient possible home with home care, and aide service. Pt A&OX4 admitted for Resp Failure with Hypoxia. Pt lives with spouse Kathy Garcia [phone], independent with some ADL's, but require some help. Pt has cane, RW, walking shower with bench. Home oxygen 2-2.5 L NC, from [employer]. Continuous BIPAP at home. Aide 3 times a week, for 2 hours. [facility] reach out to pt's spouse with pt's permission, both Pt and spouse not able to recall agency; however sppuse states pt has visiting nurse Felicia [phone] from [facility], also PT, and aide are form [facility]. Per Felicia just send referral to [facility] when pt is ready, then she will set up the aide. PCP: Dr. Hakan Ramos in [place] [phone]. Pulmonary: Dr. Krishnan [phone]. Per pt's spouse pt already have a schedule appointment with Dr. Krishnan on January 13, 2021, if needed to reschedule she will takes care of that. Pt declined meds to bed at this time. [facility] [address] [facility] [place]. D/C plan unclear, Star patient possible home with home care, and aide service    ****you have an APPOINTMENT  WITH DR. Franko More  [phone] PULMONARY     on  12/31/at 11:15am****

## 2020-12-16 ENCOUNTER — APPOINTMENT (OUTPATIENT)
Dept: NEPHROLOGY | Facility: CLINIC | Age: 70
End: 2020-12-16

## 2020-12-16 LAB
ANION GAP SERPL CALC-SCNC: 10 MMOL/L — SIGNIFICANT CHANGE UP (ref 5–17)
ANISOCYTOSIS BLD QL: SIGNIFICANT CHANGE UP
BASE EXCESS BLDA CALC-SCNC: 4 MMOL/L — HIGH (ref -2–2)
BASO STIPL BLD QL SMEAR: PRESENT — SIGNIFICANT CHANGE UP
BASOPHILS # BLD AUTO: 0 K/UL — SIGNIFICANT CHANGE UP (ref 0–0.2)
BASOPHILS NFR BLD AUTO: 0 % — SIGNIFICANT CHANGE UP (ref 0–2)
BLOOD GAS COMMENTS ARTERIAL: SIGNIFICANT CHANGE UP
BUN SERPL-MCNC: 79 MG/DL — HIGH (ref 8–20)
CALCIUM SERPL-MCNC: 8.5 MG/DL — LOW (ref 8.6–10.2)
CHLORIDE SERPL-SCNC: 98 MMOL/L — SIGNIFICANT CHANGE UP (ref 98–107)
CO2 SERPL-SCNC: 30 MMOL/L — HIGH (ref 22–29)
CREAT SERPL-MCNC: 2.75 MG/DL — HIGH (ref 0.5–1.3)
DACRYOCYTES BLD QL SMEAR: SLIGHT — SIGNIFICANT CHANGE UP
ELLIPTOCYTES BLD QL SMEAR: SLIGHT — SIGNIFICANT CHANGE UP
EOSINOPHIL # BLD AUTO: 0 K/UL — SIGNIFICANT CHANGE UP (ref 0–0.5)
EOSINOPHIL NFR BLD AUTO: 0 % — SIGNIFICANT CHANGE UP (ref 0–6)
GAS PNL BLDA: SIGNIFICANT CHANGE UP
GLUCOSE BLDC GLUCOMTR-MCNC: 225 MG/DL — HIGH (ref 70–99)
GLUCOSE SERPL-MCNC: 181 MG/DL — HIGH (ref 70–99)
HCO3 BLDA-SCNC: 28 MMOL/L — HIGH (ref 20–26)
HCT VFR BLD CALC: 30.3 % — LOW (ref 39–50)
HGB BLD-MCNC: 9.1 G/DL — LOW (ref 13–17)
HOROWITZ INDEX BLDA+IHG-RTO: 4.5 — SIGNIFICANT CHANGE UP
HYPOCHROMIA BLD QL: SLIGHT — SIGNIFICANT CHANGE UP
LYMPHOCYTES # BLD AUTO: 0.2 K/UL — LOW (ref 1–3.3)
LYMPHOCYTES # BLD AUTO: 2.6 % — LOW (ref 13–44)
MACROCYTES BLD QL: SIGNIFICANT CHANGE UP
MAGNESIUM SERPL-MCNC: 2.5 MG/DL — SIGNIFICANT CHANGE UP (ref 1.6–2.6)
MANUAL SMEAR VERIFICATION: SIGNIFICANT CHANGE UP
MCHC RBC-ENTMCNC: 30 GM/DL — LOW (ref 32–36)
MCHC RBC-ENTMCNC: 32.4 PG — SIGNIFICANT CHANGE UP (ref 27–34)
MCV RBC AUTO: 107.8 FL — HIGH (ref 80–100)
MICROCYTES BLD QL: SLIGHT — SIGNIFICANT CHANGE UP
MONOCYTES # BLD AUTO: 0 K/UL — SIGNIFICANT CHANGE UP (ref 0–0.9)
MONOCYTES NFR BLD AUTO: 0 % — LOW (ref 2–14)
NEUTROPHILS # BLD AUTO: 7.38 K/UL — SIGNIFICANT CHANGE UP (ref 1.8–7.4)
NEUTROPHILS NFR BLD AUTO: 97.4 % — HIGH (ref 43–77)
OVALOCYTES BLD QL SMEAR: SLIGHT — SIGNIFICANT CHANGE UP
PCO2 BLDA: 71 MMHG — CRITICAL HIGH (ref 35–45)
PH BLDA: 7.26 — LOW (ref 7.35–7.45)
PHOSPHATE SERPL-MCNC: 6.6 MG/DL — HIGH (ref 2.4–4.7)
PLAT MORPH BLD: NORMAL — SIGNIFICANT CHANGE UP
PLATELET # BLD AUTO: 202 K/UL — SIGNIFICANT CHANGE UP (ref 150–400)
PO2 BLDA: 104 MMHG — SIGNIFICANT CHANGE UP (ref 83–108)
POIKILOCYTOSIS BLD QL AUTO: SLIGHT — SIGNIFICANT CHANGE UP
POLYCHROMASIA BLD QL SMEAR: SLIGHT — SIGNIFICANT CHANGE UP
POTASSIUM SERPL-MCNC: 5 MMOL/L — SIGNIFICANT CHANGE UP (ref 3.5–5.3)
POTASSIUM SERPL-SCNC: 5 MMOL/L — SIGNIFICANT CHANGE UP (ref 3.5–5.3)
RBC # BLD: 2.81 M/UL — LOW (ref 4.2–5.8)
RBC # FLD: 17.1 % — HIGH (ref 10.3–14.5)
RBC BLD AUTO: ABNORMAL
SAO2 % BLDA: 99 % — SIGNIFICANT CHANGE UP (ref 95–99)
SARS-COV-2 IGG SERPL QL IA: NEGATIVE — SIGNIFICANT CHANGE UP
SARS-COV-2 IGM SERPL IA-ACNC: 0.07 INDEX — SIGNIFICANT CHANGE UP
SCHISTOCYTES BLD QL AUTO: SLIGHT — SIGNIFICANT CHANGE UP
SODIUM SERPL-SCNC: 138 MMOL/L — SIGNIFICANT CHANGE UP (ref 135–145)
TARGETS BLD QL SMEAR: SLIGHT — SIGNIFICANT CHANGE UP
WBC # BLD: 7.58 K/UL — SIGNIFICANT CHANGE UP (ref 3.8–10.5)
WBC # FLD AUTO: 7.58 K/UL — SIGNIFICANT CHANGE UP (ref 3.8–10.5)

## 2020-12-16 PROCEDURE — 99232 SBSQ HOSP IP/OBS MODERATE 35: CPT

## 2020-12-16 RX ADMIN — CARVEDILOL PHOSPHATE 3.12 MILLIGRAM(S): 80 CAPSULE, EXTENDED RELEASE ORAL at 17:39

## 2020-12-16 RX ADMIN — SENNA PLUS 2 TABLET(S): 8.6 TABLET ORAL at 21:45

## 2020-12-16 RX ADMIN — Medication 60 MILLIGRAM(S): at 06:19

## 2020-12-16 RX ADMIN — PANTOPRAZOLE SODIUM 40 MILLIGRAM(S): 20 TABLET, DELAYED RELEASE ORAL at 06:19

## 2020-12-16 RX ADMIN — Medication 81 MILLIGRAM(S): at 11:11

## 2020-12-16 RX ADMIN — Medication 3 MILLILITER(S): at 09:50

## 2020-12-16 RX ADMIN — APIXABAN 2.5 MILLIGRAM(S): 2.5 TABLET, FILM COATED ORAL at 17:39

## 2020-12-16 RX ADMIN — PREGABALIN 1000 MICROGRAM(S): 225 CAPSULE ORAL at 11:11

## 2020-12-16 RX ADMIN — Medication 1 MILLIGRAM(S): at 11:11

## 2020-12-16 RX ADMIN — CARVEDILOL PHOSPHATE 3.12 MILLIGRAM(S): 80 CAPSULE, EXTENDED RELEASE ORAL at 06:19

## 2020-12-16 RX ADMIN — Medication 3 MILLILITER(S): at 14:31

## 2020-12-16 RX ADMIN — Medication 3 MILLILITER(S): at 04:42

## 2020-12-16 RX ADMIN — TEMAZEPAM 30 MILLIGRAM(S): 15 CAPSULE ORAL at 22:17

## 2020-12-16 RX ADMIN — Medication 100 MILLIGRAM(S): at 11:11

## 2020-12-16 RX ADMIN — Medication 40 MILLIGRAM(S): at 17:39

## 2020-12-16 RX ADMIN — Medication 100 MILLIGRAM(S): at 11:12

## 2020-12-16 RX ADMIN — Medication 60 MILLIGRAM(S): at 17:39

## 2020-12-16 RX ADMIN — Medication 40 MILLIGRAM(S): at 06:19

## 2020-12-16 RX ADMIN — Medication 3 MILLILITER(S): at 21:25

## 2020-12-16 RX ADMIN — Medication 325 MILLIGRAM(S): at 11:11

## 2020-12-16 RX ADMIN — Medication 40 MILLIGRAM(S): at 14:33

## 2020-12-16 RX ADMIN — APIXABAN 2.5 MILLIGRAM(S): 2.5 TABLET, FILM COATED ORAL at 06:19

## 2020-12-16 RX ADMIN — TAMSULOSIN HYDROCHLORIDE 0.4 MILLIGRAM(S): 0.4 CAPSULE ORAL at 21:45

## 2020-12-16 RX ADMIN — ATORVASTATIN CALCIUM 20 MILLIGRAM(S): 80 TABLET, FILM COATED ORAL at 21:45

## 2020-12-16 NOTE — PROGRESS NOTE ADULT - ASSESSMENT
69 y/o M w/ a PMH of AFib (on eliquis), HFpEF (follows with Dr. Zia Storm; last known EF 60-65% on 11/17), COPD (on 2L home O2), CABG 2013, and bladder CA (s/p TURP 2013) presented to [facility]  w/ AMS x1 day w/ associated lethargy.  Pt also reports SOB x2 months that has acutely worsened the past few days.  Pt also reports fatigue, weight gain, inability to sleep flat.  Pt was noted to have evidence of fluid overload on exam, pBNP was 11K and CXR showed small pleural effusions consistent w/ CHF exacerbation  ABG also showed pt had hypoxemia and hypercapnea.  Pt was placed on BIPAP and mentation improved.  Pt will be admitted to tele/pulse ox for acute hypoxic hypercapnic respiratory failure.        Acute hypoxemic hypercapnic respiratory failure   - Likely multifactorial 2/2 Acute on chronic diastolic CHF and COPD exacerbation  - Monitor on  and c/w Supplemental O2 to maintain O2 sats 88-92%, now down to 4L NC  - BIPAP on standby as needed and nocturnal  - ABG reviewed   - CXR and elevated pBNP consistent w/ fluid overload   - c/w Solumedrol and duonebs  - Head of bed elevation to 30 degrees  - Follows with Dr. Zia Storm (cardio)  - Last known EF 60-65% on 11/17  - CXR shows b/l pleural effusions and pBNP 11,000 on admission  -  Increase Lasix to 60mg IV BID.   - Monitor on telemetry  - Hold home torsemide and place on IV lasix for more aggressive diuresis  - In light of active diuresis monitor lytes and renal function.  Goal K~4 and Mg~2  - Daily weights, strict I/O's and fluid restrict. -1.6L negative balance  - f/u cardio recs  Pulm appreciated  COVID -ve      Acute metabolic encephalopathy likely 2/2 CO2 narcosis   - pCO2 improved w/ BIPAP  - Mentation back to baseline         AFib, rate controlled  - c/w Eliquis 2.5mg q12h  - Monitor on telemetry      LLE chronic wound   - Xray L-tib/fib and L-foot: no evidence of rubén destruction  appears like a healed skin graft         GERALD on CKD likely prerenal  - Last known Cr 1.92 on 11/21/20  - Monitor renal fxn closely given active diuresis w/ IV lasix  - Avoid nephrotoxic medications or renally dose if needed  -nephrology appreciated  Will have to tolerate some degree of azotemia  Renal US & Bladder scan      Anemia likely of chronic dx  - H/H at baseline and pt hemodynamically stable  - Monitor CBC daily   - Transfuse if Hb <7-8  - c/w supplemental iron      DVT ppx: on Eliquis    Dispo: Anticipate possible d/c home w/ home care in 3-4 days.    PCP is Dr. Hakan Ramos

## 2020-12-16 NOTE — CONSULT NOTE ADULT - ASSESSMENT
71 y/o M w/ a PMH of AFib (on eliquis), HFpEF (follows with Dr. Zia Storm; last known EF 60-65% on 11/17), COPD (on 2L home O2), CABG 2013, and bladder CA (s/p TURP 2013) presented to [facility]  w/ AMS x1 day w/ associated lethargy.  Pt also reports SOB x2 months that has acutely worsened the past few days.  Pt also reports fatigue, weight gain, inability to sleep     GERALD on CKD   above events noted   Have to accept some degree of augmented azotemia   Watch Labs with IV Lasix diuresis   Prior Urine P/C in Nov 2020 , only 200 mg   Check post void bladder scan and renal sonogram   Expect some rise in BUN with steroids too   Labs in am   Check TFT's     Anemia -   Check iron stores   Will consider adding Epogen

## 2020-12-16 NOTE — PROGRESS NOTE ADULT - ASSESSMENT
69 y/o M with a PMHx of Afib (on Eilquis), severe pulmonary HTN, RV dysfunction, HFpEF (EF 60-65%), COPD (on 2L home O2), CAD s/p CABG 2013 (recommended for RHC/LHC on last 2 admissions, but patient refusing), chronic OM, GERALD/CKD, hematoma of LLE s/p debridement wth a skin graft. bladder CA s/p TURP who presented to the ED with AMS and lethargy. Per patient he feels that ever since his last 2 hospitalizations he has never really had the fluid off, and his breathing has never significantly improved. Patient was sent out on Lasix 40mg PO BID, and states that his right leg continued to fill up with fluid and he still had dyspnea at rest. Patient's Cardiologist (Dr. Zia Nieto) then transitioned him to Torsemid 40mg PO qam and 20mg PO qPM. Patient states that he still feels like his weight is going up and his legs are still swollen, and his O2 requirement when up to 5L. Patient was seen and examined at bedside on BiPAP. Patient denies any fevers, chills, CP, syncope, near syncope, abdominal pain, N/V/D, headache, or dizziness.   Troponin 0.03  BNP 17332 71 y/o M with a PMHx of Afib (on Eilquis), severe pulmonary HTN, RV dysfunction, HFpEF (EF 60-65%), COPD (on 2L home O2), CAD s/p CABG 2013 (recommended for RHC/LHC on last 2 admissions, but patient refusing), chronic OM, GERALD/CKD, hematoma of LLE s/p debridement with a skin graft. bladder CA s/p TURP who presented to the ED with AMS and lethargy. Per patient he feels that ever since his last 2 hospitalizations with little improvement with edema and sob.  He is 02 dependant and has bipap at home but does not use it.  Troponin 0.03 BNP 16045 Echo this admission with ef 60% with severe pul htn      CHF  EF 60%  severe pulmonary htn  Output 1520  Discussed low na diet with him he states he follows it at home      CAD s/p CABG 2013  patient is not agreeing to cath if minimal dye load can be used    PERMANENT ATRIAL FIB   on Eliquis and coreg    HYPERCAPNIC RESP FAILURE  BIPAp  02  c/w nebulizer     Cardiac meds  apixaban 2.5 milliGRAM(s) Oral every 12 hours  aspirin  chewable 81 milliGRAM(s) Oral daily  atorvastatin 20 milliGRAM(s) Oral at bedtime  carvedilol 3.125 milliGRAM(s) Oral every 12 hours  furosemide   Injectable 60 milliGRAM(s) IV Push every 12 hours 71 y/o M with a PMHx of Afib (on Eilquis), severe pulmonary HTN, RV dysfunction, HFpEF (EF 60-65%), COPD (on 2L home O2), CAD s/p CABG 2013 (recommended for RHC/LHC on last 2 admissions, but patient refusing), chronic OM, GERALD/CKD, hematoma of LLE s/p debridement with a skin graft. bladder CA s/p TURP who presented to the ED with AMS and lethargy. Per patient he feels that ever since his last 2 hospitalizations with little improvement with edema and sob.  He is 02 dependant and has bipap at home but does not use it.  Troponin 0.03 BNP 04110 Echo this admission with ef 60% with severe pul htn      CHF  EF 60%  Severe pulmonary htn  Output 1520  Discussed low na diet with him he states he follows it at home  Discussed right heart cath    CAD s/p CABG 2013  out patient follow up with cardio    PERMANENT ATRIAL FIB   on Eliquis and coreg    HYPERCAPNIC RESP FAILURE  BIPAp  02  c/w nebulizer     Cardiac meds  apixaban 2.5 milliGRAM(s) Oral every 12 hours  aspirin  chewable 81 milliGRAM(s) Oral daily  atorvastatin 20 milliGRAM(s) Oral at bedtime  carvedilol 3.125 milliGRAM(s) Oral every 12 hours  furosemide   Injectable 60 milliGRAM(s) IV Push every 12 hours

## 2020-12-16 NOTE — PROGRESS NOTE ADULT - SUBJECTIVE AND OBJECTIVE BOX
[facility]  [address], [place] [address]    REASON FOR VISIT:  Follow up on chf  UPDATE:  Very dyspneic at rest when he speaks  Patient states that he will now consider cath if low dye load to be used  TELEMETRY MONITORING:  Atrial fib with controlled ventricular response  12-16    138  |    98  |      79.0<H>  ----------------------------<  181<H>  5.0   |  30.0<H>  |  2.75<H>    Ca    8.5<L>      16 Dec 2020 07:45  Phos  6.6     12-16  Mg     2.5     12-16      MEDICATIONS  (STANDING):  apixaban 2.5 milliGRAM(s) Oral every 12 hours  aspirin  chewable 81 milliGRAM(s) Oral daily  atorvastatin 20 milliGRAM(s) Oral at bedtime  carvedilol 3.125 milliGRAM(s) Oral every 12 hours  furosemide   Injectable 60 milliGRAM(s) IV Push every 12 hours      ROS:  No fever chills  Cardiac  No cp sob or palp  sob at rest  Resp  no cough no mucus production  Gi  no abd pain no melana  Ext No calf tenderness, + edema    Vital Signs Last 24 Hrs  T(C): 36.6 (16 Dec 2020 10:08), Max: 36.7 (15 Dec 2020 23:31)  T(F): 97.8 (16 Dec 2020 10:08), Max: 98 (15 Dec 2020 23:31)  HR: 89 (16 Dec 2020 10:08) (72 - 111)  BP: 138/69 (16 Dec 2020 10:08) (127/67 - 146/68)  BP(mean): 92 (16 Dec 2020 10:08) (92 - 92)  RR: 17 (16 Dec 2020 10:08) (17 - 20)  SpO2: 95% (16 Dec 2020 10:08) (94% - 100%)  T(C): 36.6 (12-16-20 @ 10:08), Max: 36.7 (12-15-20 @ 23:31)  HR: 89 (12-16-20 @ 10:08) (72 - 111)  BP: 138/69 (12-16-20 @ 10:08) (127/67 - 146/68)  RR: 17 (12-16-20 @ 10:08) (17 - 20)  SpO2: 95% (12-16-20 @ 10:08) (94% - 100%)    HEENT Head atraumatic eomi, oral mucosa moist  CV S1&S2  Irregular   RESP   decreased breath sounds  GI  Soft active bowel sounds non tender  EXT  No clubbing/Cyanosis /2 plus pitting edema with chronic venous changes  NEURO  Alert oriented  No gross motor or sensory deficits    < from: TTE Echo Limited or F/U (11.16.20 @ 21:06) >   1. Left ventricular ejection fraction, by visual estimation, is 60 to 65%.  2. Normal global left ventricular systolic function.   3. Mildly enlarged right ventricle.   4. Mildly reduced RV systolic function.   5. Moderate tricuspid regurgitation.   6. Estimated pulmonary artery systolic pressure is 67.7 mmHg assuming a right atrial pressure of 15 mmHg, which is consistent with severe pulmonary hypertension.

## 2020-12-16 NOTE — PROGRESS NOTE ADULT - SUBJECTIVE AND OBJECTIVE BOX
CHIEF COMPLAINT/INTERVAL HISTORY:    Patient is a 70y old  Male who presents with a chief complaint of CHF exacerbation (15 Dec 2020 16:12)      HPI:  71 y/o M w/ a PMH of AFib (on eliquis), HFpEF (follows with Dr. Zia Storm; last known EF 60-65% on 11/17), COPD (on 2L home O2), CABG 2013, and bladder CA (s/p TURP 2013) presented to [facility]  w/ AMS x1 day w/ associated lethargy.  Pt also reports SOB x2 months that has acutely worsened the past few days.  Pt also reports fatigue, weight gain, inability to sleep flat.  Pt reports increased supplemental O2 requirement over the past few days to 5L.   He denies nocturnal cough, fevers, chills, myalgias, sick contacts, recent travel, cp, palpitations, abd pain, N/V, diarrhea.         (14 Dec 2020 18:45)      SUBJECTIVE & OBJECTIVE/ ROS: Pt seen and examined at bedside. Improved SOB on on NC 4L. No chest pain, palpitations, light headedness/dizziness,  fevers/chills, abdominal pain, n/v, diarrhea/constipation, dysuria or increased urinary frequency.     ICU Vital Signs Last 24 Hrs  T(C): 36.4 (15 Dec 2020 15:48), Max: 36.8 (15 Dec 2020 00:29)  T(F): 97.6 (15 Dec 2020 15:48), Max: 98.2 (15 Dec 2020 00:29)  HR: 80 (15 Dec 2020 15:48) (70 - 111)  BP: 138/71 (15 Dec 2020 15:48) (113/60 - 150/69)  BP(mean): 81 (15 Dec 2020 04:19) (81 - 81)  ABP: --  ABP(mean): --  RR: 20 (15 Dec 2020 15:48) (18 - 26)  SpO2: 96% (15 Dec 2020 15:48) (92% - 100%)      REVIEW OF SYSTEMS:    CONSTITUTIONAL: No weakness, fevers or chills  EYES/ENT: No visual changes;  No vertigo or throat pain   NECK: No pain or stiffness  RESPIRATORY: No cough, wheezing, hemoptysis; No shortness of breath  CARDIOVASCULAR: No chest pain or palpitations  GASTROINTESTINAL: No abdominal or epigastric pain. No nausea, vomiting, or hematemesis; No diarrhea or constipation. No melena or hematochezia.  GENITOURINARY: No dysuria, frequency or hematuria  NEUROLOGICAL: No numbness or weakness  SKIN: No itching, rashes        MEDICATIONS  (STANDING):  albuterol/ipratropium for Nebulization 3 milliLiter(s) Nebulizer every 6 hours  albuterol/ipratropium for Nebulization. 3 milliLiter(s) Nebulizer once  allopurinol 100 milliGRAM(s) Oral daily  apixaban 2.5 milliGRAM(s) Oral every 12 hours  aspirin  chewable 81 milliGRAM(s) Oral daily  atorvastatin 20 milliGRAM(s) Oral at bedtime  carvedilol 3.125 milliGRAM(s) Oral every 12 hours  cyanocobalamin 1000 MICROGram(s) Oral daily  ferrous    sulfate 325 milliGRAM(s) Oral daily  folic acid 1 milliGRAM(s) Oral daily  furosemide   Injectable 60 milliGRAM(s) IV Push every 12 hours  methylPREDNISolone sodium succinate Injectable 40 milliGRAM(s) IV Push every 8 hours  pantoprazole    Tablet 40 milliGRAM(s) Oral before breakfast  senna 2 Tablet(s) Oral at bedtime  tamsulosin 0.4 milliGRAM(s) Oral at bedtime  thiamine 100 milliGRAM(s) Oral daily    MEDICATIONS  (PRN):  acetaminophen   Tablet .. 650 milliGRAM(s) Oral every 6 hours PRN Temp greater or equal to 38C (100.4F), Mild Pain (1 - 3)  temazepam 30 milliGRAM(s) Oral at bedtime PRN Insomnia      LABS:                        9.0    4.01  )-----------( 211      ( 15 Dec 2020 11:15 )             29.6     12-15    137  |  97<L>  |  69.0<H>  ----------------------------<  228<H>  5.1   |  29.0  |  2.52<H>    Ca    8.4<L>      15 Dec 2020 11:15  Mg     2.4     12-14    TPro  5.7<L>  /  Alb  3.4  /  TBili  0.5  /  DBili  x   /  AST  15  /  ALT  9   /  AlkPhos  203<H>  12-14    PT/INR - ( 14 Dec 2020 11:06 )   PT: 18.5 sec;   INR: 1.63 ratio         PTT - ( 14 Dec 2020 11:06 )  PTT:34.2 sec      CAPILLARY BLOOD GLUCOSE          RECENT CULTURES:        12-14-20 @ 07:01  -  12-15-20 @ 07:00  --------------------------------------------------------  IN: 0 mL / OUT: 425 mL / NET: -425 mL    12-15-20 @ 07:01  -  12-15-20 @ 17:00  --------------------------------------------------------  IN: 0 mL / OUT: 350 mL / NET: -350 mL          RADIOLOGY & ADDITIONAL TESTS:      PHYSICAL EXAM:    GENERAL: NAD, +morbidly obese  HEAD:  Atraumatic, Normocephalic  EYES: EOMI, PERRLA, conjunctiva and sclera clear  ENMT: Moist mucous membranes  NECK: Supple, No JVD  NERVOUS SYSTEM:  Alert & Oriented X3, Motor Strength 5/5 B/L upper and lower extremities; DTRs 2+ intact and symmetric  CHEST/LUNG: decreased BS bilaterally; No rales, rhonchi, wheezing, or rubs  HEART: Regular rate and rhythm; No murmurs, rubs, or gallops  ABDOMEN: Soft, Nontender, Nondistended; Bowel sounds present  EXTREMITIES:  2+ Peripheral Pulses, No clubbing, cyanosis, or edema

## 2020-12-17 ENCOUNTER — APPOINTMENT (OUTPATIENT)
Dept: TRAUMA SURGERY | Facility: CLINIC | Age: 70
End: 2020-12-17

## 2020-12-17 DIAGNOSIS — N18.9 CHRONIC KIDNEY DISEASE, UNSPECIFIED: ICD-10-CM

## 2020-12-17 DIAGNOSIS — J96.02 ACUTE RESPIRATORY FAILURE WITH HYPERCAPNIA: ICD-10-CM

## 2020-12-17 DIAGNOSIS — I48.20 CHRONIC ATRIAL FIBRILLATION, UNSPECIFIED: ICD-10-CM

## 2020-12-17 DIAGNOSIS — E87.2 ACIDOSIS: ICD-10-CM

## 2020-12-17 PROBLEM — L97.929 NON-PRESSURE CHRONIC ULCER OF UNSPECIFIED PART OF LEFT LOWER LEG WITH UNSPECIFIED SEVERITY: Chronic | Status: ACTIVE | Noted: 2020-12-14

## 2020-12-17 LAB
ANION GAP SERPL CALC-SCNC: 10 MMOL/L — SIGNIFICANT CHANGE UP (ref 5–17)
BASE EXCESS BLDA CALC-SCNC: 5 MMOL/L — HIGH (ref -2–2)
BASE EXCESS BLDA CALC-SCNC: 5.5 MMOL/L — HIGH (ref -3–3)
BASE EXCESS BLDA CALC-SCNC: 5.8 MMOL/L — HIGH (ref -2–2)
BASOPHILS # BLD AUTO: 0.01 K/UL — SIGNIFICANT CHANGE UP (ref 0–0.2)
BASOPHILS NFR BLD AUTO: 0.1 % — SIGNIFICANT CHANGE UP (ref 0–2)
BLOOD GAS COMMENTS ARTERIAL: SIGNIFICANT CHANGE UP
BUN SERPL-MCNC: 85 MG/DL — HIGH (ref 8–20)
CALCIUM SERPL-MCNC: 8.2 MG/DL — LOW (ref 8.6–10.2)
CHLORIDE SERPL-SCNC: 97 MMOL/L — LOW (ref 98–107)
CO2 SERPL-SCNC: 31 MMOL/L — HIGH (ref 22–29)
CREAT SERPL-MCNC: 2.56 MG/DL — HIGH (ref 0.5–1.3)
EOSINOPHIL # BLD AUTO: 0 K/UL — SIGNIFICANT CHANGE UP (ref 0–0.5)
EOSINOPHIL NFR BLD AUTO: 0 % — SIGNIFICANT CHANGE UP (ref 0–6)
FERRITIN SERPL-MCNC: 189 NG/ML — SIGNIFICANT CHANGE UP (ref 30–400)
GAS PNL BLDA: SIGNIFICANT CHANGE UP
GLUCOSE BLDC GLUCOMTR-MCNC: 208 MG/DL — HIGH (ref 70–99)
GLUCOSE BLDC GLUCOMTR-MCNC: 237 MG/DL — HIGH (ref 70–99)
GLUCOSE BLDC GLUCOMTR-MCNC: 242 MG/DL — HIGH (ref 70–99)
GLUCOSE BLDC GLUCOMTR-MCNC: 252 MG/DL — HIGH (ref 70–99)
GLUCOSE SERPL-MCNC: 241 MG/DL — HIGH (ref 70–99)
HCO3 BLDA-SCNC: 29 MMOL/L — HIGH (ref 20–26)
HCO3 BLDA-SCNC: 29 MMOL/L — HIGH (ref 20–26)
HCO3 BLDA-SCNC: 30 MMOL/L — HIGH (ref 20–26)
HCT VFR BLD CALC: 31.8 % — LOW (ref 39–50)
HGB BLD-MCNC: 9.3 G/DL — LOW (ref 13–17)
HOROWITZ INDEX BLDA+IHG-RTO: 0.4 — SIGNIFICANT CHANGE UP
HOROWITZ INDEX BLDA+IHG-RTO: 0.4 — SIGNIFICANT CHANGE UP
HOROWITZ INDEX BLDA+IHG-RTO: SIGNIFICANT CHANGE UP
IMM GRANULOCYTES NFR BLD AUTO: 0.7 % — SIGNIFICANT CHANGE UP (ref 0–1.5)
IRON SATN MFR SERPL: 13 % — LOW (ref 16–55)
IRON SATN MFR SERPL: 36 UG/DL — LOW (ref 59–158)
LYMPHOCYTES # BLD AUTO: 0.59 K/UL — LOW (ref 1–3.3)
LYMPHOCYTES # BLD AUTO: 6.7 % — LOW (ref 13–44)
MAGNESIUM SERPL-MCNC: 2.3 MG/DL — SIGNIFICANT CHANGE UP (ref 1.6–2.6)
MCHC RBC-ENTMCNC: 29.2 GM/DL — LOW (ref 32–36)
MCHC RBC-ENTMCNC: 31.8 PG — SIGNIFICANT CHANGE UP (ref 27–34)
MCV RBC AUTO: 108.9 FL — HIGH (ref 80–100)
MONOCYTES # BLD AUTO: 0.3 K/UL — SIGNIFICANT CHANGE UP (ref 0–0.9)
MONOCYTES NFR BLD AUTO: 3.4 % — SIGNIFICANT CHANGE UP (ref 2–14)
NEUTROPHILS # BLD AUTO: 7.87 K/UL — HIGH (ref 1.8–7.4)
NEUTROPHILS NFR BLD AUTO: 89.1 % — HIGH (ref 43–77)
PCO2 BLDA: 71 MMHG — CRITICAL HIGH (ref 35–45)
PCO2 BLDA: 74 MMHG — CRITICAL HIGH (ref 35–45)
PCO2 BLDA: 89 MMHG — CRITICAL HIGH (ref 35–45)
PH BLDA: 7.2 — CRITICAL LOW (ref 7.35–7.45)
PH BLDA: 7.27 — LOW (ref 7.35–7.45)
PH BLDA: 7.28 — LOW (ref 7.35–7.45)
PHOSPHATE SERPL-MCNC: 5.2 MG/DL — HIGH (ref 2.4–4.7)
PLATELET # BLD AUTO: 208 K/UL — SIGNIFICANT CHANGE UP (ref 150–400)
PO2 BLDA: 106 MMHG — SIGNIFICANT CHANGE UP (ref 83–108)
PO2 BLDA: 81 MMHG — LOW (ref 83–108)
PO2 BLDA: 86 MMHG — SIGNIFICANT CHANGE UP (ref 83–108)
POTASSIUM SERPL-MCNC: 4.8 MMOL/L — SIGNIFICANT CHANGE UP (ref 3.5–5.3)
POTASSIUM SERPL-SCNC: 4.8 MMOL/L — SIGNIFICANT CHANGE UP (ref 3.5–5.3)
RBC # BLD: 2.92 M/UL — LOW (ref 4.2–5.8)
RBC # FLD: 17.2 % — HIGH (ref 10.3–14.5)
SAO2 % BLDA: 96 % — SIGNIFICANT CHANGE UP (ref 95–99)
SAO2 % BLDA: 97 % — SIGNIFICANT CHANGE UP (ref 95–99)
SAO2 % BLDA: 99 % — SIGNIFICANT CHANGE UP (ref 95–99)
SODIUM SERPL-SCNC: 138 MMOL/L — SIGNIFICANT CHANGE UP (ref 135–145)
T3FREE SERPL-MCNC: 1.16 PG/ML — LOW (ref 1.8–4.6)
T4 FREE SERPL-MCNC: 0.8 NG/DL — LOW (ref 0.9–1.8)
TIBC SERPL-MCNC: 280 UG/DL — SIGNIFICANT CHANGE UP (ref 220–430)
TRANSFERRIN SERPL-MCNC: 196 MG/DL — SIGNIFICANT CHANGE UP (ref 180–329)
TSH SERPL-MCNC: 1.09 UIU/ML — SIGNIFICANT CHANGE UP (ref 0.27–4.2)
TSH SERPL-MCNC: 1.1 UIU/ML — SIGNIFICANT CHANGE UP (ref 0.27–4.2)
WBC # BLD: 8.83 K/UL — SIGNIFICANT CHANGE UP (ref 3.8–10.5)
WBC # FLD AUTO: 8.83 K/UL — SIGNIFICANT CHANGE UP (ref 3.8–10.5)

## 2020-12-17 PROCEDURE — 99232 SBSQ HOSP IP/OBS MODERATE 35: CPT

## 2020-12-17 PROCEDURE — 76775 US EXAM ABDO BACK WALL LIM: CPT | Mod: 26

## 2020-12-17 RX ORDER — IRON SUCROSE 20 MG/ML
200 INJECTION, SOLUTION INTRAVENOUS EVERY 24 HOURS
Refills: 0 | Status: COMPLETED | OUTPATIENT
Start: 2020-12-17 | End: 2020-12-19

## 2020-12-17 RX ORDER — ZINC SULFATE TAB 220 MG (50 MG ZINC EQUIVALENT) 220 (50 ZN) MG
220 TAB ORAL DAILY
Refills: 0 | Status: DISCONTINUED | OUTPATIENT
Start: 2020-12-17 | End: 2020-12-27

## 2020-12-17 RX ORDER — ASCORBIC ACID 60 MG
500 TABLET,CHEWABLE ORAL DAILY
Refills: 0 | Status: DISCONTINUED | OUTPATIENT
Start: 2020-12-17 | End: 2020-12-27

## 2020-12-17 RX ORDER — ERYTHROPOIETIN 10000 [IU]/ML
20000 INJECTION, SOLUTION INTRAVENOUS; SUBCUTANEOUS
Refills: 0 | Status: DISCONTINUED | OUTPATIENT
Start: 2020-12-17 | End: 2020-12-27

## 2020-12-17 RX ORDER — INSULIN LISPRO 100/ML
VIAL (ML) SUBCUTANEOUS
Refills: 0 | Status: DISCONTINUED | OUTPATIENT
Start: 2020-12-17 | End: 2020-12-27

## 2020-12-17 RX ADMIN — Medication 40 MILLIGRAM(S): at 18:47

## 2020-12-17 RX ADMIN — Medication 3 MILLILITER(S): at 03:27

## 2020-12-17 RX ADMIN — Medication 40 MILLIGRAM(S): at 06:05

## 2020-12-17 RX ADMIN — Medication 1 MILLIGRAM(S): at 13:23

## 2020-12-17 RX ADMIN — TAMSULOSIN HYDROCHLORIDE 0.4 MILLIGRAM(S): 0.4 CAPSULE ORAL at 21:52

## 2020-12-17 RX ADMIN — ATORVASTATIN CALCIUM 20 MILLIGRAM(S): 80 TABLET, FILM COATED ORAL at 21:52

## 2020-12-17 RX ADMIN — Medication 6: at 17:59

## 2020-12-17 RX ADMIN — CARVEDILOL PHOSPHATE 3.12 MILLIGRAM(S): 80 CAPSULE, EXTENDED RELEASE ORAL at 21:52

## 2020-12-17 RX ADMIN — Medication 60 MILLIGRAM(S): at 18:47

## 2020-12-17 RX ADMIN — APIXABAN 2.5 MILLIGRAM(S): 2.5 TABLET, FILM COATED ORAL at 06:05

## 2020-12-17 RX ADMIN — APIXABAN 2.5 MILLIGRAM(S): 2.5 TABLET, FILM COATED ORAL at 21:52

## 2020-12-17 RX ADMIN — Medication 81 MILLIGRAM(S): at 13:23

## 2020-12-17 RX ADMIN — Medication 3 MILLILITER(S): at 09:40

## 2020-12-17 RX ADMIN — Medication 100 MILLIGRAM(S): at 13:23

## 2020-12-17 RX ADMIN — PANTOPRAZOLE SODIUM 40 MILLIGRAM(S): 20 TABLET, DELAYED RELEASE ORAL at 06:05

## 2020-12-17 RX ADMIN — CARVEDILOL PHOSPHATE 3.12 MILLIGRAM(S): 80 CAPSULE, EXTENDED RELEASE ORAL at 06:05

## 2020-12-17 RX ADMIN — SENNA PLUS 2 TABLET(S): 8.6 TABLET ORAL at 21:52

## 2020-12-17 RX ADMIN — Medication 60 MILLIGRAM(S): at 06:05

## 2020-12-17 RX ADMIN — PREGABALIN 1000 MICROGRAM(S): 225 CAPSULE ORAL at 13:23

## 2020-12-17 RX ADMIN — Medication 325 MILLIGRAM(S): at 13:23

## 2020-12-17 RX ADMIN — IRON SUCROSE 110 MILLIGRAM(S): 20 INJECTION, SOLUTION INTRAVENOUS at 21:54

## 2020-12-17 RX ADMIN — Medication 3 MILLILITER(S): at 20:50

## 2020-12-17 RX ADMIN — Medication 3 MILLILITER(S): at 14:22

## 2020-12-17 NOTE — CONSULT NOTE ADULT - ATTENDING COMMENTS
70M with HFpEF    - volume overloaded and hypoxic  - wean bipap as able  - diurese today with lasix 60 IV bid; goal at least -1L negative daily  - add metolazone if not meeting diuresis target  - needs accurate I&Os  - rate controlled AF, continue apixaban and coreg  - stable CAD, continue ASA; declined [facility] on multiple recent hospitalizations, no urgent indication presently   - continue COPD treatment per primary team
greater than 50% of time spent reviewing labs, notes, orders and radiographs, coordinating care
70M with COPD, Pulm HTN, Afib admitted with worsening Hypercapnia and Right heart failure  Medical team called back again with concerns of persistent acidosis and hypercapnia   > Recommend  - Continuation of AVAPs at current setting of 500/8 with pressures 30/16 and back up rate 18 at 40-50% with minute ventilation ranging from 10 to 16 LPM with RR 18-24 with normal mentation  - ATC duoneb   - Consider Albumin with Lasix for 24 to 36 hours with strict I&Os  - Consider one time Bicarb amp and Nephrology eval for further management as have chronic comp metab alkalosis   - Grave long term prognosis; would benefit from palliative care eval and support; GOC  - please call us back with concerns or questions

## 2020-12-17 NOTE — CHART NOTE - NSCHARTNOTEFT_GEN_A_CORE
Called by RN for pt's wound care order.  Pt with chronic LLE ulceration, unspecified stage, s/p skin graft, has HOME wound care by RN.  Will order Wound care consult for recommendation of dressings. Called by RN for pt's wound care order.  Pt with chronic LLE ulceration, unspecified stage, s/p skin graft, has HOME wound care by RN.  Will order Wound care consult for recommendation of dressings.    22:30  Followed up with ABG ordered for Respiratory Acidosis with hypercapnia on AVAPS Min 16 Max 30 EPAP 8  FiO2 40%  ABG - ( 17 Dec 2020 21:16 )  pH, Arterial: 7.28  pH, Blood: x     /  pCO2: 71    /  pO2: 81    / HCO3: 29    / Base Excess: 5.5   /  SaO2: 97     Currently pt is tolerating being on AVAPS, no respiratory distress, mentating well.  Consulted ICU PA if we can modify settings.   Will come up to see pt.  RN to continue to monitor and escalate prn. Called by RN for pt's wound care order.  Pt with chronic LLE ulceration, unspecified stage, s/p skin graft, has HOME wound care by RN.  Will order Wound care consult for recommendation of dressings.    22:30  Followed up with ABG ordered for Respiratory Acidosis with hypercapnia on AVAPS Min 16 Max 30 EPAP 8  FiO2 40%  ABG - ( 17 Dec 2020 21:16 )  pH, Arterial: 7.28  pH, Blood: x     /  pCO2: 71    /  pO2: 81    / HCO3: 29    / Base Excess: 5.5   /  SaO2: 97     Currently pt is tolerating being on AVAPS, no respiratory distress, mentating well.  Consulted ICU PA if we can modify settings.   Will come up to see pt.  RN to continue to monitor and escalate prn.    23:15  Made Noctmarybelist, Dr. Bernard aware of recommendation of Dr. Kaur, ICU.  Due to pt's respiratory stability on AVAPS/ mentating well, will monitor pt on this setting and have day team f/u with Nephrology.    RN to monitor pt's respiratory status and mentation and escalate prn.

## 2020-12-17 NOTE — PROGRESS NOTE ADULT - SUBJECTIVE AND OBJECTIVE BOX
NEPHROLOGY INTERVAL HPI/OVERNIGHT EVENTS:    Feels better   UO 1.7 L   Lasix noted   Bladder scan pending         MEDICATIONS  (STANDING):  albuterol/ipratropium for Nebulization 3 milliLiter(s) Nebulizer every 6 hours  albuterol/ipratropium for Nebulization. 3 milliLiter(s) Nebulizer once  allopurinol 100 milliGRAM(s) Oral daily  apixaban 2.5 milliGRAM(s) Oral every 12 hours  aspirin  chewable 81 milliGRAM(s) Oral daily  atorvastatin 20 milliGRAM(s) Oral at bedtime  carvedilol 3.125 milliGRAM(s) Oral every 12 hours  cyanocobalamin 1000 MICROGram(s) Oral daily  dextrose 40% Gel 15 Gram(s) Oral once  dextrose 5%. 1000 milliLiter(s) (50 mL/Hr) IV Continuous <Continuous>  dextrose 5%. 1000 milliLiter(s) (100 mL/Hr) IV Continuous <Continuous>  dextrose 50% Injectable 25 Gram(s) IV Push once  dextrose 50% Injectable 12.5 Gram(s) IV Push once  dextrose 50% Injectable 25 Gram(s) IV Push once  ferrous    sulfate 325 milliGRAM(s) Oral daily  folic acid 1 milliGRAM(s) Oral daily  furosemide   Injectable 60 milliGRAM(s) IV Push every 12 hours  glucagon  Injectable 1 milliGRAM(s) IntraMuscular once  insulin lispro (ADMELOG) corrective regimen sliding scale   SubCutaneous three times a day before meals  methylPREDNISolone sodium succinate Injectable 40 milliGRAM(s) IV Push every 12 hours  pantoprazole    Tablet 40 milliGRAM(s) Oral before breakfast  senna 2 Tablet(s) Oral at bedtime  tamsulosin 0.4 milliGRAM(s) Oral at bedtime  thiamine 100 milliGRAM(s) Oral daily    MEDICATIONS  (PRN):  acetaminophen   Tablet .. 650 milliGRAM(s) Oral every 6 hours PRN Temp greater or equal to 38C (100.4F), Mild Pain (1 - 3)  temazepam 30 milliGRAM(s) Oral at bedtime PRN Insomnia      Allergies    No Known Allergies    Intolerances          Vital Signs Last 24 Hrs  T(C): 36.4 (17 Dec 2020 10:17), Max: 36.6 (17 Dec 2020 00:00)  T(F): 97.6 (17 Dec 2020 10:17), Max: 97.8 (17 Dec 2020 00:00)  HR: 90 (17 Dec 2020 12:48) (78 - 98)  BP: 143/67 (17 Dec 2020 10:17) (133/68 - 158/85)  BP(mean): 109 (16 Dec 2020 20:00) (109 - 109)  RR: 18 (17 Dec 2020 10:17) (18 - 19)  SpO2: 97% (17 Dec 2020 12:48) (92% - 100%)  Daily     Daily   I&O's Detail    16 Dec 2020 07:01  -  17 Dec 2020 07:00  --------------------------------------------------------  IN:    Oral Fluid: 720 mL  Total IN: 720 mL    OUT:    Voided (mL): 950 mL  Total OUT: 950 mL    Total NET: -230 mL        I&O's Summary    16 Dec 2020 07:01  -  17 Dec 2020 07:00  --------------------------------------------------------  IN: 720 mL / OUT: 950 mL / NET: -230 mL      GENERAL: NAD, feels slowly better   HEAD:  Atraumatic, No facial edema   NECK: Supple, No JVD,  NERVOUS SYSTEM:  Alert & Oriented X3, No asterixis   CHEST/LUNG: EAE , Dec BS at bases   HEART: Regular rate and rhythm; No rub   ABDOMEN: Soft, Nontender, Nondistended;   EXTREMITIES:  ++ edema , wrapped       LABS:                        9.3    8.83  )-----------( 208      ( 17 Dec 2020 07:27 )             31.8     12-17    138  |  97<L>  |  85.0<H>  ----------------------------<  241<H>  4.8   |  31.0<H>  |  2.56<H>    Ca    8.2<L>      17 Dec 2020 07:27  Phos  5.2     12-17  Mg     2.3     12-17          Magnesium, Serum: 2.3 mg/dL (12-17 @ 07:27)  Phosphorus Level, Serum: 5.2 mg/dL (12-17 @ 07:27)    ABG - ( 17 Dec 2020 12:28 )  pH, Arterial: 7.20  pH, Blood: x     /  pCO2: 89    /  pO2: 86    / HCO3: 29    / Base Excess: 5.0   /  SaO2: 96              d      RADIOLOGY & ADDITIONAL TESTS:

## 2020-12-17 NOTE — CONSULT NOTE ADULT - SUBJECTIVE AND OBJECTIVE BOX
[place] [facility]                                                               Office:  [address] [address]                                                              Telephone: [phone]. Fax:[phone]                                                                        CARDIOLOGY CONSULTATION NOTE                                                                                             Consult requested by:  Dr. Higgins   Reason for Consultation: CHF Exacerbation  History obtained by: Patient and medical record   obtained: No    Chief complaint:    Patient is a 70y old  Male who presents with a chief complaint of CHF exacerbation (14 Dec 2020 18:45)      HPI: 69 y/o M with a PMHx of Afib (on Eilquis), severe pulmonary HTN, RV dysfunction, HFpEF (EF 60-65%), COPD (on 2L home O2), CAD s/p CABG 2013 (recommended for RHC/LHC on last 2 admissions, but patient refusing), chronic OM, GERALD/CKD, hematoma of LLE s/p debridement, bladder CA s/p TURP who presented to the ED with AMS and lethargy. Per patient he feels that ever since his last 2 hospitalizations he has never really had the fluid off, and his breathing has never significantly improved. Patient was sent out on Lasix 40mg PO BID, and states that his right leg continued to fill up with fluid and he still had dyspnea at rest. Patient's Cardiologist (Dr. Zia Nieto) then transitioned him to Torsemid 40mg PO qam and 20mg PO qPM. Patient states that he still feels like his weight is going up and his legs are still swollen, and his O2 requirement when up to 5L. Patient was seen and examined at bedside on BiPAP. Patient denies any fevers, chills, CP, syncope, near syncope, abdominal pain, N/V/D, headache, or dizziness.     REVIEW OF SYMPTOMS:     CONSTITUTIONAL: No fever, weight loss, or fatigue  ENMT:  No difficulty hearing, tinnitus, vertigo; No sinus or throat pain  NECK: No pain or stiffness  CARDIOVASCULAR: AS PER HPI  RESPIRATORY: AS PER HPI  : No dysuria, no hematuria   GI: No dark color stool, no melena, no diarrhea, no constipation, no abdominal pain   NEURO: No headache, no dizziness, no slurred speech   MUSCULOSKELETAL: No joint pain or swelling; No muscle, back, or extremity pain  PSYCH: No agitation, no anxiety.    ALL OTHER REVIEW OF SYSTEMS ARE NEGATIVE.      PREVIOUS DIAGNOSTIC TESTING  ECHO FINDINGS:  < from: TTE Echo Limited or F/U (11.16.20 @ 21:06) >  PHYSICIAN INTERPRETATION:  Left Ventricle: The left ventricular internal cavity size is normal.  Global LV systolic function was normal. Left ventricular ejection fraction, by visual estimation, is 60 to 65%.  Right Ventricle: The right ventricular size is mildly enlarged. RV systolic function is mildly reduced.  Tricuspid Valve: The tricuspid valve is not well seen. Moderate tricuspid regurgitation is visualized. Estimated pulmonary artery systolic pressure is 67.7 mmHg assuming a right atrial pressure of 15 mmHg, which is consistent with severe pulmonary hypertension.  Venous: The inferior vena cava was dilated, with respiratory size variation less than 50%.      Summary:   1. Left ventricular ejection fraction, by visual estimation, is 60 to 65%.  2. Normal global left ventricular systolic function.   3. Mildly enlarged right ventricle.   4. Mildly reduced RV systolic function.   5. Moderate tricuspid regurgitation.   6. Estimated pulmonary artery systolic pressure is 67.7 mmHg assuming a right atrial pressure of 15 mmHg, which is consistent with severe pulmonary hypertension.    MD Dana Electronically signed on 11/17/2020 at 10:11:35 AM    < end of copied text >    ALLERGIES: Allergies    No Known Allergies    Intolerances      PAST MEDICAL HISTORY  Ulcer of left lower extremity, unspecified ulcer stage    CKD (chronic kidney disease)    CHF (congestive heart failure)    Atrial fibrillation    COPD, severity to be determined    Coronary artery disease involving coronary bypass graft of native heart without angina pectoris    Chronic osteomyelitis, osteomyelitis of unspecified site    COPD (chronic obstructive pulmonary disease)    Atrial fibrillation    Bladder cancer    HLD (hyperlipidemia)        PAST SURGICAL HISTORY  H/O knee surgery    S/P CABG (coronary artery bypass graft)    Presence of stent of CABG        FAMILY HISTORY:  No pertinent family history in first degree relatives      SOCIAL HISTORY:    CIGARETTES: Former smoker, quit 2012. Smoked 1- 1.5 PPD x 20+ years  ALCOHOL: On weekends   DRUGS: Denies      CURRENT MEDICATIONS:  carvedilol 3.125 milliGRAM(s) Oral every 12 hours  furosemide   Injectable 60 milliGRAM(s) IV Push every 12 hours  tamsulosin 0.4 milliGRAM(s) Oral at bedtime    albuterol/ipratropium for Nebulization  albuterol/ipratropium for Nebulization.   pantoprazole    Tablet  senna  allopurinol  apixaban  aspirin  chewable  atorvastatin  azithromycin  IVPB  cyanocobalamin  ferrous    sulfate  folic acid  methylPREDNISolone sodium succinate Injectable  thiamine      HOME MEDICATIONS:  Home Medications:  Advair Diskus 250 mcg-50 mcg inhalation powder: 1 puff(s) inhaled 2 times a day (14 Dec 2020 11:09)  allopurinol 100 mg oral tablet: 1 tab(s) orally once a day (14 Dec 2020 11:09)  apixaban 2.5 mg oral tablet: 1 tab(s) orally 2 times a day (14 Dec 2020 11:09)  aspirin 81 mg oral tablet: 1 tab(s) orally once a day (14 Dec 2020 11:09)  Flomax 0.4 mg oral capsule: 1 cap(s) orally once a day (17 Nov 2020 11:30)  Lipitor 20 mg oral tablet: 1 tab(s) orally once a day (17 Nov 2020 11:30)  omeprazole 40 mg oral delayed release capsule: 1 cap(s) orally once a day (17 Nov 2020 11:30)  senna oral tablet: 2 tab(s) orally once a day (at bedtime) (22 Nov 2020 11:07)  temazepam 30 mg oral capsule: 1 cap(s) orally once a day (at bedtime), As Needed (17 Nov 2020 11:30)  torsemide 20 mg oral tablet: 1 tab(s) orally once a day (14 Dec 2020 18:20)      Vital Signs Last 24 Hrs  T(C): 36.6 (15 Dec 2020 07:55), Max: 36.8 (15 Dec 2020 00:29)  T(F): 97.9 (15 Dec 2020 07:55), Max: 98.2 (15 Dec 2020 00:29)  HR: 85 (15 Dec 2020 07:55) (70 - 94)  BP: 113/60 (15 Dec 2020 07:55) (113/60 - 150/69)  BP(mean): 81 (15 Dec 2020 04:19) (81 - 81)  RR: 18 (15 Dec 2020 07:55) (18 - 26)  SpO2: 100% (15 Dec 2020 07:59) (92% - 100%)      PHYSICAL EXAM:  Constitutional: Comfortable . No acute distress. On BiPAP  HEENT: Atraumatic and normocephalic , neck is supple . no JVD. No carotid bruit. PEERL   CNS: A&Ox3. No focal deficits. EOMI. Cranial nerves II-IX are intact.   Lymph Nodes: Cervical : Not palpable.  Respiratory: Bibasilar rales with coarse breath sounds  Cardiovascular: Irregularly irregular. No rubs or gallop. III/VI systolic murmur  Gastrointestinal: Soft non-tender and non distended . +Bowel sounds. negative Woodson's sign.  Extremities: 3+ b/l pitting edema to upper thighs, with chronic left leg wound  Psychiatric: Calm . no agitation.  Skin: No skin rash/ulcers visualized to face, hands or feet.    Intake and output:   12-14 @ 07:01  -  12-15 @ 07:00  --------------------------------------------------------  IN: 0 mL / OUT: 425 mL / NET: -425 mL        LABS:                        9.0    4.01  )-----------( 211      ( 15 Dec 2020 11:15 )             29.6     12-15    137  |  97<L>  |  69.0<H>  ----------------------------<  228<H>  5.1   |  29.0  |  2.52<H>    Ca    8.4<L>      15 Dec 2020 11:15  Mg     2.4     12-14    TPro  5.7<L>  /  Alb  3.4  /  TBili  0.5  /  DBili  x   /  AST  15  /  ALT  9   /  AlkPhos  203<H>  12-14    CARDIAC MARKERS ( 14 Dec 2020 11:06 )  x     / 0.03 ng/mL / x     / x     / x        ;p-BNP=Serum Pro-Brain Natriuretic Peptide: [id number] pg/mL (12-14 @ 11:06)    PT/INR - ( 14 Dec 2020 11:06 )   PT: 18.5 sec;   INR: 1.63 ratio         PTT - ( 14 Dec 2020 11:06 )  PTT:34.2 sec      INTERPRETATION OF TELEMETRY: Reviewed by me. Afib rate controlled  ECG: Reviewed by me. Afib, RAD, anteroseptal infarct     RADIOLOGY & ADDITIONAL STUDIES:    X-ray:  reviewed by me.   < from: Xray Chest 1 View- PORTABLE-Urgent (12.14.20 @ 11:20) >   EXAM:  XR CHEST PORTABLE URGENT 1V                          PROCEDURE DATE:  12/14/2020          INTERPRETATION:  TECHNIQUE: Single portable view of the chest.    COMPARISON: 11/21/2020    CLINICAL HISTORY: Chest Pain    FINDINGS:    Single frontal view of the chest demonstrates mild CHF. Small bilateral pleural effusions. Low lung volumes. The cardiomediastinal silhouette is enlarged. Mediastinal sternotomy wires. No acute osseous abnormalities. Overlying EKG leads and wires are noted    IMPRESSION: Mild CHF. Small bilateral pleural effusions. Cardiomegaly.    < end of copied text >      
PULMONARY CONSULT NOTE      DEBI [id number]    Patient is a 70y old  Male who presents with a chief complaint of CHF exacerbation (15 Dec 2020 12:26)  71 y/o M w/ a PMH of AFib (on eliquis), HFpEF (follows with Dr. Zia Storm; last known EF 60-65% on 11/17), COPD (on 2L home O2), CABG 2013, and bladder CA (s/p TURP 2013) presented to [facility]  w/ AMS x1 day w/ associated lethargy.  Pt also reports SOB x2 months that has acutely worsened the past few days.  Pt also reports fatigue, weight gain, inability to sleep flat.  Pt reports increased supplemental O2 requirement over the past few days to 5L.   He denies nocturnal cough, fevers, chills, myalgias, sick contacts, recent travel, cp, palpitations, abd pain, N/V, diarrhea.        HISTORY OF PRESENT ILLNESS:  feels better  no Cp or sputum, no wheezing at home  increased edema  using home 02, not on bipap  MEDICATIONS  (STANDING):  albuterol/ipratropium for Nebulization 3 milliLiter(s) Nebulizer every 6 hours  albuterol/ipratropium for Nebulization. 3 milliLiter(s) Nebulizer once  allopurinol 100 milliGRAM(s) Oral daily  apixaban 2.5 milliGRAM(s) Oral every 12 hours  aspirin  chewable 81 milliGRAM(s) Oral daily  atorvastatin 20 milliGRAM(s) Oral at bedtime  azithromycin  IVPB 500 milliGRAM(s) IV Intermittent every 24 hours  carvedilol 3.125 milliGRAM(s) Oral every 12 hours  cyanocobalamin 1000 MICROGram(s) Oral daily  ferrous    sulfate 325 milliGRAM(s) Oral daily  folic acid 1 milliGRAM(s) Oral daily  furosemide   Injectable 60 milliGRAM(s) IV Push every 12 hours  methylPREDNISolone sodium succinate Injectable 40 milliGRAM(s) IV Push every 8 hours  pantoprazole    Tablet 40 milliGRAM(s) Oral before breakfast  senna 2 Tablet(s) Oral at bedtime  tamsulosin 0.4 milliGRAM(s) Oral at bedtime  thiamine 100 milliGRAM(s) Oral daily      MEDICATIONS  (PRN):  acetaminophen   Tablet .. 650 milliGRAM(s) Oral every 6 hours PRN Temp greater or equal to 38C (100.4F), Mild Pain (1 - 3)  temazepam 30 milliGRAM(s) Oral at bedtime PRN Insomnia      Allergies    No Known Allergies    Intolerances        PAST MEDICAL & SURGICAL HISTORY:  Ulcer of left lower extremity, unspecified ulcer stage  Chronic LE ulceration    CKD (chronic kidney disease)    CHF (congestive heart failure)    Atrial fibrillation    COPD, severity to be determined    Coronary artery disease involving coronary bypass graft of native heart without angina pectoris    Chronic osteomyelitis, osteomyelitis of unspecified site    COPD (chronic obstructive pulmonary disease)    Atrial fibrillation    Bladder cancer    HLD (hyperlipidemia)    H/O knee surgery    S/P CABG (coronary artery bypass graft)    Presence of stent of CABG        FAMILY HISTORY:  No pertinent family history in first degree relatives        SOCIAL HISTORY  Smoking History:     REVIEW OF SYSTEMS:    CONSTITUTIONAL:  No fevers, chills, sweats    HEENT:  Eyes:  No diplopia or blurred vision. ENT:  No earache, sore throat or runny nose.    CARDIOVASCULAR:  No pressure, squeezing, tightness, or heaviness about the chest; no palpitations.    RESPIRATORY:  see HPI    GASTROINTESTINAL:  No abdominal pain, nausea, vomiting or diarrhea.    GENITOURINARY:  No dysuria, frequency or urgency.    NEUROLOGIC:  No paresthesias, fasciculations, seizures or weakness.    PSYCHIATRIC:  No disorder of thought or mood.    Vital Signs Last 24 Hrs  T(C): 36.4 (15 Dec 2020 15:48), Max: 36.8 (15 Dec 2020 00:29)  T(F): 97.6 (15 Dec 2020 15:48), Max: 98.2 (15 Dec 2020 00:29)  HR: 80 (15 Dec 2020 15:48) (70 - 111)  BP: 138/71 (15 Dec 2020 15:48) (113/60 - 150/69)  BP(mean): 81 (15 Dec 2020 04:19) (81 - 81)  RR: 20 (15 Dec 2020 15:48) (18 - 26)  SpO2: 96% (15 Dec 2020 15:48) (92% - 100%)    PHYSICAL EXAMINATION:    GENERAL: The patient is a well-developed, well-nourished in no apparent distress.     HEENT: Head is normocephalic and atraumatic. Extraocular muscles are intact. Mucous membranes are moist.     NECK: Supple.     LUNGS: moderate air entry bilat , decreased bases without wheezing, rales, or rhonchi. Respirations unlabored    HEART: Regular rate and rhythm without murmur.    ABDOMEN: Soft, nontender, and nondistended.  No hepatosplenomegaly is noted.    EXTREMITIES: With 1 plus  edema.    NEUROLOGIC: Grossly intact.      LABS:                        9.0    4.01  )-----------( 211      ( 15 Dec 2020 11:15 )             29.6     12-15    137  |  97<L>  |  69.0<H>  ----------------------------<  228<H>  5.1   |  29.0  |  2.52<H>    Ca    8.4<L>      15 Dec 2020 11:15  Mg     2.4     12-14    TPro  5.7<L>  /  Alb  3.4  /  TBili  0.5  /  DBili  x   /  AST  15  /  ALT  9   /  AlkPhos  203<H>  12-14    PT/INR - ( 14 Dec 2020 11:06 )   PT: 18.5 sec;   INR: 1.63 ratio         PTT - ( 14 Dec 2020 11:06 )  PTT:34.2 sec    ABG - ( 15 Dec 2020 09:19 )  pH, Arterial: 7.30  pH, Blood: x     /  pCO2: 65    /  pO2: 61    / HCO3: 28    / Base Excess: 4.6   /  SaO2: 91                CARDIAC MARKERS ( 14 Dec 2020 11:06 )  x     / 0.03 ng/mL / x     / x     / x            Serum Pro-Brain Natriuretic Peptide: 17552 pg/mL (12-14-20 @ 11:06)          MICROBIOLOGY:    RADIOLOGY & ADDITIONAL STUDIES:  CXRs reviewed
Patient is a 70y old  Male who presents with a chief complaint of     HPI/BRIEF HOSPITAL COURSE: 69 y/o  Male with multiple co-morbid conditions as follows presented with worsening lethargy and SOB as well as chills without fever, CP, cough, dizziness  Patient claimed that since he was started on Torsemide recently, he has started retaining more weight in his body especially belly and legs.   Denied any other complaints.     PAST MEDICAL & SURGICAL HISTORY:  Ulcer of left lower extremity, unspecified ulcer stage  Chronic LE ulceration  CKD (chronic kidney disease)  CHF (congestive heart failure)  Atrial fibrillation  Coronary artery disease involving coronary bypass graft of native heart without angina pectoris  Chronic osteomyelitis, osteomyelitis of unspecified site  COPD (chronic obstructive pulmonary disease)  Atrial fibrillation  Bladder cancer  HLD (hyperlipidemia)  H/O knee surgery  S/P CABG (coronary artery bypass graft)  Presence of stent of CABG    Allergies: No Known Allergies    FAMILY HISTORY:  No pertinent family history in first degree relatives      All systems reviewed with symptoms mentions as above.     Physical Examination:    ICU Vital Signs Last 24 Hrs  T(C): 36.3 (14 Dec 2020 09:45), Max: 36.3 (14 Dec 2020 09:45)  T(F): 97.3 (14 Dec 2020 09:45), Max: 97.3 (14 Dec 2020 09:45)  HR: 71 (14 Dec 2020 16:13) (71 - 77)  BP: 138/73 (14 Dec 2020 09:45) (138/73 - 138/73)  BP(mean): --  ABP: --  ABP(mean): --  RR: 20 (14 Dec 2020 09:45) (20 - 20)  SpO2: 95% (14 Dec 2020 16:13) (95% - 98%)      General: Mild resp distress, flushed skin on face and anasarca   Neuro: AAO*3, No motor, sensory, or cranial nerve deficit  HEENT: Pupils equal, reactive to light, Oral mucosa dry, JVD+  PULM: decreased air entry b/l   CVS: Irregular rhythm and controlled rate, 1/6systolic murmurs, rubs, or gallops  ABD: Soft, distended, nontender, normoactive bowel sounds, no CVA tenderness  EXT: 4++ b/l LE edema, nontender with pedal pulse palpable but feeble b/l with erythema b/l LE R>>L, subcut tissue (post recent procedure) looks ok  SKIN: Warm and well perfused, no acute rashes     Medications:  piperacillin/tazobactam IVPB... 3.375 Gram(s) IV Intermittent once  albuterol/ipratropium for Nebulization. 3 milliLiter(s) Nebulizer once  methylPREDNISolone sodium succinate Injectable 125 milliGRAM(s) IV Push Once    I&O's Detail    RADIOLOGY/ Microbiology/ Labs: reviewed     I have personally provided 70 minutes of critical care time excluding time spent on separate procedures      
Patient is a 70y old  Male who presents with a chief complaint of CHF exacerbation (16 Dec 2020 12:21)      HPI:  71 y/o M w/ a PMH of AFib (on eliquis), HFpEF (follows with Dr. Zia Storm; last known EF 60-65% on ), COPD (on 2L home O2), CABG , and bladder CA (s/p TURP ) presented to [facility]  w/ AMS x1 day w/ associated lethargy.  Pt also reports SOB x2 months that has acutely worsened the past few days.  Pt also reports fatigue, weight gain, inability to sleep flat.  Pt reports increased supplemental O2 requirement over the past few days to 5L.   He denies nocturnal cough, fevers, chills, myalgias, sick contacts, recent travel, cp, palpitations, abd pain, N/V, diarrhea.      Above noted   Known h/o CKD   Follows with doctors where he resides in [place]   Feels better with IV Lasix diuresis  Recorderd LOS balance (-) 1.6 L   Pulmonary and Cardio follow up noted        (14 Dec 2020 18:45)      PAST MEDICAL & SURGICAL HISTORY:  Ulcer of left lower extremity, unspecified ulcer stage  Chronic LE ulceration    CKD (chronic kidney disease)    CHF (congestive heart failure)    Atrial fibrillation    COPD, severity to be determined    Coronary artery disease involving coronary bypass graft of native heart without angina pectoris    Chronic osteomyelitis, osteomyelitis of unspecified site    COPD (chronic obstructive pulmonary disease)    Atrial fibrillation    Bladder cancer    HLD (hyperlipidemia)    H/O knee surgery    S/P CABG (coronary artery bypass graft)    Presence of stent of CABG        FAMILY HISTORY:  No pertinent family history in first degree relatives        Social History:    MEDICATIONS  (STANDING):  albuterol/ipratropium for Nebulization 3 milliLiter(s) Nebulizer every 6 hours  albuterol/ipratropium for Nebulization. 3 milliLiter(s) Nebulizer once  allopurinol 100 milliGRAM(s) Oral daily  apixaban 2.5 milliGRAM(s) Oral every 12 hours  aspirin  chewable 81 milliGRAM(s) Oral daily  atorvastatin 20 milliGRAM(s) Oral at bedtime  carvedilol 3.125 milliGRAM(s) Oral every 12 hours  cyanocobalamin 1000 MICROGram(s) Oral daily  dextrose 40% Gel 15 Gram(s) Oral once  dextrose 5%. 1000 milliLiter(s) (50 mL/Hr) IV Continuous <Continuous>  dextrose 5%. 1000 milliLiter(s) (100 mL/Hr) IV Continuous <Continuous>  dextrose 50% Injectable 25 Gram(s) IV Push once  dextrose 50% Injectable 12.5 Gram(s) IV Push once  dextrose 50% Injectable 25 Gram(s) IV Push once  ferrous    sulfate 325 milliGRAM(s) Oral daily  folic acid 1 milliGRAM(s) Oral daily  furosemide   Injectable 60 milliGRAM(s) IV Push every 12 hours  glucagon  Injectable 1 milliGRAM(s) IntraMuscular once  methylPREDNISolone sodium succinate Injectable 40 milliGRAM(s) IV Push every 8 hours  pantoprazole    Tablet 40 milliGRAM(s) Oral before breakfast  senna 2 Tablet(s) Oral at bedtime  tamsulosin 0.4 milliGRAM(s) Oral at bedtime  thiamine 100 milliGRAM(s) Oral daily    MEDICATIONS  (PRN):  acetaminophen   Tablet .. 650 milliGRAM(s) Oral every 6 hours PRN Temp greater or equal to 38C (100.4F), Mild Pain (1 - 3)  temazepam 30 milliGRAM(s) Oral at bedtime PRN Insomnia      Allergies    No Known Allergies    Intolerances        Vital Signs Last 24 Hrs  T(C): 36.5 (16 Dec 2020 15:40), Max: 36.7 (15 Dec 2020 23:31)  T(F): 97.7 (16 Dec 2020 15:40), Max: 98 (15 Dec 2020 23:31)  HR: 84 (16 Dec 2020 15:40) (72 - 96)  BP: 144/76 (16 Dec 2020 15:40) (136/71 - 146/68)  BP(mean): 92 (16 Dec 2020 10:08) (92 - 92)  RR: 19 (16 Dec 2020 15:40) (17 - 20)  SpO2: 98% (16 Dec 2020 15:40) (94% - 100%)  Daily     Daily Weight in k (16 Dec 2020 06:49)  I&O's Detail    15 Dec 2020 07:01  -  16 Dec 2020 07:00  --------------------------------------------------------  IN:    Oral Fluid: 180 mL  Total IN: 180 mL    OUT:    Voided (mL): 1275 mL  Total OUT: 1275 mL    Total NET: -1095 mL      16 Dec 2020 07:01  -  16 Dec 2020 16:19  --------------------------------------------------------  IN:    Oral Fluid: 600 mL  Total IN: 600 mL    OUT:    Voided (mL): 750 mL  Total OUT: 750 mL    Total NET: -150 mL        I&O's Summary    15 Dec 2020 07:  -  16 Dec 2020 07:00  --------------------------------------------------------  IN: 180 mL / OUT: 1275 mL / NET: -1095 mL    16 Dec 2020 07:  -  16 Dec 2020 16:19  --------------------------------------------------------  IN: 600 mL / OUT: 750 mL / NET: -150 mL        PHYSICAL EXAM:    GENERAL: NAD, feels slowly better   HEAD:  Atraumatic, No facial edema   NECK: Supple, No JVD,  NERVOUS SYSTEM:  Alert & Oriented X3, No asterixis   CHEST/LUNG: EAE , Dec BS at bases   HEART: Regular rate and rhythm; No rub   ABDOMEN: Soft, Nontender, Nondistended;   EXTREMITIES:  ++ edema , wrapped           LABS:                        9.1    7.58  )-----------( 202      ( 16 Dec 2020 07:45 )             30.3     12    138  |  98  |  79.0<H>  ----------------------------<  181<H>  5.0   |  30.0<H>  |  2.75<H>    Ca    8.5<L>      16 Dec 2020 07:45  Phos  6.6     12-16  Mg     2.5               Magnesium, Serum: 2.5 mg/dL ( @ 07:45)  Phosphorus Level, Serum: 6.6 mg/dL ( @ 07:45)  Magnesium, Serum: 2.3 mg/dL (12-15 @ 19:22)    ABG - ( 16 Dec 2020 11:14 )  pH, Arterial: 7.26  pH, Blood: x     /  pCO2: 71    /  pO2: 104   / HCO3: 28    / Base Excess: 4.0   /  SaO2: 99                 EXAM:  CT ABDOMEN AND PELVIS                         EXAM:  CT CHEST                          PROCEDURE DATE:  11/15/2020          INTERPRETATION:  CLINICAL INFORMATION: Sepsis. Respiratory illness.    COMPARISON: CT chest, abdomen, and pelvis of 2016.    PROCEDURE:  CT of the Chest, Abdomen and Pelvis was performed without intravenous contrast.  Intravenous contrast: None.  Oral contrast: None.  Sagittal and coronal reformats were performed.    FINDINGS:  CHEST:  LUNGS AND LARGE AIRWAYS:Endotracheal tube with tip terminating above the level of the harvey. Mild scattered groundglass airspace opacities and interlobular septal thickening. Bilateral compressive atelectasis involving nearly the entirety of the right lower lobe.  PLEURA: Moderate to large right and small-to-moderate left pleural effusions.  VESSELS: Atherosclerosis of the thoracic aorta, which is normal in caliber.  HEART: Heart size is normal. No pericardial effusion. Status post CABG.  MEDIASTINUM AND JULIA: No lymphadenopathy.  CHEST WALL AND LOWER NECK: Status post median sternotomy.  Mild bilateral symmetric gynecomastia.    ABDOMEN AND PELVIS:  LIVER: Within normal limits.  BILE DUCTS: Normal caliber.  GALLBLADDER: Within normal limits.  SPLEEN: Within normallimits.  PANCREAS: Within normal limits.  ADRENALS: Within normal limits.  KIDNEYS/URETERS: Right renal cyst. No hydronephrosis.    BLADDER: Collapsed around a Webb catheter balloon.  REPRODUCTIVE ORGANS: Prostate within normal limits.    BOWEL: No bowel obstruction. Colonic diverticulosis without diverticulitis. Appendix is normal. Enteric tube terminates within the proximal stomach.  PERITONEUM: Small intra-abdominal ascites.  VESSELS: Extensive calcified atherosclerosis of the abdominal aortaand its branch vessels.  RETROPERITONEUM/LYMPH NODES: No lymphadenopathy.  ABDOMINAL WALL: Anasarca. Fat-containing umbilical hernia.  BONES: Multilevel degenerative changes of the spine. Osseous fusion of the L4-L5 vertebral bodies.    IMPRESSION:  Moderate to large right and small-to-moderate left pleural effusions with associated compressive atelectasis, most notably involving nearly the entirety of the right lower lobe.    Mild scattered groundglass airspace opacities and interlobular septal thickening, may reflect pulmonary edema.    Small intra-abdominal ascites.    Anasarca.              ZIA COOPER MD; Attending Radiologist  This document has been electronically signed. Nov 15 2020  5:38AM        RADIOLOGY & ADDITIONAL TESTS:  
69 y/o M w/ a PMH of AFib (on eliquis), Severe Pulmonary HTN,  HFpEF COPD (on 2L home O2), Noncompliant with CPAP at home, CABG 2013, and bladder CA (s/p TURP 2013) presented to [facility]  w/ /AMS x1 day w/ associated lethargy.  Pt also reports SOB x2 months that has acutely worsened the past few days.  Pt also reports fatigue,, inability to sleep flat.   He denies nocturnal cough, fevers, chills, myalgias, sick contacts, recent travel, cp, palpitations, abd pain, N/V, diarrhea.      Events last 24 hours: Asked to evaluate for increasing Hypercapnea and respiratory acidosis. PLaced on Bipap earlier. Upon arrival the patient is awake,alert, interactive. Speaks in clear sentences. oriented x 3. Lat ABG on Bipap is 7.25/86/92/29    PAST MEDICAL & SURGICAL HISTORY:  Ulcer of left lower extremity, unspecified ulcer stage  Chronic LE ulceration    CKD (chronic kidney disease)    CHF (congestive heart failure)    Atrial fibrillation    COPD, severity to be determined    Coronary artery disease involving coronary bypass graft of native heart without angina pectoris    Chronic osteomyelitis, osteomyelitis of unspecified site    COPD (chronic obstructive pulmonary disease)    Atrial fibrillation    Bladder cancer    HLD (hyperlipidemia)    H/O knee surgery    //P CABG (coronary artery bypass graft)   which is improving from the prior off Bipap which was   /resence of stent of CABG      Allergies    No Known Allergies    Intolerances      FAMILY HISTORY:  No pertinent family history in first degree relatives        Review of Systems:  CONSTITUTIONAL: No fever, chills,  + Fatigue  EYES: No eye pain, visual disturbances, or discharge  ENMT:  No difficulty hearing, tinnitus, vertigo; No sinus or throat pain  NECK: No pain or stiffness  RESPIRATORY: +cough, +heezing, +shortness of breath,   CARDIOVASCULAR: No chest pain, palpitations, dizziness, + leg swelling  GASTROINTESTINAL: No abdominal or epigastric pain. No nausea, vomiting, or hematemesis; No diarrhea or constipation. No melena or hematochezia.  GENITOURINARY: No dysuria, frequency, hematuria, or incontinence  NEUROLOGICAL: No headaches, memory loss, loss of strength, numbness, or tremors  SKIN: No itching, burning, rashes, or lesions   MUSCULOSKELETAL: No joint pain or swelling; No muscle, back, or extremity pain  PSYCHIATRIC: No depression, anxiety, mood swings, or difficulty sleeping      Medications:    carvedilol 3.125 milliGRAM(s) Oral every 12 hours  furosemide   Injectable 60 milliGRAM(s) IV Push every 12 hours  tamsulosin 0.4 milliGRAM(s) Oral at bedtime    albuterol/ipratropium for Nebulization 3 milliLiter(s) Nebulizer every 6 hours  albuterol/ipratropium for Nebulization. 3 milliLiter(s) Nebulizer once    acetaminophen   Tablet .. 650 milliGRAM(s) Oral every 6 hours PRN  temazepam 30 milliGRAM(s) Oral at bedtime PRN      apixaban 2.5 milliGRAM(s) Oral every 12 hours  aspirin  chewable 81 milliGRAM(s) Oral daily    pantoprazole    Tablet 40 milliGRAM(s) Oral before breakfast  senna 2 Tablet(s) Oral at bedtime      allopurinol 100 milliGRAM(s) Oral daily  atorvastatin 20 milliGRAM(s) Oral at bedtime  dextrose 40% Gel 15 Gram(s) Oral once  dextrose 50% Injectable 25 Gram(s) IV Push once  dextrose 50% Injectable 12.5 Gram(s) IV Push once  dextrose 50% Injectable 25 Gram(s) IV Push once  glucagon  Injectable 1 milliGRAM(s) IntraMuscular once  insulin lispro (ADMELOG) corrective regimen sliding scale   SubCutaneous three times a day before meals  methylPREDNISolone sodium succinate Injectable 40 milliGRAM(s) IV Push every 12 hours    ascorbic acid 500 milliGRAM(s) Oral daily  cyanocobalamin 1000 MICROGram(s) Oral daily  dextrose 5%. 1000 milliLiter(s) IV Continuous <Continuous>  dextrose 5%. 1000 milliLiter(s) IV Continuous <Continuous>  ferrous    sulfate 325 milliGRAM(s) Oral daily  folic acid 1 milliGRAM(s) Oral daily  iron sucrose IVPB 200 milliGRAM(s) IV Intermittent every 24 hours  thiamine 100 milliGRAM(s) Oral daily  zinc sulfate 220 milliGRAM(s) Oral daily    epoetin felisha-epbx (RETACRIT) Injectable 29068 Unit(s) SubCutaneous every 7 days              ICU Vital Signs Last 24 Hrs  T(C): 36.3 (17 Dec 2020 16:18), Max: 36.6 (17 Dec 2020 00:00)  T(F): 97.4 (17 Dec 2020 16:18), Max: 97.8 (17 Dec 2020 00:00)  HR: 91 (17 Dec 2020 16:18) (78 - 98)  BP: 130/75 (17 Dec 2020 16:18) (130/75 - 158/85)  BP(mean): 109 (16 Dec 2020 20:00) (109 - 109)  ABP: --  ABP(mean): --  RR: 18 (17 Dec 2020 16:18) (18 - 19)  SpO2: 98% (17 Dec 2020 16:18) (92% - 100%)    Vital Signs Last 24 Hrs  T(C): 36.3 (17 Dec 2020 16:18), Max: 36.6 (17 Dec 2020 00:00)  T(F): 97.4 (17 Dec 2020 16:18), Max: 97.8 (17 Dec 2020 00:00)  HR: 91 (17 Dec 2020 16:18) (78 - 98)  BP: 130/75 (17 Dec 2020 16:18) (130/75 - 158/85)  BP(mean): 109 (16 Dec 2020 20:00) (109 - 109)  RR: 18 (17 Dec 2020 16:18) (18 - 19)  SpO2: 98% (17 Dec 2020 16:18) (92% - 100%)    ABG - ( 17 Dec 2020 16:14 )  pH, Arterial: 7.25  pH, Blood: x     /  pCO2: 80    /  pO2: 92    / HCO3: 30    / Base Excess: 5.9   /  SaO2: 98                  I&O's Detail    16 Dec 2020 07:01  -  17 Dec 2020 07:00  --------------------------------------------------------  IN:    Oral Fluid: 720 mL  Total IN: 720 mL    OUT:    Voided (mL): 950 mL  Total OUT: 950 mL    Total NET: -230 mL            LABS:                        9.3    8.83  )-----------( 208      ( 17 Dec 2020 07:27 )             31.8     12-17    138  |  97<L>  |  85.0<H>  ----------------------------<  241<H>  4.8   |  31.0<H>  |  2.56<H>    Ca    8.2<L>      17 Dec 2020 07:27  Phos  5.2     12-17  Mg     2.3     12-17            CAPILLARY BLOOD GLUCOSE      POCT Blood Glucose.: 252 mg/dL (17 Dec 2020 16:56)        CULTURES:      Physical Examination:    General: No acute distress.  Alert, oriented, interactive, nonfocal, comfortably breathing on Bipap    HEENT: Pupils equal, reactive to light.  Symmetric.    PULM: Decreased BS at B/L Bases R>L  No Wheezing      CVS: Irregular rhythm, no murmurs, rubs, or gallops    ABD: Soft, nondistended, nontender, normoactive bowel sounds, no masses    EXT: 2+ Lower ext Edema B/L , nontender    SKIN: Warm and well perfused, no rashes noted.    RADIOLOGY: CXR: FINDINGS:    Single frontal view of the chest demonstrates mild CHF. Small bilateral pleural effusions. Low lung volumes. The cardiomediastinal silhouette is enlarged. Mediastinal sternotomy wires. No acute osseous abnormalities. Overlying EKG leads and wires are noted    IMPRESSION: Mild CHF. Small bilateral pleural effusions. Cardiomegaly.        CRITICAL CARE TIME SPENT: 40 mins of time was spent evaluating this patient, including reviewing the medical EMR, speaking with the patinet and staff as well as documenting ,

## 2020-12-17 NOTE — CONSULT NOTE ADULT - PROBLEM SELECTOR RECOMMENDATION 9
Related to underlying COPD and sever pulmonary HTN. NOw improving slowly. Adjusted NIPPV to AVAPS with increased Min Ventilation to 10L/Min +, PHigh of 30, EPAP 8, set back up rate to 14. Will follow ABG.   Mobilize OOB as much as possible for improved respiratory excursion.   Continue Nocturnal Support  Continue Aggressive diuresis as tolerated. Concern for rising Baseline serum CO2 in face of renal failure with diuresis. Would consider Diamox dosing.   Continue bronchodilator therapy

## 2020-12-17 NOTE — DIETITIAN INITIAL EVALUATION ADULT. - OTHER INFO
EMS
71 y/o M w/ a PMH of AFib (on eliquis), HFpEF (follows with Dr. Zia Storm; last known EF 60-65% on 11/17), COPD (on 2L home O2), CABG 2013, and bladder CA (s/p TURP 2013) presented to [facility]  w/ AMS x1 day w/ associated lethargy.  Pt also reports SOB x2 months that has acutely worsened the past few days.  Pt also reports fatigue, weight gain, inability to sleep flat.  Pt reports increased supplemental O2 requirement over the past few days to 5L. Pt reports fluid weight gain about 9#. Stage 1 Butt and unstageable left heel noted. Pt with good po intake reported. Pt states he follows low sodium diet.

## 2020-12-17 NOTE — PROGRESS NOTE ADULT - SUBJECTIVE AND OBJECTIVE BOX
CC: f/u hypercapnic resp failure 2/2 COPD, Severe pulmonary HTN, afib, GERALD on CKD  INTERVAL HPI/OVERNIGHT EVENTS: Pt seen and examined at bedside this AM by Hospitalist and PA. Pt says he's feeling so-so. Denies fever, chills, headaches, dizziness, chest pain/pressure, palpitations, shortness of breath, difficulty breathing, abdominal pain, n/v/d or any other complaints. On 4L NC(2L at home) today. Placed back on bipap for resp acidosis. UO 1.7 L   on tele, HR controlled, afib     REVIEW OF SYSTEMS:  CONSTITUTIONAL: No fever, weight loss, or fatigue  RESPIRATORY: No cough, wheezing, chills or hemoptysis; No shortness of breath  CARDIOVASCULAR: No chest pain, palpitations, dizziness, or leg swelling  GASTROINTESTINAL: No abdominal or epigastric pain. No nausea, vomiting or hematemesis; No diarrhea or constipation  GENITOURINARY: No dysuria, frequency, hematuria, or incontinence  NEUROLOGICAL: No headaches, memory loss, loss of strength, numbness, or tremors  SKIN: No itching, burning, rashes, or lesions   MUSCULOSKELETAL: No joint pain or swelling; No muscle, back, or extremity pain    Allergies  No Known Allergies  Intolerances    PAST MEDICAL & SURGICAL HISTORY:  Ulcer of left lower extremity, unspecified ulcer stage  Chronic LE ulceration  CKD (chronic kidney disease)  CHF (congestive heart failure)  Atrial fibrillation  COPD, severityto be determined  Coronary artery disease involving coronary bypass graft of native heart without angina pectoris  Chronic osteomyelitis, osteomyelitis of unspecified site  COPD (chronic obstructive pulmonary disease)  Atrial fibrillation  Bladder cancer  HLD (hyperlipidemia)  H/O knee surgery  S/P CABG (coronary artery bypass graft)  Presence of stent of CABG    VITAL SIGNS:  T(C): 36.4 (12-17-20 @ 10:17), Max: 36.6 (12-17-20 @ 00:00)  HR: 90 (12-17-20 @ 12:48) (78 - 98)  BP: 143/67 (12-17-20 @ 10:17) (133/68 - 158/85)  RR: 18 (12-17-20 @ 10:17) (18 - 19)  SpO2: 97% (12-17-20 @ 12:48) (92% - 100%)  Wt(kg): --Vital Signs Last 24 Hrs  T(C): 36.4 (17 Dec 2020 10:17), Max: 36.6 (17 Dec 2020 00:00)  T(F): 97.6 (17 Dec 2020 10:17), Max: 97.8 (17 Dec 2020 00:00)  HR: 90 (17 Dec 2020 12:48) (78 - 98)  BP: 143/67 (17 Dec 2020 10:17) (133/68 - 158/85)  BP(mean): 109 (16 Dec 2020 20:00) (109 - 109)  RR: 18 (17 Dec 2020 10:17) (18 - 19)  SpO2: 97% (17 Dec 2020 12:48) (92% - 100%)    PHYSICAL EXAM:  GENERAL: Pt lying in bed comfortably in NAD  HEAD:  Atraumatic  EYES: EOMI, PERRL, conjunctiva and sclera clear  ENT: Moist mucous membranes  NECK: No JVD  CHEST/LUNG: Clear to auscultation bilaterally; No rales, rhonchi, wheezing, or rubs. Unlabored respirations  HEART: S1, S2, Regular rate, irregualr rhythm  ABDOMEN: Bowel sounds present; Soft, Nontender, Nondistended. No guarding or rigidity   EXTREMITIES:  2+ Peripheral Pulses, brisk capillary refill. No clubbing or cyanosis. trace LE pitting edema, LLE dressing clean/dry/intact  NERVOUS SYSTEM:  Alert & Oriented X3, speech clear, FROM x 4 extremities. No deficits   SKIN: No rashes or lesions    LABS:                   9.3    8.83  )-----------( 208      ( 17 Dec 2020 07:27 )             31.8     12-17  138  |  97<L>  |  85.0<H>  ----------------------------<  241<H>  4.8   |  31.0<H>  |  2.56<H>    Ca    8.2<L>      17 Dec 2020 07:27  Phos  5.2     12-17  Mg     2.3     12-17    CAPILLARY BLOOD GLUCOSE  POCT Blood Glucose.: 242 mg/dL (17 Dec 2020 12:13)  POCT Blood Glucose.: 237 mg/dL (17 Dec 2020 09:03)  POCT Blood Glucose.: 225 mg/dL (16 Dec 2020 21:32)    ABG - ( 17 Dec 2020 12:28 )  pH, Arterial: 7.20  pH, Blood: x     /  pCO2: 89    /  pO2: 86    / HCO3: 29    / Base Excess: 5.0   /  SaO2: 96        MEDICATIONS  (STANDING):  albuterol/ipratropium for Nebulization 3 milliLiter(s) Nebulizer every 6 hours  albuterol/ipratropium for Nebulization. 3 milliLiter(s) Nebulizer once  allopurinol 100 milliGRAM(s) Oral daily  apixaban 2.5 milliGRAM(s) Oral every 12 hours  ascorbic acid 500 milliGRAM(s) Oral daily  aspirin  chewable 81 milliGRAM(s) Oral daily  atorvastatin 20 milliGRAM(s) Oral at bedtime  carvedilol 3.125 milliGRAM(s) Oral every 12 hours  cyanocobalamin 1000 MICROGram(s) Oral daily  dextrose 40% Gel 15 Gram(s) Oral once  dextrose 5%. 1000 milliLiter(s) (50 mL/Hr) IV Continuous <Continuous>  dextrose 5%. 1000 milliLiter(s) (100 mL/Hr) IV Continuous <Continuous>  dextrose 50% Injectable 25 Gram(s) IV Push once  dextrose 50% Injectable 12.5 Gram(s) IV Push once  dextrose 50% Injectable 25 Gram(s) IV Push once  epoetin felisha-epbx (RETACRIT) Injectable 10334 Unit(s) SubCutaneous every 7 days  ferrous    sulfate 325 milliGRAM(s) Oral daily  folic acid 1 milliGRAM(s) Oral daily  furosemide   Injectable 60 milliGRAM(s) IV Push every 12 hours  glucagon  Injectable 1 milliGRAM(s) IntraMuscular once  insulin lispro (ADMELOG) corrective regimen sliding scale   SubCutaneous three times a day before meals  iron sucrose IVPB 200 milliGRAM(s) IV Intermittent every 24 hours  methylPREDNISolone sodium succinate Injectable 40 milliGRAM(s) IV Push every 12 hours  pantoprazole    Tablet 40 milliGRAM(s) Oral before breakfast  senna 2 Tablet(s) Oral at bedtime  tamsulosin 0.4 milliGRAM(s) Oral at bedtime  thiamine 100 milliGRAM(s) Oral daily  zinc sulfate 220 milliGRAM(s) Oral daily    MEDICATIONS  (PRN):  acetaminophen   Tablet .. 650 milliGRAM(s) Oral every 6 hours PRN Temp greater or equal to 38C (100.4F), Mild Pain (1 - 3)  temazepam 30 milliGRAM(s) Oral at bedtime PRN Insomnia

## 2020-12-17 NOTE — DIETITIAN INITIAL EVALUATION ADULT. - PERTINENT LABORATORY DATA
12-17 Na138 mmol/L Glu 241 mg/dL<H> K+ 4.8 mmol/L Cr  2.56 mg/dL<H> BUN 85.0 mg/dL<H> Phos 5.2 mg/dL<H> Alb n/a   PAB n/a

## 2020-12-17 NOTE — PROGRESS NOTE ADULT - ASSESSMENT
69 y/o M w/ a PMH of AFib (on eliquis), HFpEF (follows with Dr. Zia Storm; last known EF 60-65% on 11/17), COPD (on 2L home O2), CABG 2013, and bladder CA (s/p TURP 2013) presented to [facility]  w/ AMS x1 day w/ associated lethargy.  Pt also reports SOB x2 months that has acutely worsened the past few days.  Pt also reports fatigue, weight gain, inability to sleep flat.  Pt was noted to have evidence of fluid overload on exam, pBNP was 11K and CXR showed small pleural effusions consistent w/ CHF exacerbation  ABG also showed pt had hypoxemia and hypercapnia Pt was placed on BIPAP and mentation improved.  Pt will be admitted to tele/pulse ox for acute hypoxic hypercapnic respiratory failure. COVID -ve    Acute hypoxemic hypercapnic respiratory failure   - Likely multifactorial 2/2 Acute on chronic diastolic CHF and COPD exacerbation  - Monitor on  and c/w Supplemental O2 to maintain O2 sats 88-92%, on 4L NC  - BIPAP on standby as needed and nocturnal  - ABG reviewed today  - CXR and elevated pBNP consistent w/ fluid overload   - c/w Solumedrol and duonebs  - Head of bed elevation to 30 degrees  - Last known EF 60-65% on 11/17  - CXR shows b/l pleural effusions and pBNP 11,000 on admission  - Lasix 60mg IV BID, Solumed 40mg IV BID. titrate as tolerated   - Monitor on telemetry  - Hold home torsemide and place on IV lasix for more aggressive diuresis  - In light of active diuresis monitor lytes and renal function.  Goal K~4 and Mg~2  - Daily weights, strict I/O's and fluid restrict.  - Follows with Dr. Zia Storm (cardio). Dr. Geller will dw pt cardiologist Dr. Storm plan for possible right heart cath and cardiomems this hospital admission.  - Pulm and cardiology recs appreciated    Acute metabolic encephalopathy likely 2/2 CO2 narcosis   - pCO2 improved w/ BIPAP  - Mentation back to baseline     AFib, rate controlled  - c/w Eliquis 2.5mg q12h  - Monitor on telemetry    LLE chronic wound   - Xray L-tib/fib and L-foot: no evidence of rubén destruction  appears like a healed skin graft     GERALD on CKD likely prerenal  - Last known Cr 1.92 on 11/21/20  - Monitor renal fxn closely given active diuresis w/ IV lasix  - Avoid nephrotoxic medications or renally dose if needed  - nephrology recs appreciated  - Will have to tolerate some degree of azotemia  - Renal US pending & Bladder scan eval for retention    Anemia likely of chronic dx  - H/H at baseline and pt hemodynamically stable  - Monitor CBC daily   - Transfuse if Hb <7-8  - c/w epo and venofer added per renal    Hyperglycemia  -due to solumedrol  -HbA1c 5.0  -ISS added    DVT ppx: on Eliquis    Dispo: Possible cardiomems this admission when respiratory status improves; Eventual likely home w/ home care.  PCP is Dr. Hakan Ramos   71 y/o M w/ a PMH of AFib (on eliquis), HFpEF (follows with Dr. Zia Storm; last known EF 60-65% on 11/17), COPD (on 2L home O2), CABG 2013, and bladder CA (s/p TURP 2013) presented to [facility]  w/ AMS x1 day w/ associated lethargy.  Pt also reports SOB x2 months that has acutely worsened the past few days.  Pt also reports fatigue, weight gain, inability to sleep flat.  Pt was noted to have evidence of fluid overload on exam, pBNP was 11K and CXR showed small pleural effusions consistent w/ CHF exacerbation  ABG also showed pt had hypoxemia and hypercapnia Pt was placed on BIPAP and mentation improved.  Pt will be admitted to tele/pulse ox for acute hypoxic hypercapnic respiratory failure. COVID -ve    Acute hypoxemic hypercapnic respiratory failure   - Likely multifactorial 2/2 Acute on chronic diastolic CHF and COPD exacerbation  - Monitor on  and c/w Supplemental O2 to maintain O2 sats 88-92%, on 4L NC  - BIPAP on standby as needed and nocturnal  - ABG reviewed today  - CXR and elevated pBNP consistent w/ fluid overload   - c/w Solumedrol and duonebs  - Head of bed elevation to 30 degrees  - Last known EF 60-65% on 11/17  - CXR shows b/l pleural effusions and pBNP 11,000 on admission  - Lasix 60mg IV BID, Solumed 40mg IV BID. titrate as tolerated   - Monitor on telemetry  - Hold home torsemide and place on IV lasix for more aggressive diuresis  - In light of active diuresis monitor lytes and renal function.  Goal K~4 and Mg~2  - Daily weights, strict I/O's and fluid restrict.  - Follows with Dr. Zia Storm (cardio). Dr. Geller will dw pt cardiologist Dr. Storm plan for possible right heart cath and cardiomems this hospital admission.  - Pulm and cardiology recs appreciated  - repeat ABG noted, case d/w MICU resident around 16:30, will f/u recs     Acute metabolic encephalopathy likely 2/2 CO2 narcosis   - pCO2 improved w/ BIPAP  - Mentation back to baseline     AFib, rate controlled  - c/w Eliquis 2.5mg q12h  - Monitor on telemetry    LLE chronic wound   - Xray L-tib/fib and L-foot: no evidence of rubén destruction  appears like a healed skin graft     GERALD on CKD likely prerenal  - Last known Cr 1.92 on 11/21/20  - Monitor renal fxn closely given active diuresis w/ IV lasix  - Avoid nephrotoxic medications or renally dose if needed  - nephrology recs appreciated  - Will have to tolerate some degree of azotemia  - Renal US pending & Bladder scan eval for retention    Anemia likely of chronic dx  - H/H at baseline and pt hemodynamically stable  - Monitor CBC daily   - Transfuse if Hb <7-8  - c/w epo and venofer added per renal    Hyperglycemia  -due to solumedrol  -HbA1c 5.0  -ISS added    DVT ppx: on Eliquis    Dispo: Possible cardiomems this admission when respiratory status improves; Eventual likely home w/ home care.  PCP is Dr. Hakan Ramos   69 y/o M w/ a PMH of AFib (on eliquis), HFpEF (follows with Dr. Zia Storm; last known EF 60-65% on 11/17), COPD (on 2L home O2), CABG 2013, and bladder CA (s/p TURP 2013) presented to [facility]  w/ AMS x1 day w/ associated lethargy.  Pt also reports SOB x2 months that has acutely worsened the past few days.  Pt also reports fatigue, weight gain, inability to sleep flat.  Pt was noted to have evidence of fluid overload on exam, pBNP was 11K and CXR showed small pleural effusions consistent w/ CHF exacerbation  ABG also showed pt had hypoxemia and hypercapnia Pt was placed on BIPAP and mentation improved.  Pt will be admitted to tele/pulse ox for acute hypoxic hypercapnic respiratory failure. COVID -ve    Acute hypoxemic hypercapnic respiratory failure   - Likely multifactorial 2/2 Acute on chronic diastolic CHF and COPD exacerbation  - Monitor on  and c/w Supplemental O2 to maintain O2 sats 88-92%, on 4L NC  - BIPAP on standby as needed and nocturnal  - ABG reviewed today  - CXR and elevated pBNP consistent w/ fluid overload   - c/w Solumedrol and duonebs  - Head of bed elevation to 30 degrees  - Last known EF 60-65% on 11/17  - CXR shows b/l pleural effusions and pBNP 11,000 on admission  - Lasix 60mg IV BID, Solumed 40mg IV BID. titrate as tolerated   - Monitor on telemetry  - Hold home torsemide and place on IV lasix for more aggressive diuresis  - In light of active diuresis monitor lytes and renal function.  Goal K~4 and Mg~2  - Daily weights, strict I/O's and fluid restrict.  - Follows with Dr. Zia Storm (cardio). Dr. Geller will dw pt cardiologist Dr. Storm plan for possible right heart cath and cardiomems this hospital admission.  - Pulm and cardiology recs appreciated  - repeat ABG noted, case d/w MICU resident around 16:30. Not candidate for ICU transfer. Settings were changed at bedside by ICU     Acute metabolic encephalopathy likely 2/2 CO2 narcosis   - pCO2 improved w/ BIPAP  - Mentation back to baseline     AFib, rate controlled  - c/w Eliquis 2.5mg q12h  - Monitor on telemetry    LLE chronic wound   - Xray L-tib/fib and L-foot: no evidence of rubén destruction  appears like a healed skin graft     GERALD on CKD likely prerenal  - Last known Cr 1.92 on 11/21/20  - Monitor renal fxn closely given active diuresis w/ IV lasix  - Avoid nephrotoxic medications or renally dose if needed  - nephrology recs appreciated  - Will have to tolerate some degree of azotemia  - Renal US pending & Bladder scan eval for retention    Anemia likely of chronic dx  - H/H at baseline and pt hemodynamically stable  - Monitor CBC daily   - Transfuse if Hb <7-8  - c/w epo and venofer added per renal    Hyperglycemia  -due to solumedrol  -HbA1c 5.0  -ISS added    DVT ppx: on Eliquis    Dispo: Possible cardiomems this admission when respiratory status improves; Eventual likely home w/ home care.  PCP is Dr. Hakan Ramos

## 2020-12-17 NOTE — CONSULT NOTE ADULT - ASSESSMENT
70M with COPD, Pulm HTN, Afib admitted with worsening Hypercapnia and Right heart failure.  He is currently improving on Bipap, which has been adjusted to AVAPS. At this time he does not require ICU admission. If his condition deteriorates, we can re-evaluate him at that.

## 2020-12-17 NOTE — PROGRESS NOTE ADULT - SUBJECTIVE AND OBJECTIVE BOX
[place] [facility]                                                               Office: [address] [address]                                                              Telephone: [phone]. Fax:[phone]                                                                             PROGRESS NOTE  Reason for follow up: CHF exacerbation  Overnight: No new events.   Update: 12/17: Pt denies chest pain, palpitations. Pt states breathing is not better or worse. Pt resp exam reveals bilateral diminished breath sounds. Pt also has lower extremity pitting edema R>L. Tele- Afib HR @ 80-90s. Dr. Geller will dw pt cardiologist Dr. Storm plan for possible right heart cath and cardiomems this hospital admission. Cont. current medication regimen     Subjective: No new events     	  Vitals:  T(C): 36.4 (12-17-20 @ 10:17), Max: 36.6 (12-17-20 @ 00:00)  HR: 92 (12-17-20 @ 10:17) (78 - 98)  BP: 143/67 (12-17-20 @ 10:17) (133/68 - 158/85)  RR: 18 (12-17-20 @ 10:17) (18 - 19)  SpO2: 93% (12-17-20 @ 10:17) (92% - 100%)    I&O's Summary    16 Dec 2020 07:01  -  17 Dec 2020 07:00  --------------------------------------------------------  IN: 720 mL / OUT: 950 mL / NET: -230 mL      Weight (kg): 98 (12-15 @ 06:15)      PHYSICAL EXAM:  Appearance: Comfortable. No acute distress  HEENT:  Head and neck: Atraumatic. Normocephalic.  Normal oral mucosa, PERRL, Neck is supple. No JVD, No carotid bruit.   Neurologic: A & O x 3, no focal deficits. EOMI.  Lymphatic: No cervical lymphadenopathy  Cardiovascular: Normal S1 S2, No murmur, rubs/gallops. No JVD, No edema, IRRR  Respiratory: Diminished bilateral Lung sounds   Gastrointestinal:  Soft, Non-tender, + BS  Lower Extremities: bilateral lower extremity pitting edema   Psychiatry: Patient is calm. No agitation. Mood & affect appropriate  Skin: Covered       CURRENT MEDICATIONS:  carvedilol 3.125 milliGRAM(s) Oral every 12 hours  furosemide   Injectable 60 milliGRAM(s) IV Push every 12 hours  tamsulosin 0.4 milliGRAM(s) Oral at bedtime  albuterol/ipratropium for Nebulization  albuterol/ipratropium for Nebulization.  pantoprazole    Tablet  senna  allopurinol  atorvastatin  dextrose 40% Gel  dextrose 50% Injectable  dextrose 50% Injectable  dextrose 50% Injectable  glucagon  Injectable  methylPREDNISolone sodium succinate Injectable  apixaban  aspirin  chewable  cyanocobalamin  dextrose 5%.  dextrose 5%.  ferrous    sulfate  folic acid  thiamine      DIAGNOSTIC TESTING:  [ ] Echocardiogram: < from: TTE Echo Limited or F/U (11.16.20 @ 21:06) >  Summary:   1. Left ventricular ejection fraction, by visual estimation, is 60 to 65%.  2. Normal global left ventricular systolic function.   3. Mildly enlarged right ventricle.   4. Mildly reduced RV systolic function.   5. Moderate tricuspid regurgitation.   6. Estimated pulmonary artery systolic pressure is 67.7 mmHg assuming a right atrial pressure of 15 mmHg, which is consistent with severe pulmonary hypertension.      OTHER: 	      LABS:	 	  CARDIAC MARKERS ( 14 Dec 2020 11:06 )  x     / 0.03 ng/mL / x     / x     / x      p-BNP 14 Dec 2020 11:06: 43796 pg/mL                          9.3    8.83  )-----------( 208      ( 17 Dec 2020 07:27 )             31.8     12-17    138  |  97<L>  |  85.0<H>  ----------------------------<  241<H>  4.8   |  31.0<H>  |  2.56<H>    Ca    8.2<L>      17 Dec 2020 07:27  Phos  5.2     12-17  Mg     2.3     12-17      proBNP: Serum Pro-Brain Natriuretic Peptide: 75850 pg/mL (12-14 @ 11:06)       TSH: Thyroid Stimulating Hormone, Serum: 1.10 uIU/mL  Thyroid Stimulating Hormone, Serum: 1.09 uIU/mL        TELEMETRY: Afib HR @ 80-90s

## 2020-12-17 NOTE — PROGRESS NOTE ADULT - ASSESSMENT
71 y/o M w/ a PMH of AFib (on eliquis), HFpEF (follows with Dr. Zia Storm; last known EF 60-65% on 11/17), COPD (on 2L home O2), CABG 2013, and bladder CA (s/p TURP 2013) presented to [facility]  w/ AMS x1 day w/ associated lethargy.  Pt also reports SOB x2 months that has acutely worsened the past few days.  Pt also reports fatigue, weight gain, inability to sleep     GERALD on CKD ( 1.9 ).   Have to accept some degree of augmented azotemia   Watch Labs with IV Lasix diuresis , continue for now   Prior Urine P/C in Nov 2020 , only 200 mg   Await  post void bladder scan and renal sonogram   Expect some rise in BUN with steroids too   Labs in am       Anemia -   Add Epogen , folate and Venofer

## 2020-12-17 NOTE — GOALS OF CARE CONVERSATION - ADVANCED CARE PLANNING - CONVERSATION DETAILS
> Goals of care - I had a lengthy conversation with pt  We discussed the comorbid conditions, hospital care, and prognosis.  I also discussed advanced directives with the family - made aware of limited prognosis if patient were to be intubated or resuscitated, in consideration of age and comorbid condition. Pt wants to be FULL code. Total time spent on patient care discussion = 45 minutes

## 2020-12-17 NOTE — PROGRESS NOTE ADULT - ASSESSMENT
69 y/o M with a PMHx of Afib (on Eilquis), severe pulmonary HTN, RV dysfunction, HFpEF (EF 60-65%), COPD (on 2L home O2), CAD s/p CABG 2013 (recommended for RHC/LHC on last 2 admissions, but patient refusing), chronic OM, GERALD/CKD, hematoma of LLE s/p debridement with a skin graft. bladder CA s/p TURP who presented to the ED with AMS and lethargy. Per patient he feels that ever since his last 2 hospitalizations with little improvement with edema and sob.  He is 02 dependant and has bipap at home but does not use it.  Troponin 0.03 BNP 86383 Echo this admission with ef 60% with severe pul htn    12/17: Pt denies chest pain, palpitations. Pt states breathing is not better or worse. Pt resp exam reveals bilateral diminished breath sounds. Pt also has lower extremity pitting edema R>L. Tele- Afib HR @ 80-90s. Dr. Yimi mcqueen pt cardiologist Dr. Storm plan for possible right heart cath and cardiomems this hospital admission. Cont. current medication regimen.       CHF  EF 60%  Severe pulmonary htn  Output 1750   Discussed low na diet with him he states he follows it at home  Dr. Yimi mcqueen pt cardiologist Dr. Storm plan for possible right heart cath and cardiomems this hospital admission    CAD s/p CABG 2013  out patient follow up with cardio    PERMANENT ATRIAL FIB   on Eliquis and coreg    HYPERCAPNIC RESP FAILURE  BIPAp  02  c/w nebulizer

## 2020-12-18 LAB
A1C WITH ESTIMATED AVERAGE GLUCOSE RESULT: 4.7 % — SIGNIFICANT CHANGE UP (ref 4–5.6)
ANION GAP SERPL CALC-SCNC: 11 MMOL/L — SIGNIFICANT CHANGE UP (ref 5–17)
ANISOCYTOSIS BLD QL: SIGNIFICANT CHANGE UP
BASE EXCESS BLDA CALC-SCNC: 7 MMOL/L — HIGH (ref -2–2)
BASOPHILS # BLD AUTO: 0 K/UL — SIGNIFICANT CHANGE UP (ref 0–0.2)
BASOPHILS NFR BLD AUTO: 0 % — SIGNIFICANT CHANGE UP (ref 0–2)
BLOOD GAS COMMENTS ARTERIAL: SIGNIFICANT CHANGE UP
BUN SERPL-MCNC: 94 MG/DL — HIGH (ref 8–20)
CALCIUM SERPL-MCNC: 8.5 MG/DL — LOW (ref 8.6–10.2)
CHLORIDE SERPL-SCNC: 97 MMOL/L — LOW (ref 98–107)
CO2 SERPL-SCNC: 32 MMOL/L — HIGH (ref 22–29)
CREAT SERPL-MCNC: 2.14 MG/DL — HIGH (ref 0.5–1.3)
DACRYOCYTES BLD QL SMEAR: SLIGHT — SIGNIFICANT CHANGE UP
EOSINOPHIL # BLD AUTO: 0.07 K/UL — SIGNIFICANT CHANGE UP (ref 0–0.5)
EOSINOPHIL NFR BLD AUTO: 0.9 % — SIGNIFICANT CHANGE UP (ref 0–6)
ESTIMATED AVERAGE GLUCOSE: 88 MG/DL — SIGNIFICANT CHANGE UP (ref 68–114)
GAS PNL BLDA: SIGNIFICANT CHANGE UP
GLUCOSE BLDC GLUCOMTR-MCNC: 169 MG/DL — HIGH (ref 70–99)
GLUCOSE BLDC GLUCOMTR-MCNC: 169 MG/DL — HIGH (ref 70–99)
GLUCOSE BLDC GLUCOMTR-MCNC: 180 MG/DL — HIGH (ref 70–99)
GLUCOSE BLDC GLUCOMTR-MCNC: 186 MG/DL — HIGH (ref 70–99)
GLUCOSE SERPL-MCNC: 198 MG/DL — HIGH (ref 70–99)
HCO3 BLDA-SCNC: 31 MMOL/L — HIGH (ref 20–26)
HCT VFR BLD CALC: 30.8 % — LOW (ref 39–50)
HGB BLD-MCNC: 9.2 G/DL — LOW (ref 13–17)
HOROWITZ INDEX BLDA+IHG-RTO: 0.4 — SIGNIFICANT CHANGE UP
LYMPHOCYTES # BLD AUTO: 0.29 K/UL — LOW (ref 1–3.3)
LYMPHOCYTES # BLD AUTO: 3.5 % — LOW (ref 13–44)
MACROCYTES BLD QL: SIGNIFICANT CHANGE UP
MAGNESIUM SERPL-MCNC: 2.2 MG/DL — SIGNIFICANT CHANGE UP (ref 1.8–2.6)
MANUAL SMEAR VERIFICATION: SIGNIFICANT CHANGE UP
MCHC RBC-ENTMCNC: 29.9 GM/DL — LOW (ref 32–36)
MCHC RBC-ENTMCNC: 32.9 PG — SIGNIFICANT CHANGE UP (ref 27–34)
MCV RBC AUTO: 110 FL — HIGH (ref 80–100)
MONOCYTES # BLD AUTO: 0.14 K/UL — SIGNIFICANT CHANGE UP (ref 0–0.9)
MONOCYTES NFR BLD AUTO: 1.7 % — LOW (ref 2–14)
NEUTROPHILS # BLD AUTO: 7.65 K/UL — HIGH (ref 1.8–7.4)
NEUTROPHILS NFR BLD AUTO: 93.9 % — HIGH (ref 43–77)
OVALOCYTES BLD QL SMEAR: SLIGHT — SIGNIFICANT CHANGE UP
PCO2 BLDA: 70 MMHG — CRITICAL HIGH (ref 35–45)
PH BLDA: 7.3 — LOW (ref 7.35–7.45)
PHOSPHATE SERPL-MCNC: 4.3 MG/DL — SIGNIFICANT CHANGE UP (ref 2.4–4.7)
PLAT MORPH BLD: NORMAL — SIGNIFICANT CHANGE UP
PLATELET # BLD AUTO: 194 K/UL — SIGNIFICANT CHANGE UP (ref 150–400)
PO2 BLDA: 116 MMHG — HIGH (ref 83–108)
POLYCHROMASIA BLD QL SMEAR: SLIGHT — SIGNIFICANT CHANGE UP
POTASSIUM SERPL-MCNC: 4.8 MMOL/L — SIGNIFICANT CHANGE UP (ref 3.5–5.3)
POTASSIUM SERPL-SCNC: 4.8 MMOL/L — SIGNIFICANT CHANGE UP (ref 3.5–5.3)
RBC # BLD: 2.8 M/UL — LOW (ref 4.2–5.8)
RBC # FLD: 16.9 % — HIGH (ref 10.3–14.5)
RBC BLD AUTO: ABNORMAL
SAO2 % BLDA: 100 % — HIGH (ref 95–99)
SODIUM SERPL-SCNC: 140 MMOL/L — SIGNIFICANT CHANGE UP (ref 135–145)
WBC # BLD: 8.15 K/UL — SIGNIFICANT CHANGE UP (ref 3.8–10.5)
WBC # FLD AUTO: 8.15 K/UL — SIGNIFICANT CHANGE UP (ref 3.8–10.5)

## 2020-12-18 PROCEDURE — 99232 SBSQ HOSP IP/OBS MODERATE 35: CPT

## 2020-12-18 RX ORDER — ALPRAZOLAM 0.25 MG
0.25 TABLET ORAL ONCE
Refills: 0 | Status: DISCONTINUED | OUTPATIENT
Start: 2020-12-18 | End: 2020-12-18

## 2020-12-18 RX ORDER — CARVEDILOL PHOSPHATE 80 MG/1
6.25 CAPSULE, EXTENDED RELEASE ORAL EVERY 12 HOURS
Refills: 0 | Status: DISCONTINUED | OUTPATIENT
Start: 2020-12-18 | End: 2020-12-20

## 2020-12-18 RX ADMIN — ERYTHROPOIETIN 20000 UNIT(S): 10000 INJECTION, SOLUTION INTRAVENOUS; SUBCUTANEOUS at 18:39

## 2020-12-18 RX ADMIN — ATORVASTATIN CALCIUM 20 MILLIGRAM(S): 80 TABLET, FILM COATED ORAL at 21:03

## 2020-12-18 RX ADMIN — Medication 40 MILLIGRAM(S): at 18:44

## 2020-12-18 RX ADMIN — IRON SUCROSE 110 MILLIGRAM(S): 20 INJECTION, SOLUTION INTRAVENOUS at 22:38

## 2020-12-18 RX ADMIN — Medication 3 MILLILITER(S): at 02:49

## 2020-12-18 RX ADMIN — Medication 3 MILLILITER(S): at 15:14

## 2020-12-18 RX ADMIN — Medication 2: at 17:17

## 2020-12-18 RX ADMIN — SENNA PLUS 2 TABLET(S): 8.6 TABLET ORAL at 21:03

## 2020-12-18 RX ADMIN — CARVEDILOL PHOSPHATE 3.12 MILLIGRAM(S): 80 CAPSULE, EXTENDED RELEASE ORAL at 05:50

## 2020-12-18 RX ADMIN — APIXABAN 2.5 MILLIGRAM(S): 2.5 TABLET, FILM COATED ORAL at 05:50

## 2020-12-18 RX ADMIN — TAMSULOSIN HYDROCHLORIDE 0.4 MILLIGRAM(S): 0.4 CAPSULE ORAL at 21:03

## 2020-12-18 RX ADMIN — Medication 2: at 09:10

## 2020-12-18 RX ADMIN — Medication 2: at 12:12

## 2020-12-18 RX ADMIN — Medication 3 MILLILITER(S): at 08:30

## 2020-12-18 RX ADMIN — PANTOPRAZOLE SODIUM 40 MILLIGRAM(S): 20 TABLET, DELAYED RELEASE ORAL at 05:50

## 2020-12-18 RX ADMIN — Medication 0.25 MILLIGRAM(S): at 22:38

## 2020-12-18 RX ADMIN — Medication 60 MILLIGRAM(S): at 05:51

## 2020-12-18 RX ADMIN — Medication 3 MILLILITER(S): at 20:07

## 2020-12-18 RX ADMIN — Medication 40 MILLIGRAM(S): at 05:50

## 2020-12-18 RX ADMIN — APIXABAN 2.5 MILLIGRAM(S): 2.5 TABLET, FILM COATED ORAL at 18:44

## 2020-12-18 RX ADMIN — CARVEDILOL PHOSPHATE 6.25 MILLIGRAM(S): 80 CAPSULE, EXTENDED RELEASE ORAL at 18:43

## 2020-12-18 RX ADMIN — Medication 60 MILLIGRAM(S): at 18:44

## 2020-12-18 NOTE — PROGRESS NOTE ADULT - ASSESSMENT
Assess    Acute on chronic Hypoxic and Hypercarbic Respiratory Failure  CKD  Obesity    Assess    Acute on chronic Hypoxic and Hypercarbic Respiratory Failure  CKD  Obesity   COPD - No clear evidence this is severe  Possible VANITA  Unclear reason for severe pulmonary HTN    Rec    Upgrade to step down  Get ICU opinion again  NIV - nocturnal when able   DuoNeb  AC  Cardiac regiment   Eventual RHC/cardio MEMS  Palliative input may be appropriate

## 2020-12-18 NOTE — PROGRESS NOTE ADULT - SUBJECTIVE AND OBJECTIVE BOX
[place] [facility]                                                               Office: [address] [address]                                                              Telephone: [phone]. Fax:[phone]                                                                             PROGRESS NOTE  Reason for follow up: CHF exacerbation  Overnight: No new events.   Update: 12/18: Pt denies chest pain, palpitations. Pt currently on bipap and pt was transferred to tele unit. Pt resp exam reveals bilateral diminished breath sounds. Pt also has lower extremity pitting edema R>L. Tele- Afib HR @ 70s. Dr. Geller will dw pt cardiologist Dr. Storm plan for possible right heart cath and cardiomems this hospital admission. Increase Coreg to 6.25mg BID.    Subjective: No new events    	  Vitals:  T(C): 36.3 (12-18-20 @ 15:34), Max: 36.4 (12-17-20 @ 19:43)  HR: 74 (12-18-20 @ 15:53) (66 - 96)  BP: 152/68 (12-18-20 @ 15:34) (130/75 - 156/75)  RR: 18 (12-18-20 @ 15:34) (18 - 19)  SpO2: 100% (12-18-20 @ 15:53) (98% - 100%)    I&O's Summary    17 Dec 2020 07:01  -  18 Dec 2020 07:00  --------------------------------------------------------  IN: 360 mL / OUT: 600 mL / NET: -240 mL    18 Dec 2020 07:01  -  18 Dec 2020 16:08  --------------------------------------------------------  IN: 0 mL / OUT: 300 mL / NET: -300 mL      Weight (kg): 98 (12-15 @ 06:15)      PHYSICAL EXAM:  Appearance: Comfortable. No acute distress  HEENT:  Head and neck: Atraumatic. Normocephalic.  Normal oral mucosa, PERRL, Neck is supple. No JVD, No carotid bruit.   Neurologic: A & O x 3, no focal deficits. EOMI.  Lymphatic: No cervical lymphadenopathy  Cardiovascular: Normal S1 S2, No murmur, rubs/gallops. No JVD, No edema  Respiratory: bilateral lungs diminished   Gastrointestinal:  Soft, Non-tender, + BS  Lower Extremities: Bilateral pitting R>L edema  Psychiatry: Patient is calm. No agitation. Mood & affect appropriate  Skin: Ace wrap L lowerextremity       CURRENT MEDICATIONS:  carvedilol 6.25 milliGRAM(s) Oral every 12 hours  furosemide   Injectable 60 milliGRAM(s) IV Push every 12 hours  tamsulosin 0.4 milliGRAM(s) Oral at bedtime  albuterol/ipratropium for Nebulization  albuterol/ipratropium for Nebulization.  pantoprazole    Tablet  senna  allopurinol  atorvastatin  dextrose 40% Gel  dextrose 50% Injectable  dextrose 50% Injectable  dextrose 50% Injectable  glucagon  Injectable  insulin lispro (ADMELOG) corrective regimen sliding scale  methylPREDNISolone sodium succinate Injectable  apixaban  ascorbic acid  aspirin  chewable  cyanocobalamin  dextrose 5%.  dextrose 5%.  epoetin felisha-epbx (RETACRIT) Injectable  ferrous    sulfate  folic acid  iron sucrose IVPB  thiamine  zinc sulfate      DIAGNOSTIC TESTING:  [ ] Echocardiogram: < from: TTE Echo Limited or F/U (11.16.20 @ 21:06) >  Summary:   1. Left ventricular ejection fraction, by visual estimation, is 60 to 65%.  2. Normal global left ventricular systolic function.   3. Mildly enlarged right ventricle.   4. Mildly reduced RV systolic function.   5. Moderate tricuspid regurgitation.   6. Estimated pulmonary artery systolic pressure is 67.7 mmHg assuming a right atrial pressure of 15 mmHg, which is consistent with severe pulmonary hypertension.          OTHER: 	      LABS:	 	                            9.2    8.15  )-----------( 194      ( 18 Dec 2020 07:52 )             30.8     12-18    140  |  97<L>  |  94.0<H>  ----------------------------<  198<H>  4.8   |  32.0<H>  |  2.14<H>    Ca    8.5<L>      18 Dec 2020 07:52  Phos  4.3     12-18  Mg     2.2     12-18      proBNP: Serum Pro-Brain Natriuretic Peptide: 85692 pg/mL (12-14 @ 11:06)      TSH: Thyroid Stimulating Hormone, Serum: 1.10 uIU/mL  Thyroid Stimulating Hormone, Serum: 1.09 uIU/mL        TELEMETRY: Afib HR @ 70s

## 2020-12-18 NOTE — PROGRESS NOTE ADULT - ASSESSMENT
69 y/o M with a PMHx of Afib (on Eilquis), severe pulmonary HTN, RV dysfunction, HFpEF (EF 60-65%), COPD (on 2L home O2), CAD s/p CABG 2013 (recommended for RHC/LHC on last 2 admissions, but patient refusing), chronic OM, GERALD/CKD, hematoma of LLE s/p debridement with a skin graft. bladder CA s/p TURP who presented to the ED with AMS and lethargy. Per patient he feels that ever since his last 2 hospitalizations with little improvement with edema and sob.  He is 02 dependant and has bipap at home but does not use it.  Troponin 0.03 BNP [id number] Echo this admission with ef 60% with severe pul htn    12/18: Pt denies chest pain, palpitations. Pt currently on bipap and pt was transferred to tele unit. Pt resp exam reveals bilateral diminished breath sounds. Pt also has lower extremity pitting edema R>L. Tele- Afib HR @ 70s. Dr. Yimi mcqueen pt cardiologist Dr. Storm plan for possible right heart cath and cardiomems this hospital admission. Increase Coreg to 6.25mg BID.      CHF  EF 60%  Severe pulmonary htn  Output 1750   Discussed low na diet with him he states he follows it at home  Dr. Yimi mcqueen pt cardiologist Dr. Storm plan for possible right heart cath and cardiomems this hospital admission  Increase Coreg to 6.25mg BID    CAD s/p CABG 2013  out patient follow up with cardio    PERMANENT ATRIAL FIB   on Eliquis and coreg    HYPERCAPNIC RESP FAILURE  BIPAp  02  c/w nebulizer

## 2020-12-18 NOTE — PROGRESS NOTE ADULT - ASSESSMENT
CKD ( 1.9 ): GERALD, prerenal  Renal sono w/o hydro  Elevated also due to steroid effects  - avoid potential nephrotoxins  - diuretics and adjust as needed to keep azotemic  - monitor labs       Anemia:  - cont TIMOTHY and Venofer CKD ( 1.9 ): GERALD, prerenal  Renal sono w/o hydro  Elevated also due to steroid effects  - avoid potential nephrotoxins  - diuretics and adjust as needed to keep azotemic  - monitor labs   - if fails medical management will need to consider RRT    Anemia:  - cont TIMOTHY and Venofer

## 2020-12-18 NOTE — PROGRESS NOTE ADULT - ASSESSMENT
71 y/o M w/ a PMH of AFib (on eliquis), HFpEF (follows with Dr. Zia Storm; last known EF 60-65% on 11/17), COPD (on 2L home O2), CABG 2013, and bladder CA (s/p TURP 2013) presented to [facility]  w/ AMS x1 day w/ associated lethargy.  Pt also reports SOB x2 months that has acutely worsened the past few days.  Pt also reports fatigue, weight gain, inability to sleep flat.  Pt was noted to have evidence of fluid overload on exam, pBNP was 11K and CXR showed small pleural effusions consistent w/ CHF exacerbation  ABG also showed pt had hypoxemia and hypercapnia Pt was placed on BIPAP and mentation improved.  Pt will be admitted to tele/pulse ox for acute hypoxic hypercapnic respiratory failure. COVID -ve    Acute hypoxemic hypercapnic respiratory failure   - Likely multifactorial 2/2 Acute on chronic diastolic CHF and COPD exacerbation  - Monitor on  and c/w Supplemental O2 to maintain O2 sats 88-92%, on 4L NC  -ABG on 12/17 was consistent with worsening resp acidosis. Placed on continuous BIPAP, transferred to stepdown. ICU called yestreday but pt denied by ICU. Today pt much improved. ABG improved.   - CXR and elevated pBNP consistent w/ fluid overload   - c/w Solumedrol and duonebs  - Head of bed elevation to 30 degrees  - Last known EF 60-65% on 11/17  - CXR shows b/l pleural effusions and pBNP 11,000 on admission  - Lasix 60mg IV BID, Solumed 40mg IV BID. titrate as tolerated   - Monitor on telemetry  - Hold home torsemide and place on IV lasix for more aggressive diuresis  - In light of active diuresis monitor lytes and renal function. Cr improved. Goal K~4 and Mg~2  - Daily weights, strict I/O's and fluid restrict.-1.9L negative balance  - Follows with Dr. Zia Storm (cardio). Dr. Geller will dw pt cardiologist Dr. Storm plan for possible right heart cath and cardiomems this hospital admission.  - Pulm and cardiology recs appreciated  - repeat ABG noted, case d/w MICU resident around 16:30. Not candidate for ICU transfer. Settings were changed at bedside by ICU     Acute metabolic encephalopathy likely 2/2 CO2 narcosis   - pCO2 improved w/ BIPAP  - Mentation back to baseline     AFib, rate controlled  - c/w Eliquis 2.5mg q12h  - Monitor on telemetry    LLE chronic wound   - Xray L-tib/fib and L-foot: no evidence of rubén destruction  appears like a healed skin graft     GERALD on CKD 3 likely prerenal  - Last known Cr 1.92 on 11/21/20  - Monitor renal fxn closely given active diuresis w/ IV lasix  - Avoid nephrotoxic medications or renally dose if needed  - nephrology recs appreciated  - Will have to tolerate some degree of azotemia  - Renal US no hydro   Bladder scan eval for retention    Anemia likely of chronic dx  - H/H at baseline and pt hemodynamically stable  - Monitor CBC daily   - Transfuse if Hb <7-8  - c/w epo and venofer added per renal    Hyperglycemia  -due to solumedrol  -HbA1c 5.0  -ISS added    DVT ppx: on Eliquis    Spoke with wife. Agreeable with plan    Dispo: Possible cardiomems this admission when respiratory status improves; Eventual likely home w/ home care.  PCP is Dr. Hakan Ramos

## 2020-12-18 NOTE — PROGRESS NOTE ADULT - SUBJECTIVE AND OBJECTIVE BOX
PULMONARY PROGRESS NOTE      [id number]    Patient is a 70y old  Male who presents with a chief complaint of CHF exacerbation (18 Dec 2020 07:08)    69 y/o M w/ a PMH of AFib (on eliquis), Severe Pulmonary HTN,  HFpEF COPD (on 2L home O2), Noncompliant with CPAP at home, CABG 2013, and bladder CA (s/p TURP 2013) presented to [facility]  w/ /AMS x1 day w/ associated lethargy.  Pt also reports SOB x2 months that has acutely worsened the past few days.  Pt also reports fatigue,, inability to sleep flat.   He denies nocturnal cough, fevers, chills, myalgias, sick contacts, recent travel, cp, palpitations, abd pain, N/V, diarrhea.    12/17/20: increasing Hypercapnea and respiratory acidosis. PLaced on Bipap . Upon arrival the patient is awake,alert, interactive. Speaks in clear sentences. oriented x 3. Lat ABG on Bipap is 7.25/86/92/29    INTERVAL HPI/OVERNIGHT EVENTS:  Placed on continuous NIV  Alert - hypercarbic    MEDICATIONS  (STANDING):  albuterol/ipratropium for Nebulization 3 milliLiter(s) Nebulizer every 6 hours  albuterol/ipratropium for Nebulization. 3 milliLiter(s) Nebulizer once  allopurinol 100 milliGRAM(s) Oral daily  apixaban 2.5 milliGRAM(s) Oral every 12 hours  ascorbic acid 500 milliGRAM(s) Oral daily  aspirin  chewable 81 milliGRAM(s) Oral daily  atorvastatin 20 milliGRAM(s) Oral at bedtime  carvedilol 3.125 milliGRAM(s) Oral every 12 hours  cyanocobalamin 1000 MICROGram(s) Oral daily  dextrose 40% Gel 15 Gram(s) Oral once  dextrose 5%. 1000 milliLiter(s) (50 mL/Hr) IV Continuous <Continuous>  dextrose 5%. 1000 milliLiter(s) (100 mL/Hr) IV Continuous <Continuous>  dextrose 50% Injectable 25 Gram(s) IV Push once  dextrose 50% Injectable 12.5 Gram(s) IV Push once  dextrose 50% Injectable 25 Gram(s) IV Push once  epoetin felisha-epbx (RETACRIT) Injectable 60782 Unit(s) SubCutaneous every 7 days  ferrous    sulfate 325 milliGRAM(s) Oral daily  folic acid 1 milliGRAM(s) Oral daily  furosemide   Injectable 60 milliGRAM(s) IV Push every 12 hours  glucagon  Injectable 1 milliGRAM(s) IntraMuscular once  insulin lispro (ADMELOG) corrective regimen sliding scale   SubCutaneous three times a day before meals  iron sucrose IVPB 200 milliGRAM(s) IV Intermittent every 24 hours  methylPREDNISolone sodium succinate Injectable 40 milliGRAM(s) IV Push every 12 hours  pantoprazole    Tablet 40 milliGRAM(s) Oral before breakfast  senna 2 Tablet(s) Oral at bedtime  tamsulosin 0.4 milliGRAM(s) Oral at bedtime  thiamine 100 milliGRAM(s) Oral daily  zinc sulfate 220 milliGRAM(s) Oral daily      MEDICATIONS  (PRN):  acetaminophen   Tablet .. 650 milliGRAM(s) Oral every 6 hours PRN Temp greater or equal to 38C (100.4F), Mild Pain (1 - 3)  temazepam 30 milliGRAM(s) Oral at bedtime PRN Insomnia      Allergies    No Known Allergies    Intolerances        PAST MEDICAL & SURGICAL HISTORY:  Ulcer of left lower extremity, unspecified ulcer stage  Chronic LE ulceration    CKD (chronic kidney disease)    CHF (congestive heart failure)    Atrial fibrillation    COPD, severity to be determined    Coronary artery disease involving coronary bypass graft of native heart without angina pectoris    Chronic osteomyelitis, osteomyelitis of unspecified site    COPD (chronic obstructive pulmonary disease)    Atrial fibrillation    Bladder cancer    HLD (hyperlipidemia)    H/O knee surgery    S/P CABG (coronary artery bypass graft)    Presence of stent of CABG        SOCIAL HISTORY  Smoking History:       REVIEW OF SYSTEMS:    CONSTITUTIONAL:  No distress    HEENT:  Eyes:  No diplopia or blurred vision. ENT:  No earache, sore throat or runny nose.    CARDIOVASCULAR:  No pressure, squeezing, tightness, heaviness or aching about the chest; no palpitations.    RESPIRATORY:  No cough, PND or orthopnea. Mod SOBOE    GASTROINTESTINAL:  No nausea, vomiting or diarrhea.    GENITOURINARY:  No dysuria, frequency or urgency.    NEUROLOGIC:  No paresthesias, fasciculations, seizures or weakness.    PSYCHIATRIC:  No disorder of thought or mood.    Vital Signs Last 24 Hrs  T(C): 36.4 (18 Dec 2020 07:50), Max: 36.4 (17 Dec 2020 10:17)  T(F): 97.5 (18 Dec 2020 07:50), Max: 97.6 (17 Dec 2020 10:17)  HR: 78 (18 Dec 2020 07:50) (78 - 96)  BP: 155/74 (18 Dec 2020 07:50) (130/75 - 155/74)  BP(mean): --  RR: 18 (18 Dec 2020 07:50) (18 - 18)  SpO2: 100% (18 Dec 2020 07:50) (93% - 100%)    PHYSICAL EXAMINATION:    GENERAL: The patient is awake and alert in no apparent distress.     HEENT: Head is normocephalic and atraumatic. Extraocular muscles are intact. Mucous membranes are moist.    NECK: Supple.    LUNGS: Clear to auscultation without wheezing, rales or rhonchi; respirations unlabored    HEART: Regular rate and rhythm without murmur.    ABDOMEN: Soft, nontender, and nondistended.      EXTREMITIES: Without any cyanosis, clubbing, rash, lesions or edema.    NEUROLOGIC: Grossly intact.    LABS:                        9.2    8.15  )-----------( 194      ( 18 Dec 2020 07:52 )             30.8     12-18    140  |  97<L>  |  94.0<H>  ----------------------------<  198<H>  4.8   |  32.0<H>  |  2.14<H>    Ca    8.5<L>      18 Dec 2020 07:52  Phos  4.3     12-18  Mg     2.2     12-18      ABG - ( 18 Dec 2020 08:13 )  pH, Arterial: 7.30  pH, Blood: x     /  pCO2: 70    /  pO2: 116   / HCO3: 31    / Base Excess: 7.0   /  SaO2: 100       MICROBIOLOGY:  COVID neg; RVP and Flu neg     RADIOLOGY & ADDITIONAL STUDIES:    12/14/20  CXR mild PVC and bilat effusion  Leg Duplex neg  PULMONARY PROGRESS NOTE      [id number]    Patient is a 70y old  Male who presents with a chief complaint of CHF exacerbation (18 Dec 2020 07:08)    71 y/o M w/ a PMH of AFib (on eliquis), Severe Pulmonary HTN,  HFpEF COPD (on 2L home O2), Noncompliant with CPAP at home, CABG 2013, and bladder CA (s/p TURP 2013) presented to [facility]  w/ /AMS x1 day w/ associated lethargy.  Pt also reports SOB x2 months that has acutely worsened the past few days.  Pt also reports fatigue,, inability to sleep flat.   He denies nocturnal cough, fevers, chills, myalgias, sick contacts, recent travel, cp, palpitations, abd pain, N/V, diarrhea.    12/17/20: increasing Hypercapnea and respiratory acidosis. PLaced on Bipap . Upon arrival the patient is awake,alert, interactive. Speaks in clear sentences. oriented x 3. Lat ABG on Bipap is 7.25/86/92/29    INTERVAL HPI/OVERNIGHT EVENTS:  Placed on continuous NIV  Alert - hypercarbic    MEDICATIONS  (STANDING):  albuterol/ipratropium for Nebulization 3 milliLiter(s) Nebulizer every 6 hours  albuterol/ipratropium for Nebulization. 3 milliLiter(s) Nebulizer once  allopurinol 100 milliGRAM(s) Oral daily  apixaban 2.5 milliGRAM(s) Oral every 12 hours  ascorbic acid 500 milliGRAM(s) Oral daily  aspirin  chewable 81 milliGRAM(s) Oral daily  atorvastatin 20 milliGRAM(s) Oral at bedtime  carvedilol 3.125 milliGRAM(s) Oral every 12 hours  cyanocobalamin 1000 MICROGram(s) Oral daily  dextrose 40% Gel 15 Gram(s) Oral once  dextrose 5%. 1000 milliLiter(s) (50 mL/Hr) IV Continuous <Continuous>  dextrose 5%. 1000 milliLiter(s) (100 mL/Hr) IV Continuous <Continuous>  dextrose 50% Injectable 25 Gram(s) IV Push once  dextrose 50% Injectable 12.5 Gram(s) IV Push once  dextrose 50% Injectable 25 Gram(s) IV Push once  epoetin felisha-epbx (RETACRIT) Injectable 13935 Unit(s) SubCutaneous every 7 days  ferrous    sulfate 325 milliGRAM(s) Oral daily  folic acid 1 milliGRAM(s) Oral daily  furosemide   Injectable 60 milliGRAM(s) IV Push every 12 hours  glucagon  Injectable 1 milliGRAM(s) IntraMuscular once  insulin lispro (ADMELOG) corrective regimen sliding scale   SubCutaneous three times a day before meals  iron sucrose IVPB 200 milliGRAM(s) IV Intermittent every 24 hours  methylPREDNISolone sodium succinate Injectable 40 milliGRAM(s) IV Push every 12 hours  pantoprazole    Tablet 40 milliGRAM(s) Oral before breakfast  senna 2 Tablet(s) Oral at bedtime  tamsulosin 0.4 milliGRAM(s) Oral at bedtime  thiamine 100 milliGRAM(s) Oral daily  zinc sulfate 220 milliGRAM(s) Oral daily      MEDICATIONS  (PRN):  acetaminophen   Tablet .. 650 milliGRAM(s) Oral every 6 hours PRN Temp greater or equal to 38C (100.4F), Mild Pain (1 - 3)  temazepam 30 milliGRAM(s) Oral at bedtime PRN Insomnia      Allergies    No Known Allergies    Intolerances        PAST MEDICAL & SURGICAL HISTORY:  Ulcer of left lower extremity, unspecified ulcer stage  Chronic LE ulceration    CKD (chronic kidney disease)    CHF (congestive heart failure)    Atrial fibrillation    COPD, severity to be determined    Coronary artery disease involving coronary bypass graft of native heart without angina pectoris    Chronic osteomyelitis, osteomyelitis of unspecified site    COPD (chronic obstructive pulmonary disease)    Atrial fibrillation    Bladder cancer    HLD (hyperlipidemia)    H/O knee surgery    S/P CABG (coronary artery bypass graft)    Presence of stent of CABG        SOCIAL HISTORY  Smoking History:       REVIEW OF SYSTEMS:    CONSTITUTIONAL:  No distress    HEENT:  Eyes:  No diplopia or blurred vision. ENT:  No earache, sore throat or runny nose.    CARDIOVASCULAR:  No pressure, squeezing, tightness, heaviness or aching about the chest; no palpitations.    RESPIRATORY:  No cough, PND or orthopnea. Mod SOBOE    GASTROINTESTINAL:  No nausea, vomiting or diarrhea.    GENITOURINARY:  No dysuria, frequency or urgency.    NEUROLOGIC:  No paresthesias, fasciculations, seizures or weakness.    PSYCHIATRIC:  No disorder of thought or mood.    Vital Signs Last 24 Hrs  T(C): 36.4 (18 Dec 2020 07:50), Max: 36.4 (17 Dec 2020 10:17)  T(F): 97.5 (18 Dec 2020 07:50), Max: 97.6 (17 Dec 2020 10:17)  HR: 78 (18 Dec 2020 07:50) (78 - 96)  BP: 155/74 (18 Dec 2020 07:50) (130/75 - 155/74)  BP(mean): --  RR: 18 (18 Dec 2020 07:50) (18 - 18)  SpO2: 100% (18 Dec 2020 07:50) (93% - 100%)    PHYSICAL EXAMINATION:    GENERAL: The patient is awake and alert in no apparent distress.     HEENT: Head is normocephalic and atraumatic. Extraocular muscles are intact. Mucous membranes are moist.    NECK: Supple.    LUNGS: Clear to auscultation without wheezing, rales or rhonchi; respirations unlabored    HEART: Regular rate and rhythm without murmur.    ABDOMEN: Soft, nontender, and nondistended.      EXTREMITIES: Without any cyanosis, clubbing, rash, lesions or edema.    NEUROLOGIC: Grossly intact.    LABS:                        9.2    8.15  )-----------( 194      ( 18 Dec 2020 07:52 )             30.8     12-18    140  |  97<L>  |  94.0<H>  ----------------------------<  198<H>  4.8   |  32.0<H>  |  2.14<H>    Ca    8.5<L>      18 Dec 2020 07:52  Phos  4.3     12-18  Mg     2.2     12-18      ABG - ( 18 Dec 2020 08:13 )  pH, Arterial: 7.30  pH, Blood: x     /  pCO2: 70    /  pO2: 116   / HCO3: 31    / Base Excess: 7.0   /  SaO2: 100       MICROBIOLOGY:  COVID neg; RVP and Flu neg     RADIOLOGY & ADDITIONAL STUDIES:    12/14/20  CXR mild PVC and bilat effusion  Leg Duplex neg     Echo on 11/16/20   1. Left ventricular ejection fraction, by visual estimation, is 60 to 65%.  2. Normal global left ventricular systolic function.   3. Mildly enlarged right ventricle.   4. Mildly reduced RV systolic function.   5. Moderate tricuspid regurgitation.   6. Estimated pulmonary artery systolic pressure is 67.7 mmHg assuming a right atrial pressure of 15 mmHg, which is consistent with severe pulmonary hypertension.    MD Dana Electronically signed on 11/17/2020 at 10:11:35 AM

## 2020-12-18 NOTE — PROGRESS NOTE ADULT - SUBJECTIVE AND OBJECTIVE BOX
CC: f/u hypercapnic resp failure 2/2 COPD, Severe pulmonary HTN, afib, GERALD on CKD  INTERVAL HPI/OVERNIGHT EVENTS: Pt seen and examined at [facility].  Pt says his SOB is feeling  the same. Denies fever, chills, headaches, dizziness, chest pain/pressure, palpitations, shortness of breath, difficulty breathing, abdominal pain, n/v/d or any other complaints. On 4L NC(2L at home) today. Placed back on bipap continuous for resp acidosis. UO 1.7 L   on tele, HR controlled, afib     REVIEW OF SYSTEMS:  CONSTITUTIONAL: No fever, weight loss, or fatigue  RESPIRATORY: No cough, wheezing, chills or hemoptysis; No shortness of breath  CARDIOVASCULAR: No chest pain, palpitations, dizziness, or leg swelling  GASTROINTESTINAL: No abdominal or epigastric pain. No nausea, vomiting or hematemesis; No diarrhea or constipation  GENITOURINARY: No dysuria, frequency, hematuria, or incontinence  NEUROLOGICAL: No headaches, memory loss, loss of strength, numbness, or tremors  SKIN: No itching, burning, rashes, or lesions   MUSCULOSKELETAL: No joint pain or swelling; No muscle, back, or extremity pain    Allergies  No Known Allergies  Intolerances    PAST MEDICAL & SURGICAL HISTORY:  Ulcer of left lower extremity, unspecified ulcer stage  Chronic LE ulceration  CKD (chronic kidney disease)  CHF (congestive heart failure)  Atrial fibrillation  COPD, severityto be determined  Coronary artery disease involving coronary bypass graft of native heart without angina pectoris  Chronic osteomyelitis, osteomyelitis of unspecified site  COPD (chronic obstructive pulmonary disease)  Atrial fibrillation  Bladder cancer  HLD (hyperlipidemia)  H/O knee surgery  S/P CABG (coronary artery bypass graft)  Presence of stent of CABG    VITAL SIGNS:  T(C): 36.4 (12-17-20 @ 10:17), Max: 36.6 (12-17-20 @ 00:00)  HR: 90 (12-17-20 @ 12:48) (78 - 98)  BP: 143/67 (12-17-20 @ 10:17) (133/68 - 158/85)  RR: 18 (12-17-20 @ 10:17) (18 - 19)  SpO2: 97% (12-17-20 @ 12:48) (92% - 100%)  Wt(kg): --Vital Signs Last 24 Hrs  T(C): 36.4 (17 Dec 2020 10:17), Max: 36.6 (17 Dec 2020 00:00)  T(F): 97.6 (17 Dec 2020 10:17), Max: 97.8 (17 Dec 2020 00:00)  HR: 90 (17 Dec 2020 12:48) (78 - 98)  BP: 143/67 (17 Dec 2020 10:17) (133/68 - 158/85)  BP(mean): 109 (16 Dec 2020 20:00) (109 - 109)  RR: 18 (17 Dec 2020 10:17) (18 - 19)  SpO2: 97% (17 Dec 2020 12:48) (92% - 100%)    PHYSICAL EXAM:  GENERAL: Pt lying in bed comfortably in NAD, morbid obeisity, +BiPAP  HEAD:  Atraumatic  EYES: EOMI, PERRL, conjunctiva and sclera clear  ENT: Moist mucous membranes  NECK: No JVD  CHEST/LUNG: decreased BS bilaterally; No rales, rhonchi, wheezing, or rubs. Unlabored respirations  HEART: S1, S2, Regular rate, irregular rhythm  ABDOMEN: Bowel sounds present; Soft, Nontender, Nondistended. No guarding or rigidity   EXTREMITIES:  2+ Peripheral Pulses, brisk capillary refill. No clubbing or cyanosis. trace LE pitting edema, LLE dressing clean/dry/intact  NERVOUS SYSTEM:  Alert & Oriented X3, speech clear, FROM x 4 extremities. No deficits   SKIN: No rashes or lesions    LABS:                   9.3    8.83  )-----------( 208      ( 17 Dec 2020 07:27 )             31.8     12-17  138  |  97<L>  |  85.0<H>  ----------------------------<  241<H>  4.8   |  31.0<H>  |  2.56<H>    Ca    8.2<L>      17 Dec 2020 07:27  Phos  5.2     12-17  Mg     2.3     12-17    CAPILLARY BLOOD GLUCOSE  POCT Blood Glucose.: 242 mg/dL (17 Dec 2020 12:13)  POCT Blood Glucose.: 237 mg/dL (17 Dec 2020 09:03)  POCT Blood Glucose.: 225 mg/dL (16 Dec 2020 21:32)    ABG - ( 17 Dec 2020 12:28 )  pH, Arterial: 7.20  pH, Blood: x     /  pCO2: 89    /  pO2: 86    / HCO3: 29    / Base Excess: 5.0   /  SaO2: 96        MEDICATIONS  (STANDING):  albuterol/ipratropium for Nebulization 3 milliLiter(s) Nebulizer every 6 hours  albuterol/ipratropium for Nebulization. 3 milliLiter(s) Nebulizer once  allopurinol 100 milliGRAM(s) Oral daily  apixaban 2.5 milliGRAM(s) Oral every 12 hours  ascorbic acid 500 milliGRAM(s) Oral daily  aspirin  chewable 81 milliGRAM(s) Oral daily  atorvastatin 20 milliGRAM(s) Oral at bedtime  carvedilol 3.125 milliGRAM(s) Oral every 12 hours  cyanocobalamin 1000 MICROGram(s) Oral daily  dextrose 40% Gel 15 Gram(s) Oral once  dextrose 5%. 1000 milliLiter(s) (50 mL/Hr) IV Continuous <Continuous>  dextrose 5%. 1000 milliLiter(s) (100 mL/Hr) IV Continuous <Continuous>  dextrose 50% Injectable 25 Gram(s) IV Push once  dextrose 50% Injectable 12.5 Gram(s) IV Push once  dextrose 50% Injectable 25 Gram(s) IV Push once  epoetin felisha-epbx (RETACRIT) Injectable 14745 Unit(s) SubCutaneous every 7 days  ferrous    sulfate 325 milliGRAM(s) Oral daily  folic acid 1 milliGRAM(s) Oral daily  furosemide   Injectable 60 milliGRAM(s) IV Push every 12 hours  glucagon  Injectable 1 milliGRAM(s) IntraMuscular once  insulin lispro (ADMELOG) corrective regimen sliding scale   SubCutaneous three times a day before meals  iron sucrose IVPB 200 milliGRAM(s) IV Intermittent every 24 hours  methylPREDNISolone sodium succinate Injectable 40 milliGRAM(s) IV Push every 12 hours  pantoprazole    Tablet 40 milliGRAM(s) Oral before breakfast  senna 2 Tablet(s) Oral at bedtime  tamsulosin 0.4 milliGRAM(s) Oral at bedtime  thiamine 100 milliGRAM(s) Oral daily  zinc sulfate 220 milliGRAM(s) Oral daily    MEDICATIONS  (PRN):  acetaminophen   Tablet .. 650 milliGRAM(s) Oral every 6 hours PRN Temp greater or equal to 38C (100.4F), Mild Pain (1 - 3)  temazepam 30 milliGRAM(s) Oral at bedtime PRN Insomnia

## 2020-12-18 NOTE — PROGRESS NOTE ADULT - SUBJECTIVE AND OBJECTIVE BOX
NEPHROLOGY INTERVAL HPI/OVERNIGHT EVENTS:  no acute distress noted    MEDICATIONS  (STANDING):  albuterol/ipratropium for Nebulization 3 milliLiter(s) Nebulizer every 6 hours  albuterol/ipratropium for Nebulization. 3 milliLiter(s) Nebulizer once  allopurinol 100 milliGRAM(s) Oral daily  apixaban 2.5 milliGRAM(s) Oral every 12 hours  ascorbic acid 500 milliGRAM(s) Oral daily  aspirin  chewable 81 milliGRAM(s) Oral daily  atorvastatin 20 milliGRAM(s) Oral at bedtime  carvedilol 3.125 milliGRAM(s) Oral every 12 hours  cyanocobalamin 1000 MICROGram(s) Oral daily  dextrose 40% Gel 15 Gram(s) Oral once  dextrose 5%. 1000 milliLiter(s) (50 mL/Hr) IV Continuous <Continuous>  dextrose 5%. 1000 milliLiter(s) (100 mL/Hr) IV Continuous <Continuous>  dextrose 50% Injectable 25 Gram(s) IV Push once  dextrose 50% Injectable 12.5 Gram(s) IV Push once  dextrose 50% Injectable 25 Gram(s) IV Push once  epoetin felisha-epbx (RETACRIT) Injectable 09349 Unit(s) SubCutaneous every 7 days  ferrous    sulfate 325 milliGRAM(s) Oral daily  folic acid 1 milliGRAM(s) Oral daily  furosemide   Injectable 60 milliGRAM(s) IV Push every 12 hours  glucagon  Injectable 1 milliGRAM(s) IntraMuscular once  insulin lispro (ADMELOG) corrective regimen sliding scale   SubCutaneous three times a day before meals  iron sucrose IVPB 200 milliGRAM(s) IV Intermittent every 24 hours  methylPREDNISolone sodium succinate Injectable 40 milliGRAM(s) IV Push every 12 hours  pantoprazole    Tablet 40 milliGRAM(s) Oral before breakfast  senna 2 Tablet(s) Oral at bedtime  tamsulosin 0.4 milliGRAM(s) Oral at bedtime  thiamine 100 milliGRAM(s) Oral daily  zinc sulfate 220 milliGRAM(s) Oral daily    MEDICATIONS  (PRN):  acetaminophen   Tablet .. 650 milliGRAM(s) Oral every 6 hours PRN Temp greater or equal to 38C (100.4F), Mild Pain (1 - 3)  temazepam 30 milliGRAM(s) Oral at bedtime PRN Insomnia      Allergies    No Known Allergies          Vital Signs Last 24 Hrs  T(C): 36.1 (18 Dec 2020 05:02), Max: 36.4 (17 Dec 2020 10:17)  T(F): 97 (18 Dec 2020 05:02), Max: 97.6 (17 Dec 2020 10:17)  HR: 82 (18 Dec 2020 05:02) (82 - 96)  BP: 143/73 (18 Dec 2020 05:02) (130/75 - 153/74)  BP(mean): --  RR: 18 (18 Dec 2020 05:02) (18 - 18)  SpO2: 100% (18 Dec 2020 05:02) (93% - 100%)    PHYSICAL EXAM:  GENERAL: Fatigued  NECK: Supple, No JVD,  NERVOUS SYSTEM:  Alert & Oriented X3, No asterixis   CHEST/LUNG: Clear, diminished  HEART: Regular rate and rhythm; No rub   ABDOMEN: Soft, Nontender, Nondistended; +BS  EXTREMITIES:  ++ edema ,    LABS:                        9.3    8.83  )-----------( 208      ( 17 Dec 2020 07:27 )             31.8     12-17    138  |  97<L>  |  85.0<H>  ----------------------------<  241<H>  4.8   |  31.0<H>  |  2.56<H>    Ca    8.2<L>      17 Dec 2020 07:27  Phos  5.2     12-17  Mg     2.3     12-17          Magnesium, Serum: 2.3 mg/dL (12-17 @ 07:27)  Phosphorus Level, Serum: 5.2 mg/dL (12-17 @ 07:27)      RADIOLOGY & ADDITIONAL TESTS:  < from: US Renal (12.17.20 @ 22:36) >     EXAM:  US KIDNEY(S)                          PROCEDURE DATE:  12/17/2020          INTERPRETATION:  CLINICAL INFORMATION: Renal failure    COMPARISON: Renal ultrasound 12/13/2016    TECHNIQUE: Sonography of the kidneys and bladder.    FINDINGS:    Right kidney: 10.9 cm. No renal mass, hydronephrosis or calculi. Interval growth of a lower pole simple cyst measuring 2.4. Renal cortical thinning.    Left kidney: 9.7 cm. No renal mass, hydronephrosis or calculi.    Urinary bladder: Within normal limits.    IMPRESSION:    No hydronephrosis.    < end of copied text >   NEPHROLOGY INTERVAL HPI/OVERNIGHT EVENTS:  no acute distress noted  still sob on BiPAP but can comfortably carry on a conversation    MEDICATIONS  (STANDING):  albuterol/ipratropium for Nebulization 3 milliLiter(s) Nebulizer every 6 hours  albuterol/ipratropium for Nebulization. 3 milliLiter(s) Nebulizer once  allopurinol 100 milliGRAM(s) Oral daily  apixaban 2.5 milliGRAM(s) Oral every 12 hours  ascorbic acid 500 milliGRAM(s) Oral daily  aspirin  chewable 81 milliGRAM(s) Oral daily  atorvastatin 20 milliGRAM(s) Oral at bedtime  carvedilol 3.125 milliGRAM(s) Oral every 12 hours  cyanocobalamin 1000 MICROGram(s) Oral daily  dextrose 40% Gel 15 Gram(s) Oral once  dextrose 5%. 1000 milliLiter(s) (50 mL/Hr) IV Continuous <Continuous>  dextrose 5%. 1000 milliLiter(s) (100 mL/Hr) IV Continuous <Continuous>  dextrose 50% Injectable 25 Gram(s) IV Push once  dextrose 50% Injectable 12.5 Gram(s) IV Push once  dextrose 50% Injectable 25 Gram(s) IV Push once  epoetin felisha-epbx (RETACRIT) Injectable 74392 Unit(s) SubCutaneous every 7 days  ferrous    sulfate 325 milliGRAM(s) Oral daily  folic acid 1 milliGRAM(s) Oral daily  furosemide   Injectable 60 milliGRAM(s) IV Push every 12 hours  glucagon  Injectable 1 milliGRAM(s) IntraMuscular once  insulin lispro (ADMELOG) corrective regimen sliding scale   SubCutaneous three times a day before meals  iron sucrose IVPB 200 milliGRAM(s) IV Intermittent every 24 hours  methylPREDNISolone sodium succinate Injectable 40 milliGRAM(s) IV Push every 12 hours  pantoprazole    Tablet 40 milliGRAM(s) Oral before breakfast  senna 2 Tablet(s) Oral at bedtime  tamsulosin 0.4 milliGRAM(s) Oral at bedtime  thiamine 100 milliGRAM(s) Oral daily  zinc sulfate 220 milliGRAM(s) Oral daily    MEDICATIONS  (PRN):  acetaminophen   Tablet .. 650 milliGRAM(s) Oral every 6 hours PRN Temp greater or equal to 38C (100.4F), Mild Pain (1 - 3)  temazepam 30 milliGRAM(s) Oral at bedtime PRN Insomnia      Allergies    No Known Allergies          Vital Signs Last 24 Hrs  T(C): 36.1 (18 Dec 2020 05:02), Max: 36.4 (17 Dec 2020 10:17)  T(F): 97 (18 Dec 2020 05:02), Max: 97.6 (17 Dec 2020 10:17)  HR: 82 (18 Dec 2020 05:02) (82 - 96)  BP: 143/73 (18 Dec 2020 05:02) (130/75 - 153/74)  BP(mean): --  RR: 18 (18 Dec 2020 05:02) (18 - 18)  SpO2: 100% (18 Dec 2020 05:02) (93% - 100%)    PHYSICAL EXAM:  GENERAL: Fatigued  NECK: Supple, + JVD,  NERVOUS SYSTEM:  Alert & Oriented X3, No asterixis   CHEST/LUNG: Clear, diminished  HEART: Regular rate and rhythm; No rub   ABDOMEN: Soft, Nontender, Nondistended; +BS  EXTREMITIES:  ++ edema LEs    LABS:                        9.3    8.83  )-----------( 208      ( 17 Dec 2020 07:27 )             31.8     12-17    138  |  97<L>  |  85.0<H>  ----------------------------<  241<H>  4.8   |  31.0<H>  |  2.56<H>    Ca    8.2<L>      17 Dec 2020 07:27  Phos  5.2     12-17  Mg     2.3     12-17          Magnesium, Serum: 2.3 mg/dL (12-17 @ 07:27)  Phosphorus Level, Serum: 5.2 mg/dL (12-17 @ 07:27)      RADIOLOGY & ADDITIONAL TESTS:  < from: US Renal (12.17.20 @ 22:36) >     EXAM:  US KIDNEY(S)                          PROCEDURE DATE:  12/17/2020          INTERPRETATION:  CLINICAL INFORMATION: Renal failure    COMPARISON: Renal ultrasound 12/13/2016    TECHNIQUE: Sonography of the kidneys and bladder.    FINDINGS:    Right kidney: 10.9 cm. No renal mass, hydronephrosis or calculi. Interval growth of a lower pole simple cyst measuring 2.4. Renal cortical thinning.    Left kidney: 9.7 cm. No renal mass, hydronephrosis or calculi.    Urinary bladder: Within normal limits.    IMPRESSION:    No hydronephrosis.    < end of copied text >

## 2020-12-19 LAB
ANION GAP SERPL CALC-SCNC: 11 MMOL/L — SIGNIFICANT CHANGE UP (ref 5–17)
BASOPHILS # BLD AUTO: 0 K/UL — SIGNIFICANT CHANGE UP (ref 0–0.2)
BASOPHILS NFR BLD AUTO: 0 % — SIGNIFICANT CHANGE UP (ref 0–2)
BUN SERPL-MCNC: 91 MG/DL — HIGH (ref 8–20)
CALCIUM SERPL-MCNC: 8.4 MG/DL — LOW (ref 8.6–10.2)
CHLORIDE SERPL-SCNC: 98 MMOL/L — SIGNIFICANT CHANGE UP (ref 98–107)
CO2 SERPL-SCNC: 32 MMOL/L — HIGH (ref 22–29)
CREAT SERPL-MCNC: 1.93 MG/DL — HIGH (ref 0.5–1.3)
EOSINOPHIL # BLD AUTO: 0.11 K/UL — SIGNIFICANT CHANGE UP (ref 0–0.5)
EOSINOPHIL NFR BLD AUTO: 1.8 % — SIGNIFICANT CHANGE UP (ref 0–6)
GAS PNL BLDA: SIGNIFICANT CHANGE UP
GLUCOSE BLDC GLUCOMTR-MCNC: 159 MG/DL — HIGH (ref 70–99)
GLUCOSE BLDC GLUCOMTR-MCNC: 180 MG/DL — HIGH (ref 70–99)
GLUCOSE BLDC GLUCOMTR-MCNC: 180 MG/DL — HIGH (ref 70–99)
GLUCOSE BLDC GLUCOMTR-MCNC: 259 MG/DL — HIGH (ref 70–99)
GLUCOSE SERPL-MCNC: 184 MG/DL — HIGH (ref 70–99)
HCT VFR BLD CALC: 30.8 % — LOW (ref 39–50)
HGB BLD-MCNC: 9.3 G/DL — LOW (ref 13–17)
IMM GRANULOCYTES NFR BLD AUTO: 0.3 % — SIGNIFICANT CHANGE UP (ref 0–1.5)
LYMPHOCYTES # BLD AUTO: 0.46 K/UL — LOW (ref 1–3.3)
LYMPHOCYTES # BLD AUTO: 7.6 % — LOW (ref 13–44)
MAGNESIUM SERPL-MCNC: 2 MG/DL — SIGNIFICANT CHANGE UP (ref 1.6–2.6)
MCHC RBC-ENTMCNC: 30.2 GM/DL — LOW (ref 32–36)
MCHC RBC-ENTMCNC: 32.6 PG — SIGNIFICANT CHANGE UP (ref 27–34)
MCV RBC AUTO: 108.1 FL — HIGH (ref 80–100)
MONOCYTES # BLD AUTO: 0.28 K/UL — SIGNIFICANT CHANGE UP (ref 0–0.9)
MONOCYTES NFR BLD AUTO: 4.6 % — SIGNIFICANT CHANGE UP (ref 2–14)
NEUTROPHILS # BLD AUTO: 5.22 K/UL — SIGNIFICANT CHANGE UP (ref 1.8–7.4)
NEUTROPHILS NFR BLD AUTO: 85.7 % — HIGH (ref 43–77)
NT-PROBNP SERPL-SCNC: HIGH PG/ML (ref 0–300)
PHOSPHATE SERPL-MCNC: 4.4 MG/DL — SIGNIFICANT CHANGE UP (ref 2.4–4.7)
PLATELET # BLD AUTO: 186 K/UL — SIGNIFICANT CHANGE UP (ref 150–400)
POTASSIUM SERPL-MCNC: 4.6 MMOL/L — SIGNIFICANT CHANGE UP (ref 3.5–5.3)
POTASSIUM SERPL-SCNC: 4.6 MMOL/L — SIGNIFICANT CHANGE UP (ref 3.5–5.3)
RBC # BLD: 2.85 M/UL — LOW (ref 4.2–5.8)
RBC # FLD: 17.2 % — HIGH (ref 10.3–14.5)
SODIUM SERPL-SCNC: 141 MMOL/L — SIGNIFICANT CHANGE UP (ref 135–145)
WBC # BLD: 6.09 K/UL — SIGNIFICANT CHANGE UP (ref 3.8–10.5)
WBC # FLD AUTO: 6.09 K/UL — SIGNIFICANT CHANGE UP (ref 3.8–10.5)

## 2020-12-19 PROCEDURE — 99232 SBSQ HOSP IP/OBS MODERATE 35: CPT

## 2020-12-19 PROCEDURE — 99233 SBSQ HOSP IP/OBS HIGH 50: CPT

## 2020-12-19 PROCEDURE — 71045 X-RAY EXAM CHEST 1 VIEW: CPT | Mod: 26

## 2020-12-19 RX ADMIN — Medication 60 MILLIGRAM(S): at 17:28

## 2020-12-19 RX ADMIN — Medication 325 MILLIGRAM(S): at 12:21

## 2020-12-19 RX ADMIN — Medication 3 MILLILITER(S): at 20:30

## 2020-12-19 RX ADMIN — Medication 3 MILLILITER(S): at 04:20

## 2020-12-19 RX ADMIN — CARVEDILOL PHOSPHATE 6.25 MILLIGRAM(S): 80 CAPSULE, EXTENDED RELEASE ORAL at 05:54

## 2020-12-19 RX ADMIN — Medication 81 MILLIGRAM(S): at 12:19

## 2020-12-19 RX ADMIN — Medication 2: at 08:15

## 2020-12-19 RX ADMIN — PREGABALIN 1000 MICROGRAM(S): 225 CAPSULE ORAL at 13:49

## 2020-12-19 RX ADMIN — CARVEDILOL PHOSPHATE 6.25 MILLIGRAM(S): 80 CAPSULE, EXTENDED RELEASE ORAL at 17:28

## 2020-12-19 RX ADMIN — Medication 60 MILLIGRAM(S): at 05:40

## 2020-12-19 RX ADMIN — Medication 40 MILLIGRAM(S): at 05:40

## 2020-12-19 RX ADMIN — TAMSULOSIN HYDROCHLORIDE 0.4 MILLIGRAM(S): 0.4 CAPSULE ORAL at 22:01

## 2020-12-19 RX ADMIN — Medication 1 MILLIGRAM(S): at 12:20

## 2020-12-19 RX ADMIN — Medication 3 MILLILITER(S): at 15:30

## 2020-12-19 RX ADMIN — Medication 500 MILLIGRAM(S): at 12:20

## 2020-12-19 RX ADMIN — ATORVASTATIN CALCIUM 20 MILLIGRAM(S): 80 TABLET, FILM COATED ORAL at 22:01

## 2020-12-19 RX ADMIN — TEMAZEPAM 30 MILLIGRAM(S): 15 CAPSULE ORAL at 22:23

## 2020-12-19 RX ADMIN — Medication 100 MILLIGRAM(S): at 12:20

## 2020-12-19 RX ADMIN — IRON SUCROSE 110 MILLIGRAM(S): 20 INJECTION, SOLUTION INTRAVENOUS at 21:59

## 2020-12-19 RX ADMIN — ZINC SULFATE TAB 220 MG (50 MG ZINC EQUIVALENT) 220 MILLIGRAM(S): 220 (50 ZN) TAB at 12:21

## 2020-12-19 RX ADMIN — Medication 2: at 16:54

## 2020-12-19 RX ADMIN — Medication 40 MILLIGRAM(S): at 17:28

## 2020-12-19 RX ADMIN — Medication 6: at 12:19

## 2020-12-19 RX ADMIN — PANTOPRAZOLE SODIUM 40 MILLIGRAM(S): 20 TABLET, DELAYED RELEASE ORAL at 05:40

## 2020-12-19 RX ADMIN — APIXABAN 2.5 MILLIGRAM(S): 2.5 TABLET, FILM COATED ORAL at 17:28

## 2020-12-19 RX ADMIN — APIXABAN 2.5 MILLIGRAM(S): 2.5 TABLET, FILM COATED ORAL at 05:40

## 2020-12-19 RX ADMIN — Medication 3 MILLILITER(S): at 08:39

## 2020-12-19 NOTE — PROGRESS NOTE ADULT - SUBJECTIVE AND OBJECTIVE BOX
NEPHROLOGY INTERVAL HPI/OVERNIGHT EVENTS:  pt still receiving BiPAP  more comfortable  no acute sob    MEDICATIONS  (STANDING):  albuterol/ipratropium for Nebulization 3 milliLiter(s) Nebulizer every 6 hours  albuterol/ipratropium for Nebulization. 3 milliLiter(s) Nebulizer once  allopurinol 100 milliGRAM(s) Oral daily  apixaban 2.5 milliGRAM(s) Oral every 12 hours  ascorbic acid 500 milliGRAM(s) Oral daily  aspirin  chewable 81 milliGRAM(s) Oral daily  atorvastatin 20 milliGRAM(s) Oral at bedtime  carvedilol 6.25 milliGRAM(s) Oral every 12 hours  cyanocobalamin 1000 MICROGram(s) Oral daily  dextrose 40% Gel 15 Gram(s) Oral once  dextrose 5%. 1000 milliLiter(s) (50 mL/Hr) IV Continuous <Continuous>  dextrose 5%. 1000 milliLiter(s) (100 mL/Hr) IV Continuous <Continuous>  dextrose 50% Injectable 25 Gram(s) IV Push once  dextrose 50% Injectable 12.5 Gram(s) IV Push once  dextrose 50% Injectable 25 Gram(s) IV Push once  epoetin felisha-epbx (RETACRIT) Injectable 41902 Unit(s) SubCutaneous every 7 days  ferrous    sulfate 325 milliGRAM(s) Oral daily  folic acid 1 milliGRAM(s) Oral daily  furosemide   Injectable 60 milliGRAM(s) IV Push every 12 hours  glucagon  Injectable 1 milliGRAM(s) IntraMuscular once  insulin lispro (ADMELOG) corrective regimen sliding scale   SubCutaneous three times a day before meals  iron sucrose IVPB 200 milliGRAM(s) IV Intermittent every 24 hours  methylPREDNISolone sodium succinate Injectable 40 milliGRAM(s) IV Push every 12 hours  pantoprazole    Tablet 40 milliGRAM(s) Oral before breakfast  senna 2 Tablet(s) Oral at bedtime  tamsulosin 0.4 milliGRAM(s) Oral at bedtime  thiamine 100 milliGRAM(s) Oral daily  zinc sulfate 220 milliGRAM(s) Oral daily    MEDICATIONS  (PRN):  acetaminophen   Tablet .. 650 milliGRAM(s) Oral every 6 hours PRN Temp greater or equal to 38C (100.4F), Mild Pain (1 - 3)  temazepam 30 milliGRAM(s) Oral at bedtime PRN Insomnia      Allergies    No Known Allergies          Vital Signs Last 24 Hrs  T(C): 36.9 (19 Dec 2020 05:34), Max: 37.1 (18 Dec 2020 20:05)  T(F): 98.4 (19 Dec 2020 05:34), Max: 98.7 (18 Dec 2020 20:05)  HR: 72 (19 Dec 2020 08:42) (66 - 85)  BP: 152/65 (19 Dec 2020 08:00) (149/73 - 160/80)  BP(mean): 89 (19 Dec 2020 08:00) (89 - 89)  RR: 18 (19 Dec 2020 08:00) (18 - 19)  SpO2: 100% (19 Dec 2020 08:42) (98% - 100%)    PHYSICAL EXAM:  GENERAL: SOB  NECK: Supple, + JVD  NERVOUS SYSTEM:  Alert & Oriented X3, No asterixis   CHEST/LUNG: Clear, diminished bilaterally  HEART: Regular rate and rhythm; No rub   ABDOMEN: Soft, Nontender, Nondistended; +BS  EXTREMITIES:  +LE edema    LABS:                        9.3    6.09  )-----------( 186      ( 19 Dec 2020 07:30 )             30.8     12-19    141  |  98  |  91.0<H>  ----------------------------<  184<H>  4.6   |  32.0<H>  |  1.93<H>      Creatinine, Serum: 2.14 mg/dL (12.18.20)    Ca    8.4<L>      19 Dec 2020 07:30  Phos  4.4     12-19  Mg     2.0     12-19          Magnesium, Serum: 2.0 mg/dL (12-19 @ 07:30)  Phosphorus Level, Serum: 4.4 mg/dL (12-19 @ 07:30)      RADIOLOGY & ADDITIONAL TESTS:  < from: US Renal (12.17.20 @ 22:36) >   EXAM:  US KIDNEY(S)                          PROCEDURE DATE:  12/17/2020          INTERPRETATION:  CLINICAL INFORMATION: Renal failure    COMPARISON: Renal ultrasound 12/13/2016    TECHNIQUE: Sonography of the kidneys and bladder.    FINDINGS:    Right kidney: 10.9 cm. No renal mass, hydronephrosis or calculi. Interval growth of a lower pole simple cyst measuring 2.4. Renal cortical thinning.    Left kidney: 9.7 cm. No renal mass, hydronephrosis or calculi.    Urinary bladder: Within normal limits.    IMPRESSION:    No hydronephrosis.    < end of copied text >

## 2020-12-19 NOTE — PROGRESS NOTE ADULT - SUBJECTIVE AND OBJECTIVE BOX
Patient is a 70y old  Male who presents with a chief complaint of CHF exacerbation (19 Dec 2020 09:40)    Pt seen and exam. Feeling better today. Pt states his breathing is improving, able to sleep last nigh.  No cp,cough,fever,chill, palpitation      SUBJECTIVE / OVERNIGHT EVENTS:  REVIEW OF SYSTEMS: All systems are reviewed and found to be negative except above    MEDICATIONS  (STANDING):  albuterol/ipratropium for Nebulization 3 milliLiter(s) Nebulizer every 6 hours  albuterol/ipratropium for Nebulization. 3 milliLiter(s) Nebulizer once  allopurinol 100 milliGRAM(s) Oral daily  apixaban 2.5 milliGRAM(s) Oral every 12 hours  ascorbic acid 500 milliGRAM(s) Oral daily  aspirin  chewable 81 milliGRAM(s) Oral daily  atorvastatin 20 milliGRAM(s) Oral at bedtime  carvedilol 6.25 milliGRAM(s) Oral every 12 hours  cyanocobalamin 1000 MICROGram(s) Oral daily  dextrose 40% Gel 15 Gram(s) Oral once  dextrose 5%. 1000 milliLiter(s) (50 mL/Hr) IV Continuous <Continuous>  dextrose 5%. 1000 milliLiter(s) (100 mL/Hr) IV Continuous <Continuous>  dextrose 50% Injectable 25 Gram(s) IV Push once  dextrose 50% Injectable 12.5 Gram(s) IV Push once  dextrose 50% Injectable 25 Gram(s) IV Push once  epoetin felisha-epbx (RETACRIT) Injectable 57476 Unit(s) SubCutaneous every 7 days  ferrous    sulfate 325 milliGRAM(s) Oral daily  folic acid 1 milliGRAM(s) Oral daily  furosemide   Injectable 60 milliGRAM(s) IV Push every 12 hours  glucagon  Injectable 1 milliGRAM(s) IntraMuscular once  insulin lispro (ADMELOG) corrective regimen sliding scale   SubCutaneous three times a day before meals  iron sucrose IVPB 200 milliGRAM(s) IV Intermittent every 24 hours  methylPREDNISolone sodium succinate Injectable 40 milliGRAM(s) IV Push every 12 hours  pantoprazole    Tablet 40 milliGRAM(s) Oral before breakfast  senna 2 Tablet(s) Oral at bedtime  tamsulosin 0.4 milliGRAM(s) Oral at bedtime  thiamine 100 milliGRAM(s) Oral daily  zinc sulfate 220 milliGRAM(s) Oral daily    MEDICATIONS  (PRN):  acetaminophen   Tablet .. 650 milliGRAM(s) Oral every 6 hours PRN Temp greater or equal to 38C (100.4F), Mild Pain (1 - 3)  temazepam 30 milliGRAM(s) Oral at bedtime PRN Insomnia      CAPILLARY BLOOD GLUCOSE      POCT Blood Glucose.: 180 mg/dL (19 Dec 2020 08:12)  POCT Blood Glucose.: 169 mg/dL (18 Dec 2020 21:06)  POCT Blood Glucose.: 180 mg/dL (18 Dec 2020 16:49)  POCT Blood Glucose.: 169 mg/dL (18 Dec 2020 12:09)    I&O's Summary    18 Dec 2020 07:01  -  19 Dec 2020 07:00  --------------------------------------------------------  IN: 0 mL / OUT: 1050 mL / NET: -1050 mL    19 Dec 2020 07:01  -  19 Dec 2020 10:57  --------------------------------------------------------  IN: 480 mL / OUT: 675 mL / NET: -195 mL        PHYSICAL EXAM:  Vital Signs Last 24 Hrs  T(C): 36.9 (19 Dec 2020 05:34), Max: 37.1 (18 Dec 2020 20:05)  T(F): 98.4 (19 Dec 2020 05:34), Max: 98.7 (18 Dec 2020 20:05)  HR: 72 (19 Dec 2020 08:42) (66 - 85)  BP: 152/65 (19 Dec 2020 08:00) (149/73 - 160/80)  BP(mean): 89 (19 Dec 2020 08:00) (89 - 89)  RR: 18 (19 Dec 2020 08:00) (18 - 18)  SpO2: 100% (19 Dec 2020 08:42) (98% - 100%)    CONSTITUTIONAL: NAD, well-developed,   EYES: PERRLA; conjunctiva and sclera clear  ENMT: Moist oral mucosa,  RESPIRATORY: Normal respiratory effort; rhonchi to auscultation bilaterally. No wheeze  CARDIOVASCULAR: IRR, normal S1 and S2, no murmur   EXTS: 1+ lower extremity edema; Peripheral pulses are 2+ bilaterally  ABDOMEN: Nontender to palpation, normoactive bowel sounds, no rebound/guarding;   MUSCLOSKELETAL:   no clubbing or cyanosis of digits; no joint swelling or tenderness to palpation  PSYCH: affect appropriate  NEUROLOGY: A+O to person, place, and time; CN 2-12 are intact and symmetric; no gross sensory deficits;       LABS:                        9.3    6.09  )-----------( 186      ( 19 Dec 2020 07:30 )             30.8     12-19    141  |  98  |  91.0<H>  ----------------------------<  184<H>  4.6   |  32.0<H>  |  1.93<H>    Ca    8.4<L>      19 Dec 2020 07:30  Phos  4.4     12-19  Mg     2.0     12-19                  RADIOLOGY & ADDITIONAL TESTS:  Results Reviewed:   Imaging Personally Reviewed:  Electrocardiogram Personally Reviewed:    COORDINATION OF CARE:  Care Discussed with Consultants/Other Providers [Y/N]:  Prior or Outpatient Records Reviewed [Y/N]:

## 2020-12-19 NOTE — PROGRESS NOTE ADULT - ASSESSMENT
69 y/o M with a PMHx of Afib (on Eilquis), severe pulmonary HTN, RV dysfunction, HFpEF (EF 60-65%), COPD (on 2L home O2), CAD s/p CABG 2013 (recommended for RHC/LHC on last 2 admissions, but patient refusing), chronic OM, GERALD/CKD, hematoma of LLE s/p debridement with a skin graft. bladder CA s/p TURP who presented to the ED with AMS and lethargy. Per patient he feels that ever since his last 2 hospitalizations with little improvement with edema and sob.  He is 02 dependant and has bipap at home but does not use it.  Troponin 0.03 BNP 48688 Echo this admission with ef 60% with severe pul htn            CARDIAC MEDS  apixaban 2.5 milliGRAM(s) Oral every 12 hours  aspirin  chewable 81 milliGRAM(s) Oral daily  atorvastatin 20 milliGRAM(s) Oral at bedtime  carvedilol 6.25 milliGRAM(s) Oral every 12 hours  furosemide   Injectable 60 milliGRAM(s) IV Push every 12 hours   71 y/o M with a PMHx of Afib (on Eilquis), severe pulmonary HTN, RV dysfunction, HFpEF (EF 60-65%), COPD (on 2L home O2), CAD s/p CABG 2013 (recommended for RHC/LHC on last 2 admissions, but patient refusing), chronic OM, GERALD/CKD, hematoma of LLE s/p debridement with a skin graft. bladder CA s/p TURP who presented to the ED with AMS and lethargy. Per patient he feels that ever since his last 2 hospitalizations with little improvement with edema and sob.  He is 02 dependant and has bipap at home but does not use it.  Troponin 0.03 BNP [id number] Echo this admission with ef 60% with severe pul htn    HFPEF  ischemic work up out patient with Primary Cardio Dr. Weeks  improving with diuretics  Plan for Cardiomens and right heart cath prior to d/c    HLD  low fat diet  Atorvastatin qd    HYPERTENSON  b/p 140-160/70-80  B/P elevated will increase coreg  b/p may be elevated due to steroids?    COPD  management per Pulmonary    CARDIAC MEDS  apixaban 2.5 milliGRAM(s) Oral every 12 hours  aspirin  chewable 81 milliGRAM(s) Oral daily  atorvastatin 20 milliGRAM(s) Oral at bedtime  carvedilol 6.25 milliGRAM(s) Oral every 12 hours  furosemide   Injectable 60 milliGRAM(s) IV Push every 12 hours

## 2020-12-19 NOTE — PROGRESS NOTE ADULT - ASSESSMENT
CKDIII)): GERALD, prerenal==> approaching baseline renal function  Renal sono w/o hydro  Elevated BUN also due to steroid effects  - avoid potential nephrotoxins  - diuretics; will need to keep azotemic  - monitor labs     Anemia:  - cont TIMOTHY and Venofer  - target Hgb= 10.0

## 2020-12-19 NOTE — PROGRESS NOTE ADULT - ASSESSMENT
Assess    Acute on chronic Hypoxic and Hypercarbic Respiratory Failure  CKD  Obesity   COPD - No clear evidence this is severe  Possible VANITA  Unclear reason for severe pulmonary HTN    Rec    Step down  Continuous NIV - nocturnal when able   DuoNeb  AC  Cardiac regiment   Eventual RHC/cardio MEMS  BNP, CXR, ABG today   Palliative input may be appropriate

## 2020-12-19 NOTE — PROGRESS NOTE ADULT - ASSESSMENT
69 y/o M w/ a PMH of AFib (on eliquis), HFpEF (follows with Dr. Zia Storm; last known EF 60-65% on 11/17), COPD (on 2L home O2), CABG 2013, and bladder CA (s/p TURP 2013) presented to [facility]  w/ AMS x1 day w/ associated lethargy.  Pt also reports SOB x2 months that has acutely worsened the past few days.  Pt also reports fatigue, weight gain, inability to sleep flat.  Pt was noted to have evidence of fluid overload on exam, pBNP was 11K and CXR showed small pleural effusions consistent w/ CHF exacerbation  ABG also showed pt had hypoxemia and hypercapnia Pt was placed on BIPAP and mentation improved.  Pt will be admitted to tele/pulse ox for acute hypoxic hypercapnic respiratory failure. COVID -ve    Acute hypoxemic hypercapnic respiratory failure   - Likely multifactorial 2/2 Acute on chronic diastolic CHF and COPD exacerbation  - reviewed ABG-improving . off bipap now.on 4 l Oxygen. Pt is eating breakfast  - .Pt was Placed on continuous BIPAP yesterday and  transferred to stepdown. ICU called yestreday but pt denied by ICU. Today pt much improved. ABG improved.f/u pulm rec   - CXR and elevated pBNP consistent w/ fluid overload   - c/w Solumedrol and duonebs  - Head of bed elevation to 30 degrees  - Last known EF 60-65% on 11/17  - CXR shows b/l pleural effusions and pBNP 11,000 on admission  - Lasix 60mg IV BID, Solumed 40mg IV BID taper . titrate as tolerated   - Monitor on telemetry  - Hold home torsemide and place on IV lasix for more aggressive diuresis  - In light of active diuresis monitor lytes and renal function. Cr improved. Goal K~4 and Mg~2  - Daily weights, strict I/O's and fluid restrict.-1.9L negative balance  - Follows with Dr. Zia Storm (cardio). Dr. Geller will dw pt cardiologist Dr. Storm plan for possible right heart cath and cardiomems this hospital admission.  - Pulm and cardiology recs appreciated      Acute metabolic encephalopathy likely 2/2 CO2 narcosis   - improved. pt  is aaox3 now  - pCO2 improved w/ BIPAP  - Mentation back to baseline     AFib, rate controlled  - c/w Eliquis 2.5mg q12h  - Monitor on telemetry    LLE chronic wound   - Xray L-tib/fib and L-foot: no evidence of rubén destruction  appears like a healed skin graft     GERALD on CKD 3 likely prerenal  - Cr improving  - Last known Cr 1.92 on 11/21/20  - nephrology recs appreciated  - Renal US no hydro   Bladder scan eval for retention    Anemia likely of chronic dx  - H/H at baseline and pt hemodynamically stable  - Monitor CBC daily   - Transfuse if Hb <7-8  - c/w epo and venofer added per renal    Hyperglycemia  -due to solumedrol  -HbA1c 5.0  -ISS added    DVT ppx: on Eliquis    Spoke with wife. Agreeable with plan    Dispo: Possible cardiomems this admission when respiratory status improves; Eventual likely home w/ home care.  PCP is Dr. Hakan Ramos    care of plan dw pt -he agreed  DW wife over phone in detailed. Agreed with above plan

## 2020-12-19 NOTE — PROGRESS NOTE ADULT - SUBJECTIVE AND OBJECTIVE BOX
PULMONARY PROGRESS NOTE      [id number]    Patient is a 70y old  Male who presents with a chief complaint of CHF exacerbation (18 Dec 2020 07:08)    71 y/o M w/ a PMH of AFib (on eliquis), Severe Pulmonary HTN,  HFpEF COPD (on 2L home O2), Noncompliant with CPAP at home, CABG 2013, and bladder CA (s/p TURP 2013) presented to [facility]  w/ /AMS x1 day w/ associated lethargy.  Pt also reports SOB x2 months that has acutely worsened the past few days.  Pt also reports fatigue,, inability to sleep flat.   He denies nocturnal cough, fevers, chills, myalgias, sick contacts, recent travel, cp, palpitations, abd pain, N/V, diarrhea.    12/17/20: increasing Hypercapnea and respiratory acidosis. PLaced on Bipap . Upon arrival the patient is awake,alert, interactive. Speaks in clear sentences. oriented x 3. Lat ABG on Bipap is 7.25/86/92/29    INTERVAL HPI/OVERNIGHT EVENTS:  Placed on continuous NIV yesterday and upgraded to step down  Alert - hypercarbic  VT in 300's    MEDICATIONS  (STANDING):  albuterol/ipratropium for Nebulization 3 milliLiter(s) Nebulizer every 6 hours  albuterol/ipratropium for Nebulization. 3 milliLiter(s) Nebulizer once  allopurinol 100 milliGRAM(s) Oral daily  apixaban 2.5 milliGRAM(s) Oral every 12 hours  ascorbic acid 500 milliGRAM(s) Oral daily  aspirin  chewable 81 milliGRAM(s) Oral daily  atorvastatin 20 milliGRAM(s) Oral at bedtime  carvedilol 3.125 milliGRAM(s) Oral every 12 hours  cyanocobalamin 1000 MICROGram(s) Oral daily  dextrose 40% Gel 15 Gram(s) Oral once  dextrose 5%. 1000 milliLiter(s) (50 mL/Hr) IV Continuous <Continuous>  dextrose 5%. 1000 milliLiter(s) (100 mL/Hr) IV Continuous <Continuous>  dextrose 50% Injectable 25 Gram(s) IV Push once  dextrose 50% Injectable 12.5 Gram(s) IV Push once  dextrose 50% Injectable 25 Gram(s) IV Push once  epoetin felisha-epbx (RETACRIT) Injectable 82194 Unit(s) SubCutaneous every 7 days  ferrous    sulfate 325 milliGRAM(s) Oral daily  folic acid 1 milliGRAM(s) Oral daily  furosemide   Injectable 60 milliGRAM(s) IV Push every 12 hours  glucagon  Injectable 1 milliGRAM(s) IntraMuscular once  insulin lispro (ADMELOG) corrective regimen sliding scale   SubCutaneous three times a day before meals  iron sucrose IVPB 200 milliGRAM(s) IV Intermittent every 24 hours  methylPREDNISolone sodium succinate Injectable 40 milliGRAM(s) IV Push every 12 hours  pantoprazole    Tablet 40 milliGRAM(s) Oral before breakfast  senna 2 Tablet(s) Oral at bedtime  tamsulosin 0.4 milliGRAM(s) Oral at bedtime  thiamine 100 milliGRAM(s) Oral daily  zinc sulfate 220 milliGRAM(s) Oral daily      MEDICATIONS  (PRN):  acetaminophen   Tablet .. 650 milliGRAM(s) Oral every 6 hours PRN Temp greater or equal to 38C (100.4F), Mild Pain (1 - 3)  temazepam 30 milliGRAM(s) Oral at bedtime PRN Insomnia      Allergies    No Known Allergies    Intolerances        PAST MEDICAL & SURGICAL HISTORY:  Ulcer of left lower extremity, unspecified ulcer stage  Chronic LE ulceration    CKD (chronic kidney disease)    CHF (congestive heart failure)    Atrial fibrillation    COPD, severity to be determined    Coronary artery disease involving coronary bypass graft of native heart without angina pectoris    Chronic osteomyelitis, osteomyelitis of unspecified site    COPD (chronic obstructive pulmonary disease)    Atrial fibrillation    Bladder cancer    HLD (hyperlipidemia)    H/O knee surgery    S/P CABG (coronary artery bypass graft)    Presence of stent of CABG        SOCIAL HISTORY  Smoking History:       REVIEW OF SYSTEMS:    CONSTITUTIONAL:  No distress    HEENT:  Eyes:  No diplopia or blurred vision. ENT:  No earache, sore throat or runny nose.    CARDIOVASCULAR:  No pressure, squeezing, tightness, heaviness or aching about the chest; no palpitations.    RESPIRATORY:  No cough, PND or orthopnea. Mod SOBOE    GASTROINTESTINAL:  No nausea, vomiting or diarrhea.    GENITOURINARY:  No dysuria, frequency or urgency.    NEUROLOGIC:  No paresthesias, fasciculations, seizures or weakness.    PSYCHIATRIC:  No disorder of thought or mood.    Vital Signs Last 24 Hrs  T(C): 36.4 (18 Dec 2020 07:50), Max: 36.4 (17 Dec 2020 10:17)  T(F): 97.5 (18 Dec 2020 07:50), Max: 97.6 (17 Dec 2020 10:17)  HR: 78 (18 Dec 2020 07:50) (78 - 96)  BP: 155/74 (18 Dec 2020 07:50) (130/75 - 155/74)  BP(mean): --  RR: 18 (18 Dec 2020 07:50) (18 - 18)  SpO2: 100% (18 Dec 2020 07:50) (93% - 100%)    PHYSICAL EXAMINATION:    GENERAL: The patient is awake and alert in no apparent distress.     HEENT: Head is normocephalic and atraumatic. Extraocular muscles are intact. Mucous membranes are moist.    NECK: Supple.    LUNGS: Clear to auscultation without wheezing, rales or rhonchi; respirations unlabored    HEART: Regular rate and rhythm without murmur.    ABDOMEN: Soft, nontender, and nondistended.      EXTREMITIES: Without any cyanosis, clubbing, rash, lesions or edema.    NEUROLOGIC: Grossly intact.    LABS:                        9.2    8.15  )-----------( 194      ( 18 Dec 2020 07:52 )             30.8     12-18    140  |  97<L>  |  94.0<H>  ----------------------------<  198<H>  4.8   |  32.0<H>  |  2.14<H>    Ca    8.5<L>      18 Dec 2020 07:52  Phos  4.3     12-18  Mg     2.2     12-18      ABG - ( 18 Dec 2020 08:13 )  pH, Arterial: 7.30  pH, Blood: x     /  pCO2: 70    /  pO2: 116   / HCO3: 31    / Base Excess: 7.0   /  SaO2: 100       MICROBIOLOGY:  COVID neg; RVP and Flu neg     RADIOLOGY & ADDITIONAL STUDIES:    12/14/20  CXR mild PVC and bilat effusion  Leg Duplex neg     Echo on 11/16/20   1. Left ventricular ejection fraction, by visual estimation, is 60 to 65%.  2. Normal global left ventricular systolic function.   3. Mildly enlarged right ventricle.   4. Mildly reduced RV systolic function.   5. Moderate tricuspid regurgitation.   6. Estimated pulmonary artery systolic pressure is 67.7 mmHg assuming a right atrial pressure of 15 mmHg, which is consistent with severe pulmonary hypertension.    MD Dana Electronically signed on 11/17/2020 at 10:11:35 AM

## 2020-12-19 NOTE — PROGRESS NOTE ADULT - SUBJECTIVE AND OBJECTIVE BOX
[facility]  [address], [place] [address]    REASON FOR VISIT:  Follow up on chf  UPDATE:  Feeling better, using bipap  b/p remains borderline high.  Renal function is improving  TELEMETRY MONITORING:  Atrial fib with controlled ventricular rate    12-19    141  |       98       |  91.0<H>  ----------------------------<  184<H>  4.6   |  32.0<H>  |  1.93<H>    Ca    8.4<L>      19 Dec 2020 07:30  Phos  4.4     12-19  Mg     2.0     12-19    ROS:  No fever chills  Cardiac  No cp sob or palp  Resp  no cough no mucus production  Gi  no abd pain no melana  Ext No calf tenderness, no edema    Vital Signs Last 24 Hrs  T(C): 36.9 (19 Dec 2020 05:34), Max: 37.1 (18 Dec 2020 20:05)  T(F): 98.4 (19 Dec 2020 05:34), Max: 98.7 (18 Dec 2020 20:05)  HR: 72 (19 Dec 2020 08:42) (66 - 85)  BP: 160/80 (19 Dec 2020 05:34) (149/73 - 160/80)  BP(mean): --  RR: 18 (19 Dec 2020 08:00) (18 - 19)  SpO2: 100% (19 Dec 2020 08:42) (98% - 100%)  T(C): 36.9 (12-19-20 @ 05:34), Max: 37.1 (12-18-20 @ 20:05)  HR: 72 (12-19-20 @ 08:42) (66 - 85)  BP: 160/80 (12-19-20 @ 05:34) (149/73 - 160/80)  RR: 18 (12-19-20 @ 08:00) (18 - 19)  SpO2: 100% (12-19-20 @ 08:42) (98% - 100%)    HEENT Head atraumatic eomi, oral mucosa moist  Neck no carotid bruitt  CV S1&S2  irregular  RESP  decreased breath sounds  GI  Soft active bowel sounds non tender  EXT  No clubbing/Cyanosis /venous compression devices in use decreased edema  NEURO  Alert oriented  No gross motor or sensory deficits       [facility]  [facility]  [address], [place] [address]    REASON FOR VISIT:  Follow up on chf  UPDATE:  Feeling better, using bipap  b/p remains borderline high.  Renal function is improving  Out put 1150  TELEMETRY MONITORING:  Atrial fib with controlled ventricular rate    12-19    141  |       98       |  91.0<H>  ----------------------------<  184<H>  4.6   |  32.0<H>  |  1.93<H>    Ca    8.4<L>      19 Dec 2020 07:30  Phos  4.4     12-19  Mg     2.0     12-19    ROS:  No fever chills  Cardiac  No cp sob or palp  Resp  no cough no mucus production  Gi  no abd pain no melana  Ext No calf tenderness, no edema    Vital Signs Last 24 Hrs  T(C): 36.9 (19 Dec 2020 05:34), Max: 37.1 (18 Dec 2020 20:05)  T(F): 98.4 (19 Dec 2020 05:34), Max: 98.7 (18 Dec 2020 20:05)  HR: 72 (19 Dec 2020 08:42) (66 - 85)  BP: 160/80 (19 Dec 2020 05:34) (149/73 - 160/80)  BP(mean): --  RR: 18 (19 Dec 2020 08:00) (18 - 19)  SpO2: 100% (19 Dec 2020 08:42) (98% - 100%)  T(C): 36.9 (12-19-20 @ 05:34), Max: 37.1 (12-18-20 @ 20:05)  HR: 72 (12-19-20 @ 08:42) (66 - 85)  BP: 160/80 (12-19-20 @ 05:34) (149/73 - 160/80)  RR: 18 (12-19-20 @ 08:00) (18 - 19)  SpO2: 100% (12-19-20 @ 08:42) (98% - 100%)    HEENT Head atraumatic eomi, oral mucosa moist  Neck no carotid bruitt  CV S1&S2  irregular  RESP  decreased breath sounds  GI  Soft active bowel sounds non tender  EXT  No clubbing/Cyanosis /venous compression devices in use decreased edema  NEURO  Alert oriented  No gross motor or sensory deficits    cho< from: TTE Echo Limited or F/U (11.16.20 @ 21:06) >    Summary:   1. Left ventricular ejection fraction, by visual estimation, is 60 to 65%.  2. Normal global left ventricular systolic function.   3. Mildly enlarged right ventricle.   4. Mildly reduced RV systolic function.   5. Moderate tricuspid regurgitation.   6. Estimated pulmonary artery systolic pressure is 67.7 mmHg assuming a right atrial pressure of 15 mmHg, which is consistent with severe pulmonary hypertension.    < end of copied text >

## 2020-12-20 LAB
ALBUMIN SERPL ELPH-MCNC: 3.2 G/DL — LOW (ref 3.3–5.2)
ALP SERPL-CCNC: 131 U/L — HIGH (ref 40–120)
ALT FLD-CCNC: 23 U/L — SIGNIFICANT CHANGE UP
ANION GAP SERPL CALC-SCNC: 11 MMOL/L — SIGNIFICANT CHANGE UP (ref 5–17)
AST SERPL-CCNC: 26 U/L — SIGNIFICANT CHANGE UP
BASOPHILS # BLD AUTO: 0 K/UL — SIGNIFICANT CHANGE UP (ref 0–0.2)
BASOPHILS NFR BLD AUTO: 0 % — SIGNIFICANT CHANGE UP (ref 0–2)
BILIRUB SERPL-MCNC: 0.5 MG/DL — SIGNIFICANT CHANGE UP (ref 0.4–2)
BUN SERPL-MCNC: 97 MG/DL — HIGH (ref 8–20)
CALCIUM SERPL-MCNC: 8.7 MG/DL — SIGNIFICANT CHANGE UP (ref 8.6–10.2)
CHLORIDE SERPL-SCNC: 97 MMOL/L — LOW (ref 98–107)
CO2 SERPL-SCNC: 31 MMOL/L — HIGH (ref 22–29)
CREAT SERPL-MCNC: 1.67 MG/DL — HIGH (ref 0.5–1.3)
EOSINOPHIL # BLD AUTO: 0.12 K/UL — SIGNIFICANT CHANGE UP (ref 0–0.5)
EOSINOPHIL NFR BLD AUTO: 2.1 % — SIGNIFICANT CHANGE UP (ref 0–6)
GLUCOSE BLDC GLUCOMTR-MCNC: 178 MG/DL — HIGH (ref 70–99)
GLUCOSE BLDC GLUCOMTR-MCNC: 185 MG/DL — HIGH (ref 70–99)
GLUCOSE BLDC GLUCOMTR-MCNC: 253 MG/DL — HIGH (ref 70–99)
GLUCOSE BLDC GLUCOMTR-MCNC: 285 MG/DL — HIGH (ref 70–99)
GLUCOSE SERPL-MCNC: 185 MG/DL — HIGH (ref 70–99)
HCT VFR BLD CALC: 30 % — LOW (ref 39–50)
HGB BLD-MCNC: 9.5 G/DL — LOW (ref 13–17)
IMM GRANULOCYTES NFR BLD AUTO: 0.3 % — SIGNIFICANT CHANGE UP (ref 0–1.5)
LYMPHOCYTES # BLD AUTO: 0.37 K/UL — LOW (ref 1–3.3)
LYMPHOCYTES # BLD AUTO: 6.4 % — LOW (ref 13–44)
MCHC RBC-ENTMCNC: 31.7 GM/DL — LOW (ref 32–36)
MCHC RBC-ENTMCNC: 34.5 PG — HIGH (ref 27–34)
MCV RBC AUTO: 109.1 FL — HIGH (ref 80–100)
MONOCYTES # BLD AUTO: 0.32 K/UL — SIGNIFICANT CHANGE UP (ref 0–0.9)
MONOCYTES NFR BLD AUTO: 5.5 % — SIGNIFICANT CHANGE UP (ref 2–14)
NEUTROPHILS # BLD AUTO: 4.99 K/UL — SIGNIFICANT CHANGE UP (ref 1.8–7.4)
NEUTROPHILS NFR BLD AUTO: 85.7 % — HIGH (ref 43–77)
PLATELET # BLD AUTO: 181 K/UL — SIGNIFICANT CHANGE UP (ref 150–400)
POTASSIUM SERPL-MCNC: 4.5 MMOL/L — SIGNIFICANT CHANGE UP (ref 3.5–5.3)
POTASSIUM SERPL-SCNC: 4.5 MMOL/L — SIGNIFICANT CHANGE UP (ref 3.5–5.3)
PROT SERPL-MCNC: 5.1 G/DL — LOW (ref 6.6–8.7)
RBC # BLD: 2.75 M/UL — LOW (ref 4.2–5.8)
RBC # FLD: 18.8 % — HIGH (ref 10.3–14.5)
SODIUM SERPL-SCNC: 139 MMOL/L — SIGNIFICANT CHANGE UP (ref 135–145)
WBC # BLD: 5.82 K/UL — SIGNIFICANT CHANGE UP (ref 3.8–10.5)
WBC # FLD AUTO: 5.82 K/UL — SIGNIFICANT CHANGE UP (ref 3.8–10.5)

## 2020-12-20 PROCEDURE — 99232 SBSQ HOSP IP/OBS MODERATE 35: CPT

## 2020-12-20 PROCEDURE — 99233 SBSQ HOSP IP/OBS HIGH 50: CPT

## 2020-12-20 RX ORDER — CARVEDILOL PHOSPHATE 80 MG/1
3.12 CAPSULE, EXTENDED RELEASE ORAL ONCE
Refills: 0 | Status: COMPLETED | OUTPATIENT
Start: 2020-12-20 | End: 2020-12-20

## 2020-12-20 RX ORDER — CARVEDILOL PHOSPHATE 80 MG/1
12.5 CAPSULE, EXTENDED RELEASE ORAL EVERY 12 HOURS
Refills: 0 | Status: DISCONTINUED | OUTPATIENT
Start: 2020-12-20 | End: 2020-12-22

## 2020-12-20 RX ADMIN — Medication 100 MILLIGRAM(S): at 08:40

## 2020-12-20 RX ADMIN — Medication 81 MILLIGRAM(S): at 08:40

## 2020-12-20 RX ADMIN — Medication 1 MILLIGRAM(S): at 08:40

## 2020-12-20 RX ADMIN — Medication 3 MILLILITER(S): at 08:59

## 2020-12-20 RX ADMIN — APIXABAN 2.5 MILLIGRAM(S): 2.5 TABLET, FILM COATED ORAL at 17:15

## 2020-12-20 RX ADMIN — Medication 3 MILLILITER(S): at 20:50

## 2020-12-20 RX ADMIN — CARVEDILOL PHOSPHATE 3.12 MILLIGRAM(S): 80 CAPSULE, EXTENDED RELEASE ORAL at 08:39

## 2020-12-20 RX ADMIN — Medication 500 MILLIGRAM(S): at 08:40

## 2020-12-20 RX ADMIN — Medication 3 MILLILITER(S): at 15:42

## 2020-12-20 RX ADMIN — Medication 60 MILLIGRAM(S): at 17:16

## 2020-12-20 RX ADMIN — Medication 3 MILLILITER(S): at 03:21

## 2020-12-20 RX ADMIN — APIXABAN 2.5 MILLIGRAM(S): 2.5 TABLET, FILM COATED ORAL at 05:28

## 2020-12-20 RX ADMIN — ZINC SULFATE TAB 220 MG (50 MG ZINC EQUIVALENT) 220 MILLIGRAM(S): 220 (50 ZN) TAB at 08:40

## 2020-12-20 RX ADMIN — SENNA PLUS 2 TABLET(S): 8.6 TABLET ORAL at 21:05

## 2020-12-20 RX ADMIN — Medication 40 MILLIGRAM(S): at 05:28

## 2020-12-20 RX ADMIN — Medication 40 MILLIGRAM(S): at 17:20

## 2020-12-20 RX ADMIN — Medication 6: at 12:08

## 2020-12-20 RX ADMIN — CARVEDILOL PHOSPHATE 6.25 MILLIGRAM(S): 80 CAPSULE, EXTENDED RELEASE ORAL at 05:28

## 2020-12-20 RX ADMIN — PREGABALIN 1000 MICROGRAM(S): 225 CAPSULE ORAL at 08:40

## 2020-12-20 RX ADMIN — CARVEDILOL PHOSPHATE 12.5 MILLIGRAM(S): 80 CAPSULE, EXTENDED RELEASE ORAL at 17:16

## 2020-12-20 RX ADMIN — Medication 2: at 08:58

## 2020-12-20 RX ADMIN — TEMAZEPAM 30 MILLIGRAM(S): 15 CAPSULE ORAL at 21:05

## 2020-12-20 RX ADMIN — Medication 60 MILLIGRAM(S): at 05:29

## 2020-12-20 RX ADMIN — PANTOPRAZOLE SODIUM 40 MILLIGRAM(S): 20 TABLET, DELAYED RELEASE ORAL at 05:28

## 2020-12-20 RX ADMIN — TAMSULOSIN HYDROCHLORIDE 0.4 MILLIGRAM(S): 0.4 CAPSULE ORAL at 21:05

## 2020-12-20 RX ADMIN — ATORVASTATIN CALCIUM 20 MILLIGRAM(S): 80 TABLET, FILM COATED ORAL at 21:05

## 2020-12-20 RX ADMIN — Medication 325 MILLIGRAM(S): at 08:40

## 2020-12-20 RX ADMIN — Medication 6: at 17:17

## 2020-12-20 NOTE — PROGRESS NOTE ADULT - SUBJECTIVE AND OBJECTIVE BOX
NEPHROLOGY INTERVAL HPI/OVERNIGHT EVENTS:  pt off BiPAP when seen earlier  no acute sob, comfortable    MEDICATIONS  (STANDING):  albuterol/ipratropium for Nebulization 3 milliLiter(s) Nebulizer every 6 hours  albuterol/ipratropium for Nebulization. 3 milliLiter(s) Nebulizer once  allopurinol 100 milliGRAM(s) Oral daily  apixaban 2.5 milliGRAM(s) Oral every 12 hours  ascorbic acid 500 milliGRAM(s) Oral daily  aspirin  chewable 81 milliGRAM(s) Oral daily  atorvastatin 20 milliGRAM(s) Oral at bedtime  carvedilol 12.5 milliGRAM(s) Oral every 12 hours  cyanocobalamin 1000 MICROGram(s) Oral daily  dextrose 40% Gel 15 Gram(s) Oral once  dextrose 5%. 1000 milliLiter(s) (50 mL/Hr) IV Continuous <Continuous>  dextrose 5%. 1000 milliLiter(s) (100 mL/Hr) IV Continuous <Continuous>  dextrose 50% Injectable 25 Gram(s) IV Push once  dextrose 50% Injectable 12.5 Gram(s) IV Push once  dextrose 50% Injectable 25 Gram(s) IV Push once  epoetin felisha-epbx (RETACRIT) Injectable 16628 Unit(s) SubCutaneous every 7 days  ferrous    sulfate 325 milliGRAM(s) Oral daily  folic acid 1 milliGRAM(s) Oral daily  furosemide   Injectable 60 milliGRAM(s) IV Push every 12 hours  glucagon  Injectable 1 milliGRAM(s) IntraMuscular once  insulin lispro (ADMELOG) corrective regimen sliding scale   SubCutaneous three times a day before meals  methylPREDNISolone sodium succinate Injectable 40 milliGRAM(s) IV Push every 12 hours  pantoprazole    Tablet 40 milliGRAM(s) Oral before breakfast  senna 2 Tablet(s) Oral at bedtime  tamsulosin 0.4 milliGRAM(s) Oral at bedtime  thiamine 100 milliGRAM(s) Oral daily  zinc sulfate 220 milliGRAM(s) Oral daily    MEDICATIONS  (PRN):  acetaminophen   Tablet .. 650 milliGRAM(s) Oral every 6 hours PRN Temp greater or equal to 38C (100.4F), Mild Pain (1 - 3)  temazepam 30 milliGRAM(s) Oral at bedtime PRN Insomnia      Allergies    No Known Allergies          Vital Signs Last 24 Hrs  T(C): 36.7 (20 Dec 2020 07:50), Max: 36.7 (20 Dec 2020 07:50)  T(F): 98.1 (20 Dec 2020 07:50), Max: 98.1 (20 Dec 2020 07:50)  HR: 80 (20 Dec 2020 09:02) (71 - 95)  BP: 153/70 (20 Dec 2020 07:50) (141/58 - 155/66)  BP(mean): 80 (19 Dec 2020 16:00) (80 - 90)  RR: 18 (20 Dec 2020 07:50) (18 - 19)  SpO2: 98% (20 Dec 2020 09:02) (92% - 100%)    PHYSICAL EXAM:  GENERAL: Fatigued  NECK: Supple, + JVD  NERVOUS SYSTEM:  Alert & Oriented X3, No asterixis   CHEST/LUNG: Clear, diminished bilaterally  HEART: Regular rate and rhythm; No rub   ABDOMEN: Soft, Nontender, Nondistended; +BS  EXTREMITIES:  +LE edema better    LABS:                        9.5    5.82  )-----------( 181      ( 20 Dec 2020 08:27 )             30.0     12-20    139  |  97<L>  |  97.0<H>  ----------------------------<  185<H>  4.5   |  31.0<H>  |  1.67<H>        Ca    8.7      20 Dec 2020 08:27  Phos  4.4     12-19  Mg     2.0     12-19    TPro  5.1<L>  /  Alb  3.2<L>  /  TBili  0.5  /  DBili  x   /  AST  26  /  ALT  23  /  AlkPhos  131<H>  12-20            RADIOLOGY & ADDITIONAL TESTS:

## 2020-12-20 NOTE — PROGRESS NOTE ADULT - SUBJECTIVE AND OBJECTIVE BOX
[facility]  [facility]  [address], [place] [address]    REASON FOR VISIT:  Follow up on CHF   UPDATE:  Uneventful night, no complaints  Did not wear cpap last night  TELEMETRY MONITORING:  Atrial fib with controlled ventricular response  B/p remains in the 150 range  Out put 960  weight down 5 pounds since admission    ROS:  No fever chills  Cardiac  No cp sob has improved no palp  Resp  no cough no mucus production  Gi  no abd pain no melana  Ext No calf tenderness, no edema  + edema    12-19    141  |  98  |  91.0<H>  ----------------------------<  184<H>  4.6   |  32.0<H>  |  1.93<H>    Ca    8.4<L>      19 Dec 2020 07:30  Phos  4.4     12-19  Mg     2.0     12-19      MEDICATIONS  (STANDING):  albuterol/ipratropium for Nebulization 3 milliLiter(s) Nebulizer every 6 hours  albuterol/ipratropium for Nebulization. 3 milliLiter(s) Nebulizer once  allopurinol 100 milliGRAM(s) Oral daily  apixaban 2.5 milliGRAM(s) Oral every 12 hours  ascorbic acid 500 milliGRAM(s) Oral daily  aspirin  chewable 81 milliGRAM(s) Oral daily  atorvastatin 20 milliGRAM(s) Oral at bedtime  carvedilol 6.25 milliGRAM(s) Oral every 12 hours  cyanocobalamin 1000 MICROGram(s) Oral daily  epoetin felisha-epbx (RETACRIT) Injectable 91013 Unit(s) SubCutaneous every 7 days  ferrous    sulfate 325 milliGRAM(s) Oral daily  folic acid 1 milliGRAM(s) Oral daily  furosemide   Injectable 60 milliGRAM(s) IV Push every 12 hours  glucagon  Injectable 1 milliGRAM(s) IntraMuscular once  insulin lispro (ADMELOG) corrective regimen sliding scale   SubCutaneous three times a day before meals  methylPREDNISolone sodium succinate Injectable 40 milliGRAM(s) IV Push every 12 hours  pantoprazole    Tablet 40 milliGRAM(s) Oral before breakfast  senna 2 Tablet(s) Oral at bedtime  tamsulosin 0.4 milliGRAM(s) Oral at bedtime  thiamine 100 milliGRAM(s) Oral daily  zinc sulfate 220 milliGRAM(s) Oral daily    Vital Signs Last 24 Hrs  T(C): 36.7 (20 Dec 2020 07:50), Max: 36.7 (20 Dec 2020 07:50)  T(F): 98.1 (20 Dec 2020 07:50), Max: 98.1 (20 Dec 2020 07:50)  HR: 77 (20 Dec 2020 07:50) (71 - 95)  BP: 153/70 (20 Dec 2020 07:50) (141/58 - 155/66)  BP(mean): 80 (19 Dec 2020 16:00) (80 - 90)  RR: 18 (20 Dec 2020 07:50) (18 - 19)  SpO2: 100% (20 Dec 2020 07:50) (92% - 100%)  T(C): 36.7 (12-20-20 @ 07:50), Max: 36.7 (12-20-20 @ 07:50)  HR: 77 (12-20-20 @ 07:50) (71 - 95)  BP: 153/70 (12-20-20 @ 07:50) (141/58 - 155/66)  RR: 18 (12-20-20 @ 07:50) (18 - 19)  SpO2: 100% (12-20-20 @ 07:50) (92% - 100%)    HEENT Head atraumatic eomi, oral mucosa moist  CV S1&S2  Irregulr  RESP  decreased breath sounds  GI  Soft active bowel sounds non tender  EXT  No clubbing/Cyanosis /small ulcer on skin graft site  + pitting edema to thinghs  venous compression devices on lower ext  NEURO  Alert oriented  No gross motor or sensory deficits      11-16-2- TTE    Summary:   1. Left ventricular ejection fraction, by visual estimation, is 60 to 65%.  2. Normal global left ventricular systolic function.   3. Mildly enlarged right ventricle.   4. Mildly reduced RV systolic function.   5. Moderate tricuspid regurgitation.   6. Estimated pulmonary artery systolic pressure is 67.7 mmHg assuming a right atrial pressure of 15 mmHg, which is consistent with severe pulmonary hypertension.

## 2020-12-20 NOTE — PROGRESS NOTE ADULT - SUBJECTIVE AND OBJECTIVE BOX
PULMONARY PROGRESS NOTE      DEBI BADILLOAPARNA-[id number]    Patient is a 70y old  Male who presents with a chief complaint of CHF exacerbation (20 Dec 2020 08:13)  69 y/o M w/ a PMH of AFib (on eliquis), Severe Pulmonary HTN,  HFpEF COPD (on 2L home O2), Noncompliant with CPAP at home, CABG 2013, and bladder CA (s/p TURP 2013) presented to [facility]  w/ /AMS x1 day w/ associated lethargy.    INTERVAL HPI/OVERNIGHT EVENTS:  Some confusion but no distress  Says he did not use NIV last night   HIS NURSE SAYS HE DID USE NIV - TAKEN OFF AT 7 AM - OFF DURING THE DAY 12/19 AS WELL    MEDICATIONS  (STANDING):  albuterol/ipratropium for Nebulization 3 milliLiter(s) Nebulizer every 6 hours  albuterol/ipratropium for Nebulization. 3 milliLiter(s) Nebulizer once  allopurinol 100 milliGRAM(s) Oral daily  apixaban 2.5 milliGRAM(s) Oral every 12 hours  ascorbic acid 500 milliGRAM(s) Oral daily  aspirin  chewable 81 milliGRAM(s) Oral daily  atorvastatin 20 milliGRAM(s) Oral at bedtime  carvedilol 12.5 milliGRAM(s) Oral every 12 hours  cyanocobalamin 1000 MICROGram(s) Oral daily  dextrose 40% Gel 15 Gram(s) Oral once  dextrose 5%. 1000 milliLiter(s) (50 mL/Hr) IV Continuous <Continuous>  dextrose 5%. 1000 milliLiter(s) (100 mL/Hr) IV Continuous <Continuous>  dextrose 50% Injectable 25 Gram(s) IV Push once  dextrose 50% Injectable 12.5 Gram(s) IV Push once  dextrose 50% Injectable 25 Gram(s) IV Push once  epoetin felisha-epbx (RETACRIT) Injectable 43870 Unit(s) SubCutaneous every 7 days  ferrous    sulfate 325 milliGRAM(s) Oral daily  folic acid 1 milliGRAM(s) Oral daily  furosemide   Injectable 60 milliGRAM(s) IV Push every 12 hours  glucagon  Injectable 1 milliGRAM(s) IntraMuscular once  insulin lispro (ADMELOG) corrective regimen sliding scale   SubCutaneous three times a day before meals  methylPREDNISolone sodium succinate Injectable 40 milliGRAM(s) IV Push every 12 hours  pantoprazole    Tablet 40 milliGRAM(s) Oral before breakfast  senna 2 Tablet(s) Oral at bedtime  tamsulosin 0.4 milliGRAM(s) Oral at bedtime  thiamine 100 milliGRAM(s) Oral daily  zinc sulfate 220 milliGRAM(s) Oral daily      MEDICATIONS  (PRN):  acetaminophen   Tablet .. 650 milliGRAM(s) Oral every 6 hours PRN Temp greater or equal to 38C (100.4F), Mild Pain (1 - 3)  temazepam 30 milliGRAM(s) Oral at bedtime PRN Insomnia      Allergies    No Known Allergies    Intolerances        PAST MEDICAL & SURGICAL HISTORY:  Ulcer of left lower extremity, unspecified ulcer stage  Chronic LE ulceration    CKD (chronic kidney disease)    CHF (congestive heart failure)    Atrial fibrillation    COPD, severity to be determined    Coronary artery disease involving coronary bypass graft of native heart without angina pectoris    Chronic osteomyelitis, osteomyelitis of unspecified site    COPD (chronic obstructive pulmonary disease)    Atrial fibrillation    Bladder cancer    HLD (hyperlipidemia)    H/O knee surgery    S/P CABG (coronary artery bypass graft)    Presence of stent of CABG        SOCIAL HISTORY  Smoking History:       REVIEW OF SYSTEMS:    CONSTITUTIONAL:  No distress    HEENT:  Eyes:  No diplopia or blurred vision. ENT:  No earache, sore throat or runny nose.    CARDIOVASCULAR:  No pressure, squeezing, tightness, heaviness or aching about the chest; no palpitations.    RESPIRATORY:  No cough, shortness of breath, PND or orthopnea. Mild SOBOE    GASTROINTESTINAL:  No nausea, vomiting or diarrhea.    GENITOURINARY:  No dysuria, frequency or urgency.    NEUROLOGIC:  No paresthesias, fasciculations, seizures or weakness.        Vital Signs Last 24 Hrs  T(C): 36.7 (20 Dec 2020 07:50), Max: 36.7 (20 Dec 2020 07:50)  T(F): 98.1 (20 Dec 2020 07:50), Max: 98.1 (20 Dec 2020 07:50)  HR: 77 (20 Dec 2020 07:50) (71 - 95)  BP: 153/70 (20 Dec 2020 07:50) (141/58 - 155/66)  BP(mean): 80 (19 Dec 2020 16:00) (80 - 90)  RR: 18 (20 Dec 2020 07:50) (18 - 19)  SpO2: 100% (20 Dec 2020 07:50) (92% - 100%)    PHYSICAL EXAMINATION:    GENERAL: The patient is awake and alert in no apparent distress.     HEENT: Head is normocephalic and atraumatic. Extraocular muscles are intact. Mucous membranes are moist.    NECK: Supple.    LUNGS: Clear to auscultation without wheezing, rales or rhonchi; respirations unlabored    HEART: Regular rate and rhythm without murmur.    ABDOMEN: Soft, nontender, and nondistended.      EXTREMITIES: Without any cyanosis, clubbing, rash, lesions or edema.    NEUROLOGIC: Grossly intact.    LABS:                        9.5    x     )-----------( 181      ( 20 Dec 2020 08:27 )             30.0     12-19    141  |  98  |  91.0<H>  ----------------------------<  184<H>  4.6   |  32.0<H>  |  1.93<H>    Ca    8.4<L>      19 Dec 2020 07:30  Phos  4.4     12-19  Mg     2.0     12-19          ABG - ( 19 Dec 2020 08:53 )  pH, Arterial: 7.35  pH, Blood: x     /  pCO2: 69    /  pO2: 60    / HCO3: 34    / Base Excess: 10.3  /  SaO2: 92        Serum Pro-Brain Natriuretic Peptide: [id number] pg/mL (12-19-20 @ 14:04)    MICROBIOLOGY: Influenza, RVP and COVID neg    RADIOLOGY & ADDITIONAL STUDIES:    12/14/20  CXR mild PVC and bilat effusion  Leg Duplex neg     Echo on 11/16/20   1. Left ventricular ejection fraction, by visual estimation, is 60 to 65%.  2. Normal global left ventricular systolic function.   3. Mildly enlarged right ventricle.   4. Mildly reduced RV systolic function.   5. Moderate tricuspid regurgitation.   6. Estimated pulmonary artery systolic pressure is 67.7 mmHg assuming a right atrial pressure of 15 mmHg, which is consistent with severe pulmonary hypertension.    MD Dana Electronically signed on 11/17/2020 at 10:11:35 AM       EXAM:  XR CHEST PORTABLE ROUTINE 1V                          PROCEDURE DATE:  12/19/2020      INTERPRETATION:  HISTORY: Hypoxia    TECHNIQUE: Single frontal view of the chest with comparison to 12/14/2020    FINDINGS:    Prior sternotomy. Prior CABG. Heart is enlarged. Mild pulmonary vascular congestion with mild bilateral pleural effusions and bibasilar atelectasis improved. Apices unremarkable. Degenerative changes.    IMPRESSION:    Improving pulmonary vascular congestion with bilateral pleural effusions and bibasilar atelectasis    VIRGINIA ELISE MD; Attending Radiologist  This document has been electronically signed. Dec 19 2020  6:51PM

## 2020-12-20 NOTE — PROGRESS NOTE ADULT - ASSESSMENT
CKDIII): GERALD, prerenal==> improving towards baseline  Renal sono w/o hydro  Elevated BUN also due to steroid effects  - avoid potential nephrotoxins  - diuretics; will need to keep azotemic  - monitor labs   - attempt to ambulate    Anemia:  - cont TIMOTHY and Venofer  - target Hgb= 10.0

## 2020-12-20 NOTE — PROGRESS NOTE ADULT - ASSESSMENT
71 y/o male with a PMHx of Afib (on Eilquis), severe pulmonary HTN, RV dysfunction, HFpEF (EF 60-65%), COPD (on 2L home O2), CAD s/p CABG 2013 (recommended for RHC/LHC on last 2 admissions, but patient refusing), chronic OM, GERALD/CKD, hematoma of LLE s/p debridement with a skin graft. bladder CA s/p TURP who presented to the ED with AMS and lethargy. Per patient he feels that ever since his last 2 hospitalizations with little improvement with edema and sob.  He is 02 dependant and has bipap at home but does not use it.  Troponin 0.03 BNP 13794 Echo this admission with ef 60% with severe pul htn    CHF EF 65%  Severe pulmonary htn  patient would benefit for right heart cath to assess Wedge pressure  urine out put is decreasing but there still is signicant edeam in thighs pelvis  improving with diuretics  Low sodium diet    HLD  low fat diet  Atorvastatin qd    HYPERTENSON  remains elevated  coreg up to 12.5 bid    COPD  management per Pulmonary              CARDIAC MEDS  apixaban 2.5 milliGRAM(s) Oral every 12 hours  aspirin  chewable 81 milliGRAM(s) Oral daily  atorvastatin 20 milliGRAM(s) Oral at bedtime  carvedilol 6.25 milliGRAM(s) Oral every 12 hours  furosemide   Injectable 60 milliGRAM(s) IV Push every 12 hours 69 y/o male with a PMHx of Afib (on Eilquis), severe pulmonary HTN, RV dysfunction, HFpEF (EF 60-65%), COPD (on 2L home O2), CAD s/p CABG 2013 (recommended for RHC/LHC on last 2 admissions, but patient refusing), chronic OM, GERALD/CKD, hematoma of LLE s/p debridement with a skin graft. bladder CA s/p TURP who presented to the ED with AMS and lethargy. Per patient he feels that ever since his last 2 hospitalizations with little improvement with edema and sob.  He is 02 dependant and has bipap at home but does not use it.  Troponin 0.03 BNP 46044 Echo this admission with ef 60% with severe pul htn    CHF EF 65%  Severe pulmonary htn  patient would benefit for right heart cath to assess Wedge pressure  urine out put is decreasing but there still is signicant edema  in thighs pelvis  improving with diuretics  Low sodium diet    HLD  low fat diet  Atorvastatin qd    HYPERTENSON  remains elevated  coreg up to 12.5 bid    COPD  management per Pulmonary    CARDIAC MEDS  apixaban 2.5 milliGRAM(s) Oral every 12 hours  aspirin  chewable 81 milliGRAM(s) Oral daily  atorvastatin 20 milliGRAM(s) Oral at bedtime  carvedilol 6.25 milliGRAM(s) Oral every 12 hours  furosemide   Injectable 60 milliGRAM(s) IV Push every 12 hours

## 2020-12-20 NOTE — PROGRESS NOTE ADULT - ASSESSMENT
71 y/o M w/ a PMH of AFib (on eliquis), HFpEF (follows with Dr. Zia Storm; last known EF 60-65% on 11/17), COPD (on 2L home O2), CABG 2013, and bladder CA (s/p TURP 2013) presented to [facility]  w/ AMS x1 day w/ associated lethargy.  Pt also reports SOB x2 months that has acutely worsened the past few days.  Pt also reports fatigue, weight gain, inability to sleep flat.  Pt was noted to have evidence of fluid overload on exam, pBNP was 11K and CXR showed small pleural effusions consistent w/ CHF exacerbation  ABG also showed pt had hypoxemia and hypercapnia Pt was placed on BIPAP and mentation improved.  Pt will be admitted to tele/pulse ox for acute hypoxic hypercapnic respiratory failure. COVID -ve    Acute hypoxemic hypercapnic respiratory failure   - Likely multifactorial 2/2 Acute on chronic diastolic CHF and COPD exacerbation  -off bipap now.on 4 l Oxygen. titrate down to 2.5 L SO2 REMAINED 96-97 %. Pt is tolerating well. will monitor closely  - CXR and elevated pBNP consistent w/ fluid overload   - c/w Solumedrol and duonebs  - Head of bed elevation to 30 degrees  - Last known EF 60-65% on 11/17  - CXR shows b/l pleural effusions and pBNP 11,000 on admission  - Lasix 60mg IV BID, Solumed 40mg IV BID taper . titrate as tolerated   - Monitor on telemetry  - Hold home torsemide and place on IV lasix for more aggressive diuresis  - In light of active diuresis monitor lytes and renal function. Cr improved. Goal K~4 and Mg~2  - Daily weights, strict I/O's and fluid restrict.-1.9L negative balance  - Follows with Dr. Zia Storm (cardio). Dr. Geller will dw pt cardiologist Dr. Storm plan for possible right heart cath and cardiomems this hospital admission.  - Pulm and cardiology recs appreciated      Acute metabolic encephalopathy likely 2/2 CO2 narcosis   - improved. pt  is aaox3 now  - pCO2 improved w/ BIPAP  - Mentation back to baseline     AFib, rate controlled  - c/w Eliquis 2.5mg q12h  - Monitor on telemetry    LLE chronic wound   - Xray L-tib/fib and L-foot: no evidence of rubén destruction  appears like a healed skin graft     GERALD on CKD 3 likely prerenal  - Cr improving 1.6 today  - Last known Cr 1.92 on 11/21/20  - nephrology recs appreciated  - Renal US no hydro   Bladder scan eval for retention    Anemia likely of chronic dx  - H/H at baseline and pt hemodynamically stable  - Monitor CBC daily   - Transfuse if Hb <7-8  - c/w epo and venofer added per renal    Hyperglycemia  -due to solumedrol  -HbA1c 5.0  -ISS added    DVT ppx: on Eliquis  PT Eval     Spoke with wife. Agreeable with plan    Dispo: Possible cardiomems this admission when respiratory status improves; Eventual likely home w/ home care.  PCP is Dr. Hakan Ramos    care of plan dw pt -he agreed  DW wife over phone in detailed. Agreed with above plan

## 2020-12-20 NOTE — PROGRESS NOTE ADULT - SUBJECTIVE AND OBJECTIVE BOX
Patient is a 70y old  Male who presents with a chief complaint of CHF exacerbation (20 Dec 2020 08:55)    Pt is sfeeling much beeter. Breathing stable. so2 100% 4 L nc.  Mild cough, no fever, chill, appetite improving    SUBJECTIVE / OVERNIGHT EVENTS:  REVIEW OF SYSTEMS: All systems are reviewed and found to be negative except above    MEDICATIONS  (STANDING):  albuterol/ipratropium for Nebulization 3 milliLiter(s) Nebulizer every 6 hours  albuterol/ipratropium for Nebulization. 3 milliLiter(s) Nebulizer once  allopurinol 100 milliGRAM(s) Oral daily  apixaban 2.5 milliGRAM(s) Oral every 12 hours  ascorbic acid 500 milliGRAM(s) Oral daily  aspirin  chewable 81 milliGRAM(s) Oral daily  atorvastatin 20 milliGRAM(s) Oral at bedtime  carvedilol 12.5 milliGRAM(s) Oral every 12 hours  cyanocobalamin 1000 MICROGram(s) Oral daily  dextrose 40% Gel 15 Gram(s) Oral once  dextrose 5%. 1000 milliLiter(s) (50 mL/Hr) IV Continuous <Continuous>  dextrose 5%. 1000 milliLiter(s) (100 mL/Hr) IV Continuous <Continuous>  dextrose 50% Injectable 25 Gram(s) IV Push once  dextrose 50% Injectable 12.5 Gram(s) IV Push once  dextrose 50% Injectable 25 Gram(s) IV Push once  epoetin felisha-epbx (RETACRIT) Injectable 22005 Unit(s) SubCutaneous every 7 days  ferrous    sulfate 325 milliGRAM(s) Oral daily  folic acid 1 milliGRAM(s) Oral daily  furosemide   Injectable 60 milliGRAM(s) IV Push every 12 hours  glucagon  Injectable 1 milliGRAM(s) IntraMuscular once  insulin lispro (ADMELOG) corrective regimen sliding scale   SubCutaneous three times a day before meals  methylPREDNISolone sodium succinate Injectable 40 milliGRAM(s) IV Push every 12 hours  pantoprazole    Tablet 40 milliGRAM(s) Oral before breakfast  senna 2 Tablet(s) Oral at bedtime  tamsulosin 0.4 milliGRAM(s) Oral at bedtime  thiamine 100 milliGRAM(s) Oral daily  zinc sulfate 220 milliGRAM(s) Oral daily    MEDICATIONS  (PRN):  acetaminophen   Tablet .. 650 milliGRAM(s) Oral every 6 hours PRN Temp greater or equal to 38C (100.4F), Mild Pain (1 - 3)  temazepam 30 milliGRAM(s) Oral at bedtime PRN Insomnia      CAPILLARY BLOOD GLUCOSE      POCT Blood Glucose.: 185 mg/dL (20 Dec 2020 08:24)  POCT Blood Glucose.: 180 mg/dL (19 Dec 2020 22:02)  POCT Blood Glucose.: 159 mg/dL (19 Dec 2020 16:52)  POCT Blood Glucose.: 259 mg/dL (19 Dec 2020 12:16)    I&O's Summary    19 Dec 2020 07:01  -  20 Dec 2020 07:00  --------------------------------------------------------  IN: 960 mL / OUT: 2695 mL / NET: -1735 mL        PHYSICAL EXAM:  Vital Signs Last 24 Hrs  T(C): 36.7 (20 Dec 2020 07:50), Max: 36.7 (20 Dec 2020 07:50)  T(F): 98.1 (20 Dec 2020 07:50), Max: 98.1 (20 Dec 2020 07:50)  HR: 80 (20 Dec 2020 09:02) (71 - 95)  BP: 153/70 (20 Dec 2020 07:50) (141/58 - 155/66)  BP(mean): 80 (19 Dec 2020 16:00) (80 - 90)  RR: 18 (20 Dec 2020 07:50) (18 - 19)  SpO2: 98% (20 Dec 2020 09:02) (92% - 100%)    CONSTITUTIONAL: NAD,   EYES: PERRLA; conjunctiva and sclera clear  ENMT: Moist oral mucosa,   RESPIRATORY: Normal respiratory effort; mild crackle  to auscultation bilaterally  CARDIOVASCULAR: Regular rate and rhythm, normal S1 and S2, no murmur   EXTS:1 +  lower extremity edema; Peripheral pulses are 2+ bilaterally. RLL: wound healing well. bandage present heel-mild discomfort heel.no acute discharge seen  ABDOMEN: Nontender to palpation, normoactive bowel sounds, no rebound/guarding;   MUSCLOSKELETAL:   no clubbing or cyanosis of digits; no joint swelling or tenderness to palpation  PSYCH: affect appropriate  NEUROLOGY: A+O to person, place, and time; CN 2-12 are intact and symmetric; no gross sensory deficits;     LABS:                        9.5    5.82  )-----------( 181      ( 20 Dec 2020 08:27 )             30.0     12-20    139  |  97<L>  |  97.0<H>  ----------------------------<  185<H>  4.5   |  31.0<H>  |  1.67<H>    Ca    8.7      20 Dec 2020 08:27  Phos  4.4     12-19  Mg     2.0     12-19    TPro  5.1<L>  /  Alb  3.2<L>  /  TBili  0.5  /  DBili  x   /  AST  26  /  ALT  23  /  AlkPhos  131<H>  12-20                RADIOLOGY & ADDITIONAL TESTS:  Results Reviewed:   Imaging Personally Reviewed:  Electrocardiogram Personally Reviewed:    COORDINATION OF CARE:  Care Discussed with Consultants/Other Providers [Y/N]:  Prior or Outpatient Records Reviewed [Y/N]:

## 2020-12-20 NOTE — PROGRESS NOTE ADULT - ASSESSMENT
Assess    Acute on chronic Hypoxic and Hypercarbic Respiratory Failure improving   CKD  Obesity   COPD - No clear evidence this is severe  Possible VANITA  Unclear reason for severe pulmonary HTN    Rec    Nocturnal NIV   DuoNeb  AC  Cardiac regiment   RHC/cardio MEMS evaluation indicated   Palliative input may be appropriate

## 2020-12-21 LAB
ANION GAP SERPL CALC-SCNC: 8 MMOL/L — SIGNIFICANT CHANGE UP (ref 5–17)
BUN SERPL-MCNC: 94 MG/DL — HIGH (ref 8–20)
CALCIUM SERPL-MCNC: 8.3 MG/DL — LOW (ref 8.6–10.2)
CHLORIDE SERPL-SCNC: 95 MMOL/L — LOW (ref 98–107)
CO2 SERPL-SCNC: 36 MMOL/L — HIGH (ref 22–29)
CREAT SERPL-MCNC: 1.48 MG/DL — HIGH (ref 0.5–1.3)
GLUCOSE BLDC GLUCOMTR-MCNC: 204 MG/DL — HIGH (ref 70–99)
GLUCOSE BLDC GLUCOMTR-MCNC: 380 MG/DL — HIGH (ref 70–99)
GLUCOSE BLDC GLUCOMTR-MCNC: 414 MG/DL — HIGH (ref 70–99)
GLUCOSE SERPL-MCNC: 212 MG/DL — HIGH (ref 70–99)
HCT VFR BLD CALC: 30.4 % — LOW (ref 39–50)
HGB BLD-MCNC: 9.6 G/DL — LOW (ref 13–17)
MCHC RBC-ENTMCNC: 31.6 GM/DL — LOW (ref 32–36)
MCHC RBC-ENTMCNC: 34 PG — SIGNIFICANT CHANGE UP (ref 27–34)
MCV RBC AUTO: 107.8 FL — HIGH (ref 80–100)
PLATELET # BLD AUTO: 182 K/UL — SIGNIFICANT CHANGE UP (ref 150–400)
POTASSIUM SERPL-MCNC: 4 MMOL/L — SIGNIFICANT CHANGE UP (ref 3.5–5.3)
POTASSIUM SERPL-SCNC: 4 MMOL/L — SIGNIFICANT CHANGE UP (ref 3.5–5.3)
RBC # BLD: 2.82 M/UL — LOW (ref 4.2–5.8)
RBC # FLD: 17.5 % — HIGH (ref 10.3–14.5)
SODIUM SERPL-SCNC: 139 MMOL/L — SIGNIFICANT CHANGE UP (ref 135–145)
WBC # BLD: 7.99 K/UL — SIGNIFICANT CHANGE UP (ref 3.8–10.5)
WBC # FLD AUTO: 7.99 K/UL — SIGNIFICANT CHANGE UP (ref 3.8–10.5)

## 2020-12-21 PROCEDURE — 99232 SBSQ HOSP IP/OBS MODERATE 35: CPT

## 2020-12-21 PROCEDURE — 99233 SBSQ HOSP IP/OBS HIGH 50: CPT

## 2020-12-21 RX ORDER — FUROSEMIDE 40 MG
60 TABLET ORAL DAILY
Refills: 0 | Status: DISCONTINUED | OUTPATIENT
Start: 2020-12-21 | End: 2020-12-22

## 2020-12-21 RX ORDER — AMLODIPINE BESYLATE 2.5 MG/1
5 TABLET ORAL DAILY
Refills: 0 | Status: DISCONTINUED | OUTPATIENT
Start: 2020-12-21 | End: 2020-12-22

## 2020-12-21 RX ADMIN — Medication 40 MILLIGRAM(S): at 18:18

## 2020-12-21 RX ADMIN — Medication 1 MILLIGRAM(S): at 12:45

## 2020-12-21 RX ADMIN — Medication 3 MILLILITER(S): at 20:31

## 2020-12-21 RX ADMIN — CARVEDILOL PHOSPHATE 12.5 MILLIGRAM(S): 80 CAPSULE, EXTENDED RELEASE ORAL at 05:02

## 2020-12-21 RX ADMIN — Medication 3 MILLILITER(S): at 15:28

## 2020-12-21 RX ADMIN — Medication 12: at 18:18

## 2020-12-21 RX ADMIN — Medication 60 MILLIGRAM(S): at 05:00

## 2020-12-21 RX ADMIN — Medication 4: at 08:19

## 2020-12-21 RX ADMIN — Medication 40 MILLIGRAM(S): at 05:01

## 2020-12-21 RX ADMIN — ATORVASTATIN CALCIUM 20 MILLIGRAM(S): 80 TABLET, FILM COATED ORAL at 23:04

## 2020-12-21 RX ADMIN — APIXABAN 2.5 MILLIGRAM(S): 2.5 TABLET, FILM COATED ORAL at 18:19

## 2020-12-21 RX ADMIN — Medication 500 MILLIGRAM(S): at 12:45

## 2020-12-21 RX ADMIN — SENNA PLUS 2 TABLET(S): 8.6 TABLET ORAL at 23:05

## 2020-12-21 RX ADMIN — Medication 100 MILLIGRAM(S): at 12:46

## 2020-12-21 RX ADMIN — AMLODIPINE BESYLATE 5 MILLIGRAM(S): 2.5 TABLET ORAL at 12:45

## 2020-12-21 RX ADMIN — APIXABAN 2.5 MILLIGRAM(S): 2.5 TABLET, FILM COATED ORAL at 05:02

## 2020-12-21 RX ADMIN — PANTOPRAZOLE SODIUM 40 MILLIGRAM(S): 20 TABLET, DELAYED RELEASE ORAL at 05:02

## 2020-12-21 RX ADMIN — ZINC SULFATE TAB 220 MG (50 MG ZINC EQUIVALENT) 220 MILLIGRAM(S): 220 (50 ZN) TAB at 12:45

## 2020-12-21 RX ADMIN — Medication 3 MILLILITER(S): at 08:57

## 2020-12-21 RX ADMIN — Medication 3 MILLILITER(S): at 02:51

## 2020-12-21 RX ADMIN — Medication 100 MILLIGRAM(S): at 12:45

## 2020-12-21 RX ADMIN — Medication 81 MILLIGRAM(S): at 12:45

## 2020-12-21 RX ADMIN — CARVEDILOL PHOSPHATE 12.5 MILLIGRAM(S): 80 CAPSULE, EXTENDED RELEASE ORAL at 18:19

## 2020-12-21 RX ADMIN — Medication 325 MILLIGRAM(S): at 12:46

## 2020-12-21 RX ADMIN — TAMSULOSIN HYDROCHLORIDE 0.4 MILLIGRAM(S): 0.4 CAPSULE ORAL at 23:04

## 2020-12-21 RX ADMIN — TEMAZEPAM 30 MILLIGRAM(S): 15 CAPSULE ORAL at 23:04

## 2020-12-21 RX ADMIN — Medication 10: at 12:46

## 2020-12-21 RX ADMIN — Medication 60 MILLIGRAM(S): at 18:19

## 2020-12-21 RX ADMIN — PREGABALIN 1000 MICROGRAM(S): 225 CAPSULE ORAL at 12:45

## 2020-12-21 NOTE — PROGRESS NOTE ADULT - SUBJECTIVE AND OBJECTIVE BOX
Patient is a 70y old  Male who presents with a chief complaint of CHF exacerbation (21 Dec 2020 11:09)    Pt is feeling better. so2 95% 3l oxy.  No cp, dizziness,palpitation    SUBJECTIVE / OVERNIGHT EVENTS:  REVIEW OF SYSTEMS: All systems are reviewed and found to be negative except above    MEDICATIONS  (STANDING):  albuterol/ipratropium for Nebulization 3 milliLiter(s) Nebulizer every 6 hours  albuterol/ipratropium for Nebulization. 3 milliLiter(s) Nebulizer once  allopurinol 100 milliGRAM(s) Oral daily  amLODIPine   Tablet 5 milliGRAM(s) Oral daily  apixaban 2.5 milliGRAM(s) Oral every 12 hours  ascorbic acid 500 milliGRAM(s) Oral daily  aspirin  chewable 81 milliGRAM(s) Oral daily  atorvastatin 20 milliGRAM(s) Oral at bedtime  carvedilol 12.5 milliGRAM(s) Oral every 12 hours  cyanocobalamin 1000 MICROGram(s) Oral daily  dextrose 40% Gel 15 Gram(s) Oral once  dextrose 5%. 1000 milliLiter(s) (50 mL/Hr) IV Continuous <Continuous>  dextrose 5%. 1000 milliLiter(s) (100 mL/Hr) IV Continuous <Continuous>  dextrose 50% Injectable 25 Gram(s) IV Push once  dextrose 50% Injectable 12.5 Gram(s) IV Push once  dextrose 50% Injectable 25 Gram(s) IV Push once  epoetin felisha-epbx (RETACRIT) Injectable 15821 Unit(s) SubCutaneous every 7 days  ferrous    sulfate 325 milliGRAM(s) Oral daily  folic acid 1 milliGRAM(s) Oral daily  furosemide   Injectable 60 milliGRAM(s) IV Push daily  glucagon  Injectable 1 milliGRAM(s) IntraMuscular once  insulin lispro (ADMELOG) corrective regimen sliding scale   SubCutaneous three times a day before meals  methylPREDNISolone sodium succinate Injectable 40 milliGRAM(s) IV Push every 12 hours  pantoprazole    Tablet 40 milliGRAM(s) Oral before breakfast  senna 2 Tablet(s) Oral at bedtime  tamsulosin 0.4 milliGRAM(s) Oral at bedtime  thiamine 100 milliGRAM(s) Oral daily  zinc sulfate 220 milliGRAM(s) Oral daily    MEDICATIONS  (PRN):  acetaminophen   Tablet .. 650 milliGRAM(s) Oral every 6 hours PRN Temp greater or equal to 38C (100.4F), Mild Pain (1 - 3)  temazepam 30 milliGRAM(s) Oral at bedtime PRN Insomnia      CAPILLARY BLOOD GLUCOSE      POCT Blood Glucose.: 380 mg/dL (21 Dec 2020 12:08)  POCT Blood Glucose.: 204 mg/dL (21 Dec 2020 08:16)  POCT Blood Glucose.: 178 mg/dL (20 Dec 2020 21:42)  POCT Blood Glucose.: 253 mg/dL (20 Dec 2020 16:48)    I&O's Summary    20 Dec 2020 07:01  -  21 Dec 2020 07:00  --------------------------------------------------------  IN: 390 mL / OUT: 1300 mL / NET: -910 mL    21 Dec 2020 07:01  -  21 Dec 2020 14:30  --------------------------------------------------------  IN: 0 mL / OUT: 750 mL / NET: -750 mL        PHYSICAL EXAM:  Vital Signs Last 24 Hrs  T(C): 36.3 (21 Dec 2020 12:39), Max: 36.6 (21 Dec 2020 08:00)  T(F): 97.4 (21 Dec 2020 12:39), Max: 97.9 (21 Dec 2020 08:00)  HR: 79 (21 Dec 2020 12:39) (69 - 89)  BP: 165/63 (21 Dec 2020 12:39) (140/63 - 175/71)  BP(mean): 83 (21 Dec 2020 08:00) (83 - 83)  RR: 16 (21 Dec 2020 12:39) (16 - 20)  SpO2: 98% (21 Dec 2020 12:39) (95% - 100%)    CONSTITUTIONAL: NAD,   EYES: PERRLA; conjunctiva and sclera clear  ENMT: Moist oral mucosa,   RESPIRATORY: Normal respiratory effort; lungs are clear to auscultation bilaterally  CARDIOVASCULAR: Regular rate and rhythm, normal S1 and S2, no murmur   EXTS:  lower extremity edema; Peripheral pulses are 2+ bilaterally.   ABDOMEN: Nontender to palpation, normoactive bowel sounds, no rebound/guarding;   MUSCLOSKELETAL:   no clubbing or cyanosis of digits; no joint swelling or tenderness to palpation  PSYCH: affect appropriate  NEUROLOGY: A+O to person, place, and time; CN 2-12 are intact and symmetric; no gross sensory deficits;       LABS:                        9.6    7.99  )-----------( 182      ( 21 Dec 2020 07:49 )             30.4     12-21    139  |  95<L>  |  94.0<H>  ----------------------------<  212<H>  4.0   |  36.0<H>  |  1.48<H>    Ca    8.3<L>      21 Dec 2020 07:49    TPro  5.1<L>  /  Alb  3.2<L>  /  TBili  0.5  /  DBili  x   /  AST  26  /  ALT  23  /  AlkPhos  131<H>  12-20                RADIOLOGY & ADDITIONAL TESTS:  Results Reviewed:   Imaging Personally Reviewed:  Electrocardiogram Personally Reviewed:    COORDINATION OF CARE:  Care Discussed with Consultants/Other Providers [Y/N]:  Prior or Outpatient Records Reviewed [Y/N]:

## 2020-12-21 NOTE — PROGRESS NOTE ADULT - ATTENDING COMMENTS
70M with HFpEF    - respiratory status improved compared with admission  - continue IV diuretics, expected diuresis response thus far with current dosing regimen  - monitor renal function and electrolytes daily, K>4 and Mg>2  - would be suitable candidate for RHC/MEMs when euvolemic  - apixaban and coreg for AF; monitor Hb given recent history of significant anemia .
70M with HFpEF and COPD    - appears comfortable on bipap, but difficulty maintaining wean thus far; agree with transfer back to step-down unit  - continue IV diuretics, diuresing slowly but clinically improved and Cr improving   - monitor renal function and electrolytes daily, K>4 and Mg>2; follow up nephrology recommendations   - would be suitable candidate for RHC/MEMs when euvolemic; attempted to reach patient's primary Cardiologist Dr CRISTIAN Storm ([phone]) on 12/17 and 12/18, awaiting callback regarding timing of possible procedures  - continue treatment of COPD per primary team and Pulmonary  - wound care for lower extremity wound  - apixaban and coreg for AF; monitor Hb given recent history of significant anemia
I have personally seen and examined patient.  Above note reviewed and discussed with PA, modified where appropriate. Pt appears lethargic but arousable today with slightly laboured breathing. ABG with worsening resp acidosis. BiPAP placed. Repeat ABG in 2 hours. ICU consult called. Await pulm follow up. Pt requesting to be FULL code for now.       PHYSICAL EXAM-  GENERAL: moderate distress, using accessory muscles, morbid obesity  HEAD:  Atraumatic, Normocephalic  EYES: EOMI, PERRLA, conjunctiva and sclera clear  NECK: Supple, No JVD  NERVOUS SYSTEM:  lethargic but arousable, non focal  CHEST/LUNG: decreased BS bilaterally; No rales, rhonchi, wheezing, or rubs  HEART: Regular rate and rhythm; No murmurs, rubs, or gallops  ABDOMEN: Soft, Nontender, Nondistended; Bowel sounds present  EXTREMITIES:  2+ Peripheral Pulses, No clubbing, cyanosis, or edema  SKIN: No rashes or lesions
Patient seen and examined by me.  I have discussed my recommendation with the PA which are outlined above.  Will be followed by Dr Guero Geller.
70M with HFpEF    - respiratory status improved; weaned off bipap to NC O2  - continue IV diuretics today  - monitor renal function and electrolytes daily, K>4 and Mg>2  - would be suitable candidate for RHC/MEMs when euvolemic; will discuss with primary Cardiologist regarding timing  - no strong indication for LHC at this time given no chest pain and tenuous renal function  - apixaban and coreg for AF; monitor Hb given recent history of significant anemia
Patient seen and examined by me.  I have discussed my recommendation with the PA which are outlined above.  Will follow.
70M with HFpEF and COPD    - still with conversational dyspnea, wean bipap as able  - decrease lasix frequeny to daily today, may be able to convert to PO tomorrow  - monitor renal function and electrolytes daily, K>4 and Mg>2; follow up nephrology recommendations   - discussed RHC/MEMs with primary Cardiologist Dr Storm and patient's wife today  - will review with MEMs rep if Dr Storm's office is equipped for MEMs monitoring; if so then will plan for RHC/MEMs prior to discharge  - continue treatment of COPD per primary team and Pulmonary  - wound care for lower extremity wound  - apixaban and coreg for AF; monitor Hb given recent history of significant anemia   - add amlodipine for hypertension

## 2020-12-21 NOTE — PROGRESS NOTE ADULT - ASSESSMENT
71 y/o M w/ a PMH of AFib (on eliquis), HFpEF (follows with Dr. Zia Storm; last known EF 60-65% on 11/17), COPD (on 2L home O2), CABG 2013, and bladder CA (s/p TURP 2013) presented to [facility]  w/ AMS x1 day w/ associated lethargy.  Pt also reports SOB x2 months that has acutely worsened the past few days.  Pt also reports fatigue, weight gain, inability to sleep flat.  Pt was noted to have evidence of fluid overload on exam, pBNP was 11K and CXR showed small pleural effusions consistent w/ CHF exacerbation  ABG also showed pt had hypoxemia and hypercapnia Pt was placed on BIPAP and mentation improved.  Pt will be admitted to tele/pulse ox for acute hypoxic hypercapnic respiratory failure. COVID -ve    Acute hypoxemic hypercapnic respiratory failure   - Likely multifactorial 2/2 Acute on chronic diastolic CHF and COPD exacerbation  -off bipap now.on 3 l Oxygen. titrate down AS TOLERATE. Pt is tolerating well. will monitor closely  - CXR and elevated pBNP consistent w/ fluid overload   - c/w Solumedrol and duonebs  - Head of bed elevation to 30 degrees  - Last known EF 60-65% on 11/17  - CXR shows b/l pleural effusions and pBNP 11,000 on admission  - Lasix 60mg IV BID, Solumed 40mg IV BID taper . titrate as tolerated   - Monitor on telemetry  - Hold home torsemide and place on IV lasix for more aggressive diuresis  - In light of active diuresis monitor lytes and renal function. Cr improved. Goal K~4 and Mg~2  - Daily weights, strict I/O's and fluid restrict.-1.9L negative balance  - plan for possible right heart cath and cardiomems this hospital admission. cardiology Spoke with pt's primary cardiology  Dr. Storm   - Pulm and cardiology recs appreciated      Acute metabolic encephalopathy likely 2/2 CO2 narcosis   - improved. pt  is aaox3 now  - Mentation back to baseline     AFib, rate controlled  - c/w Eliquis 2.5mg q12h  - Monitor on telemetry    LLE chronic wound   - Xray L-tib/fib and L-foot: no evidence of rubén destruction  appears like a healed skin graft     GERALD on CKD 3 likely prerenal  - Cr improving 1.6 today  - Last known Cr 1.92 on 11/21/20  - nephrology recs appreciated  - Renal US no hydro   Bladder scan eval for retention    Anemia likely of chronic dx  - H/H at baseline and pt hemodynamically stable  - Monitor CBC daily   - Transfuse if Hb <7-8  - c/w epo and venofer added per renal    Hyperglycemia  -due to solumedrol  -HbA1c 5.0  -ISS added    DVT ppx: on Eliquis  PT Eval     Spoke with wife. Agreeable with plan    Dispo: PT rec home pt/larisa. plan for cath    care of plan dw pt -he agreed  DW wife over phone in detailed. Agreed with above plan

## 2020-12-21 NOTE — PROGRESS NOTE ADULT - ASSESSMENT
71 y/o male with a PMHx of Afib (on Eilquis), severe pulmonary HTN, RV dysfunction, HFpEF (EF 60-65%), COPD (on 2L home O2), CAD s/p CABG 2013 (recommended for RHC/LHC on last 2 admissions, but patient refusing), chronic OM, GERALD/CKD, hematoma of LLE s/p debridement with a skin graft. bladder CA s/p TURP who presented to the ED with AMS and lethargy. Per patient he feels that ever since his last 2 hospitalizations with little improvement with edema and sob.  He is 02 dependant and has bipap at home but does not use it.  Troponin 0.03 BNP 03671 Echo this admission with ef 60% with severe pul htn    HFpEF/Sev Pulm HTN  - Severe pulm HTN   - EF 65%  - volume overloaded on physical exam   - pt would benefit from RHC and cardiomems placement  - patient is primarily followed by [facility] outpatient   - need to reach out to [facility] to see if mems is placed here how the information would be transferred to primary cardiology team for their monitoring  - plan for RHC will be determined based on those discussions  - will continue current treatment plan   - continue IV diuresis w/ Lasix 60mg, reduce to daily from BID   - strict I/O and daily weights  - goal urine output net negative 1-2L   - continue coreg  - monitor renal function currently creatinine 1.48, GFR 47 both are improved from baseline  - watch Bicarb, trending up 36 currently   - Diet/lifestyle modifications and medication compliance heavily reinforced     HLD  - continue lipitor     HTN   - uncontrolled  - li  coreg up to 12.5 bid    COPD  management per Pulmonary 71 y/o male with a PMHx of Afib (on Eilquis), severe pulmonary HTN, RV dysfunction, HFpEF (EF 60-65%), COPD (on 2L home O2), CAD s/p CABG 2013 (recommended for RHC/LHC on last 2 admissions, but patient refusing), chronic OM, GERALD/CKD, hematoma of LLE s/p debridement with a skin graft. bladder CA s/p TURP who presented to the ED with AMS and lethargy. Per patient he feels that ever since his last 2 hospitalizations with little improvement with edema and sob.  He is 02 dependant and has bipap at home but does not use it.  Troponin 0.03 BNP 77646 Echo this admission with ef 60% with severe pul htn    HFpEF/Sev Pulm HTN  - Severe pulm HTN   - EF 65%  - volume overloaded on physical exam   - pt would benefit from RHC and cardiomems placement  - patient is primarily followed by [facility] outpatient   - need to reach out to [facility] to see if mems is placed here how the information would be transferred to primary cardiology team for their monitoring  - plan for RHC will be determined based on those discussions  - will continue current treatment plan   - continue IV diuresis w/ Lasix 60mg, reduce to daily from BID   - strict I/O and daily weights  - goal urine output net negative 1-2L   - continue coreg  - monitor renal function currently creatinine 1.48, GFR 47 both are improved from baseline  - watch Bicarb, trending up 36 currently   - Diet/lifestyle modifications and medication compliance heavily reinforced     HLD  - continue lipitor     HTN   - uncontrolled 2/2 to steroid use   - continue coreg   - start amlodipine 5mg daily     COPD   - management per Pulmonary

## 2020-12-21 NOTE — PROGRESS NOTE ADULT - SUBJECTIVE AND OBJECTIVE BOX
[place] [facility] Faculty Practice                          [address] [place] [address]                       Phone: [phone]. Fax:[phone]                      ________________________________________________    HPI:  69 y/o M w/ a PMH of AFib (on eliquis), HFpEF (follows with Dr. Zia Storm; last known EF 60-65% on 11/17), COPD (on 2L home O2), CABG 2013, and bladder CA (s/p TURP 2013) presented to [facility]  w/ AMS x1 day w/ associated lethargy.  Pt also reports SOB x2 months that has acutely worsened the past few days.  Pt also reports fatigue, weight gain, inability to sleep flat.  Pt reports increased supplemental O2 requirement over the past few days to 5L.   He denies nocturnal cough, fevers, chills, myalgias, sick contacts, recent travel, cp, palpitations, abd pain, N/V, diarrhea.     (14 Dec 2020 18:45)    ROS: All review of systems negative unless indicated otherwise below.                          LAB RESULTS                   COMPLETE BLOOD COUNT( 21 Dec 2020 07:49 )                            9.6 g/dL<L>  7.99 K/uL )---------------( 182 K/uL                        30.4 %<L>      Automated Differential     Auto Basophil # - X      Auto Basophil % - X      Auto Eosinophil # - X      Auto Eosinophil % - X      Auto Immature Granulocyte # - X      Auto Immature Granulocyte % - X      Auto Lymphocyte # - X      Auto Lymphocyte % - X      Auto Monocyte # - X      Auto Monocyte % - X      Auto Neutrophil # - X      Auto Neutrophil % - X                                      CHEMISTRY                 Basic Metabolic Panel (12-21-20 @ 07:49)    139  |  95<L>  |  94.0<H>  ----------------------------<  212<H>  4.0   |  36.0<H>  |  1.48<H>    Ca    8.3<L>      21 Dec 2020 07:49  Phos  4.4     12-19  Mg     2.0     12-19                    Liver Functions (12-20-20 @ 08:27))  TPro  5.1  /  Alb  3.2  /  TBili  0.5  /  DBili  x   /  AST  26  /  ALT  23  /  AlkPhos  131     PT/INR/PTT ( 14 Dec 2020 11:06 )                        :                       :      18.5         :       34.2                  .        .                   .              .           .       1.63        .                                       ABG - ( 19 Dec 2020 08:53 )  pH: 7.35  /  pCO2: 69    /  pO2: 60    / HCO3: 34    / Base Excess: 10.3  /  SaO2: 92                                  Cardiac Enzymes   ( 19 Dec 2020 14:04 )  Troponin T  X    ,  CPK  X    , CKMB  X    , BNP 81295<H>    , ( 14 Dec 2020 11:06 )  Troponin T  0.03 ,  CPK  X    , CKMB  X    , BNP 76257<H>                          RADIOLOGY RESULTS: Personally visualized   < from: Xray Chest 1 View- PORTABLE-Routine (Xray Chest 1 View- PORTABLE-Routine .) (12.19.20 @ 13:23) >  IMPRESSION:    Improving pulmonary vascular congestion with bilateral pleural effusions and bibasilar atelectasis    < end of copied text >                            CARDIOLOGY RESULTS: Official Report/Preliminary Verbal Reports    ECHO:   < from: TTE Echo Limited or F/U (11.16.20 @ 21:06) >  Summary:   1. Left ventricular ejection fraction, by visual estimation, is 60 to 65%.  2. Normal global left ventricular systolic function.   3. Mildly enlarged right ventricle.   4. Mildly reduced RV systolic function.   5. Moderate tricuspid regurgitation.   6. Estimated pulmonary artery systolic pressure is 67.7 mmHg assuming a right atrial pressure of 15 mmHg, which is consistent with severe pulmonary hypertension.    MD Dana Electronically signed on 11/17/2020 at 10:11:35 AM    < end of copied text >                          CARDIOLOGY REVIEW: Personally visualized and reviewed  Telemetry Last 24h: Afib 70s PVCs, AIVR 8 beats, NSVT 4 beats,                             INTAKE AND OUTPUT - 48 HOUR TREND   12-19-20 @ 07:01  -  12-20-20 @ 07:00  --------------------------------------------------------  IN:  Total IN: 0 mL    OUT:    Voided (mL): 2695 mL  Total OUT: 2695 mL    Total NET: -2695 mL    12-20-20 @ 07:01  -  12-21-20 @ 07:00  --------------------------------------------------------  IN:  Total IN: 0 mL    OUT:    Voided (mL): 1300 mL  Total OUT: 1300 mL    Total NET: -1300 mL      HOME MEDICATIONS:  Advair Diskus 250 mcg-50 mcg inhalation powder: 1 puff(s) inhaled 2 times a day (14 Dec 2020 11:09)  allopurinol 100 mg oral tablet: 1 tab(s) orally once a day (14 Dec 2020 11:09)  apixaban 2.5 mg oral tablet: 1 tab(s) orally 2 times a day (14 Dec 2020 11:09)  aspirin 81 mg oral tablet: 1 tab(s) orally once a day (14 Dec 2020 11:09)  Flomax 0.4 mg oral capsule: 1 cap(s) orally once a day (17 Nov 2020 11:30)  Lipitor 20 mg oral tablet: 1 tab(s) orally once a day (17 Nov 2020 11:30)  omeprazole 40 mg oral delayed release capsule: 1 cap(s) orally once a day (17 Nov 2020 11:30)  senna oral tablet: 2 tab(s) orally once a day (at bedtime) (22 Nov 2020 11:07)  temazepam 30 mg oral capsule: 1 cap(s) orally once a day (at bedtime), As Needed (17 Nov 2020 11:30)  torsemide 20 mg oral tablet: 1 tab(s) orally once a day (14 Dec 2020 18:20)                             Current Admission Active Medications    acetaminophen   Tablet .. 650 milliGRAM(s) Oral every 6 hours PRN Temp greater or equal to 38C (100.4F), Mild Pain (1 - 3)  albuterol/ipratropium for Nebulization 3 milliLiter(s) Nebulizer every 6 hours  albuterol/ipratropium for Nebulization. 3 milliLiter(s) Nebulizer once  allopurinol 100 milliGRAM(s) Oral daily  amLODIPine   Tablet 5 milliGRAM(s) Oral daily  apixaban 2.5 milliGRAM(s) Oral every 12 hours  ascorbic acid 500 milliGRAM(s) Oral daily  aspirin  chewable 81 milliGRAM(s) Oral daily  atorvastatin 20 milliGRAM(s) Oral at bedtime  carvedilol 12.5 milliGRAM(s) Oral every 12 hours  cyanocobalamin 1000 MICROGram(s) Oral daily  dextrose 40% Gel 15 Gram(s) Oral once  dextrose 5%. 1000 milliLiter(s) (50 mL/Hr) IV Continuous <Continuous>  dextrose 5%. 1000 milliLiter(s) (100 mL/Hr) IV Continuous <Continuous>  dextrose 50% Injectable 25 Gram(s) IV Push once  dextrose 50% Injectable 12.5 Gram(s) IV Push once  dextrose 50% Injectable 25 Gram(s) IV Push once  epoetin felisha-epbx (RETACRIT) Injectable 74589 Unit(s) SubCutaneous every 7 days  ferrous    sulfate 325 milliGRAM(s) Oral daily  folic acid 1 milliGRAM(s) Oral daily  furosemide   Injectable 60 milliGRAM(s) IV Push daily  glucagon  Injectable 1 milliGRAM(s) IntraMuscular once  insulin lispro (ADMELOG) corrective regimen sliding scale   SubCutaneous three times a day before meals  methylPREDNISolone sodium succinate Injectable 40 milliGRAM(s) IV Push every 12 hours  pantoprazole    Tablet 40 milliGRAM(s) Oral before breakfast  senna 2 Tablet(s) Oral at bedtime  tamsulosin 0.4 milliGRAM(s) Oral at bedtime  temazepam 30 milliGRAM(s) Oral at bedtime PRN Insomnia  thiamine 100 milliGRAM(s) Oral daily  zinc sulfate 220 milliGRAM(s) Oral daily                        PHYSICAL EXAM:    Vital Signs Last 24 Hrs  T(C): 36.6 (21 Dec 2020 08:00), Max: 36.6 (21 Dec 2020 08:00)  T(F): 97.9 (21 Dec 2020 08:00), Max: 97.9 (21 Dec 2020 08:00)  HR: 83 (21 Dec 2020 10:17) (69 - 89)  BP: 140/63 (21 Dec 2020 08:00) (140/63 - 175/71)  BP(mean): 83 (21 Dec 2020 08:00) (83 - 83)  RR: 18 (21 Dec 2020 08:00) (18 - 20)  SpO2: 95% (21 Dec 2020 10:17) (95% - 100%)    GENERAL: NAD  NECK: Supple, No JVD  NERVOUS SYSTEM:  Alert & Oriented X3, non focal neuro exam.   CHEST/LUNG: clear lungs, No rales, rhonchi, wheezing, or rubs  HEART: Regular rate and rhythm; s1 and s2 auscultated, No murmurs, rubs, or gallops  ABDOMEN: Soft, Nontender, Nondistended; Bowel sounds present and normoactive.   EXTREMITIES:  2+ Peripheral Pulses, No clubbing, cyanosis, or edema

## 2020-12-21 NOTE — PHYSICAL THERAPY INITIAL EVALUATION ADULT - PERTINENT HX OF CURRENT PROBLEM, REHAB EVAL
71 y/o M w/ a PMH of AFib (on eliquis), HFpEF (follows with Dr. Zia Storm; last known EF 60-65% on 11/17), COPD (on 2L home O2), CABG 2013, and bladder CA (s/p TURP 2013) presented to [facility]  w/ AMS x1 day w/ associated lethargy.

## 2020-12-21 NOTE — PHYSICAL THERAPY INITIAL EVALUATION ADULT - ADDITIONAL COMMENTS
Pt. reports he lives in a townhouse with his wife with no stairs to enter and no stairs inside. Pt. reports he sleeps in a recliner and uses O2 via NC at home. Pt. was modified independent with a RW PTA.

## 2020-12-21 NOTE — PROGRESS NOTE ADULT - ASSESSMENT
CKDIII): GERALD, prerenal==> improving with diuresis  Renal sono w/o hydro  Elevated BUN also due to steroid effects  - avoid potential nephrotoxins  - diuretics to keep azotemic--> consider transitioning to oral dosing  - monitor labs     Anemia:  - cont TIMOTHY and Venofer  - target Hgb= 10.0

## 2020-12-21 NOTE — PROGRESS NOTE ADULT - SUBJECTIVE AND OBJECTIVE BOX
NEPHROLOGY INTERVAL HPI/OVERNIGHT EVENTS:  pt continues to feel better  no acute sob at rest    MEDICATIONS  (STANDING):  albuterol/ipratropium for Nebulization 3 milliLiter(s) Nebulizer every 6 hours  albuterol/ipratropium for Nebulization. 3 milliLiter(s) Nebulizer once  allopurinol 100 milliGRAM(s) Oral daily  apixaban 2.5 milliGRAM(s) Oral every 12 hours  ascorbic acid 500 milliGRAM(s) Oral daily  aspirin  chewable 81 milliGRAM(s) Oral daily  atorvastatin 20 milliGRAM(s) Oral at bedtime  carvedilol 12.5 milliGRAM(s) Oral every 12 hours  cyanocobalamin 1000 MICROGram(s) Oral daily  dextrose 40% Gel 15 Gram(s) Oral once  dextrose 5%. 1000 milliLiter(s) (50 mL/Hr) IV Continuous <Continuous>  dextrose 5%. 1000 milliLiter(s) (100 mL/Hr) IV Continuous <Continuous>  dextrose 50% Injectable 25 Gram(s) IV Push once  dextrose 50% Injectable 12.5 Gram(s) IV Push once  dextrose 50% Injectable 25 Gram(s) IV Push once  epoetin felisha-epbx (RETACRIT) Injectable 81942 Unit(s) SubCutaneous every 7 days  ferrous    sulfate 325 milliGRAM(s) Oral daily  folic acid 1 milliGRAM(s) Oral daily  furosemide   Injectable 60 milliGRAM(s) IV Push every 12 hours  glucagon  Injectable 1 milliGRAM(s) IntraMuscular once  insulin lispro (ADMELOG) corrective regimen sliding scale   SubCutaneous three times a day before meals  methylPREDNISolone sodium succinate Injectable 40 milliGRAM(s) IV Push every 12 hours  pantoprazole    Tablet 40 milliGRAM(s) Oral before breakfast  senna 2 Tablet(s) Oral at bedtime  tamsulosin 0.4 milliGRAM(s) Oral at bedtime  thiamine 100 milliGRAM(s) Oral daily  zinc sulfate 220 milliGRAM(s) Oral daily    MEDICATIONS  (PRN):  acetaminophen   Tablet .. 650 milliGRAM(s) Oral every 6 hours PRN Temp greater or equal to 38C (100.4F), Mild Pain (1 - 3)  temazepam 30 milliGRAM(s) Oral at bedtime PRN Insomnia      Allergies    No Known Allergies    Intolerances          Vital Signs Last 24 Hrs  T(C): 36.6 (21 Dec 2020 08:00), Max: 36.6 (21 Dec 2020 08:00)  T(F): 97.9 (21 Dec 2020 08:00), Max: 97.9 (21 Dec 2020 08:00)  HR: 83 (21 Dec 2020 10:17) (69 - 89)  BP: 140/63 (21 Dec 2020 08:00) (140/63 - 175/71)  BP(mean): 83 (21 Dec 2020 08:00) (83 - 83)  RR: 18 (21 Dec 2020 08:00) (18 - 20)  SpO2: 95% (21 Dec 2020 10:17) (95% - 100%)    PHYSICAL EXAM:  GENERAL: Chronically debilitated  NECK: Supple, + JVD  NERVOUS SYSTEM:  Alert & Oriented X3, No asterixis   CHEST/LUNG: Clear, diminished bilaterally  HEART: Regular rate and rhythm; No rub   ABDOMEN: Soft, Nontender, Nondistended; +BS  EXTREMITIES:  +LE edema stable      LABS:                        9.6    7.99  )-----------( 182      ( 21 Dec 2020 07:49 )             30.4     12-21    139  |  95<L>  |  94.0<H>  ----------------------------<  212<H>  4.0   |  36.0<H>  |  1.48<H>    Creatinine, Serum: 1.67 mg/dL (12.20.20)    Ca    8.3<L>      21 Dec 2020 07:49    TPro  5.1<L>  /  Alb  3.2<L>  /  TBili  0.5  /  DBili  x   /  AST  26  /  ALT  23  /  AlkPhos  131<H>  12-20            RADIOLOGY & ADDITIONAL TESTS:  < from: US Renal (12.17.20 @ 22:36) >     EXAM:  US KIDNEY(S)                          PROCEDURE DATE:  12/17/2020          INTERPRETATION:  CLINICAL INFORMATION: Renal failure    COMPARISON: Renal ultrasound 12/13/2016    TECHNIQUE: Sonography of the kidneys and bladder.    FINDINGS:    Right kidney: 10.9 cm. No renal mass, hydronephrosis or calculi. Interval growth of a lower pole simple cyst measuring 2.4. Renal cortical thinning.    Left kidney: 9.7 cm. No renal mass, hydronephrosis or calculi.    Urinary bladder: Within normal limits.    IMPRESSION:    No hydronephrosis.    < end of copied text >

## 2020-12-22 LAB
ANION GAP SERPL CALC-SCNC: 12 MMOL/L — SIGNIFICANT CHANGE UP (ref 5–17)
BUN SERPL-MCNC: 96 MG/DL — HIGH (ref 8–20)
CALCIUM SERPL-MCNC: 8.2 MG/DL — LOW (ref 8.6–10.2)
CHLORIDE SERPL-SCNC: 92 MMOL/L — LOW (ref 98–107)
CO2 SERPL-SCNC: 34 MMOL/L — HIGH (ref 22–29)
CREAT SERPL-MCNC: 1.64 MG/DL — HIGH (ref 0.5–1.3)
GLUCOSE BLDC GLUCOMTR-MCNC: 206 MG/DL — HIGH (ref 70–99)
GLUCOSE BLDC GLUCOMTR-MCNC: 236 MG/DL — HIGH (ref 70–99)
GLUCOSE BLDC GLUCOMTR-MCNC: 245 MG/DL — HIGH (ref 70–99)
GLUCOSE BLDC GLUCOMTR-MCNC: 305 MG/DL — HIGH (ref 70–99)
GLUCOSE SERPL-MCNC: 193 MG/DL — HIGH (ref 70–99)
MAGNESIUM SERPL-MCNC: 1.6 MG/DL — SIGNIFICANT CHANGE UP (ref 1.6–2.6)
POTASSIUM SERPL-MCNC: 4.2 MMOL/L — SIGNIFICANT CHANGE UP (ref 3.5–5.3)
POTASSIUM SERPL-SCNC: 4.2 MMOL/L — SIGNIFICANT CHANGE UP (ref 3.5–5.3)
SODIUM SERPL-SCNC: 138 MMOL/L — SIGNIFICANT CHANGE UP (ref 135–145)

## 2020-12-22 PROCEDURE — 99233 SBSQ HOSP IP/OBS HIGH 50: CPT

## 2020-12-22 PROCEDURE — 99232 SBSQ HOSP IP/OBS MODERATE 35: CPT

## 2020-12-22 RX ORDER — TEMAZEPAM 15 MG/1
30 CAPSULE ORAL ONCE
Refills: 0 | Status: DISCONTINUED | OUTPATIENT
Start: 2020-12-22 | End: 2020-12-22

## 2020-12-22 RX ORDER — CARVEDILOL PHOSPHATE 80 MG/1
25 CAPSULE, EXTENDED RELEASE ORAL EVERY 12 HOURS
Refills: 0 | Status: DISCONTINUED | OUTPATIENT
Start: 2020-12-22 | End: 2020-12-27

## 2020-12-22 RX ORDER — LANOLIN ALCOHOL/MO/W.PET/CERES
3 CREAM (GRAM) TOPICAL ONCE
Refills: 0 | Status: DISCONTINUED | OUTPATIENT
Start: 2020-12-22 | End: 2020-12-22

## 2020-12-22 RX ORDER — INSULIN LISPRO 100/ML
3 VIAL (ML) SUBCUTANEOUS
Refills: 0 | Status: DISCONTINUED | OUTPATIENT
Start: 2020-12-22 | End: 2020-12-26

## 2020-12-22 RX ORDER — AMLODIPINE BESYLATE 2.5 MG/1
10 TABLET ORAL DAILY
Refills: 0 | Status: DISCONTINUED | OUTPATIENT
Start: 2020-12-23 | End: 2020-12-27

## 2020-12-22 RX ORDER — FUROSEMIDE 40 MG
60 TABLET ORAL EVERY 12 HOURS
Refills: 0 | Status: DISCONTINUED | OUTPATIENT
Start: 2020-12-23 | End: 2020-12-27

## 2020-12-22 RX ADMIN — Medication 40 MILLIGRAM(S): at 17:47

## 2020-12-22 RX ADMIN — TAMSULOSIN HYDROCHLORIDE 0.4 MILLIGRAM(S): 0.4 CAPSULE ORAL at 21:35

## 2020-12-22 RX ADMIN — ATORVASTATIN CALCIUM 20 MILLIGRAM(S): 80 TABLET, FILM COATED ORAL at 21:35

## 2020-12-22 RX ADMIN — Medication 40 MILLIGRAM(S): at 05:20

## 2020-12-22 RX ADMIN — Medication 3 MILLILITER(S): at 08:35

## 2020-12-22 RX ADMIN — Medication 60 MILLIGRAM(S): at 06:00

## 2020-12-22 RX ADMIN — ZINC SULFATE TAB 220 MG (50 MG ZINC EQUIVALENT) 220 MILLIGRAM(S): 220 (50 ZN) TAB at 13:19

## 2020-12-22 RX ADMIN — PANTOPRAZOLE SODIUM 40 MILLIGRAM(S): 20 TABLET, DELAYED RELEASE ORAL at 05:21

## 2020-12-22 RX ADMIN — APIXABAN 2.5 MILLIGRAM(S): 2.5 TABLET, FILM COATED ORAL at 05:21

## 2020-12-22 RX ADMIN — Medication 3 UNIT(S): at 13:25

## 2020-12-22 RX ADMIN — Medication 81 MILLIGRAM(S): at 13:19

## 2020-12-22 RX ADMIN — Medication 3 MILLILITER(S): at 02:03

## 2020-12-22 RX ADMIN — CARVEDILOL PHOSPHATE 12.5 MILLIGRAM(S): 80 CAPSULE, EXTENDED RELEASE ORAL at 17:47

## 2020-12-22 RX ADMIN — SENNA PLUS 2 TABLET(S): 8.6 TABLET ORAL at 21:35

## 2020-12-22 RX ADMIN — Medication 100 MILLIGRAM(S): at 13:20

## 2020-12-22 RX ADMIN — Medication 4: at 09:49

## 2020-12-22 RX ADMIN — TEMAZEPAM 30 MILLIGRAM(S): 15 CAPSULE ORAL at 22:35

## 2020-12-22 RX ADMIN — Medication 8: at 13:25

## 2020-12-22 RX ADMIN — Medication 100 MILLIGRAM(S): at 13:19

## 2020-12-22 RX ADMIN — APIXABAN 2.5 MILLIGRAM(S): 2.5 TABLET, FILM COATED ORAL at 17:47

## 2020-12-22 RX ADMIN — AMLODIPINE BESYLATE 5 MILLIGRAM(S): 2.5 TABLET ORAL at 05:21

## 2020-12-22 RX ADMIN — Medication 3 UNIT(S): at 17:48

## 2020-12-22 RX ADMIN — CARVEDILOL PHOSPHATE 12.5 MILLIGRAM(S): 80 CAPSULE, EXTENDED RELEASE ORAL at 05:21

## 2020-12-22 RX ADMIN — PREGABALIN 1000 MICROGRAM(S): 225 CAPSULE ORAL at 13:19

## 2020-12-22 RX ADMIN — Medication 1 MILLIGRAM(S): at 13:19

## 2020-12-22 RX ADMIN — Medication 4: at 17:48

## 2020-12-22 RX ADMIN — Medication 3 MILLILITER(S): at 16:07

## 2020-12-22 RX ADMIN — Medication 3 MILLILITER(S): at 20:31

## 2020-12-22 RX ADMIN — Medication 500 MILLIGRAM(S): at 13:19

## 2020-12-22 RX ADMIN — Medication 325 MILLIGRAM(S): at 13:19

## 2020-12-22 NOTE — PROGRESS NOTE ADULT - ASSESSMENT
71 y/o M w/ a PMH of AFib (on eliquis), HFpEF (follows with Dr. Zia Storm; last known EF 60-65% on 11/17), COPD (on 2L home O2), CABG 2013, and bladder CA (s/p TURP 2013) presented to [facility]  w/ AMS x1 day w/ associated lethargy.  Pt also reports SOB x2 months that has acutely worsened the past few days.  Pt also reports fatigue, weight gain, inability to sleep flat.  Pt was noted to have evidence of fluid overload on exam, pBNP was 11K and CXR showed small pleural effusions consistent w/ CHF exacerbation  ABG also showed pt had hypoxemia and hypercapnia Pt was placed on BIPAP and mentation improved.  Pt will be admitted to tele/pulse ox for acute hypoxic hypercapnic respiratory failure. COVID -ve    Acute hypoxemic hypercapnic respiratory failure   - Likely multifactorial 2/2 Acute on chronic diastolic CHF and COPD exacerbation  -off bipap now.on 3 l Oxygen. titrate down AS TOLERATE. Pt is tolerating well. will monitor closely  - CXR and elevated pBNP consistent w/ fluid overload   - c/w Solumedrol and duonebs  - Head of bed elevation to 30 degrees  - Last known EF 60-65% on 11/17  - CXR shows b/l pleural effusions and pBNP 11,000 on admission  - Lasix 60mg IV qd, plan to change po per cardiology. Solumed 40mg IV BID taper . titrate as tolerated   - Monitor on telemetry  - Hold home torsemide and place on IV lasix for more aggressive diuresis  - In light of active diuresis monitor lytes and renal function. Cr improved. Goal K~4 and Mg~2  - Daily weights, strict I/O's and fluid restrict.-1.9L negative balance  - plan for possible right heart cath and cardiomems this hospital admission. cardiology Spoke with pt's primary cardiology  Dr. Storm   - Pulm and cardiology recs appreciated      Acute metabolic encephalopathy likely 2/2 CO2 narcosis   - improved. pt  is aaox3 now  - Mentation back to baseline     AFib, rate controlled  - c/w Eliquis 2.5mg q12h  - Monitor on telemetry    LLE chronic wound   - Xray L-tib/fib and L-foot: no evidence of rubén destruction  appears like a healed skin graft     GERALD on CKD 3 likely prerenal  - Cr improving 1.6 today  - Last known Cr 1.92 on 11/21/20  - nephrology recs appreciated  - Renal US no hydro   Bladder scan eval for retention    Anemia likely of chronic dx  - H/H at baseline and pt hemodynamically stable  - Monitor CBC daily   - Transfuse if Hb <7-8  - c/w epo and venofer added per renal    Hyperglycemia  -due to solumedrol  -HbA1c 5.0  -ISS added    DVT ppx: on Eliquis  PT Eval     Spoke with wife. Agreeable with plan    Dispo: PT rec home pt/larisa. As per cardiology plan for cath/ cardiomem     care of plan dw pt -he agreed

## 2020-12-22 NOTE — PROGRESS NOTE ADULT - SUBJECTIVE AND OBJECTIVE BOX
[place] [facility]                                                               Office: [address] [address]                                                              Telephone: [phone]. Fax:[phone]                                                                             PROGRESS NOTE    Reason for follow up: CHF    Subjective: Patient is a 70y old  Male who presents with a chief complaint of CHF exacerbation (22 Dec 2020 13:45).  Breathing at rest and with ambulation significantly improved; now weaned to baseline levels of O2.  No new cardiovascular complaints      REVIEW of SYSTEMS:  a 12 point ROS was negative except as per the HPI above.   	  Vitals:  T(C): 36.2 (12-22-20 @ 17:40), Max: 36.9 (12-22-20 @ 10:45)  HR: 83 (12-22-20 @ 17:40) (75 - 97)  BP: 163/79 (12-22-20 @ 17:40) (147/87 - 175/79)  RR: 18 (12-22-20 @ 17:40) (18 - 20)  SpO2: 96% (12-22-20 @ 17:40) (90% - 98%)  Wt(kg): --  I&O's Summary    21 Dec 2020 07:01  -  22 Dec 2020 07:00  --------------------------------------------------------  IN: 0 mL / OUT: 1150 mL / NET: -1150 mL        PHYSICAL EXAM:  Appearance: Comfortable. No acute distress  HEENT:  Head and neck: Atraumatic. Normocephalic.  Normal oral mucosa, PERRL, Neck is supple. No JVD, No carotid bruit.   Neurologic: A & O x 3, no focal deficits. EOMI.  Lymphatic: No cervical lymphadenopathy  Cardiovascular: Normal S1 S2, No murmur, rubs/gallops. No JVD, No edema  Respiratory: bilateral lungs diminished   Gastrointestinal:  Soft, Non-tender, + BS  Lower Extremities: Bilateral pitting R>L edema  Psychiatry: Patient is calm. No agitation. Mood & affect appropriate  Skin: Ace wrap L lowerextremity     CURRENT MEDICATIONS:  amLODIPine   Tablet 5 milliGRAM(s) Oral daily  carvedilol 12.5 milliGRAM(s) Oral every 12 hours  furosemide   Injectable 60 milliGRAM(s) IV Push daily  tamsulosin 0.4 milliGRAM(s) Oral at bedtime    albuterol/ipratropium for Nebulization  albuterol/ipratropium for Nebulization.  pantoprazole    Tablet  senna  allopurinol  atorvastatin  dextrose 40% Gel  dextrose 50% Injectable  dextrose 50% Injectable  dextrose 50% Injectable  glucagon  Injectable  insulin lispro (ADMELOG) corrective regimen sliding scale  insulin lispro Injectable (ADMELOG)  methylPREDNISolone sodium succinate Injectable  apixaban  ascorbic acid  aspirin  chewable  cyanocobalamin  dextrose 5%.  dextrose 5%.  epoetin felisha-epbx (RETACRIT) Injectable  ferrous    sulfate  folic acid  thiamine  zinc sulfate      DIAGNOSTIC TESTING:  [ ] Echocardiogram:   < from: TTE Echo Limited or F/U (11.16.20 @ 21:06) >  Summary:   1. Left ventricular ejection fraction, by visual estimation, is 60 to 65%.  2. Normal global left ventricular systolic function.   3. Mildly enlarged right ventricle.   4. Mildly reduced RV systolic function.   5. Moderate tricuspid regurgitation.   6. Estimated pulmonary artery systolic pressure is 67.7 mmHg assuming a right atrial pressure of 15 mmHg, which is consistent with severe pulmonary hypertension.    MD Dana Electronically signed on 11/17/2020 at 10:11:35 AM    < end of copied text >  	      LABS:	 	                            9.6    7.99  )-----------( 182      ( 21 Dec 2020 07:49 )             30.4     12-21    139  |  95<L>  |  94.0<H>  ----------------------------<  212<H>  4.0   |  36.0<H>  |  1.48<H>    Ca    8.3<L>      21 Dec 2020 07:49      proBNP: Serum Pro-Brain Natriuretic Peptide: 58875 pg/mL (12-19 @ 14:04)    Lipid Profile:   HgA1c:   TSH: Thyroid Stimulating Hormone, Serum: 1.10 uIU/mL  Thyroid Stimulating Hormone, Serum: 1.09 uIU/mL        TELEMETRY: Reviewed    ECG:  Reviewed by me.

## 2020-12-22 NOTE — PROGRESS NOTE ADULT - ASSESSMENT
70M with HFpEF and AF    HFpEF  - acute on chronic, now resolving  - still with LE edema, lungs CTAB, on baseline O2 requirement  - change diuretics to lasix 60mg PO q12h today  - needs labs daily  - discussed RHC/MEMs with patient, wife Kathy, and outpatient Cardiologist Dr Storm  - patient and wife will consider if wants to continue outpatient Cardiology follow up with Dr Storm/[facility], or switch to [facility]  - depending on choice of outpatient provider, will follow up as outpatient for RHC/MEMs; no plans for inpatient procedure at this time  - continue amlodipine and coreg for BP control; increase as needed     AF  - rate controlled, continue coreg  - continue apixaban as tolerated for high CHADSVasc; Hb has been stable on this admission    COPD  - improved, continue treatment per primary team

## 2020-12-22 NOTE — PROGRESS NOTE ADULT - SUBJECTIVE AND OBJECTIVE BOX
NEPHROLOGY INTERVAL HPI/OVERNIGHT EVENTS:  pt seated in bed  no acute sob at rest    MEDICATIONS  (STANDING):  albuterol/ipratropium for Nebulization 3 milliLiter(s) Nebulizer every 6 hours  albuterol/ipratropium for Nebulization. 3 milliLiter(s) Nebulizer once  allopurinol 100 milliGRAM(s) Oral daily  amLODIPine   Tablet 5 milliGRAM(s) Oral daily  apixaban 2.5 milliGRAM(s) Oral every 12 hours  ascorbic acid 500 milliGRAM(s) Oral daily  aspirin  chewable 81 milliGRAM(s) Oral daily  atorvastatin 20 milliGRAM(s) Oral at bedtime  carvedilol 12.5 milliGRAM(s) Oral every 12 hours  cyanocobalamin 1000 MICROGram(s) Oral daily  dextrose 40% Gel 15 Gram(s) Oral once  dextrose 5%. 1000 milliLiter(s) (50 mL/Hr) IV Continuous <Continuous>  dextrose 5%. 1000 milliLiter(s) (100 mL/Hr) IV Continuous <Continuous>  dextrose 50% Injectable 25 Gram(s) IV Push once  dextrose 50% Injectable 12.5 Gram(s) IV Push once  dextrose 50% Injectable 25 Gram(s) IV Push once  epoetin felisha-epbx (RETACRIT) Injectable 32242 Unit(s) SubCutaneous every 7 days  ferrous    sulfate 325 milliGRAM(s) Oral daily  folic acid 1 milliGRAM(s) Oral daily  furosemide   Injectable 60 milliGRAM(s) IV Push daily  glucagon  Injectable 1 milliGRAM(s) IntraMuscular once  insulin lispro (ADMELOG) corrective regimen sliding scale   SubCutaneous three times a day before meals  insulin lispro Injectable (ADMELOG) 3 Unit(s) SubCutaneous three times a day before meals  methylPREDNISolone sodium succinate Injectable 40 milliGRAM(s) IV Push every 12 hours  pantoprazole    Tablet 40 milliGRAM(s) Oral before breakfast  senna 2 Tablet(s) Oral at bedtime  tamsulosin 0.4 milliGRAM(s) Oral at bedtime  thiamine 100 milliGRAM(s) Oral daily  zinc sulfate 220 milliGRAM(s) Oral daily    MEDICATIONS  (PRN):  acetaminophen   Tablet .. 650 milliGRAM(s) Oral every 6 hours PRN Temp greater or equal to 38C (100.4F), Mild Pain (1 - 3)      Allergies    No Known Allergies          Vital Signs Last 24 Hrs  T(C): 36.6 (22 Dec 2020 04:27), Max: 37.1 (21 Dec 2020 16:15)  T(F): 97.8 (22 Dec 2020 04:27), Max: 98.8 (21 Dec 2020 16:15)  HR: 91 (22 Dec 2020 06:53) (78 - 97)  BP: 147/87 (22 Dec 2020 06:53) (147/87 - 175/79)  BP(mean): --  RR: 20 (22 Dec 2020 04:27) (16 - 20)  SpO2: 97% (22 Dec 2020 04:27) (93% - 99%)    PHYSICAL EXAM:  GENERAL: Weak, deconditioned  NECK: Supple, + JVD  NERVOUS SYSTEM:  Alert & Oriented X3, No asterixis   CHEST/LUNG: Clear, diminished bilaterally  HEART: Regular rate and rhythm; No rub   ABDOMEN: Soft, Nontender, Nondistended; +BS  EXTREMITIES:  +LE edema improved    LABS:                        9.6    7.99  )-----------( 182      ( 21 Dec 2020 07:49 )             30.4     12-21    139  |  95<L>  |  94.0<H>  ----------------------------<  212<H>  4.0   |  36.0<H>  |  1.48<H>      Creatinine, Serum: 1.67 mg/dL (12.20.20)  Ca    8.3<L>      21 Dec 2020 07:49              RADIOLOGY & ADDITIONAL TESTS:  < from: US Renal (12.17.20 @ 22:36) >     EXAM:  US KIDNEY(S)                          PROCEDURE DATE:  12/17/2020          INTERPRETATION:  CLINICAL INFORMATION: Renal failure    COMPARISON: Renal ultrasound 12/13/2016    TECHNIQUE: Sonography of the kidneys and bladder.    FINDINGS:    Right kidney: 10.9 cm. No renal mass, hydronephrosis or calculi. Interval growth of a lower pole simple cyst measuring 2.4. Renal cortical thinning.    Left kidney: 9.7 cm. No renal mass, hydronephrosis or calculi.    Urinary bladder: Within normal limits.    IMPRESSION:    No hydronephrosis.    < end of copied text >

## 2020-12-22 NOTE — PROGRESS NOTE ADULT - ASSESSMENT
CKDIII): GERALD, prerenal  Renal function stable/improved  Renal sono w/o hydro  Elevated BUN also due to steroid effects  - avoid potential nephrotoxins  - diuretics and transition to PO dosing  - monitor labs     Anemia:  - cont TIMOTHY and Venofer  - target Hgb= 10.0

## 2020-12-22 NOTE — PROGRESS NOTE ADULT - SUBJECTIVE AND OBJECTIVE BOX
Patient is a 70y old  Male who presents with a chief complaint of CHF exacerbation (22 Dec 2020 10:40)    Pt is feeling much better. Breathing stable with 2 L oxygen. Pt is on 2 L oxy home  No cp,sob, palpitation  SUBJECTIVE / OVERNIGHT EVENTS:  REVIEW OF SYSTEMS: All systems are reviewed and found to be negative except above    MEDICATIONS  (STANDING):  albuterol/ipratropium for Nebulization 3 milliLiter(s) Nebulizer every 6 hours  albuterol/ipratropium for Nebulization. 3 milliLiter(s) Nebulizer once  allopurinol 100 milliGRAM(s) Oral daily  amLODIPine   Tablet 5 milliGRAM(s) Oral daily  apixaban 2.5 milliGRAM(s) Oral every 12 hours  ascorbic acid 500 milliGRAM(s) Oral daily  aspirin  chewable 81 milliGRAM(s) Oral daily  atorvastatin 20 milliGRAM(s) Oral at bedtime  carvedilol 12.5 milliGRAM(s) Oral every 12 hours  cyanocobalamin 1000 MICROGram(s) Oral daily  dextrose 40% Gel 15 Gram(s) Oral once  dextrose 5%. 1000 milliLiter(s) (50 mL/Hr) IV Continuous <Continuous>  dextrose 5%. 1000 milliLiter(s) (100 mL/Hr) IV Continuous <Continuous>  dextrose 50% Injectable 25 Gram(s) IV Push once  dextrose 50% Injectable 12.5 Gram(s) IV Push once  dextrose 50% Injectable 25 Gram(s) IV Push once  epoetin felisha-epbx (RETACRIT) Injectable 43373 Unit(s) SubCutaneous every 7 days  ferrous    sulfate 325 milliGRAM(s) Oral daily  folic acid 1 milliGRAM(s) Oral daily  furosemide   Injectable 60 milliGRAM(s) IV Push daily  glucagon  Injectable 1 milliGRAM(s) IntraMuscular once  insulin lispro (ADMELOG) corrective regimen sliding scale   SubCutaneous three times a day before meals  insulin lispro Injectable (ADMELOG) 3 Unit(s) SubCutaneous three times a day before meals  methylPREDNISolone sodium succinate Injectable 40 milliGRAM(s) IV Push every 12 hours  pantoprazole    Tablet 40 milliGRAM(s) Oral before breakfast  senna 2 Tablet(s) Oral at bedtime  tamsulosin 0.4 milliGRAM(s) Oral at bedtime  thiamine 100 milliGRAM(s) Oral daily  zinc sulfate 220 milliGRAM(s) Oral daily    MEDICATIONS  (PRN):  acetaminophen   Tablet .. 650 milliGRAM(s) Oral every 6 hours PRN Temp greater or equal to 38C (100.4F), Mild Pain (1 - 3)      CAPILLARY BLOOD GLUCOSE      POCT Blood Glucose.: 305 mg/dL (22 Dec 2020 13:24)  POCT Blood Glucose.: 245 mg/dL (22 Dec 2020 09:05)  POCT Blood Glucose.: 414 mg/dL (21 Dec 2020 18:15)    I&O's Summary    21 Dec 2020 07:01  -  22 Dec 2020 07:00  --------------------------------------------------------  IN: 0 mL / OUT: 1150 mL / NET: -1150 mL        PHYSICAL EXAM:  Vital Signs Last 24 Hrs  T(C): 36.9 (22 Dec 2020 10:45), Max: 37.1 (21 Dec 2020 16:15)  T(F): 98.4 (22 Dec 2020 10:45), Max: 98.8 (21 Dec 2020 16:15)  HR: 93 (22 Dec 2020 13:16) (77 - 97)  BP: 149/69 (22 Dec 2020 13:16) (147/87 - 175/79)  BP(mean): --  RR: 19 (22 Dec 2020 10:45) (18 - 20)  SpO2: 91% (22 Dec 2020 13:16) (90% - 99%)    CONSTITUTIONAL: NAD,   EYES: PERRLA; conjunctiva and sclera clear  RESPIRATORY: Normal respiratory effort; lungs are clear to auscultation bilaterally  CARDIOVASCULAR: Regular rate and rhythm, normal S1 and S2, no murmur   EXTS: + lower extremity edema; Peripheral pulses are 2+ bilaterally  ABDOMEN: Nontender to palpation, normoactive bowel sounds, no rebound/guarding;   MUSCLOSKELETAL:   no clubbing or cyanosis of digits; no joint swelling or tenderness to palpation  PSYCH: affect appropriate  NEUROLOGY: A+O to person, place, and time; CN 2-12 are intact and symmetric; no gross sensory deficits;       LABS:                        9.6    7.99  )-----------( 182      ( 21 Dec 2020 07:49 )             30.4     12-21    139  |  95<L>  |  94.0<H>  ----------------------------<  212<H>  4.0   |  36.0<H>  |  1.48<H>    Ca    8.3<L>      21 Dec 2020 07:49                  RADIOLOGY & ADDITIONAL TESTS:  Results Reviewed:   Imaging Personally Reviewed:  Electrocardiogram Personally Reviewed:    COORDINATION OF CARE:  Care Discussed with Consultants/Other Providers [Y/N]:  Prior or Outpatient Records Reviewed [Y/N]:

## 2020-12-23 ENCOUNTER — TRANSCRIPTION ENCOUNTER (OUTPATIENT)
Age: 70
End: 2020-12-23

## 2020-12-23 LAB
ANION GAP SERPL CALC-SCNC: 10 MMOL/L — SIGNIFICANT CHANGE UP (ref 5–17)
BUN SERPL-MCNC: 97 MG/DL — HIGH (ref 8–20)
CALCIUM SERPL-MCNC: 8.5 MG/DL — LOW (ref 8.6–10.2)
CHLORIDE SERPL-SCNC: 93 MMOL/L — LOW (ref 98–107)
CO2 SERPL-SCNC: 34 MMOL/L — HIGH (ref 22–29)
CREAT SERPL-MCNC: 1.56 MG/DL — HIGH (ref 0.5–1.3)
GLUCOSE BLDC GLUCOMTR-MCNC: 220 MG/DL — HIGH (ref 70–99)
GLUCOSE BLDC GLUCOMTR-MCNC: 264 MG/DL — HIGH (ref 70–99)
GLUCOSE BLDC GLUCOMTR-MCNC: 269 MG/DL — HIGH (ref 70–99)
GLUCOSE BLDC GLUCOMTR-MCNC: 301 MG/DL — HIGH (ref 70–99)
GLUCOSE SERPL-MCNC: 241 MG/DL — HIGH (ref 70–99)
HCT VFR BLD CALC: 31.7 % — LOW (ref 39–50)
HGB BLD-MCNC: 9.6 G/DL — LOW (ref 13–17)
MCHC RBC-ENTMCNC: 30.3 GM/DL — LOW (ref 32–36)
MCHC RBC-ENTMCNC: 31.4 PG — SIGNIFICANT CHANGE UP (ref 27–34)
MCV RBC AUTO: 103.6 FL — HIGH (ref 80–100)
NT-PROBNP SERPL-SCNC: 6165 PG/ML — HIGH (ref 0–300)
PLATELET # BLD AUTO: 195 K/UL — SIGNIFICANT CHANGE UP (ref 150–400)
POTASSIUM SERPL-MCNC: 4.3 MMOL/L — SIGNIFICANT CHANGE UP (ref 3.5–5.3)
POTASSIUM SERPL-SCNC: 4.3 MMOL/L — SIGNIFICANT CHANGE UP (ref 3.5–5.3)
RBC # BLD: 3.06 M/UL — LOW (ref 4.2–5.8)
RBC # FLD: 16.8 % — HIGH (ref 10.3–14.5)
SODIUM SERPL-SCNC: 137 MMOL/L — SIGNIFICANT CHANGE UP (ref 135–145)
WBC # BLD: 11.95 K/UL — HIGH (ref 3.8–10.5)
WBC # FLD AUTO: 11.95 K/UL — HIGH (ref 3.8–10.5)

## 2020-12-23 PROCEDURE — 99232 SBSQ HOSP IP/OBS MODERATE 35: CPT

## 2020-12-23 PROCEDURE — 71045 X-RAY EXAM CHEST 1 VIEW: CPT | Mod: 26

## 2020-12-23 RX ORDER — APIXABAN 2.5 MG/1
1 TABLET, FILM COATED ORAL
Qty: 0 | Refills: 0 | DISCHARGE
Start: 2020-12-23

## 2020-12-23 RX ORDER — CARVEDILOL PHOSPHATE 80 MG/1
1 CAPSULE, EXTENDED RELEASE ORAL
Qty: 60 | Refills: 0
Start: 2020-12-23 | End: 2021-01-21

## 2020-12-23 RX ORDER — TEMAZEPAM 15 MG/1
30 CAPSULE ORAL ONCE
Refills: 0 | Status: DISCONTINUED | OUTPATIENT
Start: 2020-12-23 | End: 2020-12-23

## 2020-12-23 RX ORDER — FUROSEMIDE 40 MG
3 TABLET ORAL
Qty: 180 | Refills: 0
Start: 2020-12-23 | End: 2021-01-21

## 2020-12-23 RX ORDER — INSULIN GLARGINE 100 [IU]/ML
10 INJECTION, SOLUTION SUBCUTANEOUS
Refills: 0 | Status: DISCONTINUED | OUTPATIENT
Start: 2020-12-23 | End: 2020-12-27

## 2020-12-23 RX ORDER — ALBUTEROL 90 UG/1
2 AEROSOL, METERED ORAL
Qty: 1 | Refills: 0
Start: 2020-12-23

## 2020-12-23 RX ORDER — APIXABAN 2.5 MG/1
1 TABLET, FILM COATED ORAL
Qty: 0 | Refills: 0 | DISCHARGE

## 2020-12-23 RX ORDER — AMLODIPINE BESYLATE 2.5 MG/1
1 TABLET ORAL
Qty: 30 | Refills: 0
Start: 2020-12-23 | End: 2021-01-21

## 2020-12-23 RX ORDER — TEMAZEPAM 15 MG/1
1 CAPSULE ORAL
Qty: 0 | Refills: 0 | DISCHARGE

## 2020-12-23 RX ADMIN — Medication 81 MILLIGRAM(S): at 11:31

## 2020-12-23 RX ADMIN — PANTOPRAZOLE SODIUM 40 MILLIGRAM(S): 20 TABLET, DELAYED RELEASE ORAL at 05:07

## 2020-12-23 RX ADMIN — ZINC SULFATE TAB 220 MG (50 MG ZINC EQUIVALENT) 220 MILLIGRAM(S): 220 (50 ZN) TAB at 11:31

## 2020-12-23 RX ADMIN — Medication 3 MILLILITER(S): at 15:48

## 2020-12-23 RX ADMIN — Medication 1 MILLIGRAM(S): at 11:31

## 2020-12-23 RX ADMIN — Medication 325 MILLIGRAM(S): at 11:31

## 2020-12-23 RX ADMIN — Medication 3 MILLILITER(S): at 08:31

## 2020-12-23 RX ADMIN — Medication 40 MILLIGRAM(S): at 05:07

## 2020-12-23 RX ADMIN — Medication 60 MILLIGRAM(S): at 17:23

## 2020-12-23 RX ADMIN — INSULIN GLARGINE 10 UNIT(S): 100 INJECTION, SOLUTION SUBCUTANEOUS at 11:51

## 2020-12-23 RX ADMIN — AMLODIPINE BESYLATE 10 MILLIGRAM(S): 2.5 TABLET ORAL at 05:07

## 2020-12-23 RX ADMIN — CARVEDILOL PHOSPHATE 25 MILLIGRAM(S): 80 CAPSULE, EXTENDED RELEASE ORAL at 17:22

## 2020-12-23 RX ADMIN — Medication 3 UNIT(S): at 18:03

## 2020-12-23 RX ADMIN — Medication 100 MILLIGRAM(S): at 11:31

## 2020-12-23 RX ADMIN — APIXABAN 2.5 MILLIGRAM(S): 2.5 TABLET, FILM COATED ORAL at 05:07

## 2020-12-23 RX ADMIN — TEMAZEPAM 30 MILLIGRAM(S): 15 CAPSULE ORAL at 22:29

## 2020-12-23 RX ADMIN — INSULIN GLARGINE 10 UNIT(S): 100 INJECTION, SOLUTION SUBCUTANEOUS at 22:29

## 2020-12-23 RX ADMIN — Medication 3 UNIT(S): at 09:26

## 2020-12-23 RX ADMIN — TAMSULOSIN HYDROCHLORIDE 0.4 MILLIGRAM(S): 0.4 CAPSULE ORAL at 21:21

## 2020-12-23 RX ADMIN — Medication 3 MILLILITER(S): at 04:25

## 2020-12-23 RX ADMIN — Medication 500 MILLIGRAM(S): at 11:32

## 2020-12-23 RX ADMIN — PREGABALIN 1000 MICROGRAM(S): 225 CAPSULE ORAL at 11:31

## 2020-12-23 RX ADMIN — CARVEDILOL PHOSPHATE 25 MILLIGRAM(S): 80 CAPSULE, EXTENDED RELEASE ORAL at 05:07

## 2020-12-23 RX ADMIN — Medication 6: at 18:04

## 2020-12-23 RX ADMIN — ATORVASTATIN CALCIUM 20 MILLIGRAM(S): 80 TABLET, FILM COATED ORAL at 21:21

## 2020-12-23 RX ADMIN — Medication 40 MILLIGRAM(S): at 11:31

## 2020-12-23 RX ADMIN — Medication 6: at 13:12

## 2020-12-23 RX ADMIN — Medication 4: at 09:26

## 2020-12-23 RX ADMIN — Medication 3 UNIT(S): at 13:12

## 2020-12-23 RX ADMIN — APIXABAN 2.5 MILLIGRAM(S): 2.5 TABLET, FILM COATED ORAL at 17:22

## 2020-12-23 NOTE — DISCHARGE NOTE PROVIDER - CARE PROVIDER_API CALL
Domenic Krishnan)  Critical Care Medicine; Internal Medicine; Pulmonary Disease; Sleep Medicine  [address] [facility] [address]  [place] [address]  Phone: [phone]  Fax: [phone]  Follow Up Time:     Hakan Ramos  [address] [place] [address]  Phone: [phone]  Fax: (   )    -  Scheduled Appointment: 01/13/2021   Domenic Krishnan)  Critical Care Medicine; Internal Medicine; Pulmonary Disease; Sleep Medicine  [address] [facility], [address] [place] [address]  Phone: [phone]  Fax: [phone]  Follow Up Time:     Hakan Ramos  [address] [place] [address]  Phone: [phone]  Fax: (   )    -  Scheduled Appointment: 01/13/2021    Alexander Leong (DO)  Medicine  [address] [place] [address], [place] [address]  Phone: [phone]  Fax: [phone]  Follow Up Time: Routine

## 2020-12-23 NOTE — DISCHARGE NOTE PROVIDER - PROVIDER TOKENS
PROVIDER:[TOKEN:[[id number]]],FREE:[LAST:[Rachel],FIRST:[Hakan],PHONE:[phone]:[(   )    -],ADDRESS:[[address] [place] [address]],SCHEDULEDAPPT:[01/13/2021]] PROVIDER:[TOKEN:[[id number]]],FREE:[LAST:[Elfenbein],FIRST:[Hakan],PHONE:[phone]:[(   )    -],ADDRESS:[[address] [place] [address]],SCHEDULEDAPPT:[01/13/2021]],PROVIDER:[TOKEN:[[id number]],FOLLOWUP:[Routine]]

## 2020-12-23 NOTE — PROGRESS NOTE ADULT - ASSESSMENT
69 y/o M w/ a PMH of AFib (on eliquis), HFpEF (follows with Dr. Zia Storm; last known EF 60-65% on 11/17), COPD (on 2L home O2), CABG 2013, and bladder CA (s/p TURP 2013) presented to [facility]  w/ AMS x1 day w/ associated lethargy.  Pt also reports SOB x2 months that has acutely worsened the past few days.  Pt also reports fatigue, weight gain, inability to sleep flat.  Pt was noted to have evidence of fluid overload on exam, pBNP was 11K and CXR showed small pleural effusions consistent w/ CHF exacerbation  ABG also showed pt had hypoxemia and hypercapnia Pt was placed on BIPAP and mentation improved.  Pt will be admitted to tele/pulse ox for acute hypoxic hypercapnic respiratory failure. COVID -ve    Acute hypoxemic hypercapnic respiratory failure   - Likely multifactorial 2/2 Acute on chronic diastolic CHF and COPD exacerbation  -off bipap now.on 3 l Oxygen. titrate down AS TOLERATE. Pt is tolerating well. will monitor closely  - change to po steroid   - Last known EF 60-65% on 11/17  - CXR shows b/l pleural effusions and pBNP 11,000 on admission  - Lasix 60mg po bid per cardiology  - noct NIV, f/u pulm out pt  - pt does not want to go for cath now. pt will decide and plan for out pt cath per cardiology.    Acute metabolic encephalopathy likely 2/2 CO2 narcosis   - improved. pt  is aaox3 now  - Mentation back to baseline     AFib, rate controlled  - c/w Eliquis 2.5mg q12h    LLE chronic wound   - Xray L-tib/fib and L-foot: no evidence of bony destruction  appears like a healed skin graft     GERALD on CKD 3 likely prerenal  - Cr stable  -f/u renal out pt    Anemia likely of chronic dx  - H/H at baseline and pt hemodynamically stable  - Monitor CBC daily   - Transfuse if Hb <7-8  - c/w epo and venofer added per renal    Hyperglycemia  -due to solumedrol  -HbA1c 4.7  -ISS added    DVT ppx: on Eliquis  PT Edith     Spoke with wife. Agreeable with plan    Dispo: dc home w/home care    care of plan dw pt -he agreed

## 2020-12-23 NOTE — DISCHARGE NOTE PROVIDER - CARE PROVIDERS DIRECT ADDRESSES
[email or web address],DirectAddress_Unknown ,[email or web address],DirectAddress_Unknown,DirectAddress_Unknown

## 2020-12-23 NOTE — DISCHARGE NOTE PROVIDER - NSDCMRMEDTOKEN_GEN_ALL_CORE_FT
Advair Diskus 250 mcg-50 mcg inhalation powder: 1 puff(s) inhaled 2 times a day  allopurinol 100 mg oral tablet: 1 tab(s) orally once a day  amLODIPine 10 mg oral tablet: 1 tab(s) orally once a day  apixaban 2.5 mg oral tablet: 1 tab(s) orally every 12 hours  aspirin 81 mg oral tablet: 1 tab(s) orally once a day  carvedilol 25 mg oral tablet: 1 tab(s) orally every 12 hours  cyanocobalamin 1000 mcg oral tablet: 1 tab(s) orally once a day  FeroSul 325 mg (65 mg elemental iron) oral tablet: 1 tab(s) orally once a day   Flomax 0.4 mg oral capsule: 1 cap(s) orally once a day  folic acid 1 mg oral tablet: 1 tab(s) orally once a day  furosemide 20 mg oral tablet: 3 tab(s) orally every 12 hours  Lipitor 20 mg oral tablet: 1 tab(s) orally once a day  Multiple Vitamins oral tablet: 1 tab(s) orally once a day   omeprazole 40 mg oral delayed release capsule: 1 cap(s) orally once a day  predniSONE 10 mg oral tablet: 3 tab x2 day then 2 tab x2 day,1 tabx2 day,1 tabx1 day  Proventil HFA 90 mcg/inh inhalation aerosol: 2 puff(s) inhaled every 4 hours, As Needed -for shortness of breath and/or wheezing   senna oral tablet: 2 tab(s) orally once a day (at bedtime)  thiamine 100 mg oral tablet: 1 tab(s) orally once a day   Advair Diskus 250 mcg-50 mcg inhalation powder: 1 puff(s) inhaled 2 times a day  allopurinol 100 mg oral tablet: 1 tab(s) orally once a day  amLODIPine 10 mg oral tablet: 1 tab(s) orally once a day  apixaban 2.5 mg oral tablet: 1 tab(s) orally every 12 hours  aspirin 81 mg oral tablet: 1 tab(s) orally once a day  carvedilol 25 mg oral tablet: 1 tab(s) orally every 12 hours  cyanocobalamin 1000 mcg oral tablet: 1 tab(s) orally once a day  FeroSul 325 mg (65 mg elemental iron) oral tablet: 1 tab(s) orally once a day   Flomax 0.4 mg oral capsule: 1 cap(s) orally once a day  folic acid 1 mg oral tablet: 1 tab(s) orally once a day  furosemide 20 mg oral tablet: 3 tab(s) orally every 12 hours  Lipitor 20 mg oral tablet: 1 tab(s) orally once a day  Multiple Vitamins oral tablet: 1 tab(s) orally once a day   omeprazole 40 mg oral delayed release capsule: 1 cap(s) orally once a day  predniSONE 20 mg oral tablet: 2 tab(s) orally once a day  Proventil HFA 90 mcg/inh inhalation aerosol: 2 puff(s) inhaled every 4 hours, As Needed -for shortness of breath and/or wheezing   senna oral tablet: 2 tab(s) orally once a day (at bedtime)  thiamine 100 mg oral tablet: 1 tab(s) orally once a day

## 2020-12-23 NOTE — DISCHARGE NOTE PROVIDER - NSDCCPCAREPLAN_GEN_ALL_CORE_FT
Add 50357 Cpt? (Important Note: In 2017 The Use Of 15035 Is Being Tracked By Cms To Determine Future Global Period Reimbursement For Global Periods): no Detail Level: Simple PRINCIPAL DISCHARGE DIAGNOSIS  Diagnosis: Acute respiratory failure with hypercapnia  Assessment and Plan of Treatment: Hypoxemic and hypercapneic respiratory failure appears secondary to combination of COPD exacerbation and CHF exacerbation  -Home supplemental  oxygen via Triology Vent  -Tapered oral Prednisone  -To follow up outpatient with your pulmonologist        SECONDARY DISCHARGE DIAGNOSES  Diagnosis: Acute on chronic congestive heart failure, unspecified heart failure type  Assessment and Plan of Treatment: -Follow up outpatient with Cardiologist, Dr Zia Storm     PRINCIPAL DISCHARGE DIAGNOSIS  Diagnosis: Acute respiratory failure with hypercapnia  Assessment and Plan of Treatment:         SECONDARY DISCHARGE DIAGNOSES  Diagnosis: CKD (chronic kidney disease)  Assessment and Plan of Treatment:     Diagnosis: Chronic atrial fibrillation  Assessment and Plan of Treatment: Chronic atrial fibrillation    Diagnosis: Acute respiratory failure with hypercapnia  Assessment and Plan of Treatment: Acute respiratory failure with hypercapnia    Diagnosis: Respiratory acidosis  Assessment and Plan of Treatment: Respiratory acidosis    Diagnosis: Pleural effusion, bilateral  Assessment and Plan of Treatment: Pleural effusion, bilateral    Diagnosis: Chronic obstructive pulmonary disease, unspecified COPD type  Assessment and Plan of Treatment: Chronic obstructive pulmonary disease, unspecified COPD type    Diagnosis: Acute on chronic congestive heart failure, unspecified heart failure type  Assessment and Plan of Treatment: -Follow up outpatient with Cardiologist, Dr Zia Storm     PRINCIPAL DISCHARGE DIAGNOSIS  Diagnosis: Acute respiratory failure with hypercapnia  Assessment and Plan of Treatment: patient with COPD and chronic diastolic heart failure that are likely contributing. Would benefit from NIV at night. To work on trilogy for auth. Will have patient follow up with pulmonology on thursday as scheduled. CM to continue working on trilogy insurance authorization after patient discharge.        SECONDARY DISCHARGE DIAGNOSES  Diagnosis: Chronic obstructive pulmonary disease, unspecified COPD type  Assessment and Plan of Treatment: acute exacerbation mostly resolved. continue prednisone 40mg daily for now until seen by pulmonology and can taper after trilogy obtained    Diagnosis: CKD (chronic kidney disease)  Assessment and Plan of Treatment: stable disease. Follow up with nephrology    Diagnosis: Acute on chronic congestive heart failure, unspecified heart failure type  Assessment and Plan of Treatment: Follow up outpatient with Cardiologist, Dr Zia Storm. Will ultimately need [facility] on follow up - may be at this facility if chooses [facility] cardiology. Continue with lasix 60mg a day. Monitor daily weights and if increasing notify your cardiologist on discharge.

## 2020-12-23 NOTE — PROGRESS NOTE ADULT - SUBJECTIVE AND OBJECTIVE BOX
[place] [facility]                                                               Office: [address] [address]                                                              Telephone: [phone]. Fax:[phone]                                                                             PROGRESS NOTE    Reason for follow up: CHF    Subjective: Patient is a 70y old  Male who presents with a chief complaint of CHF exacerbation (22 Dec 2020 13:45).  Breathing at rest and with ambulation significantly improved; now weaned to baseline levels of O2.  No new cardiovascular complaints, tolerating PO diuretics well, anticipating discharge today.  Isolated asymptomatic runs of NSVT on tele monitor in PM yesterday.      REVIEW of SYSTEMS:  a 12 point ROS was negative except as per the HPI above.     	  Vitals:  T(C): 36.2 (12-23-20 @ 11:27), Max: 36.3 (12-22-20 @ 22:54)  HR: 75 (12-23-20 @ 11:27) (69 - 91)  BP: 144/75 (12-23-20 @ 11:27) (144/75 - 166/80)  RR: 18 (12-23-20 @ 11:27) (18 - 19)  SpO2: 97% (12-23-20 @ 11:27) (95% - 100%)  Wt(kg): --  I&O's Summary    22 Dec 2020 07:01  -  23 Dec 2020 07:00  --------------------------------------------------------  IN: 0 mL / OUT: 500 mL / NET: -500 mL    23 Dec 2020 07:01  -  23 Dec 2020 15:39  --------------------------------------------------------  IN: 0 mL / OUT: 450 mL / NET: -450 mL            PHYSICAL EXAM:  Appearance: Comfortable. No acute distress  HEENT:  Head and neck: Atraumatic. Normocephalic.  Normal oral mucosa, PERRL, Neck is supple. No JVD, No carotid bruit.   Neurologic: A & O x 3, no focal deficits. EOMI.  Lymphatic: No cervical lymphadenopathy  Cardiovascular: Normal S1 S2, No murmur, rubs/gallops. No JVD, No edema  Respiratory: bilateral lungs diminished   Gastrointestinal:  Soft, Non-tender, + BS  Lower Extremities: Bilateral pitting R>L edema  Psychiatry: Patient is calm. No agitation. Mood & affect appropriate  Skin: Ace wrap L lowerextremity       CURRENT MEDICATIONS:  amLODIPine   Tablet 10 milliGRAM(s) Oral daily  carvedilol 25 milliGRAM(s) Oral every 12 hours  furosemide    Tablet 60 milliGRAM(s) Oral every 12 hours  tamsulosin 0.4 milliGRAM(s) Oral at bedtime    albuterol/ipratropium for Nebulization  albuterol/ipratropium for Nebulization.  pantoprazole    Tablet  senna  allopurinol  atorvastatin  dextrose 40% Gel  dextrose 50% Injectable  dextrose 50% Injectable  dextrose 50% Injectable  glucagon  Injectable  insulin glargine Injectable (LANTUS)  insulin lispro (ADMELOG) corrective regimen sliding scale  insulin lispro Injectable (ADMELOG)  predniSONE   Tablet  apixaban  ascorbic acid  aspirin  chewable  cyanocobalamin  dextrose 5%.  dextrose 5%.  epoetin felisha-epbx (RETACRIT) Injectable  ferrous    sulfate  folic acid  thiamine  zinc sulfate      DIAGNOSTIC TESTING:  [ ] Echocardiogram:   < from: [facility] Echo Limited or F/U (11.16.20 @ 21:06) >  Summary:   1. Left ventricular ejection fraction, by visual estimation, is 60 to 65%.  2. Normal global left ventricular systolic function.   3. Mildly enlarged right ventricle.   4. Mildly reduced RV systolic function.   5. Moderate tricuspid regurgitation.   6. Estimated pulmonary artery systolic pressure is 67.7 mmHg assuming a right atrial pressure of 15 mmHg, which is consistent with severe pulmonary hypertension.    MD Dana Electronically signed on 11/17/2020 at 10:11:35 AM    < end of copied text >  	      LABS:	 	  CARDIAC MARKERS ( 23 Dec 2020 07:58 )  x     / x     / x     / x     / x      p-BNP 23 Dec 2020 07:58: 6165 pg/mL                          9.6    11.95 )-----------( 195      ( 23 Dec 2020 07:58 )             31.7     12-23    137  |  93<L>  |  97.0<H>  ----------------------------<  241<H>  4.3   |  34.0<H>  |  1.56<H>    Ca    8.5<L>      23 Dec 2020 07:58  Mg     1.6     12-22      proBNP: Serum Pro-Brain Natriuretic Peptide: 6165 pg/mL (12-23 @ 07:58)  Serum Pro-Brain Natriuretic Peptide: 52168 pg/mL (12-19 @ 14:04)      TELEMETRY: Reviewed    ECG:  Reviewed by me.

## 2020-12-23 NOTE — PROGRESS NOTE ADULT - ASSESSMENT
Assess    HFpEF  AF  COPD  Severe pulmonary HTN  Chronic hypercarbic respiratory failure  Needs nocturnal NIV to reduce PCO2 and to avoid readmission  Bipap considered but rejected due to the progressive nature of his disorder  Obesity   OHS may contribute        Rec    Cardiac regiment   02  Nocturnal NIV  DuoNeb  Prednisone taper

## 2020-12-23 NOTE — PROGRESS NOTE ADULT - ASSESSMENT
70M with HFpEF and AF    HFpEF  - acute on chronic, now resolved  - discharge pbnp is 6k  - still with LE edema, lungs CTAB, on baseline O2 requirement  - continue lasix 60mg PO q12h today  - discussed RHC/MEMs with patient, wife Kathy, and outpatient Cardiologist Dr Storm  - patient and wife will consider if wants to continue outpatient Cardiology follow up with Dr Storm/[facility], or switch to [facility]  - depending on choice of outpatient provider, will follow up as outpatient for RHC/MEMs; no plans for inpatient procedure at this time  - continue amlodipine and coreg for BP control; increase as needed     NSVT  - asymptomatic episodes yesterday  - electrolytes within normal limits  - coreg increased  - can consider ischemia evaluation as outpatient    AF  - rate controlled, continue coreg  - continue apixaban as tolerated for high CHADSVasc; Hb has been stable on this admission    COPD  - improved, continue treatment per primary team

## 2020-12-23 NOTE — DISCHARGE NOTE PROVIDER - NSDCFUADDAPPT_GEN_ALL_CORE_FT
Pt A&OX4 admitted for Resp Failure with Hypoxia. Pt lives with spouse Kathy Garcia [phone], independent with some ADL's, but require some help. Pt has cane, RW, walking shower with bench. Home oxygen 2-2.5 L [place], from [employer]. Continuous BIPAP at home. Aide 3 times a week, for 2 hours. [facility] reach out to pt's spouse with pt's permission, both Pt and spouse not able to recall agency; however sppuse states pt has visiting nurse Felicia [phone] from [facility], also PT, and aide are form [facility]. Per Felicia just send referral to [facility] when pt is ready, then she will set up the aide. PCP: Dr. Hakan Ramos in [place] [phone]. Pulmonary: Dr. Krishnan [phone]. Per pt's spouse pt already have a schedule appointment with Dr. Krishnan on January 13, 2021, if needed to reschedule she will takes care of that. Pt declined meds to bed at this time. [facility] in [place]. D/C plan unclear, Star patient possible home with home care, and aide service. Pt A&OX4 admitted for Resp Failure with Hypoxia. Pt lives with spouse Kathy Garcia [phone], independent with some ADL's, but require some help. Pt has cane, RW, walking shower with bench. Home oxygen 2-2.5 L [place], from [employer]. Continuous BIPAP at home. Aide 3 times a week, for 2 hours. [facility] reach out to pt's spouse with pt's permission, both Pt and spouse not able to recall agency; however sppuse states pt has visiting nurse Felicia [phone] from [facility], also PT, and aide are form [facility]. Per Felicia just send referral to [facility] when pt is ready, then she will set up the aide. PCP: Dr. Hakan Ramos in [place] [phone]. Pulmonary: Dr. Krishnan [phone]. Per pt's spouse pt already have a schedule appointment with Dr. Krishnan on January 13, 2021, if needed to reschedule she will takes care of that. Pt declined meds to bed at this time. [facility] in [place]. D/C plan unclear, Star patient possible home with home care, and aide service.      f/u own cardiologist

## 2020-12-23 NOTE — PROGRESS NOTE ADULT - SUBJECTIVE AND OBJECTIVE BOX
Patient is a 70y old  Male who presents with a chief complaint of CHF exacerbation (23 Dec 2020 09:52)  Pt is feeling better. Breathing stable with 2-3 L during ambulation  No cp, voiding well    SUBJECTIVE / OVERNIGHT EVENTS:  REVIEW OF SYSTEMS: All systems are reviewed and found to be negative except above    MEDICATIONS  (STANDING):  albuterol/ipratropium for Nebulization 3 milliLiter(s) Nebulizer every 6 hours  albuterol/ipratropium for Nebulization. 3 milliLiter(s) Nebulizer once  allopurinol 100 milliGRAM(s) Oral daily  amLODIPine   Tablet 10 milliGRAM(s) Oral daily  apixaban 2.5 milliGRAM(s) Oral every 12 hours  ascorbic acid 500 milliGRAM(s) Oral daily  aspirin  chewable 81 milliGRAM(s) Oral daily  atorvastatin 20 milliGRAM(s) Oral at bedtime  carvedilol 25 milliGRAM(s) Oral every 12 hours  cyanocobalamin 1000 MICROGram(s) Oral daily  dextrose 40% Gel 15 Gram(s) Oral once  dextrose 5%. 1000 milliLiter(s) (50 mL/Hr) IV Continuous <Continuous>  dextrose 5%. 1000 milliLiter(s) (100 mL/Hr) IV Continuous <Continuous>  dextrose 50% Injectable 25 Gram(s) IV Push once  dextrose 50% Injectable 12.5 Gram(s) IV Push once  dextrose 50% Injectable 25 Gram(s) IV Push once  epoetin felisha-epbx (RETACRIT) Injectable 24143 Unit(s) SubCutaneous every 7 days  ferrous    sulfate 325 milliGRAM(s) Oral daily  folic acid 1 milliGRAM(s) Oral daily  furosemide    Tablet 60 milliGRAM(s) Oral every 12 hours  glucagon  Injectable 1 milliGRAM(s) IntraMuscular once  insulin glargine Injectable (LANTUS) 10 Unit(s) SubCutaneous two times a day  insulin lispro (ADMELOG) corrective regimen sliding scale   SubCutaneous three times a day before meals  insulin lispro Injectable (ADMELOG) 3 Unit(s) SubCutaneous three times a day before meals  pantoprazole    Tablet 40 milliGRAM(s) Oral before breakfast  predniSONE   Tablet 40 milliGRAM(s) Oral daily  senna 2 Tablet(s) Oral at bedtime  tamsulosin 0.4 milliGRAM(s) Oral at bedtime  thiamine 100 milliGRAM(s) Oral daily  zinc sulfate 220 milliGRAM(s) Oral daily    MEDICATIONS  (PRN):  acetaminophen   Tablet .. 650 milliGRAM(s) Oral every 6 hours PRN Temp greater or equal to 38C (100.4F), Mild Pain (1 - 3)      CAPILLARY BLOOD GLUCOSE      POCT Blood Glucose.: 220 mg/dL (23 Dec 2020 08:56)  POCT Blood Glucose.: 206 mg/dL (22 Dec 2020 22:00)  POCT Blood Glucose.: 236 mg/dL (22 Dec 2020 17:46)  POCT Blood Glucose.: 305 mg/dL (22 Dec 2020 13:24)    I&O's Summary    22 Dec 2020 07:01  -  23 Dec 2020 07:00  --------------------------------------------------------  IN: 0 mL / OUT: 500 mL / NET: -500 mL        PHYSICAL EXAM:  Vital Signs Last 24 Hrs  T(C): 36.2 (23 Dec 2020 04:15), Max: 36.3 (22 Dec 2020 22:54)  T(F): 97.2 (23 Dec 2020 04:15), Max: 97.3 (22 Dec 2020 22:54)  HR: 73 (23 Dec 2020 08:32) (69 - 93)  BP: 166/80 (23 Dec 2020 04:15) (149/69 - 166/80)  BP(mean): --  RR: 18 (23 Dec 2020 04:15) (18 - 19)  SpO2: 98% (23 Dec 2020 08:32) (91% - 100%)    CONSTITUTIONAL: NAD  EYES: PERRLA; conjunctiva and sclera clear  ENMT: Moist oral mucosa, no pharyngeal injection or exudates; normal dentition  NECK: Supple, no palpable masses; no thyromegaly  RESPIRATORY: Normal respiratory effort; lungs are clear to auscultation bilaterally  CARDIOVASCULAR: Regular rate and rhythm, normal S1 and S2, no murmur   EXTS: Trace lower extremity edema; Peripheral pulses are 2+ bilaterally  ABDOMEN: Nontender to palpation, normoactive bowel sounds, no rebound/guarding;   MUSCLOSKELETAL:   no clubbing or cyanosis of digits; no joint swelling or tenderness to palpation  PSYCH: affect appropriate  NEUROLOGY: A+O to person, place, and time; CN 2-12 are intact and symmetric; no gross sensory deficits;       LABS:                        9.6    11.95 )-----------( 195      ( 23 Dec 2020 07:58 )             31.7     12-23    137  |  93<L>  |  97.0<H>  ----------------------------<  241<H>  4.3   |  34.0<H>  |  1.56<H>    Ca    8.5<L>      23 Dec 2020 07:58  Mg     1.6     12-22                  RADIOLOGY & ADDITIONAL TESTS:  Results Reviewed:   Imaging Personally Reviewed:  Electrocardiogram Personally Reviewed:    COORDINATION OF CARE:  Care Discussed with Consultants/Other Providers [Y/N]:  Prior or Outpatient Records Reviewed [Y/N]:

## 2020-12-23 NOTE — DISCHARGE NOTE PROVIDER - HOSPITAL COURSE
69 y/o M w/ a PMH of AFib (on eliquis), HFpEF (follows with Dr. Zia Storm; last known EF 60-65% on 11/17), COPD (on 2L home O2), CABG 2013, and bladder CA (s/p TURP 2013) presented to [facility]  w/ AMS x1 day w/ associated lethargy, admitted for Acute hypoxemic and hypercapnic respiratory failure. Patient noted with to have evidence of fluid overload on exam, pBNP was 11K and CXR showed small pleural effusions consistent w/ CHF exacerbation. Patient seen by Cardiology, and their input appreciated. Patient managed with IV diuretics, telemonitoring, as well as Eliquis for anticoagulation for A. fib.  ABG also showed pt had hypoxemia and hypercapnia. Acute hypoxemic hypercapnic respiratory failure likely multifactorial 2/2 acute on chronic diastolic CHF and COPD exacerbation.  Also noted with acute metabolic encephalopathy likely secondary to CO2 narcosis. He was seen by pulmonology team, and their inputs appreciated. Pt was placed on BIPAP and mentation improved. BiPAP later removed and supplemental oxygen via nasal cannula  titrated as tolerated. He was also managed with bronchodilator and steroids, and improved.   To be discharged on tapered steroids and home oxygen.    Patient also found to have GERALD on CKD 3,  likely with prerenal cause. Nephrologist saw and evaluated patient. A renal sonogram was negative for hydronephrosis. The patient was managed With IV diuretics, with strict monitoring of fluid inputs and outputs. His renal function has improved toward baseline.     Patient with hyperglycemia and leukocytosis secondary to steroids administration. Xray L-tib/fib and L-foot showed no evidence of bony destruction, and negative for osteomyelitis. Patient afebrile, with no signs of infection. All the patient's chronic clinical issues were managed with his home medications. The patient has improved and is medically stable for discharge. 71 y/o M w/ a PMH of AFib (on eliquis), HFpEF (follows with Dr. Zia Storm; last known EF 60-65% on 11/17), COPD (on 2L home O2), CABG 2013, and bladder CA (s/p TURP 2013) presented to [facility]  w/ AMS x1 day w/ associated lethargy, admitted for Acute hypoxemic and hypercapnic respiratory failure. Patient noted with to have evidence of fluid overload on exam, pBNP was 11K and CXR showed small pleural effusions consistent w/ CHF exacerbation. Patient seen by Cardiology, and their input appreciated. Patient managed with IV diuretics, telemonitoring, as well as Eliquis for anticoagulation for A. fib. Right heart catheterization Cardiomems recommended by Cardiology, and discussion with patient's wife and cardiologist Dr Storm. Patient and wife to consider if they want to continue outpatient cardiology follow up with Dr Storm/ [facility] or switch to [facility] for RHC/MEM.     ABG also showed pt had hypoxemia and hypercapnia. Acute hypoxemic hypercapnic respiratory failure likely multifactorial 2/2 acute on chronic diastolic CHF and COPD exacerbation.  Also noted with acute metabolic encephalopathy likely secondary to CO2 narcosis. He was seen by pulmonology team, and their inputs appreciated. Pt was placed on BIPAP and mentation improved. BiPAP later removed and supplemental oxygen via nasal cannula  titrated as tolerated. He was also managed with bronchodilator and steroids, and improved.   To be discharged on tapered steroids and home oxygen.    Patient also found to have GERALD on CKD 3,  likely with prerenal cause. Nephrologist saw and evaluated patient. A renal sonogram was negative for hydronephrosis. The patient was managed With IV diuretics, with strict monitoring of fluid inputs and outputs. His renal function has improved toward baseline.     Patient with hyperglycemia and leukocytosis secondary to steroids administration. Xray L-tib/fib and L-foot showed no evidence of bony destruction, and negative for osteomyelitis. Patient afebrile, with no signs of infection. All the patient's chronic clinical issues were managed with his home medications. The patient has improved and is medically stable for discharge. 69 y/o M w/ a PMH of AFib (on eliquis), HFpEF (follows with Dr. Zia Storm; last known EF 60-65% on 11/17), COPD (on 2L home O2), CABG 2013, and bladder CA (s/p TURP 2013) presented to [facility]  w/ AMS x1 day w/ associated lethargy, admitted for Acute hypoxemic and hypercapnic respiratory failure. Patient noted with to have evidence of fluid overload on exam, pBNP was 11K and CXR showed small pleural effusions consistent w/ CHF exacerbation. Patient seen by Cardiology, and their input appreciated. Patient managed with IV diuretics, telemonitoring, as well as Eliquis for anticoagulation for A. fib. Right heart catheterization Cardiomems recommended by Cardiology, and discussion with patient's wife and cardiologist Dr Storm. Patient and wife to consider if they want to continue outpatient cardiology follow up with Dr Storm/ [facility] or switch to [facility] for RHC/MEM.     ABG also showed pt had hypoxemia and hypercapnia. Acute hypoxemic hypercapnic respiratory failure likely multifactorial 2/2 acute on chronic diastolic CHF and COPD exacerbation.  Also noted with acute metabolic encephalopathy likely secondary to CO2 narcosis. He was seen by pulmonology team, and their inputs appreciated. Pt was placed on BIPAP and mentation improved. BiPAP later removed and supplemental oxygen via nasal cannula  titrated as tolerated. He was also managed with bronchodilator and steroids, and improved.   To be discharged on tapered steroids and home oxygen.    Patient also found to have GERALD on CKD 3,  likely with prerenal cause. Nephrologist saw and evaluated patient. A renal sonogram was negative for hydronephrosis. The patient was managed With IV diuretics, with strict monitoring of fluid inputs and outputs. His renal function has improved toward baseline.     Patient with hyperglycemia and leukocytosis secondary to steroids administration. Xray L-tib/fib and L-foot showed no evidence of bony destruction, and negative for osteomyelitis. Patient afebrile, with no signs of infection. All the patient's chronic clinical issues were managed with his home medications. The patient has improved and is medically stable for discharge. Pt will for own cardiology for cardiac cath . NIV at night.     time spent>35 minutes 71 y/o M w/ a PMH of AFib (on eliquis), HFpEF (follows with Dr. Zia Storm; last known EF 60-65% on 11/17), COPD (on 2L home O2), CABG 2013, and bladder CA (s/p TURP 2013) presented to [facility]  w/ AMS x1 day w/ associated lethargy, admitted for Acute hypoxemic and hypercapnic respiratory failure. Patient noted with to have evidence of fluid overload on exam, pBNP was 11K and CXR showed small pleural effusions consistent w/ CHF exacerbation. Patient seen by Cardiology, and their input appreciated. Patient managed with IV diuretics, telemonitoring, as well as Eliquis for anticoagulation for A. fib. Right heart catheterization Cardiomems recommended by Cardiology, and discussion with patient's wife and cardiologist Dr Storm. Patient and wife to consider if they want to continue outpatient cardiology follow up with Dr Storm/ [facility] or switch to [facility] for RHC/MEM.     ABG also showed pt had hypoxemia and hypercapnia. Acute hypoxemic hypercapnic respiratory failure likely multifactorial 2/2 acute on chronic diastolic CHF and COPD exacerbation.  Also noted with acute metabolic encephalopathy likely secondary to CO2 narcosis. He was seen by pulmonology team, and their inputs appreciated. Pt was placed on BIPAP and mentation improved. BiPAP later removed and supplemental oxygen via nasal cannula  titrated as tolerated. He was also managed with bronchodilator and steroids, and improved.   To be discharged on tapered steroids and home oxygen.    Patient also found to have GERALD on CKD 3,  likely with prerenal cause. Nephrologist saw and evaluated patient. A renal sonogram was negative for hydronephrosis. The patient was managed With IV diuretics, with strict monitoring of fluid inputs and outputs. His renal function has improved toward baseline.     Patient with hyperglycemia and leukocytosis secondary to steroids administration. Xray L-tib/fib and L-foot showed no evidence of bony destruction, and negative for osteomyelitis. Patient afebrile, with no signs of infection. All the patient's chronic clinical issues were managed with his home medications. The patient has improved and is medically stable for discharge. Pt will for own cardiology for cardiac cath . NIV at night.  pt is feeling better. No chhest pain,sob.No acute issues    T(C): 36.4 (12-24-20 @ 10:01), Max: 36.4 (12-24-20 @ 05:35)  HR: 69 (12-24-20 @ 10:01) (69 - 74)  BP: 111/67 (12-24-20 @ 10:01) (111/67 - 165/52)  RR: 18 (12-24-20 @ 10:01) (18 - 19)  SpO2: 89% (12-24-20 @ 10:01) (89% - 99%)    GEN - NAD  HEENT - NCAT, EOMI, RICCARDO,   RESP - CTA BL, no wheeze/stridor/rhonchi/crackles.on supplemental O2 2-3L  CARDIO - NS1S2, Rate control  ABD - Soft/Non tender/Non distended. Normal BS x4 quadrants.   Ext - +NAKUL. no signs of venous/arterial stasis ulcers  MSK - full ROM of BL upper and lower extremities without pain or restriction. BL 5/5 strength on upper and lower extremities.   Neuro - cn 2-12 grossly intact. cerebellar function intact. no visible seizure activity appreciated. no tremor. gait not observed.   Skin - clean, dry, intact. .    Psych- AAOx3.appropriate behaviour. attentive. normal affect.     time spent>35 minutes 71 y/o M w/ a PMH of AFib (on eliquis), HFpEF (follows with Dr. Zia Storm; last known EF 60-65% on 11/17), COPD (on 2L home O2), CABG 2013, and bladder CA (s/p TURP 2013) presented to [facility]  w/ AMS x1 day w/ associated lethargy, admitted for Acute hypoxemic and hypercapnic respiratory failure. Patient noted with to have evidence of fluid overload on exam, pBNP was 11K and CXR showed small pleural effusions consistent w/ CHF exacerbation. Patient seen by Cardiology, and their input appreciated. Patient managed with IV diuretics, telemonitoring, as well as Eliquis for anticoagulation for A. fib. Right heart catheterization Cardiomems recommended by Cardiology, and discussion with patient's wife and cardiologist Dr Storm. Patient and wife to consider if they want to continue outpatient cardiology follow up with Dr Storm/ [facility] or switch to [facility] for RHC/MEM.     ABG also showed pt had hypoxemia and hypercapnia. Acute hypoxemic hypercapnic respiratory failure likely multifactorial 2/2 acute on chronic diastolic CHF and COPD exacerbation.  Also noted with acute metabolic encephalopathy likely secondary to CO2 narcosis. He was seen by pulmonology team, and their inputs appreciated. Pt was placed on BIPAP and mentation improved. BiPAP later removed and supplemental oxygen via nasal cannula  titrated as tolerated. He was also managed with bronchodilator and steroids, and improved.   To be discharged on tapered steroids and home oxygen. Patient has a compensated respiratory acidosis and is a candidate for NIV at night. We held bipap last night and observed abg this morning. Appears that patient is tolerating enough to go home and wait for trilogy. Discussed with CM who will continue to work on insurance auth after discharge. Patient in agreement with seeing pulmonologist as soon as possible (has appointment this Thursday)    Patient also found to have GERALD on CKD 3,  likely with prerenal cause. Nephrologist saw and evaluated patient. A renal sonogram was negative for hydronephrosis. The patient was managed With IV diuretics, with strict monitoring of fluid inputs and outputs. His renal function has improved toward baseline.     Patient with hyperglycemia and leukocytosis secondary to steroids administration. Xray L-tib/fib and L-foot showed no evidence of bony destruction, and negative for osteomyelitis. Patient afebrile, with no signs of infection. All the patient's chronic clinical issues were managed with his home medications. The patient has improved and is medically stable for discharge. Pt will for own cardiology for cardiac cath . NIV at night.    Vital Signs Last 24 Hrs  T(C): 36.4 (27 Dec 2020 10:10), Max: 36.6 (26 Dec 2020 22:13)  T(F): 97.6 (27 Dec 2020 10:10), Max: 97.9 (26 Dec 2020 22:13)  HR: 56 (27 Dec 2020 10:10) (56 - 75)  BP: 119/63 (27 Dec 2020 10:10) (119/63 - 142/77)  BP(mean): --  RR: 18 (27 Dec 2020 10:10) (18 - 18)  SpO2: 99% (27 Dec 2020 10:10) (97% - 99%)    GEN - NAD  HEENT - NCAT, EOMI, RICCARDO,   RESP - CTA BL, no wheeze/stridor/rhonchi/crackles.on supplemental O2 2-3L  CARDIO - NS1S2, Rate control  ABD - Soft/Non tender/Non distended. Normal BS x4 quadrants.   Ext - +NAKUL. no signs of venous/arterial stasis ulcers  MSK - full ROM of BL upper and lower extremities without pain or restriction. BL 5/5 strength on upper and lower extremities.   Neuro - cn 2-12 grossly intact. cerebellar function intact. no visible seizure activity appreciated. no tremor. gait not observed.   Skin - clean, dry, intact. .    Psych- AAOx3.appropriate behaviour. attentive. normal affect.     time spent>35 minutes

## 2020-12-23 NOTE — PROGRESS NOTE ADULT - SUBJECTIVE AND OBJECTIVE BOX
PULMONARY PROGRESS NOTE      DEBI [id number]    Patient is a 70y old  Male who presents with a chief complaint of CHF exacerbation (22 Dec 2020 18:22)  HFpEF  AFib  COPD at [place]  Morbid obesity  CKD III    INTERVAL HPI/OVERNIGHT EVENTS:  Marginal - OK   Wants to go home    MEDICATIONS  (STANDING):  albuterol/ipratropium for Nebulization 3 milliLiter(s) Nebulizer every 6 hours  albuterol/ipratropium for Nebulization. 3 milliLiter(s) Nebulizer once  allopurinol 100 milliGRAM(s) Oral daily  amLODIPine   Tablet 10 milliGRAM(s) Oral daily  apixaban 2.5 milliGRAM(s) Oral every 12 hours  ascorbic acid 500 milliGRAM(s) Oral daily  aspirin  chewable 81 milliGRAM(s) Oral daily  atorvastatin 20 milliGRAM(s) Oral at bedtime  carvedilol 25 milliGRAM(s) Oral every 12 hours  cyanocobalamin 1000 MICROGram(s) Oral daily  dextrose 40% Gel 15 Gram(s) Oral once  dextrose 5%. 1000 milliLiter(s) (50 mL/Hr) IV Continuous <Continuous>  dextrose 5%. 1000 milliLiter(s) (100 mL/Hr) IV Continuous <Continuous>  dextrose 50% Injectable 25 Gram(s) IV Push once  dextrose 50% Injectable 12.5 Gram(s) IV Push once  dextrose 50% Injectable 25 Gram(s) IV Push once  epoetin felisha-epbx (RETACRIT) Injectable 72941 Unit(s) SubCutaneous every 7 days  ferrous    sulfate 325 milliGRAM(s) Oral daily  folic acid 1 milliGRAM(s) Oral daily  furosemide    Tablet 60 milliGRAM(s) Oral every 12 hours  glucagon  Injectable 1 milliGRAM(s) IntraMuscular once  insulin lispro (ADMELOG) corrective regimen sliding scale   SubCutaneous three times a day before meals  insulin lispro Injectable (ADMELOG) 3 Unit(s) SubCutaneous three times a day before meals  methylPREDNISolone sodium succinate Injectable 40 milliGRAM(s) IV Push every 12 hours  pantoprazole    Tablet 40 milliGRAM(s) Oral before breakfast  senna 2 Tablet(s) Oral at bedtime  tamsulosin 0.4 milliGRAM(s) Oral at bedtime  thiamine 100 milliGRAM(s) Oral daily  zinc sulfate 220 milliGRAM(s) Oral daily      MEDICATIONS  (PRN):  acetaminophen   Tablet .. 650 milliGRAM(s) Oral every 6 hours PRN Temp greater or equal to 38C (100.4F), Mild Pain (1 - 3)      Allergies    No Known Allergies    Intolerances        PAST MEDICAL & SURGICAL HISTORY:  Ulcer of left lower extremity, unspecified ulcer stage  Chronic LE ulceration    CKD (chronic kidney disease)    CHF (congestive heart failure)    Atrial fibrillation    COPD, severity to be determined    Coronary artery disease involving coronary bypass graft of native heart without angina pectoris    Chronic osteomyelitis, osteomyelitis of unspecified site    COPD (chronic obstructive pulmonary disease)    Atrial fibrillation    Bladder cancer    HLD (hyperlipidemia)    H/O knee surgery    S/P CABG (coronary artery bypass graft)    Presence of stent of CABG        SOCIAL HISTORY  Smoking History:       REVIEW OF SYSTEMS:    CONSTITUTIONAL:  No distress    HEENT:  Eyes:  No diplopia or blurred vision. ENT:  No earache, sore throat or runny nose.    CARDIOVASCULAR:  No pressure, squeezing, tightness, heaviness or aching about the chest; no palpitations.    RESPIRATORY:  No cough, shortness of breath, PND or orthopnea. Mild SOBOE    GASTROINTESTINAL:  No nausea, vomiting or diarrhea.    GENITOURINARY:  No dysuria, frequency or urgency.    NEUROLOGIC:  No paresthesias, fasciculations, seizures or weakness.    PSYCHIATRIC:  No disorder of thought or mood.    Vital Signs Last 24 Hrs  T(C): 36.2 (23 Dec 2020 04:15), Max: 36.9 (22 Dec 2020 10:45)  T(F): 97.2 (23 Dec 2020 04:15), Max: 98.4 (22 Dec 2020 10:45)  HR: 73 (23 Dec 2020 08:32) (69 - 93)  BP: 166/80 (23 Dec 2020 04:15) (148/65 - 166/80)  BP(mean): --  RR: 18 (23 Dec 2020 04:15) (18 - 19)  SpO2: 98% (23 Dec 2020 08:32) (90% - 100%)    PHYSICAL EXAMINATION:    GENERAL: The patient is awake and alert in no apparent distress.     HEENT: Head is normocephalic and atraumatic. Extraocular muscles are intact. Mucous membranes are moist.    NECK: Supple.    LUNGS: Clear to auscultation without wheezing, rales or rhonchi; respirations unlabored    HEART: Regular rate and rhythm without murmur.    ABDOMEN: Soft, nontender, and nondistended.      EXTREMITIES: Without any cyanosis, clubbing, rash, lesions or edema.    NEUROLOGIC: Grossly intact.    LABS:                        9.6    11.95 )-----------( 195      ( 23 Dec 2020 07:58 )             31.7     12-23    137  |  93<L>  |  97.0<H>  ----------------------------<  241<H>  4.3   |  34.0<H>  |  1.56<H>    Ca    8.5<L>      23 Dec 2020 07:58  Mg     1.6     12-22        Serum Pro-Brain Natriuretic Peptide: 6165 pg/mL (12-23-20 @ 07:58)      RADIOLOGY & ADDITIONAL STUDIES:     EXAM:  XR CHEST PORTABLE ROUTINE 1V                          PROCEDURE DATE:  12/19/2020          INTERPRETATION:  HISTORY: Hypoxia    TECHNIQUE: Single frontal view of the chest with comparison to 12/14/2020    FINDINGS:    Prior sternotomy. Prior CABG. Heart is enlarged. Mild pulmonary vascular congestion with mild bilateral pleural effusions and bibasilar atelectasis improved. Apices unremarkable. Degenerative changes.        IMPRESSION:    Improving pulmonary vascular congestion with bilateral pleural effusions and bibasilar atelectasis    VIRGINIA ELISE MD; Attending Radiologist  This document has been electronically signed. Dec 19 2020  6:51PM      Echo on 11/16/20   1. Left ventricular ejection fraction, by visual estimation, is 60 to 65%.  2. Normal global left ventricular systolic function.   3. Mildly enlarged right ventricle.   4. Mildly reduced RV systolic function.   5. Moderate tricuspid regurgitation.   6. Estimated pulmonary artery systolic pressure is 67.7 mmHg assuming a right atrial pressure of 15 mmHg, which is consistent with severe pulmonary hypertension.    MD Dana Electronically signed on 11/17/2020 at 10:11:35 AM

## 2020-12-24 LAB
ANION GAP SERPL CALC-SCNC: 11 MMOL/L — SIGNIFICANT CHANGE UP (ref 5–17)
ANISOCYTOSIS BLD QL: SIGNIFICANT CHANGE UP
BASOPHILS # BLD AUTO: 0 K/UL — SIGNIFICANT CHANGE UP (ref 0–0.2)
BASOPHILS NFR BLD AUTO: 0 % — SIGNIFICANT CHANGE UP (ref 0–2)
BUN SERPL-MCNC: 102 MG/DL — HIGH (ref 8–20)
CALCIUM SERPL-MCNC: 8.4 MG/DL — LOW (ref 8.6–10.2)
CHLORIDE SERPL-SCNC: 93 MMOL/L — LOW (ref 98–107)
CO2 SERPL-SCNC: 32 MMOL/L — HIGH (ref 22–29)
CREAT SERPL-MCNC: 1.71 MG/DL — HIGH (ref 0.5–1.3)
EOSINOPHIL # BLD AUTO: 0 K/UL — SIGNIFICANT CHANGE UP (ref 0–0.5)
EOSINOPHIL NFR BLD AUTO: 0 % — SIGNIFICANT CHANGE UP (ref 0–6)
GIANT PLATELETS BLD QL SMEAR: PRESENT — SIGNIFICANT CHANGE UP
GLUCOSE BLDC GLUCOMTR-MCNC: 197 MG/DL — HIGH (ref 70–99)
GLUCOSE BLDC GLUCOMTR-MCNC: 202 MG/DL — HIGH (ref 70–99)
GLUCOSE BLDC GLUCOMTR-MCNC: 247 MG/DL — HIGH (ref 70–99)
GLUCOSE BLDC GLUCOMTR-MCNC: 253 MG/DL — HIGH (ref 70–99)
GLUCOSE SERPL-MCNC: 214 MG/DL — HIGH (ref 70–99)
HCT VFR BLD CALC: 31.5 % — LOW (ref 39–50)
HGB BLD-MCNC: 9.9 G/DL — LOW (ref 13–17)
LYMPHOCYTES # BLD AUTO: 0.76 K/UL — LOW (ref 1–3.3)
LYMPHOCYTES # BLD AUTO: 6.9 % — LOW (ref 13–44)
MACROCYTES BLD QL: SIGNIFICANT CHANGE UP
MANUAL SMEAR VERIFICATION: SIGNIFICANT CHANGE UP
MCHC RBC-ENTMCNC: 31.4 GM/DL — LOW (ref 32–36)
MCHC RBC-ENTMCNC: 33.3 PG — SIGNIFICANT CHANGE UP (ref 27–34)
MCV RBC AUTO: 106.1 FL — HIGH (ref 80–100)
MONOCYTES # BLD AUTO: 0.39 K/UL — SIGNIFICANT CHANGE UP (ref 0–0.9)
MONOCYTES NFR BLD AUTO: 3.5 % — SIGNIFICANT CHANGE UP (ref 2–14)
NEUTROPHILS # BLD AUTO: 9.86 K/UL — HIGH (ref 1.8–7.4)
NEUTROPHILS NFR BLD AUTO: 88.7 % — HIGH (ref 43–77)
NEUTS BAND # BLD: 0.9 % — SIGNIFICANT CHANGE UP (ref 0–8)
NRBC # BLD: 1 /100 — HIGH (ref 0–0)
PLAT MORPH BLD: NORMAL — SIGNIFICANT CHANGE UP
PLATELET # BLD AUTO: 203 K/UL — SIGNIFICANT CHANGE UP (ref 150–400)
POTASSIUM SERPL-MCNC: 4.3 MMOL/L — SIGNIFICANT CHANGE UP (ref 3.5–5.3)
POTASSIUM SERPL-SCNC: 4.3 MMOL/L — SIGNIFICANT CHANGE UP (ref 3.5–5.3)
RBC # BLD: 2.97 M/UL — LOW (ref 4.2–5.8)
RBC # FLD: 17.4 % — HIGH (ref 10.3–14.5)
RBC BLD AUTO: ABNORMAL
SODIUM SERPL-SCNC: 135 MMOL/L — SIGNIFICANT CHANGE UP (ref 135–145)
WBC # BLD: 11.01 K/UL — HIGH (ref 3.8–10.5)
WBC # FLD AUTO: 11.01 K/UL — HIGH (ref 3.8–10.5)

## 2020-12-24 PROCEDURE — 99232 SBSQ HOSP IP/OBS MODERATE 35: CPT

## 2020-12-24 RX ORDER — CARVEDILOL PHOSPHATE 80 MG/1
1 CAPSULE, EXTENDED RELEASE ORAL
Qty: 60 | Refills: 0
Start: 2020-12-24 | End: 2021-01-22

## 2020-12-24 RX ORDER — AMLODIPINE BESYLATE 2.5 MG/1
1 TABLET ORAL
Qty: 30 | Refills: 0
Start: 2020-12-24 | End: 2021-01-22

## 2020-12-24 RX ORDER — TEMAZEPAM 15 MG/1
30 CAPSULE ORAL ONCE
Refills: 0 | Status: DISCONTINUED | OUTPATIENT
Start: 2020-12-24 | End: 2020-12-24

## 2020-12-24 RX ORDER — FUROSEMIDE 40 MG
3 TABLET ORAL
Qty: 180 | Refills: 0
Start: 2020-12-24 | End: 2021-01-22

## 2020-12-24 RX ORDER — ALBUTEROL 90 UG/1
2 AEROSOL, METERED ORAL
Qty: 1 | Refills: 0
Start: 2020-12-24

## 2020-12-24 RX ADMIN — Medication 3 MILLILITER(S): at 14:33

## 2020-12-24 RX ADMIN — Medication 3 UNIT(S): at 09:04

## 2020-12-24 RX ADMIN — Medication 3 UNIT(S): at 12:09

## 2020-12-24 RX ADMIN — Medication 60 MILLIGRAM(S): at 17:03

## 2020-12-24 RX ADMIN — Medication 60 MILLIGRAM(S): at 05:37

## 2020-12-24 RX ADMIN — ATORVASTATIN CALCIUM 20 MILLIGRAM(S): 80 TABLET, FILM COATED ORAL at 22:18

## 2020-12-24 RX ADMIN — Medication 3 MILLILITER(S): at 03:54

## 2020-12-24 RX ADMIN — Medication 3 UNIT(S): at 17:01

## 2020-12-24 RX ADMIN — PREGABALIN 1000 MICROGRAM(S): 225 CAPSULE ORAL at 11:58

## 2020-12-24 RX ADMIN — Medication 3 MILLILITER(S): at 21:29

## 2020-12-24 RX ADMIN — ZINC SULFATE TAB 220 MG (50 MG ZINC EQUIVALENT) 220 MILLIGRAM(S): 220 (50 ZN) TAB at 11:58

## 2020-12-24 RX ADMIN — Medication 325 MILLIGRAM(S): at 11:58

## 2020-12-24 RX ADMIN — Medication 40 MILLIGRAM(S): at 05:43

## 2020-12-24 RX ADMIN — AMLODIPINE BESYLATE 10 MILLIGRAM(S): 2.5 TABLET ORAL at 05:37

## 2020-12-24 RX ADMIN — Medication 500 MILLIGRAM(S): at 11:58

## 2020-12-24 RX ADMIN — Medication 4: at 09:05

## 2020-12-24 RX ADMIN — TEMAZEPAM 30 MILLIGRAM(S): 15 CAPSULE ORAL at 23:10

## 2020-12-24 RX ADMIN — Medication 81 MILLIGRAM(S): at 11:57

## 2020-12-24 RX ADMIN — CARVEDILOL PHOSPHATE 25 MILLIGRAM(S): 80 CAPSULE, EXTENDED RELEASE ORAL at 17:02

## 2020-12-24 RX ADMIN — PANTOPRAZOLE SODIUM 40 MILLIGRAM(S): 20 TABLET, DELAYED RELEASE ORAL at 05:37

## 2020-12-24 RX ADMIN — CARVEDILOL PHOSPHATE 25 MILLIGRAM(S): 80 CAPSULE, EXTENDED RELEASE ORAL at 05:37

## 2020-12-24 RX ADMIN — TAMSULOSIN HYDROCHLORIDE 0.4 MILLIGRAM(S): 0.4 CAPSULE ORAL at 22:21

## 2020-12-24 RX ADMIN — APIXABAN 2.5 MILLIGRAM(S): 2.5 TABLET, FILM COATED ORAL at 05:37

## 2020-12-24 RX ADMIN — Medication 3 MILLILITER(S): at 08:58

## 2020-12-24 RX ADMIN — INSULIN GLARGINE 10 UNIT(S): 100 INJECTION, SOLUTION SUBCUTANEOUS at 22:18

## 2020-12-24 RX ADMIN — SENNA PLUS 2 TABLET(S): 8.6 TABLET ORAL at 22:19

## 2020-12-24 RX ADMIN — Medication 100 MILLIGRAM(S): at 11:57

## 2020-12-24 RX ADMIN — Medication 100 MILLIGRAM(S): at 11:59

## 2020-12-24 RX ADMIN — Medication 4: at 12:09

## 2020-12-24 RX ADMIN — APIXABAN 2.5 MILLIGRAM(S): 2.5 TABLET, FILM COATED ORAL at 17:02

## 2020-12-24 RX ADMIN — Medication 1 MILLIGRAM(S): at 11:57

## 2020-12-24 RX ADMIN — Medication 6: at 17:02

## 2020-12-24 RX ADMIN — INSULIN GLARGINE 10 UNIT(S): 100 INJECTION, SOLUTION SUBCUTANEOUS at 09:08

## 2020-12-24 NOTE — PROGRESS NOTE ADULT - SUBJECTIVE AND OBJECTIVE BOX
NEPHROLOGY INTERVAL HPI/OVERNIGHT EVENTS:    Feels better   Pulm follow up noted   No new events   meds reviewed     MEDICATIONS  (STANDING):  albuterol/ipratropium for Nebulization 3 milliLiter(s) Nebulizer every 6 hours  albuterol/ipratropium for Nebulization. 3 milliLiter(s) Nebulizer once  allopurinol 100 milliGRAM(s) Oral daily  amLODIPine   Tablet 10 milliGRAM(s) Oral daily  apixaban 2.5 milliGRAM(s) Oral every 12 hours  ascorbic acid 500 milliGRAM(s) Oral daily  aspirin  chewable 81 milliGRAM(s) Oral daily  atorvastatin 20 milliGRAM(s) Oral at bedtime  carvedilol 25 milliGRAM(s) Oral every 12 hours  cyanocobalamin 1000 MICROGram(s) Oral daily  dextrose 40% Gel 15 Gram(s) Oral once  dextrose 5%. 1000 milliLiter(s) (50 mL/Hr) IV Continuous <Continuous>  dextrose 5%. 1000 milliLiter(s) (100 mL/Hr) IV Continuous <Continuous>  dextrose 50% Injectable 25 Gram(s) IV Push once  dextrose 50% Injectable 12.5 Gram(s) IV Push once  dextrose 50% Injectable 25 Gram(s) IV Push once  epoetin felisha-epbx (RETACRIT) Injectable 72924 Unit(s) SubCutaneous every 7 days  ferrous    sulfate 325 milliGRAM(s) Oral daily  folic acid 1 milliGRAM(s) Oral daily  furosemide    Tablet 60 milliGRAM(s) Oral every 12 hours  glucagon  Injectable 1 milliGRAM(s) IntraMuscular once  insulin glargine Injectable (LANTUS) 10 Unit(s) SubCutaneous two times a day  insulin lispro (ADMELOG) corrective regimen sliding scale   SubCutaneous three times a day before meals  insulin lispro Injectable (ADMELOG) 3 Unit(s) SubCutaneous three times a day before meals  pantoprazole    Tablet 40 milliGRAM(s) Oral before breakfast  predniSONE   Tablet 40 milliGRAM(s) Oral daily  senna 2 Tablet(s) Oral at bedtime  tamsulosin 0.4 milliGRAM(s) Oral at bedtime  thiamine 100 milliGRAM(s) Oral daily  zinc sulfate 220 milliGRAM(s) Oral daily    MEDICATIONS  (PRN):  acetaminophen   Tablet .. 650 milliGRAM(s) Oral every 6 hours PRN Temp greater or equal to 38C (100.4F), Mild Pain (1 - 3)      Allergies    No Known Allergies    Intolerances      Vital Signs Last 24 Hrs  T(C): 36.4 (24 Dec 2020 10:01), Max: 36.4 (24 Dec 2020 05:35)  T(F): 97.5 (24 Dec 2020 10:01), Max: 97.5 (24 Dec 2020 05:35)  HR: 69 (24 Dec 2020 10:01) (69 - 74)  BP: 111/67 (24 Dec 2020 10:01) (111/67 - 165/52)  BP(mean): --  RR: 18 (24 Dec 2020 10:01) (18 - 19)  SpO2: 89% (24 Dec 2020 10:01) (89% - 99%)  Daily     Daily   I&O's Detail    23 Dec 2020 07:01  -  24 Dec 2020 07:00  --------------------------------------------------------  IN:  Total IN: 0 mL    OUT:    Voided (mL): 450 mL  Total OUT: 450 mL    Total NET: -450 mL        I&O's Summary    23 Dec 2020 07:01  -  24 Dec 2020 07:00  --------------------------------------------------------  IN: 0 mL / OUT: 450 mL / NET: -450 mL    PHYSICAL EXAM:  GENERAL: Weak, deconditioned  NECK: Supple, + JVD  NERVOUS SYSTEM:  Alert & Oriented X3, No asterixis   CHEST/LUNG: Clear, diminished bilaterally  HEART: Regular rate and rhythm; No rub   ABDOMEN: Soft, Nontender, Nondistended; +BS  EXTREMITIES:  +LE edema improved      LABS:                        9.9    11.01 )-----------( 203      ( 24 Dec 2020 08:18 )             31.5     12-24    135  |  93<L>  |  102.0<H>  ----------------------------<  214<H>  4.3   |  32.0<H>  |  1.71<H>    Ca    8.4<L>      24 Dec 2020 08:18  Mg     1.6     12-22                  RADIOLOGY & ADDITIONAL TESTS:

## 2020-12-24 NOTE — PROGRESS NOTE ADULT - ASSESSMENT
69 y/o M w/ a PMH of AFib (on eliquis), HFpEF (follows with Dr. Zia Storm; last known EF 60-65% on 11/17), COPD (on 2L home O2), CABG 2013, and bladder CA (s/p TURP 2013) presented to [facility]  w/ AMS x1 day w/ associated lethargy.  Pt also reports SOB x2 months that has acutely worsened the past few days.  Pt also reports fatigue, weight gain, inability to sleep flat.  Pt was noted to have evidence of fluid overload on exam, pBNP was 11K and CXR showed small pleural effusions consistent w/ CHF exacerbation  ABG also showed pt had hypoxemia and hypercapnia Pt was placed on BIPAP and mentation improved.  Pt will be admitted to tele/pulse ox for acute hypoxic hypercapnic respiratory failure. COVID -ve    Acute hypoxemic hypercapnic respiratory failure   - Likely multifactorial 2/2 Acute on chronic diastolic CHF and COPD exacerbation  -off bipap now.on 2- 3 l Oxygen. titrate down AS TOLERATE. Pt is tolerating well.  - change to po steroid   - Last known EF 60-65% on 11/17  - CXR shows b/l pleural effusions and pBNP 11,000 on admission  - Lasix 60mg po bid per cardiology  - noct NIV, f/u pulm out pt  - pt does not want to go for cath now. pt will decide and plan for out pt cath per cardiology.    Acute metabolic encephalopathy likely 2/2 CO2 narcosis   - improved. pt  is aaox3 now  - Mentation back to baseline     AFib, rate controlled  - c/w Eliquis 2.5mg q12h    LLE chronic wound   - Xray L-tib/fib and L-foot: no evidence of bony destruction  appears like a healed skin graft     GERALD on CKD 3 likely prerenal  - Cr stable  -f/u renal out pt    Anemia likely of chronic dx  - H/H at baseline and pt hemodynamically stable  - Monitor CBC daily   - Transfuse if Hb <7-8  - c/w epo and venofer added per renal    Hyperglycemia  -due to solumedrol  -HbA1c 4.7  -ISS added    DVT ppx: on Eliquis  PT Edith     Spoke with wife. Agreeable with plan  charisse martínez    Dispo: dc home w/home care    care of plan charisse pt -he agreed    dw rn

## 2020-12-24 NOTE — PROGRESS NOTE ADULT - ASSESSMENT
CKDIII: GERALD, prerenal  Renal sono w/o hydro  Elevated BUN also due to steroid effects  - avoid potential nephrotoxins  - diuretics and transition to PO dosing  - monitor labs     Anemia:  - cont TIMOTHY and Venofer  - target Hgb= 10.0

## 2020-12-24 NOTE — PROGRESS NOTE ADULT - ASSESSMENT
Assess    HFpEF  AF  COPD  Severe pulmonary HTN  Chronic hypercarbic respiratory failure  Needs nocturnal NIV to reduce PCO2 and to avoid readmission  Bipap considered but rejected due to the progressive nature of his disorder  Obesity   OHS may contribute        Rec    Cardiac regiment   02  Nocturnal NIV  DuoNeb  Prednisone taper  Home with OP FU soon   Assess    HFpEF  AF  COPD  Severe pulmonary HTN  Chronic hypercarbic respiratory failure  Bipap attempted but failed  AVAPS AE or IVAPS not available on Bipap so ruled out  The patient requires NIV for safe discharge   Obesity         Rec    Cardiac regiment   02  Nocturnal NIV  DuoNeb  Prednisone taper  Home with OP FU soon   Assess    HFpEF  AF  COPD  Severe pulmonary HTN  Chronic hypercarbic respiratory failure  Bipap attempted but failed  AVAPS AE or IVAPS not available on Bipap so ruled out  The patient requires NIV to reduce the PCO2, to avoid recurrent hospitalization and to allow for safe discharge    Obesity         Rec    Cardiac regiment   02  Nocturnal NIV  DuoNeb  Prednisone taper  Home with OP FU soon

## 2020-12-24 NOTE — PROGRESS NOTE ADULT - SUBJECTIVE AND OBJECTIVE BOX
PULMONARY PROGRESS NOTE      DEBI BADILLOAPARNA-[id number]    Patient is a 70y old  Male who presents with a chief complaint of CHF exacerbation (22 Dec 2020 18:22)  HFpEF  AFib  COPD at baseline  Morbid obesity  CKD III    INTERVAL HPI/OVERNIGHT EVENTS:  Marginal - OK   Wants to go home    MEDICATIONS  (STANDING):  albuterol/ipratropium for Nebulization 3 milliLiter(s) Nebulizer every 6 hours  albuterol/ipratropium for Nebulization. 3 milliLiter(s) Nebulizer once  allopurinol 100 milliGRAM(s) Oral daily  amLODIPine   Tablet 10 milliGRAM(s) Oral daily  apixaban 2.5 milliGRAM(s) Oral every 12 hours  ascorbic acid 500 milliGRAM(s) Oral daily  aspirin  chewable 81 milliGRAM(s) Oral daily  atorvastatin 20 milliGRAM(s) Oral at bedtime  carvedilol 25 milliGRAM(s) Oral every 12 hours  cyanocobalamin 1000 MICROGram(s) Oral daily  dextrose 40% Gel 15 Gram(s) Oral once  dextrose 5%. 1000 milliLiter(s) (50 mL/Hr) IV Continuous <Continuous>  dextrose 5%. 1000 milliLiter(s) (100 mL/Hr) IV Continuous <Continuous>  dextrose 50% Injectable 25 Gram(s) IV Push once  dextrose 50% Injectable 12.5 Gram(s) IV Push once  dextrose 50% Injectable 25 Gram(s) IV Push once  epoetin felisha-epbx (RETACRIT) Injectable 36271 Unit(s) SubCutaneous every 7 days  ferrous    sulfate 325 milliGRAM(s) Oral daily  folic acid 1 milliGRAM(s) Oral daily  furosemide    Tablet 60 milliGRAM(s) Oral every 12 hours  glucagon  Injectable 1 milliGRAM(s) IntraMuscular once  insulin lispro (ADMELOG) corrective regimen sliding scale   SubCutaneous three times a day before meals  insulin lispro Injectable (ADMELOG) 3 Unit(s) SubCutaneous three times a day before meals  methylPREDNISolone sodium succinate Injectable 40 milliGRAM(s) IV Push every 12 hours  pantoprazole    Tablet 40 milliGRAM(s) Oral before breakfast  senna 2 Tablet(s) Oral at bedtime  tamsulosin 0.4 milliGRAM(s) Oral at bedtime  thiamine 100 milliGRAM(s) Oral daily  zinc sulfate 220 milliGRAM(s) Oral daily      MEDICATIONS  (PRN):  acetaminophen   Tablet .. 650 milliGRAM(s) Oral every 6 hours PRN Temp greater or equal to 38C (100.4F), Mild Pain (1 - 3)      Allergies    No Known Allergies    Intolerances        PAST MEDICAL & SURGICAL HISTORY:  Ulcer of left lower extremity, unspecified ulcer stage  Chronic LE ulceration    CKD (chronic kidney disease)    CHF (congestive heart failure)    Atrial fibrillation    COPD, severity to be determined    Coronary artery disease involving coronary bypass graft of native heart without angina pectoris    Chronic osteomyelitis, osteomyelitis of unspecified site    COPD (chronic obstructive pulmonary disease)    Atrial fibrillation    Bladder cancer    HLD (hyperlipidemia)    H/O knee surgery    S/P CABG (coronary artery bypass graft)    Presence of stent of CABG        SOCIAL HISTORY  Smoking History:       REVIEW OF SYSTEMS:    CONSTITUTIONAL:  No distress    HEENT:  Eyes:  No diplopia or blurred vision. ENT:  No earache, sore throat or runny nose.    CARDIOVASCULAR:  No pressure, squeezing, tightness, heaviness or aching about the chest; no palpitations.    RESPIRATORY:  No cough, shortness of breath, PND or orthopnea. Mild SOBOE    GASTROINTESTINAL:  No nausea, vomiting or diarrhea.    GENITOURINARY:  No dysuria, frequency or urgency.    NEUROLOGIC:  No paresthesias, fasciculations, seizures or weakness.    PSYCHIATRIC:  No disorder of thought or mood.    Vital Signs Last 24 Hrs  T(C): 36.2 (23 Dec 2020 04:15), Max: 36.9 (22 Dec 2020 10:45)  T(F): 97.2 (23 Dec 2020 04:15), Max: 98.4 (22 Dec 2020 10:45)  HR: 73 (23 Dec 2020 08:32) (69 - 93)  BP: 166/80 (23 Dec 2020 04:15) (148/65 - 166/80)  BP(mean): --  RR: 18 (23 Dec 2020 04:15) (18 - 19)  SpO2: 98% (23 Dec 2020 08:32) (90% - 100%)    PHYSICAL EXAMINATION:    GENERAL: The patient is awake and alert in no apparent distress.     HEENT: Head is normocephalic and atraumatic. Extraocular muscles are intact. Mucous membranes are moist.    NECK: Supple.    LUNGS: Clear to auscultation without wheezing, rales or rhonchi; respirations unlabored    HEART: Regular rate and rhythm without murmur.    ABDOMEN: Soft, nontender, and nondistended.      EXTREMITIES: Without any cyanosis, clubbing, rash, lesions or edema.    NEUROLOGIC: Grossly intact.    LABS:                        9.6    11.95 )-----------( 195      ( 23 Dec 2020 07:58 )             31.7     12-23    137  |  93<L>  |  97.0<H>  ----------------------------<  241<H>  4.3   |  34.0<H>  |  1.56<H>    Ca    8.5<L>      23 Dec 2020 07:58  Mg     1.6     12-22        Serum Pro-Brain Natriuretic Peptide: 6165 pg/mL (12-23-20 @ 07:58)      RADIOLOGY & ADDITIONAL STUDIES:     EXAM:  XR CHEST PORTABLE ROUTINE 1V                          PROCEDURE DATE:  12/19/2020          INTERPRETATION:  HISTORY: Hypoxia    TECHNIQUE: Single frontal view of the chest with comparison to 12/14/2020    FINDINGS:    Prior sternotomy. Prior CABG. Heart is enlarged. Mild pulmonary vascular congestion with mild bilateral pleural effusions and bibasilar atelectasis improved. Apices unremarkable. Degenerative changes.        IMPRESSION:    Improving pulmonary vascular congestion with bilateral pleural effusions and bibasilar atelectasis    VIRGINIA ELISE MD; Attending Radiologist  This document has been electronically signed. Dec 19 2020  6:51PM      Echo on 11/16/20   1. Left ventricular ejection fraction, by visual estimation, is 60 to 65%.  2. Normal global left ventricular systolic function.   3. Mildly enlarged right ventricle.   4. Mildly reduced RV systolic function.   5. Moderate tricuspid regurgitation.   6. Estimated pulmonary artery systolic pressure is 67.7 mmHg assuming a right atrial pressure of 15 mmHg, which is consistent with severe pulmonary hypertension.    MD Dana Electronically signed on 11/17/2020 at 10:11:35 AM

## 2020-12-24 NOTE — PROGRESS NOTE ADULT - SUBJECTIVE AND OBJECTIVE BOX
Patient is a 70y old  Male who presents with a chief complaint of CHF exacerbation (24 Dec 2020 13:47)    pt is feeling okay. No acute issues. Breathing stable  SUBJECTIVE / OVERNIGHT EVENTS:  REVIEW OF SYSTEMS: All systems are reviewed and found to be negative except above    MEDICATIONS  (STANDING):  albuterol/ipratropium for Nebulization 3 milliLiter(s) Nebulizer every 6 hours  albuterol/ipratropium for Nebulization. 3 milliLiter(s) Nebulizer once  allopurinol 100 milliGRAM(s) Oral daily  amLODIPine   Tablet 10 milliGRAM(s) Oral daily  apixaban 2.5 milliGRAM(s) Oral every 12 hours  ascorbic acid 500 milliGRAM(s) Oral daily  aspirin  chewable 81 milliGRAM(s) Oral daily  atorvastatin 20 milliGRAM(s) Oral at bedtime  carvedilol 25 milliGRAM(s) Oral every 12 hours  cyanocobalamin 1000 MICROGram(s) Oral daily  dextrose 40% Gel 15 Gram(s) Oral once  dextrose 5%. 1000 milliLiter(s) (50 mL/Hr) IV Continuous <Continuous>  dextrose 5%. 1000 milliLiter(s) (100 mL/Hr) IV Continuous <Continuous>  dextrose 50% Injectable 25 Gram(s) IV Push once  dextrose 50% Injectable 12.5 Gram(s) IV Push once  dextrose 50% Injectable 25 Gram(s) IV Push once  epoetin felisha-epbx (RETACRIT) Injectable 55800 Unit(s) SubCutaneous every 7 days  ferrous    sulfate 325 milliGRAM(s) Oral daily  folic acid 1 milliGRAM(s) Oral daily  furosemide    Tablet 60 milliGRAM(s) Oral every 12 hours  glucagon  Injectable 1 milliGRAM(s) IntraMuscular once  insulin glargine Injectable (LANTUS) 10 Unit(s) SubCutaneous two times a day  insulin lispro (ADMELOG) corrective regimen sliding scale   SubCutaneous three times a day before meals  insulin lispro Injectable (ADMELOG) 3 Unit(s) SubCutaneous three times a day before meals  pantoprazole    Tablet 40 milliGRAM(s) Oral before breakfast  predniSONE   Tablet 40 milliGRAM(s) Oral daily  senna 2 Tablet(s) Oral at bedtime  tamsulosin 0.4 milliGRAM(s) Oral at bedtime  thiamine 100 milliGRAM(s) Oral daily  zinc sulfate 220 milliGRAM(s) Oral daily    MEDICATIONS  (PRN):  acetaminophen   Tablet .. 650 milliGRAM(s) Oral every 6 hours PRN Temp greater or equal to 38C (100.4F), Mild Pain (1 - 3)      CAPILLARY BLOOD GLUCOSE      POCT Blood Glucose.: 253 mg/dL (24 Dec 2020 16:55)  POCT Blood Glucose.: 247 mg/dL (24 Dec 2020 12:08)  POCT Blood Glucose.: 202 mg/dL (24 Dec 2020 09:01)  POCT Blood Glucose.: 301 mg/dL (23 Dec 2020 21:59)    I&O's Summary    23 Dec 2020 07:01  -  24 Dec 2020 07:00  --------------------------------------------------------  IN: 0 mL / OUT: 450 mL / NET: -450 mL        PHYSICAL EXAM:  Vital Signs Last 24 Hrs  T(C): 36.3 (24 Dec 2020 17:01), Max: 36.4 (24 Dec 2020 05:35)  T(F): 97.4 (24 Dec 2020 17:01), Max: 97.5 (24 Dec 2020 05:35)  HR: 70 (24 Dec 2020 17:01) (68 - 74)  BP: 138/71 (24 Dec 2020 17:01) (111/67 - 149/72)  BP(mean): --  RR: 18 (24 Dec 2020 17:01) (18 - 19)  SpO2: 97% (24 Dec 2020 17:01) (89% - 99%)    CONSTITUTIONAL: NAD,   RESPIRATORY: Normal respiratory effort; lungs are clear to auscultation bilaterally  CARDIOVASCULAR:, normal S1 and S2, no murmur   EXTS: trace lower extremity edema; Peripheral pulses are 2+ bilaterally  ABDOMEN: Nontender to palpation, normoactive bowel sounds, no rebound/guarding;   MUSCLOSKELETAL:   no clubbing or cyanosis of digits; no joint swelling or tenderness to palpation  PSYCH: affect appropriate  NEUROLOGY: A+O to person, place, and time; CN 2-12 are intact and symmetric; no gross sensory deficits;        LABS:                        9.9    11.01 )-----------( 203      ( 24 Dec 2020 08:18 )             31.5     12-24    135  |  93<L>  |  102.0<H>  ----------------------------<  214<H>  4.3   |  32.0<H>  |  1.71<H>    Ca    8.4<L>      24 Dec 2020 08:18  Mg     1.6     12-22                  RADIOLOGY & ADDITIONAL TESTS:  Results Reviewed:   Imaging Personally Reviewed:  Electrocardiogram Personally Reviewed:    COORDINATION OF CARE:  Care Discussed with Consultants/Other Providers [Y/N]:  Prior or Outpatient Records Reviewed [Y/N]:

## 2020-12-24 NOTE — CHART NOTE - NSCHARTNOTEFT_GEN_A_CORE
Source: Patient [ ]  Family [ ]   other [x ]    Current Diet: Dash    PO intake:  < 50% [ ]   50-75%  [ ]   %  [x ]  other :    Source for PO intake [ ] Patient [ ] family [x ] chart [ ] staff [ ] other    Enteral /Parenteral Nutrition:     Current Weight: 210# 12/19  generalized 1 +, left leg and right leg 3+    % Weight Change     Pertinent Medications: MEDICATIONS  (STANDING):  albuterol/ipratropium for Nebulization 3 milliLiter(s) Nebulizer every 6 hours  albuterol/ipratropium for Nebulization. 3 milliLiter(s) Nebulizer once  allopurinol 100 milliGRAM(s) Oral daily  amLODIPine   Tablet 10 milliGRAM(s) Oral daily  apixaban 2.5 milliGRAM(s) Oral every 12 hours  ascorbic acid 500 milliGRAM(s) Oral daily  aspirin  chewable 81 milliGRAM(s) Oral daily  atorvastatin 20 milliGRAM(s) Oral at bedtime  carvedilol 25 milliGRAM(s) Oral every 12 hours  cyanocobalamin 1000 MICROGram(s) Oral daily  dextrose 40% Gel 15 Gram(s) Oral once  dextrose 5%. 1000 milliLiter(s) (50 mL/Hr) IV Continuous <Continuous>  dextrose 5%. 1000 milliLiter(s) (100 mL/Hr) IV Continuous <Continuous>  dextrose 50% Injectable 25 Gram(s) IV Push once  dextrose 50% Injectable 12.5 Gram(s) IV Push once  dextrose 50% Injectable 25 Gram(s) IV Push once  epoetin fleisha-epbx (RETACRIT) Injectable 59992 Unit(s) SubCutaneous every 7 days  ferrous    sulfate 325 milliGRAM(s) Oral daily  folic acid 1 milliGRAM(s) Oral daily  furosemide    Tablet 60 milliGRAM(s) Oral every 12 hours  glucagon  Injectable 1 milliGRAM(s) IntraMuscular once  insulin glargine Injectable (LANTUS) 10 Unit(s) SubCutaneous two times a day  insulin lispro (ADMELOG) corrective regimen sliding scale   SubCutaneous three times a day before meals  insulin lispro Injectable (ADMELOG) 3 Unit(s) SubCutaneous three times a day before meals  pantoprazole    Tablet 40 milliGRAM(s) Oral before breakfast  predniSONE   Tablet 40 milliGRAM(s) Oral daily  senna 2 Tablet(s) Oral at bedtime  tamsulosin 0.4 milliGRAM(s) Oral at bedtime  thiamine 100 milliGRAM(s) Oral daily  zinc sulfate 220 milliGRAM(s) Oral daily    MEDICATIONS  (PRN):  acetaminophen   Tablet .. 650 milliGRAM(s) Oral every 6 hours PRN Temp greater or equal to 38C (100.4F), Mild Pain (1 - 3)    Pertinent Labs: CBC Full  -  ( 24 Dec 2020 08:18 )  WBC Count : 11.01 K/uL  RBC Count : 2.97 M/uL  Hemoglobin : 9.9 g/dL  Hematocrit : 31.5 %  Platelet Count - Automated : 203 K/uL  Mean Cell Volume : 106.1 fl  Mean Cell Hemoglobin : 33.3 pg  Mean Cell Hemoglobin Concentration : 31.4 gm/dL  Auto Neutrophil # : 9.86 K/uL  Auto Lymphocyte # : 0.76 K/uL  Auto Monocyte # : 0.39 K/uL  Auto Eosinophil # : 0.00 K/uL  Auto Basophil # : 0.00 K/uL  Auto Neutrophil % : 88.7 %  Auto Lymphocyte % : 6.9 %  Auto Monocyte % : 3.5 %  Auto Eosinophil % : 0.0 %  Auto Basophil % : 0.0 %        12-24 Na135 mmol/L Glu 214 mg/dL<H> K+ 4.3 mmol/L Cr  1.71 mg/dL<H> .0 mg/dL<H> Phos n/a   Alb n/a   PAB n/a         Skin: unstageable left heel and stage 1 butt.     Nutrition focused physical exam not conducted - found signs of malnutrition [ ]absent [ ]present    Subcutaneous fat loss: [ ] Orbital fat pads region, [ ]Buccal fat region, [ ]Triceps region,  [ ]Ribs region    Muscle wasting: [ ]Temples region, [ ]Clavicle region, [ ]Shoulder region, [ ]Scapula region, [ ]Interosseous region,  [ ]thigh region, [ ]Calf region    Estimated Needs:   [x ] no change since previous assessment  [ ] recalculated:     Current Nutrition Diagnosis:  Increased Nutrient Needs related to increased physiological demand to promote healing as evidenced by COPD, CHF, resp failure. Pt eating well.      Recommendations: Continue VitC, thiamine, [id number]. Rec MVI    Monitoring and Evaluation:   [x ] PO intake [x ] Tolerance to diet prescription [X] Weights  [X] Follow up per protocol [X] Labs:

## 2020-12-25 LAB
ANION GAP SERPL CALC-SCNC: 12 MMOL/L — SIGNIFICANT CHANGE UP (ref 5–17)
BUN SERPL-MCNC: 103 MG/DL — HIGH (ref 8–20)
CALCIUM SERPL-MCNC: 8.1 MG/DL — LOW (ref 8.6–10.2)
CHLORIDE SERPL-SCNC: 93 MMOL/L — LOW (ref 98–107)
CO2 SERPL-SCNC: 30 MMOL/L — HIGH (ref 22–29)
CREAT SERPL-MCNC: 1.55 MG/DL — HIGH (ref 0.5–1.3)
GLUCOSE BLDC GLUCOMTR-MCNC: 140 MG/DL — HIGH (ref 70–99)
GLUCOSE BLDC GLUCOMTR-MCNC: 227 MG/DL — HIGH (ref 70–99)
GLUCOSE BLDC GLUCOMTR-MCNC: 232 MG/DL — HIGH (ref 70–99)
GLUCOSE BLDC GLUCOMTR-MCNC: 264 MG/DL — HIGH (ref 70–99)
GLUCOSE SERPL-MCNC: 132 MG/DL — HIGH (ref 70–99)
HCT VFR BLD CALC: 30.5 % — LOW (ref 39–50)
HGB BLD-MCNC: 10.1 G/DL — LOW (ref 13–17)
MAGNESIUM SERPL-MCNC: 1.6 MG/DL — SIGNIFICANT CHANGE UP (ref 1.6–2.6)
MCHC RBC-ENTMCNC: 33.1 GM/DL — SIGNIFICANT CHANGE UP (ref 32–36)
MCHC RBC-ENTMCNC: 35.2 PG — HIGH (ref 27–34)
MCV RBC AUTO: 106.3 FL — HIGH (ref 80–100)
PLATELET # BLD AUTO: 178 K/UL — SIGNIFICANT CHANGE UP (ref 150–400)
POTASSIUM SERPL-MCNC: 4.1 MMOL/L — SIGNIFICANT CHANGE UP (ref 3.5–5.3)
POTASSIUM SERPL-SCNC: 4.1 MMOL/L — SIGNIFICANT CHANGE UP (ref 3.5–5.3)
RBC # BLD: 2.87 M/UL — LOW (ref 4.2–5.8)
RBC # FLD: 18.5 % — HIGH (ref 10.3–14.5)
SODIUM SERPL-SCNC: 135 MMOL/L — SIGNIFICANT CHANGE UP (ref 135–145)
WBC # BLD: 11.46 K/UL — HIGH (ref 3.8–10.5)
WBC # FLD AUTO: 11.46 K/UL — HIGH (ref 3.8–10.5)

## 2020-12-25 PROCEDURE — 99232 SBSQ HOSP IP/OBS MODERATE 35: CPT

## 2020-12-25 RX ORDER — TEMAZEPAM 15 MG/1
30 CAPSULE ORAL AT BEDTIME
Refills: 0 | Status: DISCONTINUED | OUTPATIENT
Start: 2020-12-25 | End: 2020-12-27

## 2020-12-25 RX ORDER — ZOLPIDEM TARTRATE 10 MG/1
5 TABLET ORAL ONCE
Refills: 0 | Status: DISCONTINUED | OUTPATIENT
Start: 2020-12-25 | End: 2020-12-26

## 2020-12-25 RX ADMIN — Medication 81 MILLIGRAM(S): at 11:27

## 2020-12-25 RX ADMIN — INSULIN GLARGINE 10 UNIT(S): 100 INJECTION, SOLUTION SUBCUTANEOUS at 09:55

## 2020-12-25 RX ADMIN — ZINC SULFATE TAB 220 MG (50 MG ZINC EQUIVALENT) 220 MILLIGRAM(S): 220 (50 ZN) TAB at 11:27

## 2020-12-25 RX ADMIN — Medication 60 MILLIGRAM(S): at 17:28

## 2020-12-25 RX ADMIN — Medication 100 MILLIGRAM(S): at 11:27

## 2020-12-25 RX ADMIN — Medication 3 MILLILITER(S): at 15:01

## 2020-12-25 RX ADMIN — CARVEDILOL PHOSPHATE 25 MILLIGRAM(S): 80 CAPSULE, EXTENDED RELEASE ORAL at 17:29

## 2020-12-25 RX ADMIN — Medication 325 MILLIGRAM(S): at 11:28

## 2020-12-25 RX ADMIN — INSULIN GLARGINE 10 UNIT(S): 100 INJECTION, SOLUTION SUBCUTANEOUS at 21:13

## 2020-12-25 RX ADMIN — ERYTHROPOIETIN 20000 UNIT(S): 10000 INJECTION, SOLUTION INTRAVENOUS; SUBCUTANEOUS at 17:21

## 2020-12-25 RX ADMIN — TAMSULOSIN HYDROCHLORIDE 0.4 MILLIGRAM(S): 0.4 CAPSULE ORAL at 21:12

## 2020-12-25 RX ADMIN — Medication 3 MILLILITER(S): at 03:42

## 2020-12-25 RX ADMIN — ATORVASTATIN CALCIUM 20 MILLIGRAM(S): 80 TABLET, FILM COATED ORAL at 21:12

## 2020-12-25 RX ADMIN — APIXABAN 2.5 MILLIGRAM(S): 2.5 TABLET, FILM COATED ORAL at 17:28

## 2020-12-25 RX ADMIN — Medication 4: at 17:30

## 2020-12-25 RX ADMIN — Medication 6: at 11:32

## 2020-12-25 RX ADMIN — Medication 3 UNIT(S): at 09:29

## 2020-12-25 RX ADMIN — Medication 3 UNIT(S): at 17:31

## 2020-12-25 RX ADMIN — APIXABAN 2.5 MILLIGRAM(S): 2.5 TABLET, FILM COATED ORAL at 05:33

## 2020-12-25 RX ADMIN — Medication 3 MILLILITER(S): at 08:22

## 2020-12-25 RX ADMIN — PANTOPRAZOLE SODIUM 40 MILLIGRAM(S): 20 TABLET, DELAYED RELEASE ORAL at 05:35

## 2020-12-25 RX ADMIN — Medication 3 MILLILITER(S): at 20:23

## 2020-12-25 RX ADMIN — Medication 1 MILLIGRAM(S): at 11:28

## 2020-12-25 RX ADMIN — Medication 3 UNIT(S): at 11:31

## 2020-12-25 RX ADMIN — AMLODIPINE BESYLATE 10 MILLIGRAM(S): 2.5 TABLET ORAL at 05:33

## 2020-12-25 RX ADMIN — Medication 60 MILLIGRAM(S): at 05:34

## 2020-12-25 RX ADMIN — Medication 500 MILLIGRAM(S): at 11:27

## 2020-12-25 RX ADMIN — PREGABALIN 1000 MICROGRAM(S): 225 CAPSULE ORAL at 11:28

## 2020-12-25 RX ADMIN — CARVEDILOL PHOSPHATE 25 MILLIGRAM(S): 80 CAPSULE, EXTENDED RELEASE ORAL at 05:38

## 2020-12-25 RX ADMIN — Medication 40 MILLIGRAM(S): at 05:37

## 2020-12-25 NOTE — PROGRESS NOTE ADULT - SUBJECTIVE AND OBJECTIVE BOX
Patient is a 70y old  Male who presents with a chief complaint of CHF exacerbation (24 Dec 2020 19:49)    Pt seen and exam  No acute issues  Breathing well.    SUBJECTIVE / OVERNIGHT EVENTS:  REVIEW OF SYSTEMS: All systems are reviewed and found to be negative except above    MEDICATIONS  (STANDING):  albuterol/ipratropium for Nebulization 3 milliLiter(s) Nebulizer every 6 hours  albuterol/ipratropium for Nebulization. 3 milliLiter(s) Nebulizer once  allopurinol 100 milliGRAM(s) Oral daily  amLODIPine   Tablet 10 milliGRAM(s) Oral daily  apixaban 2.5 milliGRAM(s) Oral every 12 hours  ascorbic acid 500 milliGRAM(s) Oral daily  aspirin  chewable 81 milliGRAM(s) Oral daily  atorvastatin 20 milliGRAM(s) Oral at bedtime  carvedilol 25 milliGRAM(s) Oral every 12 hours  cyanocobalamin 1000 MICROGram(s) Oral daily  dextrose 40% Gel 15 Gram(s) Oral once  dextrose 5%. 1000 milliLiter(s) (50 mL/Hr) IV Continuous <Continuous>  dextrose 5%. 1000 milliLiter(s) (100 mL/Hr) IV Continuous <Continuous>  dextrose 50% Injectable 25 Gram(s) IV Push once  dextrose 50% Injectable 12.5 Gram(s) IV Push once  dextrose 50% Injectable 25 Gram(s) IV Push once  epoetin felisha-epbx (RETACRIT) Injectable 80961 Unit(s) SubCutaneous every 7 days  ferrous    sulfate 325 milliGRAM(s) Oral daily  folic acid 1 milliGRAM(s) Oral daily  furosemide    Tablet 60 milliGRAM(s) Oral every 12 hours  glucagon  Injectable 1 milliGRAM(s) IntraMuscular once  insulin glargine Injectable (LANTUS) 10 Unit(s) SubCutaneous two times a day  insulin lispro (ADMELOG) corrective regimen sliding scale   SubCutaneous three times a day before meals  insulin lispro Injectable (ADMELOG) 3 Unit(s) SubCutaneous three times a day before meals  pantoprazole    Tablet 40 milliGRAM(s) Oral before breakfast  predniSONE   Tablet 40 milliGRAM(s) Oral daily  senna 2 Tablet(s) Oral at bedtime  tamsulosin 0.4 milliGRAM(s) Oral at bedtime  thiamine 100 milliGRAM(s) Oral daily  zinc sulfate 220 milliGRAM(s) Oral daily    MEDICATIONS  (PRN):  acetaminophen   Tablet .. 650 milliGRAM(s) Oral every 6 hours PRN Temp greater or equal to 38C (100.4F), Mild Pain (1 - 3)      CAPILLARY BLOOD GLUCOSE      POCT Blood Glucose.: 264 mg/dL (25 Dec 2020 11:30)  POCT Blood Glucose.: 140 mg/dL (25 Dec 2020 09:16)  POCT Blood Glucose.: 197 mg/dL (24 Dec 2020 22:17)  POCT Blood Glucose.: 253 mg/dL (24 Dec 2020 16:55)    I&O's Summary    24 Dec 2020 07:01  -  25 Dec 2020 07:00  --------------------------------------------------------  IN: 0 mL / OUT: 900 mL / NET: -900 mL        PHYSICAL EXAM:  Vital Signs Last 24 Hrs  T(C): 36.3 (25 Dec 2020 11:25), Max: 36.7 (24 Dec 2020 20:57)  T(F): 97.3 (25 Dec 2020 11:25), Max: 98 (24 Dec 2020 20:57)  HR: 62 (25 Dec 2020 11:25) (62 - 88)  BP: 124/70 (25 Dec 2020 11:25) (124/70 - 159/78)  BP(mean): --  RR: 18 (25 Dec 2020 11:25) (18 - 20)  SpO2: 95% (25 Dec 2020 11:25) (95% - 100%)    CONSTITUTIONAL: NAD  RESPIRATORY: Normal respiratory effort; lungs are clear to auscultation bilaterally  CARDIOVASCULAR: Regular rate and rhythm, normal S1 and S2, no murmur   EXTS: No lower extremity edema; Peripheral pulses are 2+ bilaterally  ABDOMEN: Nontender to palpation, normoactive bowel sounds, no rebound/guarding;   MUSCLOSKELETAL:   no clubbing or cyanosis of digits; no joint swelling or tenderness to palpation  PSYCH: affect appropriate  NEUROLOGY: A+O to person, place, and time; CN 2-12 are intact and symmetric; no gross sensory deficits;       LABS:                        10.1   11.46 )-----------( 178      ( 25 Dec 2020 09:20 )             30.5     12-25    135  |  93<L>  |  103.0<H>  ----------------------------<  132<H>  4.1   |  30.0<H>  |  1.55<H>    Ca    8.1<L>      25 Dec 2020 09:20  Mg     1.6     12-25                  RADIOLOGY & ADDITIONAL TESTS:  Results Reviewed:   Imaging Personally Reviewed:  Electrocardiogram Personally Reviewed:    COORDINATION OF CARE:  Care Discussed with Consultants/Other Providers [Y/N]:  Prior or Outpatient Records Reviewed [Y/N]:

## 2020-12-25 NOTE — PROGRESS NOTE ADULT - ASSESSMENT
69 y/o M w/ a PMH of AFib (on eliquis), HFpEF (follows with Dr. Zia Storm; last known EF 60-65% on 11/17), COPD (on 2L home O2), CABG 2013, and bladder CA (s/p TURP 2013) presented to [facility]  w/ AMS x1 day w/ associated lethargy.  Pt also reports SOB x2 months that has acutely worsened the past few days.  Pt also reports fatigue, weight gain, inability to sleep flat.  Pt was noted to have evidence of fluid overload on exam, pBNP was 11K and CXR showed small pleural effusions consistent w/ CHF exacerbation  ABG also showed pt had hypoxemia and hypercapnia Pt was placed on BIPAP and mentation improved.  Pt will be admitted to tele/pulse ox for acute hypoxic hypercapnic respiratory failure. COVID -ve    Acute hypoxemic hypercapnic respiratory failure   - Likely multifactorial 2/2 Acute on chronic diastolic CHF and COPD exacerbation  -off bipap now.on 2- 3 l Oxygen. titrate down AS TOLERATE. Pt is tolerating well.  - change to po steroid   - Last known EF 60-65% on 11/17  - CXR shows b/l pleural effusions and pBNP 11,000 on admission  - Lasix 60mg po bid per cardiology  - noct NIV, f/u pulm out pt  - pt does not want to go for cath now. pt will decide and plan for out pt cath per cardiology.    Acute metabolic encephalopathy likely 2/2 CO2 narcosis   - improved. pt  is aaox3 now  - Mentation back to baseline     AFib, rate controlled  - c/w Eliquis 2.5mg q12h    LLE chronic wound   - Xray L-tib/fib and L-foot: no evidence of bony destruction  appears like a healed skin graft     GERALD on CKD 3 likely prerenal  - Cr stable  -f/u renal out pt    Anemia likely of chronic dx  - H/H at baseline and pt hemodynamically stable  - Monitor CBC daily   - Transfuse if Hb <7-8  - c/w epo and venofer added per renal    Hyperglycemia  -due to solumedrol  -HbA1c 4.7  -ISS added    DVT ppx: on Eliquis  PT Edith     Spoke with wife. Agreeable with plan  charisse martínez    Dispo: dc home w/home care. waiting for trilogy     care of plan charisse pt -he agreed    dw rn

## 2020-12-26 LAB
ANION GAP SERPL CALC-SCNC: 13 MMOL/L — SIGNIFICANT CHANGE UP (ref 5–17)
BUN SERPL-MCNC: 104 MG/DL — HIGH (ref 8–20)
CALCIUM SERPL-MCNC: 8 MG/DL — LOW (ref 8.6–10.2)
CHLORIDE SERPL-SCNC: 93 MMOL/L — LOW (ref 98–107)
CO2 SERPL-SCNC: 34 MMOL/L — HIGH (ref 22–29)
CREAT SERPL-MCNC: 1.67 MG/DL — HIGH (ref 0.5–1.3)
GLUCOSE BLDC GLUCOMTR-MCNC: 108 MG/DL — HIGH (ref 70–99)
GLUCOSE BLDC GLUCOMTR-MCNC: 209 MG/DL — HIGH (ref 70–99)
GLUCOSE BLDC GLUCOMTR-MCNC: 247 MG/DL — HIGH (ref 70–99)
GLUCOSE BLDC GLUCOMTR-MCNC: 281 MG/DL — HIGH (ref 70–99)
GLUCOSE SERPL-MCNC: 121 MG/DL — HIGH (ref 70–99)
MAGNESIUM SERPL-MCNC: 1.6 MG/DL — SIGNIFICANT CHANGE UP (ref 1.6–2.6)
POTASSIUM SERPL-MCNC: 3.8 MMOL/L — SIGNIFICANT CHANGE UP (ref 3.5–5.3)
POTASSIUM SERPL-SCNC: 3.8 MMOL/L — SIGNIFICANT CHANGE UP (ref 3.5–5.3)
SODIUM SERPL-SCNC: 140 MMOL/L — SIGNIFICANT CHANGE UP (ref 135–145)

## 2020-12-26 PROCEDURE — 99232 SBSQ HOSP IP/OBS MODERATE 35: CPT

## 2020-12-26 RX ORDER — ZOLPIDEM TARTRATE 10 MG/1
5 TABLET ORAL AT BEDTIME
Refills: 0 | Status: DISCONTINUED | OUTPATIENT
Start: 2020-12-26 | End: 2020-12-27

## 2020-12-26 RX ORDER — ZOLPIDEM TARTRATE 10 MG/1
5 TABLET ORAL AT BEDTIME
Refills: 0 | Status: DISCONTINUED | OUTPATIENT
Start: 2020-12-26 | End: 2020-12-26

## 2020-12-26 RX ORDER — INSULIN LISPRO 100/ML
5 VIAL (ML) SUBCUTANEOUS
Refills: 0 | Status: DISCONTINUED | OUTPATIENT
Start: 2020-12-26 | End: 2020-12-27

## 2020-12-26 RX ADMIN — Medication 3 MILLILITER(S): at 20:34

## 2020-12-26 RX ADMIN — Medication 325 MILLIGRAM(S): at 12:04

## 2020-12-26 RX ADMIN — Medication 4: at 18:01

## 2020-12-26 RX ADMIN — CARVEDILOL PHOSPHATE 25 MILLIGRAM(S): 80 CAPSULE, EXTENDED RELEASE ORAL at 06:51

## 2020-12-26 RX ADMIN — INSULIN GLARGINE 10 UNIT(S): 100 INJECTION, SOLUTION SUBCUTANEOUS at 21:48

## 2020-12-26 RX ADMIN — PREGABALIN 1000 MICROGRAM(S): 225 CAPSULE ORAL at 12:04

## 2020-12-26 RX ADMIN — Medication 100 MILLIGRAM(S): at 12:04

## 2020-12-26 RX ADMIN — Medication 3 UNIT(S): at 12:46

## 2020-12-26 RX ADMIN — ZOLPIDEM TARTRATE 5 MILLIGRAM(S): 10 TABLET ORAL at 23:48

## 2020-12-26 RX ADMIN — APIXABAN 2.5 MILLIGRAM(S): 2.5 TABLET, FILM COATED ORAL at 06:52

## 2020-12-26 RX ADMIN — AMLODIPINE BESYLATE 10 MILLIGRAM(S): 2.5 TABLET ORAL at 06:52

## 2020-12-26 RX ADMIN — Medication 3 MILLILITER(S): at 14:31

## 2020-12-26 RX ADMIN — Medication 60 MILLIGRAM(S): at 06:54

## 2020-12-26 RX ADMIN — APIXABAN 2.5 MILLIGRAM(S): 2.5 TABLET, FILM COATED ORAL at 17:15

## 2020-12-26 RX ADMIN — Medication 60 MILLIGRAM(S): at 17:16

## 2020-12-26 RX ADMIN — Medication 40 MILLIGRAM(S): at 06:52

## 2020-12-26 RX ADMIN — CARVEDILOL PHOSPHATE 25 MILLIGRAM(S): 80 CAPSULE, EXTENDED RELEASE ORAL at 17:15

## 2020-12-26 RX ADMIN — PANTOPRAZOLE SODIUM 40 MILLIGRAM(S): 20 TABLET, DELAYED RELEASE ORAL at 06:53

## 2020-12-26 RX ADMIN — Medication 5 UNIT(S): at 18:01

## 2020-12-26 RX ADMIN — Medication 3 UNIT(S): at 09:23

## 2020-12-26 RX ADMIN — Medication 81 MILLIGRAM(S): at 12:04

## 2020-12-26 RX ADMIN — INSULIN GLARGINE 10 UNIT(S): 100 INJECTION, SOLUTION SUBCUTANEOUS at 09:22

## 2020-12-26 RX ADMIN — ATORVASTATIN CALCIUM 20 MILLIGRAM(S): 80 TABLET, FILM COATED ORAL at 21:48

## 2020-12-26 RX ADMIN — Medication 3 MILLILITER(S): at 02:39

## 2020-12-26 RX ADMIN — ZOLPIDEM TARTRATE 5 MILLIGRAM(S): 10 TABLET ORAL at 00:07

## 2020-12-26 RX ADMIN — Medication 3 MILLILITER(S): at 08:30

## 2020-12-26 RX ADMIN — Medication 1 MILLIGRAM(S): at 12:04

## 2020-12-26 RX ADMIN — TAMSULOSIN HYDROCHLORIDE 0.4 MILLIGRAM(S): 0.4 CAPSULE ORAL at 21:48

## 2020-12-26 RX ADMIN — Medication 4: at 12:46

## 2020-12-26 RX ADMIN — ZINC SULFATE TAB 220 MG (50 MG ZINC EQUIVALENT) 220 MILLIGRAM(S): 220 (50 ZN) TAB at 12:04

## 2020-12-26 RX ADMIN — Medication 500 MILLIGRAM(S): at 12:04

## 2020-12-26 NOTE — PROGRESS NOTE ADULT - SUBJECTIVE AND OBJECTIVE BOX
CC: CHF exacerbation (26 Dec 2020 07:59)    INTERVAL HPI/OVERNIGHT EVENTS:  no acute events overnight  patient awaiting trilogy  otherwise feels well    Vital Signs Last 24 Hrs  T(C): 36.4 (26 Dec 2020 09:35), Max: 36.5 (25 Dec 2020 21:18)  T(F): 97.6 (26 Dec 2020 09:35), Max: 97.7 (25 Dec 2020 21:18)  HR: 66 (26 Dec 2020 09:35) (61 - 84)  BP: 131/63 (26 Dec 2020 09:35) (131/63 - 155/68)  BP(mean): --  RR: 18 (26 Dec 2020 09:35) (18 - 18)  SpO2: 94% (26 Dec 2020 09:35) (94% - 100%)    I&O's Detail                        10.1   11.46 )-----------( 178      ( 25 Dec 2020 09:20 )             30.5     26 Dec 2020 10:36    140    |  93     |  104.0  ----------------------------<  121    3.8     |  34.0   |  1.67     Ca    8.0        26 Dec 2020 10:36  Mg     1.6       26 Dec 2020 10:36    CAPILLARY BLOOD GLUCOSE    POCT Blood Glucose.: 247 mg/dL (26 Dec 2020 12:45)  POCT Blood Glucose.: 108 mg/dL (26 Dec 2020 08:47)  POCT Blood Glucose.: 232 mg/dL (25 Dec 2020 21:12)  POCT Blood Glucose.: 227 mg/dL (25 Dec 2020 17:25)    MEDICATIONS  (STANDING):  albuterol/ipratropium for Nebulization 3 milliLiter(s) Nebulizer every 6 hours  albuterol/ipratropium for Nebulization. 3 milliLiter(s) Nebulizer once  allopurinol 100 milliGRAM(s) Oral daily  amLODIPine   Tablet 10 milliGRAM(s) Oral daily  apixaban 2.5 milliGRAM(s) Oral every 12 hours  ascorbic acid 500 milliGRAM(s) Oral daily  aspirin  chewable 81 milliGRAM(s) Oral daily  atorvastatin 20 milliGRAM(s) Oral at bedtime  carvedilol 25 milliGRAM(s) Oral every 12 hours  cyanocobalamin 1000 MICROGram(s) Oral daily  dextrose 40% Gel 15 Gram(s) Oral once  dextrose 5%. 1000 milliLiter(s) (50 mL/Hr) IV Continuous <Continuous>  dextrose 5%. 1000 milliLiter(s) (100 mL/Hr) IV Continuous <Continuous>  dextrose 50% Injectable 25 Gram(s) IV Push once  dextrose 50% Injectable 12.5 Gram(s) IV Push once  dextrose 50% Injectable 25 Gram(s) IV Push once  epoetin felisha-epbx (RETACRIT) Injectable 01692 Unit(s) SubCutaneous every 7 days  ferrous    sulfate 325 milliGRAM(s) Oral daily  folic acid 1 milliGRAM(s) Oral daily  furosemide    Tablet 60 milliGRAM(s) Oral every 12 hours  glucagon  Injectable 1 milliGRAM(s) IntraMuscular once  insulin glargine Injectable (LANTUS) 10 Unit(s) SubCutaneous two times a day  insulin lispro (ADMELOG) corrective regimen sliding scale   SubCutaneous three times a day before meals  insulin lispro Injectable (ADMELOG) 3 Unit(s) SubCutaneous three times a day before meals  pantoprazole    Tablet 40 milliGRAM(s) Oral before breakfast  predniSONE   Tablet 40 milliGRAM(s) Oral daily  senna 2 Tablet(s) Oral at bedtime  tamsulosin 0.4 milliGRAM(s) Oral at bedtime  thiamine 100 milliGRAM(s) Oral daily  zinc sulfate 220 milliGRAM(s) Oral daily    MEDICATIONS  (PRN):  acetaminophen   Tablet .. 650 milliGRAM(s) Oral every 6 hours PRN Temp greater or equal to 38C (100.4F), Mild Pain (1 - 3)  temazepam 30 milliGRAM(s) Oral at bedtime PRN Insomnia      RADIOLOGY & ADDITIONAL TESTS:

## 2020-12-26 NOTE — PROGRESS NOTE ADULT - SUBJECTIVE AND OBJECTIVE BOX
NEPHROLOGY INTERVAL HPI/OVERNIGHT EVENTS:    Interim noted   Feeling better   Remains on steroids     MEDICATIONS  (STANDING):  albuterol/ipratropium for Nebulization 3 milliLiter(s) Nebulizer every 6 hours  albuterol/ipratropium for Nebulization. 3 milliLiter(s) Nebulizer once  allopurinol 100 milliGRAM(s) Oral daily  amLODIPine   Tablet 10 milliGRAM(s) Oral daily  apixaban 2.5 milliGRAM(s) Oral every 12 hours  ascorbic acid 500 milliGRAM(s) Oral daily  aspirin  chewable 81 milliGRAM(s) Oral daily  atorvastatin 20 milliGRAM(s) Oral at bedtime  carvedilol 25 milliGRAM(s) Oral every 12 hours  cyanocobalamin 1000 MICROGram(s) Oral daily  dextrose 40% Gel 15 Gram(s) Oral once  dextrose 5%. 1000 milliLiter(s) (50 mL/Hr) IV Continuous <Continuous>  dextrose 5%. 1000 milliLiter(s) (100 mL/Hr) IV Continuous <Continuous>  dextrose 50% Injectable 25 Gram(s) IV Push once  dextrose 50% Injectable 12.5 Gram(s) IV Push once  dextrose 50% Injectable 25 Gram(s) IV Push once  epoetin felisha-epbx (RETACRIT) Injectable 96656 Unit(s) SubCutaneous every 7 days  ferrous    sulfate 325 milliGRAM(s) Oral daily  folic acid 1 milliGRAM(s) Oral daily  furosemide    Tablet 60 milliGRAM(s) Oral every 12 hours  glucagon  Injectable 1 milliGRAM(s) IntraMuscular once  insulin glargine Injectable (LANTUS) 10 Unit(s) SubCutaneous two times a day  insulin lispro (ADMELOG) corrective regimen sliding scale   SubCutaneous three times a day before meals  insulin lispro Injectable (ADMELOG) 3 Unit(s) SubCutaneous three times a day before meals  pantoprazole    Tablet 40 milliGRAM(s) Oral before breakfast  predniSONE   Tablet 40 milliGRAM(s) Oral daily  senna 2 Tablet(s) Oral at bedtime  tamsulosin 0.4 milliGRAM(s) Oral at bedtime  thiamine 100 milliGRAM(s) Oral daily  zinc sulfate 220 milliGRAM(s) Oral daily    MEDICATIONS  (PRN):  acetaminophen   Tablet .. 650 milliGRAM(s) Oral every 6 hours PRN Temp greater or equal to 38C (100.4F), Mild Pain (1 - 3)  temazepam 30 milliGRAM(s) Oral at bedtime PRN Insomnia      Allergies    No Known Allergies    Intolerances      Vital Signs Last 24 Hrs  T(C): 36.1 (26 Dec 2020 04:27), Max: 36.5 (25 Dec 2020 21:18)  T(F): 97 (26 Dec 2020 04:27), Max: 97.7 (25 Dec 2020 21:18)  HR: 69 (26 Dec 2020 04:27) (61 - 84)  BP: 155/68 (26 Dec 2020 04:27) (124/70 - 155/68)  BP(mean): --  RR: 18 (26 Dec 2020 04:27) (18 - 18)  SpO2: 100% (26 Dec 2020 04:27) (94% - 100%)  Daily     Daily   I&O's Detail    I&O's Summary  PHYSICAL EXAM:  GENERAL: Weak, deconditioned  NECK: Supple, + JVD  NERVOUS SYSTEM:  Alert & Oriented X3, No asterixis   CHEST/LUNG: Clear, diminished bilaterally  HEART: Regular rate and rhythm; No rub   ABDOMEN: Soft, Nontender, Nondistended; +BS  EXTREMITIES:  +LE edema improved    LABS:                        10.1   11.46 )-----------( 178      ( 25 Dec 2020 09:20 )             30.5     12-25    135  |  93<L>  |  103.0<H>  ----------------------------<  132<H>  4.1   |  30.0<H>  |  1.55<H>    Ca    8.1<L>      25 Dec 2020 09:20  Mg     1.6     12-25          Magnesium, Serum: 1.6 mg/dL (12-25 @ 09:20)          RADIOLOGY & ADDITIONAL TESTS:

## 2020-12-26 NOTE — PROGRESS NOTE ADULT - PROVIDER SPECIALTY LIST ADULT
Cardiology
Cardiology
Hospitalist
Hospitalist
Nephrology
Pulmonology
Hospitalist
Nephrology
Nephrology
Pulmonology
Cardiology
Hospitalist
Nephrology
Pulmonology
Cardiology
Hospitalist
Nephrology
Pulmonology
Cardiology
Nephrology
Pulmonology

## 2020-12-26 NOTE — PROGRESS NOTE ADULT - ASSESSMENT
71 y/o M w/ a PMH of AFib (on eliquis), HFpEF (follows with Dr. Zia Storm; last known EF 60-65% on 11/17), COPD (on 2L home O2), CABG 2013, and bladder CA (s/p TURP 2013) presented to [facility]  w/ AMS x1 day w/ associated lethargy.  Pt also reports SOB x2 months that has acutely worsened the past few days.  Pt also reports fatigue, weight gain, inability to sleep flat.  Pt was noted to have evidence of fluid overload on exam, pBNP was 11K and CXR showed small pleural effusions consistent w/ CHF exacerbation  ABG also showed pt had hypoxemia and hypercapnia Pt was placed on BIPAP and mentation improved.  Pt will be admitted to tele/pulse ox for acute hypoxic hypercapnic respiratory failure. COVID -ve    #Acute hypoxemic hypercapnic respiratory failure   - Likely multifactorial 2/2 Acute on chronic diastolic CHF and COPD exacerbation  - off bipap now on 2- 3 l Oxygen. - trilogy for now  - now on change to po steroid   - Last known EF 60-65% on 11/17  - CXR shows b/l pleural effusions and pBNP 11,000 on admission  - Lasix 60mg po bid per cardiology  - pt does not want to go for cath now - will decide and plan for out pt cath with cardiologist he decides to follow up with     #Acute metabolic encephalopathy likely 2/2 CO2 narcosis   - improved. pt  is aaox3 now  - Mentation back to baseline     #AFib, rate controlled  - c/w Eliquis 2.5mg q12h    #LLE chronic wound   - Xray L-tib/fib and L-foot: no evidence of bony destruction  appears like a healed skin graft     #GERALD on CKD 3 likely prerenal  - Cr stable  -f/u renal out pt    #Anemia likely of chronic dx  - H/H at baseline and pt hemodynamically stable  - Monitor CBC daily   - Transfuse if Hb <7-8  - c/w epo and venofer added per renal    #Hyperglycemia  - due to solumedrol  - HbA1c 4.7  - ISS added  - can increase premeal while here  - should improve as steroids taper    DVT ppx: on Eliquis    Dispo: dc home w/home care. waiting for trilogy

## 2020-12-27 VITALS
SYSTOLIC BLOOD PRESSURE: 119 MMHG | TEMPERATURE: 98 F | HEART RATE: 56 BPM | RESPIRATION RATE: 18 BRPM | DIASTOLIC BLOOD PRESSURE: 63 MMHG | OXYGEN SATURATION: 99 %

## 2020-12-27 LAB
GAS PNL BLDA: SIGNIFICANT CHANGE UP
GLUCOSE BLDC GLUCOMTR-MCNC: 172 MG/DL — HIGH (ref 70–99)
GLUCOSE BLDC GLUCOMTR-MCNC: 80 MG/DL — SIGNIFICANT CHANGE UP (ref 70–99)

## 2020-12-27 PROCEDURE — 87631 RESP VIRUS 3-5 TARGETS: CPT

## 2020-12-27 PROCEDURE — 82947 ASSAY GLUCOSE BLOOD QUANT: CPT

## 2020-12-27 PROCEDURE — 36415 COLL VENOUS BLD VENIPUNCTURE: CPT

## 2020-12-27 PROCEDURE — 85610 PROTHROMBIN TIME: CPT

## 2020-12-27 PROCEDURE — 84443 ASSAY THYROID STIM HORMONE: CPT

## 2020-12-27 PROCEDURE — 85730 THROMBOPLASTIN TIME PARTIAL: CPT

## 2020-12-27 PROCEDURE — 86140 C-REACTIVE PROTEIN: CPT

## 2020-12-27 PROCEDURE — 83880 ASSAY OF NATRIURETIC PEPTIDE: CPT

## 2020-12-27 PROCEDURE — 96375 TX/PRO/DX INJ NEW DRUG ADDON: CPT

## 2020-12-27 PROCEDURE — 94640 AIRWAY INHALATION TREATMENT: CPT

## 2020-12-27 PROCEDURE — 80048 BASIC METABOLIC PNL TOTAL CA: CPT

## 2020-12-27 PROCEDURE — 82728 ASSAY OF FERRITIN: CPT

## 2020-12-27 PROCEDURE — 83540 ASSAY OF IRON: CPT

## 2020-12-27 PROCEDURE — 73620 X-RAY EXAM OF FOOT: CPT

## 2020-12-27 PROCEDURE — 86769 SARS-COV-2 COVID-19 ANTIBODY: CPT

## 2020-12-27 PROCEDURE — 82962 GLUCOSE BLOOD TEST: CPT

## 2020-12-27 PROCEDURE — 93005 ELECTROCARDIOGRAM TRACING: CPT

## 2020-12-27 PROCEDURE — 83605 ASSAY OF LACTIC ACID: CPT

## 2020-12-27 PROCEDURE — 71045 X-RAY EXAM CHEST 1 VIEW: CPT

## 2020-12-27 PROCEDURE — 84295 ASSAY OF SERUM SODIUM: CPT

## 2020-12-27 PROCEDURE — 99291 CRITICAL CARE FIRST HOUR: CPT | Mod: 25

## 2020-12-27 PROCEDURE — 82803 BLOOD GASES ANY COMBINATION: CPT

## 2020-12-27 PROCEDURE — 73590 X-RAY EXAM OF LOWER LEG: CPT

## 2020-12-27 PROCEDURE — 97530 THERAPEUTIC ACTIVITIES: CPT

## 2020-12-27 PROCEDURE — 84484 ASSAY OF TROPONIN QUANT: CPT

## 2020-12-27 PROCEDURE — 84481 FREE ASSAY (FT-3): CPT

## 2020-12-27 PROCEDURE — 84439 ASSAY OF FREE THYROXINE: CPT

## 2020-12-27 PROCEDURE — 96374 THER/PROPH/DIAG INJ IV PUSH: CPT

## 2020-12-27 PROCEDURE — 97116 GAIT TRAINING THERAPY: CPT

## 2020-12-27 PROCEDURE — 80053 COMPREHEN METABOLIC PANEL: CPT

## 2020-12-27 PROCEDURE — 83550 IRON BINDING TEST: CPT

## 2020-12-27 PROCEDURE — 93970 EXTREMITY STUDY: CPT

## 2020-12-27 PROCEDURE — 82435 ASSAY OF BLOOD CHLORIDE: CPT

## 2020-12-27 PROCEDURE — 84466 ASSAY OF TRANSFERRIN: CPT

## 2020-12-27 PROCEDURE — 84100 ASSAY OF PHOSPHORUS: CPT

## 2020-12-27 PROCEDURE — 85018 HEMOGLOBIN: CPT

## 2020-12-27 PROCEDURE — 36600 WITHDRAWAL OF ARTERIAL BLOOD: CPT

## 2020-12-27 PROCEDURE — 94660 CPAP INITIATION&MGMT: CPT

## 2020-12-27 PROCEDURE — 94760 N-INVAS EAR/PLS OXIMETRY 1: CPT

## 2020-12-27 PROCEDURE — 84132 ASSAY OF SERUM POTASSIUM: CPT

## 2020-12-27 PROCEDURE — 99239 HOSP IP/OBS DSCHRG MGMT >30: CPT

## 2020-12-27 PROCEDURE — 83036 HEMOGLOBIN GLYCOSYLATED A1C: CPT

## 2020-12-27 PROCEDURE — 82330 ASSAY OF CALCIUM: CPT

## 2020-12-27 PROCEDURE — U0003: CPT

## 2020-12-27 PROCEDURE — 85652 RBC SED RATE AUTOMATED: CPT

## 2020-12-27 PROCEDURE — 76775 US EXAM ABDO BACK WALL LIM: CPT

## 2020-12-27 PROCEDURE — 85049 AUTOMATED PLATELET COUNT: CPT

## 2020-12-27 PROCEDURE — 85027 COMPLETE CBC AUTOMATED: CPT

## 2020-12-27 PROCEDURE — 85025 COMPLETE CBC W/AUTO DIFF WBC: CPT

## 2020-12-27 PROCEDURE — 85014 HEMATOCRIT: CPT

## 2020-12-27 PROCEDURE — 83735 ASSAY OF MAGNESIUM: CPT

## 2020-12-27 RX ORDER — PREGABALIN 225 MG/1
1 CAPSULE ORAL
Qty: 30 | Refills: 0
Start: 2020-12-27 | End: 2021-01-25

## 2020-12-27 RX ORDER — AMLODIPINE BESYLATE 2.5 MG/1
1 TABLET ORAL
Qty: 30 | Refills: 0
Start: 2020-12-27 | End: 2021-01-25

## 2020-12-27 RX ORDER — FUROSEMIDE 40 MG
3 TABLET ORAL
Qty: 180 | Refills: 0
Start: 2020-12-27 | End: 2021-01-25

## 2020-12-27 RX ORDER — FERROUS SULFATE 325(65) MG
1 TABLET ORAL
Qty: 30 | Refills: 0
Start: 2020-12-27 | End: 2021-01-25

## 2020-12-27 RX ORDER — CARVEDILOL PHOSPHATE 80 MG/1
1 CAPSULE, EXTENDED RELEASE ORAL
Qty: 60 | Refills: 0
Start: 2020-12-27 | End: 2021-01-25

## 2020-12-27 RX ADMIN — Medication 3 MILLILITER(S): at 03:32

## 2020-12-27 RX ADMIN — Medication 1 MILLIGRAM(S): at 11:33

## 2020-12-27 RX ADMIN — PANTOPRAZOLE SODIUM 40 MILLIGRAM(S): 20 TABLET, DELAYED RELEASE ORAL at 05:11

## 2020-12-27 RX ADMIN — Medication 100 MILLIGRAM(S): at 11:27

## 2020-12-27 RX ADMIN — APIXABAN 2.5 MILLIGRAM(S): 2.5 TABLET, FILM COATED ORAL at 05:11

## 2020-12-27 RX ADMIN — Medication 60 MILLIGRAM(S): at 05:11

## 2020-12-27 RX ADMIN — Medication 5 UNIT(S): at 14:07

## 2020-12-27 RX ADMIN — Medication 3 MILLILITER(S): at 08:36

## 2020-12-27 RX ADMIN — AMLODIPINE BESYLATE 10 MILLIGRAM(S): 2.5 TABLET ORAL at 05:11

## 2020-12-27 RX ADMIN — Medication 100 MILLIGRAM(S): at 11:33

## 2020-12-27 RX ADMIN — Medication 500 MILLIGRAM(S): at 11:29

## 2020-12-27 RX ADMIN — ZOLPIDEM TARTRATE 5 MILLIGRAM(S): 10 TABLET ORAL at 00:49

## 2020-12-27 RX ADMIN — Medication 81 MILLIGRAM(S): at 11:32

## 2020-12-27 RX ADMIN — CARVEDILOL PHOSPHATE 25 MILLIGRAM(S): 80 CAPSULE, EXTENDED RELEASE ORAL at 05:12

## 2020-12-27 RX ADMIN — PREGABALIN 1000 MICROGRAM(S): 225 CAPSULE ORAL at 11:30

## 2020-12-27 RX ADMIN — Medication 325 MILLIGRAM(S): at 11:32

## 2020-12-27 RX ADMIN — ZINC SULFATE TAB 220 MG (50 MG ZINC EQUIVALENT) 220 MILLIGRAM(S): 220 (50 ZN) TAB at 11:33

## 2020-12-27 RX ADMIN — Medication 40 MILLIGRAM(S): at 05:11

## 2020-12-27 RX ADMIN — Medication 2: at 14:07

## 2020-12-27 RX ADMIN — INSULIN GLARGINE 10 UNIT(S): 100 INJECTION, SOLUTION SUBCUTANEOUS at 08:13

## 2020-12-28 ENCOUNTER — TRANSCRIPTION ENCOUNTER (OUTPATIENT)
Age: 70
End: 2020-12-28

## 2020-12-28 PROCEDURE — 82803 BLOOD GASES ANY COMBINATION: CPT

## 2020-12-28 PROCEDURE — 82570 ASSAY OF URINE CREATININE: CPT

## 2020-12-28 PROCEDURE — 84466 ASSAY OF TRANSFERRIN: CPT

## 2020-12-28 PROCEDURE — 31500 INSERT EMERGENCY AIRWAY: CPT

## 2020-12-28 PROCEDURE — 36430 TRANSFUSION BLD/BLD COMPNT: CPT

## 2020-12-28 PROCEDURE — 87040 BLOOD CULTURE FOR BACTERIA: CPT

## 2020-12-28 PROCEDURE — 84484 ASSAY OF TROPONIN QUANT: CPT

## 2020-12-28 PROCEDURE — 83880 ASSAY OF NATRIURETIC PEPTIDE: CPT

## 2020-12-28 PROCEDURE — 97530 THERAPEUTIC ACTIVITIES: CPT

## 2020-12-28 PROCEDURE — 97116 GAIT TRAINING THERAPY: CPT

## 2020-12-28 PROCEDURE — 87070 CULTURE OTHR SPECIMN AEROBIC: CPT

## 2020-12-28 PROCEDURE — 87205 SMEAR GRAM STAIN: CPT

## 2020-12-28 PROCEDURE — 86900 BLOOD TYPING SEROLOGIC ABO: CPT

## 2020-12-28 PROCEDURE — 97163 PT EVAL HIGH COMPLEX 45 MIN: CPT

## 2020-12-28 PROCEDURE — 83615 LACTATE (LD) (LDH) ENZYME: CPT

## 2020-12-28 PROCEDURE — 85045 AUTOMATED RETICULOCYTE COUNT: CPT

## 2020-12-28 PROCEDURE — 74176 CT ABD & PELVIS W/O CONTRAST: CPT

## 2020-12-28 PROCEDURE — 85730 THROMBOPLASTIN TIME PARTIAL: CPT

## 2020-12-28 PROCEDURE — 94760 N-INVAS EAR/PLS OXIMETRY 1: CPT

## 2020-12-28 PROCEDURE — 36600 WITHDRAWAL OF ARTERIAL BLOOD: CPT

## 2020-12-28 PROCEDURE — 85027 COMPLETE CBC AUTOMATED: CPT

## 2020-12-28 PROCEDURE — 83735 ASSAY OF MAGNESIUM: CPT

## 2020-12-28 PROCEDURE — 93005 ELECTROCARDIOGRAM TRACING: CPT

## 2020-12-28 PROCEDURE — 84156 ASSAY OF PROTEIN URINE: CPT

## 2020-12-28 PROCEDURE — 96375 TX/PRO/DX INJ NEW DRUG ADDON: CPT | Mod: XU

## 2020-12-28 PROCEDURE — 86901 BLOOD TYPING SEROLOGIC RH(D): CPT

## 2020-12-28 PROCEDURE — 70450 CT HEAD/BRAIN W/O DYE: CPT

## 2020-12-28 PROCEDURE — 85610 PROTHROMBIN TIME: CPT

## 2020-12-28 PROCEDURE — 89051 BODY FLUID CELL COUNT: CPT

## 2020-12-28 PROCEDURE — 83605 ASSAY OF LACTIC ACID: CPT

## 2020-12-28 PROCEDURE — 94002 VENT MGMT INPAT INIT DAY: CPT

## 2020-12-28 PROCEDURE — 86922 COMPATIBILITY TEST ANTIGLOB: CPT

## 2020-12-28 PROCEDURE — 83550 IRON BINDING TEST: CPT

## 2020-12-28 PROCEDURE — 83986 ASSAY PH BODY FLUID NOS: CPT

## 2020-12-28 PROCEDURE — 96374 THER/PROPH/DIAG INJ IV PUSH: CPT | Mod: XU

## 2020-12-28 PROCEDURE — 84300 ASSAY OF URINE SODIUM: CPT

## 2020-12-28 PROCEDURE — 85025 COMPLETE CBC W/AUTO DIFF WBC: CPT

## 2020-12-28 PROCEDURE — 99291 CRITICAL CARE FIRST HOUR: CPT | Mod: 25

## 2020-12-28 PROCEDURE — P9016: CPT

## 2020-12-28 PROCEDURE — 83540 ASSAY OF IRON: CPT

## 2020-12-28 PROCEDURE — 93308 TTE F-UP OR LMTD: CPT

## 2020-12-28 PROCEDURE — 87635 SARS-COV-2 COVID-19 AMP PRB: CPT

## 2020-12-28 PROCEDURE — 71250 CT THORAX DX C-: CPT

## 2020-12-28 PROCEDURE — 86769 SARS-COV-2 COVID-19 ANTIBODY: CPT

## 2020-12-28 PROCEDURE — 83935 ASSAY OF URINE OSMOLALITY: CPT

## 2020-12-28 PROCEDURE — 80053 COMPREHEN METABOLIC PANEL: CPT

## 2020-12-28 PROCEDURE — 86850 RBC ANTIBODY SCREEN: CPT

## 2020-12-28 PROCEDURE — 36415 COLL VENOUS BLD VENIPUNCTURE: CPT

## 2020-12-28 PROCEDURE — 71045 X-RAY EXAM CHEST 1 VIEW: CPT

## 2020-12-28 PROCEDURE — 87186 SC STD MICRODIL/AGAR DIL: CPT

## 2020-12-28 PROCEDURE — 84100 ASSAY OF PHOSPHORUS: CPT

## 2020-12-28 PROCEDURE — 80202 ASSAY OF VANCOMYCIN: CPT

## 2020-12-28 PROCEDURE — 82945 GLUCOSE OTHER FLUID: CPT

## 2020-12-28 PROCEDURE — 87075 CULTR BACTERIA EXCEPT BLOOD: CPT

## 2020-12-28 PROCEDURE — 94640 AIRWAY INHALATION TREATMENT: CPT

## 2020-12-28 PROCEDURE — 84157 ASSAY OF PROTEIN OTHER: CPT

## 2020-12-28 PROCEDURE — 81001 URINALYSIS AUTO W/SCOPE: CPT

## 2020-12-28 PROCEDURE — 80048 BASIC METABOLIC PNL TOTAL CA: CPT

## 2020-12-30 ENCOUNTER — TRANSCRIPTION ENCOUNTER (OUTPATIENT)
Age: 70
End: 2020-12-30

## 2020-12-31 ENCOUNTER — APPOINTMENT (OUTPATIENT)
Dept: PULMONOLOGY | Facility: CLINIC | Age: 70
End: 2020-12-31
Payer: MEDICARE

## 2020-12-31 VITALS
HEART RATE: 86 BPM | WEIGHT: 208 LBS | OXYGEN SATURATION: 97 % | RESPIRATION RATE: 16 BRPM | BODY MASS INDEX: 33.57 KG/M2

## 2020-12-31 PROCEDURE — 99214 OFFICE O/P EST MOD 30 MIN: CPT

## 2020-12-31 RX ORDER — BACILLUS COAGULANS/INULIN 1B-250 MG
CAPSULE ORAL
Refills: 0 | Status: ACTIVE | COMMUNITY

## 2020-12-31 RX ORDER — CHLORHEXIDINE GLUCONATE 4 %
1000 LIQUID (ML) TOPICAL
Refills: 0 | Status: ACTIVE | COMMUNITY

## 2020-12-31 RX ORDER — ALLOPURINOL 100 MG/1
100 TABLET ORAL
Refills: 0 | Status: ACTIVE | COMMUNITY

## 2020-12-31 RX ORDER — FLUTICASONE FUROATE, UMECLIDINIUM BROMIDE AND VILANTEROL TRIFENATATE 100; 62.5; 25 UG/1; UG/1; UG/1
100-62.5-25 POWDER RESPIRATORY (INHALATION) DAILY
Qty: 1 | Refills: 5 | Status: COMPLETED | COMMUNITY
Start: 2020-12-09 | End: 2020-12-31

## 2020-12-31 RX ORDER — TAMSULOSIN HYDROCHLORIDE 0.4 MG/1
CAPSULE ORAL
Refills: 0 | Status: ACTIVE | COMMUNITY

## 2020-12-31 RX ORDER — ATORVASTATIN CALCIUM 20 MG/1
20 TABLET, FILM COATED ORAL
Refills: 0 | Status: ACTIVE | COMMUNITY

## 2020-12-31 RX ORDER — UBIDECARENONE/VIT E ACET 100MG-5
CAPSULE ORAL
Refills: 0 | Status: ACTIVE | COMMUNITY

## 2020-12-31 RX ORDER — IRON/IRON ASP GLY/FA/MV-MIN 38 125-25-1MG
TABLET ORAL
Refills: 0 | Status: ACTIVE | COMMUNITY

## 2020-12-31 RX ORDER — CHROMIUM 200 MCG
TABLET ORAL
Refills: 0 | Status: ACTIVE | COMMUNITY

## 2020-12-31 RX ORDER — OMEPRAZOLE MAGNESIUM 40 MG/1
40 CAPSULE, DELAYED RELEASE ORAL
Refills: 0 | Status: ACTIVE | COMMUNITY

## 2020-12-31 NOTE — HISTORY OF PRESENT ILLNESS
[Obstructive Sleep Apnea] : obstructive sleep apnea [TextBox_4] : Hospital Course: \par Discharge Date	22-Nov-2020 \par Admission Date	15-Nov-2020 03:55 \par Reason for Admission	Resp failure \par Hospital Course	 \par 70 year old male with pmh of afib, pulm htn, chronic om, ckd, copd on home 02, \par cabg coming to hospital unresponsive from [facility]. Patient found to have AMS, acute \par on chronic respiratory failure. Initially due to this patient was intubated and \par extubated. Was in micu however as extubated and toleration ambulation diet and \par voiding transferred from MICU. patient also transudative pleural effusions s/p pigtail \par catheter via CT Surgery. Patient hospital course complicated by anemia which \par stabilized and eliquis restarted, patient s/p prbc. patient seen by cardio, \par pulm, thoracic surgery, nephro while admitted. patient being d/c in stable \par condition with home o2. patietn recommended for cardiac cath for pulm htn \par however patient and wife refusing given outpatient cardiology concerns\par \par I SPENT 35 MINIUTE REVIEWING NOTES, ORDERS, LABS AND IMAGES FROM HIS HOSPITALIZATION PRIOR TO THIS VISIT \par \par Not using Trilogy enough at night\par On 180 mg lasix BID\par Less edema\par Weight stable\par On Trelegy for COPD\par \par 11/25/20\par 75 pack year smoker - dc IN 2012\par COPD previously treated by Dr Ji with Advair\par No prostatism or glaucoma\par H/O VANITA - intolerant to CPAP or Bipap\par Sleeping in a recliner\par On 02\par Significant leg edema \par \par 12/31/20\par Hospital Course: \par Discharge Date	24-Dec-2020 \par Admission Date	14-Dec-2020 17:57 \par Reason for Admission	CHF exacerbation \par Hospital Course	 \par 71 y/o M w/ a PMH of AFib (on eliquis), HFpEF (follows with Dr. Zia Storm; \par last known EF 60-65% on 11/17), COPD (on 2L home O2), CABG 2013, and bladder CA \par (s/p TURP 2013) presented to [facility]  w/ AMS x1 day w/ associated lethargy, \par admitted for Acute hypoxemic and hypercapnic respiratory failure. Patient noted \par with to have evidence of fluid overload on exam, pBNP was 11K and CXR showed \par small pleural effusions consistent w/ CHF exacerbation. Patient seen by \par Cardiology, and their input appreciated. Patient managed with IV diuretics, \par telemonitoring, as well as Eliquis for anticoagulation for A. fib. Right heart \par catheterization Cardiomems recommended by Cardiology, and discussion with \par patient's wife and cardiologist Dr Storm. Patient and wife to consider if \par they want to continue outpatient cardiology follow up with Dr Storm/ [facility] or switch to [facility] for RHC/MEM. \par \par ABG also showed pt had hypoxemia and hypercapnia. Acute hypoxemic hypercapnic \par respiratory failure likely multifactorial 2/2 acute on chronic diastolic CHF \par and COPD exacerbation. \par Also noted with acute metabolic encephalopathy likely secondary to CO2 \par narcosis. He was seen by pulmonology team, and their inputs appreciated. Pt was \par placed on BIPAP and mentation improved. BiPAP later removed and supplemental \par oxygen via nasal cannula  titrated as tolerated. He was also managed with \par bronchodilator and steroids, and improved. \par To be discharged on tapered steroids and home oxygen. Patient has a compensated \par respiratory acidosis and is a candidate for NIV at night. We held bipap last \par night and observed abg this morning. Appears that patient is tolerating enough \par to go home and wait for trilogy. Discussed with CM who will continue to work on \par insurance auth after discharge. Patient in agreement with seeing pulmonologist \par as soon as possible (has appointment this Thursday) \par \par Patient also found to have GERALD on CKD 3,  likely with prerenal cause. \par Nephrologist saw and evaluated patient. A renal sonogram was negative for \par hydronephrosis. The patient was managed With IV diuretics, with strict \par monitoring of fluid inputs and outputs. His renal function has improved toward \par baseline. \par \par Patient with hyperglycemia and leukocytosis secondary to steroids \par administration. Xray L-tib/fib and L-foot showed no evidence of bony \par destruction, and negative for osteomyelitis. Patient afebrile, with no signs of \par infection. All the patient's chronic clinical issues were managed with his home \par medications. The patient has improved and is medically stable for discharge. Pt \par will for own cardiology for cardiac cath . NIV at night.\par \par I SPENT 35 MINIUTE REVIEWING NOTES, ORDERS, LABS AND IMAGES FROM HIS HOSPITALIZATION PRIOR TO THIS VISIT \par  [ESS] : 8

## 2021-01-02 ENCOUNTER — TRANSCRIPTION ENCOUNTER (OUTPATIENT)
Age: 71
End: 2021-01-02

## 2021-01-04 ENCOUNTER — RX CHANGE (OUTPATIENT)
Age: 71
End: 2021-01-04

## 2021-01-04 RX ORDER — ALBUTEROL SULFATE 2.5 MG/3ML
(2.5 MG/3ML) SOLUTION RESPIRATORY (INHALATION)
Qty: 1080 | Refills: 1 | Status: DISCONTINUED | COMMUNITY
Start: 2017-03-29 | End: 2021-01-04

## 2021-01-06 ENCOUNTER — OUTPATIENT (OUTPATIENT)
Dept: OUTPATIENT SERVICES | Facility: HOSPITAL | Age: 71
LOS: 1 days | Discharge: HOME | End: 2021-01-06
Payer: MEDICARE

## 2021-01-06 ENCOUNTER — TRANSCRIPTION ENCOUNTER (OUTPATIENT)
Age: 71
End: 2021-01-06

## 2021-01-06 ENCOUNTER — APPOINTMENT (OUTPATIENT)
Dept: WOUND CARE | Facility: HOSPITAL | Age: 71
End: 2021-01-06
Payer: MEDICARE

## 2021-01-06 VITALS
TEMPERATURE: 97.8 F | DIASTOLIC BLOOD PRESSURE: 58 MMHG | OXYGEN SATURATION: 98 % | WEIGHT: 208 LBS | BODY MASS INDEX: 33.43 KG/M2 | SYSTOLIC BLOOD PRESSURE: 107 MMHG | HEART RATE: 62 BPM | RESPIRATION RATE: 20 BRPM | HEIGHT: 66 IN

## 2021-01-06 DIAGNOSIS — L97.801 NON-PRESSURE CHRONIC ULCER OF OTHER PART OF UNSPECIFIED LOWER LEG LIMITED TO BREAKDOWN OF SKIN: ICD-10-CM

## 2021-01-06 DIAGNOSIS — Z95.1 PRESENCE OF AORTOCORONARY BYPASS GRAFT: Chronic | ICD-10-CM

## 2021-01-06 DIAGNOSIS — Z98.890 OTHER SPECIFIED POSTPROCEDURAL STATES: Chronic | ICD-10-CM

## 2021-01-06 DIAGNOSIS — Z95.5 PRESENCE OF CORONARY ANGIOPLASTY IMPLANT AND GRAFT: Chronic | ICD-10-CM

## 2021-01-06 PROCEDURE — 97602 WOUND(S) CARE NON-SELECTIVE: CPT

## 2021-01-06 PROCEDURE — 99203 OFFICE O/P NEW LOW 30 MIN: CPT

## 2021-01-06 PROCEDURE — G0463: CPT | Mod: 25

## 2021-01-06 RX ORDER — PREDNISONE 10 MG
TABLET ORAL
Refills: 0 | Status: DISCONTINUED | COMMUNITY
End: 2021-01-06

## 2021-01-06 RX ORDER — LEVALBUTEROL HYDROCHLORIDE 1.25 MG/3ML
1.25 SOLUTION RESPIRATORY (INHALATION)
Qty: 1620 | Refills: 3 | Status: DISCONTINUED | COMMUNITY
Start: 2021-01-04 | End: 2021-01-06

## 2021-01-06 RX ORDER — APIXABAN 2.5 MG/1
2.5 TABLET, FILM COATED ORAL
Refills: 0 | Status: ACTIVE | COMMUNITY

## 2021-01-06 RX ORDER — FLUTICASONE PROPIONATE AND SALMETEROL 50; 250 UG/1; UG/1
250-50 POWDER RESPIRATORY (INHALATION)
Refills: 0 | Status: DISCONTINUED | COMMUNITY
End: 2021-01-06

## 2021-01-06 RX ORDER — CARVEDILOL 12.5 MG/1
12.5 TABLET, FILM COATED ORAL
Refills: 0 | Status: DISCONTINUED | COMMUNITY
End: 2021-01-06

## 2021-01-06 NOTE — PLAN
[FreeTextEntry1] : Patient examined and evaluated at this time.\par Continue local wound care and offloading.\par Radiographs ordered for left foot.\par Will auth for MRI of the left foot.\par Will auth for vascular studies.\par Spent 30 minutes for patient care and medical decision making.\par

## 2021-01-06 NOTE — HISTORY OF PRESENT ILLNESS
[FreeTextEntry1] : Patient had a fall October 9th 2020 resulted in an embolism and hematoma on left leg. Embolism removed. Patient had a graft placed. Donor site from left thigh. Currently being treated by [facility] with collagenase and Xeroform. Patient also stated that he has a wound on his left heel that started to develop while in the hospital from 10/2020.

## 2021-01-06 NOTE — ASSESSMENT
[Verbal] : Verbal [Written] : Written [Demo] : Demo [Patient] : Patient [Home Health Provider] : Home Health Provider [Fair - mild discomfort, physical impairment, low acceptance] : Fair - mild discomfort, physical impairment, low acceptance [Needs reinforcement] : needs reinforcement [Dressing changes] : dressing changes [Foot Care] : foot care [Skin Care] : skin care [Signs and symptoms of infection] : sign and symptoms of infection [Venous Disease] : venous disease [Nutrition] : nutrition [Arterial Disease] : arterial disease [How and When to Call] : how and when to call [Labs and Tests] : labs and tests [Pain Management] : pain management [Compression Therapy] : compression therapy [Home Health] : home health [Patient responsibility to plan of care] : patient responsibility to plan of care [] : Yes [Stable] : stable [Home] : Home [Wheelchair] : Wheelchair [Faxed - Long Term Care/Home Health Agency] : Long Term Care/Home Health Agency: Faxed [FreeTextEntry2] : Infection prevention\par Localized wound care \par Goal of remaining pain free regarding wounds.\par Enzymatic debridement \par Compression therapy  [FreeTextEntry4] : Auth submitted for MRI\par Auth submitted for vascular studies \par Collagenase E scribed \par Script for x ray provided for patient \par Follow up in 1 week  [FreeTextEntry1] : [facility]

## 2021-01-06 NOTE — REVIEW OF SYSTEMS
[Skin Wound] : skin wound [Fever] : no fever [Eye Pain] : no eye pain [Earache] : no earache [Chest Pain] : no chest pain [Shortness Of Breath] : no shortness of breath [Cough] : no cough [Abdominal Pain] : no abdominal pain [FreeTextEntry5] : afib [FreeTextEntry8] : ckd [de-identified] : left lower leg ulceration down to skin, subcutaneous tissue, and fat. left anterior ankle ulceration down to skin, subcutaneous tissue, and fat. left dorsal midfoot ulceration down to skin, subcutaneous tissue, and fat. left heel ulceration down to skin, subcutaneous tissue, and fat.

## 2021-01-06 NOTE — PHYSICAL EXAM
[1+] : left 1+ [Ankle Swelling (On Exam)] : present [Ankle Swelling On The Left] : moderate [Skin Ulcer] : ulcer [Alert] : alert [Oriented to Person] : oriented to person [Oriented to Place] : oriented to place [Oriented to Time] : oriented to time [Calm] : calm [4 x 4] : 4 x 4  [Abdominal Pad] : Abdominal Pad [Varicose Veins Of Lower Extremities] : not present [] : not present [de-identified] : A&Ox3, NAD [de-identified] : 3/5 strength in all quadrants bilaterally [de-identified] : left lower leg ulceration down to skin, subcutaneous tissue, and fat. left anterior ankle ulceration down to skin, subcutaneous tissue, and fat. left dorsal midfoot ulceration down to skin, subcutaneous tissue, and fat. left heel ulceration down to skin, subcutaneous tissue, and fat. [de-identified] : Diminished light touch sensation bilaterally [de-identified] : 80% [FreeTextEntry7] : Left anterior lower leg  [FreeTextEntry8] : 1.1 [FreeTextEntry9] : 1.6 [de-identified] : 0.3 [de-identified] : Serous/sanguinous [de-identified] : 50% [de-identified] : Expressed comfort post ACE application. [de-identified] : Collagenase  [de-identified] : Cleansed with chlorhexidine then NS\par Kerlix\par \par ** Bandage applied by DPM [de-identified] : Left dorsal foot  [de-identified] : 1.1 [de-identified] : 0.9 [de-identified] : 0.1 [de-identified] : Serous/sanguinous [de-identified] : 100% [de-identified] : Expressed comfort post ACE application. [de-identified] : Collagenase  [de-identified] : Cleansed with chlorhexidine then NS\par Kerlix\par \par \par ** Bandage applied by DPM [de-identified] : Dorsalis Pedis: 0\par Posterior Tibialis: 0\par Doppler pulses:  Present. \par Dorsalis Pedis: Right Monophasic/irregular    Left: Biphasic irregular \par Posterior Tibialis: Right: Monophasic irregular     Left: Biphasic irregular \par Extremity color: Pink \par Extremity temperature:\par Capillary refill: < 3 sec\par DENICE: Auth submitted \par  [FreeTextEntry1] : Left heel  [FreeTextEntry2] : 1.9 [FreeTextEntry3] : 1.9 [FreeTextEntry4] : 0.2 [de-identified] : Serous/sanguinous [de-identified] : Expressed comfort post ACE application. [de-identified] : cOLLAGENASE  [de-identified] : Cleansed with chlorhexidine then NS\par Kerlix\par \par ** Bandage applied by DPM [de-identified] : None [de-identified] : <20% [de-identified] : Daily [de-identified] : Primary Dressing [de-identified] : Small [de-identified] : Normal [de-identified] : None [de-identified] : None [de-identified] : 50% [de-identified] : Yes [de-identified] : Ace wraps [de-identified] : Daily [de-identified] : Primary Dressing [de-identified] : Small [de-identified] : Normal [de-identified] : None [de-identified] : None [de-identified] : None [de-identified] : Yes [de-identified] : Ace wraps [de-identified] : Daily [de-identified] : Primary Dressing [TWNoteComboBox4] : Small [de-identified] : Normal [de-identified] : None [de-identified] : 100% [de-identified] : None [de-identified] : No [de-identified] : Ace wraps [de-identified] : Daily [de-identified] : Primary Dressing

## 2021-01-07 ENCOUNTER — TRANSCRIPTION ENCOUNTER (OUTPATIENT)
Age: 71
End: 2021-01-07

## 2021-01-07 DIAGNOSIS — Z98.49 CATARACT EXTRACTION STATUS, UNSPECIFIED EYE: ICD-10-CM

## 2021-01-07 DIAGNOSIS — Z79.899 OTHER LONG TERM (CURRENT) DRUG THERAPY: ICD-10-CM

## 2021-01-07 DIAGNOSIS — L89.623 PRESSURE ULCER OF LEFT HEEL, STAGE 3: ICD-10-CM

## 2021-01-07 DIAGNOSIS — Z85.51 PERSONAL HISTORY OF MALIGNANT NEOPLASM OF BLADDER: ICD-10-CM

## 2021-01-07 DIAGNOSIS — L89.893 PRESSURE ULCER OF OTHER SITE, STAGE 3: ICD-10-CM

## 2021-01-07 DIAGNOSIS — I48.91 UNSPECIFIED ATRIAL FIBRILLATION: ICD-10-CM

## 2021-01-07 DIAGNOSIS — Z95.1 PRESENCE OF AORTOCORONARY BYPASS GRAFT: ICD-10-CM

## 2021-01-07 DIAGNOSIS — Z79.82 LONG TERM (CURRENT) USE OF ASPIRIN: ICD-10-CM

## 2021-01-07 DIAGNOSIS — N18.30 CHRONIC KIDNEY DISEASE, STAGE 3 UNSPECIFIED: ICD-10-CM

## 2021-01-07 DIAGNOSIS — Z87.891 PERSONAL HISTORY OF NICOTINE DEPENDENCE: ICD-10-CM

## 2021-01-07 DIAGNOSIS — I13.10 HYPERTENSIVE HEART AND CHRONIC KIDNEY DISEASE WITHOUT HEART FAILURE, WITH STAGE 1 THROUGH STAGE 4 CHRONIC KIDNEY DISEASE, OR UNSPECIFIED CHRONIC KIDNEY DISEASE: ICD-10-CM

## 2021-01-07 DIAGNOSIS — Z94.5 SKIN TRANSPLANT STATUS: ICD-10-CM

## 2021-01-07 DIAGNOSIS — J44.9 CHRONIC OBSTRUCTIVE PULMONARY DISEASE, UNSPECIFIED: ICD-10-CM

## 2021-01-07 DIAGNOSIS — Z95.5 PRESENCE OF CORONARY ANGIOPLASTY IMPLANT AND GRAFT: ICD-10-CM

## 2021-01-08 ENCOUNTER — TRANSCRIPTION ENCOUNTER (OUTPATIENT)
Age: 71
End: 2021-01-08

## 2021-01-12 ENCOUNTER — APPOINTMENT (OUTPATIENT)
Dept: CARDIOLOGY | Facility: CLINIC | Age: 71
End: 2021-01-12

## 2021-01-13 ENCOUNTER — APPOINTMENT (OUTPATIENT)
Dept: PULMONOLOGY | Facility: CLINIC | Age: 71
End: 2021-01-13

## 2021-01-13 ENCOUNTER — APPOINTMENT (OUTPATIENT)
Dept: WOUND CARE | Facility: HOSPITAL | Age: 71
End: 2021-01-13

## 2021-01-27 ENCOUNTER — APPOINTMENT (OUTPATIENT)
Dept: SURGERY | Facility: HOSPITAL | Age: 71
End: 2021-01-27
Payer: MEDICARE

## 2021-01-27 ENCOUNTER — OUTPATIENT (OUTPATIENT)
Dept: OUTPATIENT SERVICES | Facility: HOSPITAL | Age: 71
LOS: 1 days | Discharge: HOME | End: 2021-01-27
Payer: MEDICARE

## 2021-01-27 VITALS
RESPIRATION RATE: 20 BRPM | DIASTOLIC BLOOD PRESSURE: 65 MMHG | WEIGHT: 208 LBS | TEMPERATURE: 97.6 F | OXYGEN SATURATION: 99 % | HEIGHT: 66 IN | BODY MASS INDEX: 33.43 KG/M2 | SYSTOLIC BLOOD PRESSURE: 132 MMHG | HEART RATE: 72 BPM

## 2021-01-27 DIAGNOSIS — J44.9 CHRONIC OBSTRUCTIVE PULMONARY DISEASE, UNSPECIFIED: ICD-10-CM

## 2021-01-27 DIAGNOSIS — N18.30 CHRONIC KIDNEY DISEASE, STAGE 3 UNSPECIFIED: ICD-10-CM

## 2021-01-27 DIAGNOSIS — Z95.1 PRESENCE OF AORTOCORONARY BYPASS GRAFT: Chronic | ICD-10-CM

## 2021-01-27 DIAGNOSIS — I48.91 UNSPECIFIED ATRIAL FIBRILLATION: ICD-10-CM

## 2021-01-27 DIAGNOSIS — L89.893 PRESSURE ULCER OF OTHER SITE, STAGE 3: ICD-10-CM

## 2021-01-27 DIAGNOSIS — Z79.82 LONG TERM (CURRENT) USE OF ASPIRIN: ICD-10-CM

## 2021-01-27 DIAGNOSIS — Z95.5 PRESENCE OF CORONARY ANGIOPLASTY IMPLANT AND GRAFT: ICD-10-CM

## 2021-01-27 DIAGNOSIS — Z85.51 PERSONAL HISTORY OF MALIGNANT NEOPLASM OF BLADDER: ICD-10-CM

## 2021-01-27 DIAGNOSIS — Z95.5 PRESENCE OF CORONARY ANGIOPLASTY IMPLANT AND GRAFT: Chronic | ICD-10-CM

## 2021-01-27 DIAGNOSIS — Z98.49 CATARACT EXTRACTION STATUS, UNSPECIFIED EYE: ICD-10-CM

## 2021-01-27 DIAGNOSIS — Z98.890 OTHER SPECIFIED POSTPROCEDURAL STATES: Chronic | ICD-10-CM

## 2021-01-27 DIAGNOSIS — Z79.899 OTHER LONG TERM (CURRENT) DRUG THERAPY: ICD-10-CM

## 2021-01-27 DIAGNOSIS — I13.10 HYPERTENSIVE HEART AND CHRONIC KIDNEY DISEASE WITHOUT HEART FAILURE, WITH STAGE 1 THROUGH STAGE 4 CHRONIC KIDNEY DISEASE, OR UNSPECIFIED CHRONIC KIDNEY DISEASE: ICD-10-CM

## 2021-01-27 DIAGNOSIS — Z87.891 PERSONAL HISTORY OF NICOTINE DEPENDENCE: ICD-10-CM

## 2021-01-27 DIAGNOSIS — L89.623 PRESSURE ULCER OF LEFT HEEL, STAGE 3: ICD-10-CM

## 2021-01-27 DIAGNOSIS — Z94.5 SKIN TRANSPLANT STATUS: ICD-10-CM

## 2021-01-27 DIAGNOSIS — Z95.1 PRESENCE OF AORTOCORONARY BYPASS GRAFT: ICD-10-CM

## 2021-01-27 PROCEDURE — 99213 OFFICE O/P EST LOW 20 MIN: CPT

## 2021-01-27 PROCEDURE — 97602 WOUND(S) CARE NON-SELECTIVE: CPT

## 2021-01-28 NOTE — ASSESSMENT
[Verbal] : Verbal [Written] : Written [Patient] : Patient [Fair - mild discomfort, physical impairment, low acceptance] : Fair - mild discomfort, physical impairment, low acceptance [Needs reinforcement] : needs reinforcement [Dressing changes] : dressing changes [Foot Care] : foot care [Skin Care] : skin care [Pressure relief] : pressure relief [Signs and symptoms of infection] : sign and symptoms of infection [Nutrition] : nutrition [How and When to Call] : how and when to call [Pain Management] : pain management [Off-loading] : off-loading [Compression Therapy] : compression therapy [Patient responsibility to plan of care] : patient responsibility to plan of care [] : Yes [Stable] : stable [Home] : Home [Walker] : Walker [Faxed - Long Term Care/Home Health Agency] : Long Term Care/Home Health Agency: Faxed [FreeTextEntry2] : Infection Prevention\par Localized Wound Care\par Offloading / Pressure Relief\par Promote Optimal Skin Integrity\par Compression Therapy - ACE Compression\par Enzymatic Debridement\par Maintain acceptable pain level with use of pharmacological and nonpharmacological interventions \par  [FreeTextEntry4] : F/U to [facility] in One Week for assessment\par Pt was hospitalized at [facility] from 1/8/2021-1/202021 for dyspnea and inability to walk.  Pt had adjustments made to current medication, requested medication information so chart can be updated.  Pt verbalized understanding and said that he will provide that information at next visit \par Auth re-submitted for MRI and Vascular.  Pt was unable to complete either due to hospitalization  [FreeTextEntry1] : Heel ulcer and leg ulcers are stable, no acute infection\par

## 2021-01-28 NOTE — VITALS
[] : No [de-identified] : Pt rates pain 0/10.  Patient denies any pain or discomfort at the present  [FreeTextEntry5] : pt to bring updated medication list next visit

## 2021-01-28 NOTE — OCCUPATIONAL THERAPY INITIAL EVALUATION ADULT - ASR WT BEARING STATUS EVAL
Left LE
35 y/o M, PMHx HTN & HLD, presents to the ED with complaints of bloody stool x two episodes today. Patient noticed bright red blood streaked both within stool and on toilet tissue. He then had a third bowel movement without blood. He denies loose stool/diarrhea, melena, abdominal pain, fever, chills, back pain, chest pain & dyspnea. He states that he had a colonoscopy several years ago that was normal and has an appt with GI on 2/4. He states that his maternal GF had colon cancer.

## 2021-01-28 NOTE — PLAN
[FreeTextEntry1] : Collagenase to heel, Xeroform, dry dressing, ACE wrap to legs, return to office in one week.\par 25 minutes spent for patient care and medical decision making.\par

## 2021-01-28 NOTE — PHYSICAL EXAM
[4 x 4] : 4 x 4  [Abdominal Pad] : Abdominal Pad [Normal Breath Sounds] : Normal breath sounds [Normal Heart Sounds] : normal heart sounds [1+] : left 1+ [0] : left 0 [Ankle Swelling Bilaterally] : bilaterally  [Alert] : alert [Oriented to Person] : oriented to person [Calm] : calm [JVD] : no jugular venous distention  [Ankle Swelling (On Exam)] : not present [Varicose Veins Of Lower Extremities] : not present [] : not present [de-identified] : WD/WN in no acute distress. [de-identified] : WNL [de-identified] : Left heel ulcer is clean, moderate slough, no acute infection, periwound skin is intact with no cellulitis. leg ulcers are healing well.  [de-identified] : No neurovascular deficit noted  [FreeTextEntry7] : Left Anterior Lower Leg  [FreeTextEntry8] : 0.5 [FreeTextEntry9] : 0.6 [de-identified] : 0.1 [de-identified] : Serosanguineous  [de-identified] : Pt denies any pain or discomfort post ACE application  [de-identified] : Xeroform  [de-identified] : Cleansed with Normal Saline \par Kerlix \par \par  [de-identified] : No neurovascular deficit noted  [de-identified] : Left dorsal foot  [de-identified] : 0.8 [de-identified] : 0.6 [de-identified] : 0.1 [de-identified] : Serosanguineous  [de-identified] : Pt denies any pain or discomfort post ACE application [de-identified] : Xeroform  [de-identified] : Cleansed with Normal Saline \par Kerlix\par  [de-identified] : \par  [de-identified] : No neurovascular deficit noted  [FreeTextEntry1] : Left Heel  [FreeTextEntry2] : 1.7 [FreeTextEntry3] : 1.8 [FreeTextEntry4] : 0.1 - 0.3 [de-identified] : Serous/sanguinous [de-identified] : Pt denies any pain or discomfort post ACE application  [de-identified] : Collagenase  [de-identified] : Cleansed with Normal Saline \par Chlorhexidine Scrub\par Kerlix\par \par  [de-identified] : No [de-identified] : None [de-identified] : 100% [de-identified] : Every other day [de-identified] : Primary Dressing [de-identified] : Small [de-identified] : No [de-identified] : No [de-identified] : Normal [de-identified] : None [de-identified] : None [de-identified] : 100% [de-identified] : No [de-identified] : Ace wraps [de-identified] : Every other day [de-identified] : Primary Dressing [de-identified] : Small [de-identified] : No [de-identified] : No [de-identified] : Normal [de-identified] : None [de-identified] : None [de-identified] : 100% [de-identified] : No [de-identified] : Ace wraps [de-identified] : Every other day [TWNoteComboBox4] : Moderate [TWNoteComboBox5] : No [de-identified] : No [de-identified] : Normal [de-identified] : Strong [de-identified] : >75% [de-identified] : <20% [de-identified] : No [de-identified] : Ace wraps [de-identified] : Every other day

## 2021-02-03 ENCOUNTER — TRANSCRIPTION ENCOUNTER (OUTPATIENT)
Age: 71
End: 2021-02-03

## 2021-02-03 RX ORDER — COLLAGENASE SANTYL 250 [ARB'U]/G
250 OINTMENT TOPICAL
Qty: 1 | Refills: 1 | Status: COMPLETED | COMMUNITY
Start: 2021-02-03 | End: 2021-04-04

## 2021-02-10 ENCOUNTER — APPOINTMENT (OUTPATIENT)
Dept: SURGERY | Facility: HOSPITAL | Age: 71
End: 2021-02-10
Payer: MEDICARE

## 2021-02-10 ENCOUNTER — OUTPATIENT (OUTPATIENT)
Dept: OUTPATIENT SERVICES | Facility: HOSPITAL | Age: 71
LOS: 1 days | Discharge: HOME | End: 2021-02-10
Payer: MEDICARE

## 2021-02-10 VITALS
TEMPERATURE: 97.3 F | WEIGHT: 208 LBS | BODY MASS INDEX: 33.43 KG/M2 | HEIGHT: 66 IN | HEART RATE: 85 BPM | OXYGEN SATURATION: 100 % | SYSTOLIC BLOOD PRESSURE: 134 MMHG | DIASTOLIC BLOOD PRESSURE: 73 MMHG | RESPIRATION RATE: 20 BRPM

## 2021-02-10 VITALS — TEMPERATURE: 97 F

## 2021-02-10 DIAGNOSIS — Z95.1 PRESENCE OF AORTOCORONARY BYPASS GRAFT: Chronic | ICD-10-CM

## 2021-02-10 DIAGNOSIS — Z85.51 PERSONAL HISTORY OF MALIGNANT NEOPLASM OF BLADDER: ICD-10-CM

## 2021-02-10 DIAGNOSIS — Z98.49 CATARACT EXTRACTION STATUS, UNSPECIFIED EYE: ICD-10-CM

## 2021-02-10 DIAGNOSIS — J44.9 CHRONIC OBSTRUCTIVE PULMONARY DISEASE, UNSPECIFIED: ICD-10-CM

## 2021-02-10 DIAGNOSIS — Z98.890 OTHER SPECIFIED POSTPROCEDURAL STATES: Chronic | ICD-10-CM

## 2021-02-10 DIAGNOSIS — Z79.82 LONG TERM (CURRENT) USE OF ASPIRIN: ICD-10-CM

## 2021-02-10 DIAGNOSIS — Z95.5 PRESENCE OF CORONARY ANGIOPLASTY IMPLANT AND GRAFT: Chronic | ICD-10-CM

## 2021-02-10 DIAGNOSIS — Z79.899 OTHER LONG TERM (CURRENT) DRUG THERAPY: ICD-10-CM

## 2021-02-10 DIAGNOSIS — I48.91 UNSPECIFIED ATRIAL FIBRILLATION: ICD-10-CM

## 2021-02-10 DIAGNOSIS — I13.10 HYPERTENSIVE HEART AND CHRONIC KIDNEY DISEASE WITHOUT HEART FAILURE, WITH STAGE 1 THROUGH STAGE 4 CHRONIC KIDNEY DISEASE, OR UNSPECIFIED CHRONIC KIDNEY DISEASE: ICD-10-CM

## 2021-02-10 DIAGNOSIS — N18.30 CHRONIC KIDNEY DISEASE, STAGE 3 UNSPECIFIED: ICD-10-CM

## 2021-02-10 DIAGNOSIS — L89.623 PRESSURE ULCER OF LEFT HEEL, STAGE 3: ICD-10-CM

## 2021-02-10 DIAGNOSIS — Z87.891 PERSONAL HISTORY OF NICOTINE DEPENDENCE: ICD-10-CM

## 2021-02-10 DIAGNOSIS — Z95.5 PRESENCE OF CORONARY ANGIOPLASTY IMPLANT AND GRAFT: ICD-10-CM

## 2021-02-10 DIAGNOSIS — Z95.1 PRESENCE OF AORTOCORONARY BYPASS GRAFT: ICD-10-CM

## 2021-02-10 DIAGNOSIS — L89.893 PRESSURE ULCER OF OTHER SITE, STAGE 3: ICD-10-CM

## 2021-02-10 DIAGNOSIS — Z94.5 SKIN TRANSPLANT STATUS: ICD-10-CM

## 2021-02-10 PROCEDURE — 99213 OFFICE O/P EST LOW 20 MIN: CPT

## 2021-02-10 PROCEDURE — 97602 WOUND(S) CARE NON-SELECTIVE: CPT

## 2021-02-10 NOTE — PHYSICAL EXAM
[4 x 4] : 4 x 4  [Abdominal Pad] : Abdominal Pad [Normal Breath Sounds] : Normal breath sounds [Normal Heart Sounds] : normal heart sounds [1+] : left 1+ [0] : left 0 [Ankle Swelling Bilaterally] : bilaterally  [Oriented to Person] : oriented to person [Alert] : alert [Calm] : calm [JVD] : no jugular venous distention  [Ankle Swelling (On Exam)] : not present [Varicose Veins Of Lower Extremities] : not present [] : not present [de-identified] : WD/WN in no acute distress. [de-identified] : WNL [de-identified] : Left heel ulcer is clean, moderate slough, no acute infection, periwound skin is intact with no cellulitis. Left leg ulcers are almost closed.  [de-identified] : Circ neurovascular function WNL post ace wraps, pt expressed comfort\par  [FreeTextEntry8] : 0.4 [FreeTextEntry7] : Left Anterior Lower Leg  [FreeTextEntry9] : 0.3 [de-identified] : 0.1 [de-identified] : Serosanguineous  [de-identified] : Pt denies any pain or discomfort post ACE application  [de-identified] : silver alginate [de-identified] : Cleansed with Normal Saline \par  \par  [de-identified] : Left dorsal foot  [de-identified] : Circ neurovascular function WNL post ace wraps, pt expressed comfort.\par  [de-identified] : 0.8 [de-identified] : 0.6 [de-identified] : 0.1 [de-identified] : Serosanguineous  [de-identified] : Pt denies any pain or discomfort post ACE application [de-identified] : silver alginate [de-identified] : Cleansed with Normal Saline \par \par  [de-identified] : \par  [de-identified] : Circ neurovascular function WNL post ace wrap, pt expressed comfort.\par  [FreeTextEntry1] : Left Heel  [FreeTextEntry2] : 1.4 [FreeTextEntry3] : 1.7 [FreeTextEntry4] : 0.7 [de-identified] : Serosanguineous  [de-identified] : Hypergranulation [de-identified] : silver alginate [de-identified] : 50% [de-identified] : Cleansed with Normal Saline \par \par  [de-identified] : No [de-identified] : None [de-identified] : None [de-identified] : Every other day [de-identified] : Primary Dressing [de-identified] : No [de-identified] : Small [de-identified] : No [de-identified] : None [de-identified] : Normal [de-identified] : None [de-identified] : 100% [de-identified] : No [de-identified] : 3x Weekly [de-identified] : Ace wraps [de-identified] : Primary Dressing [de-identified] : Small [de-identified] : No [de-identified] : No [de-identified] : Normal [de-identified] : None [de-identified] : None [de-identified] : 100% [de-identified] : No [de-identified] : Ace wraps [de-identified] : 3x Weekly [de-identified] : Primary Dressing [TWNoteComboBox4] : Moderate [TWNoteComboBox5] : No [TWNoteComboBox6] : Pressure [de-identified] : No [de-identified] : Normal [de-identified] : None [de-identified] : None [de-identified] : 50% [de-identified] : Yes [de-identified] : Ace wraps [de-identified] : 3x Weekly [de-identified] : Primary Dressing [TWNoteComboBox9] : False

## 2021-02-10 NOTE — PLAN
[FreeTextEntry1] : Start Silver Alginate, dry dressing, Bilateral ACE wrap, return to office in one week.\par 25 minutes spent for patient care and medical decision making.\par

## 2021-02-10 NOTE — ASSESSMENT
[Verbal] : Verbal [Written] : Written [Patient] : Patient [Fair - mild discomfort, physical impairment, low acceptance] : Fair - mild discomfort, physical impairment, low acceptance [Needs reinforcement] : needs reinforcement [Dressing changes] : dressing changes [Foot Care] : foot care [Skin Care] : skin care [Pressure relief] : pressure relief [Signs and symptoms of infection] : sign and symptoms of infection [Nutrition] : nutrition [How and When to Call] : how and when to call [Pain Management] : pain management [Off-loading] : off-loading [Compression Therapy] : compression therapy [Patient responsibility to plan of care] : patient responsibility to plan of care [] : Yes [Stable] : stable [Home] : Home [Walker] : Walker [Faxed - Long Term Care/Home Health Agency] : Long Term Care/Home Health Agency: Faxed [FreeTextEntry2] : Infection Prevention\par Promote Skin Integrity\par Offloading\par Elevation\par Compression Compliance\par Low Na+ Diet\par Maintain acceptable levels of pain\par Demonstrates use of both pharmacological and nonpharmacological pain management interventions\par  [FreeTextEntry4] : F/U 2 weeks for assessment\par MRI and Vascular Studies scheduled for next week [FreeTextEntry1] : Left heel and leg ulcers are stable, no acute infection\par

## 2021-02-17 ENCOUNTER — RESULT REVIEW (OUTPATIENT)
Age: 71
End: 2021-02-17

## 2021-02-17 ENCOUNTER — OUTPATIENT (OUTPATIENT)
Dept: OUTPATIENT SERVICES | Facility: HOSPITAL | Age: 71
LOS: 1 days | End: 2021-02-17
Payer: MEDICARE

## 2021-02-17 DIAGNOSIS — Z98.890 OTHER SPECIFIED POSTPROCEDURAL STATES: Chronic | ICD-10-CM

## 2021-02-17 DIAGNOSIS — Z95.5 PRESENCE OF CORONARY ANGIOPLASTY IMPLANT AND GRAFT: Chronic | ICD-10-CM

## 2021-02-17 DIAGNOSIS — I70.223 ATHEROSCLEROSIS OF NATIVE ARTERIES OF EXTREMITIES WITH REST PAIN, BILATERAL LEGS: ICD-10-CM

## 2021-02-17 DIAGNOSIS — Z95.1 PRESENCE OF AORTOCORONARY BYPASS GRAFT: Chronic | ICD-10-CM

## 2021-02-17 PROCEDURE — 93923 UPR/LXTR ART STDY 3+ LVLS: CPT | Mod: 26

## 2021-02-17 PROCEDURE — 73630 X-RAY EXAM OF FOOT: CPT

## 2021-02-17 PROCEDURE — 73718 MRI LOWER EXTREMITY W/O DYE: CPT

## 2021-02-17 PROCEDURE — 73630 X-RAY EXAM OF FOOT: CPT | Mod: 26,LT

## 2021-02-17 PROCEDURE — 73718 MRI LOWER EXTREMITY W/O DYE: CPT | Mod: 26,LT,MH

## 2021-02-18 ENCOUNTER — APPOINTMENT (OUTPATIENT)
Dept: PULMONOLOGY | Facility: CLINIC | Age: 71
End: 2021-02-18
Payer: MEDICARE

## 2021-02-18 VITALS
DIASTOLIC BLOOD PRESSURE: 60 MMHG | OXYGEN SATURATION: 98 % | SYSTOLIC BLOOD PRESSURE: 138 MMHG | HEART RATE: 78 BPM | HEIGHT: 66 IN | RESPIRATION RATE: 16 BRPM | TEMPERATURE: 98 F | BODY MASS INDEX: 27.48 KG/M2 | WEIGHT: 171 LBS

## 2021-02-18 DIAGNOSIS — I27.81 COR PULMONALE (CHRONIC): ICD-10-CM

## 2021-02-18 PROCEDURE — 99213 OFFICE O/P EST LOW 20 MIN: CPT

## 2021-02-18 RX ORDER — AMLODIPINE BESYLATE 10 MG/1
10 TABLET ORAL
Refills: 0 | Status: DISCONTINUED | COMMUNITY
End: 2021-02-18

## 2021-02-18 RX ORDER — HYDRALAZINE HYDROCHLORIDE 25 MG/1
25 TABLET ORAL 3 TIMES DAILY
Refills: 0 | Status: DISCONTINUED | COMMUNITY
End: 2021-02-18

## 2021-02-18 RX ORDER — MAGNESIUM OXIDE/MAG AA CHELATE 300 MG
CAPSULE ORAL
Refills: 0 | Status: DISCONTINUED | COMMUNITY
End: 2021-02-18

## 2021-02-18 RX ORDER — FUROSEMIDE 20 MG/1
20 TABLET ORAL
Refills: 0 | Status: DISCONTINUED | COMMUNITY
End: 2021-02-18

## 2021-02-18 RX ORDER — CARVEDILOL 25 MG/1
25 TABLET, FILM COATED ORAL TWICE DAILY
Refills: 0 | Status: DISCONTINUED | COMMUNITY
End: 2021-02-18

## 2021-02-18 NOTE — PHYSICAL EXAM
[No Acute Distress] : no acute distress [Elongated Uvula] : elongated uvula [Enlarged Base of the Tongue] : enlarged base of the tongue [II] : Mallampati Class: II [Normal Appearance] : normal appearance [No Neck Mass] : no neck mass [No Murmurs] : no murmurs [No Resp Distress] : no resp distress [Clear to Auscultation Bilaterally] : clear to auscultation bilaterally [No Abnormalities] : no abnormalities [Benign] : benign [Normal Gait] : normal gait [No Clubbing] : no clubbing [No Cyanosis] : no cyanosis [3+ Pitting] : 3+ pitting [Normal Color/ Pigmentation] : normal color/ pigmentation [No Focal Deficits] : no focal deficits [Oriented x3] : oriented x3 [Normal Affect] : normal affect [TextBox_54] : Irreg - irreg

## 2021-02-18 NOTE — HISTORY OF PRESENT ILLNESS
[Obstructive Sleep Apnea] : obstructive sleep apnea [TextBox_4] : Hospital Course: \par Discharge Date	22-Nov-2020 \par Admission Date	15-Nov-2020 03:55 \par Reason for Admission	Resp failure \par Hospital Course	 \par 70 year old male with pmh of afib, pulm htn, chronic om, ckd, copd on home 02, \par cabg coming to hospital unresponsive from [facility]. Patient found to have AMS, acute \par on chronic respiratory failure. Initially due to this patient was intubated and \par extubated. Was in micu however as extubated and toleration ambulation diet and \par voiding transferred from MICU. patient also transudative pleural effusions s/p pigtail \par catheter via CT Surgery. Patient hospital course complicated by anemia which \par stabilized and eliquis restarted, patient s/p prbc. patient seen by cardio, \par pulm, thoracic surgery, nephro while admitted. patient being d/c in stable \par condition with home o2. patietn recommended for cardiac cath for pulm htn \par however patient and wife refusing given outpatient cardiology concerns\par \par I SPENT 35 MINIUTE REVIEWING NOTES, ORDERS, LABS AND IMAGES FROM HIS HOSPITALIZATION PRIOR TO THIS VISIT \par \par Not using Trilogy enough at night\par On 180 mg lasix BID\par Less edema\par Weight stable\par On Trelegy for COPD\par \par 11/25/20\par 75 pack year smoker - dc IN 2012\par COPD previously treated by Dr Ji with Advair\par No prostatism or glaucoma\par H/O VANITA - intolerant to CPAP or Bipap\par Sleeping in a recliner\par On 02\par Significant leg edema \par \par 12/31/20\par Hospital Course: \par Discharge Date	24-Dec-2020 \par Admission Date	14-Dec-2020 17:57 \par Reason for Admission	CHF exacerbation \par Hospital Course	 \par 69 y/o M w/ a PMH of AFib (on eliquis), HFpEF (follows with Dr. Zia Storm; \par last known EF 60-65% on 11/17), COPD (on 2L home O2), CABG 2013, and bladder CA \par (s/p TURP 2013) presented to [facility]  w/ AMS x1 day w/ associated lethargy, \par admitted for Acute hypoxemic and hypercapnic respiratory failure. Patient noted \par with to have evidence of fluid overload on exam, pBNP was 11K and CXR showed \par small pleural effusions consistent w/ CHF exacerbation. Patient seen by \par Cardiology, and their input appreciated. Patient managed with IV diuretics, \par telemonitoring, as well as Eliquis for anticoagulation for A. fib. Right heart \par catheterization Cardiomems recommended by Cardiology, and discussion with \par patient's wife and cardiologist Dr Storm. Patient and wife to consider if \par they want to continue outpatient cardiology follow up with Dr Storm/ [facility] or switch to [facility] for RHC/MEM. \par \par ABG also showed pt had hypoxemia and hypercapnia. Acute hypoxemic hypercapnic \par respiratory failure likely multifactorial 2/2 acute on chronic diastolic CHF \par and COPD exacerbation. \par Also noted with acute metabolic encephalopathy likely secondary to CO2 \par narcosis. He was seen by pulmonology team, and their inputs appreciated. Pt was \par placed on BIPAP and mentation improved. BiPAP later removed and supplemental \par oxygen via nasal cannula  titrated as tolerated. He was also managed with \par bronchodilator and steroids, and improved. \par To be discharged on tapered steroids and home oxygen. Patient has a compensated \par respiratory acidosis and is a candidate for NIV at night. We held bipap last \par night and observed abg this morning. Appears that patient is tolerating enough \par to go home and wait for trilogy. Discussed with CM who will continue to work on \par insurance auth after discharge. Patient in agreement with seeing pulmonologist \par as soon as possible (has appointment this Thursday) \par \par Patient also found to have GERALD on CKD 3,  likely with prerenal cause. \par Nephrologist saw and evaluated patient. A renal sonogram was negative for \par hydronephrosis. The patient was managed With IV diuretics, with strict \par monitoring of fluid inputs and outputs. His renal function has improved toward \par baseline. \par \par Patient with hyperglycemia and leukocytosis secondary to steroids \par administration. Xray L-tib/fib and L-foot showed no evidence of bony \par destruction, and negative for osteomyelitis. Patient afebrile, with no signs of \par infection. All the patient's chronic clinical issues were managed with his home \par medications. The patient has improved and is medically stable for discharge. Pt \par will for own cardiology for cardiac cath . NIV at night.\par \par I SPENT 35 MINIUTE REVIEWING NOTES, ORDERS, LABS AND IMAGES FROM HIS HOSPITALIZATION PRIOR TO THIS VISIT \par \par 2/18/21\par Hospitalized at [facility] for weeks in January of 2021\par Much better with aggressive diuresis\par Off Trilogy NIV and 02\par  [ESS] : 8

## 2021-02-24 ENCOUNTER — OUTPATIENT (OUTPATIENT)
Dept: OUTPATIENT SERVICES | Facility: HOSPITAL | Age: 71
LOS: 1 days | Discharge: HOME | End: 2021-02-24
Payer: MEDICARE

## 2021-02-24 ENCOUNTER — APPOINTMENT (OUTPATIENT)
Dept: SURGERY | Facility: HOSPITAL | Age: 71
End: 2021-02-24

## 2021-02-24 ENCOUNTER — APPOINTMENT (OUTPATIENT)
Dept: PLASTIC SURGERY | Facility: HOSPITAL | Age: 71
End: 2021-02-24
Payer: MEDICARE

## 2021-02-24 ENCOUNTER — NON-APPOINTMENT (OUTPATIENT)
Age: 71
End: 2021-02-24

## 2021-02-24 VITALS
TEMPERATURE: 97.6 F | BODY MASS INDEX: 27.48 KG/M2 | SYSTOLIC BLOOD PRESSURE: 140 MMHG | OXYGEN SATURATION: 100 % | WEIGHT: 171 LBS | HEIGHT: 66 IN | HEART RATE: 88 BPM | DIASTOLIC BLOOD PRESSURE: 73 MMHG | RESPIRATION RATE: 20 BRPM

## 2021-02-24 DIAGNOSIS — Z79.82 LONG TERM (CURRENT) USE OF ASPIRIN: ICD-10-CM

## 2021-02-24 DIAGNOSIS — Z95.1 PRESENCE OF AORTOCORONARY BYPASS GRAFT: Chronic | ICD-10-CM

## 2021-02-24 DIAGNOSIS — Z87.891 PERSONAL HISTORY OF NICOTINE DEPENDENCE: ICD-10-CM

## 2021-02-24 DIAGNOSIS — N18.30 CHRONIC KIDNEY DISEASE, STAGE 3 UNSPECIFIED: ICD-10-CM

## 2021-02-24 DIAGNOSIS — L89.623 PRESSURE ULCER OF LEFT HEEL, STAGE 3: ICD-10-CM

## 2021-02-24 DIAGNOSIS — Z94.5 SKIN TRANSPLANT STATUS: ICD-10-CM

## 2021-02-24 DIAGNOSIS — I48.91 UNSPECIFIED ATRIAL FIBRILLATION: ICD-10-CM

## 2021-02-24 DIAGNOSIS — Z85.51 PERSONAL HISTORY OF MALIGNANT NEOPLASM OF BLADDER: ICD-10-CM

## 2021-02-24 DIAGNOSIS — Z98.49 CATARACT EXTRACTION STATUS, UNSPECIFIED EYE: ICD-10-CM

## 2021-02-24 DIAGNOSIS — Z95.1 PRESENCE OF AORTOCORONARY BYPASS GRAFT: ICD-10-CM

## 2021-02-24 DIAGNOSIS — Z98.890 OTHER SPECIFIED POSTPROCEDURAL STATES: Chronic | ICD-10-CM

## 2021-02-24 DIAGNOSIS — L89.893 PRESSURE ULCER OF OTHER SITE, STAGE 3: ICD-10-CM

## 2021-02-24 DIAGNOSIS — J44.9 CHRONIC OBSTRUCTIVE PULMONARY DISEASE, UNSPECIFIED: ICD-10-CM

## 2021-02-24 DIAGNOSIS — Z95.5 PRESENCE OF CORONARY ANGIOPLASTY IMPLANT AND GRAFT: Chronic | ICD-10-CM

## 2021-02-24 DIAGNOSIS — Z95.5 PRESENCE OF CORONARY ANGIOPLASTY IMPLANT AND GRAFT: ICD-10-CM

## 2021-02-24 DIAGNOSIS — Z79.899 OTHER LONG TERM (CURRENT) DRUG THERAPY: ICD-10-CM

## 2021-02-24 DIAGNOSIS — I13.10 HYPERTENSIVE HEART AND CHRONIC KIDNEY DISEASE WITHOUT HEART FAILURE, WITH STAGE 1 THROUGH STAGE 4 CHRONIC KIDNEY DISEASE, OR UNSPECIFIED CHRONIC KIDNEY DISEASE: ICD-10-CM

## 2021-02-24 PROCEDURE — G0463: CPT

## 2021-02-24 PROCEDURE — 99214 OFFICE O/P EST MOD 30 MIN: CPT

## 2021-02-26 ENCOUNTER — NON-APPOINTMENT (OUTPATIENT)
Age: 71
End: 2021-02-26

## 2021-02-26 NOTE — PHYSICAL EXAM
[4 x 4] : 4 x 4  [Abdominal Pad] : Abdominal Pad [Normal Breath Sounds] : Normal breath sounds [Normal Heart Sounds] : normal heart sounds [1+] : left 1+ [0] : left 0 [Ankle Swelling Bilaterally] : bilaterally  [Alert] : alert [Oriented to Person] : oriented to person [Calm] : calm [JVD] : no jugular venous distention  [Ankle Swelling (On Exam)] : not present [Varicose Veins Of Lower Extremities] : not present [] : not present [de-identified] : WD/WN in no acute distress. [de-identified] : WNL [de-identified] : WNL [de-identified] : Left heel ulcer is clean, moderate slough, no acute infection, periwound skin is intact with no cellulitis. Left leg ulcers are almost closed.  [de-identified] : Circ neurovascular function WNL post ace wraps, pt expressed comfort\par  [FreeTextEntry7] : Left Anterior Lower Leg- closed- fragile epithelization [de-identified] : None [de-identified] : Cleansed with Normal Saline \par  \par  [de-identified] : Circ neurovascular function WNL post ace wraps, pt expressed comfort.\par  [de-identified] : Left dorsal foot- closed- fragile epithelization [de-identified] : None [de-identified] : Cleansed with Normal Saline \par \par  [de-identified] : \par  [de-identified] : Circ neurovascular function WNL post ace wrap, pt expressed comfort.\par  [FreeTextEntry1] : Left Heel  [FreeTextEntry2] : 1.7 [FreeTextEntry3] : 1.7 [FreeTextEntry4] : 0.5-0.7 [de-identified] : Serosanguineous  [de-identified] : area of Hypergranulation [de-identified] : 20-25% [de-identified] : silver alginate [de-identified] : Cleansed with Normal Saline \par \par  [de-identified] : No [de-identified] : None [de-identified] : None [de-identified] : Every other day [de-identified] : Primary Dressing [de-identified] : None [de-identified] : No [de-identified] : No [de-identified] : Normal [de-identified] : None [de-identified] : None [de-identified] : None [de-identified] : No [de-identified] : Ace wraps [de-identified] : 3x Weekly [de-identified] : Primary Dressing [de-identified] : None [de-identified] : No [de-identified] : No [de-identified] : Normal [de-identified] : None [de-identified] : None [de-identified] : None [de-identified] : No [de-identified] : Ace wraps [de-identified] : 3x Weekly [TWNoteComboBox4] : Moderate [TWNoteComboBox5] : No [TWNoteComboBox6] : Pressure [de-identified] : No [de-identified] : Normal [de-identified] : None [de-identified] : None [de-identified] : >75% [de-identified] : Yes [de-identified] : Ace wraps [de-identified] : 3x Weekly [de-identified] : Primary Dressing

## 2021-02-26 NOTE — ASSESSMENT
[Verbal] : Verbal [Written] : Written [Patient] : Patient [Fair - mild discomfort, physical impairment, low acceptance] : Fair - mild discomfort, physical impairment, low acceptance [Needs reinforcement] : needs reinforcement [Dressing changes] : dressing changes [Foot Care] : foot care [Skin Care] : skin care [Pressure relief] : pressure relief [Signs and symptoms of infection] : sign and symptoms of infection [Nutrition] : nutrition [How and When to Call] : how and when to call [Pain Management] : pain management [Off-loading] : off-loading [Compression Therapy] : compression therapy [Patient responsibility to plan of care] : patient responsibility to plan of care [] : Yes [Stable] : stable [Home] : Home [Walker] : Walker [Faxed - Long Term Care/Home Health Agency] : Long Term Care/Home Health Agency: Faxed [Demo] : Demo [FreeTextEntry2] : Infection Prevention\par Promote Skin Integrity\par Offloading\par Elevation\par Compression Compliance\par Low Na+ Diet\par Maintain acceptable levels of pain\par Demonstrates use of both pharmacological and nonpharmacological pain management interventions\par  [FreeTextEntry4] : F/U 2 weeks for assessment\par Infectious Disease consult submitted for PICC line insertion for IV ATB therapy\par MRI, Vascular, and Xray completed and viewed by MD, Osteomyelitis of the Left Heel noted\par HBOT was suggested, awaiting pt decision  [FreeTextEntry1] : [facility]

## 2021-02-26 NOTE — HISTORY OF PRESENT ILLNESS
[FreeTextEntry1] : Left heel ulcer is clean, legs ulcers are healing, no C/O\par \par 2/24/21 most lower leg ulcers closed. Left heel ulcer clean with no palpable bone exposed. MRI shows early heel osteo and vascular studies show most vessels in legs non compressible

## 2021-02-26 NOTE — PLAN
[FreeTextEntry1] :  Silver Alginate, dry dressing, Bilateral ACE wrap, return to office in two week.\par REviewed vascular studies with patient and MRI of his left heel that shows early osteomyelitis.\par had Dr Horner also see patient, who is reluctant to do HBO, explain the advantage of HBO and what could occur if osteo untreated or does not respond to just IV antibiotics\par patient referred to ID\par \par 35 minutes spent for patient care and medical decision making.\par

## 2021-03-02 ENCOUNTER — APPOINTMENT (OUTPATIENT)
Dept: SURGERY | Facility: HOSPITAL | Age: 71
End: 2021-03-02
Payer: MEDICARE

## 2021-03-02 ENCOUNTER — OUTPATIENT (OUTPATIENT)
Dept: OUTPATIENT SERVICES | Facility: HOSPITAL | Age: 71
LOS: 1 days | Discharge: HOME | End: 2021-03-02
Payer: MEDICARE

## 2021-03-02 VITALS
RESPIRATION RATE: 20 BRPM | OXYGEN SATURATION: 98 % | HEIGHT: 66 IN | BODY MASS INDEX: 27.48 KG/M2 | TEMPERATURE: 97.6 F | WEIGHT: 171 LBS | SYSTOLIC BLOOD PRESSURE: 142 MMHG | HEART RATE: 89 BPM | DIASTOLIC BLOOD PRESSURE: 73 MMHG

## 2021-03-02 DIAGNOSIS — Z95.5 PRESENCE OF CORONARY ANGIOPLASTY IMPLANT AND GRAFT: Chronic | ICD-10-CM

## 2021-03-02 DIAGNOSIS — Z95.1 PRESENCE OF AORTOCORONARY BYPASS GRAFT: Chronic | ICD-10-CM

## 2021-03-02 DIAGNOSIS — L89.623 PRESSURE ULCER OF LEFT HEEL, STAGE 3: ICD-10-CM

## 2021-03-02 DIAGNOSIS — Z98.890 OTHER SPECIFIED POSTPROCEDURAL STATES: Chronic | ICD-10-CM

## 2021-03-02 DIAGNOSIS — A04.72 ENTEROCOLITIS DUE TO CLOSTRIDIUM DIFFICILE, NOT SPECIFIED AS RECURRENT: ICD-10-CM

## 2021-03-02 PROCEDURE — G0463: CPT

## 2021-03-02 PROCEDURE — 87070 CULTURE OTHR SPECIMN AEROBIC: CPT

## 2021-03-02 PROCEDURE — 99213 OFFICE O/P EST LOW 20 MIN: CPT

## 2021-03-02 PROCEDURE — 99203 OFFICE O/P NEW LOW 30 MIN: CPT

## 2021-03-03 PROBLEM — A04.72 CLOSTRIDIUM DIFFICILE DIARRHEA: Status: ACTIVE | Noted: 2017-03-29

## 2021-03-04 LAB
CULTURE RESULTS: SIGNIFICANT CHANGE UP
SPECIMEN SOURCE: SIGNIFICANT CHANGE UP

## 2021-03-10 ENCOUNTER — APPOINTMENT (OUTPATIENT)
Dept: SURGERY | Facility: HOSPITAL | Age: 71
End: 2021-03-10

## 2021-03-10 ENCOUNTER — APPOINTMENT (OUTPATIENT)
Dept: PLASTIC SURGERY | Facility: HOSPITAL | Age: 71
End: 2021-03-10

## 2021-03-10 ENCOUNTER — APPOINTMENT (OUTPATIENT)
Dept: WOUND CARE | Facility: HOSPITAL | Age: 71
End: 2021-03-10
Payer: MEDICARE

## 2021-03-10 ENCOUNTER — OUTPATIENT (OUTPATIENT)
Dept: OUTPATIENT SERVICES | Facility: HOSPITAL | Age: 71
LOS: 1 days | Discharge: HOME | End: 2021-03-10
Payer: MEDICARE

## 2021-03-10 VITALS
TEMPERATURE: 97.6 F | HEIGHT: 66 IN | BODY MASS INDEX: 27.48 KG/M2 | OXYGEN SATURATION: 100 % | SYSTOLIC BLOOD PRESSURE: 150 MMHG | RESPIRATION RATE: 20 BRPM | HEART RATE: 85 BPM | DIASTOLIC BLOOD PRESSURE: 81 MMHG | WEIGHT: 171 LBS

## 2021-03-10 DIAGNOSIS — J44.9 CHRONIC OBSTRUCTIVE PULMONARY DISEASE, UNSPECIFIED: ICD-10-CM

## 2021-03-10 DIAGNOSIS — Z79.01 LONG TERM (CURRENT) USE OF ANTICOAGULANTS: ICD-10-CM

## 2021-03-10 DIAGNOSIS — I48.91 UNSPECIFIED ATRIAL FIBRILLATION: ICD-10-CM

## 2021-03-10 DIAGNOSIS — Z79.82 LONG TERM (CURRENT) USE OF ASPIRIN: ICD-10-CM

## 2021-03-10 DIAGNOSIS — L89.623 PRESSURE ULCER OF LEFT HEEL, STAGE 3: ICD-10-CM

## 2021-03-10 DIAGNOSIS — Z95.5 PRESENCE OF CORONARY ANGIOPLASTY IMPLANT AND GRAFT: Chronic | ICD-10-CM

## 2021-03-10 DIAGNOSIS — Z87.891 PERSONAL HISTORY OF NICOTINE DEPENDENCE: ICD-10-CM

## 2021-03-10 DIAGNOSIS — Z95.1 PRESENCE OF AORTOCORONARY BYPASS GRAFT: ICD-10-CM

## 2021-03-10 DIAGNOSIS — Z85.51 PERSONAL HISTORY OF MALIGNANT NEOPLASM OF BLADDER: ICD-10-CM

## 2021-03-10 DIAGNOSIS — N18.30 CHRONIC KIDNEY DISEASE, STAGE 3 UNSPECIFIED: ICD-10-CM

## 2021-03-10 DIAGNOSIS — L89.150 PRESSURE ULCER OF SACRAL REGION, UNSTAGEABLE: ICD-10-CM

## 2021-03-10 DIAGNOSIS — Z94.5 SKIN TRANSPLANT STATUS: ICD-10-CM

## 2021-03-10 DIAGNOSIS — I13.10 HYPERTENSIVE HEART AND CHRONIC KIDNEY DISEASE WITHOUT HEART FAILURE, WITH STAGE 1 THROUGH STAGE 4 CHRONIC KIDNEY DISEASE, OR UNSPECIFIED CHRONIC KIDNEY DISEASE: ICD-10-CM

## 2021-03-10 DIAGNOSIS — Z98.890 OTHER SPECIFIED POSTPROCEDURAL STATES: Chronic | ICD-10-CM

## 2021-03-10 DIAGNOSIS — Z95.5 PRESENCE OF CORONARY ANGIOPLASTY IMPLANT AND GRAFT: ICD-10-CM

## 2021-03-10 DIAGNOSIS — Z98.49 CATARACT EXTRACTION STATUS, UNSPECIFIED EYE: ICD-10-CM

## 2021-03-10 DIAGNOSIS — Z95.1 PRESENCE OF AORTOCORONARY BYPASS GRAFT: Chronic | ICD-10-CM

## 2021-03-10 DIAGNOSIS — Z79.899 OTHER LONG TERM (CURRENT) DRUG THERAPY: ICD-10-CM

## 2021-03-10 DIAGNOSIS — L89.893 PRESSURE ULCER OF OTHER SITE, STAGE 3: ICD-10-CM

## 2021-03-10 PROCEDURE — 99213 OFFICE O/P EST LOW 20 MIN: CPT

## 2021-03-10 PROCEDURE — G0463: CPT

## 2021-03-17 ENCOUNTER — OUTPATIENT (OUTPATIENT)
Dept: OUTPATIENT SERVICES | Facility: HOSPITAL | Age: 71
LOS: 1 days | Discharge: HOME | End: 2021-03-17
Payer: MEDICARE

## 2021-03-17 ENCOUNTER — APPOINTMENT (OUTPATIENT)
Dept: WOUND CARE | Facility: HOSPITAL | Age: 71
End: 2021-03-17
Payer: MEDICARE

## 2021-03-17 VITALS
BODY MASS INDEX: 27.48 KG/M2 | WEIGHT: 171 LBS | HEIGHT: 66 IN | RESPIRATION RATE: 20 BRPM | TEMPERATURE: 97.4 F | SYSTOLIC BLOOD PRESSURE: 146 MMHG | OXYGEN SATURATION: 98 % | HEART RATE: 92 BPM | DIASTOLIC BLOOD PRESSURE: 78 MMHG

## 2021-03-17 DIAGNOSIS — N18.30 CHRONIC KIDNEY DISEASE, STAGE 3 UNSPECIFIED: ICD-10-CM

## 2021-03-17 DIAGNOSIS — Z79.899 OTHER LONG TERM (CURRENT) DRUG THERAPY: ICD-10-CM

## 2021-03-17 DIAGNOSIS — Z95.5 PRESENCE OF CORONARY ANGIOPLASTY IMPLANT AND GRAFT: ICD-10-CM

## 2021-03-17 DIAGNOSIS — Z95.1 PRESENCE OF AORTOCORONARY BYPASS GRAFT: ICD-10-CM

## 2021-03-17 DIAGNOSIS — Z79.01 LONG TERM (CURRENT) USE OF ANTICOAGULANTS: ICD-10-CM

## 2021-03-17 DIAGNOSIS — Z98.49 CATARACT EXTRACTION STATUS, UNSPECIFIED EYE: ICD-10-CM

## 2021-03-17 DIAGNOSIS — Z79.82 LONG TERM (CURRENT) USE OF ASPIRIN: ICD-10-CM

## 2021-03-17 DIAGNOSIS — I13.10 HYPERTENSIVE HEART AND CHRONIC KIDNEY DISEASE WITHOUT HEART FAILURE, WITH STAGE 1 THROUGH STAGE 4 CHRONIC KIDNEY DISEASE, OR UNSPECIFIED CHRONIC KIDNEY DISEASE: ICD-10-CM

## 2021-03-17 DIAGNOSIS — Z95.5 PRESENCE OF CORONARY ANGIOPLASTY IMPLANT AND GRAFT: Chronic | ICD-10-CM

## 2021-03-17 DIAGNOSIS — L89.893 PRESSURE ULCER OF OTHER SITE, STAGE 3: ICD-10-CM

## 2021-03-17 DIAGNOSIS — Z85.51 PERSONAL HISTORY OF MALIGNANT NEOPLASM OF BLADDER: ICD-10-CM

## 2021-03-17 DIAGNOSIS — Z87.891 PERSONAL HISTORY OF NICOTINE DEPENDENCE: ICD-10-CM

## 2021-03-17 DIAGNOSIS — L89.623 PRESSURE ULCER OF LEFT HEEL, STAGE 3: ICD-10-CM

## 2021-03-17 DIAGNOSIS — L89.624 PRESSURE ULCER OF LEFT HEEL, STAGE 4: ICD-10-CM

## 2021-03-17 DIAGNOSIS — Z95.1 PRESENCE OF AORTOCORONARY BYPASS GRAFT: Chronic | ICD-10-CM

## 2021-03-17 DIAGNOSIS — L89.300 PRESSURE ULCER OF UNSPECIFIED BUTTOCK, UNSTAGEABLE: ICD-10-CM

## 2021-03-17 DIAGNOSIS — J44.9 CHRONIC OBSTRUCTIVE PULMONARY DISEASE, UNSPECIFIED: ICD-10-CM

## 2021-03-17 DIAGNOSIS — Z94.5 SKIN TRANSPLANT STATUS: ICD-10-CM

## 2021-03-17 DIAGNOSIS — I48.91 UNSPECIFIED ATRIAL FIBRILLATION: ICD-10-CM

## 2021-03-17 DIAGNOSIS — Z98.890 OTHER SPECIFIED POSTPROCEDURAL STATES: Chronic | ICD-10-CM

## 2021-03-17 PROCEDURE — G0463: CPT

## 2021-03-17 PROCEDURE — 99213 OFFICE O/P EST LOW 20 MIN: CPT

## 2021-03-17 NOTE — PHYSICAL EXAM
[1+] : left 1+ [Ankle Swelling (On Exam)] : present [Ankle Swelling On The Left] : moderate [Skin Ulcer] : ulcer [Alert] : alert [Oriented to Person] : oriented to person [Oriented to Place] : oriented to place [Oriented to Time] : oriented to time [Calm] : calm [Varicose Veins Of Lower Extremities] : not present [] : not present [de-identified] : A&Ox3, NAD [de-identified] : 3/5 strength in all quadrants bilaterally [de-identified] : left lower leg ulceration down to skin, subcutaneous tissue, fat, tendon, and bone (+OM on mri). left anterior ankle ulceration epithelialized. [de-identified] : Diminished light touch sensation bilaterally [de-identified] : No neurovascular deficit noted \par  [FreeTextEntry7] : Left Anterior Lower Leg - fragile epithelization [de-identified] : Pt denies any pain or discomfort post ACE application  [de-identified] : No Product  [de-identified] : Cleansed with Normal Saline \par  \par  [de-identified] : No neurovascular deficit noted \par  [de-identified] : Left Dorsal Foot - Closed - Fragile Epithelization [de-identified] : Pt denies any pain or discomfort post ACE application  [de-identified] : No Product  [de-identified] : Cleansed with Normal Saline \par \par  [de-identified] : \par  [de-identified] : No neurovascular deficit noted \par  [FreeTextEntry1] : Left Heel  [FreeTextEntry2] : 1.0 [FreeTextEntry3] : 1.4 [FreeTextEntry4] : 1.0 [de-identified] : Serosanguineous  [de-identified] : 85% and area of Hypergranulation [de-identified] : 15% [de-identified] : Pt denies any pain or discomfort post ACE application  [de-identified] : Silver Alginate [de-identified] : Cleansed with Normal Saline \par Kerlix \par  [de-identified] : No [de-identified] : None [de-identified] : None [de-identified] : 3x Weekly [de-identified] : Primary Dressing [de-identified] : None [de-identified] : No [de-identified] : No [de-identified] : Normal [de-identified] : None [de-identified] : None [de-identified] : None [de-identified] : No [de-identified] : Ace wraps [de-identified] : 3x Weekly [de-identified] : Primary Dressing [de-identified] : None [de-identified] : No [de-identified] : No [de-identified] : Normal [de-identified] : None [de-identified] : None [de-identified] : None [de-identified] : No [de-identified] : Ace wraps [de-identified] : 3x Weekly [TWNoteComboBox4] : Small [TWNoteComboBox5] : No [TWNoteComboBox6] : Pressure [de-identified] : No [de-identified] : Normal [de-identified] : None [de-identified] : None [de-identified] : >75% [de-identified] : Yes [de-identified] : Ace wraps [de-identified] : 3x Weekly

## 2021-03-17 NOTE — REVIEW OF SYSTEMS
[Fever] : no fever [Eye Pain] : no eye pain [Earache] : no earache [Chest Pain] : no chest pain [Shortness Of Breath] : no shortness of breath [Cough] : no cough [Abdominal Pain] : no abdominal pain [Skin Wound] : skin wound [FreeTextEntry5] : afib [FreeTextEntry8] : ckd [de-identified] : left lower leg ulceration down to skin, subcutaneous tissue, fat, tendon, and bone (+OM on mri). left anterior ankle ulceration epithelialized.

## 2021-03-17 NOTE — REASON FOR VISIT
Patient called back, please call patient back   [Follow-Up: _____] : a [unfilled] follow-up visit [Other: _____] : [unfilled]

## 2021-03-17 NOTE — ASSESSMENT
[Verbal] : Verbal [Demo] : Demo [Patient] : Patient [Good - alert, interested, motivated] : Good - alert, interested, motivated [Verbalizes knowledge/Understanding] : Verbalizes knowledge/understanding [Dressing changes] : dressing changes [Foot Care] : foot care [Skin Care] : skin care [Pressure relief] : pressure relief [Signs and symptoms of infection] : sign and symptoms of infection [Nutrition] : nutrition [How and When to Call] : how and when to call [Off-loading] : off-loading [Patient responsibility to plan of care] : patient responsibility to plan of care [Stable] : stable [Home] : Home [Cane] : Cane [Faxed - Long Term Care/Home Health Agency] : Long Term Care/Home Health Agency: Faxed [] : No [FreeTextEntry2] : Infection Prevention\par Localized Wound Care\par Offloading / Pressure Relief\par Promote Optimal Skin Integrity\par Compression Therapy -  ACE Compression  [FreeTextEntry3] : Possible Osteomyelitis of the Left Heel  [FreeTextEntry4] : F/U to [facility] in One Week\par DPM discussed surgical recommendation - Partial Calcanectomy.  Pt verbalized understanding\par Pt had consult with Tyrone from [facility] for brace to be used post - op \par  [FreeTextEntry1] : [facility]

## 2021-03-17 NOTE — HISTORY OF PRESENT ILLNESS
[FreeTextEntry1] : Patient seen for left posterior heel ulcer down to skin, subcutaneous tissue, fat, tendon, and bone (+OM on mri). Pt relates that he saw Dr. Scott for infectious disease consultation and relates that he would like to pursue surgical calcanectomy due to the bone infection.

## 2021-03-17 NOTE — PLAN
[FreeTextEntry1] : Patient examined and evaluated at this time.\par Continue local wound care and offloading.\par Reviewed MRI with patient and advised regarding surgical vs conservative treatment with patient. Patient to pursue surgical partial calcanectomy at this time. Patient is a high risk for further limb loss, proximal amputation, sepsis, and death.\par Referral to orthotist at this time. Patient will need a fully lined custom SAFO due to the need to control foot and ankle in multiple planes.\par Spent 20 minutes for patient care and medical decision making.\par

## 2021-03-17 NOTE — HISTORY OF PRESENT ILLNESS
[FreeTextEntry1] : Patient seen for left posterior heel ulcer down to skin, subcutaneous tissue, fat, tendon, and bone (+OM on mri). Pt relates that he saw Dr. Scott for infectious disease consultation and relates that he would like to pursue surgical calcanectomy due to the bone infection. Pt relates that he would like to wait until he gets his second covid shot on 3/29/21 prior to having surgical partial calcanectomy as he notes that it was difficult to get the appointment and does not want to risk losing it.

## 2021-03-17 NOTE — PLAN
[FreeTextEntry1] : Patient examined and evaluated at this time.\par Continue local wound care and offloading.\par Reviewed MRI with patient and advised regarding surgical vs conservative treatment with patient. Patient to pursue surgical partial calcanectomy at this time. Patient is a high risk for further limb loss, proximal amputation, sepsis, and death.\par Pt advised to immediately report to the ER if any worsening or new symptoms are noted.\par Spent 20 minutes for patient care and medical decision making.\par

## 2021-03-17 NOTE — VITALS
[] : No [de-identified] : Pt reports pain 2/10  [FreeTextEntry4] : Pt states that no intervention is necessary

## 2021-03-17 NOTE — PHYSICAL EXAM
[4 x 4] : 4 x 4  [Abdominal Pad] : Abdominal Pad [1+] : left 1+ [Ankle Swelling (On Exam)] : present [Ankle Swelling On The Left] : moderate [Varicose Veins Of Lower Extremities] : not present [] : not present [Skin Ulcer] : ulcer [Alert] : alert [Oriented to Person] : oriented to person [Oriented to Place] : oriented to place [Oriented to Time] : oriented to time [Calm] : calm [de-identified] : A&Ox3, NAD [de-identified] : 3/5 strength in all quadrants bilaterally [de-identified] : left lower leg ulceration down to skin, subcutaneous tissue, fat, tendon, and bone (+OM on mri). left anterior ankle ulceration epithelialized. [de-identified] : Diminished light touch sensation bilaterally [FreeTextEntry1] : Left Heel [FreeTextEntry2] : 0.9 [FreeTextEntry3] : 1.4 [FreeTextEntry4] : 0.2 [de-identified] : Serosanguineous [de-identified] : Intact [de-identified] : 1-25% [de-identified] : Silver Alginate [de-identified] : Cleansed with Normal Saline\par  [TWNoteComboBox4] : Small [TWNoteComboBox5] : No [de-identified] : No [de-identified] : None [de-identified] : <20% [de-identified] : >75% [de-identified] : Yes [de-identified] : Ace wraps [de-identified] : 3x Weekly

## 2021-03-17 NOTE — REVIEW OF SYSTEMS
[Skin Wound] : skin wound [Fever] : no fever [Eye Pain] : no eye pain [Earache] : no earache [Chest Pain] : no chest pain [Shortness Of Breath] : no shortness of breath [Cough] : no cough [Abdominal Pain] : no abdominal pain [FreeTextEntry5] : afib [FreeTextEntry8] : ckd [de-identified] : left lower leg ulceration down to skin, subcutaneous tissue, fat, tendon, and bone (+OM on mri). left anterior ankle ulceration epithelialized.

## 2021-03-17 NOTE — ASSESSMENT
[Verbal] : Verbal [Patient] : Patient [Good - alert, interested, motivated] : Good - alert, interested, motivated [Verbalizes knowledge/Understanding] : Verbalizes knowledge/understanding [Dressing changes] : dressing changes [Foot Care] : foot care [Skin Care] : skin care [Signs and symptoms of infection] : sign and symptoms of infection [How and When to Call] : how and when to call [Off-loading] : off-loading [Patient responsibility to plan of care] : patient responsibility to plan of care [] : Yes [Stable] : stable [Home] : Home [Cane] : Cane [Not Applicable - Long Term Care/Home Health Agency] : Long Term Care/Home Health Agency: Not Applicable [Surgery] : surgery [FreeTextEntry2] : Restore Skin Integrity\par Infection Control\par Localized wound care\par Maintain acceptable pain levels at satisfactory relief.\par Demonstrates use of both nonpharmacological and pharmacological pain relief strategies [FreeTextEntry4] : Photos Taken\par POLLY Discussed Surgical Intervention based on MRI results, Patient stated wants to wait until 2nd Dose of COVID injection is administered on March 29th prior to surgery, DPM Aware and educated patient of worsen of wound to go to ER.\par Tyrone MCKEE contacted today by DPM, Made aware of Brace in progress but not ready as of today. Patient stated understanding.\par F/U to [facility] in 1 week

## 2021-03-18 NOTE — ASSESSMENT
[Verbal] : Verbal [Written] : Written [Demo] : Demo [Patient] : Patient [Fair - mild discomfort, physical impairment, low acceptance] : Fair - mild discomfort, physical impairment, low acceptance [Needs reinforcement] : needs reinforcement [Dressing changes] : dressing changes [Foot Care] : foot care [Skin Care] : skin care [Pressure relief] : pressure relief [Signs and symptoms of infection] : sign and symptoms of infection [Nutrition] : nutrition [How and When to Call] : how and when to call [Pain Management] : pain management [Off-loading] : off-loading [Compression Therapy] : compression therapy [Patient responsibility to plan of care] : patient responsibility to plan of care [Stable] : stable [Home] : Home [Walker] : Walker [Faxed - Long Term Care/Home Health Agency] : Long Term Care/Home Health Agency: Faxed [Rx Dose / Side Effects] : Rx dose/side effects [Risk Reduction] : risk reduction [Universal Precautions] : universal precautions [Anticipatory Guidance] : anticipatory guidance [] : No [FreeTextEntry2] : Infection Prevention\par Promote Skin Integrity\par Offloading\par Elevation\par Compression Compliance\par Low Na+ Diet\par Maintain acceptable levels of pain\par Demonstrates use of both pharmacological and nonpharmacological pain management interventions\par ID consult\par Culture\par F/U 1 week \par  [FreeTextEntry4] : Patient seen for ID consult with Dr. Scott.. Patient will contacted with results by Dr. Scott. \par Culture obtained from left heel at 3:04pm\par Patient has decided he will not be moving forward with HBOT.\par F/U 1 week  [FreeTextEntry1] : Left heel ulcer is stable, no acute infection\par

## 2021-03-18 NOTE — VITALS
[Pain related to present condition?] : The patient's  pain is not related to present condition. [] : No [de-identified] : 0/10

## 2021-03-18 NOTE — VITALS
[Pain related to present condition?] : The patient's  pain is not related to present condition. [] : No [de-identified] : 0/10

## 2021-03-18 NOTE — PHYSICAL EXAM
[4 x 4] : 4 x 4  [Abdominal Pad] : Abdominal Pad [General Appearance - Alert] : alert [General Appearance - In No Acute Distress] : in no acute distress [Sclera] : the sclera and conjunctiva were normal [PERRL With Normal Accommodation] : pupils were equal in size, round, reactive to light [Extraocular Movements] : extraocular movements were intact [Outer Ear] : the ears and nose were normal in appearance [Oropharynx] : the oropharynx was normal with no thrush [Neck Appearance] : the appearance of the neck was normal [Neck Cervical Mass (___cm)] : no neck mass was observed [Jugular Venous Distention Increased] : there was no jugular-venous distention [Thyroid Diffuse Enlargement] : the thyroid was not enlarged [Auscultation Breath Sounds / Voice Sounds] : lungs were clear to auscultation bilaterally [Heart Rate And Rhythm] : heart rate was normal and rhythm regular [Heart Sounds] : normal S1 and S2 [Heart Sounds Gallop] : no gallops [Murmurs] : no murmurs [Heart Sounds Pericardial Friction Rub] : no pericardial rub [Bowel Sounds] : normal bowel sounds [Abdomen Soft] : soft [Abdomen Tenderness] : non-tender [Abdomen Mass (___ Cm)] : no abdominal mass palpated [Costovertebral Angle Tenderness] : no CVA tenderness [No Palpable Adenopathy] : no palpable adenopathy [Musculoskeletal - Swelling] : no joint swelling [Nail Clubbing] : no clubbing  or cyanosis of the fingernails [Motor Tone] : muscle strength and tone were normal [No Focal Deficits] : no focal deficits [Oriented To Time, Place, And Person] : oriented to person, place, and time [Affect] : the affect was normal [Normal Breath Sounds] : Normal breath sounds [Normal Heart Sounds] : normal heart sounds [1+] : left 1+ [0] : left 0 [Ankle Swelling Bilaterally] : bilaterally  [Alert] : alert [Oriented to Person] : oriented to person [Calm] : calm [JVD] : no jugular venous distention  [Ankle Swelling (On Exam)] : not present [Varicose Veins Of Lower Extremities] : not present [] : not present [de-identified] : WD/WN in no acute distress. [de-identified] : WNL [de-identified] : WNL [de-identified] : Left heel ulcer is clean, moderate slough, no acute infection, periwound skin is intact with no cellulitis. Left leg ulcers are almost closed.  [de-identified] : Circ neurovascular function WNL post ace wraps, pt expressed comfort\par  [FreeTextEntry7] : Left Anterior Lower Leg- fragile epithelization [de-identified] : Cleansed with Normal Saline \par  \par  [de-identified] : Circ neurovascular function WNL post ace wraps, pt expressed comfort.\par  [de-identified] : Left Dorsal Foot- epithelization [de-identified] : Cleansed with Normal Saline \par \par  [de-identified] : \par  [de-identified] : Circ neurovascular function WNL post ace wrap, pt expressed comfort.\par  [FreeTextEntry1] : Left Heel  [FreeTextEntry2] : 1.7 [FreeTextEntry3] : 1.7 [FreeTextEntry4] : 0.1 and hyper- 0.5 [de-identified] : Serosanguineous  [de-identified] : 85% and area of Hypergranulation [de-identified] : 15% [de-identified] : silver alginate [de-identified] : Cleansed with Normal Saline \par \par  [de-identified] : No [de-identified] : None [de-identified] : None [de-identified] : 3x Weekly [de-identified] : Primary Dressing [de-identified] : None [de-identified] : No [de-identified] : No [de-identified] : Normal [de-identified] : None [de-identified] : None [de-identified] : None [de-identified] : No [de-identified] : Ace wraps [de-identified] : 3x Weekly [de-identified] : Primary Dressing [de-identified] : None [de-identified] : No [de-identified] : No [de-identified] : Normal [de-identified] : None [de-identified] : None [de-identified] : None [de-identified] : No [de-identified] : Ace wraps [de-identified] : 3x Weekly [TWNoteComboBox4] : Moderate [TWNoteComboBox5] : No [TWNoteComboBox6] : Pressure [de-identified] : No [de-identified] : Normal [de-identified] : None [de-identified] : None [de-identified] : >75% [de-identified] : Yes [de-identified] : Ace wraps [de-identified] : 3x Weekly [de-identified] : Primary Dressing

## 2021-03-18 NOTE — PHYSICAL EXAM
[4 x 4] : 4 x 4  [Abdominal Pad] : Abdominal Pad [General Appearance - Alert] : alert [General Appearance - In No Acute Distress] : in no acute distress [Sclera] : the sclera and conjunctiva were normal [PERRL With Normal Accommodation] : pupils were equal in size, round, reactive to light [Extraocular Movements] : extraocular movements were intact [Outer Ear] : the ears and nose were normal in appearance [Oropharynx] : the oropharynx was normal with no thrush [Neck Appearance] : the appearance of the neck was normal [Neck Cervical Mass (___cm)] : no neck mass was observed [Jugular Venous Distention Increased] : there was no jugular-venous distention [Thyroid Diffuse Enlargement] : the thyroid was not enlarged [Auscultation Breath Sounds / Voice Sounds] : lungs were clear to auscultation bilaterally [Heart Rate And Rhythm] : heart rate was normal and rhythm regular [Heart Sounds] : normal S1 and S2 [Heart Sounds Gallop] : no gallops [Murmurs] : no murmurs [Heart Sounds Pericardial Friction Rub] : no pericardial rub [Bowel Sounds] : normal bowel sounds [Abdomen Soft] : soft [Abdomen Tenderness] : non-tender [Abdomen Mass (___ Cm)] : no abdominal mass palpated [Costovertebral Angle Tenderness] : no CVA tenderness [No Palpable Adenopathy] : no palpable adenopathy [Musculoskeletal - Swelling] : no joint swelling [Nail Clubbing] : no clubbing  or cyanosis of the fingernails [Motor Tone] : muscle strength and tone were normal [No Focal Deficits] : no focal deficits [Oriented To Time, Place, And Person] : oriented to person, place, and time [Affect] : the affect was normal [Normal Breath Sounds] : Normal breath sounds [Normal Heart Sounds] : normal heart sounds [1+] : left 1+ [0] : left 0 [Ankle Swelling Bilaterally] : bilaterally  [Alert] : alert [Oriented to Person] : oriented to person [Calm] : calm [JVD] : no jugular venous distention  [Ankle Swelling (On Exam)] : not present [Varicose Veins Of Lower Extremities] : not present [] : not present [de-identified] : WD/WN in no acute distress. [de-identified] : WNL [de-identified] : WNL [de-identified] : Left heel ulcer is clean, moderate slough, no acute infection, periwound skin is intact with no cellulitis. Left leg ulcers are almost closed.  [de-identified] : Circ neurovascular function WNL post ace wraps, pt expressed comfort\par  [FreeTextEntry7] : Left Anterior Lower Leg- fragile epithelization [de-identified] : Cleansed with Normal Saline \par  \par  [de-identified] : Circ neurovascular function WNL post ace wraps, pt expressed comfort.\par  [de-identified] : Left Dorsal Foot- epithelization [de-identified] : Cleansed with Normal Saline \par \par  [de-identified] : \par  [de-identified] : Circ neurovascular function WNL post ace wrap, pt expressed comfort.\par  [FreeTextEntry1] : Left Heel  [FreeTextEntry2] : 1.7 [FreeTextEntry3] : 1.7 [FreeTextEntry4] : 0.1 and hyper- 0.5 [de-identified] : Serosanguineous  [de-identified] : 85% and area of Hypergranulation [de-identified] : 15% [de-identified] : silver alginate [de-identified] : Cleansed with Normal Saline \par \par  [de-identified] : No [de-identified] : None [de-identified] : None [de-identified] : 3x Weekly [de-identified] : Primary Dressing [de-identified] : None [de-identified] : No [de-identified] : No [de-identified] : Normal [de-identified] : None [de-identified] : None [de-identified] : None [de-identified] : No [de-identified] : Ace wraps [de-identified] : 3x Weekly [de-identified] : Primary Dressing [de-identified] : None [de-identified] : No [de-identified] : No [de-identified] : Normal [de-identified] : None [de-identified] : None [de-identified] : None [de-identified] : No [de-identified] : Ace wraps [de-identified] : 3x Weekly [TWNoteComboBox4] : Moderate [TWNoteComboBox5] : No [TWNoteComboBox6] : Pressure [de-identified] : No [de-identified] : Normal [de-identified] : None [de-identified] : None [de-identified] : >75% [de-identified] : Yes [de-identified] : Ace wraps [de-identified] : 3x Weekly [de-identified] : Primary Dressing

## 2021-03-18 NOTE — PLAN
[FreeTextEntry1] : Silver Alginate, dry dressing, return to office in one week.\par 25 minutes spent for patient care and medical decision making.\par

## 2021-03-25 ENCOUNTER — APPOINTMENT (OUTPATIENT)
Dept: PODIATRY | Facility: HOSPITAL | Age: 71
End: 2021-03-25
Payer: MEDICARE

## 2021-03-25 ENCOUNTER — OUTPATIENT (OUTPATIENT)
Dept: OUTPATIENT SERVICES | Facility: HOSPITAL | Age: 71
LOS: 1 days | Discharge: HOME | End: 2021-03-25
Payer: MEDICARE

## 2021-03-25 VITALS
RESPIRATION RATE: 20 BRPM | OXYGEN SATURATION: 98 % | DIASTOLIC BLOOD PRESSURE: 77 MMHG | TEMPERATURE: 97.6 F | WEIGHT: 171 LBS | HEIGHT: 66 IN | SYSTOLIC BLOOD PRESSURE: 150 MMHG | HEART RATE: 87 BPM | BODY MASS INDEX: 27.48 KG/M2

## 2021-03-25 DIAGNOSIS — L89.150 PRESSURE ULCER OF SACRAL REGION, UNSTAGEABLE: ICD-10-CM

## 2021-03-25 DIAGNOSIS — Z95.1 PRESENCE OF AORTOCORONARY BYPASS GRAFT: Chronic | ICD-10-CM

## 2021-03-25 DIAGNOSIS — Z98.890 OTHER SPECIFIED POSTPROCEDURAL STATES: Chronic | ICD-10-CM

## 2021-03-25 DIAGNOSIS — L89.623 PRESSURE ULCER OF LEFT HEEL, STAGE 3: ICD-10-CM

## 2021-03-25 DIAGNOSIS — Z95.5 PRESENCE OF CORONARY ANGIOPLASTY IMPLANT AND GRAFT: Chronic | ICD-10-CM

## 2021-03-25 PROCEDURE — 99213 OFFICE O/P EST LOW 20 MIN: CPT

## 2021-03-25 PROCEDURE — G0463: CPT

## 2021-03-25 NOTE — REVIEW OF SYSTEMS
[Fever] : no fever [Eye Pain] : no eye pain [Earache] : no earache [Chest Pain] : no chest pain [Shortness Of Breath] : no shortness of breath [Cough] : no cough [Abdominal Pain] : no abdominal pain [Skin Wound] : skin wound [Confused] : no confusion [Anxiety] : no anxiety [Easy Bleeding] : a tendency for easy bleeding [Negative] : Endocrine [FreeTextEntry5] : afib ,HTN, HLD , CABG [FreeTextEntry8] : ckd [de-identified] : left lower leg ulceration down to skin, subcutaneous tissue, fat, tendon, and bone (+OM on mri). left anterior ankle ulceration epithelialized. [de-identified] : Eliquis

## 2021-03-25 NOTE — PHYSICAL EXAM
[1+] : left 1+ [Ankle Swelling (On Exam)] : present [Ankle Swelling On The Left] : moderate [Varicose Veins Of Lower Extremities] : not present [] : not present [Skin Ulcer] : ulcer [Alert] : alert [Oriented to Person] : oriented to person [Oriented to Place] : oriented to place [Oriented to Time] : oriented to time [Calm] : calm [de-identified] : A&Ox3, NAD [de-identified] : HTN , HLD , Cabg , a-fib [de-identified] : 3/5 strength in all quadrants bilaterally , OM heel on MRI , not clinically infected  [de-identified] : left lower leg ulceration down to skin, subcutaneous tissue, fat, tendon, and bone (+OM on mri). left anterior ankle ulceration epithelialized. [de-identified] : Diminished light touch sensation bilaterally [FreeTextEntry1] : Left Heel [FreeTextEntry2] : 0.5 [FreeTextEntry3] : 0.8 [FreeTextEntry4] : 0.1 [de-identified] : Serosanguineous [de-identified] : Dry Eschar [de-identified] : Silver Alginate [de-identified] : Cleansed with Normal Saline\par  [TWNoteComboBox4] : Small [TWNoteComboBox5] : No [de-identified] : No [de-identified] : None [de-identified] : None [de-identified] : 100% [de-identified] : No [de-identified] : 3x Weekly

## 2021-03-25 NOTE — PLAN
[FreeTextEntry1] : continue wound care off loading , pt to see vascular and ID for evaluation . Possible surgery if wound doesn’t close.  Spent 25 minutes for patient care and medical decision making.\par

## 2021-03-25 NOTE — ASSESSMENT
[Verbal] : Verbal [Patient] : Patient [Good - alert, interested, motivated] : Good - alert, interested, motivated [Verbalizes knowledge/Understanding] : Verbalizes knowledge/understanding [Dressing changes] : dressing changes [Foot Care] : foot care [Skin Care] : skin care [Pressure relief] : pressure relief [Signs and symptoms of infection] : sign and symptoms of infection [How and When to Call] : how and when to call [Off-loading] : off-loading [Patient responsibility to plan of care] : patient responsibility to plan of care [Stable] : stable [Home] : Home [Cane] : Cane [Not Applicable - Long Term Care/Home Health Agency] : Long Term Care/Home Health Agency: Not Applicable [] : No [FreeTextEntry2] : Restore Skin Integrity\par Infection Control\par Localized wound care\par Maintain acceptable pain levels at satisfactory relief.\par Demonstrates use of both nonpharmacological and pharmacological pain relief strategies [FreeTextEntry4] : DPM Wants patient to follow up with Vascular surgeon Dr. Briseno for Pre surgical Clearance\par Patient states he will not have surgery until after his 2nd COVID 19 Vaccine is administered, DPM Made Aware\par F/U to [facility] in 1 week

## 2021-03-27 DIAGNOSIS — Z87.891 PERSONAL HISTORY OF NICOTINE DEPENDENCE: ICD-10-CM

## 2021-03-27 DIAGNOSIS — Z95.1 PRESENCE OF AORTOCORONARY BYPASS GRAFT: ICD-10-CM

## 2021-03-27 DIAGNOSIS — J44.9 CHRONIC OBSTRUCTIVE PULMONARY DISEASE, UNSPECIFIED: ICD-10-CM

## 2021-03-27 DIAGNOSIS — L89.893 PRESSURE ULCER OF OTHER SITE, STAGE 3: ICD-10-CM

## 2021-03-27 DIAGNOSIS — Z79.01 LONG TERM (CURRENT) USE OF ANTICOAGULANTS: ICD-10-CM

## 2021-03-27 DIAGNOSIS — Z95.5 PRESENCE OF CORONARY ANGIOPLASTY IMPLANT AND GRAFT: ICD-10-CM

## 2021-03-27 DIAGNOSIS — L89.623 PRESSURE ULCER OF LEFT HEEL, STAGE 3: ICD-10-CM

## 2021-03-27 DIAGNOSIS — Z79.82 LONG TERM (CURRENT) USE OF ASPIRIN: ICD-10-CM

## 2021-03-27 DIAGNOSIS — I13.10 HYPERTENSIVE HEART AND CHRONIC KIDNEY DISEASE WITHOUT HEART FAILURE, WITH STAGE 1 THROUGH STAGE 4 CHRONIC KIDNEY DISEASE, OR UNSPECIFIED CHRONIC KIDNEY DISEASE: ICD-10-CM

## 2021-03-27 DIAGNOSIS — Z85.51 PERSONAL HISTORY OF MALIGNANT NEOPLASM OF BLADDER: ICD-10-CM

## 2021-03-27 DIAGNOSIS — I48.91 UNSPECIFIED ATRIAL FIBRILLATION: ICD-10-CM

## 2021-03-27 DIAGNOSIS — Z79.899 OTHER LONG TERM (CURRENT) DRUG THERAPY: ICD-10-CM

## 2021-03-27 DIAGNOSIS — Z98.49 CATARACT EXTRACTION STATUS, UNSPECIFIED EYE: ICD-10-CM

## 2021-03-27 DIAGNOSIS — Z94.5 SKIN TRANSPLANT STATUS: ICD-10-CM

## 2021-03-27 DIAGNOSIS — N18.30 CHRONIC KIDNEY DISEASE, STAGE 3 UNSPECIFIED: ICD-10-CM

## 2021-04-01 ENCOUNTER — OUTPATIENT (OUTPATIENT)
Dept: OUTPATIENT SERVICES | Facility: HOSPITAL | Age: 71
LOS: 1 days | Discharge: HOME | End: 2021-04-01
Payer: MEDICARE

## 2021-04-01 ENCOUNTER — APPOINTMENT (OUTPATIENT)
Dept: PODIATRY | Facility: HOSPITAL | Age: 71
End: 2021-04-01
Payer: MEDICARE

## 2021-04-01 VITALS
WEIGHT: 171 LBS | SYSTOLIC BLOOD PRESSURE: 139 MMHG | TEMPERATURE: 97.6 F | HEART RATE: 78 BPM | RESPIRATION RATE: 20 BRPM | HEIGHT: 66 IN | DIASTOLIC BLOOD PRESSURE: 78 MMHG | OXYGEN SATURATION: 100 % | BODY MASS INDEX: 27.48 KG/M2

## 2021-04-01 DIAGNOSIS — Z95.1 PRESENCE OF AORTOCORONARY BYPASS GRAFT: Chronic | ICD-10-CM

## 2021-04-01 DIAGNOSIS — L89.629 PRESSURE ULCER OF LEFT HEEL, UNSPECIFIED STAGE: ICD-10-CM

## 2021-04-01 DIAGNOSIS — L89.623 PRESSURE ULCER OF LEFT HEEL, STAGE 3: ICD-10-CM

## 2021-04-01 DIAGNOSIS — Z95.5 PRESENCE OF CORONARY ANGIOPLASTY IMPLANT AND GRAFT: Chronic | ICD-10-CM

## 2021-04-01 DIAGNOSIS — L89.301 PRESSURE ULCER OF UNSPECIFIED BUTTOCK, STAGE 1: ICD-10-CM

## 2021-04-01 DIAGNOSIS — L89.893 PRESSURE ULCER OF OTHER SITE, STAGE 3: ICD-10-CM

## 2021-04-01 DIAGNOSIS — Z98.890 OTHER SPECIFIED POSTPROCEDURAL STATES: Chronic | ICD-10-CM

## 2021-04-01 PROCEDURE — 99213 OFFICE O/P EST LOW 20 MIN: CPT

## 2021-04-01 PROCEDURE — G0463: CPT

## 2021-04-02 DIAGNOSIS — Z85.51 PERSONAL HISTORY OF MALIGNANT NEOPLASM OF BLADDER: ICD-10-CM

## 2021-04-02 DIAGNOSIS — I48.91 UNSPECIFIED ATRIAL FIBRILLATION: ICD-10-CM

## 2021-04-02 DIAGNOSIS — Z79.01 LONG TERM (CURRENT) USE OF ANTICOAGULANTS: ICD-10-CM

## 2021-04-02 DIAGNOSIS — Z79.82 LONG TERM (CURRENT) USE OF ASPIRIN: ICD-10-CM

## 2021-04-02 DIAGNOSIS — Z87.891 PERSONAL HISTORY OF NICOTINE DEPENDENCE: ICD-10-CM

## 2021-04-02 DIAGNOSIS — L89.623 PRESSURE ULCER OF LEFT HEEL, STAGE 3: ICD-10-CM

## 2021-04-02 DIAGNOSIS — Z95.1 PRESENCE OF AORTOCORONARY BYPASS GRAFT: ICD-10-CM

## 2021-04-02 DIAGNOSIS — N18.30 CHRONIC KIDNEY DISEASE, STAGE 3 UNSPECIFIED: ICD-10-CM

## 2021-04-02 DIAGNOSIS — Z94.5 SKIN TRANSPLANT STATUS: ICD-10-CM

## 2021-04-02 DIAGNOSIS — I13.10 HYPERTENSIVE HEART AND CHRONIC KIDNEY DISEASE WITHOUT HEART FAILURE, WITH STAGE 1 THROUGH STAGE 4 CHRONIC KIDNEY DISEASE, OR UNSPECIFIED CHRONIC KIDNEY DISEASE: ICD-10-CM

## 2021-04-02 DIAGNOSIS — Z95.5 PRESENCE OF CORONARY ANGIOPLASTY IMPLANT AND GRAFT: ICD-10-CM

## 2021-04-02 DIAGNOSIS — Z98.49 CATARACT EXTRACTION STATUS, UNSPECIFIED EYE: ICD-10-CM

## 2021-04-02 DIAGNOSIS — J44.9 CHRONIC OBSTRUCTIVE PULMONARY DISEASE, UNSPECIFIED: ICD-10-CM

## 2021-04-02 DIAGNOSIS — Z79.899 OTHER LONG TERM (CURRENT) DRUG THERAPY: ICD-10-CM

## 2021-04-02 PROBLEM — L89.893: Status: ACTIVE | Noted: 2021-01-06

## 2021-04-02 NOTE — REVIEW OF SYSTEMS
[Fever] : no fever [Eye Pain] : no eye pain [Earache] : no earache [Chest Pain] : no chest pain [Shortness Of Breath] : no shortness of breath [Cough] : no cough [Abdominal Pain] : no abdominal pain [Skin Wound] : skin wound [Confused] : no confusion [Anxiety] : no anxiety [Easy Bleeding] : a tendency for easy bleeding [Negative] : Endocrine [FreeTextEntry5] : afib ,HTN, HLD , CABG [FreeTextEntry8] : ckd [de-identified] :  (+OM on mri). currently pressure ulcer is closed  [de-identified] : Eliquis

## 2021-04-02 NOTE — PLAN
[FreeTextEntry1] : continue off loading , observation and protective dressing   Spent 20 minutes for patient care and medical decision making.\par

## 2021-04-02 NOTE — PHYSICAL EXAM
[1+] : left 1+ [Ankle Swelling (On Exam)] : present [Ankle Swelling On The Left] : of the left ankle [Varicose Veins Of Lower Extremities] : not present [] : not present [Skin Ulcer] : ulcer [Alert] : alert [Oriented to Person] : oriented to person [Oriented to Place] : oriented to place [Oriented to Time] : oriented to time [Calm] : calm [de-identified] : A&Ox3, NAD [de-identified] : HTN , HLD , Cabg , a-fib [de-identified] : 3/5 strength in all quadrants bilaterally , OM heel on MRI , not clinically infected  [de-identified] : (+OM on mri). Currently posterior pressure ulcer is closed  [de-identified] : Diminished light touch sensation bilaterally [FreeTextEntry1] : Left heel - closed - Fragile epithelial tissue  [de-identified] : Germán doe  [de-identified] : Cleansed with NS\par  [TWNoteComboBox4] : None [de-identified] : Normal [de-identified] : None [de-identified] : None [de-identified] : No [de-identified] : 3x Weekly [de-identified] : Secondary Dressing

## 2021-04-02 NOTE — ASSESSMENT
[Verbal] : Verbal [Written] : Written [Demo] : Demo [Patient] : Patient [Good - alert, interested, motivated] : Good - alert, interested, motivated [Verbalizes knowledge/Understanding] : Verbalizes knowledge/understanding [Dressing changes] : dressing changes [Foot Care] : foot care [Skin Care] : skin care [Signs and symptoms of infection] : sign and symptoms of infection [Nutrition] : nutrition [How and When to Call] : how and when to call [Pain Management] : pain management [Off-loading] : off-loading [Patient responsibility to plan of care] : patient responsibility to plan of care [] : Yes [Stable] : stable [Home] : Home [Cane] : Cane [Not Applicable - Long Term Care/Home Health Agency] : Long Term Care/Home Health Agency: Not Applicable [FreeTextEntry2] : Infection prevention\par Offloading \par Goal of remaining pain free regarding wounds.\par Promote optimal skin integrity  [FreeTextEntry4] : No surgery necessary at this time per DPM\par Follow up in 2 weeks

## 2021-04-08 ENCOUNTER — NON-APPOINTMENT (OUTPATIENT)
Age: 71
End: 2021-04-08

## 2021-04-12 RX ORDER — FLUTICASONE FUROATE, UMECLIDINIUM BROMIDE AND VILANTEROL TRIFENATATE 100; 62.5; 25 UG/1; UG/1; UG/1
100-62.5-25 POWDER RESPIRATORY (INHALATION) DAILY
Qty: 1 | Refills: 5 | Status: ACTIVE | COMMUNITY
Start: 1900-01-01 | End: 1900-01-01

## 2021-04-14 ENCOUNTER — APPOINTMENT (OUTPATIENT)
Dept: PODIATRY | Facility: HOSPITAL | Age: 71
End: 2021-04-14
Payer: MEDICARE

## 2021-04-14 ENCOUNTER — OUTPATIENT (OUTPATIENT)
Dept: OUTPATIENT SERVICES | Facility: HOSPITAL | Age: 71
LOS: 1 days | Discharge: HOME | End: 2021-04-14
Payer: MEDICARE

## 2021-04-14 VITALS
HEIGHT: 66 IN | HEART RATE: 94 BPM | SYSTOLIC BLOOD PRESSURE: 144 MMHG | RESPIRATION RATE: 20 BRPM | BODY MASS INDEX: 27.48 KG/M2 | WEIGHT: 171 LBS | TEMPERATURE: 97.2 F | DIASTOLIC BLOOD PRESSURE: 75 MMHG | OXYGEN SATURATION: 99 %

## 2021-04-14 DIAGNOSIS — L89.893 PRESSURE ULCER OF OTHER SITE, STAGE 3: ICD-10-CM

## 2021-04-14 DIAGNOSIS — Z95.1 PRESENCE OF AORTOCORONARY BYPASS GRAFT: Chronic | ICD-10-CM

## 2021-04-14 DIAGNOSIS — Z98.890 OTHER SPECIFIED POSTPROCEDURAL STATES: Chronic | ICD-10-CM

## 2021-04-14 DIAGNOSIS — Z95.5 PRESENCE OF CORONARY ANGIOPLASTY IMPLANT AND GRAFT: Chronic | ICD-10-CM

## 2021-04-14 PROCEDURE — 99213 OFFICE O/P EST LOW 20 MIN: CPT

## 2021-04-14 PROCEDURE — G0463: CPT

## 2021-04-15 DIAGNOSIS — I13.10 HYPERTENSIVE HEART AND CHRONIC KIDNEY DISEASE WITHOUT HEART FAILURE, WITH STAGE 1 THROUGH STAGE 4 CHRONIC KIDNEY DISEASE, OR UNSPECIFIED CHRONIC KIDNEY DISEASE: ICD-10-CM

## 2021-04-15 DIAGNOSIS — Z95.1 PRESENCE OF AORTOCORONARY BYPASS GRAFT: ICD-10-CM

## 2021-04-15 DIAGNOSIS — Z85.51 PERSONAL HISTORY OF MALIGNANT NEOPLASM OF BLADDER: ICD-10-CM

## 2021-04-15 DIAGNOSIS — Z79.82 LONG TERM (CURRENT) USE OF ASPIRIN: ICD-10-CM

## 2021-04-15 DIAGNOSIS — Z79.01 LONG TERM (CURRENT) USE OF ANTICOAGULANTS: ICD-10-CM

## 2021-04-15 DIAGNOSIS — I48.91 UNSPECIFIED ATRIAL FIBRILLATION: ICD-10-CM

## 2021-04-15 DIAGNOSIS — N18.30 CHRONIC KIDNEY DISEASE, STAGE 3 UNSPECIFIED: ICD-10-CM

## 2021-04-15 DIAGNOSIS — Z87.891 PERSONAL HISTORY OF NICOTINE DEPENDENCE: ICD-10-CM

## 2021-04-15 DIAGNOSIS — Z79.899 OTHER LONG TERM (CURRENT) DRUG THERAPY: ICD-10-CM

## 2021-04-15 DIAGNOSIS — Z98.49 CATARACT EXTRACTION STATUS, UNSPECIFIED EYE: ICD-10-CM

## 2021-04-15 DIAGNOSIS — J44.9 CHRONIC OBSTRUCTIVE PULMONARY DISEASE, UNSPECIFIED: ICD-10-CM

## 2021-04-15 DIAGNOSIS — L89.623 PRESSURE ULCER OF LEFT HEEL, STAGE 3: ICD-10-CM

## 2021-04-15 DIAGNOSIS — Z95.5 PRESENCE OF CORONARY ANGIOPLASTY IMPLANT AND GRAFT: ICD-10-CM

## 2021-04-15 DIAGNOSIS — Z94.5 SKIN TRANSPLANT STATUS: ICD-10-CM

## 2021-04-15 NOTE — ASSESSMENT
[Verbal] : Verbal [Written] : Written [Demo] : Demo [Patient] : Patient [Good - alert, interested, motivated] : Good - alert, interested, motivated [Verbalizes knowledge/Understanding] : Verbalizes knowledge/understanding [Dressing changes] : dressing changes [Foot Care] : foot care [Skin Care] : skin care [Pressure relief] : pressure relief [Signs and symptoms of infection] : sign and symptoms of infection [Nutrition] : nutrition [How and When to Call] : how and when to call [Pain Management] : pain management [Off-loading] : off-loading [Patient responsibility to plan of care] : patient responsibility to plan of care [] : Yes [Stable] : stable [Home] : Home [Cane] : Cane [Not Applicable - Long Term Care/Home Health Agency] : Long Term Care/Home Health Agency: Not Applicable [FreeTextEntry2] : Infection prevention\par Offloading \par Promote skin integrity\par Pressure relief\par demonstrates use of both pharmacological and non pharmacological pain management interventions\par Maintain acceptable levels of pain [FreeTextEntry4] : Vascular consult pending, pt to have consult with Dr. Zabala at her [place] office\par F/U 1 month for assessment\par

## 2021-04-15 NOTE — PLAN
[FreeTextEntry1] : continue protective dressing , observation Spent 20 minutes for patient care and medical decision making.\par

## 2021-04-15 NOTE — REVIEW OF SYSTEMS
[Fever] : no fever [Eye Pain] : no eye pain [Earache] : no earache [Chest Pain] : no chest pain [Shortness Of Breath] : no shortness of breath [Cough] : no cough [Abdominal Pain] : no abdominal pain [Skin Wound] : skin wound [Confused] : no confusion [Anxiety] : no anxiety [Easy Bleeding] : a tendency for easy bleeding [Negative] : Endocrine [FreeTextEntry5] : afib ,HTN, HLD , CABG [FreeTextEntry8] : ckd [de-identified] :  (+OM on mri). currently pressure ulcer is closed  [de-identified] : Eliquis

## 2021-04-15 NOTE — PHYSICAL EXAM
[2 x 2] : 2 x 2  [1+] : left 1+ [Ankle Swelling (On Exam)] : present [Ankle Swelling On The Left] : moderate [Varicose Veins Of Lower Extremities] : not present [] : not present [Skin Ulcer] : ulcer [Alert] : alert [Oriented to Person] : oriented to person [Oriented to Place] : oriented to place [Oriented to Time] : oriented to time [Calm] : calm [de-identified] : A&Ox3, NAD [de-identified] : HTN , HLD , Cabg , a-fib [de-identified] : 3/5 strength in all quadrants bilaterally , OM heel on MRI , not clinically infected  [de-identified] : (+OM on mri). Currently posterior pressure ulcer is closed  [de-identified] : Diminished light touch sensation bilaterally [FreeTextEntry1] : Left Heel- Dry Eschar/Scab with areas of new epithelium [de-identified] : Germán [place] [de-identified] : \par  [de-identified] : No [de-identified] : None [de-identified] : None [de-identified] : 3x Weekly [de-identified] : Primary Dressing [TWNoteComboBox4] : None [TWNoteComboBox5] : No [TWNoteComboBox6] : Pressure [de-identified] : No [de-identified] : Normal [de-identified] : None [de-identified] : None [de-identified] : None [de-identified] : No [de-identified] : 3x Weekly [de-identified] : Primary Dressing

## 2021-05-16 NOTE — DISCHARGE NOTE ADULT - NS AS DC FU [facility] LV FUNCTION ASSESSMENT
2018  Melvi Bradford is a 63 y.o., female.  Pre-operative evaluation for PHACOEMULSIFICATION-ASPIRATION-CATARACT (Left), INSERTION-INTRAOCULAR LENS (IOL) (Left)    Chief Complaint: cataract, left eye    PMH:  Coronary artery disease, angina presence unspecified   Essential hypertension    Left carotid bruit    Hyperlipidemia   Reflux esophagitis    History of MI (myocardial infarction)    DEISY on CPAP         Past Surgical History:   Procedure Laterality Date    BALLOON ANGIOPLASTY, ARTERY Right 2015    right leg    CORONARY STENT PLACEMENT      EYE SURGERY Left 2016    FEMORAL ARTERY STENT Left 2015    HERNIA REPAIR  1978    HYSTERECTOMY      TUBAL LIGATION           Vital Signs Range (Last 24H):         CBC:     Recent Labs  Lab 18  1125   WBC 6.25   RBC 4.21   HGB 10.8*   HCT 35.7*      MCV 85   MCH 25.7*   MCHC 30.3*       CMP:   Recent Labs  Lab 18  1125      K 3.8      CO2 35*   BUN 10   CREATININE 0.9   GLU 98   CALCIUM 10.0   ALBUMIN 3.6   PROT 7.7   ALKPHOS 80   ALT 18   AST 18   BILITOT 0.4       INR:  No results for input(s): PT, INR, PROTIME, APTT in the last 720 hours.      Diagnostic Studies:      EKD Echo:  Anesthesia Evaluation         Review of Systems         Anesthesia Plan  Type of Anesthesia, risks & benefits discussed:  Anesthesia Type:  MAC  Patient's Preference:   Intra-op Monitoring Plan: standard ASA monitors  Intra-op Monitoring Plan Comments:   Post Op Pain Control Plan:   Post Op Pain Control Plan Comments:   Induction:   IV  Beta Blocker:  Patient is on a Beta-Blocker and has received one dose within the past 24 hours (No further documentation required).       Informed Consent: Patient understands risks and agrees with Anesthesia plan.  Questions answered. Anesthesia consent signed with patient.  ASA  Dual AVS reviewed with Cassidy Borges RN. All medications reviewed individually with patient. Opportunities for questions and concerns provided. Patient verbalized understanding and verified by teachback. IV discontinued, no redness, swelling or pain noted. Patient discharged via (mode of transport ie. Car, ambulance or air transport) car. Patient's arm band appropriately discarded. Score: 3     Day of Surgery Review of History & Physical:    H&P update referred to the surgeon.         Ready For Surgery From Anesthesia Perspective.        yes

## 2021-05-18 ENCOUNTER — APPOINTMENT (OUTPATIENT)
Dept: PODIATRY | Facility: HOSPITAL | Age: 71
End: 2021-05-18
Payer: MEDICARE

## 2021-05-18 ENCOUNTER — OUTPATIENT (OUTPATIENT)
Dept: OUTPATIENT SERVICES | Facility: HOSPITAL | Age: 71
LOS: 1 days | Discharge: HOME | End: 2021-05-18
Payer: MEDICARE

## 2021-05-18 VITALS
RESPIRATION RATE: 18 BRPM | BODY MASS INDEX: 28.61 KG/M2 | DIASTOLIC BLOOD PRESSURE: 77 MMHG | HEIGHT: 66 IN | HEART RATE: 72 BPM | WEIGHT: 178 LBS | TEMPERATURE: 96.8 F | SYSTOLIC BLOOD PRESSURE: 157 MMHG | OXYGEN SATURATION: 99 %

## 2021-05-18 DIAGNOSIS — Z95.5 PRESENCE OF CORONARY ANGIOPLASTY IMPLANT AND GRAFT: Chronic | ICD-10-CM

## 2021-05-18 DIAGNOSIS — Z95.1 PRESENCE OF AORTOCORONARY BYPASS GRAFT: Chronic | ICD-10-CM

## 2021-05-18 DIAGNOSIS — L89.623 PRESSURE ULCER OF LEFT HEEL, STAGE 3: ICD-10-CM

## 2021-05-18 DIAGNOSIS — Z98.890 OTHER SPECIFIED POSTPROCEDURAL STATES: Chronic | ICD-10-CM

## 2021-05-18 PROCEDURE — G0463: CPT

## 2021-05-18 PROCEDURE — 99213 OFFICE O/P EST LOW 20 MIN: CPT

## 2021-05-19 NOTE — REVIEW OF SYSTEMS
[Fever] : no fever [Eye Pain] : no eye pain [Earache] : no earache [Chest Pain] : no chest pain [Shortness Of Breath] : no shortness of breath [Cough] : no cough [Skin Wound] : skin wound [Abdominal Pain] : no abdominal pain [Confused] : no confusion [Anxiety] : no anxiety [Easy Bleeding] : a tendency for easy bleeding [Negative] : Endocrine [FreeTextEntry5] : afib ,HTN, HLD , CABG [FreeTextEntry8] : ckd [de-identified] :  (+OM on mri). currently pressure ulcer is closed  [de-identified] : Eliquis

## 2021-05-19 NOTE — PHYSICAL EXAM
[1+] : left 1+ [Ankle Swelling (On Exam)] : present [Ankle Swelling On The Left] : moderate [Varicose Veins Of Lower Extremities] : not present [] : not present [Skin Ulcer] : ulcer [Alert] : alert [Oriented to Person] : oriented to person [Oriented to Place] : oriented to place [Oriented to Time] : oriented to time [Calm] : calm [de-identified] : A&Ox3, NAD [de-identified] : HTN , HLD , Cabg , a-fib [de-identified] : 3/5 strength in all quadrants bilaterally , OM heel on MRI , not clinically infected  [de-identified] : (+OM on mri). Currently posterior pressure ulcer is closed  [de-identified] : Diminished light touch sensation bilaterally [FreeTextEntry1] : Left Heel- Dry Eschar/Scab with areas of new epithelium [de-identified] : No Product  [de-identified] : \par  [de-identified] : No [de-identified] : None [de-identified] : None [de-identified] : 3x Weekly [de-identified] : Primary Dressing

## 2021-05-19 NOTE — VITALS
[] : No [de-identified] : Pt rates pain 0/10.  Patient denies any pain or discomfort at the present  [FreeTextEntry5] : Pt currently taking Spironolactone and Bumetanide but unsure of dosage.  Requested pt call or bring in information to update chart

## 2021-05-19 NOTE — PLAN
[FreeTextEntry1] : continue observation and off loading   Spent 20 minutes for patient care and medical decision making.\par

## 2021-05-19 NOTE — ASSESSMENT
[Verbal] : Verbal [Written] : Written [Patient] : Patient [Good - alert, interested, motivated] : Good - alert, interested, motivated [Verbalizes knowledge/Understanding] : Verbalizes knowledge/understanding [Foot Care] : foot care [Skin Care] : skin care [Pressure relief] : pressure relief [Signs and symptoms of infection] : sign and symptoms of infection [How and When to Call] : how and when to call [Patient responsibility to plan of care] : patient responsibility to plan of care [] : Yes [Stable] : stable [Home] : Home [Cane] : Cane [Not Applicable - Long Term Care/Home Health Agency] : Long Term Care/Home Health Agency: Not Applicable [FreeTextEntry2] : Infection Prevention\par Maintain Optimal Skin Integrity \par Maintain acceptable pain level with use of pharmacological and nonpharmacological interventions \par  [FreeTextEntry4] : F/U to [facility] for an assessment in Two Weeks\par Pt was advised by DPM to use Bag Balm q daily in PM to promote optimal skin integrity

## 2021-05-20 DIAGNOSIS — Z87.891 PERSONAL HISTORY OF NICOTINE DEPENDENCE: ICD-10-CM

## 2021-05-20 DIAGNOSIS — N18.30 CHRONIC KIDNEY DISEASE, STAGE 3 UNSPECIFIED: ICD-10-CM

## 2021-05-20 DIAGNOSIS — L89.623 PRESSURE ULCER OF LEFT HEEL, STAGE 3: ICD-10-CM

## 2021-05-20 DIAGNOSIS — Z94.5 SKIN TRANSPLANT STATUS: ICD-10-CM

## 2021-05-20 DIAGNOSIS — Z79.899 OTHER LONG TERM (CURRENT) DRUG THERAPY: ICD-10-CM

## 2021-05-20 DIAGNOSIS — Z98.49 CATARACT EXTRACTION STATUS, UNSPECIFIED EYE: ICD-10-CM

## 2021-05-20 DIAGNOSIS — Z85.51 PERSONAL HISTORY OF MALIGNANT NEOPLASM OF BLADDER: ICD-10-CM

## 2021-05-20 DIAGNOSIS — I13.10 HYPERTENSIVE HEART AND CHRONIC KIDNEY DISEASE WITHOUT HEART FAILURE, WITH STAGE 1 THROUGH STAGE 4 CHRONIC KIDNEY DISEASE, OR UNSPECIFIED CHRONIC KIDNEY DISEASE: ICD-10-CM

## 2021-05-20 DIAGNOSIS — Z95.1 PRESENCE OF AORTOCORONARY BYPASS GRAFT: ICD-10-CM

## 2021-05-20 DIAGNOSIS — I48.91 UNSPECIFIED ATRIAL FIBRILLATION: ICD-10-CM

## 2021-05-20 DIAGNOSIS — Z79.82 LONG TERM (CURRENT) USE OF ASPIRIN: ICD-10-CM

## 2021-05-20 DIAGNOSIS — J44.9 CHRONIC OBSTRUCTIVE PULMONARY DISEASE, UNSPECIFIED: ICD-10-CM

## 2021-05-20 DIAGNOSIS — Z95.5 PRESENCE OF CORONARY ANGIOPLASTY IMPLANT AND GRAFT: ICD-10-CM

## 2021-05-20 DIAGNOSIS — Z79.01 LONG TERM (CURRENT) USE OF ANTICOAGULANTS: ICD-10-CM

## 2021-06-01 ENCOUNTER — APPOINTMENT (OUTPATIENT)
Dept: PODIATRY | Facility: HOSPITAL | Age: 71
End: 2021-06-01
Payer: MEDICARE

## 2021-06-01 ENCOUNTER — OUTPATIENT (OUTPATIENT)
Dept: OUTPATIENT SERVICES | Facility: HOSPITAL | Age: 71
LOS: 1 days | Discharge: HOME | End: 2021-06-01
Payer: MEDICARE

## 2021-06-01 VITALS
HEART RATE: 77 BPM | SYSTOLIC BLOOD PRESSURE: 149 MMHG | BODY MASS INDEX: 28.61 KG/M2 | OXYGEN SATURATION: 95 % | WEIGHT: 178 LBS | TEMPERATURE: 98.2 F | DIASTOLIC BLOOD PRESSURE: 71 MMHG | HEIGHT: 66 IN | RESPIRATION RATE: 20 BRPM

## 2021-06-01 DIAGNOSIS — Z95.5 PRESENCE OF CORONARY ANGIOPLASTY IMPLANT AND GRAFT: Chronic | ICD-10-CM

## 2021-06-01 DIAGNOSIS — Z95.1 PRESENCE OF AORTOCORONARY BYPASS GRAFT: Chronic | ICD-10-CM

## 2021-06-01 DIAGNOSIS — Z98.890 OTHER SPECIFIED POSTPROCEDURAL STATES: Chronic | ICD-10-CM

## 2021-06-01 DIAGNOSIS — L89.623 PRESSURE ULCER OF LEFT HEEL, STAGE 3: ICD-10-CM

## 2021-06-01 PROCEDURE — G0463: CPT

## 2021-06-01 PROCEDURE — 99213 OFFICE O/P EST LOW 20 MIN: CPT

## 2021-06-01 NOTE — REVIEW OF SYSTEMS
[Fever] : no fever [Eye Pain] : no eye pain [Earache] : no earache [Chest Pain] : no chest pain [Shortness Of Breath] : no shortness of breath [Cough] : no cough [Abdominal Pain] : no abdominal pain [Skin Wound] : skin wound [Confused] : no confusion [Anxiety] : no anxiety [Easy Bleeding] : a tendency for easy bleeding [Negative] : Endocrine [FreeTextEntry5] : afib ,HTN, HLD , CABG [FreeTextEntry8] : ckd [de-identified] :  (+OM on mri). currently pressure ulcer is closed  [de-identified] : Eliquis

## 2021-06-01 NOTE — PLAN
[FreeTextEntry1] : Patient healed no open wound , skin integrity intact , no soi , happy with the results , patient discharged with full instructions Spent 20 minutes for patient care and medical decision making.\par

## 2021-06-01 NOTE — PHYSICAL EXAM
[1+] : left 1+ [Ankle Swelling (On Exam)] : present [Ankle Swelling On The Left] : moderate [Varicose Veins Of Lower Extremities] : not present [] : not present [Skin Ulcer] : ulcer [Alert] : alert [Oriented to Person] : oriented to person [Oriented to Place] : oriented to place [Oriented to Time] : oriented to time [Calm] : calm [de-identified] : HTN , HLD , Cabg , a-fib [de-identified] : A&Ox3, NAD [de-identified] : 3/5 strength in all quadrants bilaterally , OM heel on MRI , not clinically infected  [de-identified] : (+OM on mri). Currently posterior pressure ulcer is closed  [de-identified] : Diminished light touch sensation bilaterally [FreeTextEntry1] : Left heel - Dry skin - no open wound  [de-identified] : Cleansed with NS\par No other treatment

## 2021-06-01 NOTE — ASSESSMENT
[Verbal] : Verbal [Written] : Written [Patient] : Patient [Good - alert, interested, motivated] : Good - alert, interested, motivated [Verbalizes knowledge/Understanding] : Verbalizes knowledge/understanding [Foot Care] : foot care [Skin Care] : skin care [Signs and symptoms of infection] : sign and symptoms of infection [How and When to Call] : how and when to call [Patient responsibility to plan of care] : patient responsibility to plan of care [Stable] : stable [Home] : Home [Ambulatory] : Ambulatory [Not Applicable - Long Term Care/Home Health Agency] : Long Term Care/Home Health Agency: Not Applicable [] : No [FreeTextEntry2] : Infection prevention\par Goal of remaining pain free regarding wounds.\par Promote optimal skin integrity  [FreeTextEntry4] : Patient discharged

## 2021-06-02 DIAGNOSIS — Z87.891 PERSONAL HISTORY OF NICOTINE DEPENDENCE: ICD-10-CM

## 2021-06-02 DIAGNOSIS — Z79.899 OTHER LONG TERM (CURRENT) DRUG THERAPY: ICD-10-CM

## 2021-06-02 DIAGNOSIS — I48.91 UNSPECIFIED ATRIAL FIBRILLATION: ICD-10-CM

## 2021-06-02 DIAGNOSIS — Z95.1 PRESENCE OF AORTOCORONARY BYPASS GRAFT: ICD-10-CM

## 2021-06-02 DIAGNOSIS — L89.623 PRESSURE ULCER OF LEFT HEEL, STAGE 3: ICD-10-CM

## 2021-06-02 DIAGNOSIS — Z85.51 PERSONAL HISTORY OF MALIGNANT NEOPLASM OF BLADDER: ICD-10-CM

## 2021-06-02 DIAGNOSIS — I13.10 HYPERTENSIVE HEART AND CHRONIC KIDNEY DISEASE WITHOUT HEART FAILURE, WITH STAGE 1 THROUGH STAGE 4 CHRONIC KIDNEY DISEASE, OR UNSPECIFIED CHRONIC KIDNEY DISEASE: ICD-10-CM

## 2021-06-02 DIAGNOSIS — Z94.5 SKIN TRANSPLANT STATUS: ICD-10-CM

## 2021-06-02 DIAGNOSIS — Z79.01 LONG TERM (CURRENT) USE OF ANTICOAGULANTS: ICD-10-CM

## 2021-06-02 DIAGNOSIS — Z95.5 PRESENCE OF CORONARY ANGIOPLASTY IMPLANT AND GRAFT: ICD-10-CM

## 2021-06-02 DIAGNOSIS — J44.9 CHRONIC OBSTRUCTIVE PULMONARY DISEASE, UNSPECIFIED: ICD-10-CM

## 2021-06-02 DIAGNOSIS — N18.30 CHRONIC KIDNEY DISEASE, STAGE 3 UNSPECIFIED: ICD-10-CM

## 2021-06-02 DIAGNOSIS — Z98.49 CATARACT EXTRACTION STATUS, UNSPECIFIED EYE: ICD-10-CM

## 2021-06-02 DIAGNOSIS — Z79.82 LONG TERM (CURRENT) USE OF ASPIRIN: ICD-10-CM

## 2021-06-13 ENCOUNTER — EMERGENCY (EMERGENCY)
Facility: HOSPITAL | Age: 71
LOS: 1 days | Discharge: HOME | End: 2021-06-13
Attending: EMERGENCY MEDICINE
Payer: MEDICARE

## 2021-06-13 VITALS
OXYGEN SATURATION: 100 % | SYSTOLIC BLOOD PRESSURE: 158 MMHG | TEMPERATURE: 99 F | HEART RATE: 96 BPM | DIASTOLIC BLOOD PRESSURE: 87 MMHG | RESPIRATION RATE: 18 BRPM

## 2021-06-13 VITALS
WEIGHT: 177.91 LBS | HEART RATE: 105 BPM | TEMPERATURE: 98 F | OXYGEN SATURATION: 98 % | RESPIRATION RATE: 16 BRPM | HEIGHT: 66 IN | SYSTOLIC BLOOD PRESSURE: 114 MMHG | DIASTOLIC BLOOD PRESSURE: 57 MMHG

## 2021-06-13 DIAGNOSIS — Z98.890 OTHER SPECIFIED POSTPROCEDURAL STATES: Chronic | ICD-10-CM

## 2021-06-13 DIAGNOSIS — Z95.5 PRESENCE OF CORONARY ANGIOPLASTY IMPLANT AND GRAFT: Chronic | ICD-10-CM

## 2021-06-13 DIAGNOSIS — Z95.1 PRESENCE OF AORTOCORONARY BYPASS GRAFT: Chronic | ICD-10-CM

## 2021-06-13 LAB
ALBUMIN SERPL ELPH-MCNC: 4.1 G/DL — SIGNIFICANT CHANGE UP (ref 3.3–5.2)
ALP SERPL-CCNC: 181 U/L — HIGH (ref 40–120)
ALT FLD-CCNC: 14 U/L — SIGNIFICANT CHANGE UP
ANION GAP SERPL CALC-SCNC: 16 MMOL/L — SIGNIFICANT CHANGE UP (ref 5–17)
ANISOCYTOSIS BLD QL: SIGNIFICANT CHANGE UP
AST SERPL-CCNC: 24 U/L — SIGNIFICANT CHANGE UP
BASOPHILS # BLD AUTO: 0 K/UL — SIGNIFICANT CHANGE UP (ref 0–0.2)
BASOPHILS NFR BLD AUTO: 0 % — SIGNIFICANT CHANGE UP (ref 0–2)
BILIRUB SERPL-MCNC: 1 MG/DL — SIGNIFICANT CHANGE UP (ref 0.4–2)
BUN SERPL-MCNC: 76.9 MG/DL — HIGH (ref 8–20)
CALCIUM SERPL-MCNC: 9.4 MG/DL — SIGNIFICANT CHANGE UP (ref 8.6–10.2)
CHLORIDE SERPL-SCNC: 97 MMOL/L — LOW (ref 98–107)
CO2 SERPL-SCNC: 20 MMOL/L — LOW (ref 22–29)
CREAT SERPL-MCNC: 3.04 MG/DL — HIGH (ref 0.5–1.3)
EOSINOPHIL # BLD AUTO: 0 K/UL — SIGNIFICANT CHANGE UP (ref 0–0.5)
EOSINOPHIL NFR BLD AUTO: 0 % — SIGNIFICANT CHANGE UP (ref 0–6)
GLUCOSE SERPL-MCNC: 143 MG/DL — HIGH (ref 70–99)
HCT VFR BLD CALC: 28.5 % — LOW (ref 39–50)
HGB BLD-MCNC: 9.6 G/DL — LOW (ref 13–17)
LYMPHOCYTES # BLD AUTO: 0.6 K/UL — LOW (ref 1–3.3)
LYMPHOCYTES # BLD AUTO: 7 % — LOW (ref 13–44)
MACROCYTES BLD QL: SIGNIFICANT CHANGE UP
MANUAL SMEAR VERIFICATION: SIGNIFICANT CHANGE UP
MCHC RBC-ENTMCNC: 33.7 GM/DL — SIGNIFICANT CHANGE UP (ref 32–36)
MCHC RBC-ENTMCNC: 37.4 PG — HIGH (ref 27–34)
MCV RBC AUTO: 110.9 FL — HIGH (ref 80–100)
METAMYELOCYTES # FLD: 0.9 % — HIGH (ref 0–0)
MONOCYTES # BLD AUTO: 0.45 K/UL — SIGNIFICANT CHANGE UP (ref 0–0.9)
MONOCYTES NFR BLD AUTO: 5.2 % — SIGNIFICANT CHANGE UP (ref 2–14)
NEUTROPHILS # BLD AUTO: 7.46 K/UL — HIGH (ref 1.8–7.4)
NEUTROPHILS NFR BLD AUTO: 85.2 % — HIGH (ref 43–77)
NEUTS BAND # BLD: 1.7 % — SIGNIFICANT CHANGE UP (ref 0–8)
NT-PROBNP SERPL-SCNC: 4545 PG/ML — HIGH (ref 0–300)
PLAT MORPH BLD: NORMAL — SIGNIFICANT CHANGE UP
PLATELET # BLD AUTO: 241 K/UL — SIGNIFICANT CHANGE UP (ref 150–400)
POLYCHROMASIA BLD QL SMEAR: SLIGHT — SIGNIFICANT CHANGE UP
POTASSIUM SERPL-MCNC: 4.2 MMOL/L — SIGNIFICANT CHANGE UP (ref 3.5–5.3)
POTASSIUM SERPL-SCNC: 4.2 MMOL/L — SIGNIFICANT CHANGE UP (ref 3.5–5.3)
PROT SERPL-MCNC: 6.8 G/DL — SIGNIFICANT CHANGE UP (ref 6.6–8.7)
RBC # BLD: 2.57 M/UL — LOW (ref 4.2–5.8)
RBC # FLD: 14.9 % — HIGH (ref 10.3–14.5)
RBC BLD AUTO: ABNORMAL
SODIUM SERPL-SCNC: 132 MMOL/L — LOW (ref 135–145)
TROPONIN T SERPL-MCNC: 0.21 NG/ML — HIGH (ref 0–0.06)
WBC # BLD: 8.58 K/UL — SIGNIFICANT CHANGE UP (ref 3.8–10.5)
WBC # FLD AUTO: 8.58 K/UL — SIGNIFICANT CHANGE UP (ref 3.8–10.5)

## 2021-06-13 PROCEDURE — 73590 X-RAY EXAM OF LOWER LEG: CPT | Mod: 26,RT

## 2021-06-13 PROCEDURE — 84484 ASSAY OF TROPONIN QUANT: CPT

## 2021-06-13 PROCEDURE — 87186 SC STD MICRODIL/AGAR DIL: CPT

## 2021-06-13 PROCEDURE — 73590 X-RAY EXAM OF LOWER LEG: CPT

## 2021-06-13 PROCEDURE — 80053 COMPREHEN METABOLIC PANEL: CPT

## 2021-06-13 PROCEDURE — 93010 ELECTROCARDIOGRAM REPORT: CPT

## 2021-06-13 PROCEDURE — 87150 DNA/RNA AMPLIFIED PROBE: CPT

## 2021-06-13 PROCEDURE — 71046 X-RAY EXAM CHEST 2 VIEWS: CPT

## 2021-06-13 PROCEDURE — 85025 COMPLETE CBC W/AUTO DIFF WBC: CPT

## 2021-06-13 PROCEDURE — 99285 EMERGENCY DEPT VISIT HI MDM: CPT

## 2021-06-13 PROCEDURE — 36415 COLL VENOUS BLD VENIPUNCTURE: CPT

## 2021-06-13 PROCEDURE — 87040 BLOOD CULTURE FOR BACTERIA: CPT

## 2021-06-13 PROCEDURE — 93971 EXTREMITY STUDY: CPT | Mod: 26,RT

## 2021-06-13 PROCEDURE — 71046 X-RAY EXAM CHEST 2 VIEWS: CPT | Mod: 26

## 2021-06-13 PROCEDURE — 93971 EXTREMITY STUDY: CPT

## 2021-06-13 PROCEDURE — 93005 ELECTROCARDIOGRAM TRACING: CPT

## 2021-06-13 PROCEDURE — 83880 ASSAY OF NATRIURETIC PEPTIDE: CPT

## 2021-06-13 PROCEDURE — 87077 CULTURE AEROBIC IDENTIFY: CPT

## 2021-06-13 PROCEDURE — 99284 EMERGENCY DEPT VISIT MOD MDM: CPT | Mod: 25

## 2021-06-13 PROCEDURE — 87181 SC STD AGAR DILUTION PER AGT: CPT

## 2021-06-13 RX ORDER — CEPHALEXIN 500 MG
1 CAPSULE ORAL
Qty: 14 | Refills: 0
Start: 2021-06-13 | End: 2021-06-19

## 2021-06-13 NOTE — ED STATDOCS - PROGRESS NOTE DETAILS
pt is seen by Dr Tabor agreed with hx , PE and plan   seen the pt at the bed side with attending aware the last Creatinine was 3.0 and she is  [facility] and has cardiology . and PC in [facility] .  pt refused stay in hospital . deneis any chest pain or SOB. seen again by dr tabor at the bed side all concern answered .  will send the antibiotic to pharmacy and close  f.u pcp and nephrology pt is seen by Dr Tabor agreed with hx , PE and plan   seen the pt at the bed side with attending aware the last Creatinine was 3.0 and he is  [facility] and has cardiology . and PC in [facility] .  pt refused stay in hospital . deneis any chest pain or SOB. seen again by dr tabor at the bed side all concern answered .  will send the antibiotic to pharmacy and close  f.u pcp and nephrology pt is seen by Dr Tabor initially agreed with hx , PE  seen the pt at the bed side swollen RLE  and small bite size on the proximal shin . pt is aware of his last Creatinine states was 3.0 . made him aware that last creatinine in [facility] was 1.67. pt states he has 2 nephrologist in [place] and [place] that he f.u. he f.u at [facility] and cardiology as well. and his PCp in [place]. consult ssc. case d.w dr tabor again pt has elevated trop most likely due to elevated creatinine. pt denies any chest pain or SOB , reexamine Lung CTA. pt refused stay in hospital . he request the antibiotic   pt's concern d.w with attending. seen the pt with  by dr tabor at the bed side all concern answered .  pt is been told less likely cellulitis . pt explained by that we will send the antibiotic to pharmacy and he should hold on to and fu with pcp within 1-2 day for re evaluation of his RLE to if necessary to start the antibiotic. pt is agreed and understood.  and close f.u with nephrologist . have discussion with attending does not need AMA . I have d.c the pt

## 2021-06-13 NOTE — ED ADULT NURSE NOTE - NSIMPLEMENTINTERV_GEN_ALL_ED
Implemented All Universal Safety Interventions:  [place] to call system. Call bell, personal items and telephone within reach. Instruct patient to call for assistance. Room bathroom lighting operational. Non-slip footwear when patient is off stretcher. Physically safe environment: no spills, clutter or unnecessary equipment. Stretcher in lowest position, wheels locked, appropriate side rails in place. Implemented All Fall Risk Interventions:  [place] to call system. Call bell, personal items and telephone within reach. Instruct patient to call for assistance. Room bathroom lighting operational. Non-slip footwear when patient is off stretcher. Physically safe environment: no spills, clutter or unnecessary equipment. Stretcher in lowest position, wheels locked, appropriate side rails in place. Provide visual cue, wrist band, yellow gown, etc. Monitor gait and stability. Monitor for mental status changes and reorient to person, place, and time. Review medications for side effects contributing to fall risk. Reinforce activity limits and safety measures with patient and family.

## 2021-06-13 NOTE — ED STATDOCS - OBJECTIVE STATEMENT
72 y/o M with PMH A fib on Eliquis, CHF, CKD, COPD, CAD, left LE ulcer presents from urgent care for right LE edema. He states that 5 days ago he got "bitten by something" and notes swelling to the right LE since then. He denies pain to the extremity, but notes chills, 2 days of diarrhea and 1 episode of vomiting. He notes ecchymosis of the right lower leg since the bite, denies any injury otherwise.

## 2021-06-13 NOTE — ED STATDOCS - ATTENDING CONTRIBUTION TO CARE
I, Charla Hi, performed the initial face to face bedside interview with this patient regarding history of present illness, review of symptoms and relevant past medical, social and family history.  I completed an independent physical examination.  I was the initial provider who evaluated this patient. I have signed out the follow up of any pending tests (i.e. labs, radiological studies) to the ACP.  I have communicated the patient’s plan of care and disposition with the ACP.

## 2021-06-13 NOTE — ED ADULT NURSE NOTE - OBJECTIVE STATEMENT
71 M, nad, alert and oriented x 3 c/o swelling and discoloration to RLE x 5 days, states "I think something bit me", pt denies CP, denies SOB

## 2021-06-13 NOTE — ED STATDOCS - PHYSICAL EXAMINATION
Const: Awake, alert and oriented. In no acute distress. Well appearing.  HEENT: NC/AT. Moist mucous membranes.  Eyes: No scleral icterus. EOMI.  Neck:. Soft and supple. Full ROM without pain.  Cardiac: Tachycardic, irregularly irregular rate and rhythm. +S1/S2. No murmurs. Peripheral pulses 2+ and symmetric. 2+ right pitting LE edema.  Resp: Speaking in full sentences. No evidence of respiratory distress. No wheezes, rales or rhonchi.  Abd: Soft, non-tender, non-distended. Normal bowel sounds in all 4 quadrants. No guarding or rebound.  Back: Spine midline and non-tender. No CVAT.  Skin: Extensive ecchymosis of the right lower leg, non-tender. Skin graft to lateral aspect of left lower leg.  Neuro: Awake, alert & oriented x 3. Moves all extremities symmetrically.

## 2021-06-13 NOTE — ED STATDOCS - NSFOLLOWUPINSTRUCTIONS_ED_ALL_ED_FT
please as we spoke hold the antibiotic   close follow up with primary care , leg elevation at home   please bring the result as given   come back in ER if any worsen of your symptoms , fever , warmth or any new concern     Cellulitis    Cellulitis is a skin infection caused by bacteria. This condition occurs most often in the arms and lower legs but can occur anywhere over the body. Symptoms include redness, swelling, warm skin, tenderness, and chills/fever. If you were prescribed an antibiotic medicine, take it as told by your health care provider. Do not stop taking the antibiotic even if you start to feel better.    SEEK IMMEDIATE MEDICAL CARE IF YOU HAVE ANY OF THE FOLLOWING SYMPTOMS: worsening fever, red streaks coming from affected area, vomiting or diarrhea, or dizziness/lightheadedness.

## 2021-06-13 NOTE — ED STATDOCS - CLINICAL SUMMARY MEDICAL DECISION MAKING FREE TEXT BOX
72 y/o M presents with right LE edema x 5 days following a bug bite - associated vomiting and diarrhea, no fever, but tachycardic in the ED. right LE edematous, ecchymotic, non-tender. Will evaluate with labs, sono, XR, EKG and CXR due to tachycardia and reassess. 70 y/o M presents with right LE edema x 5 days following a bug bite - associated vomiting and diarrhea, no fever, but tachycardic in the ED. right LE edematous, ecchymotic, non-tender. Will evaluate with labs, sono, XR, EKG and CXR due to tachycardia and reassess.    Patient noted to make troponin, have doubled creatinine from prior [facility] result. I shared results regarding no obvious cellulitis, normal WBC, no DVT, but elevated creatinine and troponin. Patient and wife state that baseline creatinine is 3.1 - he follows with renal and cardiology at [place], is asking for ABx. I discussed that at this time, it does not appear to be cellulitis as cause of LE edema, but will send Rx to pharmacy and discussed if there is pain, erythema, warmth or tenderness or skin changes to then start ABx. Patient is adamant will see his PMD in 48 hours and verbalizes understanding regarding indications to return. WIll discharge at this time - trop likely elevated in the face of rising creatinine, EKG non-ischemic. 70 y/o M presents with right LE edema x 5 days following a bug bite - associated vomiting and diarrhea, no fever, but tachycardic in the ED. right LE edematous, ecchymotic, non-tender. Will evaluate with labs, sono, XR, EKG and CXR due to tachycardia and reassess.    Patient noted to make troponin, have doubled creatinine from prior [facility] result. I shared results regarding no obvious cellulitis, normal WBC, no DVT, but elevated creatinine and troponin. Patient and wife state that baseline creatinine is 3.1 - he follows with renal and cardiology at [place], is asking for ABx. I discussed that at this time, it does not appear to be cellulitis as cause of LE edema, but will send Rx to pharmacy and discussed if there is pain, erythema, warmth or tenderness or skin changes to then start ABx. Patient is adamant will see his PMD in 48 hours and verbalizes understanding regarding indications to return. Will discharge at this time - trop likely elevated in the face of rising creatinine, EKG non-ischemic.

## 2021-06-13 NOTE — ED STATDOCS - NS ED ROS FT
Const: Denies fever, chills  HEENT: Denies blurry vision, sore throat  Neck: Denies neck pain/stiffness  Resp: Denies coughing, SOB  Cardiovascular: + LE edema. Denies CP, palpitations  GI: + vomiting, + diarrhea. Denies nausea, abdominal pain, constipation, blood in stool  : Denies urinary frequency/urgency/dysuria, hematuria  MSK: Denies back pain  Neuro: Denies HA, dizziness, numbness, weakness  Skin: Denies rashes.

## 2021-06-13 NOTE — ED STATDOCS - PATIENT PORTAL LINK FT
You can access the FollowMyHealth Patient Portal offered by [facility] by registering at the following website: [email or web address]. By joining [facility]’s FollowMyHealth portal, you will also be able to view your health information using other applications (apps) compatible with our system.

## 2021-06-13 NOTE — ED ADULT TRIAGE NOTE - CHIEF COMPLAINT QUOTE
Patient ambulated into ED with steady gait, Pt c/o right lower extremity swelling non painful, started about 5 days ago, went to urgent care was sent to ED.

## 2021-06-15 LAB
-  CANDIDA ALBICANS: SIGNIFICANT CHANGE UP
-  CANDIDA GLABRATA: SIGNIFICANT CHANGE UP
-  CANDIDA KRUSEI: SIGNIFICANT CHANGE UP
-  CANDIDA PARAPSILOSIS: SIGNIFICANT CHANGE UP
-  CANDIDA TROPICALIS: SIGNIFICANT CHANGE UP
-  COAGULASE NEGATIVE STAPHYLOCOCCUS: SIGNIFICANT CHANGE UP
-  K. PNEUMONIAE GROUP: SIGNIFICANT CHANGE UP
-  KPC RESISTANCE GENE: SIGNIFICANT CHANGE UP
-  STREPTOCOCCUS SP. (NOT GRP A, B OR S PNEUMONIAE): SIGNIFICANT CHANGE UP
A BAUMANNII DNA SPEC QL NAA+PROBE: SIGNIFICANT CHANGE UP
E CLOAC COMP DNA BLD POS QL NAA+PROBE: SIGNIFICANT CHANGE UP
E COLI DNA BLD POS QL NAA+NON-PROBE: SIGNIFICANT CHANGE UP
ENTEROCOC DNA BLD POS QL NAA+NON-PROBE: SIGNIFICANT CHANGE UP
ENTEROCOC DNA BLD POS QL NAA+NON-PROBE: SIGNIFICANT CHANGE UP
GP B STREP DNA BLD POS QL NAA+NON-PROBE: SIGNIFICANT CHANGE UP
GRAM STN FLD: SIGNIFICANT CHANGE UP
HAEM INFLU DNA BLD POS QL NAA+NON-PROBE: SIGNIFICANT CHANGE UP
K OXYTOCA DNA BLD POS QL NAA+NON-PROBE: SIGNIFICANT CHANGE UP
L MONOCYTOG DNA BLD POS QL NAA+NON-PROBE: SIGNIFICANT CHANGE UP
METHOD TYPE: SIGNIFICANT CHANGE UP
MRSA SPEC QL CULT: SIGNIFICANT CHANGE UP
MSSA DNA SPEC QL NAA+PROBE: SIGNIFICANT CHANGE UP
N MEN ISLT CULT: SIGNIFICANT CHANGE UP
ORGANISM # SPEC MICROSCOPIC CNT: SIGNIFICANT CHANGE UP
PROTEUS SP DNA BLD POS QL NAA+NON-PROBE: SIGNIFICANT CHANGE UP
S MARCESCENS DNA BLD POS NAA+NON-PROBE: SIGNIFICANT CHANGE UP
S PNEUM DNA BLD POS QL NAA+NON-PROBE: SIGNIFICANT CHANGE UP
S PYO DNA BLD POS QL NAA+NON-PROBE: SIGNIFICANT CHANGE UP
SPECIMEN SOURCE: SIGNIFICANT CHANGE UP

## 2021-06-15 NOTE — ED POST DISCHARGE NOTE - ADDITIONAL DOCUMENTATION
6/19/2021 0912:  I s/w patient to make aware of = BC, he does not want to come to hospital, he was informed that this can caude sepsis and death.  Verbalized understanding.

## 2021-06-15 NOTE — ED POST DISCHARGE NOTE - DETAILS
[facility] PD called for wellness check. Instructions given to have pt return to hospital for evaluation

## 2021-06-17 LAB
-  AMPICILLIN: SIGNIFICANT CHANGE UP
-  CEFTRIAXONE: SIGNIFICANT CHANGE UP
-  TRIMETHOPRIM/SULFAMETHOXAZOLE: SIGNIFICANT CHANGE UP
METHOD TYPE: SIGNIFICANT CHANGE UP

## 2021-06-18 LAB
-  CIPROFLOXACIN: SIGNIFICANT CHANGE UP
CULTURE RESULTS: SIGNIFICANT CHANGE UP
CULTURE RESULTS: SIGNIFICANT CHANGE UP
METHOD TYPE: SIGNIFICANT CHANGE UP
ORGANISM # SPEC MICROSCOPIC CNT: SIGNIFICANT CHANGE UP
SPECIMEN SOURCE: SIGNIFICANT CHANGE UP
SPECIMEN SOURCE: SIGNIFICANT CHANGE UP
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
## 2021-09-07 NOTE — PROGRESS NOTE ADULT - PROBLEM/PLAN-1
Comprehensive Nutrition Assessment    Type and Reason for Visit:  Reassess    Nutrition Recommendations/Plan:  Continue to encourage oral intakes    Nutrition Assessment:  Improving nutrient intakes r/t respiratory dysfunction, AEB improving PO intakes for meals and supplement. Continues on mvi, vit D and zinc (would recommned to d/c zinc after acute usage). Upbeat sounding over phone, and appearing comfortable in chair with high flow O2. Weight is holding steady. Malnutrition Assessment:  Malnutrition Status: At risk for malnutrition (Comment)    Context:  Acute Illness     Findings of the 6 clinical characteristics of malnutrition:  Energy Intake:  Mild decrease in energy intake (Comment) (3 days per patient)  Weight Loss:  No significant weight loss     Body Fat Loss:  Unable to assess     Muscle Mass Loss:  Unable to assess    Fluid Accumulation:  No significant fluid accumulation     Strength:  Not Performed    Estimated Daily Nutrient Needs:  Energy (kcal):  2510-2414 (18-20); Weight Used for Energy Requirements:  Current     Protein (g):  80-92 (1.3-1.5); Weight Used for Protein Requirements:  Ideal        Fluid (ml/day):  1800; Method Used for Fluid Requirements:  1 ml/kcal      Nutrition Related Findings:  appears well nourished      Wounds:  Surgical Incision       Current Nutrition Therapies:    ADULT DIET; Regular  Adult Oral Nutrition Supplement; Standard 4 oz Oral Supplement    Anthropometric Measures:  · Height: 5' 7\" (170.2 cm)  · Current Body Weight: 201 lb 4.8 oz (91.3 kg)   · Admission Body Weight: 204 lb (92.5 kg)    · Usual Body Weight:  (190-200#)     · Ideal Body Weight: 135 lbs; % Ideal Body Weight 146.5 %   · BMI: 31.5  · Adjusted Body Weight:  ; No Adjustment   · BMI Categories: Obese Class 1 (BMI 30.0-34. 9)       Nutrition Diagnosis:   · Altered nutrition-related lab values related to endocrine dysfuntion as evidenced by lab values    Lab Results   Component Value Date     09/07/2021    K 4.0 09/07/2021     09/07/2021    CO2 25 09/07/2021    BUN 24 (H) 09/07/2021    CREATININE 0.74 09/07/2021    GLUCOSE 95 09/07/2021    CALCIUM 9.0 09/07/2021    PROT 5.5 (L) 09/07/2021    LABALBU 2.8 (L) 09/07/2021    BILITOT 0.30 09/07/2021    ALKPHOS 90 09/07/2021    AST 24 09/07/2021    ALT 44 (H) 09/07/2021    LABGLOM >60 09/07/2021    GFRAA >60 09/07/2021    GLOB NOT REPORTED 05/08/2016     No results for input(s): POCGLU in the last 72 hours.     Nutrition Interventions:   Food and/or Nutrient Delivery:  Continue Oral Nutrition Supplement, Continue Current Diet  Nutrition Education/Counseling:  Education initiated   Coordination of Nutrition Care:  Continue to monitor while inpatient    Goals:  PO >75% meals and supplements       Nutrition Monitoring and Evaluation:   Behavioral-Environmental Outcomes:  None Identified   Food/Nutrient Intake Outcomes:  Food and Nutrient Intake, Supplement Intake  Physical Signs/Symptoms Outcomes:  Biochemical Data, Weight     Discharge Planning:    Continue Oral Nutrition Supplement     Electronically signed by Vijaya Chandler RD, LD on 9/7/21 at 12:01 PM EDT    Contact: [phone] DISPLAY PLAN FREE TEXT

## 2021-09-09 ENCOUNTER — APPOINTMENT (OUTPATIENT)
Dept: PULMONOLOGY | Facility: CLINIC | Age: 71
End: 2021-09-09
Payer: MEDICARE

## 2021-09-09 VITALS
RESPIRATION RATE: 16 BRPM | DIASTOLIC BLOOD PRESSURE: 60 MMHG | HEART RATE: 79 BPM | OXYGEN SATURATION: 96 % | SYSTOLIC BLOOD PRESSURE: 120 MMHG | BODY MASS INDEX: 33.29 KG/M2 | HEIGHT: 64 IN | WEIGHT: 195 LBS

## 2021-09-09 DIAGNOSIS — J44.9 CHRONIC OBSTRUCTIVE PULMONARY DISEASE, UNSPECIFIED: ICD-10-CM

## 2021-09-09 DIAGNOSIS — Z99.81 HYPOXEMIA: ICD-10-CM

## 2021-09-09 DIAGNOSIS — I27.20 PULMONARY HYPERTENSION, UNSPECIFIED: ICD-10-CM

## 2021-09-09 DIAGNOSIS — R09.02 HYPOXEMIA: ICD-10-CM

## 2021-09-09 PROCEDURE — 99213 OFFICE O/P EST LOW 20 MIN: CPT

## 2021-09-09 RX ORDER — ALPRAZOLAM 0.5 MG/1
0.5 TABLET ORAL
Qty: 2 | Refills: 0 | Status: DISCONTINUED | COMMUNITY
Start: 2021-02-04 | End: 2021-09-09

## 2021-09-09 RX ORDER — METOPROLOL TARTRATE 50 MG/1
50 TABLET, FILM COATED ORAL
Refills: 0 | Status: ACTIVE | COMMUNITY

## 2021-09-09 RX ORDER — BUMETANIDE 2 MG/1
2 TABLET ORAL
Refills: 0 | Status: ACTIVE | COMMUNITY

## 2021-09-09 RX ORDER — SPIRONOLACTONE 50 MG/1
TABLET ORAL
Refills: 0 | Status: ACTIVE | COMMUNITY

## 2021-09-09 NOTE — HISTORY OF PRESENT ILLNESS
[Obstructive Sleep Apnea] : obstructive sleep apnea [TextBox_4] : Hospital Course: \par Discharge Date	22-Nov-2020 \par Admission Date	15-Nov-2020 03:55 \par Reason for Admission	Resp failure \par Hospital Course	 \par 70 year old male with pmh of afib, pulm htn, chronic om, ckd, copd on home 02, \par cabg coming to hospital unresponsive from [facility]. Patient found to have AMS, acute \par on chronic respiratory failure. Initially due to this patient was intubated and \par extubated. Was in micu however as extubated and toleration ambulation diet and \par voiding transferred from MICU. patient also transudative pleural effusions s/p pigtail \par catheter via CT Surgery. Patient hospital course complicated by anemia which \par stabilized and eliquis restarted, patient s/p prbc. patient seen by cardio, \par pulm, thoracic surgery, nephro while admitted. patient being d/c in stable \par condition with home o2. patietn recommended for cardiac cath for pulm htn \par however patient and wife refusing given outpatient cardiology concerns\par \par I SPENT 35 MINIUTE REVIEWING NOTES, ORDERS, LABS AND IMAGES FROM HIS HOSPITALIZATION PRIOR TO THIS VISIT \par \par Not using Trilogy enough at night\par On 180 mg lasix BID\par Less edema\par Weight stable\par On Trelegy for COPD\par \par 11/25/20\par 75 pack year smoker - dc IN 2012\par COPD previously treated by Dr Ji with Advair\par No prostatism or glaucoma\par H/O VANITA - intolerant to CPAP or Bipap\par Sleeping in a recliner\par On 02\par Significant leg edema \par \par 12/31/20\par Hospital Course: \par Discharge Date	24-Dec-2020 \par Admission Date	14-Dec-2020 17:57 \par Reason for Admission	CHF exacerbation \par Hospital Course	 \par 69 y/o M w/ a PMH of AFib (on eliquis), HFpEF (follows with Dr. Zia Storm; \par last known EF 60-65% on 11/17), COPD (on 2L home O2), CABG 2013, and bladder CA \par (s/p TURP 2013) presented to [facility]  w/ AMS x1 day w/ associated lethargy, \par admitted for Acute hypoxemic and hypercapnic respiratory failure. Patient noted \par with to have evidence of fluid overload on exam, pBNP was 11K and CXR showed \par small pleural effusions consistent w/ CHF exacerbation. Patient seen by \par Cardiology, and their input appreciated. Patient managed with IV diuretics, \par telemonitoring, as well as Eliquis for anticoagulation for A. fib. Right heart \par catheterization Cardiomems recommended by Cardiology, and discussion with \par patient's wife and cardiologist Dr Storm. Patient and wife to consider if \par they want to continue outpatient cardiology follow up with Dr Storm/ [facility] or switch to [facility] for RHC/MEM. \par \par ABG also showed pt had hypoxemia and hypercapnia. Acute hypoxemic hypercapnic \par respiratory failure likely multifactorial 2/2 acute on chronic diastolic CHF \par and COPD exacerbation. \par Also noted with acute metabolic encephalopathy likely secondary to CO2 \par narcosis. He was seen by pulmonology team, and their inputs appreciated. Pt was \par placed on BIPAP and mentation improved. BiPAP later removed and supplemental \par oxygen via nasal cannula  titrated as tolerated. He was also managed with \par bronchodilator and steroids, and improved. \par To be discharged on tapered steroids and home oxygen. Patient has a compensated \par respiratory acidosis and is a candidate for NIV at night. We held bipap last \par night and observed abg this morning. Appears that patient is tolerating enough \par to go home and wait for trilogy. Discussed with CM who will continue to work on \par insurance auth after discharge. Patient in agreement with seeing pulmonologist \par as soon as possible (has appointment this Thursday) \par \par Patient also found to have GERALD on CKD 3,  likely with prerenal cause. \par Nephrologist saw and evaluated patient. A renal sonogram was negative for \par hydronephrosis. The patient was managed With IV diuretics, with strict \par monitoring of fluid inputs and outputs. His renal function has improved toward \par baseline. \par \par Patient with hyperglycemia and leukocytosis secondary to steroids \par administration. Xray L-tib/fib and L-foot showed no evidence of bony \par destruction, and negative for osteomyelitis. Patient afebrile, with no signs of \par infection. All the patient's chronic clinical issues were managed with his home \par medications. The patient has improved and is medically stable for discharge. Pt \par will for own cardiology for cardiac cath . NIV at night.\par \par I SPENT 35 MINIUTE REVIEWING NOTES, ORDERS, LABS AND IMAGES FROM HIS HOSPITALIZATION PRIOR TO THIS VISIT \par \par 2/18/21\par Hospitalized at [facility] for weeks in January of 2021\par Much better with aggressive diuresis\par Off Trilogy NIV and 02\par \par 9/9/21\par No cough or sputum\par Back pain limits activity \par  [ESS] : 8

## 2021-11-16 NOTE — H&P ADULT. - PROBLEM SELECTOR PLAN 7
Occupational Therapy Daily Treatment    Visit Count: 3    Precautions:     SUBJECTIVE   \" I am doing my exercises at home \"   Current Pain (0-10 scale): 5   Functional Change:     OBJECTIVE   Lt shoulder flex  84 degrees     Treatment   Therapeutic Exercise:    diaphragmatic breathing, cervical AROM, shoulder AROM, dowel exercises in supine . Door frame pec strechs and red theraband pulls , shoulder pulley 3 ins , UBE  4 mins     Manual Therapy: Manual lymphatic drainage: Proximal clear/flow and re-routing progressive through back . Cording techs , bilatly . Negative pressure therapy with lympha touch       Skilled input: verbal instruction/cues, tactile instruction/cues, posture correction    Home Program:       Writer verbally educated the patient and received verbal consent from the patient on hand placement, positioning of patient, and techniques to be performed today including clothing adjustments for techniques, therapist position for techniques, hand placement and palpation for techniques as described above and how they are pertinent to the patient's plan of care.       Suggestions for next session as indicated: progress per plan of care,     ASSESSMENT   Pt reports 5/10 pain , noted improved ROM , added more therapeutic exercises , pt tolerated well .    Pain after treatment (patient reported, 0-10 scale): 5  Result of above outlined education: Verbalizes understanding, Demonstrates understanding and Needs reinforcement    Therapy procedure time and total treatment time can be found documented on the Time Entry flowsheet   - off a/c as pt reports cards seen as outpt wanted  - cont metoprolol

## 2022-02-02 NOTE — PROGRESS NOTE ADULT - REASON FOR ADMISSION
Subjective   Subjective:      HPI:pt on ventilator and sedated. No active bleeding clinically.     Objective     Current Facility-Administered Medications:     dexamethasone (DECADRON) 4 mg/mL injection 4 mg, 4 mg, IntraVENous, Q12H, Ge Duran MD    NOREPINephrine (LEVOPHED) 8 mg in 0.9% NS 250ml infusion, 0.5-120 mcg/min, IntraVENous, TITRATE, Ge Duran MD, Stopped at 02/01/22 1827    lactulose (CHRONULAC) 10 gram/15 mL solution 15 mL, 10 g, Per G Tube, TID, Benedict Pathak MD, 15 mL at 02/02/22 1521    glucose chewable tablet 16 g, 4 Tablet, Oral, PRN, Benedict Pathak MD    glucagon (GLUCAGEN) injection 1 mg, 1 mg, IntraMUSCular, PRN, Benedict Pathak MD    insulin lispro (HUMALOG) injection, , SubCUTAneous, Q4H, Benedict Pathak MD, 3 Units at 01/31/22 0846    [Held by provider] insulin glargine (LANTUS) injection 40 Units, 40 Units, SubCUTAneous, QHS, Benedict Pathak MD, 40 Units at 01/31/22 2121    dextrose 10% infusion 125-250 mL, 125-250 mL, IntraVENous, PRN, Benedict Pathak MD, 250 mL at 02/01/22 1518    LORazepam (ATIVAN) injection 2 mg, 2 mg, IntraVENous, Q2H PRN, Maurisio Jorgensen PA-C, 2 mg at 01/29/22 0057    glucose chewable tablet 16 g, 4 Tablet, Oral, PRN, Benedict Pathak MD    glucagon (GLUCAGEN) injection 1 mg, 1 mg, IntraMUSCular, PRN, Benedict Pathak MD    levETIRAcetam (KEPPRA) tablet 1,500 mg, 1,500 mg, Oral, BID, Carola Atwood MD, 1,500 mg at 02/02/22 0915    valproic acid (as sodium salt) (DEPAKENE) 250 mg/5 mL (5 mL) oral solution 500 mg, 500 mg, Oral, BID, Lennox Ranks, Umar N, MD, 500 mg at 02/02/22 0915    sodium chloride (23.4%) 0.225 % in sterile water 1,000 mL infusion, , IntraVENous, CONTINUOUS, Abrahan Berkowitz MD, Last Rate: 75 mL/hr at 02/02/22 0556, New Bag at 02/02/22 0556    dextrose 10% infusion 125-250 mL, 125-250 mL, IntraVENous, PRN, Benedict Pathak MD, 250 mL at 02/02/22 0556    PHENYLephrine (ISRAEL-SYNEPHRINE) 30 mg in 0.9% CHF exacerbation sodium chloride 250 mL infusion,  mcg/min, IntraVENous, TITRATE, Zoe Jorgensen PA-C, Held at 01/28/22 2300    propofol (DIPRIVAN) 10 mg/mL infusion, 0-50 mcg/kg/min, IntraVENous, TITRATE, Lisa Smith MD, Stopped at 01/29/22 6619    lacosamide (VIMPAT) tablet 200 mg, 200 mg, Oral, BID, Demarcus Medina MD, 200 mg at 02/02/22 0915    levothyroxine (SYNTHROID) tablet 75 mcg, 75 mcg, Oral, 7am, Demarcus Medina MD, 75 mcg at 02/02/22 0915    aspirin chewable tablet 81 mg, 81 mg, Oral, DAILY, Demarcus Medina MD, 81 mg at 02/02/22 0915    sodium chloride (NS) flush 5-40 mL, 5-40 mL, IntraVENous, Q8H, Benito Nuñez MD, 10 mL at 02/02/22 1336    sodium chloride (NS) flush 5-40 mL, 5-40 mL, IntraVENous, PRN, Demarcus Medina MD    acetaminophen (TYLENOL) tablet 650 mg, 650 mg, Oral, Q6H PRN, 650 mg at 01/28/22 1111 **OR** acetaminophen (TYLENOL) suppository 650 mg, 650 mg, Rectal, Q6H PRN, Demarcus Medina MD    polyethylene glycol (MIRALAX) packet 17 g, 17 g, Oral, DAILY PRN, Demarcus Medina MD    ondansetron (ZOFRAN ODT) tablet 4 mg, 4 mg, Oral, Q8H PRN **OR** ondansetron (ZOFRAN) injection 4 mg, 4 mg, IntraVENous, Q6H PRN, Demarcus Medina MD    atorvastatin (LIPITOR) tablet 80 mg, 80 mg, Oral, QHS, Lisa Smith MD, 80 mg at 02/01/22 2100    piperacillin-tazobactam (ZOSYN) 3.375 g in 0.9% sodium chloride (MBP/ADV) 100 mL MBP, 3.375 g, IntraVENous, Q8H, Landon Aceves MD, Last Rate: 25 mL/hr at 02/02/22 1328, 3.375 g at 02/02/22 1328    azithromycin (ZITHROMAX) 500 mg in 0.9% sodium chloride 250 mL (VIAL-MATE), 500 mg, IntraVENous, Q24H, Benito Nuñez MD, Last Rate: 250 mL/hr at 02/02/22 1521, 500 mg at 02/02/22 1521    baricitinib (OLUMIANT) tablet 1 mg, 1 mg, Oral, DAILY, Ge Duran MD, 1 mg at 02/02/22 2092    Objective:     Visit Vitals  /63 (BP 1 Location: Left upper arm, BP Patient Position: At rest)   Pulse 75   Temp 97.7 °F (36.5 °C)   Resp 18   Ht 6' (1.829 m)   Wt 107.5 kg (236 lb 15.9 oz)   SpO2 97%   BMI 32.14 kg/m²      Physical Exam   Pt on venilator,sedated  @Objective    [email or web address]     Data Review:   Recent Results (from the past 24 hour(s))   GLUCOSE, POC    Collection Time: 02/01/22  8:50 PM   Result Value Ref Range    Glucose (POC) 57 (L) 65 - 117 mg/dL    Performed by [employer] [facility]    METABOLIC PANEL, COMPREHENSIVE    Collection Time: 02/01/22  9:30 PM   Result Value Ref Range    Sodium 139 136 - 145 mmol/L    Potassium 3.7 3.5 - 5.1 mmol/L    Chloride 112 (H) 97 - 108 mmol/L    CO2 20 (L) 21 - 32 mmol/L    Anion gap 7 5 - 15 mmol/L    Glucose 100 65 - 100 mg/dL    BUN 54 (H) 6 - 20 mg/dL    Creatinine 3.47 (H) 0.70 - 1.30 mg/dL    BUN/Creatinine ratio 16 12 - 20      GFR est AA 21 (L) >60 ml/min/1.73m2    GFR est non-AA 17 (L) >60 ml/min/1.73m2    Calcium 5.9 (LL) 8.5 - 10.1 mg/dL    Bilirubin, total 0.6 0.2 - 1.0 mg/dL    AST (SGOT) 1,024 (H) 15 - 37 U/L    ALT (SGPT) 718 (H) 12 - 78 U/L    Alk. phosphatase 111 45 - 117 U/L    Protein, total 6.1 (L) 6.4 - 8.2 g/dL    Albumin 1.5 (L) 3.5 - 5.0 g/dL    Globulin 4.6 (H) 2.0 - 4.0 g/dL    A-G Ratio 0.3 (L) 1.1 - 2.2     GLUCOSE, POC    Collection Time: 02/01/22 11:57 PM   Result Value Ref Range    Glucose (POC) 97 65 - 117 mg/dL    Performed by [employer] [facility]    GLUCOSE, POC    Collection Time: 02/02/22  4:25 AM   Result Value Ref Range    Glucose (POC) 70 65 - 117 mg/dL    Performed by Loren Nguyen    CBC W/O DIFF    Collection Time: 02/02/22  4:40 AM   Result Value Ref Range    WBC 19.4 (H) 4.1 - 11.1 K/uL    RBC 3.66 (L) 4.10 - 5.70 M/uL    HGB 11.8 (L) 12.1 - 17.0 g/dL    HCT 34.4 (L) 36.6 - 50.3 %    MCV 94.0 80.0 - 99.0 FL    MCH 32.2 26.0 - 34.0 PG    MCHC 34.3 30.0 - 36.5 g/dL    RDW 15.0 (H) 11.5 - 14.5 %    PLATELET  334 - 041 K/uL     PLATELET CLUMPS SEEN ON SMEAR, NA CITRATE ORDERED FOR ACCURATE PLT COUNT.     NRBC 0.5 (H) 0.0  WBC    ABSOLUTE NRBC 0.09 (H) 0.00 - 2.98 K/uL   METABOLIC PANEL, COMPREHENSIVE    Collection Time: 02/02/22  4:40 AM   Result Value Ref Range    Sodium 140 136 - 145 mmol/L    Potassium 4.1 3.5 - 5.1 mmol/L    Chloride 110 (H) 97 - 108 mmol/L    CO2 16 (L) 21 - 32 mmol/L    Anion gap 14 5 - 15 mmol/L    Glucose 66 65 - 100 mg/dL    BUN 63 (H) 6 - 20 mg/dL    Creatinine 4.06 (H) 0.70 - 1.30 mg/dL    BUN/Creatinine ratio 16 12 - 20      GFR est AA 18 (L) >60 ml/min/1.73m2    GFR est non-AA 15 (L) >60 ml/min/1.73m2    Calcium 6.9 (L) 8.5 - 10.1 mg/dL    Bilirubin, total 0.6 0.2 - 1.0 mg/dL    AST (SGOT) >1,000 (H) 15 - 37 U/L    ALT (SGPT) 687 (H) 12 - 78 U/L    Alk.  phosphatase 116 45 - 117 U/L    Protein, total 6.3 (L) 6.4 - 8.2 g/dL    Albumin 1.5 (L) 3.5 - 5.0 g/dL    Globulin 4.8 (H) 2.0 - 4.0 g/dL    A-G Ratio 0.3 (L) 1.1 - 2.2     PROTHROMBIN TIME + INR    Collection Time: 02/02/22  4:40 AM   Result Value Ref Range    Prothrombin time 14.7 11.9 - 14.7 sec    INR 1.2 (H) 0.9 - 1.1     LD    Collection Time: 02/02/22  4:40 AM   Result Value Ref Range    LD >4,000 (H) 85 - 241 U/L   D DIMER    Collection Time: 02/02/22  4:40 AM   Result Value Ref Range    D DIMER >20.00 (H) <0.50 ug/ml(FEU)   PROCALCITONIN    Collection Time: 02/02/22  4:40 AM   Result Value Ref Range    Procalcitonin 11.51 (H) 0 ng/mL   SED RATE (ESR)    Collection Time: 02/02/22  4:40 AM   Result Value Ref Range    Sed rate, automated 82 mm/hr   PHOSPHORUS    Collection Time: 02/02/22  4:40 AM   Result Value Ref Range    Phosphorus 3.0 2.6 - 4.7 mg/dL   MAGNESIUM    Collection Time: 02/02/22  4:40 AM   Result Value Ref Range    Magnesium 2.1 1.6 - 2.4 mg/dL   BLOOD GAS, ARTERIAL    Collection Time: 02/02/22  4:50 AM   Result Value Ref Range    pH 7.30 (L) 7.35 - 7.45      PCO2 31 (L) 35 - 45 mmHg    PO2 78 75 - 100 mmHg    O2 SAT 95 (L) >95 %    BICARBONATE 16 (L) 22 - 26 mmol/L    BASE DEFICIT 10.3 (H) 0 - 2 mmol/L    O2 METHOD VENT      FIO2 50.0 %    MODE Assist Control/Volume Control      Tidal volume 500      SET RATE 18      EPAP/CPAP/PEEP 5.0      SITE Right Radial      JULIO'S TEST PASS     GLUCOSE, POC    Collection Time: 02/02/22  9:51 AM   Result Value Ref Range    Glucose (POC) 101 65 - 117 mg/dL    Performed by Digiting    PLATELET COUNT ON CITRATED BLD    Collection Time: 02/02/22 10:10 AM   Result Value Ref Range    PLATELET COUNT (NA CITRATE) 90 (L) 150 - 400 K/uL   GLUCOSE, POC    Collection Time: 02/02/22 12:37 PM   Result Value Ref Range    Glucose (POC) 90 65 - 117 mg/dL    Performed by Digiting    GLUCOSE, POC    Collection Time: 02/02/22  4:07 PM   Result Value Ref Range    Glucose (POC) 80 65 - 117 mg/dL    Performed by Digiting        Assessment   Assessment/Plan:      1. Thrombocytopenia: Most likely multifactorial.  Patient has clumping reported. Pt has pseudothrombocytopenia. Citrated platelets 05W. D/w . Platelet clumping. No increased schistocytes. LDH high but retic count low. High LDH due to shock liver and COVID. No active hemolysis or TTP. Monitor. Pt actual platelets slighly low due to Covid infection and medications also. Patient on Protonix and antiseizure medications. Monitor.     2.  Elevated D-dimer: Nonspecific. Venous Doppler of bilateral lower extremities negative for DVT. Monitor.     3.  Leukocytosis and granulocytosis: Due to Covid infection SUBJECTIVE:S/p steroids. Monitor. ID following. 4.  Anemia; mild. Check iron studies. 5.  Acute kidney injury: Nephrology following. Patient on CRRT.   6.  Shock liver: LFTs improving.     NSTEMI:cardiology following.     Encephalopathy

## 2022-08-02 NOTE — PATIENT PROFILE ADULT - SURGICAL SITE INCISION
A (Catheter 5fr Juarez Radl 3.5 Crv 110cm Lg Lum 2 Sh) catheter was used to cross the aortic valve and placed in the left ventricle.  LV pressure was recorded and measured.  no

## 2022-09-30 ENCOUNTER — APPOINTMENT (OUTPATIENT)
Dept: PULMONOLOGY | Facility: CLINIC | Age: 72
End: 2022-09-30

## 2023-07-08 NOTE — PHYSICAL EXAM
[No Acute Distress] : no acute distress [Elongated Uvula] : elongated uvula [Enlarged Base of the Tongue] : enlarged base of the tongue [II] : Mallampati Class: II [Normal Appearance] : normal appearance [No Neck Mass] : no neck mass [No Murmurs] : no murmurs 08-Jul-2023 [No Resp Distress] : no resp distress [Clear to Auscultation Bilaterally] : clear to auscultation bilaterally [No Abnormalities] : no abnormalities [Benign] : benign [Normal Gait] : normal gait [No Clubbing] : no clubbing [No Cyanosis] : no cyanosis [3+ Pitting] : 3+ pitting [Normal Color/ Pigmentation] : normal color/ pigmentation [No Focal Deficits] : no focal deficits [Oriented x3] : oriented x3 [Normal Affect] : normal affect [TextBox_54] : Irreg - irreg

## 2023-09-29 NOTE — PROGRESS NOTE ADULT - PROBLEM/PLAN-1
-Followed by Heme for B12 and iron - hx of infusions; recent ferritin and B12 are low normal DISPLAY PLAN FREE TEXT

## 2024-05-18 NOTE — DIETITIAN INITIAL EVALUATION ADULT. - CALCULATED TO (ML/KG)
Bed: 16  Expected date:   Expected time:   Means of arrival:   Comments:  TRIAGE  
Pt remains A&Ox4, VS stable, respirations nonlabored, ambulating steadily, no s/s distress noted. Pt verbalized understanding of discharge education and follow-up instructions, d/c home in stable condition with all personal belongings.  
2500

## 2024-10-30 NOTE — PROGRESS NOTE ADULT - ASSESSMENT
70M hx CAD s/p CABG, COPD, AF, CKD now re-admitted with likely hypoxic respiratory failure in setting of decompensated HF    Acute on Chronic HFpEF  - volume overloaded on admission, hypoxic, large R pleural effusion, hyperkalemia  - largely euvolemic today, but remains with lower extremity edema  - continue maintenance dose of lasix 40mg PO bid  - limited F/u TTE- severe pulm HTN, mild reduced systolic function, mod TR  - strict I&O; replace electrolytes PRN- hyperkalemia resolved   - chest tube management per thoracic surgery  - long discussion with patient and wife at bedside and then additionally with patient's outpatient Cardiologist Dr Zia Storm ([phone]).  Patient, wife, and Dr Storm at this time would prefer to defer angiogram in order to spare remaining renal function.  They prefer to continue diuretics to allow his renal and respiratory function to recover, and will pursue ischemia evaluation as an outpatient    AF  - rate controlled, but with episodes of NSVT overnight on telemetry  - CHASDVasc 3  - heparin gtt d/c secondary to ozzing at chest tube, and IV sites.   - Hb continues to trend downwards; Hb needs to be at least stabilized prior to trial of restarting AC  - ideally maintain Hb >8  - resume Coreg at 3.125/bid today  - EP evaluation for Watchman appreciated, will follow up after current hospitalization    CAD  - no chest pain, unchanged EKG, negative troponin on admission  - mildly elevated troponin on previous admission in setting of reduced renal clearance  - patient, family, and outpatient Cardiologist at this time preferring to defer angiogram   - continue ASA, statin  - nephrology consulted, follow up recommendations Detail Level: Detailed Quality 431: Preventive Care And Screening: Unhealthy Alcohol Use - Screening: Patient did not receive brief counseling if identified as an unhealthy alcohol user, reason not given Quality 226: Preventive Care And Screening: Tobacco Use: Screening And Cessation Intervention: Patient screened for tobacco use and is an ex/non-smoker Quality 130: Documentation Of Current Medications In The Medical Record: Current Medications Documented

## 2025-06-19 NOTE — ED PROCEDURE NOTE - CPROC ED CUFF TYPE1
cuffed
- Do not remove dressings until post op visit tomorrow  -Shower in 24 hours   -No heavy lifting or strenuous activity   -Monitor for infection   -Take medications as prescribed